# Patient Record
Sex: MALE | Race: WHITE | NOT HISPANIC OR LATINO | URBAN - METROPOLITAN AREA
[De-identification: names, ages, dates, MRNs, and addresses within clinical notes are randomized per-mention and may not be internally consistent; named-entity substitution may affect disease eponyms.]

---

## 2021-07-19 ENCOUNTER — OUTPATIENT (OUTPATIENT)
Dept: OUTPATIENT SERVICES | Facility: HOSPITAL | Age: 76
LOS: 1 days | End: 2021-07-19
Payer: MEDICARE

## 2021-07-19 DIAGNOSIS — M50.20 OTHER CERVICAL DISC DISPLACEMENT, UNSPECIFIED CERVICAL REGION: Chronic | ICD-10-CM

## 2021-07-19 DIAGNOSIS — M54.16 RADICULOPATHY, LUMBAR REGION: ICD-10-CM

## 2021-07-19 LAB
INR BLD: 1.07 RATIO — SIGNIFICANT CHANGE UP (ref 0.88–1.16)
PROTHROM AB SERPL-ACNC: 12.9 SEC — SIGNIFICANT CHANGE UP (ref 10.6–13.6)

## 2021-07-19 PROCEDURE — 62323 NJX INTERLAMINAR LMBR/SAC: CPT

## 2021-07-19 PROCEDURE — 85610 PROTHROMBIN TIME: CPT

## 2021-07-19 PROCEDURE — 36415 COLL VENOUS BLD VENIPUNCTURE: CPT

## 2022-03-03 ENCOUNTER — APPOINTMENT (OUTPATIENT)
Dept: SURGERY | Facility: CLINIC | Age: 77
End: 2022-03-03
Payer: MEDICARE

## 2022-03-03 VITALS
SYSTOLIC BLOOD PRESSURE: 159 MMHG | BODY MASS INDEX: 32.58 KG/M2 | DIASTOLIC BLOOD PRESSURE: 85 MMHG | WEIGHT: 215 LBS | TEMPERATURE: 98.3 F | HEART RATE: 102 BPM | OXYGEN SATURATION: 92 % | HEIGHT: 68 IN

## 2022-03-03 PROCEDURE — 99204 OFFICE O/P NEW MOD 45 MIN: CPT

## 2022-03-03 NOTE — PLAN
[FreeTextEntry1] : Open Left Scrotal Inguinal Hernia repair. Due to potential for abdominal compartment syndrome, the hernia will be repaired in stages. The large symptomatic left inguinal hernia will be approached and right side will be left alone in case on increase in intraabdominal pressure. Pt has gained medical clearance from PCP and pulmonologist for surgical intervention. Potential for drain in the hernia sac was also discussed in detail. The patient and his wife is agreeable and would like to proceed with the operation.  All risk, benefit, alternatives were explained to the patient and they are agreeable.\par

## 2022-03-03 NOTE — ASSESSMENT
[FreeTextEntry1] : 77 yo M with PMH of COPD, Factor V Leiden (on warfarin), Morgagni hernia, HTN and Parkinson's presenting for surgical evaluation of b/l inguinal hernia.

## 2022-03-03 NOTE — HISTORY OF PRESENT ILLNESS
[de-identified] : 77 yo M with PMH of COPD, Factor V Leiden (on warfarin), Morgagni hernia, HTN and Parkinson's presenting for evaluation of inguinal hernia. Pt first noticed the hernia 6 years ago, is not able to differentiate whether it is L, R or both. Has progressively increased in size. Denies any pain or discomfort. States he is not able to reduce it anymore. Denies fever, chills, bloody stools, nausea or vomiting.

## 2022-03-03 NOTE — PHYSICAL EXAM
[Normal Breath Sounds] : Normal breath sounds [Normal Heart Sounds] : normal heart sounds [Alert] : alert [Oriented to Person] : oriented to person [Oriented to Place] : oriented to place [Oriented to Time] : oriented to time [Calm] : calm [de-identified] : comfortable [de-identified] : tachycardic [de-identified] : Non-tender, non-distended abdomen. Large incarcerated inguinal hernia, L>R with no pain on palpation. NBS [de-identified] : resting tremor in b/l hands, mild

## 2022-06-01 ENCOUNTER — APPOINTMENT (OUTPATIENT)
Dept: SURGERY | Facility: CLINIC | Age: 77
End: 2022-06-01
Payer: MEDICARE

## 2022-06-08 ENCOUNTER — APPOINTMENT (OUTPATIENT)
Dept: SURGERY | Facility: CLINIC | Age: 77
End: 2022-06-08
Payer: MEDICARE

## 2022-06-08 VITALS
OXYGEN SATURATION: 95 % | RESPIRATION RATE: 17 BRPM | SYSTOLIC BLOOD PRESSURE: 180 MMHG | BODY MASS INDEX: 32.74 KG/M2 | HEIGHT: 68 IN | DIASTOLIC BLOOD PRESSURE: 84 MMHG | HEART RATE: 95 BPM | TEMPERATURE: 96.7 F | WEIGHT: 216 LBS

## 2022-06-08 VITALS — SYSTOLIC BLOOD PRESSURE: 167 MMHG | DIASTOLIC BLOOD PRESSURE: 81 MMHG

## 2022-06-08 DIAGNOSIS — B37.2 CANDIDIASIS OF SKIN AND NAIL: ICD-10-CM

## 2022-06-08 DIAGNOSIS — J44.9 CHRONIC OBSTRUCTIVE PULMONARY DISEASE, UNSPECIFIED: ICD-10-CM

## 2022-06-08 DIAGNOSIS — Z82.5 FAMILY HISTORY OF ASTHMA AND OTHER CHRONIC LOWER RESPIRATORY DISEASES: ICD-10-CM

## 2022-06-08 DIAGNOSIS — Q79.0 CONGENITAL DIAPHRAGMATIC HERNIA: ICD-10-CM

## 2022-06-08 DIAGNOSIS — Z80.1 FAMILY HISTORY OF MALIGNANT NEOPLASM OF TRACHEA, BRONCHUS AND LUNG: ICD-10-CM

## 2022-06-08 DIAGNOSIS — Z86.711 PERSONAL HISTORY OF PULMONARY EMBOLISM: ICD-10-CM

## 2022-06-08 DIAGNOSIS — G20 PARKINSON'S DISEASE: ICD-10-CM

## 2022-06-08 DIAGNOSIS — I87.2 VENOUS INSUFFICIENCY (CHRONIC) (PERIPHERAL): ICD-10-CM

## 2022-06-08 DIAGNOSIS — Z87.891 PERSONAL HISTORY OF NICOTINE DEPENDENCE: ICD-10-CM

## 2022-06-08 DIAGNOSIS — Z82.49 FAMILY HISTORY OF ISCHEMIC HEART DISEASE AND OTHER DISEASES OF THE CIRCULATORY SYSTEM: ICD-10-CM

## 2022-06-08 DIAGNOSIS — R73.03 PREDIABETES.: ICD-10-CM

## 2022-06-08 DIAGNOSIS — R41.3 OTHER AMNESIA: ICD-10-CM

## 2022-06-08 DIAGNOSIS — Z78.9 OTHER SPECIFIED HEALTH STATUS: ICD-10-CM

## 2022-06-08 DIAGNOSIS — M48.00 SPINAL STENOSIS, SITE UNSPECIFIED: ICD-10-CM

## 2022-06-08 DIAGNOSIS — K46.9 UNSPECIFIED ABDOMINAL HERNIA W/OUT OBSTRUCTION OR GANGRENE: ICD-10-CM

## 2022-06-08 PROCEDURE — 99205 OFFICE O/P NEW HI 60 MIN: CPT

## 2022-06-08 PROCEDURE — 99215 OFFICE O/P EST HI 40 MIN: CPT

## 2022-06-08 RX ORDER — CHROMIUM 200 MCG
TABLET ORAL
Refills: 0 | Status: ACTIVE | COMMUNITY

## 2022-06-08 RX ORDER — CARBIDOPA AND LEVODOPA 25; 100 MG/1; MG/1
25-100 TABLET ORAL
Refills: 0 | Status: ACTIVE | COMMUNITY

## 2022-06-08 RX ORDER — WARFARIN 5 MG/1
5 TABLET ORAL
Refills: 0 | Status: ACTIVE | COMMUNITY

## 2022-06-08 RX ORDER — NYSTATIN 100000 [USP'U]/G
100000 POWDER TOPICAL TWICE DAILY
Qty: 1 | Refills: 0 | Status: ACTIVE | COMMUNITY
Start: 2022-06-08 | End: 1900-01-01

## 2022-06-08 RX ORDER — AMLODIPINE BESYLATE 2.5 MG/1
2.5 TABLET ORAL
Refills: 0 | Status: ACTIVE | COMMUNITY

## 2022-06-08 RX ORDER — ROPINIROLE 2 MG/1
2 TABLET, FILM COATED ORAL
Refills: 0 | Status: ACTIVE | COMMUNITY

## 2022-06-08 RX ORDER — ALBUTEROL 90 MCG
AEROSOL (GRAM) INHALATION
Refills: 0 | Status: ACTIVE | COMMUNITY

## 2022-06-08 RX ORDER — WARFARIN SODIUM 2.5 MG/1
2.5 TABLET ORAL
Refills: 0 | Status: ACTIVE | COMMUNITY

## 2022-06-08 RX ORDER — IRBESARTAN AND HYDROCHLOROTHIAZIDE 300; 12.5 MG/1; MG/1
300-12.5 TABLET ORAL
Refills: 0 | Status: ACTIVE | COMMUNITY

## 2022-06-08 RX ORDER — TRIHEXYPHENIDYL HYDROCHLORIDE 5 MG/1
TABLET ORAL
Refills: 0 | Status: ACTIVE | COMMUNITY

## 2022-06-08 RX ORDER — BUDESONIDE AND FORMOTEROL FUMARATE DIHYDRATE 160; 4.5 UG/1; UG/1
160-4.5 AEROSOL RESPIRATORY (INHALATION)
Refills: 0 | Status: ACTIVE | COMMUNITY

## 2022-06-08 RX ORDER — FEXOFENADINE HYDROCHLORIDE 180 MG/1
180 TABLET ORAL
Refills: 0 | Status: ACTIVE | COMMUNITY

## 2022-06-08 NOTE — ASSESSMENT
[FreeTextEntry1] : 76-year-old male with multiple medical problems and bilateral, chronically incarcerated scrotal hernias, left larger than right.

## 2022-06-08 NOTE — PHYSICAL EXAM
[de-identified] : Obese, soft, nondistended, nontender. Moderate degree of upper midline diastasis. Left groin-  large, nontender, irreducible scrotal hernia. Right groin-  somewhat smaller, nontender, irreducible scrotal hernia.

## 2022-06-08 NOTE — HISTORY OF PRESENT ILLNESS
[de-identified] : HEATH  is a 76 year  male  here for a consultation for possible bilateral inguinal hernias. [de-identified] : 76-year-old male with severe COPD, Parkinson's disease, on Coumadin for factor V Leiden dictation with history of prior bilateral PEs now with bilateral scrotal hernias of many years duration. No pain or tenderness and no generalized abdominal symptoms. Patient does suffer from chronic constipation likely related to Parkinson's disease and medications.

## 2022-06-08 NOTE — PLAN
[FreeTextEntry1] : Advised staged repairs with a left-sided repair first. Discussed with patient and wife nature of, indications for and risks/benefits of surgery. Procedure to be scheduled on an inpatient basis and Coumadin to be held 4-5 days preoperatively

## 2022-07-12 ENCOUNTER — APPOINTMENT (OUTPATIENT)
Dept: SURGERY | Facility: HOSPITAL | Age: 77
End: 2022-07-12

## 2022-11-14 ENCOUNTER — OFFICE (OUTPATIENT)
Dept: URBAN - METROPOLITAN AREA CLINIC 70 | Facility: CLINIC | Age: 77
Setting detail: OPHTHALMOLOGY
End: 2022-11-14
Payer: MEDICARE

## 2022-11-14 DIAGNOSIS — Z96.1: ICD-10-CM

## 2022-11-14 DIAGNOSIS — D31.32: ICD-10-CM

## 2022-11-14 DIAGNOSIS — H35.40: ICD-10-CM

## 2022-11-14 DIAGNOSIS — H35.54: ICD-10-CM

## 2022-11-14 DIAGNOSIS — E11.9: ICD-10-CM

## 2022-11-14 DIAGNOSIS — H16.223: ICD-10-CM

## 2022-11-14 PROCEDURE — 99213 OFFICE O/P EST LOW 20 MIN: CPT | Performed by: OPHTHALMOLOGY

## 2022-11-14 PROCEDURE — 76512 OPH US DX B-SCAN: CPT | Performed by: OPHTHALMOLOGY

## 2022-11-14 ASSESSMENT — REFRACTION_CURRENTRX
OD_CYLINDER: -1.00
OS_VPRISM_DIRECTION: PROGS
OD_SPHERE: -0.50
OS_SPHERE: +0.75
OS_OVR_VA: 20/
OS_CYLINDER: -0.25
OS_SPHERE: +0.50
OS_CYLINDER: -0.25
OD_OVR_VA: 20/
OD_ADD: +2.50
OS_AXIS: 081
OS_ADD: +2.00
OD_AXIS: 085
OD_ADD: +2.50
OD_VPRISM_DIRECTION: PROGS
OS_OVR_VA: 20/
OD_AXIS: 088
OD_SPHERE: -0.50
OD_CYLINDER: -1.25
OS_CYLINDER: -0.50
OD_VPRISM_DIRECTION: PROGS
OD_CYLINDER: -1.50
OD_SPHERE: +0.50
OD_OVR_VA: 20/
OS_AXIS: 066
OS_OVR_VA: 20/
OD_AXIS: 090
OD_OVR_VA: 20/
OS_SPHERE: +0.50
OD_ADD: +2.25
OS_VPRISM_DIRECTION: PROGS
OS_ADD: +2.00
OS_ADD: +2.50
OS_AXIS: 072

## 2022-11-14 ASSESSMENT — KERATOMETRY
OD_K1POWER_DIOPTERS: 44.50
OS_K1POWER_DIOPTERS: 45.25
OS_K2POWER_DIOPTERS: 46.00
OS_AXISANGLE_DEGREES: 159
OD_K2POWER_DIOPTERS: 45.75
OD_AXISANGLE_DEGREES: 174

## 2022-11-14 ASSESSMENT — REFRACTION_MANIFEST
OD_SPHERE: -0.75
OD_VA2: 20/20
OS_AXIS: 75
OS_ADD: +2.50
OU_VA: 20/20
OS_SPHERE: +0.50
OD_AXIS: 90
OD_VA2: 20/20
OS_CYLINDER: -0.50
OS_AXIS: 065
OD_AXIS: 090
OS_VA2: 20/20
OS_ADD: +2.75
OS_VA1: 20/25
OD_CYLINDER: -1.00
OS_VA2: 20/20
OD_ADD: +2.75
OD_VA1: 20/25
OD_SPHERE: +0.25
OD_CYLINDER: -1.25
OS_CYLINDER: -0.25
OS_SPHERE: +0.25
OD_VA1: 20/20
OD_ADD: +2.50
OS_VA1: 20/20

## 2022-11-14 ASSESSMENT — TONOMETRY
OS_IOP_MMHG: 16
OD_IOP_MMHG: 15

## 2022-11-14 ASSESSMENT — REFRACTION_AUTOREFRACTION
OD_SPHERE: 0.00
OD_CYLINDER: -2.00
OS_CYLINDER: -1.25
OS_SPHERE: +1.00
OS_AXIS: 074
OD_AXIS: 084

## 2022-11-14 ASSESSMENT — SPHEQUIV_DERIVED
OS_SPHEQUIV: 0.25
OD_SPHEQUIV: -0.375
OD_SPHEQUIV: -1
OS_SPHEQUIV: 0.375
OD_SPHEQUIV: -1.25
OS_SPHEQUIV: 0.125

## 2022-11-14 ASSESSMENT — SUPERFICIAL PUNCTATE KERATITIS (SPK)
OD_SPK: 1+
OS_SPK: 1+

## 2022-11-14 ASSESSMENT — PACHYMETRY
OS_CT_UM: 560
OD_CT_CORRECTION: -1
OD_CT_UM: 562
OS_CT_CORRECTION: -1

## 2022-11-14 ASSESSMENT — AXIALLENGTH_DERIVED
OS_AL: 22.7003
OS_AL: 22.7455
OD_AL: 23.3864
OD_AL: 23.1496
OD_AL: 23.4825
OS_AL: 22.7908

## 2022-11-14 ASSESSMENT — VISUAL ACUITY
OD_BCVA: 20/25-2
OS_BCVA: 20/30

## 2022-11-14 ASSESSMENT — CONFRONTATIONAL VISUAL FIELD TEST (CVF)
OS_FINDINGS: FULL
OD_FINDINGS: FULL

## 2022-12-23 ENCOUNTER — INPATIENT (INPATIENT)
Facility: HOSPITAL | Age: 77
LOS: 14 days | Discharge: HOME HEALTH | DRG: 862 | End: 2023-01-07
Attending: STUDENT IN AN ORGANIZED HEALTH CARE EDUCATION/TRAINING PROGRAM | Admitting: STUDENT IN AN ORGANIZED HEALTH CARE EDUCATION/TRAINING PROGRAM
Payer: MEDICARE

## 2022-12-23 VITALS
RESPIRATION RATE: 20 BRPM | HEIGHT: 67 IN | OXYGEN SATURATION: 99 % | HEART RATE: 122 BPM | TEMPERATURE: 100 F | DIASTOLIC BLOOD PRESSURE: 78 MMHG | SYSTOLIC BLOOD PRESSURE: 144 MMHG | WEIGHT: 210.1 LBS

## 2022-12-23 DIAGNOSIS — N49.2 INFLAMMATORY DISORDERS OF SCROTUM: ICD-10-CM

## 2022-12-23 DIAGNOSIS — M50.20 OTHER CERVICAL DISC DISPLACEMENT, UNSPECIFIED CERVICAL REGION: Chronic | ICD-10-CM

## 2022-12-23 DIAGNOSIS — Z01.818 ENCOUNTER FOR OTHER PREPROCEDURAL EXAMINATION: ICD-10-CM

## 2022-12-23 DIAGNOSIS — Z86.2 PERSONAL HISTORY OF DISEASES OF THE BLOOD AND BLOOD-FORMING ORGANS AND CERTAIN DISORDERS INVOLVING THE IMMUNE MECHANISM: ICD-10-CM

## 2022-12-23 DIAGNOSIS — I48.91 UNSPECIFIED ATRIAL FIBRILLATION: ICD-10-CM

## 2022-12-23 DIAGNOSIS — I10 ESSENTIAL (PRIMARY) HYPERTENSION: ICD-10-CM

## 2022-12-23 DIAGNOSIS — G20 PARKINSON'S DISEASE: ICD-10-CM

## 2022-12-23 DIAGNOSIS — Z98.890 OTHER SPECIFIED POSTPROCEDURAL STATES: Chronic | ICD-10-CM

## 2022-12-23 DIAGNOSIS — J44.9 CHRONIC OBSTRUCTIVE PULMONARY DISEASE, UNSPECIFIED: ICD-10-CM

## 2022-12-23 DIAGNOSIS — Z91.89 OTHER SPECIFIED PERSONAL RISK FACTORS, NOT ELSEWHERE CLASSIFIED: ICD-10-CM

## 2022-12-23 DIAGNOSIS — A41.9 SEPSIS, UNSPECIFIED ORGANISM: ICD-10-CM

## 2022-12-23 LAB
ALBUMIN SERPL ELPH-MCNC: 3.2 G/DL — LOW (ref 3.3–5)
ALP SERPL-CCNC: 132 U/L — HIGH (ref 40–120)
ALT FLD-CCNC: 8 U/L — LOW (ref 10–45)
ANION GAP SERPL CALC-SCNC: 14 MMOL/L — SIGNIFICANT CHANGE UP (ref 5–17)
APTT BLD: 44.7 SEC — HIGH (ref 27.5–35.5)
AST SERPL-CCNC: 25 U/L — SIGNIFICANT CHANGE UP (ref 10–40)
BASE EXCESS BLDV CALC-SCNC: 8.2 MMOL/L — HIGH (ref -2–3)
BASOPHILS # BLD AUTO: 0.03 K/UL — SIGNIFICANT CHANGE UP (ref 0–0.2)
BASOPHILS NFR BLD AUTO: 0.2 % — SIGNIFICANT CHANGE UP (ref 0–2)
BILIRUB SERPL-MCNC: 0.6 MG/DL — SIGNIFICANT CHANGE UP (ref 0.2–1.2)
BUN SERPL-MCNC: 23 MG/DL — SIGNIFICANT CHANGE UP (ref 7–23)
CA-I SERPL-SCNC: 1.13 MMOL/L — LOW (ref 1.15–1.33)
CALCIUM SERPL-MCNC: 9.1 MG/DL — SIGNIFICANT CHANGE UP (ref 8.4–10.5)
CHLORIDE BLDV-SCNC: 98 MMOL/L — SIGNIFICANT CHANGE UP (ref 96–108)
CHLORIDE SERPL-SCNC: 96 MMOL/L — SIGNIFICANT CHANGE UP (ref 96–108)
CO2 BLDV-SCNC: 34 MMOL/L — HIGH (ref 22–26)
CO2 SERPL-SCNC: 28 MMOL/L — SIGNIFICANT CHANGE UP (ref 22–31)
CREAT SERPL-MCNC: 1.16 MG/DL — SIGNIFICANT CHANGE UP (ref 0.5–1.3)
EGFR: 64 ML/MIN/1.73M2 — SIGNIFICANT CHANGE UP
EOSINOPHIL # BLD AUTO: 0.02 K/UL — SIGNIFICANT CHANGE UP (ref 0–0.5)
EOSINOPHIL NFR BLD AUTO: 0.1 % — SIGNIFICANT CHANGE UP (ref 0–6)
GAS PNL BLDV: 133 MMOL/L — LOW (ref 136–145)
GAS PNL BLDV: SIGNIFICANT CHANGE UP
GAS PNL BLDV: SIGNIFICANT CHANGE UP
GLUCOSE BLDV-MCNC: 187 MG/DL — HIGH (ref 70–99)
GLUCOSE SERPL-MCNC: 179 MG/DL — HIGH (ref 70–99)
HCO3 BLDV-SCNC: 33 MMOL/L — HIGH (ref 22–29)
HCT VFR BLD CALC: 41.3 % — SIGNIFICANT CHANGE UP (ref 39–50)
HCT VFR BLDA CALC: 46 % — SIGNIFICANT CHANGE UP (ref 39–51)
HGB BLD CALC-MCNC: 15.2 G/DL — SIGNIFICANT CHANGE UP (ref 12.6–17.4)
HGB BLD-MCNC: 13.1 G/DL — SIGNIFICANT CHANGE UP (ref 13–17)
IMM GRANULOCYTES NFR BLD AUTO: 0.4 % — SIGNIFICANT CHANGE UP (ref 0–0.9)
INR BLD: 4.06 RATIO — HIGH (ref 0.88–1.16)
LACTATE BLDV-MCNC: 1.9 MMOL/L — SIGNIFICANT CHANGE UP (ref 0.5–2)
LYMPHOCYTES # BLD AUTO: 0.71 K/UL — LOW (ref 1–3.3)
LYMPHOCYTES # BLD AUTO: 4.9 % — LOW (ref 13–44)
MCHC RBC-ENTMCNC: 29.2 PG — SIGNIFICANT CHANGE UP (ref 27–34)
MCHC RBC-ENTMCNC: 31.7 GM/DL — LOW (ref 32–36)
MCV RBC AUTO: 92 FL — SIGNIFICANT CHANGE UP (ref 80–100)
MONOCYTES # BLD AUTO: 1.36 K/UL — HIGH (ref 0–0.9)
MONOCYTES NFR BLD AUTO: 9.3 % — SIGNIFICANT CHANGE UP (ref 2–14)
NEUTROPHILS # BLD AUTO: 12.42 K/UL — HIGH (ref 1.8–7.4)
NEUTROPHILS NFR BLD AUTO: 85.1 % — HIGH (ref 43–77)
NRBC # BLD: 0 /100 WBCS — SIGNIFICANT CHANGE UP (ref 0–0)
PCO2 BLDV: 44 MMHG — SIGNIFICANT CHANGE UP (ref 42–55)
PH BLDV: 7.48 — HIGH (ref 7.32–7.43)
PLATELET # BLD AUTO: 271 K/UL — SIGNIFICANT CHANGE UP (ref 150–400)
PO2 BLDV: 34 MMHG — SIGNIFICANT CHANGE UP (ref 25–45)
POTASSIUM BLDV-SCNC: 3.8 MMOL/L — SIGNIFICANT CHANGE UP (ref 3.5–5.1)
POTASSIUM SERPL-MCNC: 3.5 MMOL/L — SIGNIFICANT CHANGE UP (ref 3.5–5.3)
POTASSIUM SERPL-SCNC: 3.5 MMOL/L — SIGNIFICANT CHANGE UP (ref 3.5–5.3)
PROT SERPL-MCNC: 7.2 G/DL — SIGNIFICANT CHANGE UP (ref 6–8.3)
PROTHROM AB SERPL-ACNC: 47.8 SEC — HIGH (ref 10.5–13.4)
RAPID RVP RESULT: SIGNIFICANT CHANGE UP
RBC # BLD: 4.49 M/UL — SIGNIFICANT CHANGE UP (ref 4.2–5.8)
RBC # FLD: 16 % — HIGH (ref 10.3–14.5)
SAO2 % BLDV: 57.2 % — LOW (ref 67–88)
SARS-COV-2 RNA SPEC QL NAA+PROBE: SIGNIFICANT CHANGE UP
SODIUM SERPL-SCNC: 138 MMOL/L — SIGNIFICANT CHANGE UP (ref 135–145)
WBC # BLD: 14.6 K/UL — HIGH (ref 3.8–10.5)
WBC # FLD AUTO: 14.6 K/UL — HIGH (ref 3.8–10.5)

## 2022-12-23 PROCEDURE — 71250 CT THORAX DX C-: CPT | Mod: 26,MA

## 2022-12-23 PROCEDURE — 71045 X-RAY EXAM CHEST 1 VIEW: CPT | Mod: 26

## 2022-12-23 PROCEDURE — 99223 1ST HOSP IP/OBS HIGH 75: CPT

## 2022-12-23 PROCEDURE — 74177 CT ABD & PELVIS W/CONTRAST: CPT | Mod: 26,MA

## 2022-12-23 PROCEDURE — 99232 SBSQ HOSP IP/OBS MODERATE 35: CPT

## 2022-12-23 PROCEDURE — 99285 EMERGENCY DEPT VISIT HI MDM: CPT | Mod: GC

## 2022-12-23 PROCEDURE — 51702 INSERT TEMP BLADDER CATH: CPT

## 2022-12-23 RX ORDER — ACETAMINOPHEN 500 MG
650 TABLET ORAL EVERY 6 HOURS
Refills: 0 | Status: DISCONTINUED | OUTPATIENT
Start: 2022-12-23 | End: 2023-01-07

## 2022-12-23 RX ORDER — PIPERACILLIN AND TAZOBACTAM 4; .5 G/20ML; G/20ML
3.38 INJECTION, POWDER, LYOPHILIZED, FOR SOLUTION INTRAVENOUS ONCE
Refills: 0 | Status: COMPLETED | OUTPATIENT
Start: 2022-12-23 | End: 2022-12-23

## 2022-12-23 RX ORDER — TRIHEXYPHENIDYL HCL 2 MG
1 TABLET ORAL
Refills: 0 | Status: DISCONTINUED | OUTPATIENT
Start: 2022-12-23 | End: 2023-01-07

## 2022-12-23 RX ORDER — CARBIDOPA AND LEVODOPA 25; 100 MG/1; MG/1
1 TABLET ORAL
Refills: 0 | Status: DISCONTINUED | OUTPATIENT
Start: 2022-12-23 | End: 2023-01-07

## 2022-12-23 RX ORDER — BUDESONIDE AND FORMOTEROL FUMARATE DIHYDRATE 160; 4.5 UG/1; UG/1
2 AEROSOL RESPIRATORY (INHALATION)
Refills: 0 | Status: DISCONTINUED | OUTPATIENT
Start: 2022-12-23 | End: 2023-01-07

## 2022-12-23 RX ORDER — PIPERACILLIN AND TAZOBACTAM 4; .5 G/20ML; G/20ML
3.38 INJECTION, POWDER, LYOPHILIZED, FOR SOLUTION INTRAVENOUS EVERY 8 HOURS
Refills: 0 | Status: DISCONTINUED | OUTPATIENT
Start: 2022-12-24 | End: 2022-12-28

## 2022-12-23 RX ORDER — VANCOMYCIN HCL 1 G
1000 VIAL (EA) INTRAVENOUS ONCE
Refills: 0 | Status: COMPLETED | OUTPATIENT
Start: 2022-12-23 | End: 2022-12-23

## 2022-12-23 RX ORDER — LACTOBACILLUS ACIDOPHILUS 100MM CELL
1 CAPSULE ORAL DAILY
Refills: 0 | Status: DISCONTINUED | OUTPATIENT
Start: 2022-12-23 | End: 2023-01-07

## 2022-12-23 RX ORDER — ROPINIROLE 8 MG/1
2 TABLET, FILM COATED, EXTENDED RELEASE ORAL
Refills: 0 | Status: DISCONTINUED | OUTPATIENT
Start: 2022-12-23 | End: 2023-01-07

## 2022-12-23 RX ORDER — VANCOMYCIN HCL 1 G
750 VIAL (EA) INTRAVENOUS EVERY 12 HOURS
Refills: 0 | Status: DISCONTINUED | OUTPATIENT
Start: 2022-12-24 | End: 2022-12-25

## 2022-12-23 RX ORDER — FOLIC ACID 0.8 MG
1000 TABLET ORAL
Qty: 0 | Refills: 0 | DISCHARGE

## 2022-12-23 RX ORDER — FOLIC ACID 0.8 MG
1 TABLET ORAL DAILY
Refills: 0 | Status: DISCONTINUED | OUTPATIENT
Start: 2022-12-23 | End: 2023-01-07

## 2022-12-23 RX ORDER — SODIUM CHLORIDE 9 MG/ML
1000 INJECTION, SOLUTION INTRAVENOUS ONCE
Refills: 0 | Status: COMPLETED | OUTPATIENT
Start: 2022-12-23 | End: 2022-12-23

## 2022-12-23 RX ORDER — PREGABALIN 225 MG/1
1000 CAPSULE ORAL DAILY
Refills: 0 | Status: DISCONTINUED | OUTPATIENT
Start: 2022-12-23 | End: 2023-01-07

## 2022-12-23 RX ORDER — LANOLIN ALCOHOL/MO/W.PET/CERES
3 CREAM (GRAM) TOPICAL AT BEDTIME
Refills: 0 | Status: DISCONTINUED | OUTPATIENT
Start: 2022-12-23 | End: 2023-01-07

## 2022-12-23 RX ORDER — ACETAMINOPHEN 500 MG
975 TABLET ORAL ONCE
Refills: 0 | Status: COMPLETED | OUTPATIENT
Start: 2022-12-23 | End: 2022-12-23

## 2022-12-23 RX ORDER — LOSARTAN POTASSIUM 100 MG/1
100 TABLET, FILM COATED ORAL DAILY
Refills: 0 | Status: DISCONTINUED | OUTPATIENT
Start: 2022-12-23 | End: 2023-01-07

## 2022-12-23 RX ORDER — IPRATROPIUM/ALBUTEROL SULFATE 18-103MCG
3 AEROSOL WITH ADAPTER (GRAM) INHALATION EVERY 6 HOURS
Refills: 0 | Status: DISCONTINUED | OUTPATIENT
Start: 2022-12-23 | End: 2023-01-07

## 2022-12-23 RX ORDER — ONDANSETRON 8 MG/1
4 TABLET, FILM COATED ORAL EVERY 8 HOURS
Refills: 0 | Status: DISCONTINUED | OUTPATIENT
Start: 2022-12-23 | End: 2023-01-07

## 2022-12-23 RX ORDER — CEFTRIAXONE 500 MG/1
1000 INJECTION, POWDER, FOR SOLUTION INTRAMUSCULAR; INTRAVENOUS ONCE
Refills: 0 | Status: COMPLETED | OUTPATIENT
Start: 2022-12-23 | End: 2022-12-23

## 2022-12-23 RX ADMIN — SODIUM CHLORIDE 1000 MILLILITER(S): 9 INJECTION, SOLUTION INTRAVENOUS at 13:37

## 2022-12-23 RX ADMIN — Medication 975 MILLIGRAM(S): at 13:54

## 2022-12-23 RX ADMIN — PIPERACILLIN AND TAZOBACTAM 200 GRAM(S): 4; .5 INJECTION, POWDER, LYOPHILIZED, FOR SOLUTION INTRAVENOUS at 19:44

## 2022-12-23 RX ADMIN — Medication 250 MILLIGRAM(S): at 20:45

## 2022-12-23 NOTE — ED PROVIDER NOTE - ATTENDING CONTRIBUTION TO CARE
attending Ger: 77yM h/o Parkinson disease, COPD, DVT/PE factor V Leiden on Coumadin BIBEMS for generalized weakness and intermittent confusion, frequent falls, SOB. Has had multiple falls in the last week, seen at OSH with reportedly negative imaging. Exam as above. Sepsis. Will obtain labs including vbg with lactate and blood cultures, IVF, empiric abx, cxr, UA/Ucx, RVP, admit.

## 2022-12-23 NOTE — ED PROVIDER NOTE - NSICDXPASTMEDICALHX_GEN_ALL_CORE_FT
PAST MEDICAL HISTORY:  COPD (chronic obstructive pulmonary disease)     Hypertension     Personal history of PE (pulmonary embolism)     Restless leg syndrome

## 2022-12-23 NOTE — ED ADULT TRIAGE NOTE - WAS YOUR LAST COVID-19 VACCINE GREATER THAN OR EQUAL TO TWO MONTHS AGO?
At [facility], one important tool we use to improve our patient services is our Patient Survey.  Following your visit you may receive our survey in the mail.    Please take the time to complete the survey.    If your visit with us was great, we want to hear about it.    If we can improve, please let us know how.       [facility] Central Scheduling - [phone] (counselor)     Yes

## 2022-12-23 NOTE — H&P ADULT - NSHPPHYSICALEXAM_GEN_ALL_CORE
Vital Signs Last 24 Hrs  T(C): 36.7 (23 Dec 2022 19:24), Max: 39.2 (23 Dec 2022 13:42)  T(F): 98 (23 Dec 2022 19:24), Max: 102.5 (23 Dec 2022 13:42)  HR: 93 (23 Dec 2022 19:24) (83 - 143)  BP: 148/72 (23 Dec 2022 19:24) (128/79 - 148/72)  BP(mean): --  RR: 19 (23 Dec 2022 19:24) (19 - 28)  SpO2: 97% (23 Dec 2022 19:24) (92% - 99%)    Parameters below as of 23 Dec 2022 19:24  Patient On (Oxygen Delivery Method): nasal cannula  O2 Flow (L/min): 2 Vital Signs Last 24 Hrs  T(C): 36.7 (23 Dec 2022 19:24), Max: 39.2 (23 Dec 2022 13:42)  T(F): 98 (23 Dec 2022 19:24), Max: 102.5 (23 Dec 2022 13:42)  HR: 93 (23 Dec 2022 19:24) (83 - 143)  BP: 148/72 (23 Dec 2022 19:24) (128/79 - 148/72)  BP(mean): --  RR: 19 (23 Dec 2022 19:24) (19 - 28)  SpO2: 97% (23 Dec 2022 19:24) (92% - 99%)    Parameters below as of 23 Dec 2022 19:24  Patient On (Oxygen Delivery Method): nasal cannula  O2 Flow (L/min): 2    GENERAL: mild respiratory distress on NC , well-developed  HEAD:  Atraumatic, Normocephalic  ENT: EOMI, PERRLA, conjunctiva and sclera clear,  moist mucosa no pharyngeal erythema or exudates   NECK: supple , no JVD   CHEST/LUNG: Clear to auscultation bilaterally; diffuse  wheezes ,  equal breath sounds bilaterally   BACK: No spinal tenderness,  No CVA tenderness   HEART: Regular rate and rhythm; No murmurs, rubs, or gallops  ABDOMEN: Soft, Nontender, Nondistended; Bowel sounds present  EXTREMITIES:  No clubbing, cyanosis, or edema  : Uncircumcised, no lesions.  markedly edematous  R scrotum firm, indurated, mild TTP, no warmth  erythematous , gould intact with dark urine   MSK: No joint swelling or effusions, ROM intact   PSYCH: Normal behavior/affect  NEUROLOGY: b/l resting tremor , AAOx3, non-focal, cranial nerves intact  SKIN: Normal color, No rashes or lesions

## 2022-12-23 NOTE — H&P ADULT - NSHPLABSRESULTS_GEN_ALL_CORE
Labs personally reviewed:                          13.1   14.60 )-----------( 271      ( 23 Dec 2022 13:43 )             41.3     12-23    138  |  96  |  23  ----------------------------<  179<H>  3.5   |  28  |  1.16    Ca    9.1      23 Dec 2022 13:43    TPro  7.2  /  Alb  3.2<L>  /  TBili  0.6  /  DBili  x   /  AST  25  /  ALT  8<L>  /  AlkPhos  132<H>  12-23        LIVER FUNCTIONS - ( 23 Dec 2022 13:43 )  Alb: 3.2 g/dL / Pro: 7.2 g/dL / ALK PHOS: 132 U/L / ALT: 8 U/L / AST: 25 U/L / GGT: x           PT/INR - ( 23 Dec 2022 13:43 )   PT: 47.8 sec;   INR: 4.06 ratio         PTT - ( 23 Dec 2022 13:43 )  PTT:44.7 sec    CAPILLARY BLOOD GLUCOSE          Imaging:  CXR personally reviewed: Similar-appearing volume loss and atelectasis of the left lung as noted   back in 2013. This could be secondary to foramen of Morgagni hernia   compressing the lungs seen in the same day CT.    CT chest /AP: Large right scrotal abscess measuring up to 15.0 cm.    Large left inguinal hernia containing sigmoid colon.    Large left-sided Morgagni hernia.    2.3 cm pancreatic head cystic lesion.      EKG personally reviewed: nsr, no acute st changes Labs personally reviewed:                          13.1   14.60 )-----------( 271      ( 23 Dec 2022 13:43 )             41.3     12-23    138  |  96  |  23  ----------------------------<  179<H>  3.5   |  28  |  1.16    Ca    9.1      23 Dec 2022 13:43    TPro  7.2  /  Alb  3.2<L>  /  TBili  0.6  /  DBili  x   /  AST  25  /  ALT  8<L>  /  AlkPhos  132<H>  12-23        LIVER FUNCTIONS - ( 23 Dec 2022 13:43 )  Alb: 3.2 g/dL / Pro: 7.2 g/dL / ALK PHOS: 132 U/L / ALT: 8 U/L / AST: 25 U/L / GGT: x           PT/INR - ( 23 Dec 2022 13:43 )   PT: 47.8 sec;   INR: 4.06 ratio         PTT - ( 23 Dec 2022 13:43 )  PTT:44.7 sec    CAPILLARY BLOOD GLUCOSE          Imaging:  CXR personally reviewed: Similar-appearing volume loss and atelectasis of the left lung as noted   back in 2013. This could be secondary to foramen of Morgagni hernia   compressing the lungs seen in the same day CT.    CT chest /AP: Large right scrotal abscess measuring up to 15.0 cm.    Large left inguinal hernia containing sigmoid colon.    Large left-sided Morgagni hernia.    2.3 cm pancreatic head cystic lesion.      EKG personally reviewed: afib at 114 bpm

## 2022-12-23 NOTE — ED PROVIDER NOTE - NS ED ROS FT
GENERAL: No fever, chills  EYES: no vision changes, no discharge.   ENT: no difficulty swallowing or speaking   CARDIAC: no chest pain/pressure, SOB, lower extremity swelling  PULMONARY: no cough, SOB  GI: no abdominal pain, n/v/d  : no dysuria, no hematuria  SKIN: no rashes, no ecchymosis  NEURO: no headache, lightheadedness, +confusion  MSK: No joint pain, myalgia, +weakness.

## 2022-12-23 NOTE — ED PROVIDER NOTE - NS ED MD DISPO DIVISION
Pt awake, alert. Ambulatory to room, gait steady. Speech clear, appropriate. Skin warm, dry with good turgor. Pink, moist oral mucosa. Color natural for ethnicity. Pt denies visual disturbances, difficulty with arms, legs.       Edel Corbett RN  03/13/22 [id number] [facility]

## 2022-12-23 NOTE — H&P ADULT - PROBLEM SELECTOR PLAN 2
febrile , leukocytosis , tachycardic : presumed scrotal abscess as source , no other infection identified on imaging   - s/p 1L IV fluid bolus , additional IV fluids as needed   - continue broad spectrum abx   - urinalysis / urine cultures   - f/u blood cultures   - probiotics

## 2022-12-23 NOTE — H&P ADULT - NSICDXPASTSURGICALHX_GEN_ALL_CORE_FT
PAST SURGICAL HISTORY:  Herniated cervical disc      PAST SURGICAL HISTORY:  H/O hernia repair     Herniated cervical disc

## 2022-12-23 NOTE — H&P ADULT - PROBLEM SELECTOR PLAN 6
no prior history of afib   - currently controlled   -  rapid rate likely from sepsis   - already on AC

## 2022-12-23 NOTE — ED ADULT NURSE REASSESSMENT NOTE - NS ED NURSE REASSESS COMMENT FT1
Attempted in/out cath without success. Pt has severely edematous scrotum with redness and tenderness. Unable to locate penis. MD Becerril aware. Awaiting CT.

## 2022-12-23 NOTE — ED PROVIDER NOTE - CLINICAL SUMMARY MEDICAL DECISION MAKING FREE TEXT BOX
Kings Kinsey,  PGY-2: 77-year-old male past medical history of Parkinson's, COPD, DVT/PE factor V Leiden on Coumadin presents to ED complaining of weakness and intermittent confusion, frequent falls, shortness of breath.  Patient's had multiple falls in the last week and a half, patient went to [facility] 4 days ago had negative CTs and x-rays at that time.  Denies cough, fever, abdominal pain, nausea, vomiting, dysuria, diarrhea. Patient febrile and tachycardic, short of breath with oxygen saturation  high 80s low 90s.  Concern for sepsis secondary to pneumonia, UTI.  Plan for sepsis work-up.  Likely admission.

## 2022-12-23 NOTE — H&P ADULT - PROBLEM SELECTOR PLAN 5
INR supra-therapeutic   - hold coumadin   - Monitor INR , Once < 2 , can start heparin infusion  in anticipation for surgery

## 2022-12-23 NOTE — H&P ADULT - PROBLEM SELECTOR PLAN 3
exercise capacity limited by COPD and parkinsons , no chest pain or cardiac symptoms w/ moderate level of activity , IF urgent  scrotal drainage indicated in setting of sepsis ,  would not delay surgery for further testing   - Risk for Mortality per NSqip is above average at 2.9%  - Risk for Adverse cardiac event is above Average at 2.0 %   -  Pulmonary consult should be obtained for respiratory optimization prior to surgery , can be arranged by day team if required   - HR currently controlled , however , if reverts back to rapid afib ,  Cardiology consult can be obtained for optimization prior to surgery

## 2022-12-23 NOTE — ED ADULT NURSE REASSESSMENT NOTE - NS ED NURSE REASSESS COMMENT FT1
Pt found in position of comfort in bed. AOx3, MAEx4, respirations even and unlabored, skin warm dry and normal for race. Vital signs stable at this time. Pt pending UA/UC. Previous shift RN informed me of inability to obtain urine via straight cath due to enlarged scrotum affecting pt penile anatomy. MD made aware of inability to obtain urine sample at this time. RN made aware that pt is pending urology consult. Report received from ELIDA Guerrero. Pt found in position of comfort in bed. AOx3, MAEx4, respirations even and unlabored, skin warm dry and normal for race. Vital signs stable at this time. Pt pending UA/UC. Previous shift RN informed me of inability to obtain urine via straight cath due to enlarged scrotum affecting pt penile anatomy. MD made aware of inability to obtain urine sample at this time. RN made aware that pt is pending urology consult.

## 2022-12-23 NOTE — ED ADULT NURSE NOTE - CHEST MOVEMENT
Spiritual Care Partner Volunteer visited patient in [address] [employer] on 9/29/19. Documented by:   Chaplain Jackson MDiv, MACE  [phone] PRAY [phone] symmetric

## 2022-12-23 NOTE — ED PROVIDER NOTE - PHYSICAL EXAMINATION
GEN: Patient awake alert NAD.   HEENT: normocephalic, atraumatic, EOMI, no scleral icterus, moist MM  CARDIAC: tachycardic, S1, S2, no murmur.   PULM: CTA B/L no wheeze, rhonchi, rales.   ABD: soft NT, ND, no rebound no guarding,   MSK: Moving all extremities, no edema.   NEURO: A&Ox3, no focal neurological deficits,   SKIN: warm, dry, no rash.

## 2022-12-23 NOTE — ED PROVIDER NOTE - OBJECTIVE STATEMENT
77-year-old male past medical history of Parkinson's, COPD, DVT/PE factor V Leiden on Coumadin presents to ED complaining of weakness and intermittent confusion, frequent falls, shortness of breath.  Patient's had multiple falls in the last week and a half, patient went to [facility] 4 days ago had negative CTs and x-rays at that time.  Denies cough, fever, abdominal pain, nausea, vomiting, dysuria, diarrhea.

## 2022-12-23 NOTE — H&P ADULT - NSHPREVIEWOFSYSTEMS_GEN_ALL_CORE
CONSTITUTIONAL: +  weakness,no  fevers or chills  EYES/ENT: No visual changes;  No dysphagia  NECK: No pain or stiffness  RESPIRATORY: + cough,  + wheezing, no hemoptysis; + shortness of breath  CARDIOVASCULAR: No chest pain or palpitations; No lower extremity edema  EXTREMITIES: no le edema, cyanosis, clubbing  GASTROINTESTINAL: No abdominal or epigastric pain. No nausea, vomiting, or hematemesis; No diarrhea or constipation. No melena or hematochezia.  BACK: No back pain  GENITOURINARY: + dysuria, no frequency + hematuria  NEUROLOGICAL: No numbness or weakness  MSK: no joint swelling or pain  SKIN:+ scrotal swelling on R ,  No itching, burning, rashes, or lesions   PSYCH: no agitation  All other review of systems is negative unless indicated above.

## 2022-12-23 NOTE — PROCEDURE NOTE - NSSIZEINFR_GEN_A_CORE
Care Management Follow Up    Length of Stay (days): 5    Expected Discharge Date: TBD  Expected Time of Departure: TBD  Concerns to be Addressed:[facility]     Patient plan of care discussed at interdisciplinary rounds: Yes  Anticipated Discharge Disposition: Transitional Care   Anticipated Discharge Services:Home Infusion  Anticipated Discharge DME: None  Patient/Family in Agreement with the Plan: yes    Additional Information:  RNCC was contacted by beverly Palm at [facility]. This patient is open for services with [facility]. [facility] will follow patient to TCU and provide service there. Please contact [facility] with discharge.     [facility]  [phone] direct number    GIANCARLO Mack RN, BSN  5B RN Care Coordinator  [phone] phone  [phone] pager    Weekend or Holiday Care Coordinator [phone] pager  Job Code [id number]   18F coude/18

## 2022-12-23 NOTE — ED ADULT NURSE NOTE - CAS TRG GENERAL NORM CIRC DET
Capillary refill less/equal to 2 seconds/Bounding peripheral pulses/No visible significant external bleeding

## 2022-12-23 NOTE — PROCEDURE NOTE - NSURITECHNIQUE_GU_A_CORE
Proper hand hygiene was performed/Sterile gloves were worn for the duration of the procedure/A sterile drape was used to cover all adjacent areas/The site was cleaned with soap/water and sterile solution (betadine)/The catheter was appropriately lubricated/The catheter was secured with a securement device (e.g. StatLock)/The collection bag is below the level of the patient and urinary bladder/All applicable medical record documentation is completed
No

## 2022-12-23 NOTE — CONSULT NOTE ADULT - SUBJECTIVE AND OBJECTIVE BOX
HPI:  77y Male with PMHx of Parkinson's, COPD, DVT/PE factor V Leiden on Coumadin, BL chronic incarcerated hernias s/p R hernia repair 10/29/22 at [facility] with Dr. Dooley c/b large right hematoma s/p 2 needle aspirations, presents to ED complaining of weakness and  shortness of breath since last night.  Pt states since his hernia surgery, hes been seeing the general surgeon every several weeks for needle aspiration from scrotum, last aspiration was 1.5 weeks ago where 360cc of fluid was aspiration. Since then, patient's had multiple falls in the last week and a half (3 in total), patient went to [facility] 4 days ago had negative CTs and x-rays at that time, spoke with PCP who advised patient to go to ED due to another more recent fall.  Pt has never seen a urologist, denies hx of BPH. Endorsing intermittent cough. Denies fever, abdominal pain, nausea, vomiting, dysuria, hematuria, difficulty voiding. Spoke with wife to obtain more collateral.     In ED: patient septic, febrile to 102, tachy to 122, white count 14.6, Crit 41.3, Cr 1.16. CT scan shows large R scrotal hematoma.     PAST MEDICAL & SURGICAL HISTORY:  Personal history of PE (pulmonary embolism)      COPD (chronic obstructive pulmonary disease)      Hypertension      Restless leg syndrome      Herniated cervical disc          FAMILY HISTORY:  No known  malignancy     Social History:  Denies alcohol and drug abuse, nonsmoker     MEDICATIONS  (STANDING):  vancomycin  IVPB. 1000 milliGRAM(s) IV Intermittent once    MEDICATIONS  (PRN):    Allergies    Levaquin (Anaphylaxis)    Intolerances      REVIEW OF SYSTEMS: Pertinent positives and negatives as stated in HPI, otherwise negative    Vital signs  T(C): 36.7 (12-23-22 @ 19:24), Max: 39.2 (12-23-22 @ 13:42)  HR: 93 (12-23-22 @ 19:24)  BP: 148/72 (12-23-22 @ 19:24)  SpO2: 97% (12-23-22 @ 19:24)  Wt(kg): --    Physical Exam  Gen: NAD  HEENT: normocephalic, atraumatic, no scleral icterus  Pulm: CTA b/l, No respiratory distress, no subcostal retractions,   CV: S1 S2, RRR,    Abd: Soft, NT, ND, no rebound tenderness or guarding  : Uncircumcised, no lesions. Testes descended bilaterally. +edematous BL scrotum R>L, R scrotum firm, indurated, mild TTP, no warmth or marked erythema. No crepitus appreciated.   MSK:  No CVAT, Moving all extremities, full ROM in all extremities, No edema present  NEURO: A&Ox3, no focal neurological deficits,   SKIN: warm, dry,     LABS:        12-23 @ 13:43    WBC 14.60 / Hct 41.3  / SCr 1.16     12-23    138  |  96  |  23  ----------------------------<  179<H>  3.5   |  28  |  1.16    Ca    9.1      23 Dec 2022 13:43    TPro  7.2  /  Alb  3.2<L>  /  TBili  0.6  /  DBili  x   /  AST  25  /  ALT  8<L>  /  AlkPhos  132<H>  12-23    PT/INR - ( 23 Dec 2022 13:43 )   PT: 47.8 sec;   INR: 4.06 ratio         PTT - ( 23 Dec 2022 13:43 )  PTT:44.7 sec      Urine Cx: pending   Blood Cx: pending     Radiology:  < from: CT Abdomen and Pelvis w/ IV Cont (12.23.22 @ 18:58) >    ACC: [id number] EXAM:  CT ABDOMEN AND PELVIS IC                          PROCEDURE DATE:  12/23/2022          INTERPRETATION:  CLINICAL INFORMATION: Edematous right-sided scrotum with   cellulitis. Evaluate for abscess or necrotizing fasciitis.    COMPARISON: Same-day CT chest and CT chest 11/5/2013    CONTRAST/COMPLICATIONS:  IV Contrast: Omnipaque 350  90 cc administered   10 cc discarded  Oral Contrast: NONE  Complications: None reported at time of study completion    PROCEDURE:  CT of the Abdomen and Pelvis was performed.  Sagittal and coronal reformats were performed.    FINDINGS:  LOWER CHEST: Large left-sided Morgagni hernia containing colon. Right   lower lung atelectasis. Coronary artery calcifications.    LIVER: Steatosis.  BILE DUCTS: Normal caliber.  GALLBLADDER: Partial calcification of the gallbladder wall.  SPLEEN: Within normal limits.  PANCREAS: Markedly atrophic. 2.3 cm simple appearing cystic lesion in the   pancreatic head. No pancreatic ductal dilatation.  ADRENALS: Within normal limits.  KIDNEYS/URETERS: 4.2 cm right renal cyst. Additional subcentimeter right   renal hypodense focus, too small to characterize. 3 mm nonobstructing   stone in the lower pole of the right kidney.    BLADDER: Within normal limits.  REPRODUCTIVE ORGANS: Prostate is enlarged. Within the right scrotum there   is a large rim-enhancing fluid and gas containing collection measuring   15.0 x 12.8 x 10.2 cm (series 602 image 87 and series 2 image 158, AP x   CC x TR).    BOWEL: No bowel obstruction. Appendix is normal.  PERITONEUM: No ascites.  VESSELS: Atherosclerotic changes.  RETROPERITONEUM/LYMPH NODES: No lymphadenopathy.  ABDOMINAL WALL: Inflammatory changes/fat stranding and thickening in the   right groin region likely secondary to the patient's scrotal abscess.   Small fat-containing umbilical hernia. Large left inguinal hernia   containing nonobstructed sigmoid colon.  BONES: Degenerative changes.    IMPRESSION:  Large right scrotal abscess measuring up to 15.0 cm.    Large left inguinal hernia containing sigmoid colon.    Large left-sided Morgagni hernia.    2.3 cm pancreatic head cystic lesion.    --- End of Report ---           LOR CRUZ MD; Resident Radiologist  This document has been electronically signed.  NEFTALY LOGAN MD; Attending Radiologist  This document has been electronically signed. Dec 23 2022  7:43PM    < end of copied text >

## 2022-12-23 NOTE — PROCEDURE NOTE - ADDITIONAL PROCEDURE DETAILS
RN unable to place gould, attempted 16F and 18F coude without success. Pt with buried penis, able to visualize meatus and using aseptic technique, area prepped in traditional sterile fashion, lidocaine urojet instilled into urethra, and 18F coude catheter placed without resistance. >400cc yellow color urine drained. 10cc sterile water placed into balloon and gould catheter secured with stat lock. pt tolerated procedure well. plan for gould per primary team. please page with any acute  concerns or questions.  p: [phone]

## 2022-12-23 NOTE — H&P ADULT - HISTORY OF PRESENT ILLNESS
Patient is a 77-year-old male with a past medical history of Parkinson's, COPD, DVT/PE factor V Leidenon Coumadin, BL chronic incarcerated hernias s/p R hernia repair 10/29/22 at [facility] ,  presents  for generalized weakness and  shortness of breath for the past two days.    Patient is a 77-year-old male with a past medical history of Parkinson's, COPD, DVT/PE factor V Leiden on Coumadin, BL chronic incarcerated hernias s/p R hernia repair August ’22  at [facility] c/b scrotal hematoma for which he has been undergoing intermittent needle aspirations, he now  presents  for generalized weakness and  altered mental state for the past two days.   Per wife , patient has been having generalized  weakness and recurrent falls over the past several days. He was evaluated at [facility] ED four days prior to admission and imaging at the time was without acute fractures. However , since then patient in more lethargic , intermittently confused. Patient reports dysuria , pain in the scrotum , increased redness on right side . He has and intermittent non productive cough , but no fever or chills.

## 2022-12-23 NOTE — H&P ADULT - ASSESSMENT
77M w/pmh  Parkinson's, COPD, DVT/PE factor V Leiden on Coumadin, s/p R hernia repair  c/b scrotal hematoma p/w generalized weakness and  altered mental state for the past two days. febrile , tachycardic , labs with leukocytosis

## 2022-12-23 NOTE — CONSULT NOTE ADULT - ASSESSMENT
77y Male with PMHx of Parkinson's, COPD, DVT/PE factor V Leiden on Coumadin, BL chronic incarcerated hernias s/p R hernia repair 10/29/22 at [facility] with Dr. Dooley c/b large right hematoma s/p 2 needle aspirations, presents to ED complaining of weakness and  shortness of breath since last night. In ED, patient is septic, febrile to 102, tachy to 122, white count 14.6, Crit 41.3, Cr 1.16. CT scan shows large R scrotal hematoma.   77y Male with PMHx of Parkinson's, COPD, DVT/PE factor V Leiden on Coumadin, BL chronic incarcerated hernias s/p R hernia repair 10/29/22 at [facility] with Dr. Dooley c/b large right hematoma s/p 2 needle aspirations, presents to ED complaining of weakness and  shortness of breath since last night. In ED, patient is septic, febrile to 102 rectally, tachy to 122, white count 14.6, Crit 41.3, Cr 1.16. CT scan shows large R scrotal hematoma, gas confined to right scrotum where aspiration was done 1.5 weeks ago.      -given white count 14 and recent instrumentation in right scrotum, no need for acute urological intervention at this time, likely infected hematoma   -continue IV abx  -med consult/admission to medically optimize patient for potential surgery, consider switching from coumadin to heparin to prepare for this   -Neuro consult for frequent falls   -If pt decompensates, please reach out to urology immediately at [phone]    case discussed with Dr. Loya

## 2022-12-23 NOTE — ED PROVIDER NOTE - PROGRESS NOTE DETAILS
Kings Kinsey, DO PGY-2: Evaluated perineum with ELIDA Guerrero, patient with extremely edematous scrotum with right-sided erythema and firmness, no crepitus concern for possible cellulitis versus foreign years.  CT abdomen pelvis added and patient expedited to get CT done.    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 Kings Kinsey, DO PGY-2: Evaluated perineum with ELIDA Guerrero, patient with extremely edematous scrotum with right-sided erythema and firmness, no crepitus concern for possible cellulitis versus nec fasc.  CT abdomen/pelvis added and patient expedited to get CT done.    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 Kings Kinsey, DO PGY-2: Spoke with Rads, swelling in scrotum likely 2/2 to an abscess, urology paged awaiting a call back. Andrew SHARPE: Spoke w/ urology. Recommended medical optimization and IV abxs (switch from coumadin to heparin). No acute surgical interventions needed.

## 2022-12-23 NOTE — ED ADULT NURSE REASSESSMENT NOTE - NS ED NURSE REASSESS COMMENT FT1
pt attempted to void on his own, unable to urinate, urology paged at [phone], pending page back

## 2022-12-23 NOTE — H&P ADULT - PROBLEM SELECTOR PLAN 1
evaluated by  , suspect infected hematoma , given recent instrumentation will continue broad spectrum   - Urology consult appreciated, day team to follow up further recommendations   - Surgical intervention TBD by urology   - Vanc and Zosyn

## 2022-12-24 LAB
ALBUMIN SERPL ELPH-MCNC: 3 G/DL — LOW (ref 3.3–5)
ALP SERPL-CCNC: 121 U/L — HIGH (ref 40–120)
ALT FLD-CCNC: 6 U/L — LOW (ref 10–45)
ANION GAP SERPL CALC-SCNC: 15 MMOL/L — SIGNIFICANT CHANGE UP (ref 5–17)
APPEARANCE UR: CLEAR — SIGNIFICANT CHANGE UP
APTT BLD: 45.3 SEC — HIGH (ref 27.5–35.5)
AST SERPL-CCNC: 27 U/L — SIGNIFICANT CHANGE UP (ref 10–40)
BACTERIA # UR AUTO: NEGATIVE — SIGNIFICANT CHANGE UP
BILIRUB SERPL-MCNC: 0.4 MG/DL — SIGNIFICANT CHANGE UP (ref 0.2–1.2)
BILIRUB UR-MCNC: NEGATIVE — SIGNIFICANT CHANGE UP
BUN SERPL-MCNC: 24 MG/DL — HIGH (ref 7–23)
CALCIUM SERPL-MCNC: 9.2 MG/DL — SIGNIFICANT CHANGE UP (ref 8.4–10.5)
CHLORIDE SERPL-SCNC: 97 MMOL/L — SIGNIFICANT CHANGE UP (ref 96–108)
CO2 SERPL-SCNC: 27 MMOL/L — SIGNIFICANT CHANGE UP (ref 22–31)
COLOR SPEC: YELLOW — SIGNIFICANT CHANGE UP
CREAT SERPL-MCNC: 1.03 MG/DL — SIGNIFICANT CHANGE UP (ref 0.5–1.3)
DIFF PNL FLD: ABNORMAL
EGFR: 74 ML/MIN/1.73M2 — SIGNIFICANT CHANGE UP
EPI CELLS # UR: 3 /HPF — SIGNIFICANT CHANGE UP
GLUCOSE SERPL-MCNC: 161 MG/DL — HIGH (ref 70–99)
GLUCOSE UR QL: NEGATIVE — SIGNIFICANT CHANGE UP
HCT VFR BLD CALC: 37.7 % — LOW (ref 39–50)
HGB BLD-MCNC: 11.9 G/DL — LOW (ref 13–17)
HYALINE CASTS # UR AUTO: 1 /LPF — SIGNIFICANT CHANGE UP (ref 0–2)
INR BLD: 4.88 RATIO — HIGH (ref 0.88–1.16)
INR BLD: 6.75 RATIO — CRITICAL HIGH (ref 0.88–1.16)
KETONES UR-MCNC: ABNORMAL
LEUKOCYTE ESTERASE UR-ACNC: NEGATIVE — SIGNIFICANT CHANGE UP
MCHC RBC-ENTMCNC: 28.7 PG — SIGNIFICANT CHANGE UP (ref 27–34)
MCHC RBC-ENTMCNC: 31.6 GM/DL — LOW (ref 32–36)
MCV RBC AUTO: 91.1 FL — SIGNIFICANT CHANGE UP (ref 80–100)
NITRITE UR-MCNC: NEGATIVE — SIGNIFICANT CHANGE UP
NRBC # BLD: 0 /100 WBCS — SIGNIFICANT CHANGE UP (ref 0–0)
PH UR: 6 — SIGNIFICANT CHANGE UP (ref 5–8)
PLATELET # BLD AUTO: 253 K/UL — SIGNIFICANT CHANGE UP (ref 150–400)
POTASSIUM SERPL-MCNC: 3.6 MMOL/L — SIGNIFICANT CHANGE UP (ref 3.5–5.3)
POTASSIUM SERPL-SCNC: 3.6 MMOL/L — SIGNIFICANT CHANGE UP (ref 3.5–5.3)
PROT SERPL-MCNC: 6.9 G/DL — SIGNIFICANT CHANGE UP (ref 6–8.3)
PROT UR-MCNC: ABNORMAL
PROTHROM AB SERPL-ACNC: 57 SEC — HIGH (ref 10.5–13.4)
PROTHROM AB SERPL-ACNC: 79.1 SEC — HIGH (ref 10.5–13.4)
RBC # BLD: 4.14 M/UL — LOW (ref 4.2–5.8)
RBC # FLD: 15.9 % — HIGH (ref 10.3–14.5)
RBC CASTS # UR COMP ASSIST: 96 /HPF — HIGH (ref 0–4)
SODIUM SERPL-SCNC: 139 MMOL/L — SIGNIFICANT CHANGE UP (ref 135–145)
SP GR SPEC: >1.05 (ref 1.01–1.02)
UROBILINOGEN FLD QL: ABNORMAL
WBC # BLD: 13.71 K/UL — HIGH (ref 3.8–10.5)
WBC # FLD AUTO: 13.71 K/UL — HIGH (ref 3.8–10.5)
WBC UR QL: 8 /HPF — HIGH (ref 0–5)

## 2022-12-24 PROCEDURE — 99232 SBSQ HOSP IP/OBS MODERATE 35: CPT

## 2022-12-24 PROCEDURE — 93010 ELECTROCARDIOGRAM REPORT: CPT

## 2022-12-24 RX ORDER — SODIUM CHLORIDE 9 MG/ML
1000 INJECTION INTRAMUSCULAR; INTRAVENOUS; SUBCUTANEOUS
Refills: 0 | Status: DISCONTINUED | OUTPATIENT
Start: 2022-12-24 | End: 2022-12-25

## 2022-12-24 RX ORDER — METOPROLOL TARTRATE 50 MG
2.5 TABLET ORAL ONCE
Refills: 0 | Status: COMPLETED | OUTPATIENT
Start: 2022-12-24 | End: 2022-12-24

## 2022-12-24 RX ORDER — PHYTONADIONE (VIT K1) 5 MG
2.5 TABLET ORAL ONCE
Refills: 0 | Status: COMPLETED | OUTPATIENT
Start: 2022-12-24 | End: 2022-12-24

## 2022-12-24 RX ORDER — PHENAZOPYRIDINE HCL 100 MG
100 TABLET ORAL THREE TIMES A DAY
Refills: 0 | Status: COMPLETED | OUTPATIENT
Start: 2022-12-24 | End: 2022-12-26

## 2022-12-24 RX ORDER — METOPROLOL TARTRATE 50 MG
5 TABLET ORAL ONCE
Refills: 0 | Status: COMPLETED | OUTPATIENT
Start: 2022-12-24 | End: 2022-12-24

## 2022-12-24 RX ORDER — METOPROLOL TARTRATE 50 MG
25 TABLET ORAL
Refills: 0 | Status: DISCONTINUED | OUTPATIENT
Start: 2022-12-24 | End: 2023-01-07

## 2022-12-24 RX ADMIN — Medication 1 MILLIGRAM(S): at 23:26

## 2022-12-24 RX ADMIN — Medication 250 MILLIGRAM(S): at 08:13

## 2022-12-24 RX ADMIN — BUDESONIDE AND FORMOTEROL FUMARATE DIHYDRATE 2 PUFF(S): 160; 4.5 AEROSOL RESPIRATORY (INHALATION) at 06:58

## 2022-12-24 RX ADMIN — Medication 2.5 MILLIGRAM(S): at 11:03

## 2022-12-24 RX ADMIN — BUDESONIDE AND FORMOTEROL FUMARATE DIHYDRATE 2 PUFF(S): 160; 4.5 AEROSOL RESPIRATORY (INHALATION) at 00:51

## 2022-12-24 RX ADMIN — Medication 100 MILLIGRAM(S): at 21:46

## 2022-12-24 RX ADMIN — CARBIDOPA AND LEVODOPA 1 TABLET(S): 25; 100 TABLET ORAL at 00:50

## 2022-12-24 RX ADMIN — Medication 3 MILLILITER(S): at 23:27

## 2022-12-24 RX ADMIN — Medication 3 MILLILITER(S): at 06:58

## 2022-12-24 RX ADMIN — Medication 3 MILLILITER(S): at 18:33

## 2022-12-24 RX ADMIN — Medication 1 MILLIGRAM(S): at 08:16

## 2022-12-24 RX ADMIN — Medication 1 MILLIGRAM(S): at 12:27

## 2022-12-24 RX ADMIN — ROPINIROLE 2 MILLIGRAM(S): 8 TABLET, FILM COATED, EXTENDED RELEASE ORAL at 23:26

## 2022-12-24 RX ADMIN — Medication 1 TABLET(S): at 11:12

## 2022-12-24 RX ADMIN — PREGABALIN 1000 MICROGRAM(S): 225 CAPSULE ORAL at 11:13

## 2022-12-24 RX ADMIN — Medication 5 MILLIGRAM(S): at 16:03

## 2022-12-24 RX ADMIN — Medication 25 MILLIGRAM(S): at 18:34

## 2022-12-24 RX ADMIN — PIPERACILLIN AND TAZOBACTAM 25 GRAM(S): 4; .5 INJECTION, POWDER, LYOPHILIZED, FOR SOLUTION INTRAVENOUS at 03:07

## 2022-12-24 RX ADMIN — Medication 1 MILLIGRAM(S): at 01:41

## 2022-12-24 RX ADMIN — ROPINIROLE 2 MILLIGRAM(S): 8 TABLET, FILM COATED, EXTENDED RELEASE ORAL at 19:45

## 2022-12-24 RX ADMIN — PIPERACILLIN AND TAZOBACTAM 25 GRAM(S): 4; .5 INJECTION, POWDER, LYOPHILIZED, FOR SOLUTION INTRAVENOUS at 20:45

## 2022-12-24 RX ADMIN — ROPINIROLE 2 MILLIGRAM(S): 8 TABLET, FILM COATED, EXTENDED RELEASE ORAL at 01:41

## 2022-12-24 RX ADMIN — Medication 3 MILLILITER(S): at 00:51

## 2022-12-24 RX ADMIN — ROPINIROLE 2 MILLIGRAM(S): 8 TABLET, FILM COATED, EXTENDED RELEASE ORAL at 12:28

## 2022-12-24 RX ADMIN — Medication 2.5 MILLIGRAM(S): at 12:27

## 2022-12-24 RX ADMIN — Medication 3 MILLILITER(S): at 11:12

## 2022-12-24 RX ADMIN — SODIUM CHLORIDE 75 MILLILITER(S): 9 INJECTION INTRAMUSCULAR; INTRAVENOUS; SUBCUTANEOUS at 14:17

## 2022-12-24 RX ADMIN — ROPINIROLE 2 MILLIGRAM(S): 8 TABLET, FILM COATED, EXTENDED RELEASE ORAL at 08:16

## 2022-12-24 RX ADMIN — CARBIDOPA AND LEVODOPA 1 TABLET(S): 25; 100 TABLET ORAL at 12:27

## 2022-12-24 RX ADMIN — CARBIDOPA AND LEVODOPA 1 TABLET(S): 25; 100 TABLET ORAL at 08:19

## 2022-12-24 RX ADMIN — Medication 1 MILLIGRAM(S): at 11:13

## 2022-12-24 RX ADMIN — Medication 250 MILLIGRAM(S): at 21:46

## 2022-12-24 RX ADMIN — BUDESONIDE AND FORMOTEROL FUMARATE DIHYDRATE 2 PUFF(S): 160; 4.5 AEROSOL RESPIRATORY (INHALATION) at 18:34

## 2022-12-24 RX ADMIN — SODIUM CHLORIDE 75 MILLILITER(S): 9 INJECTION INTRAMUSCULAR; INTRAVENOUS; SUBCUTANEOUS at 21:46

## 2022-12-24 RX ADMIN — CARBIDOPA AND LEVODOPA 1 TABLET(S): 25; 100 TABLET ORAL at 18:35

## 2022-12-24 RX ADMIN — LOSARTAN POTASSIUM 100 MILLIGRAM(S): 100 TABLET, FILM COATED ORAL at 06:58

## 2022-12-24 RX ADMIN — Medication 1 MILLIGRAM(S): at 18:34

## 2022-12-24 RX ADMIN — CARBIDOPA AND LEVODOPA 1 TABLET(S): 25; 100 TABLET ORAL at 23:27

## 2022-12-24 RX ADMIN — PIPERACILLIN AND TAZOBACTAM 25 GRAM(S): 4; .5 INJECTION, POWDER, LYOPHILIZED, FOR SOLUTION INTRAVENOUS at 11:12

## 2022-12-24 NOTE — CONSULT NOTE ADULT - ASSESSMENT
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 admitted for sepsis 2/2 scrotal abscess.    Sepsis 2/2 Scrotal abscess  - imaging reviewed  - appreciate  recs  --suspect superinfected hematoma  --appreciate recs re: source control  - c/w vancomycin, goal trough 15-20  - c/w zosyn  - f/u pending cx  - supportive care and pain management per primary team    D/w Dr. Cardona    Covering weekend/holiday service through 12/26  Dr. Connolly to resume care on 12/27  Infectious Diseases will continue to follow. Please call with any questions.   Shayna Larse M.D.  [facility] Division of Infectious Diseases [phone]

## 2022-12-24 NOTE — PATIENT PROFILE ADULT - FALL HARM RISK - HARM RISK INTERVENTIONS

## 2022-12-24 NOTE — PROGRESS NOTE ADULT - SUBJECTIVE AND OBJECTIVE BOX
Surgery Progress Note     Subjective/24hour Events:   Patient seen and examined.   Admitted overnight for antibiotics and monitoring. AMS improved this AM, patient comfortable and pain controlled.  Pain controlled.     Vital Signs:  Vital Signs Last 24 Hrs  T(C): 37 (24 Dec 2022 14:22), Max: 37.1 (23 Dec 2022 17:51)  T(F): 98.6 (24 Dec 2022 14:22), Max: 98.7 (23 Dec 2022 17:51)  HR: 110 (24 Dec 2022 14:22) (83 - 115)  BP: 123/79 (24 Dec 2022 14:22) (117/71 - 148/72)  BP(mean): --  RR: 18 (24 Dec 2022 14:22) (18 - 23)  SpO2: 94% (24 Dec 2022 14:22) (93% - 97%)    Parameters below as of 24 Dec 2022 14:22  Patient On (Oxygen Delivery Method): nasal cannula  O2 Flow (L/min): 2      CAPILLARY BLOOD GLUCOSE          I&O's Detail    23 Dec 2022 07:01  -  24 Dec 2022 07:00  --------------------------------------------------------  IN:    IV PiggyBack: 100 mL    Oral Fluid: 420 mL  Total IN: 520 mL    OUT:    Indwelling Catheter - Urethral (mL): 600 mL  Total OUT: 600 mL    Total NET: -80 mL      24 Dec 2022 07:01  -  24 Dec 2022 15:29  --------------------------------------------------------  IN:    Oral Fluid: 300 mL  Total IN: 300 mL    OUT:    Indwelling Catheter - Urethral (mL): 500 mL  Total OUT: 500 mL    Total NET: -200 mL          MEDICATIONS  (STANDING):  albuterol/ipratropium for Nebulization 3 milliLiter(s) Nebulizer every 6 hours  budesonide 160 MICROgram(s)/formoterol 4.5 MICROgram(s) Inhaler 2 Puff(s) Inhalation two times a day  carbidopa/levodopa  25/100 1 Tablet(s) Oral <User Schedule>  cyanocobalamin 1000 MICROGram(s) Oral daily  folic acid 1 milliGRAM(s) Oral daily  hydrochlorothiazide 25 milliGRAM(s) Oral daily  lactobacillus acidophilus 1 Tablet(s) Oral daily  losartan 100 milliGRAM(s) Oral daily  metoprolol tartrate 25 milliGRAM(s) Oral two times a day  metoprolol tartrate Injectable 5 milliGRAM(s) IV Push once  piperacillin/tazobactam IVPB.. 3.375 Gram(s) IV Intermittent every 8 hours  rOPINIRole 2 milliGRAM(s) Oral <User Schedule>  sodium chloride 0.9%. 1000 milliLiter(s) (75 mL/Hr) IV Continuous <Continuous>  trihexyphenidyl 1 milliGRAM(s) Oral <User Schedule>  vancomycin  IVPB 750 milliGRAM(s) IV Intermittent every 12 hours    MEDICATIONS  (PRN):  acetaminophen     Tablet .. 650 milliGRAM(s) Oral every 6 hours PRN Temp greater or equal to 38C (100.4F), Mild Pain (1 - 3)  melatonin 3 milliGRAM(s) Oral at bedtime PRN Insomnia  ondansetron Injectable 4 milliGRAM(s) IV Push every 8 hours PRN Nausea and/or Vomiting      Physical Exam:  Gen: NAD.  Lungs: Non labored breathing.   Ab: Soft, nontender, nondistended.   :  Uncircumcised, no lesions. Testes descended bilaterally. +edematous BL scrotum R>L, R scrotum firm, indurated, mild TTP, no warmth or marked erythema. No crepitus appreciated. Morejon in place, clear yellow urine      Labs:    12-24    139  |  97  |  24<H>  ----------------------------<  161<H>  3.6   |  27  |  1.03    Ca    9.2      24 Dec 2022 06:59    TPro  6.9  /  Alb  3.0<L>  /  TBili  0.4  /  DBili  x   /  AST  27  /  ALT  6<L>  /  AlkPhos  121<H>  12-24    LIVER FUNCTIONS - ( 24 Dec 2022 06:59 )  Alb: 3.0 g/dL / Pro: 6.9 g/dL / ALK PHOS: 121 U/L / ALT: 6 U/L / AST: 27 U/L / GGT: x                                 11.9   13.71 )-----------( 253      ( 24 Dec 2022 07:00 )             37.7     PT/INR - ( 24 Dec 2022 06:59 )   PT: 79.1 sec;   INR: 6.75 ratio         PTT - ( 24 Dec 2022 06:59 )  PTT:45.3 sec

## 2022-12-24 NOTE — PHYSICAL THERAPY INITIAL EVALUATION ADULT - NSPTDISCHREC_GEN_A_CORE
Subacute Rehab; Pt with limitations in self-care & home management, will benefit from Sub acute rehab prior to D/C home to increase strength, balance and endurance to improve functional mobility to level necessary for safe return home/Sub-acute Rehab

## 2022-12-24 NOTE — PROGRESS NOTE ADULT - SUBJECTIVE AND OBJECTIVE BOX
Patient is a 77y old  Male who presents with a chief complaint of generalized weakness and AMS x 2 days (24 Dec 2022 10:32)      SUBJECTIVE / OVERNIGHT EVENTS:    Patient seen and examined. denies pain but painful on exam. no cp sob.      Vital Signs Last 24 Hrs  T(C): 36.8 (24 Dec 2022 10:30), Max: 39.2 (23 Dec 2022 13:42)  T(F): 98.2 (24 Dec 2022 10:30), Max: 102.5 (23 Dec 2022 13:42)  HR: 100 (24 Dec 2022 10:30) (83 - 143)  BP: 117/71 (24 Dec 2022 10:30) (117/71 - 148/72)  BP(mean): --  RR: 18 (24 Dec 2022 10:30) (18 - 28)  SpO2: 93% (24 Dec 2022 10:30) (92% - 99%)    Parameters below as of 24 Dec 2022 10:30  Patient On (Oxygen Delivery Method): nasal cannula  O2 Flow (L/min): 2    I&O's Summary    23 Dec 2022 07:01  -  24 Dec 2022 07:00  --------------------------------------------------------  IN: 520 mL / OUT: 600 mL / NET: -80 mL        PE:  GENERAL: NAD, AAOx3  CHEST/LUNG: CTABL, No wheeze  HEART: Regular rate and rhythm; no murmur  ABDOMEN: Soft, Nontender, Nondistended; Bowel sounds present  : enlarged R scrotum, erythematous, tender, gould  EXTREMITIES:  2+ Peripheral Pulses, No edema  NEURO: No focal deficits    LABS:                        11.9   13.71 )-----------( 253      ( 24 Dec 2022 07:00 )             37.7     12-24    139  |  97  |  24<H>  ----------------------------<  161<H>  3.6   |  27  |  1.03    Ca    9.2      24 Dec 2022 06:59    TPro  6.9  /  Alb  3.0<L>  /  TBili  0.4  /  DBili  x   /  AST  27  /  ALT  6<L>  /  AlkPhos  121<H>  12-24    PT/INR - ( 24 Dec 2022 06:59 )   PT: 79.1 sec;   INR: 6.75 ratio         PTT - ( 24 Dec 2022 06:59 )  PTT:45.3 sec  CAPILLARY BLOOD GLUCOSE            Urinalysis Basic - ( 24 Dec 2022 04:57 )    Color: Yellow / Appearance: Clear / SG: >1.050 / pH: x  Gluc: x / Ketone: Small  / Bili: Negative / Urobili: 3 mg/dL   Blood: x / Protein: 100 mg/dl / Nitrite: Negative   Leuk Esterase: Negative / RBC: 96 /hpf / WBC 8 /HPF   Sq Epi: x / Non Sq Epi: 3 /hpf / Bacteria: Negative        RADIOLOGY & ADDITIONAL TESTS:    Imaging Personally Reviewed:  [x] YES  [ ] NO    Consultant(s) Notes Reviewed:  [x] YES  [ ] NO    MEDICATIONS  (STANDING):  albuterol/ipratropium for Nebulization 3 milliLiter(s) Nebulizer every 6 hours  budesonide 160 MICROgram(s)/formoterol 4.5 MICROgram(s) Inhaler 2 Puff(s) Inhalation two times a day  carbidopa/levodopa  25/100 1 Tablet(s) Oral <User Schedule>  cyanocobalamin 1000 MICROGram(s) Oral daily  folic acid 1 milliGRAM(s) Oral daily  hydrochlorothiazide 25 milliGRAM(s) Oral daily  lactobacillus acidophilus 1 Tablet(s) Oral daily  losartan 100 milliGRAM(s) Oral daily  piperacillin/tazobactam IVPB.. 3.375 Gram(s) IV Intermittent every 8 hours  rOPINIRole 2 milliGRAM(s) Oral <User Schedule>  trihexyphenidyl 1 milliGRAM(s) Oral <User Schedule>  vancomycin  IVPB 750 milliGRAM(s) IV Intermittent every 12 hours    MEDICATIONS  (PRN):  acetaminophen     Tablet .. 650 milliGRAM(s) Oral every 6 hours PRN Temp greater or equal to 38C (100.4F), Mild Pain (1 - 3)  melatonin 3 milliGRAM(s) Oral at bedtime PRN Insomnia  ondansetron Injectable 4 milliGRAM(s) IV Push every 8 hours PRN Nausea and/or Vomiting      Care Discussed with Consultants/Other Providers [x] YES  [ ] NO    HEALTH ISSUES - PROBLEM Dx:  Scrotal abscess    Sepsis    Chronic obstructive pulmonary disease (COPD)    H/O factor V Leiden mutation    Unspecified atrial fibrillation    Parkinsons    HTN (hypertension)    Preop exam for internal medicine

## 2022-12-24 NOTE — PATIENT PROFILE ADULT - DO YOU FEEL THREATENED BY OTHERS?
SUBJECTIVE:   Winnie Foster is a 4 month old female presenting with a chief complaint of   Chief Complaint   Patient presents with     Urgent Care     Physical-Other     Vaccine reaction mother states patient had her 4 [facility] vaccines yesterday and right away had reaction lethargic and fever spoke to triage and was advice to provide tylenol which last dose was 430 PM      Patient is brought in by her parents, as she has had frequent crying, intermittent lethargy (earlier today, resolved), and fever (100.0 degrees Fahrenheit axillary at 06:00 this morning) since receiving her 4 month vaccines yesterday.  Patient received the standard 4 month vaccines, as reviewed on the mother's cell phone today.  Mother has found it difficult to give Tylenol, as patient doesn't like the taste of it.  Last dose of Tylenol was at 16:30 this afternoon, ~3 hours prior to evaluation.  Mother asks if there is a rectal Tylenol option available.  No vomiting, cough, other URI symptoms, breathing concerns, wheezing, or diarrhea.  Patient is now happy and back to normal, but mother is concerned that she was inconsolable earlier today.  Patient is breast fed, feeding only slightly less than usual today.  Good wet diapers noted.      Family is from [place].  They traveled to [place] to take [employer] pictures with their family this evening.    Review of Systems   Gastrointestinal: Negative for abdominal pain.   Skin: Negative for rash.       History reviewed. No pertinent past medical history.  Allergies, treated with as needed Benadryl.    Medications:  Benadryl and Tylenol as needed.    Social History   Substance Use Topics     Smoking status: Never Smoker     Smokeless tobacco: Never Used     Alcohol use Not on file     Family History:  Mother with allergies.      OBJECTIVE  Pulse 129  Temp 97.7  F (36.5  C) (Axillary)  Resp 20  Wt 17 lb 4.5 oz (7.839 kg)  SpO2 100%    Physical Exam   Constitutional: She is well-developed,  well-nourished, and in no distress.  Non-toxic appearance. She does not have a sickly appearance.   Smiling and cooing on the exam table.  Reactive and smiling with exam, playing with her toy zebra.   HENT:   Right Ear: Tympanic membrane, external ear and ear canal normal.   Left Ear: Tympanic membrane, external ear and ear canal normal.   Nose: Nose normal.   Mouth/Throat: Oropharynx is clear and moist and mucous membranes are normal. No oral lesions. No oropharyngeal exudate, posterior oropharyngeal edema or posterior oropharyngeal erythema.   Eyes: Conjunctivae, EOM and lids are normal. Pupils are equal, round, and reactive to light.   Neck: Normal range of motion. Neck supple. No thyromegaly present.   Cardiovascular: Normal rate, regular rhythm and normal heart sounds.    No murmur heard.  Femoral pulses 2+.     Pulmonary/Chest: Effort normal and breath sounds normal. No stridor. No respiratory distress. She has no wheezes. She has no rales.   No retractions.   Abdominal: Soft. She exhibits no distension and no mass. There is no tenderness. There is no rebound and no guarding.   Lymphadenopathy:     She has no cervical adenopathy.   Neurological: She is alert. She has normal motor skills.   Good tone.   Skin: Skin is warm and dry. No rash noted.   Nursing note and vitals reviewed.      Labs:  No results found for this or any previous visit (from the past 24 hour(s)).    X-Ray was not done.    ASSESSMENT:      ICD-10-CM    1. Fever, unspecified fever cause, resolved.  Likely transient fever due to 4 month vaccines yesterday. R50.9 acetaminophen (TYLENOL) 80 MG Suppository - Note:  Requested [facility] pharmacy verified that they do carry this dose of rectal Tylenol.  Parents may obtain rectal Tylenol over-the-counter or at another [facility], as recommended by [facility]'s pharmacist this evening.      PLAN:    -Over-the-counter medications, only as discussed.  -Nursing staff contacted the patient's mother, as the  prescribed Tylenol was not able to be filled at the requested [facility] pharmacy.  -Mother agrees to pick this up over-the-counter (available at multiple other [facility], per pharmacist), as discussed with nursing staff this evening.    -Continue to monitor the patient, as discussed.  -Follow up if further concerns, as discussed.  -Follow up immediately if breathing concerns, lethargy, or other emergent symptoms, as discussed.    The patient was discharged in stable condition to the care of her parents post discussion of follow up.    There are no Patient Instructions on file for this visit.     no

## 2022-12-24 NOTE — PHYSICAL THERAPY INITIAL EVALUATION ADULT - ADDITIONAL COMMENTS
Patient lives in pvt house with spouse,  3 steps to enter.   Patient ambulated with rolling walker independent.  pt owns rw, w/c, shower chair.

## 2022-12-24 NOTE — PHYSICAL THERAPY INITIAL EVALUATION ADULT - IMPAIRMENTS FOUND, PT EVAL
aerobic capacity/endurance/muscle strength aerobic capacity/endurance/gait, locomotion, and balance/muscle strength

## 2022-12-24 NOTE — PHYSICAL THERAPY INITIAL EVALUATION ADULT - LEVEL OF INDEPENDENCE: GAIT, REHAB EVAL
HR increased to 140's. Josette MARRERO made aware./unable to perform HR increased to 140's. Josette MARRERO made aware./minimum assist (75% patients effort)

## 2022-12-24 NOTE — PHYSICAL THERAPY INITIAL EVALUATION ADULT - CRITERIA FOR SKILLED THERAPEUTIC INTERVENTIONS
impairments found impairments found/functional limitations in following categories/risk reduction/prevention/rehab potential/therapy frequency/predicted duration of therapy intervention/anticipated equipment needs at discharge/anticipated discharge recommendation

## 2022-12-24 NOTE — PHYSICAL THERAPY INITIAL EVALUATION ADULT - PERTINENT HX OF CURRENT PROBLEM, REHAB EVAL
77-year-old male past medical history of Parkinson's, COPD, DVT/PE factor V Leiden on Coumadin presents to ED complaining of weakness and intermittent confusion, frequent falls, shortness of breath.  Patient's had multiple falls in the last week and a half, patient went to [facility] 4 days ago had negative CTs and x-rays at that time. Patient febrile and tachycardic, short of breath with oxygen saturation  high 80s low 90s.  Concern for sepsis secondary to pneumonia, UTI. sepsis 2/2 Scrotal abscess. CT a/p- Inflammatory changes/fat stranding and thickening in the   right groin region likely secondary to the patient's scrotal abscess. 77-year-old male past medical history of Parkinson's, COPD, DVT/PE factor V Leiden on Coumadin presents to ED complaining of weakness and intermittent confusion, frequent falls, shortness of breath.  Patient's had multiple falls in the last week and a half, patient went to [facility] 4 days ago had negative CTs and x-rays at that time. Patient febrile and tachycardic, short of breath with oxygen saturation  high 80s low 90s.  Concern for sepsis secondary to pneumonia, UTI. sepsis 2/2 Scrotal abscess. CT a/p- Inflammatory changes/fat stranding and thickening in the   right groin region likely secondary to the patient's scrotal abscess. Surgical intervention TBD by urology 77-year-old male past medical history of Parkinson's, COPD, DVT/PE factor V Leiden on Coumadin presents to ED complaining of weakness and intermittent confusion, frequent falls, shortness of breath.  Patient's had multiple falls in the last week and a half, patient went to [facility] 4 days ago had negative CTs and x-rays at that time. No h/o further falls post dc from [facility]. Patient febrile and tachycardic, short of breath with oxygen saturation  high 80s low 90s.  Concern for sepsis secondary to pneumonia, UTI. sepsis 2/2 Scrotal abscess. CT a/p- Inflammatory changes/fat stranding and thickening in the   right groin region likely secondary to the patient's scrotal abscess. Surgical intervention TBD by urology. 77-year-old male past medical history of Parkinson's, COPD, DVT/PE factor V Leiden on Coumadin presents to ED complaining of weakness and intermittent confusion, frequent falls, shortness of breath.  Patient's had multiple falls in the last week and a half, patient went to [facility] 4 days ago had negative CTs and x-rays at that time. No h/o further falls post dc from [facility]. Patient febrile and tachycardic, short of breath with oxygen saturation  high 80s low 90s.  Concern for sepsis secondary to pneumonia, UTI. sepsis 2/2 Scrotal abscess. CT a/p- Inflammatory changes/fat stranding and thickening in the   right groin region likely secondary to the patient's scrotal abscess. Surgical intervention TBD by urology. pt with elevated INR consulted [facility] ACP- cleared for PT.

## 2022-12-24 NOTE — PROGRESS NOTE ADULT - ASSESSMENT
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 admitted for sepsis 2/2 scrotal abscess.    # sepsis 2/2 Scrotal abscess  evaluated by  , suspect infected hematoma , given recent instrumentation will continue broad spectrum abx  Surgical intervention TBD by urology   Vanc and Luis En  ID consulted  fu urine culture  f/u blood cultures     # Chronic obstructive pulmonary disease  - Symbicort   - duonebs q6h    # H/O factor V Leiden mutation  hold coumadin   Monitor INR , Once < 2 , can start heparin infusion in anticipation for surgery    # Parkinsons  continue carbidopa-levo-dopa   continue Ropinirole   continue Trihexyphenidyl    # HTN (hypertension)  continue Irbesartan (therapeutic equivalent)    dvt ppx Once INR < 2 , can start heparin infusion in anticipation for surgery    Dr. Vaibhav Keller will be covering me starting tomorrow.  Please contact with any questions or concerns [phone]. 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 admitted for sepsis 2/2 scrotal abscess.    # sepsis 2/2 Scrotal abscess  evaluated by  , suspect infected hematoma , given recent instrumentation will continue broad spectrum abx  Surgical intervention TBD by urology   Vanc and Luis En  ID consulted  fu urine culture  f/u blood cultures     # Chronic obstructive pulmonary disease  - Symbicort   - duonebs q6h    # H/O factor V Leiden mutation  hold coumadin   INR 6 as concern for scrotal hematoma, will give vit k 2.5mg PO x 1  Monitor INR , Once < 2 , can start heparin infusion in anticipation for surgery    # Parkinsons  continue carbidopa-levo-dopa   continue Ropinirole   continue Trihexyphenidyl    # HTN (hypertension)  continue Irbesartan (therapeutic equivalent)    dvt ppx Once INR < 2 , can start heparin infusion in anticipation for surgery    Dr. Vaibhav Keller will be covering me starting tomorrow.  Please contact with any questions or concerns [phone]. 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 admitted for sepsis 2/2 scrotal abscess.    # sepsis 2/2 scrotal abscess  evaluated by , suspect infected hematoma, given recent instrumentation will continue broad spectrum abx  Surgical intervention TBD by urology, fu urology  Vanc and Zosyn  ID consulted  fu urine culture  f/u blood cultures     # Chronic obstructive pulmonary disease  Symbicort   duonebs q6h    # H/O factor V Leiden mutation  hold coumadin   INR 6 as concern for scrotal hematoma, will give vit k 2.5mg PO x 1, repeat INR  Monitor INR , Once < 2 , can start heparin infusion in anticipation for surgery    # Parkinsons  continue carbidopa-levo-dopa   continue Ropinirole   continue Trihexyphenidyl    # HTN (hypertension)  continue Irbesartan (therapeutic equivalent)    dvt ppx Once INR < 2 , can start heparin infusion in anticipation for surgery    Dr. Vaibhav Keller will be covering me starting tomorrow.  Please contact with any questions or concerns [phone].

## 2022-12-24 NOTE — PROGRESS NOTE ADULT - ASSESSMENT
77y Male with PMHx of Parkinson's, COPD, DVT/PE factor V Leiden on Coumadin, BL chronic incarcerated hernias s/p R hernia repair 10/29/22 at [facility] with Dr. Dooley c/b large right hematoma s/p 2 needle aspirations, presents to ED complaining of weakness and  shortness of breath since last night. In ED, patient is septic, febrile to 102 rectally, tachy to 122, white count 14.6, Crit 41.3, Cr 1.16. CT scan shows large R scrotal hematoma, gas confined to right scrotum where aspiration was done 1.5 weeks ago.      -WBC 13 from 14,  no need for acute urological intervention at this time.  -Discussed indication for conservative management at this time. Patient planned to have outpatient procedure for hernia repair, given presence of significant hernia on exam and on imaging, would like to avoid any urological intervention without simultaneous involvement for hernia repair by general surgery. In the acute setting, will need monitoring on antibiotics with eventual definitive management of hernia, at which time hematoma may be addressed either by drainage or monitoring.  -continue IV abx  -Continue medical optimize patient for potential surgery  -f/u Neuro consult for frequent falls   -If pt decompensates, please reach out to urology immediately at [phone]    case discussed with Dr. Loya

## 2022-12-24 NOTE — CONSULT NOTE ADULT - SUBJECTIVE AND OBJECTIVE BOX
[facility], Division of Infectious Diseases  CHRISTI Putnam S. 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   Per wife , patient has been having generalized  weakness and recurrent falls over the past several days. He was evaluated at [facility] ED four days prior to admission and imaging at the time was without acute fractures. However , since then patient in more lethargic , intermittently confused. Patient reports dysuria , pain in the scrotum , increased redness on right side . He has and intermittent non productive cough , but no fever or chills.      (23 Dec 2022 21:25)    Pt seen at bedside  Hx as above        Active Medications--  acetaminophen     Tablet .. 650 milliGRAM(s) Oral every 6 hours PRN  albuterol/ipratropium for Nebulization 3 milliLiter(s) Nebulizer every 6 hours  budesonide 160 MICROgram(s)/formoterol 4.5 MICROgram(s) Inhaler 2 Puff(s) Inhalation two times a day  carbidopa/levodopa  25/100 1 Tablet(s) Oral <User Schedule>  cyanocobalamin 1000 MICROGram(s) Oral daily  folic acid 1 milliGRAM(s) Oral daily  hydrochlorothiazide 25 milliGRAM(s) Oral daily  lactobacillus acidophilus 1 Tablet(s) Oral daily  losartan 100 milliGRAM(s) Oral daily  melatonin 3 milliGRAM(s) Oral at bedtime PRN  ondansetron Injectable 4 milliGRAM(s) IV Push every 8 hours PRN  phytonadione   Solution 2.5 milliGRAM(s) Oral once  piperacillin/tazobactam IVPB.. 3.375 Gram(s) IV Intermittent every 8 hours  rOPINIRole 2 milliGRAM(s) Oral <User Schedule>  trihexyphenidyl 1 milliGRAM(s) Oral <User Schedule>  vancomycin  IVPB 750 milliGRAM(s) IV Intermittent every 12 hours    Antimicrobials:   piperacillin/tazobactam IVPB.. 3.375 Gram(s) IV Intermittent every 8 hours  vancomycin  IVPB 750 milliGRAM(s) IV Intermittent every 12 hours    Immunologic:     ROS:  CONSTITUTIONAL: No fevers or chills. No weakness or headache. No weight changes.  EYES/ENT: No visual or hearing changes. No sore throat or throat pain .  NECK: No pain or stiffness  RESPIRATORY: No cough, wheezing, or hemoptysis. No shortness of breath  CARDIOVASCULAR: No chest pain or palpitations  GASTROINTESTINAL: No abdominal pain. No nausea or vomiting. No diarrhea or constipation.  GENITOURINARY: No dysuria, frequency or hematuria  NEUROLOGICAL: No numbness or weakness  SKIN: No itching or rashes  PSYCHIATRIC: Pleasant. Appropriate affect    Allergies: Levaquin (Anaphylaxis)    PMH -- Personal history of PE (pulmonary embolism)    COPD (chronic obstructive pulmonary disease)    Hypertension    Restless leg syndrome      PSH -- Herniated cervical disc    H/O hernia repair      FH -- FH: lung cancer (Father)      Social History --  EtOH: denies   Tobacco: denies   Drug Use: denies     Travel/Environmental/Occupational History:    Physical Exam--  Vital Signs Last 24 Hrs  T(F): 98.2 (24 Dec 2022 10:30), Max: 102.5 (23 Dec 2022 13:42)  HR: 100 (24 Dec 2022 10:30) (83 - 143)  BP: 117/71 (24 Dec 2022 10:30) (117/71 - 148/72)  RR: 18 (24 Dec 2022 10:30) (18 - 28)  SpO2: 93% (24 Dec 2022 10:30) (92% - 99%)  General: elderly M, NAD  HEENT: NC/AT, EOMI, anicteric  Lungs: Clear bilaterally without rales, wheezing or rhonchi  Heart: Regular rate and rhythm. No murmur, rub or gallop.  Abdomen: Soft. Nondistended. Nontender.   : enlarged scrotal, red, TTP  Extremities: No cyanosis or clubbing. No edema.   Skin: Warm. Dry.   Laboratory & Imaging Data:  CBC:                       11.9   13.71 )-----------( 253      ( 24 Dec 2022 07:00 )             37.7     CMP: 12-24    139  |  97  |  24<H>  ----------------------------<  161<H>  3.6   |  27  |  1.03    Ca    9.2      24 Dec 2022 06:59    TPro  6.9  /  Alb  3.0<L>  /  TBili  0.4  /  DBili  x   /  AST  27  /  ALT  6<L>  /  AlkPhos  121<H>  12-24    LIVER FUNCTIONS - ( 24 Dec 2022 06:59 )  Alb: 3.0 g/dL / Pro: 6.9 g/dL / ALK PHOS: 121 U/L / ALT: 6 U/L / AST: 27 U/L / GGT: x           Urinalysis Basic - ( 24 Dec 2022 04:57 )    Color: Yellow / Appearance: Clear / SG: >1.050 / pH: x  Gluc: x / Ketone: Small  / Bili: Negative / Urobili: 3 mg/dL   Blood: x / Protein: 100 mg/dl / Nitrite: Negative   Leuk Esterase: Negative / RBC: 96 /hpf / WBC 8 /HPF   Sq Epi: x / Non Sq Epi: 3 /hpf / Bacteria: Negative        Microbiology: reviewed        Radiology: reviewed    < from: CT Abdomen and Pelvis w/ IV Cont (12.23.22 @ 18:58) >    ACC: [id number] EXAM:  CT ABDOMEN AND PELVIS IC                          PROCEDURE DATE:  12/23/2022          INTERPRETATION:  CLINICAL INFORMATION: Edematous right-sided scrotum with   cellulitis. Evaluate for abscess or necrotizing fasciitis.    COMPARISON: Same-day CT chest and CT chest 11/5/2013    CONTRAST/COMPLICATIONS:  IV Contrast: Omnipaque 350  90 cc administered   10 cc discarded  Oral Contrast: NONE  Complications: None reported at time of study completion    PROCEDURE:  CT of the Abdomen and Pelvis was performed.  Sagittal and coronal reformats were performed.    FINDINGS:  LOWER CHEST: Large left-sided Morgagni hernia containing colon. Right   lower lung atelectasis. Coronary artery calcifications.    LIVER: Steatosis.  BILE DUCTS: Normal caliber.  GALLBLADDER: Partial calcification of the gallbladder wall.  SPLEEN: Within normal limits.  PANCREAS: Markedly atrophic. 2.3 cm simple appearing cystic lesion in the   pancreatic head. No pancreatic ductal dilatation.  ADRENALS: Within normal limits.  KIDNEYS/URETERS: 4.2 cm right renal cyst. Additional subcentimeter right   renal hypodense focus, too small to characterize. 3 mm nonobstructing   stone in the lower pole of the right kidney.    BLADDER: Within normal limits.  REPRODUCTIVE ORGANS: Prostate is enlarged. Within the right scrotum there   is a large rim-enhancing fluid and gas containing collection measuring   15.0 x 12.8 x 10.2 cm (series 602 image 87 and series 2 image 158, AP x   CC x TR).    BOWEL: No bowel obstruction. Appendix is normal.  PERITONEUM: No ascites.  VESSELS: Atherosclerotic changes.  RETROPERITONEUM/LYMPH NODES: No lymphadenopathy.  ABDOMINAL WALL: Inflammatory changes/fat stranding and thickening in the   right groin region likely secondary to the patient's scrotal abscess.   Small fat-containing umbilical hernia. Large left inguinal hernia   containing nonobstructed sigmoid colon.  BONES: Degenerative changes.    IMPRESSION:  Large right scrotal abscess measuring up to 15.0 cm.    Large left inguinal hernia containing sigmoid colon.    Large left-sided Morgagni hernia.    2.3 cm pancreatic head cystic lesion.    --- End of Report ---           LOR CRUZ MD; Resident Radiologist  This document has been electronically signed.  NEFTALY LOGAN MD; Attending Radiologist  This document has been electronically signed. Dec 23 2022  7:43PM    < end of copied text >  < from: CT Chest No Cont (12.23.22 @ 14:56) >    ACC: [id number] EXAM:  CT CHEST                          PROCEDURE DATE:  12/23/2022          INTERPRETATION:  CLINICAL INFORMATION: Evaluate for pneumonia. History of   hernia.    COMPARISON: Chest x-ray from 12/23/2022. CTA chest dated 11/5/2013.    CONTRAST/COMPLICATIONS:  IV Contrast: NONE  Oral Contrast: NONE  Complications: None reported at time of study completion    PROCEDURE:  CT of the Chest was performed.  Sagittal and coronal reformats were performed.    FINDINGS:    LUNGS AND AIRWAYS: Minimal tracheobronchial secretions.  There is volume   loss and compressive atelectasis in the left lung resulting from large   left-sided Morgagni hernia. There is additional bibasilar dependent   subsegmental atelectasis. Right upper lobe mucoid impaction. No   parenchymal consolidation.  PLEURA: No pleural effusion.  MEDIASTINUM AND HIRA: No lymphadenopathy. There is a large left-sided   Morgagni hernia containing omental fat and loops of transverse colon  VESSELS: Atherosclerotic calcifications of the aorta and coronary   arteries.  HEART: Heart size is normal. No pericardial effusion. Mitral annular   calcifications.  CHEST WALL AND LOWER NECK: Within normal limits.  VISUALIZED UPPER ABDOMEN: Large left-sided Morgagni hernia, containing   omental fat and loops of transverse colon. Otherwise, unremarkable   visualized upper abdomen.  BONES: Degenerative changes of the thoracic spine.    IMPRESSION:  No evidence of pneumonia.    Large left-sided Morgagni hernia is unchanged from the prior study with   resulting compressive atelectasis and volume loss in the left lung.        --- End of Report ---          GABBY HAYES DO; Resident Radiologist  This document has been electronically signed.  JOYCELYN LAMAS MD; Attending Radiologist  This document has been electronically signed. Dec 23 2022  3:31PM    < end of copied text >  < from: Xray Chest 1 View- PORTABLE-Urgent (12.23.22 @ 14:23) >    ACC: [id number] EXAM:  XR CHEST PORTABLE URGENT 1V                          PROCEDURE DATE:  12/23/2022          INTERPRETATION:  EXAMINATION: XR CHEST URGENT    CLINICAL INDICATION: Sepsis    TECHNIQUE: Single frontal view of the chest was obtained.    COMPARISON: Chest x-ray 11/5/2013.    FINDINGS:    The heart size cannot be assessed due to overlying left lung volume loss.    The lungs are clear. No pneumothorax. Similar-appearing volume loss and   atelectasis of the left lung as noted back [id number].  No acute osseous abnormalities.    IMPRESSION:  Similar-appearing volume loss and atelectasis of the left lung as noted   back in 2013. This could be secondary to foramen of Morgagni hernia   compressing the lungs seen in the same day CT.    --- End of Report ---           DAVID COOLEY MD; Resident Radiologist  This document has been electronically signed.  ATILIO POLK MD; Attending Radiologist  This document has been electronically signed. Dec 23 2022  5:24PM    < end of copied text >

## 2022-12-25 LAB
ANION GAP SERPL CALC-SCNC: 10 MMOL/L — SIGNIFICANT CHANGE UP (ref 5–17)
APTT BLD: 32.6 SEC — SIGNIFICANT CHANGE UP (ref 27.5–35.5)
BUN SERPL-MCNC: 21 MG/DL — SIGNIFICANT CHANGE UP (ref 7–23)
CALCIUM SERPL-MCNC: 8.5 MG/DL — SIGNIFICANT CHANGE UP (ref 8.4–10.5)
CHLORIDE SERPL-SCNC: 96 MMOL/L — SIGNIFICANT CHANGE UP (ref 96–108)
CO2 SERPL-SCNC: 29 MMOL/L — SIGNIFICANT CHANGE UP (ref 22–31)
CREAT SERPL-MCNC: 0.96 MG/DL — SIGNIFICANT CHANGE UP (ref 0.5–1.3)
EGFR: 81 ML/MIN/1.73M2 — SIGNIFICANT CHANGE UP
GLUCOSE SERPL-MCNC: 201 MG/DL — HIGH (ref 70–99)
HCT VFR BLD CALC: 34.5 % — LOW (ref 39–50)
HCT VFR BLD CALC: 36.9 % — LOW (ref 39–50)
HGB BLD-MCNC: 11 G/DL — LOW (ref 13–17)
HGB BLD-MCNC: 11.8 G/DL — LOW (ref 13–17)
INR BLD: 1.73 RATIO — HIGH (ref 0.88–1.16)
MAGNESIUM SERPL-MCNC: 2.2 MG/DL — SIGNIFICANT CHANGE UP (ref 1.6–2.6)
MCHC RBC-ENTMCNC: 28.8 PG — SIGNIFICANT CHANGE UP (ref 27–34)
MCHC RBC-ENTMCNC: 28.9 PG — SIGNIFICANT CHANGE UP (ref 27–34)
MCHC RBC-ENTMCNC: 31.9 GM/DL — LOW (ref 32–36)
MCHC RBC-ENTMCNC: 32 GM/DL — SIGNIFICANT CHANGE UP (ref 32–36)
MCV RBC AUTO: 90.3 FL — SIGNIFICANT CHANGE UP (ref 80–100)
MCV RBC AUTO: 90.4 FL — SIGNIFICANT CHANGE UP (ref 80–100)
NRBC # BLD: 0 /100 WBCS — SIGNIFICANT CHANGE UP (ref 0–0)
NRBC # BLD: 0 /100 WBCS — SIGNIFICANT CHANGE UP (ref 0–0)
PLATELET # BLD AUTO: 269 K/UL — SIGNIFICANT CHANGE UP (ref 150–400)
PLATELET # BLD AUTO: 274 K/UL — SIGNIFICANT CHANGE UP (ref 150–400)
POTASSIUM SERPL-MCNC: 3.2 MMOL/L — LOW (ref 3.5–5.3)
POTASSIUM SERPL-SCNC: 3.2 MMOL/L — LOW (ref 3.5–5.3)
PROTHROM AB SERPL-ACNC: 20.2 SEC — HIGH (ref 10.5–13.4)
RBC # BLD: 3.82 M/UL — LOW (ref 4.2–5.8)
RBC # BLD: 4.08 M/UL — LOW (ref 4.2–5.8)
RBC # FLD: 16 % — HIGH (ref 10.3–14.5)
RBC # FLD: 16.2 % — HIGH (ref 10.3–14.5)
SODIUM SERPL-SCNC: 135 MMOL/L — SIGNIFICANT CHANGE UP (ref 135–145)
VANCOMYCIN TROUGH SERPL-MCNC: 7.1 UG/ML — LOW (ref 10–20)
WBC # BLD: 13.06 K/UL — HIGH (ref 3.8–10.5)
WBC # BLD: 14.54 K/UL — HIGH (ref 3.8–10.5)
WBC # FLD AUTO: 13.06 K/UL — HIGH (ref 3.8–10.5)
WBC # FLD AUTO: 14.54 K/UL — HIGH (ref 3.8–10.5)

## 2022-12-25 PROCEDURE — 99232 SBSQ HOSP IP/OBS MODERATE 35: CPT

## 2022-12-25 PROCEDURE — 71045 X-RAY EXAM CHEST 1 VIEW: CPT | Mod: 26

## 2022-12-25 RX ORDER — POTASSIUM CHLORIDE 20 MEQ
40 PACKET (EA) ORAL ONCE
Refills: 0 | Status: COMPLETED | OUTPATIENT
Start: 2022-12-25 | End: 2022-12-25

## 2022-12-25 RX ORDER — HEPARIN SODIUM 5000 [USP'U]/ML
INJECTION INTRAVENOUS; SUBCUTANEOUS
Qty: 25000 | Refills: 0 | Status: DISCONTINUED | OUTPATIENT
Start: 2022-12-25 | End: 2022-12-31

## 2022-12-25 RX ORDER — VANCOMYCIN HCL 1 G
1000 VIAL (EA) INTRAVENOUS EVERY 12 HOURS
Refills: 0 | Status: DISCONTINUED | OUTPATIENT
Start: 2022-12-25 | End: 2022-12-27

## 2022-12-25 RX ORDER — HEPARIN SODIUM 5000 [USP'U]/ML
3500 INJECTION INTRAVENOUS; SUBCUTANEOUS EVERY 6 HOURS
Refills: 0 | Status: DISCONTINUED | OUTPATIENT
Start: 2022-12-25 | End: 2022-12-31

## 2022-12-25 RX ORDER — HEPARIN SODIUM 5000 [USP'U]/ML
7500 INJECTION INTRAVENOUS; SUBCUTANEOUS EVERY 6 HOURS
Refills: 0 | Status: DISCONTINUED | OUTPATIENT
Start: 2022-12-25 | End: 2022-12-31

## 2022-12-25 RX ADMIN — Medication 1 MILLIGRAM(S): at 17:47

## 2022-12-25 RX ADMIN — Medication 100 MILLIGRAM(S): at 13:17

## 2022-12-25 RX ADMIN — CARBIDOPA AND LEVODOPA 1 TABLET(S): 25; 100 TABLET ORAL at 08:39

## 2022-12-25 RX ADMIN — HEPARIN SODIUM 2100 UNIT(S)/HR: 5000 INJECTION INTRAVENOUS; SUBCUTANEOUS at 23:57

## 2022-12-25 RX ADMIN — Medication 3 MILLILITER(S): at 05:40

## 2022-12-25 RX ADMIN — Medication 100 MILLIGRAM(S): at 23:52

## 2022-12-25 RX ADMIN — HEPARIN SODIUM 1700 UNIT(S)/HR: 5000 INJECTION INTRAVENOUS; SUBCUTANEOUS at 16:28

## 2022-12-25 RX ADMIN — BUDESONIDE AND FORMOTEROL FUMARATE DIHYDRATE 2 PUFF(S): 160; 4.5 AEROSOL RESPIRATORY (INHALATION) at 05:40

## 2022-12-25 RX ADMIN — BUDESONIDE AND FORMOTEROL FUMARATE DIHYDRATE 2 PUFF(S): 160; 4.5 AEROSOL RESPIRATORY (INHALATION) at 17:47

## 2022-12-25 RX ADMIN — Medication 3 MILLILITER(S): at 17:47

## 2022-12-25 RX ADMIN — Medication 40 MILLIEQUIVALENT(S): at 13:11

## 2022-12-25 RX ADMIN — Medication 1 MILLIGRAM(S): at 23:52

## 2022-12-25 RX ADMIN — LOSARTAN POTASSIUM 100 MILLIGRAM(S): 100 TABLET, FILM COATED ORAL at 05:40

## 2022-12-25 RX ADMIN — Medication 1 TABLET(S): at 12:39

## 2022-12-25 RX ADMIN — HEPARIN SODIUM 7500 UNIT(S): 5000 INJECTION INTRAVENOUS; SUBCUTANEOUS at 23:58

## 2022-12-25 RX ADMIN — ROPINIROLE 2 MILLIGRAM(S): 8 TABLET, FILM COATED, EXTENDED RELEASE ORAL at 12:38

## 2022-12-25 RX ADMIN — Medication 100 MILLIGRAM(S): at 05:39

## 2022-12-25 RX ADMIN — Medication 3 MILLILITER(S): at 23:54

## 2022-12-25 RX ADMIN — Medication 25 MILLIGRAM(S): at 17:47

## 2022-12-25 RX ADMIN — Medication 1 MILLIGRAM(S): at 08:36

## 2022-12-25 RX ADMIN — Medication 250 MILLIGRAM(S): at 11:09

## 2022-12-25 RX ADMIN — ROPINIROLE 2 MILLIGRAM(S): 8 TABLET, FILM COATED, EXTENDED RELEASE ORAL at 17:47

## 2022-12-25 RX ADMIN — PIPERACILLIN AND TAZOBACTAM 25 GRAM(S): 4; .5 INJECTION, POWDER, LYOPHILIZED, FOR SOLUTION INTRAVENOUS at 13:05

## 2022-12-25 RX ADMIN — PIPERACILLIN AND TAZOBACTAM 25 GRAM(S): 4; .5 INJECTION, POWDER, LYOPHILIZED, FOR SOLUTION INTRAVENOUS at 05:40

## 2022-12-25 RX ADMIN — HEPARIN SODIUM 1700 UNIT(S)/HR: 5000 INJECTION INTRAVENOUS; SUBCUTANEOUS at 19:24

## 2022-12-25 RX ADMIN — Medication 1 MILLIGRAM(S): at 12:39

## 2022-12-25 RX ADMIN — Medication 250 MILLIGRAM(S): at 23:54

## 2022-12-25 RX ADMIN — CARBIDOPA AND LEVODOPA 1 TABLET(S): 25; 100 TABLET ORAL at 23:54

## 2022-12-25 RX ADMIN — Medication 3 MILLILITER(S): at 12:39

## 2022-12-25 RX ADMIN — ROPINIROLE 2 MILLIGRAM(S): 8 TABLET, FILM COATED, EXTENDED RELEASE ORAL at 10:26

## 2022-12-25 RX ADMIN — PREGABALIN 1000 MICROGRAM(S): 225 CAPSULE ORAL at 12:40

## 2022-12-25 RX ADMIN — PIPERACILLIN AND TAZOBACTAM 25 GRAM(S): 4; .5 INJECTION, POWDER, LYOPHILIZED, FOR SOLUTION INTRAVENOUS at 22:31

## 2022-12-25 RX ADMIN — Medication 1 MILLIGRAM(S): at 12:38

## 2022-12-25 RX ADMIN — Medication 25 MILLIGRAM(S): at 05:40

## 2022-12-25 RX ADMIN — ROPINIROLE 2 MILLIGRAM(S): 8 TABLET, FILM COATED, EXTENDED RELEASE ORAL at 23:54

## 2022-12-25 RX ADMIN — CARBIDOPA AND LEVODOPA 1 TABLET(S): 25; 100 TABLET ORAL at 17:54

## 2022-12-25 RX ADMIN — CARBIDOPA AND LEVODOPA 1 TABLET(S): 25; 100 TABLET ORAL at 12:39

## 2022-12-25 NOTE — PROGRESS NOTE ADULT - SUBJECTIVE AND OBJECTIVE BOX
[facility], Division of Infectious Diseases  CHRISTI Putnam Y. Patel, S. Shah, G. [facility]  [phone]    Name: BOECKLE, ROBERT  Age: 77y  Gender: Male  MRN: [id number]    Interval History:  Patient seen and examined at bedside this morning  No acute overnight events. Afebrile  Feeling lousy  Notes reviewed    Antibiotics:  piperacillin/tazobactam IVPB.. 3.375 Gram(s) IV Intermittent every 8 hours  vancomycin  IVPB 1000 milliGRAM(s) IV Intermittent every 12 hours      Medications:  acetaminophen     Tablet .. 650 milliGRAM(s) Oral every 6 hours PRN  albuterol/ipratropium for Nebulization 3 milliLiter(s) Nebulizer every 6 hours  budesonide 160 MICROgram(s)/formoterol 4.5 MICROgram(s) Inhaler 2 Puff(s) Inhalation two times a day  carbidopa/levodopa  25/100 1 Tablet(s) Oral <User Schedule>  cyanocobalamin 1000 MICROGram(s) Oral daily  folic acid 1 milliGRAM(s) Oral daily  hydrochlorothiazide 25 milliGRAM(s) Oral daily  lactobacillus acidophilus 1 Tablet(s) Oral daily  losartan 100 milliGRAM(s) Oral daily  melatonin 3 milliGRAM(s) Oral at bedtime PRN  metoprolol tartrate 25 milliGRAM(s) Oral two times a day  ondansetron Injectable 4 milliGRAM(s) IV Push every 8 hours PRN  phenazopyridine 100 milliGRAM(s) Oral three times a day  piperacillin/tazobactam IVPB.. 3.375 Gram(s) IV Intermittent every 8 hours  rOPINIRole 2 milliGRAM(s) Oral <User Schedule>  sodium chloride 0.9%. 1000 milliLiter(s) IV Continuous <Continuous>  trihexyphenidyl 1 milliGRAM(s) Oral <User Schedule>  vancomycin  IVPB 1000 milliGRAM(s) IV Intermittent every 12 hours      Review of Systems:  A 10-point review of systems was obtained.   Review of systems otherwise negative except as previously noted.    Allergies: Levaquin (Anaphylaxis)    For details regarding the patient's past medical history, social history, family history, and other miscellaneous elements, please refer the initial infectious diseases consultation and/or the admitting history and physical examination for this admission.    Objective:  Vitals:   T(C): 36.7 (12-25-22 @ 05:18), Max: 37.2 (12-24-22 @ 16:12)  HR: 81 (12-25-22 @ 05:18) (74 - 115)  BP: 128/70 (12-25-22 @ 05:18) (117/71 - 138/78)  RR: 18 (12-25-22 @ 05:18) (16 - 18)  SpO2: 95% (12-25-22 @ 05:18) (93% - 95%)    Physical Examination:  General: no acute distress  HEENT: NC/AT, EOMI  Cardio: S1, S2 heard, RRR, no murmurs  Resp: breath sounds heard bilaterally, no rales, wheezes or rhonchi  Abd: soft, NT, ND  : scrotal swelling and erythema, TTP  Ext: no edema or cyanosis  Skin: warm, dry, no visible rash      Laboratory Studies:  CBC:                       11.8   13.06 )-----------( 274      ( 25 Dec 2022 09:24 )             36.9     CMP: 12-25    135  |  96  |  21  ----------------------------<  201<H>  3.2<L>   |  29  |  0.96    Ca    8.5      25 Dec 2022 08:36  Mg     2.2     12-25    TPro  6.9  /  Alb  3.0<L>  /  TBili  0.4  /  DBili  x   /  AST  27  /  ALT  6<L>  /  AlkPhos  121<H>  12-24    LIVER FUNCTIONS - ( 24 Dec 2022 06:59 )  Alb: 3.0 g/dL / Pro: 6.9 g/dL / ALK PHOS: 121 U/L / ALT: 6 U/L / AST: 27 U/L / GGT: x           Urinalysis Basic - ( 24 Dec 2022 04:57 )    Color: Yellow / Appearance: Clear / SG: >1.050 / pH: x  Gluc: x / Ketone: Small  / Bili: Negative / Urobili: 3 mg/dL   Blood: x / Protein: 100 mg/dl / Nitrite: Negative   Leuk Esterase: Negative / RBC: 96 /hpf / WBC 8 /HPF   Sq Epi: x / Non Sq Epi: 3 /hpf / Bacteria: Negative        Microbiology: reviewed    Culture - Blood (collected 12-23-22 @ 13:35)  Source: .Blood Blood-Peripheral  Preliminary Report (12-24-22 @ 18:01):    No growth to date.    Culture - Blood (collected 12-23-22 @ 13:15)  Source: .Blood Blood-Peripheral  Preliminary Report (12-24-22 @ 18:01):    No growth to date.          Radiology: reviewed    < from: CT Abdomen and Pelvis w/ IV Cont (12.23.22 @ 18:58) >    ACC: [id number] EXAM:  CT ABDOMEN AND PELVIS IC                          PROCEDURE DATE:  12/23/2022          INTERPRETATION:  CLINICAL INFORMATION: Edematous right-sided scrotum with   cellulitis. Evaluate for abscess or necrotizing fasciitis.    COMPARISON: Same-day CT chest and CT chest 11/5/2013    CONTRAST/COMPLICATIONS:  IV Contrast: Omnipaque 350  90 cc administered   10 cc discarded  Oral Contrast: NONE  Complications: None reported at time of study completion    PROCEDURE:  CT of the Abdomen and Pelvis was performed.  Sagittal and coronal reformats were performed.    FINDINGS:  LOWER CHEST: Large left-sided Morgagni hernia containing colon. Right   lower lung atelectasis. Coronary artery calcifications.    LIVER: Steatosis.  BILE DUCTS: Normal caliber.  GALLBLADDER: Partial calcification of the gallbladder wall.  SPLEEN: Within normal limits.  PANCREAS: Markedly atrophic. 2.3 cm simple appearing cystic lesion in the   pancreatic head. No pancreatic ductal dilatation.  ADRENALS: Within normal limits.  KIDNEYS/URETERS: 4.2 cm right renal cyst. Additional subcentimeter right   renal hypodense focus, too small to characterize. 3 mm nonobstructing   stone in the lower pole of the right kidney.    BLADDER: Within normal limits.  REPRODUCTIVE ORGANS: Prostate is enlarged. Within the right scrotum there   is a large rim-enhancing fluid and gas containing collection measuring   15.0 x 12.8 x 10.2 cm (series 602 image 87 and series 2 image 158, AP x   CC x TR).    BOWEL: No bowel obstruction. Appendix is normal.  PERITONEUM: No ascites.  VESSELS: Atherosclerotic changes.  RETROPERITONEUM/LYMPH NODES: No lymphadenopathy.  ABDOMINAL WALL: Inflammatory changes/fat stranding and thickening in the   right groin region likely secondary to the patient's scrotal abscess.   Small fat-containing umbilical hernia. Large left inguinal hernia   containing nonobstructed sigmoid colon.  BONES: Degenerative changes.    IMPRESSION:  Large right scrotal abscess measuring up to 15.0 cm.    Large left inguinal hernia containing sigmoid colon.    Large left-sided Morgagni hernia.    2.3 cm pancreatic head cystic lesion.    --- End of Report ---           LOR CRUZ MD; Resident Radiologist  This document has been electronically signed.  NEFTALY LOGAN MD; Attending Radiologist  This document has been electronically signed. Dec 23 2022  7:43PM    < end of copied text >

## 2022-12-25 NOTE — PROCEDURE NOTE - PROCEDURE FINDINGS AND DETAILS
technically successful US guided R scrotal abscess drainage yielding purulent fluid which was sent for culture. approximately 20cc were manually aspirated with the remaining collection allowed to drain via ALIZA bulb. catheter secured to the skin with a non absorbable suture.

## 2022-12-25 NOTE — PROGRESS NOTE ADULT - SUBJECTIVE AND OBJECTIVE BOX
Patient is a 77y old  Male who presents with a chief complaint of generalized weakness and AMS x 2 days (25 Dec 2022 10:07)      SUBJECTIVE / OVERNIGHT EVENTS:  Patient seen and examined.   Still with testicular pain.      Vital Signs Last 24 Hrs  T(C): 37.1 (25 Dec 2022 10:36), Max: 37.2 (24 Dec 2022 16:12)  T(F): 98.7 (25 Dec 2022 10:36), Max: 99 (24 Dec 2022 16:12)  HR: 87 (25 Dec 2022 10:36) (74 - 110)  BP: 149/72 (25 Dec 2022 10:36) (123/79 - 149/72)  BP(mean): --  RR: 18 (25 Dec 2022 10:36) (16 - 18)  SpO2: 90% (25 Dec 2022 10:36) (90% - 95%)    Parameters below as of 25 Dec 2022 10:36  Patient On (Oxygen Delivery Method): nasal cannula  O2 Flow (L/min): 2    I&O's Summary    24 Dec 2022 07:01  -  25 Dec 2022 07:00  --------------------------------------------------------  IN: 2295 mL / OUT: 1100 mL / NET: 1195 mL        PE:  GENERAL: NAD, AAOx3  CHEST/LUNG: CTABL, No wheeze  HEART: Regular rate and rhythm; no murmur  ABDOMEN: Soft, Nontender, Nondistended; Bowel sounds present  : enlarged R scrotum, erythematous, tender, gould  EXTREMITIES:  2+ Peripheral Pulses, No edema  NEURO: No focal deficits  LABS:                        11.8   13.06 )-----------( 274      ( 25 Dec 2022 09:24 )             36.9     12-25    135  |  96  |  21  ----------------------------<  201<H>  3.2<L>   |  29  |  0.96    Ca    8.5      25 Dec 2022 08:36  Mg     2.2     12-25    TPro  6.9  /  Alb  3.0<L>  /  TBili  0.4  /  DBili  x   /  AST  27  /  ALT  6<L>  /  AlkPhos  121<H>  12-24    PT/INR - ( 25 Dec 2022 09:24 )   PT: 20.2 sec;   INR: 1.73 ratio         PTT - ( 24 Dec 2022 06:59 )  PTT:45.3 sec  CAPILLARY BLOOD GLUCOSE            Urinalysis Basic - ( 24 Dec 2022 04:57 )    Color: Yellow / Appearance: Clear / SG: >1.050 / pH: x  Gluc: x / Ketone: Small  / Bili: Negative / Urobili: 3 mg/dL   Blood: x / Protein: 100 mg/dl / Nitrite: Negative   Leuk Esterase: Negative / RBC: 96 /hpf / WBC 8 /HPF   Sq Epi: x / Non Sq Epi: 3 /hpf / Bacteria: Negative        RADIOLOGY & ADDITIONAL TESTS:    Imaging Personally Reviewed:  [x] YES  [ ] NO    Consultant(s) Notes Reviewed:  [x] YES  [ ] NO      MEDICATIONS  (STANDING):  albuterol/ipratropium for Nebulization 3 milliLiter(s) Nebulizer every 6 hours  budesonide 160 MICROgram(s)/formoterol 4.5 MICROgram(s) Inhaler 2 Puff(s) Inhalation two times a day  carbidopa/levodopa  25/100 1 Tablet(s) Oral <User Schedule>  cyanocobalamin 1000 MICROGram(s) Oral daily  folic acid 1 milliGRAM(s) Oral daily  hydrochlorothiazide 25 milliGRAM(s) Oral daily  lactobacillus acidophilus 1 Tablet(s) Oral daily  losartan 100 milliGRAM(s) Oral daily  metoprolol tartrate 25 milliGRAM(s) Oral two times a day  phenazopyridine 100 milliGRAM(s) Oral three times a day  piperacillin/tazobactam IVPB.. 3.375 Gram(s) IV Intermittent every 8 hours  potassium chloride    Tablet ER 40 milliEquivalent(s) Oral once  rOPINIRole 2 milliGRAM(s) Oral <User Schedule>  trihexyphenidyl 1 milliGRAM(s) Oral <User Schedule>  vancomycin  IVPB 1000 milliGRAM(s) IV Intermittent every 12 hours    MEDICATIONS  (PRN):  acetaminophen     Tablet .. 650 milliGRAM(s) Oral every 6 hours PRN Temp greater or equal to 38C (100.4F), Mild Pain (1 - 3)  melatonin 3 milliGRAM(s) Oral at bedtime PRN Insomnia  ondansetron Injectable 4 milliGRAM(s) IV Push every 8 hours PRN Nausea and/or Vomiting      Care Discussed with Consultants/Other Providers [x] YES  [ ] NO    HEALTH ISSUES - PROBLEM Dx:  Scrotal abscess    Sepsis    Chronic obstructive pulmonary disease (COPD)    H/O factor V Leiden mutation    Unspecified atrial fibrillation    Parkinsons    HTN (hypertension)    Preop exam for internal medicine

## 2022-12-25 NOTE — PROGRESS NOTE ADULT - SUBJECTIVE AND OBJECTIVE BOX
Subjective  Patient seen and examined.   AMS improved, patient comfortable and pain controlled.  Had some dysuria last night with gould catheter in place and started on pyridium    Objective    Vital signs  T(F): , Max: 99 (12-24-22 @ 16:12)  HR: 81 (12-25-22 @ 05:18)  BP: 128/70 (12-25-22 @ 05:18)  SpO2: 95% (12-25-22 @ 05:18)  Wt(kg): --    Output     OUT:    Indwelling Catheter - Urethral (mL): 1100 mL  Total OUT: 1100 mL    Total NET: -1100 mL      Physical Exam:  Gen: NAD.  Lungs: Non labored breathing.   Ab: Soft, nontender, nondistended.   :  Uncircumcised, no lesions. Testes descended bilaterally. +edematous BL scrotum R>L, R scrotum firm, indurated, mild TTP, no warmth or marked erythema. No crepitus appreciated. Gould in place, clear yellow urine    Labs      12-24 @ 07:00    WBC 13.71 / Hct 37.7  / SCr --       12-24 @ 06:59    WBC --    / Hct --    / SCr 1.03         Culture - Blood (collected 12-23-22 @ 13:35)  Source: .Blood Blood-Peripheral  Preliminary Report (12-24-22 @ 18:01):    No growth to date.    Culture - Blood (collected 12-23-22 @ 13:15)  Source: .Blood Blood-Peripheral  Preliminary Report (12-24-22 @ 18:01):    No growth to date.

## 2022-12-25 NOTE — CONSULT NOTE ADULT - SUBJECTIVE AND OBJECTIVE BOX
GENERAL SURGERY CONSULT  ========================    HPI:  Patient is a 77-year-old male with a past medical history of Parkinson's, COPD, DVT/PE factor V Leiden on Coumadin, BL inguinal hernias s/p R inguinal hernia repair (Aug 2022) at [facility] c/b scrotal hematoma for which he has been undergoing intermittent needle aspirations presenting with generalized weakness and altered mental state for the past two days. Per wife, patient has been having generalized weakness and recurrent falls over the past several days. He was evaluated at [facility] ED four days prior to admission and imaging at the time was without acute fractures. However, since then patient in more lethargic, intermittently confused. Patient reports dysuria, pain in the scrotum , increased redness on right side . He has and intermittent non productive cough , but no fever or chills. On admission, patient found to be septic with a large 15cm right scrotal hematoma/abscess. Urology consulted with no plans for acute intervention.    Surgery consulted for       PAST MEDICAL & SURGICAL HISTORY:  Personal history of PE (pulmonary embolism)      COPD (chronic obstructive pulmonary disease)      Hypertension      Restless leg syndrome      Herniated cervical disc      H/O hernia repair          MEDICATIONS  (STANDING):  albuterol/ipratropium for Nebulization 3 milliLiter(s) Nebulizer every 6 hours  budesonide 160 MICROgram(s)/formoterol 4.5 MICROgram(s) Inhaler 2 Puff(s) Inhalation two times a day  carbidopa/levodopa  25/100 1 Tablet(s) Oral <User Schedule>  cyanocobalamin 1000 MICROGram(s) Oral daily  folic acid 1 milliGRAM(s) Oral daily  hydrochlorothiazide 25 milliGRAM(s) Oral daily  lactobacillus acidophilus 1 Tablet(s) Oral daily  losartan 100 milliGRAM(s) Oral daily  metoprolol tartrate 25 milliGRAM(s) Oral two times a day  phenazopyridine 100 milliGRAM(s) Oral three times a day  piperacillin/tazobactam IVPB.. 3.375 Gram(s) IV Intermittent every 8 hours  rOPINIRole 2 milliGRAM(s) Oral <User Schedule>  trihexyphenidyl 1 milliGRAM(s) Oral <User Schedule>  vancomycin  IVPB 1000 milliGRAM(s) IV Intermittent every 12 hours      ALLERGIES:    ___________________________________________  REVIEW OF SYSTEMS:  All ROS negative except as per HPI.    ___________________________________________  VITALS:  Vital Signs Last 24 Hrs  T(C): 36.7 (25 Dec 2022 14:10), Max: 37.2 (24 Dec 2022 16:12)  T(F): 98 (25 Dec 2022 14:10), Max: 99 (24 Dec 2022 16:12)  HR: 88 (25 Dec 2022 14:10) (74 - 109)  BP: 122/70 (25 Dec 2022 14:10) (122/70 - 149/72)  BP(mean): --  RR: 18 (25 Dec 2022 14:24) (16 - 18)  SpO2: 91% (25 Dec 2022 14:24) (90% - 95%)    Parameters below as of 25 Dec 2022 14:24  Patient On (Oxygen Delivery Method): nasal cannula  O2 Flow (L/min): 2      CAPILLARY BLOOD GLUCOSE          I&O's Detail    24 Dec 2022 07:01  -  25 Dec 2022 07:00  --------------------------------------------------------  IN:    IV PiggyBack: 450 mL    Oral Fluid: 1020 mL    sodium chloride 0.9%: 825 mL  Total IN: 2295 mL    OUT:    Indwelling Catheter - Urethral (mL): 1100 mL  Total OUT: 1100 mL    Total NET: 1195 mL      25 Dec 2022 07:01  -  25 Dec 2022 15:12  --------------------------------------------------------  IN:    Oral Fluid: 450 mL  Total IN: 450 mL    OUT:    Indwelling Catheter - Urethral (mL): 450 mL  Total OUT: 450 mL    Total NET: 0 mL          PHYSICAL EXAM:  General: AAOx3, no acute distress.  Respiratory: breathing comfortably, no increased WOB   Abdomen: soft, nontender, nondistended, no rebound, no guarding  Extremities: Moves all four.     ____________________________________________  LABS:  CBC Full  -  ( 25 Dec 2022 09:24 )  WBC Count : 13.06 K/uL  RBC Count : 4.08 M/uL  Hemoglobin : 11.8 g/dL  Hematocrit : 36.9 %  Platelet Count - Automated : 274 K/uL  Mean Cell Volume : 90.4 fl  Mean Cell Hemoglobin : 28.9 pg  Mean Cell Hemoglobin Concentration : 32.0 gm/dL  Auto Neutrophil # : x  Auto Lymphocyte # : x  Auto Monocyte # : x  Auto Eosinophil # : x  Auto Basophil # : x  Auto Neutrophil % : x  Auto Lymphocyte % : x  Auto Monocyte % : x  Auto Eosinophil % : x  Auto Basophil % : x    12-25    135  |  96  |  21  ----------------------------<  201<H>  3.2<L>   |  29  |  0.96    Ca    8.5      25 Dec 2022 08:36  Mg     2.2     12-25    TPro  6.9  /  Alb  3.0<L>  /  TBili  0.4  /  DBili  x   /  AST  27  /  ALT  6<L>  /  AlkPhos  121<H>  12-24    LIVER FUNCTIONS - ( 24 Dec 2022 06:59 )  Alb: 3.0 g/dL / Pro: 6.9 g/dL / ALK PHOS: 121 U/L / ALT: 6 U/L / AST: 27 U/L / GGT: x           PT/INR - ( 25 Dec 2022 09:24 )   PT: 20.2 sec;   INR: 1.73 ratio         PTT - ( 24 Dec 2022 06:59 )  PTT:45.3 sec  Urinalysis Basic - ( 24 Dec 2022 04:57 )    Color: Yellow / Appearance: Clear / SG: >1.050 / pH: x  Gluc: x / Ketone: Small  / Bili: Negative / Urobili: 3 mg/dL   Blood: x / Protein: 100 mg/dl / Nitrite: Negative   Leuk Esterase: Negative / RBC: 96 /hpf / WBC 8 /HPF   Sq Epi: x / Non Sq Epi: 3 /hpf / Bacteria: Negative            ____________________________________________  RADIOLOGY & ADDITIONAL STUDIES:   GENERAL SURGERY CONSULT  ========================    HPI:  Patient is a 77-year-old male with a past medical history of Parkinson's, COPD, L Morgagni hernia, 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 Per wife, patient has been having generalized weakness and recurrent falls over the past several days. He was evaluated at [facility] ED four days prior to admission and imaging at the time was without acute fractures. However, since then patient in more lethargic, intermittently confused. Patient reports dysuria, pain in the scrotum , increased redness on right side . He has and intermittent non productive cough , but no fever or chills. On admission, patient found to be septic with a large 15cm right scrotal hematoma/abscess. Urology consulted with no plans for acute operative intervention.    Surgery consulted for evaluation of large left inguinal hernia. Per family, hernia has been present for 10 years. He had the right side repaired at [place] and had plans for repair of the left side in March 2023. Denies nausea, vomiting or abd pain. Endorses flatus. Lives with wife and is functional at baseline. Occasionally uses a walker.       PAST MEDICAL & SURGICAL HISTORY:  Personal history of PE (pulmonary embolism)      COPD (chronic obstructive pulmonary disease)      Hypertension      Restless leg syndrome      Herniated cervical disc      H/O hernia repair          MEDICATIONS  (STANDING):  albuterol/ipratropium for Nebulization 3 milliLiter(s) Nebulizer every 6 hours  budesonide 160 MICROgram(s)/formoterol 4.5 MICROgram(s) Inhaler 2 Puff(s) Inhalation two times a day  carbidopa/levodopa  25/100 1 Tablet(s) Oral <User Schedule>  cyanocobalamin 1000 MICROGram(s) Oral daily  folic acid 1 milliGRAM(s) Oral daily  hydrochlorothiazide 25 milliGRAM(s) Oral daily  lactobacillus acidophilus 1 Tablet(s) Oral daily  losartan 100 milliGRAM(s) Oral daily  metoprolol tartrate 25 milliGRAM(s) Oral two times a day  phenazopyridine 100 milliGRAM(s) Oral three times a day  piperacillin/tazobactam IVPB.. 3.375 Gram(s) IV Intermittent every 8 hours  rOPINIRole 2 milliGRAM(s) Oral <User Schedule>  trihexyphenidyl 1 milliGRAM(s) Oral <User Schedule>  vancomycin  IVPB 1000 milliGRAM(s) IV Intermittent every 12 hours      ALLERGIES:  NKDA  ___________________________________________  REVIEW OF SYSTEMS:  All ROS negative except as per HPI.    ___________________________________________  VITALS:  Vital Signs Last 24 Hrs  T(C): 36.7 (25 Dec 2022 14:10), Max: 37.2 (24 Dec 2022 16:12)  T(F): 98 (25 Dec 2022 14:10), Max: 99 (24 Dec 2022 16:12)  HR: 88 (25 Dec 2022 14:10) (74 - 109)  BP: 122/70 (25 Dec 2022 14:10) (122/70 - 149/72)  BP(mean): --  RR: 18 (25 Dec 2022 14:24) (16 - 18)  SpO2: 91% (25 Dec 2022 14:24) (90% - 95%)    Parameters below as of 25 Dec 2022 14:24  Patient On (Oxygen Delivery Method): nasal cannula  O2 Flow (L/min): 2      I&O's Detail    24 Dec 2022 07:01  -  25 Dec 2022 07:00  --------------------------------------------------------  IN:    IV PiggyBack: 450 mL    Oral Fluid: 1020 mL    sodium chloride 0.9%: 825 mL  Total IN: 2295 mL    OUT:    Indwelling Catheter - Urethral (mL): 1100 mL  Total OUT: 1100 mL    Total NET: 1195 mL      25 Dec 2022 07:01  -  25 Dec 2022 15:12  --------------------------------------------------------  IN:    Oral Fluid: 450 mL  Total IN: 450 mL    OUT:    Indwelling Catheter - Urethral (mL): 450 mL  Total OUT: 450 mL    Total NET: 0 mL      PHYSICAL EXAM:  General: AAOx1-2, no acute distress.  Respiratory: breathing comfortably, no increased WOB   Abdomen: soft, nontender, nondistended, no rebound, no guarding  : very large left inguinal hernia down into scrotum with no overlying skin changes or tenderness, right scrotum mildly erythematous but nontender, no drainage, gould catheter in place  Extremities: Moves all four.     ____________________________________________  LABS:  CBC Full  -  ( 25 Dec 2022 09:24 )  WBC Count : 13.06 K/uL  RBC Count : 4.08 M/uL  Hemoglobin : 11.8 g/dL  Hematocrit : 36.9 %  Platelet Count - Automated : 274 K/uL  Mean Cell Volume : 90.4 fl  Mean Cell Hemoglobin : 28.9 pg  Mean Cell Hemoglobin Concentration : 32.0 gm/dL  Auto Neutrophil # : x  Auto Lymphocyte # : x  Auto Monocyte # : x  Auto Eosinophil # : x  Auto Basophil # : x  Auto Neutrophil % : x  Auto Lymphocyte % : x  Auto Monocyte % : x  Auto Eosinophil % : x  Auto Basophil % : x    12-25    135  |  96  |  21  ----------------------------<  201<H>  3.2<L>   |  29  |  0.96    Ca    8.5      25 Dec 2022 08:36  Mg     2.2     12-25    TPro  6.9  /  Alb  3.0<L>  /  TBili  0.4  /  DBili  x   /  AST  27  /  ALT  6<L>  /  AlkPhos  121<H>  12-24    LIVER FUNCTIONS - ( 24 Dec 2022 06:59 )  Alb: 3.0 g/dL / Pro: 6.9 g/dL / ALK PHOS: 121 U/L / ALT: 6 U/L / AST: 27 U/L / GGT: x           PT/INR - ( 25 Dec 2022 09:24 )   PT: 20.2 sec;   INR: 1.73 ratio         PTT - ( 24 Dec 2022 06:59 )  PTT:45.3 sec  Urinalysis Basic - ( 24 Dec 2022 04:57 )    Color: Yellow / Appearance: Clear / SG: >1.050 / pH: x  Gluc: x / Ketone: Small  / Bili: Negative / Urobili: 3 mg/dL   Blood: x / Protein: 100 mg/dl / Nitrite: Negative   Leuk Esterase: Negative / RBC: 96 /hpf / WBC 8 /HPF   Sq Epi: x / Non Sq Epi: 3 /hpf / Bacteria: Negative      ____________________________________________  RADIOLOGY & ADDITIONAL STUDIES:  < from: CT Abdomen and Pelvis w/ IV Cont (12.23.22 @ 18:58) >  ACC: [id number] EXAM:  CT ABDOMEN AND PELVIS IC                          PROCEDURE DATE:  12/23/2022          INTERPRETATION:  CLINICAL INFORMATION: Edematous right-sided scrotum with   cellulitis. Evaluate for abscess or necrotizing fasciitis.    COMPARISON: Same-day CT chest and CT chest 11/5/2013    CONTRAST/COMPLICATIONS:  IV Contrast: Omnipaque 350  90 cc administered   10 cc discarded  Oral Contrast: NONE  Complications: None reported at time of study completion    PROCEDURE:  CT of the Abdomen and Pelvis was performed.  Sagittal and coronal reformats were performed.    FINDINGS:  LOWER CHEST: Large left-sided Morgagni hernia containing colon. Right   lower lung atelectasis. Coronary artery calcifications.    LIVER: Steatosis.  BILE DUCTS: Normal caliber.  GALLBLADDER: Partial calcification of the gallbladder wall.  SPLEEN: Within normal limits.  PANCREAS: Markedly atrophic. 2.3 cm simple appearing cystic lesion in the   pancreatic head. No pancreatic ductal dilatation.  ADRENALS: Within normal limits.  KIDNEYS/URETERS: 4.2 cm right renal cyst. Additional subcentimeter right   renal hypodense focus, too small to characterize. 3 mm nonobstructing   stone in the lower pole of the right kidney.    BLADDER: Within normal limits.  REPRODUCTIVE ORGANS: Prostate is enlarged. Within the right scrotum there   is a large rim-enhancing fluid and gas containing collection measuring   15.0 x 12.8 x 10.2 cm (series 602 image 87 and series 2 image 158, AP x   CC x TR).    BOWEL: No bowel obstruction. Appendix is normal.  PERITONEUM: No ascites.  VESSELS: Atherosclerotic changes.  RETROPERITONEUM/LYMPH NODES: No lymphadenopathy.  ABDOMINAL WALL: Inflammatory changes/fat stranding and thickening in the   right groin region likely secondary to the patient's scrotal abscess.   Small fat-containing umbilical hernia. Large left inguinal hernia   containing nonobstructed sigmoid colon.  BONES: Degenerative changes.    IMPRESSION:  Large right scrotal abscess measuring up to 15.0 cm.    Large left inguinal hernia containing sigmoid colon.    Large left-sided Morgagni hernia.    2.3 cm pancreatic head cystic lesion.    --- End of Report ---    < end of copied text >   GENERAL SURGERY CONSULT  ========================    HPI:  Patient is a 77-year-old male with a past medical history of Parkinson's, COPD, L Morgagni hernia, DVT/PE factor V Leiden on Coumadin, BL inguinal hernias s/p R inguinal hernia repair (Aug 2022) at [facility] c/b scrotal hematoma for which he underwent an office needle aspirations (2 weeks ago) presenting with generalized weakness and altered mental state for 4 days. Per wife, patient has been having generalized weakness and recurrent falls over the past several days. He was evaluated at [facility] ED four days prior to admission and imaging at the time was without acute fractures. However, since then patient in more lethargic, intermittently confused. Patient reports dysuria, pain in the scrotum , increased redness on right side . He has and intermittent non productive cough , but no fever or chills. On admission, patient found to be septic with a large 15cm right scrotal hematoma/abscess. Urology consulted with no plans for acute operative intervention.    Surgery consulted for evaluation of large left inguinal hernia. Per family, hernia has been present for 10 years. He had the right side repaired at [place] by Dr. Hollingsworth and had plans for repair of the left side in March 2023. Denies nausea, vomiting or abd pain. Endorses flatus. Lives with wife and is functional at baseline. Occasionally uses a walker.       PAST MEDICAL & SURGICAL HISTORY:  Personal history of PE (pulmonary embolism)      COPD (chronic obstructive pulmonary disease)      Hypertension      Restless leg syndrome      Herniated cervical disc      H/O hernia repair          MEDICATIONS  (STANDING):  albuterol/ipratropium for Nebulization 3 milliLiter(s) Nebulizer every 6 hours  budesonide 160 MICROgram(s)/formoterol 4.5 MICROgram(s) Inhaler 2 Puff(s) Inhalation two times a day  carbidopa/levodopa  25/100 1 Tablet(s) Oral <User Schedule>  cyanocobalamin 1000 MICROGram(s) Oral daily  folic acid 1 milliGRAM(s) Oral daily  hydrochlorothiazide 25 milliGRAM(s) Oral daily  lactobacillus acidophilus 1 Tablet(s) Oral daily  losartan 100 milliGRAM(s) Oral daily  metoprolol tartrate 25 milliGRAM(s) Oral two times a day  phenazopyridine 100 milliGRAM(s) Oral three times a day  piperacillin/tazobactam IVPB.. 3.375 Gram(s) IV Intermittent every 8 hours  rOPINIRole 2 milliGRAM(s) Oral <User Schedule>  trihexyphenidyl 1 milliGRAM(s) Oral <User Schedule>  vancomycin  IVPB 1000 milliGRAM(s) IV Intermittent every 12 hours      ALLERGIES:  NKDA  ___________________________________________  REVIEW OF SYSTEMS:  All ROS negative except as per HPI.    ___________________________________________  VITALS:  Vital Signs Last 24 Hrs  T(C): 36.7 (25 Dec 2022 14:10), Max: 37.2 (24 Dec 2022 16:12)  T(F): 98 (25 Dec 2022 14:10), Max: 99 (24 Dec 2022 16:12)  HR: 88 (25 Dec 2022 14:10) (74 - 109)  BP: 122/70 (25 Dec 2022 14:10) (122/70 - 149/72)  BP(mean): --  RR: 18 (25 Dec 2022 14:24) (16 - 18)  SpO2: 91% (25 Dec 2022 14:24) (90% - 95%)    Parameters below as of 25 Dec 2022 14:24  Patient On (Oxygen Delivery Method): nasal cannula  O2 Flow (L/min): 2      I&O's Detail    24 Dec 2022 07:01  -  25 Dec 2022 07:00  --------------------------------------------------------  IN:    IV PiggyBack: 450 mL    Oral Fluid: 1020 mL    sodium chloride 0.9%: 825 mL  Total IN: 2295 mL    OUT:    Indwelling Catheter - Urethral (mL): 1100 mL  Total OUT: 1100 mL    Total NET: 1195 mL      25 Dec 2022 07:01  -  25 Dec 2022 15:12  --------------------------------------------------------  IN:    Oral Fluid: 450 mL  Total IN: 450 mL    OUT:    Indwelling Catheter - Urethral (mL): 450 mL  Total OUT: 450 mL    Total NET: 0 mL      PHYSICAL EXAM:  General: AAOx1-2, no acute distress.  Respiratory: breathing comfortably, no increased WOB   Abdomen: soft, nontender, nondistended, no rebound, no guarding  : very large left inguinal hernia down into scrotum with no overlying skin changes or tenderness, right scrotum mildly erythematous but nontender, no drainage, gould catheter in place  Extremities: Moves all four.     ____________________________________________  LABS:  CBC Full  -  ( 25 Dec 2022 09:24 )  WBC Count : 13.06 K/uL  RBC Count : 4.08 M/uL  Hemoglobin : 11.8 g/dL  Hematocrit : 36.9 %  Platelet Count - Automated : 274 K/uL  Mean Cell Volume : 90.4 fl  Mean Cell Hemoglobin : 28.9 pg  Mean Cell Hemoglobin Concentration : 32.0 gm/dL  Auto Neutrophil # : x  Auto Lymphocyte # : x  Auto Monocyte # : x  Auto Eosinophil # : x  Auto Basophil # : x  Auto Neutrophil % : x  Auto Lymphocyte % : x  Auto Monocyte % : x  Auto Eosinophil % : x  Auto Basophil % : x    12-25    135  |  96  |  21  ----------------------------<  201<H>  3.2<L>   |  29  |  0.96    Ca    8.5      25 Dec 2022 08:36  Mg     2.2     12-25    TPro  6.9  /  Alb  3.0<L>  /  TBili  0.4  /  DBili  x   /  AST  27  /  ALT  6<L>  /  AlkPhos  121<H>  12-24    LIVER FUNCTIONS - ( 24 Dec 2022 06:59 )  Alb: 3.0 g/dL / Pro: 6.9 g/dL / ALK PHOS: 121 U/L / ALT: 6 U/L / AST: 27 U/L / GGT: x           PT/INR - ( 25 Dec 2022 09:24 )   PT: 20.2 sec;   INR: 1.73 ratio         PTT - ( 24 Dec 2022 06:59 )  PTT:45.3 sec  Urinalysis Basic - ( 24 Dec 2022 04:57 )    Color: Yellow / Appearance: Clear / SG: >1.050 / pH: x  Gluc: x / Ketone: Small  / Bili: Negative / Urobili: 3 mg/dL   Blood: x / Protein: 100 mg/dl / Nitrite: Negative   Leuk Esterase: Negative / RBC: 96 /hpf / WBC 8 /HPF   Sq Epi: x / Non Sq Epi: 3 /hpf / Bacteria: Negative      ____________________________________________  RADIOLOGY & ADDITIONAL STUDIES:  < from: CT Abdomen and Pelvis w/ IV Cont (12.23.22 @ 18:58) >  ACC: [id number] EXAM:  CT ABDOMEN AND PELVIS IC                          PROCEDURE DATE:  12/23/2022          INTERPRETATION:  CLINICAL INFORMATION: Edematous right-sided scrotum with   cellulitis. Evaluate for abscess or necrotizing fasciitis.    COMPARISON: Same-day CT chest and CT chest 11/5/2013    CONTRAST/COMPLICATIONS:  IV Contrast: Omnipaque 350  90 cc administered   10 cc discarded  Oral Contrast: NONE  Complications: None reported at time of study completion    PROCEDURE:  CT of the Abdomen and Pelvis was performed.  Sagittal and coronal reformats were performed.    FINDINGS:  LOWER CHEST: Large left-sided Morgagni hernia containing colon. Right   lower lung atelectasis. Coronary artery calcifications.    LIVER: Steatosis.  BILE DUCTS: Normal caliber.  GALLBLADDER: Partial calcification of the gallbladder wall.  SPLEEN: Within normal limits.  PANCREAS: Markedly atrophic. 2.3 cm simple appearing cystic lesion in the   pancreatic head. No pancreatic ductal dilatation.  ADRENALS: Within normal limits.  KIDNEYS/URETERS: 4.2 cm right renal cyst. Additional subcentimeter right   renal hypodense focus, too small to characterize. 3 mm nonobstructing   stone in the lower pole of the right kidney.    BLADDER: Within normal limits.  REPRODUCTIVE ORGANS: Prostate is enlarged. Within the right scrotum there   is a large rim-enhancing fluid and gas containing collection measuring   15.0 x 12.8 x 10.2 cm (series 602 image 87 and series 2 image 158, AP x   CC x TR).    BOWEL: No bowel obstruction. Appendix is normal.  PERITONEUM: No ascites.  VESSELS: Atherosclerotic changes.  RETROPERITONEUM/LYMPH NODES: No lymphadenopathy.  ABDOMINAL WALL: Inflammatory changes/fat stranding and thickening in the   right groin region likely secondary to the patient's scrotal abscess.   Small fat-containing umbilical hernia. Large left inguinal hernia   containing nonobstructed sigmoid colon.  BONES: Degenerative changes.    IMPRESSION:  Large right scrotal abscess measuring up to 15.0 cm.    Large left inguinal hernia containing sigmoid colon.    Large left-sided Morgagni hernia.    2.3 cm pancreatic head cystic lesion.    --- End of Report ---    < end of copied text >

## 2022-12-25 NOTE — PROGRESS NOTE ADULT - ASSESSMENT
77y Male with PMHx of Parkinson's, COPD, DVT/PE factor V Leiden on Coumadin, BL chronic incarcerated hernias s/p R hernia repair 10/29/22 at [facility] with Dr. Dooley c/b large right hematoma s/p 2 needle aspirations, presents to ED complaining of weakness and  shortness of breath since last night. In ED, patient is septic, febrile to 102 rectally, tachy to 122, white count 14.6, Crit 41.3, Cr 1.16. CT scan shows large R scrotal hematoma, gas confined to right scrotum where aspiration was done 1.5 weeks ago.      -No need for acute urological intervention at this time.  -Discussed indication for conservative management at this time. Patient planned to have outpatient procedure for hernia repair, given presence of significant hernia on exam and on imaging, would like to avoid any urological intervention without simultaneous involvement for hernia repair by general surgery. In the acute setting, will need monitoring on antibiotics with eventual definitive management of hernia, at which time hematoma may be addressed either by drainage or monitoring.  -continue IV abx  -Continue medical optimize patient for potential surgery  -f/u Neuro consult for frequent falls   -If pt decompensates, please reach out to urology immediately at [phone]

## 2022-12-25 NOTE — CONSULT NOTE ADULT - ASSESSMENT
Patient is a 77-year-old male with a past medical history of Parkinson's, COPD, L Morgagni hernia, DVT/PE factor V Leiden on Coumadin, BL inguinal hernias s/p R inguinal hernia repair (Aug 2022) at [facility] c/b scrotal hematoma for which he underwent an office needle aspirations (2 weeks ago) presenting with generalized weakness and altered mental state for 4 days found to be septic from a right 15cm likely infected hematoma.    Plan/Recommendations:  - To be discussed with attending Patient is a 77-year-old male with a past medical history of Parkinson's, COPD, L Morgagni hernia, DVT/PE factor V Leiden on Coumadin, BL inguinal hernias s/p R inguinal hernia repair (Aug 2022) at [facility] c/b scrotal hematoma for which he underwent an office needle aspirations (2 weeks ago) presenting with generalized weakness and altered mental state for 4 days found to be septic from a right 15cm likely infected hematoma.    Plan/Recommendations:  - No acute surgical intervention for LEFT inguinal hernia  - Strongly recommend IR consult for drainage of right scrotal hematoma in order to achieve source control of infection  - Continue antibiotics  - Will follow    D/w Dr. Angelica Bell, PGY3  Acute Care Surgery [id number]

## 2022-12-25 NOTE — PROGRESS NOTE ADULT - ASSESSMENT
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 admitted for sepsis 2/2 scrotal abscess.    Sepsis 2/2 Scrotal abscess  - imaging reviewed  - appreciate  recs  --suspect superinfected hematoma  --appreciate recs re: source control  - c/w vancomycin, goal trough 15-20  - c/w zosyn  - f/u pending cx  - supportive care and pain management per primary team    Covering weekend/holiday service through 12/26  Dr. Connolly to resume care on 12/27  Infectious Diseases will continue to follow. Please call with any questions.   Shayna Lares M.D.  [facility] Division of Infectious Diseases [phone]

## 2022-12-25 NOTE — PRE PROCEDURE NOTE - PRE PROCEDURE EVALUATION
Vascular & Interventional Radiology Pre-Procedure Note    Procedure Name: R scrotal fluid collection drainage    HPI: 77y Male with R scrotal fluid collection after hernia repair with concern for infection.    Allergies: Levaquin (Anaphylaxis)      Medications:     heparin  Infusion.: 1700 Unit(s)/Hr IV Continuous (12-25 @ 16:29)  hydrochlorothiazide: 25 milliGRAM(s) Oral (12-25 @ 05:40)  losartan: 100 milliGRAM(s) Oral (12-25 @ 05:40)  metoprolol tartrate: 25 milliGRAM(s) Oral (12-25 @ 17:47)  metoprolol tartrate Injectable: 5 milliGRAM(s) IV Push (12-24 @ 16:03)  metoprolol tartrate Injectable: 2.5 milliGRAM(s) IV Push (12-24 @ 12:27)  piperacillin/tazobactam IVPB..: 25 mL/Hr IV Intermittent (12-25 @ 13:05)  vancomycin  IVPB: 250 mL/Hr IV Intermittent (12-24 @ 21:46)  vancomycin  IVPB: 250 mL/Hr IV Intermittent (12-25 @ 11:09)      Data:  Vital Signs Last 24 Hrs  T(C): 37.3 (25 Dec 2022 20:54), Max: 37.4 (25 Dec 2022 16:59)  T(F): 99.2 (25 Dec 2022 20:54), Max: 99.3 (25 Dec 2022 16:59)  HR: 86 (25 Dec 2022 20:54) (74 - 88)  BP: 141/71 (25 Dec 2022 20:54) (122/70 - 149/72)  BP(mean): --  RR: 18 (25 Dec 2022 20:54) (17 - 18)  SpO2: 92% (25 Dec 2022 20:54) (90% - 100%)    Parameters below as of 25 Dec 2022 20:54  Patient On (Oxygen Delivery Method): nasal cannula  O2 Flow (L/min): 2      LABS:                        11.8   13.06 )-----------( 274      ( 25 Dec 2022 09:24 )             36.9     12-25    135  |  96  |  21  ----------------------------<  201<H>  3.2<L>   |  29  |  0.96    Ca    8.5      25 Dec 2022 08:36  Mg     2.2     12-25    TPro  6.9  /  Alb  3.0<L>  /  TBili  0.4  /  DBili  x   /  AST  27  /  ALT  6<L>  /  AlkPhos  121<H>  12-24    PT/INR - ( 25 Dec 2022 09:24 )   PT: 20.2 sec;   INR: 1.73 ratio         PTT - ( 24 Dec 2022 06:59 )  PTT:45.3 sec    Plan:   -77y Male presents for R scrotal fluid collection drainage  -Risks/Benefits/alternatives explained with the patient's HCP and witnessed informed consent obtained.

## 2022-12-25 NOTE — CONSULT NOTE ADULT - ASSESSMENT
Assessment: 77y Male with recent R hernia repair c/b R scrotal hematoma/abscess.    Plan: IR to perform bedside R scrotal abscess drainage today, 12/25  -Please place order for IR Procedure, approving attending Dr. Halaibeh -COVID PCR within 5 days of procedure  -discussed with primary team    Maxim Luna MD  PGY-V, Interventional Radiology    -Available on [employer] TEAMS for all non-urgent questions  -Emergent issues: [facility]-p.[phone]; [facility]-p.[phone] ([phone])  -Non-emergent consults: Please place a [place] order "Consult-Interventional Radiology" with an appropriate callback number  -Scheduling questions: [facility]: [phone]; [facility]: [phone]  -Clinic/Outpatient booking: [facility]: [phone]; [facility]: [phone]

## 2022-12-25 NOTE — PROGRESS NOTE ADULT - ASSESSMENT
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  admitted for sepsis 2/2 scrotal abscess.    # sepsis 2/2 right scrotal abscess  evaluated by , suspect infected hematoma, given recent instrumentation will continue broad spectrum abx  Surgical intervention TBD by urology, fu urology  Vanc and Zosyn  ID consulted  fu urine culture  f/u blood cultures     # Large left inguinal hernia  -tender, may need surgical correction.   -consult gen surgery    # Chronic obstructive pulmonary disease  Symbicort   duonebs q6h    # H/O factor V Leiden mutation  hold coumadin   Repeat INR is 1.73; F/u Urology if ok to start full dose AC; start if cleared by urology lovenox 1mg/kg bid     # Parkinsons  continue carbidopa-levo-dopa   continue Ropinirole   continue Trihexyphenidyl    # HTN (hypertension)  continue Irbesartan (therapeutic equivalent)    dvt ppx lovenox if ok by urology   Please contact with any questions or concerns [phone].

## 2022-12-25 NOTE — CONSULT NOTE ADULT - SUBJECTIVE AND OBJECTIVE BOX
Vascular & Interventional Radiology Consult Note    Evaluate for Procedure: R scrotal abscess drainage    HPI: 77y Male with recent R hernia repair c/b R scrotal abscess s/p multiple needle drainages with persistent scrotal collection.    Allergies: Levaquin (Anaphylaxis)    Medications (Abx/Cardiac/Anticoagulation/Blood Products)    heparin  Infusion.: 1700 Unit(s)/Hr IV Continuous (12-25 @ 16:29)  hydrochlorothiazide: 25 milliGRAM(s) Oral (12-25 @ 05:40)  losartan: 100 milliGRAM(s) Oral (12-25 @ 05:40)  metoprolol tartrate: 25 milliGRAM(s) Oral (12-25 @ 17:47)  metoprolol tartrate Injectable: 5 milliGRAM(s) IV Push (12-24 @ 16:03)  metoprolol tartrate Injectable: 2.5 milliGRAM(s) IV Push (12-24 @ 12:27)  piperacillin/tazobactam IVPB..: 25 mL/Hr IV Intermittent (12-25 @ 13:05)  vancomycin  IVPB: 250 mL/Hr IV Intermittent (12-24 @ 21:46)  vancomycin  IVPB: 250 mL/Hr IV Intermittent (12-25 @ 11:09)  vancomycin  IVPB.: 250 mL/Hr IV Intermittent (12-23 @ 20:45)    Data:    T(C): 37.4  HR: 79  BP: 148/74  RR: 18  SpO2: 100%    -WBC 13.06 / HgB 11.8 / Hct 36.9 / Plt 274  -Na 135 / Cl 96 / BUN 21 / Glucose 201  -K 3.2 / CO2 29 / Cr 0.96  -ALT -- / Alk Phos -- / T.Bili --  -INR 1.73 / PTT --      Imaging: CT with large R scrotal hematoma/absess

## 2022-12-26 LAB
ALBUMIN SERPL ELPH-MCNC: 2.9 G/DL — LOW (ref 3.3–5)
ALP SERPL-CCNC: 137 U/L — HIGH (ref 40–120)
ALT FLD-CCNC: 8 U/L — LOW (ref 10–45)
ANION GAP SERPL CALC-SCNC: 13 MMOL/L — SIGNIFICANT CHANGE UP (ref 5–17)
APTT BLD: 69.8 SEC — HIGH (ref 27.5–35.5)
APTT BLD: 72.2 SEC — HIGH (ref 27.5–35.5)
AST SERPL-CCNC: 32 U/L — SIGNIFICANT CHANGE UP (ref 10–40)
BASE EXCESS BLDA CALC-SCNC: 4.8 MMOL/L — HIGH (ref -2–3)
BASOPHILS # BLD AUTO: 0.04 K/UL — SIGNIFICANT CHANGE UP (ref 0–0.2)
BASOPHILS NFR BLD AUTO: 0.3 % — SIGNIFICANT CHANGE UP (ref 0–2)
BILIRUB SERPL-MCNC: 0.6 MG/DL — SIGNIFICANT CHANGE UP (ref 0.2–1.2)
BUN SERPL-MCNC: 20 MG/DL — SIGNIFICANT CHANGE UP (ref 7–23)
CALCIUM SERPL-MCNC: 8.7 MG/DL — SIGNIFICANT CHANGE UP (ref 8.4–10.5)
CHLORIDE SERPL-SCNC: 95 MMOL/L — LOW (ref 96–108)
CO2 BLDA-SCNC: 31 MMOL/L — HIGH (ref 19–24)
CO2 SERPL-SCNC: 27 MMOL/L — SIGNIFICANT CHANGE UP (ref 22–31)
CREAT SERPL-MCNC: 0.99 MG/DL — SIGNIFICANT CHANGE UP (ref 0.5–1.3)
EGFR: 78 ML/MIN/1.73M2 — SIGNIFICANT CHANGE UP
EOSINOPHIL # BLD AUTO: 0 K/UL — SIGNIFICANT CHANGE UP (ref 0–0.5)
EOSINOPHIL NFR BLD AUTO: 0 % — SIGNIFICANT CHANGE UP (ref 0–6)
GAS PNL BLDA: SIGNIFICANT CHANGE UP
GLUCOSE SERPL-MCNC: 292 MG/DL — HIGH (ref 70–99)
GRAM STN FLD: SIGNIFICANT CHANGE UP
HCO3 BLDA-SCNC: 30 MMOL/L — HIGH (ref 21–28)
HCT VFR BLD CALC: 36.9 % — LOW (ref 39–50)
HGB BLD-MCNC: 12 G/DL — LOW (ref 13–17)
IMM GRANULOCYTES NFR BLD AUTO: 1.2 % — HIGH (ref 0–0.9)
LYMPHOCYTES # BLD AUTO: 0.5 K/UL — LOW (ref 1–3.3)
LYMPHOCYTES # BLD AUTO: 3.3 % — LOW (ref 13–44)
MAGNESIUM SERPL-MCNC: 2.2 MG/DL — SIGNIFICANT CHANGE UP (ref 1.6–2.6)
MCHC RBC-ENTMCNC: 29.3 PG — SIGNIFICANT CHANGE UP (ref 27–34)
MCHC RBC-ENTMCNC: 32.5 GM/DL — SIGNIFICANT CHANGE UP (ref 32–36)
MCV RBC AUTO: 90.2 FL — SIGNIFICANT CHANGE UP (ref 80–100)
MONOCYTES # BLD AUTO: 0.43 K/UL — SIGNIFICANT CHANGE UP (ref 0–0.9)
MONOCYTES NFR BLD AUTO: 2.9 % — SIGNIFICANT CHANGE UP (ref 2–14)
NEUTROPHILS # BLD AUTO: 13.9 K/UL — HIGH (ref 1.8–7.4)
NEUTROPHILS NFR BLD AUTO: 92.3 % — HIGH (ref 43–77)
NRBC # BLD: 0 /100 WBCS — SIGNIFICANT CHANGE UP (ref 0–0)
PCO2 BLDA: 45 MMHG — SIGNIFICANT CHANGE UP (ref 35–48)
PH BLDA: 7.43 — SIGNIFICANT CHANGE UP (ref 7.35–7.45)
PHOSPHATE SERPL-MCNC: 2.7 MG/DL — SIGNIFICANT CHANGE UP (ref 2.5–4.5)
PLATELET # BLD AUTO: 269 K/UL — SIGNIFICANT CHANGE UP (ref 150–400)
PO2 BLDA: 61 MMHG — LOW (ref 83–108)
POTASSIUM SERPL-MCNC: 3.8 MMOL/L — SIGNIFICANT CHANGE UP (ref 3.5–5.3)
POTASSIUM SERPL-SCNC: 3.8 MMOL/L — SIGNIFICANT CHANGE UP (ref 3.5–5.3)
PROT SERPL-MCNC: 7 G/DL — SIGNIFICANT CHANGE UP (ref 6–8.3)
RBC # BLD: 4.09 M/UL — LOW (ref 4.2–5.8)
RBC # FLD: 16.2 % — HIGH (ref 10.3–14.5)
SAO2 % BLDA: 91.7 % — LOW (ref 94–98)
SODIUM SERPL-SCNC: 135 MMOL/L — SIGNIFICANT CHANGE UP (ref 135–145)
SPECIMEN SOURCE: SIGNIFICANT CHANGE UP
VANCOMYCIN TROUGH SERPL-MCNC: 10 UG/ML — SIGNIFICANT CHANGE UP (ref 10–20)
WBC # BLD: 15.05 K/UL — HIGH (ref 3.8–10.5)
WBC # FLD AUTO: 15.05 K/UL — HIGH (ref 3.8–10.5)

## 2022-12-26 PROCEDURE — 71045 X-RAY EXAM CHEST 1 VIEW: CPT | Mod: 26

## 2022-12-26 PROCEDURE — 10030 IMG GID FLU COLL DRG SFT TIS: CPT

## 2022-12-26 RX ORDER — FUROSEMIDE 40 MG
40 TABLET ORAL ONCE
Refills: 0 | Status: COMPLETED | OUTPATIENT
Start: 2022-12-26 | End: 2022-12-26

## 2022-12-26 RX ORDER — SODIUM CHLORIDE 9 MG/ML
1000 INJECTION, SOLUTION INTRAVENOUS
Refills: 0 | Status: DISCONTINUED | OUTPATIENT
Start: 2022-12-26 | End: 2023-01-07

## 2022-12-26 RX ORDER — INSULIN LISPRO 100/ML
1 VIAL (ML) SUBCUTANEOUS
Refills: 0 | Status: DISCONTINUED | OUTPATIENT
Start: 2022-12-26 | End: 2022-12-26

## 2022-12-26 RX ORDER — DEXTROSE 50 % IN WATER 50 %
25 SYRINGE (ML) INTRAVENOUS ONCE
Refills: 0 | Status: DISCONTINUED | OUTPATIENT
Start: 2022-12-26 | End: 2023-01-07

## 2022-12-26 RX ORDER — INSULIN GLARGINE 100 [IU]/ML
6 INJECTION, SOLUTION SUBCUTANEOUS AT BEDTIME
Refills: 0 | Status: DISCONTINUED | OUTPATIENT
Start: 2022-12-26 | End: 2022-12-27

## 2022-12-26 RX ORDER — INSULIN LISPRO 100/ML
4 VIAL (ML) SUBCUTANEOUS
Refills: 0 | Status: DISCONTINUED | OUTPATIENT
Start: 2022-12-26 | End: 2022-12-27

## 2022-12-26 RX ORDER — IPRATROPIUM/ALBUTEROL SULFATE 18-103MCG
3 AEROSOL WITH ADAPTER (GRAM) INHALATION ONCE
Refills: 0 | Status: COMPLETED | OUTPATIENT
Start: 2022-12-26 | End: 2022-12-26

## 2022-12-26 RX ORDER — DEXTROSE 50 % IN WATER 50 %
12.5 SYRINGE (ML) INTRAVENOUS ONCE
Refills: 0 | Status: DISCONTINUED | OUTPATIENT
Start: 2022-12-26 | End: 2023-01-07

## 2022-12-26 RX ORDER — INSULIN LISPRO 100/ML
VIAL (ML) SUBCUTANEOUS
Refills: 0 | Status: DISCONTINUED | OUTPATIENT
Start: 2022-12-26 | End: 2023-01-07

## 2022-12-26 RX ORDER — GLUCAGON INJECTION, SOLUTION 0.5 MG/.1ML
1 INJECTION, SOLUTION SUBCUTANEOUS ONCE
Refills: 0 | Status: DISCONTINUED | OUTPATIENT
Start: 2022-12-26 | End: 2023-01-07

## 2022-12-26 RX ORDER — INSULIN LISPRO 100/ML
VIAL (ML) SUBCUTANEOUS AT BEDTIME
Refills: 0 | Status: DISCONTINUED | OUTPATIENT
Start: 2022-12-26 | End: 2023-01-07

## 2022-12-26 RX ORDER — DEXTROSE 50 % IN WATER 50 %
15 SYRINGE (ML) INTRAVENOUS ONCE
Refills: 0 | Status: DISCONTINUED | OUTPATIENT
Start: 2022-12-26 | End: 2023-01-07

## 2022-12-26 RX ADMIN — CARBIDOPA AND LEVODOPA 1 TABLET(S): 25; 100 TABLET ORAL at 08:26

## 2022-12-26 RX ADMIN — Medication 40 MILLIGRAM(S): at 09:50

## 2022-12-26 RX ADMIN — Medication 1 MILLIGRAM(S): at 12:08

## 2022-12-26 RX ADMIN — Medication 3 MILLILITER(S): at 23:18

## 2022-12-26 RX ADMIN — INSULIN GLARGINE 6 UNIT(S): 100 INJECTION, SOLUTION SUBCUTANEOUS at 22:38

## 2022-12-26 RX ADMIN — LOSARTAN POTASSIUM 100 MILLIGRAM(S): 100 TABLET, FILM COATED ORAL at 05:51

## 2022-12-26 RX ADMIN — Medication 1 MILLIGRAM(S): at 17:18

## 2022-12-26 RX ADMIN — Medication 4 UNIT(S): at 18:16

## 2022-12-26 RX ADMIN — Medication 100 MILLIGRAM(S): at 05:50

## 2022-12-26 RX ADMIN — Medication 25 MILLIGRAM(S): at 17:19

## 2022-12-26 RX ADMIN — Medication 1 MILLIGRAM(S): at 08:25

## 2022-12-26 RX ADMIN — Medication 1 UNIT(S): at 12:23

## 2022-12-26 RX ADMIN — Medication 1 MILLIGRAM(S): at 12:07

## 2022-12-26 RX ADMIN — CARBIDOPA AND LEVODOPA 1 TABLET(S): 25; 100 TABLET ORAL at 22:38

## 2022-12-26 RX ADMIN — Medication 40 MILLIGRAM(S): at 02:17

## 2022-12-26 RX ADMIN — Medication 25 MILLIGRAM(S): at 05:50

## 2022-12-26 RX ADMIN — PIPERACILLIN AND TAZOBACTAM 25 GRAM(S): 4; .5 INJECTION, POWDER, LYOPHILIZED, FOR SOLUTION INTRAVENOUS at 05:51

## 2022-12-26 RX ADMIN — ROPINIROLE 2 MILLIGRAM(S): 8 TABLET, FILM COATED, EXTENDED RELEASE ORAL at 08:26

## 2022-12-26 RX ADMIN — Medication 5: at 12:23

## 2022-12-26 RX ADMIN — PIPERACILLIN AND TAZOBACTAM 25 GRAM(S): 4; .5 INJECTION, POWDER, LYOPHILIZED, FOR SOLUTION INTRAVENOUS at 14:24

## 2022-12-26 RX ADMIN — Medication 3 MILLILITER(S): at 17:17

## 2022-12-26 RX ADMIN — ROPINIROLE 2 MILLIGRAM(S): 8 TABLET, FILM COATED, EXTENDED RELEASE ORAL at 22:38

## 2022-12-26 RX ADMIN — HEPARIN SODIUM 2100 UNIT(S)/HR: 5000 INJECTION INTRAVENOUS; SUBCUTANEOUS at 07:40

## 2022-12-26 RX ADMIN — ROPINIROLE 2 MILLIGRAM(S): 8 TABLET, FILM COATED, EXTENDED RELEASE ORAL at 17:18

## 2022-12-26 RX ADMIN — Medication 3 MILLILITER(S): at 12:07

## 2022-12-26 RX ADMIN — BUDESONIDE AND FORMOTEROL FUMARATE DIHYDRATE 2 PUFF(S): 160; 4.5 AEROSOL RESPIRATORY (INHALATION) at 17:17

## 2022-12-26 RX ADMIN — BUDESONIDE AND FORMOTEROL FUMARATE DIHYDRATE 2 PUFF(S): 160; 4.5 AEROSOL RESPIRATORY (INHALATION) at 05:52

## 2022-12-26 RX ADMIN — Medication 2: at 23:19

## 2022-12-26 RX ADMIN — Medication 250 MILLIGRAM(S): at 23:19

## 2022-12-26 RX ADMIN — CARBIDOPA AND LEVODOPA 1 TABLET(S): 25; 100 TABLET ORAL at 17:19

## 2022-12-26 RX ADMIN — PREGABALIN 1000 MICROGRAM(S): 225 CAPSULE ORAL at 12:09

## 2022-12-26 RX ADMIN — HEPARIN SODIUM 2100 UNIT(S)/HR: 5000 INJECTION INTRAVENOUS; SUBCUTANEOUS at 15:08

## 2022-12-26 RX ADMIN — Medication 1 TABLET(S): at 12:08

## 2022-12-26 RX ADMIN — HEPARIN SODIUM 2100 UNIT(S)/HR: 5000 INJECTION INTRAVENOUS; SUBCUTANEOUS at 07:19

## 2022-12-26 RX ADMIN — PIPERACILLIN AND TAZOBACTAM 25 GRAM(S): 4; .5 INJECTION, POWDER, LYOPHILIZED, FOR SOLUTION INTRAVENOUS at 22:37

## 2022-12-26 RX ADMIN — Medication 1 MILLIGRAM(S): at 22:38

## 2022-12-26 RX ADMIN — Medication 3 MILLILITER(S): at 05:51

## 2022-12-26 RX ADMIN — HEPARIN SODIUM 2100 UNIT(S)/HR: 5000 INJECTION INTRAVENOUS; SUBCUTANEOUS at 20:12

## 2022-12-26 RX ADMIN — Medication 250 MILLIGRAM(S): at 12:05

## 2022-12-26 RX ADMIN — Medication 3 MILLILITER(S): at 02:07

## 2022-12-26 RX ADMIN — ROPINIROLE 2 MILLIGRAM(S): 8 TABLET, FILM COATED, EXTENDED RELEASE ORAL at 12:07

## 2022-12-26 RX ADMIN — CARBIDOPA AND LEVODOPA 1 TABLET(S): 25; 100 TABLET ORAL at 12:09

## 2022-12-26 RX ADMIN — Medication 6: at 18:16

## 2022-12-26 NOTE — PROGRESS NOTE ADULT - ASSESSMENT
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 admitted for sepsis 2/2 scrotal abscess.    Sepsis 2/2 Scrotal abscess  - imaging reviewed  - BCx NGTD  - appreciate  recs--suspect superinfected hematoma  - s/p IR drainage on 12/25  - Abscess cx + GPC  - c/w vancomycin, goal trough 15-20   --trough 12/25 7.1, dose increased  - c/w zosyn  - supportive care and pain management per primary team    Covering weekend/holiday service through 12/26  Dr. Connolly to resume care on 12/27  Infectious Diseases will continue to follow. Please call with any questions.   Shayna Lares M.D.  [facility] Division of Infectious Diseases [phone]

## 2022-12-26 NOTE — PROGRESS NOTE ADULT - SUBJECTIVE AND OBJECTIVE BOX
Surgery Progress Note     Subjective/24hour Events:   Patient seen and examined.   S/p drainage with IR of scrotal hematoma.      Vital Signs:  Vital Signs Last 24 Hrs  T(C): 36.6 (26 Dec 2022 09:41), Max: 37.4 (25 Dec 2022 16:59)  T(F): 97.8 (26 Dec 2022 09:41), Max: 99.3 (25 Dec 2022 16:59)  HR: 79 (26 Dec 2022 09:41) (79 - 88)  BP: 149/77 (26 Dec 2022 09:41) (122/70 - 163/85)  BP(mean): --  RR: 20 (26 Dec 2022 09:41) (18 - 20)  SpO2: 93% (26 Dec 2022 09:41) (90% - 100%)    Parameters below as of 26 Dec 2022 09:41  Patient On (Oxygen Delivery Method): nasal cannula w/ humidification  O2 Flow (L/min): 5      CAPILLARY BLOOD GLUCOSE          I&O's Detail    25 Dec 2022 07:01  -  26 Dec 2022 07:00  --------------------------------------------------------  IN:    Heparin Infusion: 304 mL    IV PiggyBack: 350 mL    Oral Fluid: 1030 mL  Total IN: 1684 mL    OUT:    Bulb (mL): 535 mL    Indwelling Catheter - Urethral (mL): 1200 mL  Total OUT: 1735 mL    Total NET: -51 mL      26 Dec 2022 07:01  -  26 Dec 2022 10:24  --------------------------------------------------------  IN:    Oral Fluid: 100 mL  Total IN: 100 mL    OUT:    Bulb (mL): 55 mL    Indwelling Catheter - Urethral (mL): 250 mL  Total OUT: 305 mL    Total NET: -205 mL          Physical Exam:  General: AAOx1-2, no acute distress.  Respiratory: breathing comfortably, no increased WOB   Abdomen: soft, nontender, nondistended, no rebound, no guarding  : very large left inguinal hernia down into scrotum with no overlying skin changes or tenderness, right scrotum mildly erythematous but nontender, no drainage, gould catheter in place, scrotal drain in place  Extremities: Moves all four.       Labs:    12-26    135  |  95<L>  |  20  ----------------------------<  292<H>  3.8   |  27  |  0.99    Ca    8.7      26 Dec 2022 06:17  Phos  2.7     12-26  Mg     2.2     12-26    TPro  7.0  /  Alb  2.9<L>  /  TBili  0.6  /  DBili  x   /  AST  32  /  ALT  8<L>  /  AlkPhos  137<H>  12-26    LIVER FUNCTIONS - ( 26 Dec 2022 06:17 )  Alb: 2.9 g/dL / Pro: 7.0 g/dL / ALK PHOS: 137 U/L / ALT: 8 U/L / AST: 32 U/L / GGT: x                                 12.0   15.05 )-----------( 269      ( 26 Dec 2022 06:17 )             36.9     PT/INR - ( 26 Dec 2022 06:17 )   PT: 18.5 sec;   INR: 1.60 ratio         PTT - ( 26 Dec 2022 06:17 )  PTT:69.8 sec

## 2022-12-26 NOTE — PROGRESS NOTE ADULT - SUBJECTIVE AND OBJECTIVE BOX
[facility], Division of Infectious Diseases  CHRISTI Putnam Y. Patel, S. Shah, G. [facility]  [phone]    Name: BOECKLE, ROBERT  Age: 77y  Gender: Male  MRN: [id number]    Interval History:  Pt s/p scrotal abscess drainage yesterday PM  Afebrile overnight  Notes reviewed    Antibiotics:  piperacillin/tazobactam IVPB.. 3.375 Gram(s) IV Intermittent every 8 hours  vancomycin  IVPB 1000 milliGRAM(s) IV Intermittent every 12 hours      Medications:  acetaminophen     Tablet .. 650 milliGRAM(s) Oral every 6 hours PRN  albuterol/ipratropium for Nebulization 3 milliLiter(s) Nebulizer every 6 hours  budesonide 160 MICROgram(s)/formoterol 4.5 MICROgram(s) Inhaler 2 Puff(s) Inhalation two times a day  carbidopa/levodopa  25/100 1 Tablet(s) Oral <User Schedule>  cyanocobalamin 1000 MICROGram(s) Oral daily  dextrose 5%. 1000 milliLiter(s) IV Continuous <Continuous>  dextrose 5%. 1000 milliLiter(s) IV Continuous <Continuous>  dextrose 50% Injectable 25 Gram(s) IV Push once  dextrose 50% Injectable 12.5 Gram(s) IV Push once  dextrose 50% Injectable 25 Gram(s) IV Push once  dextrose Oral Gel 15 Gram(s) Oral once PRN  folic acid 1 milliGRAM(s) Oral daily  furosemide   Injectable 40 milliGRAM(s) IV Push once  glucagon  Injectable 1 milliGRAM(s) IntraMuscular once  heparin   Injectable 7500 Unit(s) IV Push every 6 hours PRN  heparin   Injectable 3500 Unit(s) IV Push every 6 hours PRN  heparin  Infusion.  Unit(s)/Hr IV Continuous <Continuous>  hydrochlorothiazide 25 milliGRAM(s) Oral daily  insulin glargine Injectable (LANTUS) 6 Unit(s) SubCutaneous at bedtime  insulin lispro (ADMELOG) corrective regimen sliding scale   SubCutaneous three times a day before meals  insulin lispro Injectable (ADMELOG) 1 Unit(s) SubCutaneous three times a day before meals  lactobacillus acidophilus 1 Tablet(s) Oral daily  losartan 100 milliGRAM(s) Oral daily  melatonin 3 milliGRAM(s) Oral at bedtime PRN  metoprolol tartrate 25 milliGRAM(s) Oral two times a day  ondansetron Injectable 4 milliGRAM(s) IV Push every 8 hours PRN  phenazopyridine 100 milliGRAM(s) Oral three times a day  piperacillin/tazobactam IVPB.. 3.375 Gram(s) IV Intermittent every 8 hours  rOPINIRole 2 milliGRAM(s) Oral <User Schedule>  trihexyphenidyl 1 milliGRAM(s) Oral <User Schedule>  vancomycin  IVPB 1000 milliGRAM(s) IV Intermittent every 12 hours      Review of Systems:  Review of systems otherwise negative except as previously noted.    Allergies: Levaquin (Anaphylaxis)    For details regarding the patient's past medical history, social history, family history, and other miscellaneous elements, please refer the initial infectious diseases consultation and/or the admitting history and physical examination for this admission.    Objective:  Vitals:   T(C): 36.7 (12-26-22 @ 05:25), Max: 37.4 (12-25-22 @ 16:59)  HR: 83 (12-26-22 @ 05:25) (79 - 88)  BP: 163/85 (12-26-22 @ 05:25) (122/70 - 163/85)  RR: 20 (12-26-22 @ 05:25) (18 - 20)  SpO2: 95% (12-26-22 @ 05:25) (90% - 100%)    Physical Examination:  General: no acute distress  HEENT: NC/AT, EOMI  Cardio: S1, S2 heard, RRR, no murmurs  Resp: breath sounds heard bilaterally, no rales, wheezes or rhonchi  Abd: soft, NT, ND  : scrotal swelling and erythema, TTP, s/p drainage  Ext: no edema or cyanosis  Skin: warm, dry, no visible rash        Laboratory Studies:  CBC:                       12.0   15.05 )-----------( 269      ( 26 Dec 2022 06:17 )             36.9     CMP: 12-26    135  |  95<L>  |  20  ----------------------------<  292<H>  3.8   |  27  |  0.99    Ca    8.7      26 Dec 2022 06:17  Phos  2.7     12-26  Mg     2.2     12-26    TPro  7.0  /  Alb  2.9<L>  /  TBili  0.6  /  DBili  x   /  AST  32  /  ALT  8<L>  /  AlkPhos  137<H>  12-26    LIVER FUNCTIONS - ( 26 Dec 2022 06:17 )  Alb: 2.9 g/dL / Pro: 7.0 g/dL / ALK PHOS: 137 U/L / ALT: 8 U/L / AST: 32 U/L / GGT: x               Microbiology: reviewed    Culture - Body Fluid with Gram Stain (collected 12-25-22 @ 23:09)  Source: Abdominal Fl Abdominal Fluid  Gram Stain (12-26-22 @ 08:02):    Numerous polymorphonuclear leukocytes seen per low power field    Moderate Gram positive cocci in pairs seen per oil power field    Culture - Blood (collected 12-23-22 @ 13:35)  Source: .Blood Blood-Peripheral  Preliminary Report (12-24-22 @ 18:01):    No growth to date.    Culture - Blood (collected 12-23-22 @ 13:15)  Source: .Blood Blood-Peripheral  Preliminary Report (12-24-22 @ 18:01):    No growth to date.          Radiology: reviewed       Pt is a 53yo M with a h/o AA, liver cirrhosis, DM, TENZIN, asthma, PUD, gastric bypass, and pancreatitis.  Pt p/w fever, chills, and general malaise.  Recent admission for similar complaints and a U Cx that showed ESBL.  ROS - as above  PE - agree with PA exam as above.   A/P - Pt triggered sepsis upon arrival.  Sepsis work up performed.  Started broad spectrum abx after blood cultures obtained.  Signed out to overnight team at 8pm to be admitted. Pt is a 51yo M with a h/o AA, liver cirrhosis, DM, TENZIN, asthma, PUD, gastric bypass, and pancreatitis.  Pt p/w fever, chills, and general malaise.  Recent admission for similar complaints and a U Cx that showed ESBL.  ROS - as above  PE - agree with PA exam as above.   A/P - Pt triggered sepsis upon arrival.  Sepsis work up performed.  Started broad spectrum abx after blood cultures obtained.  Signed out to overnight team at 8pm to be admitted.

## 2022-12-26 NOTE — PROGRESS NOTE ADULT - SUBJECTIVE AND OBJECTIVE BOX
Patient is a 77y old  Male who presents with a chief complaint of generalized weakness and AMS x 2 days (26 Dec 2022 10:24)      SUBJECTIVE / OVERNIGHT EVENTS:  Patient seen and examined.   Overnight scrotal abscess drained.      Vital Signs Last 24 Hrs  T(C): 36.6 (26 Dec 2022 09:41), Max: 37.4 (25 Dec 2022 16:59)  T(F): 97.8 (26 Dec 2022 09:41), Max: 99.3 (25 Dec 2022 16:59)  HR: 79 (26 Dec 2022 09:41) (79 - 88)  BP: 149/77 (26 Dec 2022 09:41) (122/70 - 163/85)  BP(mean): --  RR: 20 (26 Dec 2022 09:41) (18 - 20)  SpO2: 93% (26 Dec 2022 09:41) (90% - 100%)    Parameters below as of 26 Dec 2022 09:41  Patient On (Oxygen Delivery Method): nasal cannula w/ humidification  O2 Flow (L/min): 5    I&O's Summary    25 Dec 2022 07:01  -  26 Dec 2022 07:00  --------------------------------------------------------  IN: 1684 mL / OUT: 1735 mL / NET: -51 mL    26 Dec 2022 07:01  -  26 Dec 2022 11:45  --------------------------------------------------------  IN: 100 mL / OUT: 305 mL / NET: -205 mL        PE:  GENERAL: NAD, AAOx3  CHEST/LUNG: wheezing  HEART: Regular rate and rhythm; no murmur  ABDOMEN: Soft, Nontender, Nondistended; Bowel sounds present  : enlarged R scrotum, erythematous, tender, gould  EXTREMITIES:  2+ Peripheral Pulses, No edema  NEURO: No focal deficit    LABS:                        12.0   15.05 )-----------( 269      ( 26 Dec 2022 06:17 )             36.9     12-26    135  |  95<L>  |  20  ----------------------------<  292<H>  3.8   |  27  |  0.99    Ca    8.7      26 Dec 2022 06:17  Phos  2.7     12-26  Mg     2.2     12-26    TPro  7.0  /  Alb  2.9<L>  /  TBili  0.6  /  DBili  x   /  AST  32  /  ALT  8<L>  /  AlkPhos  137<H>  12-26    PT/INR - ( 26 Dec 2022 06:17 )   PT: 18.5 sec;   INR: 1.60 ratio         PTT - ( 26 Dec 2022 06:17 )  PTT:69.8 sec  CAPILLARY BLOOD GLUCOSE                RADIOLOGY & ADDITIONAL TESTS:    Imaging Personally Reviewed:  [x] YES  [ ] NO    Consultant(s) Notes Reviewed:  [x] YES  [ ] NO      MEDICATIONS  (STANDING):  albuterol/ipratropium for Nebulization 3 milliLiter(s) Nebulizer every 6 hours  budesonide 160 MICROgram(s)/formoterol 4.5 MICROgram(s) Inhaler 2 Puff(s) Inhalation two times a day  carbidopa/levodopa  25/100 1 Tablet(s) Oral <User Schedule>  cyanocobalamin 1000 MICROGram(s) Oral daily  dextrose 5%. 1000 milliLiter(s) (100 mL/Hr) IV Continuous <Continuous>  dextrose 5%. 1000 milliLiter(s) (50 mL/Hr) IV Continuous <Continuous>  dextrose 50% Injectable 25 Gram(s) IV Push once  dextrose 50% Injectable 12.5 Gram(s) IV Push once  dextrose 50% Injectable 25 Gram(s) IV Push once  folic acid 1 milliGRAM(s) Oral daily  glucagon  Injectable 1 milliGRAM(s) IntraMuscular once  heparin  Infusion.  Unit(s)/Hr (17 mL/Hr) IV Continuous <Continuous>  hydrochlorothiazide 25 milliGRAM(s) Oral daily  insulin glargine Injectable (LANTUS) 6 Unit(s) SubCutaneous at bedtime  insulin lispro (ADMELOG) corrective regimen sliding scale   SubCutaneous three times a day before meals  insulin lispro Injectable (ADMELOG) 1 Unit(s) SubCutaneous three times a day before meals  lactobacillus acidophilus 1 Tablet(s) Oral daily  losartan 100 milliGRAM(s) Oral daily  metoprolol tartrate 25 milliGRAM(s) Oral two times a day  phenazopyridine 100 milliGRAM(s) Oral three times a day  piperacillin/tazobactam IVPB.. 3.375 Gram(s) IV Intermittent every 8 hours  rOPINIRole 2 milliGRAM(s) Oral <User Schedule>  trihexyphenidyl 1 milliGRAM(s) Oral <User Schedule>  vancomycin  IVPB 1000 milliGRAM(s) IV Intermittent every 12 hours    MEDICATIONS  (PRN):  acetaminophen     Tablet .. 650 milliGRAM(s) Oral every 6 hours PRN Temp greater or equal to 38C (100.4F), Mild Pain (1 - 3)  dextrose Oral Gel 15 Gram(s) Oral once PRN Blood Glucose LESS THAN 70 milliGRAM(s)/deciliter  heparin   Injectable 7500 Unit(s) IV Push every 6 hours PRN For aPTT less than 40  heparin   Injectable 3500 Unit(s) IV Push every 6 hours PRN For aPTT between 40 - 57  melatonin 3 milliGRAM(s) Oral at bedtime PRN Insomnia  ondansetron Injectable 4 milliGRAM(s) IV Push every 8 hours PRN Nausea and/or Vomiting      Care Discussed with Consultants/Other Providers [x] YES  [ ] NO    HEALTH ISSUES - PROBLEM Dx:  Scrotal abscess    Sepsis    Chronic obstructive pulmonary disease (COPD)    H/O factor V Leiden mutation    Unspecified atrial fibrillation    Parkinsons    HTN (hypertension)    Preop exam for internal medicine

## 2022-12-26 NOTE — PROGRESS NOTE ADULT - ASSESSMENT
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    Plan:  - s/p drainage of scrotal hematoma with IR  - Recommend follow up with outside surgeon for hernias  - Please recall with any questions or concerns      ACS/Trauma Surgery  [id number]

## 2022-12-26 NOTE — PROGRESS NOTE ADULT - ASSESSMENT
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  admitted for sepsis 2/2 scrotal abscess.    # sepsis 2/2 right scrotal abscess  s/p IR drainage 12/25-- growing gram + cocci   evaluated by , suspect infected hematoma, given recent instrumentation will continue broad spectrum abx  Surgical intervention TBD by urology, fu urology  Vanc and Zosyn  ID consult appreciated   fu urine culture  f/u blood cultures     # Large left inguinal hernia  -tender, may need surgical correction.   -gen surgery recs appreciated     # Chronic obstructive pulmonary disease  # Pleural effusion   -Strict I & O, Daily weights ; fluids on hold since 12/25.   -given lasix 40 Iv once   -f/u echo   -CXR noted, chronic chronic changes + effusion   Symbicort   duonebs q6h    # H/O factor V Leiden mutation  hold coumadin   Repeat INR is 1.73  heparin gtt while inpatient     # Parkinsons  continue carbidopa-levo-dopa   continue Ropinirole   continue Trihexyphenidyl    # HTN (hypertension)  continue Irbesartan (therapeutic equivalent)    dvt ppx lovenox if ok by urology   Please contact with any questions or concerns [phone]. 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  admitted for sepsis 2/2 scrotal abscess.    # sepsis 2/2 right scrotal abscess  s/p IR drainage 12/25-- growing gram + cocci   evaluated by , suspect infected hematoma, given recent instrumentation will continue broad spectrum abx  Surgical intervention TBD by urology, fu urology  Vanc and Zosyn  ID consult appreciated   fu urine culture  f/u blood cultures     # Large left inguinal hernia  -tender, may need surgical correction.   -gen surgery recs appreciated     # Chronic obstructive pulmonary disease  # Pleural effusion   -Strict I & O, Daily weights ; fluids on hold since 12/25.   -given lasix 40 Iv once   -f/u echo   -CXR noted, chronic chronic changes + effusion   Symbicort   duonebs q6h    # H/O factor V Leiden mutation  hold coumadin   Repeat INR is 1.73  heparin gtt while inpatient     # Parkinsons  continue carbidopa-levo-dopa   continue Ropinirole   continue Trihexyphenidyl    # HTN (hypertension)  continue Irbesartan (therapeutic equivalent)    dvt ppx heparin gtt   Please contact with any questions or concerns [phone].

## 2022-12-27 DIAGNOSIS — E11.9 TYPE 2 DIABETES MELLITUS WITHOUT COMPLICATIONS: ICD-10-CM

## 2022-12-27 LAB
A1C WITH ESTIMATED AVERAGE GLUCOSE RESULT: 8 % — HIGH (ref 4–5.6)
ALBUMIN SERPL ELPH-MCNC: 2.5 G/DL — LOW (ref 3.3–5)
ALP SERPL-CCNC: 115 U/L — SIGNIFICANT CHANGE UP (ref 40–120)
ALT FLD-CCNC: 8 U/L — LOW (ref 10–45)
ANION GAP SERPL CALC-SCNC: 11 MMOL/L — SIGNIFICANT CHANGE UP (ref 5–17)
APTT BLD: 64.6 SEC — HIGH (ref 27.5–35.5)
AST SERPL-CCNC: 33 U/L — SIGNIFICANT CHANGE UP (ref 10–40)
BASOPHILS # BLD AUTO: 0.03 K/UL — SIGNIFICANT CHANGE UP (ref 0–0.2)
BASOPHILS NFR BLD AUTO: 0.2 % — SIGNIFICANT CHANGE UP (ref 0–2)
BILIRUB SERPL-MCNC: 0.4 MG/DL — SIGNIFICANT CHANGE UP (ref 0.2–1.2)
BUN SERPL-MCNC: 29 MG/DL — HIGH (ref 7–23)
CALCIUM SERPL-MCNC: 8.8 MG/DL — SIGNIFICANT CHANGE UP (ref 8.4–10.5)
CHLORIDE SERPL-SCNC: 93 MMOL/L — LOW (ref 96–108)
CO2 SERPL-SCNC: 32 MMOL/L — HIGH (ref 22–31)
CREAT SERPL-MCNC: 1.01 MG/DL — SIGNIFICANT CHANGE UP (ref 0.5–1.3)
EGFR: 76 ML/MIN/1.73M2 — SIGNIFICANT CHANGE UP
EOSINOPHIL # BLD AUTO: 0 K/UL — SIGNIFICANT CHANGE UP (ref 0–0.5)
EOSINOPHIL NFR BLD AUTO: 0 % — SIGNIFICANT CHANGE UP (ref 0–6)
ESTIMATED AVERAGE GLUCOSE: 183 MG/DL — HIGH (ref 68–114)
GLUCOSE SERPL-MCNC: 332 MG/DL — HIGH (ref 70–99)
HCT VFR BLD CALC: 35.6 % — LOW (ref 39–50)
HGB BLD-MCNC: 11.5 G/DL — LOW (ref 13–17)
IMM GRANULOCYTES NFR BLD AUTO: 1 % — HIGH (ref 0–0.9)
LYMPHOCYTES # BLD AUTO: 1.29 K/UL — SIGNIFICANT CHANGE UP (ref 1–3.3)
LYMPHOCYTES # BLD AUTO: 7.4 % — LOW (ref 13–44)
MCHC RBC-ENTMCNC: 29.3 PG — SIGNIFICANT CHANGE UP (ref 27–34)
MCHC RBC-ENTMCNC: 32.3 GM/DL — SIGNIFICANT CHANGE UP (ref 32–36)
MCV RBC AUTO: 90.8 FL — SIGNIFICANT CHANGE UP (ref 80–100)
MONOCYTES # BLD AUTO: 1.16 K/UL — HIGH (ref 0–0.9)
MONOCYTES NFR BLD AUTO: 6.7 % — SIGNIFICANT CHANGE UP (ref 2–14)
NEUTROPHILS # BLD AUTO: 14.75 K/UL — HIGH (ref 1.8–7.4)
NEUTROPHILS NFR BLD AUTO: 84.7 % — HIGH (ref 43–77)
NRBC # BLD: 0 /100 WBCS — SIGNIFICANT CHANGE UP (ref 0–0)
PLATELET # BLD AUTO: 317 K/UL — SIGNIFICANT CHANGE UP (ref 150–400)
POTASSIUM SERPL-MCNC: 3.4 MMOL/L — LOW (ref 3.5–5.3)
POTASSIUM SERPL-SCNC: 3.4 MMOL/L — LOW (ref 3.5–5.3)
PROT SERPL-MCNC: 6.4 G/DL — SIGNIFICANT CHANGE UP (ref 6–8.3)
RBC # BLD: 3.92 M/UL — LOW (ref 4.2–5.8)
RBC # FLD: 16.1 % — HIGH (ref 10.3–14.5)
SODIUM SERPL-SCNC: 136 MMOL/L — SIGNIFICANT CHANGE UP (ref 135–145)
WBC # BLD: 17.41 K/UL — HIGH (ref 3.8–10.5)
WBC # FLD AUTO: 17.41 K/UL — HIGH (ref 3.8–10.5)

## 2022-12-27 PROCEDURE — 93306 TTE W/DOPPLER COMPLETE: CPT | Mod: 26

## 2022-12-27 PROCEDURE — 99231 SBSQ HOSP IP/OBS SF/LOW 25: CPT

## 2022-12-27 RX ORDER — INSULIN GLARGINE 100 [IU]/ML
18 INJECTION, SOLUTION SUBCUTANEOUS AT BEDTIME
Refills: 0 | Status: DISCONTINUED | OUTPATIENT
Start: 2022-12-27 | End: 2023-01-02

## 2022-12-27 RX ORDER — POTASSIUM CHLORIDE 20 MEQ
40 PACKET (EA) ORAL ONCE
Refills: 0 | Status: COMPLETED | OUTPATIENT
Start: 2022-12-27 | End: 2022-12-27

## 2022-12-27 RX ORDER — INSULIN LISPRO 100/ML
7 VIAL (ML) SUBCUTANEOUS
Refills: 0 | Status: DISCONTINUED | OUTPATIENT
Start: 2022-12-27 | End: 2022-12-28

## 2022-12-27 RX ORDER — VANCOMYCIN HCL 1 G
1250 VIAL (EA) INTRAVENOUS EVERY 12 HOURS
Refills: 0 | Status: DISCONTINUED | OUTPATIENT
Start: 2022-12-27 | End: 2022-12-28

## 2022-12-27 RX ADMIN — ROPINIROLE 2 MILLIGRAM(S): 8 TABLET, FILM COATED, EXTENDED RELEASE ORAL at 22:35

## 2022-12-27 RX ADMIN — Medication 3 MILLILITER(S): at 23:23

## 2022-12-27 RX ADMIN — Medication 3 MILLILITER(S): at 17:10

## 2022-12-27 RX ADMIN — ROPINIROLE 2 MILLIGRAM(S): 8 TABLET, FILM COATED, EXTENDED RELEASE ORAL at 12:32

## 2022-12-27 RX ADMIN — Medication 1 MILLIGRAM(S): at 07:57

## 2022-12-27 RX ADMIN — CARBIDOPA AND LEVODOPA 1 TABLET(S): 25; 100 TABLET ORAL at 17:10

## 2022-12-27 RX ADMIN — Medication 1 TABLET(S): at 11:47

## 2022-12-27 RX ADMIN — Medication 7 UNIT(S): at 18:12

## 2022-12-27 RX ADMIN — Medication 1: at 18:12

## 2022-12-27 RX ADMIN — Medication 25 MILLIGRAM(S): at 05:47

## 2022-12-27 RX ADMIN — Medication 3: at 08:29

## 2022-12-27 RX ADMIN — CARBIDOPA AND LEVODOPA 1 TABLET(S): 25; 100 TABLET ORAL at 12:32

## 2022-12-27 RX ADMIN — Medication 1 MILLIGRAM(S): at 22:34

## 2022-12-27 RX ADMIN — BUDESONIDE AND FORMOTEROL FUMARATE DIHYDRATE 2 PUFF(S): 160; 4.5 AEROSOL RESPIRATORY (INHALATION) at 17:10

## 2022-12-27 RX ADMIN — Medication 4 UNIT(S): at 08:29

## 2022-12-27 RX ADMIN — Medication 1 MILLIGRAM(S): at 11:47

## 2022-12-27 RX ADMIN — CARBIDOPA AND LEVODOPA 1 TABLET(S): 25; 100 TABLET ORAL at 22:36

## 2022-12-27 RX ADMIN — HEPARIN SODIUM 2100 UNIT(S)/HR: 5000 INJECTION INTRAVENOUS; SUBCUTANEOUS at 19:21

## 2022-12-27 RX ADMIN — Medication 3: at 12:31

## 2022-12-27 RX ADMIN — Medication 166.67 MILLIGRAM(S): at 21:05

## 2022-12-27 RX ADMIN — Medication 1 MILLIGRAM(S): at 17:10

## 2022-12-27 RX ADMIN — Medication 7 UNIT(S): at 12:31

## 2022-12-27 RX ADMIN — Medication 3 MILLILITER(S): at 05:46

## 2022-12-27 RX ADMIN — HEPARIN SODIUM 2100 UNIT(S)/HR: 5000 INJECTION INTRAVENOUS; SUBCUTANEOUS at 07:29

## 2022-12-27 RX ADMIN — ROPINIROLE 2 MILLIGRAM(S): 8 TABLET, FILM COATED, EXTENDED RELEASE ORAL at 17:10

## 2022-12-27 RX ADMIN — HEPARIN SODIUM 2100 UNIT(S)/HR: 5000 INJECTION INTRAVENOUS; SUBCUTANEOUS at 04:32

## 2022-12-27 RX ADMIN — ROPINIROLE 2 MILLIGRAM(S): 8 TABLET, FILM COATED, EXTENDED RELEASE ORAL at 07:56

## 2022-12-27 RX ADMIN — Medication 25 MILLIGRAM(S): at 17:10

## 2022-12-27 RX ADMIN — Medication 1 MILLIGRAM(S): at 12:32

## 2022-12-27 RX ADMIN — INSULIN GLARGINE 18 UNIT(S): 100 INJECTION, SOLUTION SUBCUTANEOUS at 22:33

## 2022-12-27 RX ADMIN — CARBIDOPA AND LEVODOPA 1 TABLET(S): 25; 100 TABLET ORAL at 07:57

## 2022-12-27 RX ADMIN — PIPERACILLIN AND TAZOBACTAM 25 GRAM(S): 4; .5 INJECTION, POWDER, LYOPHILIZED, FOR SOLUTION INTRAVENOUS at 05:47

## 2022-12-27 RX ADMIN — Medication 40 MILLIEQUIVALENT(S): at 10:08

## 2022-12-27 RX ADMIN — PREGABALIN 1000 MICROGRAM(S): 225 CAPSULE ORAL at 11:48

## 2022-12-27 RX ADMIN — BUDESONIDE AND FORMOTEROL FUMARATE DIHYDRATE 2 PUFF(S): 160; 4.5 AEROSOL RESPIRATORY (INHALATION) at 05:47

## 2022-12-27 RX ADMIN — Medication 3 MILLILITER(S): at 11:47

## 2022-12-27 RX ADMIN — PIPERACILLIN AND TAZOBACTAM 25 GRAM(S): 4; .5 INJECTION, POWDER, LYOPHILIZED, FOR SOLUTION INTRAVENOUS at 13:32

## 2022-12-27 RX ADMIN — LOSARTAN POTASSIUM 100 MILLIGRAM(S): 100 TABLET, FILM COATED ORAL at 05:47

## 2022-12-27 RX ADMIN — Medication 166.67 MILLIGRAM(S): at 11:48

## 2022-12-27 RX ADMIN — HEPARIN SODIUM 2100 UNIT(S)/HR: 5000 INJECTION INTRAVENOUS; SUBCUTANEOUS at 07:56

## 2022-12-27 NOTE — CONSULT NOTE ADULT - ASSESSMENT
Assessment  DMT2: 77y Male with DM T2 with hyperglycemia, A1C 8%, now on low-dose basal bolus insulin, blood sugars running high and not at target, no hypoglycemic episodes, eating meals, on inhaled steroids and receiving IV ABx in dextrose.  Scrotal Abscess: on medications, stable, monitored.  COPD: On meds, breathing tx, stable.  HTN: Controlled,  on antihypertensive medications.  Parkinsons: On meds, stable, monitored.        Dima Keller MD  Cell: [phone]  Office: [phone]               Assessment  DMT2: 77y Male with DM T2 with hyperglycemia, A1C 8%, now on low-dose basal bolus insulin, blood sugars running high and not at target, no hypoglycemic episodes, eating meals, on inhaled  steroids and receiving IV ABx in dextrose.  Scrotal Abscess: on medications, stable, monitored.  COPD: On meds, breathing tx, stable.  HTN: Controlled,  on antihypertensive medications.  Parkinsons: On meds, stable, monitored.        Dima Keller MD  Cell: [phone]  Office: [phone]

## 2022-12-27 NOTE — PROGRESS NOTE ADULT - SUBJECTIVE AND OBJECTIVE BOX
Patient is a 77y old  Male who presents with a chief complaint of generalized weakness and AMS x 2 days (27 Dec 2022 12:45)      SUBJECTIVE / OVERNIGHT EVENTS:  Patient seen and examined.   Doing better.   Did not know he has DM.  Has chronic wheezing.      Vital Signs Last 24 Hrs  T(C): 36.6 (27 Dec 2022 10:02), Max: 36.6 (27 Dec 2022 10:02)  T(F): 97.8 (27 Dec 2022 10:02), Max: 97.8 (27 Dec 2022 10:02)  HR: 70 (27 Dec 2022 10:02) (70 - 78)  BP: 129/67 (27 Dec 2022 10:02) (119/68 - 149/65)  BP(mean): --  RR: 20 (27 Dec 2022 10:02) (20 - 20)  SpO2: 95% (27 Dec 2022 10:02) (94% - 98%)    Parameters below as of 27 Dec 2022 10:02  Patient On (Oxygen Delivery Method): nasal cannula w/ humidification  O2 Flow (L/min): 3    I&O's Summary    26 Dec 2022 07:01  -  27 Dec 2022 07:00  --------------------------------------------------------  IN: 1431 mL / OUT: 2475 mL / NET: -1044 mL    27 Dec 2022 07:01  -  27 Dec 2022 13:33  --------------------------------------------------------  IN: 856 mL / OUT: 270 mL / NET: 586 mL      PE:  GENERAL: NAD, AAOx3  CHEST/LUNG: wheezing  HEART: Regular rate and rhythm; no murmur  ABDOMEN: Soft, Nontender, Nondistended; Bowel sounds present  : enlarged R scrotum, erythematous, tender, gould  EXTREMITIES:  2+ Peripheral Pulses, No edema  NEURO: No focal deficit    LABS:                        11.5   17.41 )-----------( 317      ( 27 Dec 2022 07:05 )             35.6     12-27    136  |  93<L>  |  29<H>  ----------------------------<  332<H>  3.4<L>   |  32<H>  |  1.01    Ca    8.8      27 Dec 2022 07:05  Phos  2.7     12-26  Mg     2.2     12-26    TPro  6.4  /  Alb  2.5<L>  /  TBili  0.4  /  DBili  x   /  AST  33  /  ALT  8<L>  /  AlkPhos  115  12-27    PT/INR - ( 26 Dec 2022 06:17 )   PT: 18.5 sec;   INR: 1.60 ratio         PTT - ( 27 Dec 2022 07:05 )  PTT:64.6 sec  CAPILLARY BLOOD GLUCOSE      POCT Blood Glucose.: 251 mg/dL (27 Dec 2022 11:54)  POCT Blood Glucose.: 295 mg/dL (27 Dec 2022 08:19)  POCT Blood Glucose.: 346 mg/dL (26 Dec 2022 23:15)  POCT Blood Glucose.: 377 mg/dL (26 Dec 2022 21:54)  POCT Blood Glucose.: 402 mg/dL (26 Dec 2022 21:51)  POCT Blood Glucose.: 434 mg/dL (26 Dec 2022 18:09)  POCT Blood Glucose.: 444 mg/dL (26 Dec 2022 18:08)            RADIOLOGY & ADDITIONAL TESTS:    Imaging Personally Reviewed:  [x] YES  [ ] NO    Consultant(s) Notes Reviewed:  [x] YES  [ ] NO      MEDICATIONS  (STANDING):  albuterol/ipratropium for Nebulization 3 milliLiter(s) Nebulizer every 6 hours  budesonide 160 MICROgram(s)/formoterol 4.5 MICROgram(s) Inhaler 2 Puff(s) Inhalation two times a day  carbidopa/levodopa  25/100 1 Tablet(s) Oral <User Schedule>  cyanocobalamin 1000 MICROGram(s) Oral daily  dextrose 5%. 1000 milliLiter(s) (50 mL/Hr) IV Continuous <Continuous>  dextrose 5%. 1000 milliLiter(s) (100 mL/Hr) IV Continuous <Continuous>  dextrose 50% Injectable 25 Gram(s) IV Push once  dextrose 50% Injectable 12.5 Gram(s) IV Push once  dextrose 50% Injectable 25 Gram(s) IV Push once  folic acid 1 milliGRAM(s) Oral daily  glucagon  Injectable 1 milliGRAM(s) IntraMuscular once  heparin  Infusion.  Unit(s)/Hr (17 mL/Hr) IV Continuous <Continuous>  hydrochlorothiazide 25 milliGRAM(s) Oral daily  insulin glargine Injectable (LANTUS) 18 Unit(s) SubCutaneous at bedtime  insulin lispro (ADMELOG) corrective regimen sliding scale   SubCutaneous three times a day before meals  insulin lispro (ADMELOG) corrective regimen sliding scale   SubCutaneous at bedtime  insulin lispro Injectable (ADMELOG) 7 Unit(s) SubCutaneous three times a day before meals  lactobacillus acidophilus 1 Tablet(s) Oral daily  losartan 100 milliGRAM(s) Oral daily  metoprolol tartrate 25 milliGRAM(s) Oral two times a day  piperacillin/tazobactam IVPB.. 3.375 Gram(s) IV Intermittent every 8 hours  rOPINIRole 2 milliGRAM(s) Oral <User Schedule>  trihexyphenidyl 1 milliGRAM(s) Oral <User Schedule>  vancomycin  IVPB 1250 milliGRAM(s) IV Intermittent every 12 hours    MEDICATIONS  (PRN):  acetaminophen     Tablet .. 650 milliGRAM(s) Oral every 6 hours PRN Temp greater or equal to 38C (100.4F), Mild Pain (1 - 3)  dextrose Oral Gel 15 Gram(s) Oral once PRN Blood Glucose LESS THAN 70 milliGRAM(s)/deciliter  heparin   Injectable 7500 Unit(s) IV Push every 6 hours PRN For aPTT less than 40  heparin   Injectable 3500 Unit(s) IV Push every 6 hours PRN For aPTT between 40 - 57  melatonin 3 milliGRAM(s) Oral at bedtime PRN Insomnia  ondansetron Injectable 4 milliGRAM(s) IV Push every 8 hours PRN Nausea and/or Vomiting      Care Discussed with Consultants/Other Providers [x] YES  [ ] NO    HEALTH ISSUES - PROBLEM Dx:  Scrotal abscess    Sepsis    Chronic obstructive pulmonary disease (COPD)    H/O factor V Leiden mutation    Unspecified atrial fibrillation    Parkinsons    HTN (hypertension)    Preop exam for internal medicine    DM (diabetes mellitus)

## 2022-12-27 NOTE — CONSULT NOTE ADULT - CONSULT REASON
Left inguinal hernia
Scrotal Abscess
R scrotal abscess
R scrotal hematoma drainage
High Blood Sugars/DMT2

## 2022-12-27 NOTE — CONSULT NOTE ADULT - SUBJECTIVE AND OBJECTIVE BOX
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   Per wife , patient has been having generalized  weakness and recurrent falls over the past several days. He was evaluated at [facility] ED four days prior to admission and imaging at the time was without acute fractures. However , since then patient in more lethargic , intermittently confused. Patient reports dysuria , pain in the scrotum , increased redness on right side . He has and intermittent non productive cough , but no fever or chills.      (23 Dec 2022 21:25)  Patient has diabetes, A1C 8%, Endo was consulted for glycemic control.    PAST MEDICAL & SURGICAL HISTORY:  Personal history of PE (pulmonary embolism)      COPD (chronic obstructive pulmonary disease)      Hypertension      Restless leg syndrome      Herniated cervical disc      H/O hernia repair          FAMILY HISTORY:  FH: lung cancer (Father)        Social History:    Outpatient Medications:    MEDICATIONS  (STANDING):  albuterol/ipratropium for Nebulization 3 milliLiter(s) Nebulizer every 6 hours  budesonide 160 MICROgram(s)/formoterol 4.5 MICROgram(s) Inhaler 2 Puff(s) Inhalation two times a day  carbidopa/levodopa  25/100 1 Tablet(s) Oral <User Schedule>  cyanocobalamin 1000 MICROGram(s) Oral daily  dextrose 5%. 1000 milliLiter(s) (50 mL/Hr) IV Continuous <Continuous>  dextrose 5%. 1000 milliLiter(s) (100 mL/Hr) IV Continuous <Continuous>  dextrose 50% Injectable 25 Gram(s) IV Push once  dextrose 50% Injectable 12.5 Gram(s) IV Push once  dextrose 50% Injectable 25 Gram(s) IV Push once  folic acid 1 milliGRAM(s) Oral daily  glucagon  Injectable 1 milliGRAM(s) IntraMuscular once  heparin  Infusion.  Unit(s)/Hr (17 mL/Hr) IV Continuous <Continuous>  hydrochlorothiazide 25 milliGRAM(s) Oral daily  insulin glargine Injectable (LANTUS) 18 Unit(s) SubCutaneous at bedtime  insulin lispro (ADMELOG) corrective regimen sliding scale   SubCutaneous at bedtime  insulin lispro (ADMELOG) corrective regimen sliding scale   SubCutaneous three times a day before meals  insulin lispro Injectable (ADMELOG) 7 Unit(s) SubCutaneous three times a day before meals  lactobacillus acidophilus 1 Tablet(s) Oral daily  losartan 100 milliGRAM(s) Oral daily  metoprolol tartrate 25 milliGRAM(s) Oral two times a day  piperacillin/tazobactam IVPB.. 3.375 Gram(s) IV Intermittent every 8 hours  rOPINIRole 2 milliGRAM(s) Oral <User Schedule>  trihexyphenidyl 1 milliGRAM(s) Oral <User Schedule>  vancomycin  IVPB 1250 milliGRAM(s) IV Intermittent every 12 hours    MEDICATIONS  (PRN):  acetaminophen     Tablet .. 650 milliGRAM(s) Oral every 6 hours PRN Temp greater or equal to 38C (100.4F), Mild Pain (1 - 3)  dextrose Oral Gel 15 Gram(s) Oral once PRN Blood Glucose LESS THAN 70 milliGRAM(s)/deciliter  heparin   Injectable 7500 Unit(s) IV Push every 6 hours PRN For aPTT less than 40  heparin   Injectable 3500 Unit(s) IV Push every 6 hours PRN For aPTT between 40 - 57  melatonin 3 milliGRAM(s) Oral at bedtime PRN Insomnia  ondansetron Injectable 4 milliGRAM(s) IV Push every 8 hours PRN Nausea and/or Vomiting      Allergies    Levaquin (Anaphylaxis)    Intolerances      Review of Systems:  Constitutional: No fever, no chills  Eyes: No blurry vision  Neuro: No tremors  HEENT: No pain, no neck swelling  Cardiovascular: No chest pain, no palpitations  Respiratory: Has SOB, no cough  GI: No nausea, vomiting, abdominal pain  : No dysuria  Skin: no rash  MSK: Has leg swelling.  Psych: no depression  Endocrine: no polyuria, polydipsia    ALL OTHER SYSTEMS REVIEWED AND NEGATIVE    UNABLE TO OBTAIN    PHYSICAL EXAM:  VITALS: T(C): 36.6 (12-27-22 @ 10:02)  T(F): 97.8 (12-27-22 @ 10:02), Max: 97.8 (12-27-22 @ 10:02)  HR: 70 (12-27-22 @ 10:02) (70 - 78)  BP: 129/67 (12-27-22 @ 10:02) (119/68 - 149/65)  RR:  (20 - 20)  SpO2:  (94% - 98%)  Wt(kg): --  GENERAL: NAD, well-groomed, well-developed  EYES: No proptosis, no lid lag  HEENT:  Atraumatic, Normocephalic  THYROID: Normal size, no palpable nodules  RESPIRATORY: Clear to auscultation bilaterally; No rales, rhonchi, wheezing  CARDIOVASCULAR: Si S2, No murmurs;  GI: Soft, non distended, normal bowel sounds  SKIN: Dry, intact, No rashes or lesions  MUSCULOSKELETAL: Has BL lower extremity edema.  NEURO:  no tremor, sensation decreased in feet BL,    POCT Blood Glucose.: 295 mg/dL (12-27-22 @ 08:19)  POCT Blood Glucose.: 346 mg/dL (12-26-22 @ 23:15)  POCT Blood Glucose.: 377 mg/dL (12-26-22 @ 21:54)  POCT Blood Glucose.: 402 mg/dL (12-26-22 @ 21:51)  POCT Blood Glucose.: 434 mg/dL (12-26-22 @ 18:09)  POCT Blood Glucose.: 444 mg/dL (12-26-22 @ 18:08)  POCT Blood Glucose.: 352 mg/dL (12-26-22 @ 12:13)                            11.5   17.41 )-----------( 317      ( 27 Dec 2022 07:05 )             35.6       12-27    136  |  93<L>  |  29<H>  ----------------------------<  332<H>  3.4<L>   |  32<H>  |  1.01    eGFR: 76    Ca    8.8      12-27  Mg     2.2     12-26  Phos  2.7     12-26    TPro  6.4  /  Alb  2.5<L>  /  TBili  0.4  /  DBili  x   /  AST  33  /  ALT  8<L>  /  AlkPhos  115  12-27      Thyroid Function Tests:              Radiology:                HPI:  Patient is a 77-year-old male with a past medical history of Parkinson's, COPD, DVT/PE factor V Leiden on Coumadin, BL chronic incarcerated hernias s/p R hernia repair August ’22  at [facility] c/b scrotal hematoma for which he has been undergoing intermittent needle aspirations, he now  presents  for generalized weakness and  altered mental state for the past two days.   Per wife , patient has been having generalized  weakness and recurrent falls over the past several days. He was evaluated at [facility] ED four days prior to admission and imaging at the time was without acute fractures. However , since then patient in more lethargic , intermittently confused. Patient reports dysuria , pain in the scrotum , increased redness on right side . He has and intermittent non productive cough , but no fever or chills.      (23 Dec 2022 21:25)  Patient has diabetes, A1C 8%, Endo was consulted for glycemic control.    PAST MEDICAL & SURGICAL HISTORY:  Personal history of PE (pulmonary embolism)      COPD (chronic obstructive pulmonary disease)      Hypertension      Restless leg syndrome      Herniated cervical disc      H/O hernia repair          FAMILY HISTORY:  FH: lung cancer (Father)        Social History:    Outpatient Medications:    MEDICATIONS  (STANDING):  albuterol/ipratropium for Nebulization 3 milliLiter(s) Nebulizer every 6 hours  budesonide 160 MICROgram(s)/formoterol 4.5 MICROgram(s) Inhaler 2 Puff(s) Inhalation two times a day  carbidopa/levodopa  25/100 1 Tablet(s) Oral <User Schedule>  cyanocobalamin 1000 MICROGram(s) Oral daily  dextrose 5%. 1000 milliLiter(s) (50 mL/Hr) IV Continuous <Continuous>  dextrose 5%. 1000 milliLiter(s) (100 mL/Hr) IV Continuous <Continuous>  dextrose 50% Injectable 25 Gram(s) IV Push once  dextrose 50% Injectable 12.5 Gram(s) IV Push once  dextrose 50% Injectable 25 Gram(s) IV Push once  folic acid 1 milliGRAM(s) Oral daily  glucagon  Injectable 1 milliGRAM(s) IntraMuscular once  heparin  Infusion.  Unit(s)/Hr (17 mL/Hr) IV Continuous <Continuous>  hydrochlorothiazide 25 milliGRAM(s) Oral daily  insulin glargine Injectable (LANTUS) 18 Unit(s) SubCutaneous at bedtime  insulin lispro (ADMELOG) corrective regimen sliding scale   SubCutaneous at bedtime  insulin lispro (ADMELOG) corrective regimen sliding scale   SubCutaneous three times a day before meals  insulin lispro Injectable (ADMELOG) 7 Unit(s) SubCutaneous three times a day before meals  lactobacillus acidophilus 1 Tablet(s) Oral daily  losartan 100 milliGRAM(s) Oral daily  metoprolol tartrate 25 milliGRAM(s) Oral two times a day  piperacillin/tazobactam IVPB.. 3.375 Gram(s) IV Intermittent every 8 hours  rOPINIRole 2 milliGRAM(s) Oral <User Schedule>  trihexyphenidyl 1 milliGRAM(s) Oral <User Schedule>  vancomycin  IVPB 1250 milliGRAM(s) IV Intermittent every 12 hours    MEDICATIONS  (PRN):  acetaminophen     Tablet .. 650 milliGRAM(s) Oral every 6 hours PRN Temp greater or equal to 38C (100.4F), Mild Pain (1 - 3)  dextrose Oral Gel 15 Gram(s) Oral once PRN Blood Glucose LESS THAN 70 milliGRAM(s)/deciliter  heparin   Injectable 7500 Unit(s) IV Push every 6 hours PRN For aPTT less than 40  heparin   Injectable 3500 Unit(s) IV Push every 6 hours PRN For aPTT between 40 - 57  melatonin 3 milliGRAM(s) Oral at bedtime PRN Insomnia  ondansetron Injectable 4 milliGRAM(s) IV Push every 8 hours PRN Nausea and/or Vomiting      Allergies    Levaquin (Anaphylaxis)    Intolerances      Review of Systems:  Constitutional: No fever, no chills  Eyes: No blurry vision  Neuro: No tremors  HEENT: No pain, no neck swelling  Cardiovascular: No chest pain, no palpitations  Respiratory: Has SOB, no cough  GI: No nausea, vomiting, abdominal pain  : No dysuria  Skin: no rash  MSK: Has leg swelling.  Psych: no depression  Endocrine: no polyuria, polydipsia    ALL OTHER SYSTEMS REVIEWED AND NEGATIVE    UNABLE TO OBTAIN    PHYSICAL EXAM:  VITALS: T(C): 36.6 (12-27-22 @ 10:02)  T(F): 97.8 (12-27-22 @ 10:02), Max: 97.8 (12-27-22 @ 10:02)  HR: 70 (12-27-22 @ 10:02) (70 - 78)  BP: 129/67 (12-27-22 @ 10:02) (119/68 - 149/65)  RR:  (20 - 20)  SpO2:  (94% - 98%)  Wt(kg): --  GENERAL: NAD, well-groomed, well-developed  EYES: No proptosis, no lid lag  HEENT:  Atraumatic, Normocephalic  THYROID: Normal size, no palpable nodules  RESPIRATORY: Clear to auscultation bilaterally; No rales, rhonchi, wheezing  CARDIOVASCULAR: Si S2, No murmurs;  GI: Soft, non distended, normal bowel sounds  SKIN: Dry, intact, No rashes or lesions  MUSCULOSKELETAL: Has BL lower extremity edema.  NEURO:  no tremor, sensation decreased in feet BL,    POCT Blood Glucose.: 295 mg/dL (12-27-22 @ 08:19)  POCT Blood Glucose.: 346 mg/dL (12-26-22 @ 23:15)  POCT Blood Glucose.: 377 mg/dL (12-26-22 @ 21:54)  POCT Blood Glucose.: 402 mg/dL (12-26-22 @ 21:51)  POCT Blood Glucose.: 434 mg/dL (12-26-22 @ 18:09)  POCT Blood Glucose.: 444 mg/dL (12-26-22 @ 18:08)  POCT Blood Glucose.: 352 mg/dL (12-26-22 @ 12:13)                            11.5   17.41 )-----------( 317      ( 27 Dec 2022 07:05 )             35.6       12-27    136  |  93<L>  |  29<H>  ----------------------------<  332<H>  3.4<L>   |  32<H>  |  1.01    eGFR: 76    Ca    8.8      12-27  Mg     2.2     12-26  Phos  2.7     12-26    TPro  6.4  /  Alb  2.5<L>  /  TBili  0.4  /  DBili  x   /  AST  33  /  ALT  8<L>  /  AlkPhos  115  12-27      Thyroid Function Tests:              Radiology:

## 2022-12-27 NOTE — CONSULT NOTE ADULT - PROBLEM SELECTOR RECOMMENDATION 9
Suggest to switch antibiotic solution if possible.  Will increase Lantus to 18u at bedtime.  Will increase Admelog to 7u before each meal and continue Admelog correction scale coverage. Will continue monitoring FS and FU.  Patient counseled for compliance with consistent low carb diet.

## 2022-12-27 NOTE — PROGRESS NOTE ADULT - ASSESSMENT
Interventional Radiology Follow-Up Note  This is a 77y Male s/p scrotal drain placement on 12/25 in Interventional Radiology.     S: Patient seen and examined @ bedside. No complaints offered.     Medication:  furosemide   Injectable: (12-26)  heparin  Infusion.: (12-27)  hydrochlorothiazide: (12-27)  losartan: (12-27)  metoprolol tartrate: (12-27)  piperacillin/tazobactam IVPB..: (12-27)  vancomycin  IVPB: (12-27)  vancomycin  IVPB: (12-26)    Vitals:   T(F): 97.8, Max: 97.8 (10:02)  HR: 70  BP: 129/67  RR: 20  SpO2: 95%    Physical Exam:  General: Nontoxic, in NAD, A&O x3.  Abdomen: soft, NTND, no peritoneal signs.  Drain Device: Drain intact attached to kim. Dressing clean, dry, intact.    24hr Drain output: 125cc    LABS:  WBC 17.41 / Hgb 11.5 / Hct 35.6 / Plt 317  Na 136 / K 3.4 / CO2 32 / Cl 93 / BUN 29 / Cr 1.01 / Glucose 332  ALT 8 / AST 33 / Alk Phos 115 / Tbili 0.4  Ptt 64.6 / Pt -- / INR --    Preliminary Report:    Few Staphylococcus caprae    Susceptibility to follow.    Assessment/Plan:  77 year old male with PMH of Parkinson's, COPD, 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    -continue global management per primary team  -monitor h/h; transfuse as needed  -trend vs/labs  -flush drain with 5cc NS daily forward only; DO NOT aspirate  -change dressing q3 days or when dressing is saturated  -regarding outpatient follow up with IR, if the patient is d/c home with drainage catheter they can make an appointment with IR by calling the IR booking office at [phone]; recommend IR follow in 2weeks for tube evaluation.  - Greater than 50% of the encounter was spent counseling and/or coordination of care on drain management and follow up.   -they will benefit from VNS service to help with drainage catheter care; they should continue same drainage catheter care as an outpatient.     Please call IR at extension [phone] with any questions, concerns, or issues regarding above.      Rosemarie Moore NP  Available on Teams

## 2022-12-27 NOTE — PROGRESS NOTE ADULT - SUBJECTIVE AND OBJECTIVE BOX
[facility] DIV[facility] OF INFECTIOUS DISEASES  CHRISTI Putnam Y. Patel, S. Shah, G. Aureliano  [phone]  ([phone] - weekdays after 5pm and weekends)    Name: BOECKLE, ROBERT  Age/Gender: 77y Male  MRN: [id number]    Interval History:  Patient seen and examined this morning.   States he feels better today, no new complaints.   Notes reviewed. No concerning overnight events  Afebrile     Allergies: Levaquin (Anaphylaxis)    Objective:  Vitals:   T(F): 97.7 (12-27-22 @ 05:15), Max: 97.8 (12-26-22 @ 09:41)  HR: 75 (12-27-22 @ 05:15) (70 - 79)  BP: 149/65 (12-27-22 @ 05:15) (119/68 - 149/77)  RR: 20 (12-27-22 @ 05:15) (20 - 20)  SpO2: 95% (12-27-22 @ 05:15) (93% - 98%)  Physical Examination:  General: no acute distress  HEENT: NC/AT, anicteric, neck supple  Respiratory: no acc muscle use, breathing comfortably  Cardiovascular: S1 and S2 present  Gastrointestinal: soft, nontender, nondistended  : scrotal swelling, drain with little brown nonpurulent fluid  Extremities: no edema, no cyanosis  Skin: no visible rash  Mishra: present draining yellow urine     Laboratory Studies:  CBC:                       11.5   17.41 )-----------( 317      ( 27 Dec 2022 07:05 )             35.6     WBC Trend:  17.41 12-27-22 @ 07:05  15.05 12-26-22 @ 06:17  14.54 12-25-22 @ 22:59  13.06 12-25-22 @ 09:24  13.71 12-24-22 @ 07:00  14.60 12-23-22 @ 13:43    CMP: 12-27    136  |  93<L>  |  29<H>  ----------------------------<  332<H>  3.4<L>   |  32<H>  |  1.01    Ca    8.8      27 Dec 2022 07:05  Phos  2.7     12-26  Mg     2.2     12-26    TPro  6.4  /  Alb  2.5<L>  /  TBili  0.4  /  DBili  x   /  AST  33  /  ALT  8<L>  /  AlkPhos  115  12-27    Creatinine, Serum: 1.01 mg/dL (12-27-22 @ 07:05)  Creatinine, Serum: 0.99 mg/dL (12-26-22 @ 06:17)  Creatinine, Serum: 0.96 mg/dL (12-25-22 @ 08:36)  Creatinine, Serum: 1.03 mg/dL (12-24-22 @ 06:59)  Creatinine, Serum: 1.16 mg/dL (12-23-22 @ 13:43)    LIVER FUNCTIONS - ( 27 Dec 2022 07:05 )  Alb: 2.5 g/dL / Pro: 6.4 g/dL / ALK PHOS: 115 U/L / ALT: 8 U/L / AST: 33 U/L / GGT: x           Vancomycin Level, Trough: 10.0 ug/mL (12-26-22 @ 22:00)  Vancomycin Level, Trough: 7.1 ug/mL (12-25-22 @ 08:36)    Microbiology: reviewed   Culture - Body Fluid with Gram Stain (collected 12-25-22 @ 23:09)  Source: Abdominal Fl Abdominal Fluid  Gram Stain (12-26-22 @ 08:02):    Numerous polymorphonuclear leukocytes seen per low power field    Moderate Gram positive cocci in pairs seen per oil power field    Culture - Blood (collected 12-23-22 @ 13:35)  Source: .Blood Blood-Peripheral  Preliminary Report (12-24-22 @ 18:01):    No growth to date.    Culture - Blood (collected 12-23-22 @ 13:15)  Source: .Blood Blood-Peripheral  Preliminary Report (12-24-22 @ 18:01):    No growth to date.    Radiology: reviewed   Xray Chest 1 View- PORTABLE-Urgent (Xray Chest 1 View- PORTABLE-Urgent .) (12.26.22 @ 03:02) >Impression: Multifocal right lung patchy opacities with small right pleural effusion.  Increase in size of moderate left pleural effusion with associated consolidative atelectasis. Cardiac silhouette is obscured by adjacent consolidation. Osseous structures are unremarkable for age.    Medications:  acetaminophen     Tablet .. 650 milliGRAM(s) Oral every 6 hours PRN  albuterol/ipratropium for Nebulization 3 milliLiter(s) Nebulizer every 6 hours  budesonide 160 MICROgram(s)/formoterol 4.5 MICROgram(s) Inhaler 2 Puff(s) Inhalation two times a day  carbidopa/levodopa  25/100 1 Tablet(s) Oral <User Schedule>  cyanocobalamin 1000 MICROGram(s) Oral daily  dextrose 5%. 1000 milliLiter(s) IV Continuous <Continuous>  dextrose 5%. 1000 milliLiter(s) IV Continuous <Continuous>  dextrose 50% Injectable 25 Gram(s) IV Push once  dextrose 50% Injectable 12.5 Gram(s) IV Push once  dextrose 50% Injectable 25 Gram(s) IV Push once  dextrose Oral Gel 15 Gram(s) Oral once PRN  folic acid 1 milliGRAM(s) Oral daily  glucagon  Injectable 1 milliGRAM(s) IntraMuscular once  heparin   Injectable 7500 Unit(s) IV Push every 6 hours PRN  heparin   Injectable 3500 Unit(s) IV Push every 6 hours PRN  heparin  Infusion.  Unit(s)/Hr IV Continuous <Continuous>  hydrochlorothiazide 25 milliGRAM(s) Oral daily  insulin glargine Injectable (LANTUS) 6 Unit(s) SubCutaneous at bedtime  insulin lispro (ADMELOG) corrective regimen sliding scale   SubCutaneous three times a day before meals  insulin lispro (ADMELOG) corrective regimen sliding scale   SubCutaneous at bedtime  insulin lispro Injectable (ADMELOG) 4 Unit(s) SubCutaneous three times a day before meals  lactobacillus acidophilus 1 Tablet(s) Oral daily  losartan 100 milliGRAM(s) Oral daily  melatonin 3 milliGRAM(s) Oral at bedtime PRN  metoprolol tartrate 25 milliGRAM(s) Oral two times a day  ondansetron Injectable 4 milliGRAM(s) IV Push every 8 hours PRN  piperacillin/tazobactam IVPB.. 3.375 Gram(s) IV Intermittent every 8 hours  rOPINIRole 2 milliGRAM(s) Oral <User Schedule>  trihexyphenidyl 1 milliGRAM(s) Oral <User Schedule>  vancomycin  IVPB 1000 milliGRAM(s) IV Intermittent every 12 hours    Current Antimicrobials:  piperacillin/tazobactam IVPB.. 3.375 Gram(s) IV Intermittent every 8 hours  vancomycin  IVPB 1000 milliGRAM(s) IV Intermittent every 12 hours    Prior/Completed Antimicrobials:  cefTRIAXone   IVPB  piperacillin/tazobactam IVPB...  vancomycin  IVPB.

## 2022-12-27 NOTE — PROGRESS NOTE ADULT - ASSESSMENT
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  admitted for sepsis 2/2 scrotal abscess.    # sepsis 2/2 right scrotal abscess  s/p IR drainage 12/25-- growing gram + cocci   evaluated by , suspect infected hematoma, given recent instrumentation will continue broad spectrum abx  Surgical intervention TBD by urology, fu urology  Vanc and Zosyn  ID consult appreciated   fu urine culture  f/u blood cultures     # Large left inguinal hernia  -tender, may need surgical correction.   -gen surgery recs appreciated     # Chronic obstructive pulmonary disease  # Pleural effusion   -Strict I & O, Daily weights ; fluids on hold since 12/25.   -given lasix 40 Iv once   -f/u echo -- pending   -CXR noted, chronic chronic changes + effusion   -per pt has chronic wheezing  Symbicort   duonebs q6h    #T2DM   - newly diagnosed but probably long standing   - insulin basal bolus   - a1c 8s   - endo consult appreciated     # H/O factor V Leiden mutation  hold coumadin   Repeat INR is 1.73  heparin gtt while inpatient     # Parkinsons  continue carbidopa-levo-dopa   continue Ropinirole   continue Trihexyphenidyl    # HTN (hypertension)  continue Irbesartan (therapeutic equivalent)    dvt ppx heparin gtt   Please contact with any questions or concerns [phone].

## 2022-12-27 NOTE — PROGRESS NOTE ADULT - ASSESSMENT
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    Sepsis 2/2 Scrotal abscess  Large L inguinal hernia  - imaging reviewed  - BCx NGTD  -  and surgery following--suspect superinfected hematoma  - s/p IR drainage on 12/25 - drained 20cc purulent fluid, drain in place  - Abscess cx gram stain with GPC in pairs   Recs:  - follow abscess culture   - c/w vancomycin, goal trough 15-20   -- trough last night low on 1g IV Q12h -- will inc to 1.25g Q12h for now pending culture and monitor level  - c/w zosyn  - supportive care and pain management per primary team  - drain care per surgery       Surendra Connolly M.D.  [facility], Division of Infectious Diseases  [phone]  After 5pm on weekdays and all day on weekends - please call [phone] Patient is a 77 year old male with PMH of Parkinson's, COPD, 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    Sepsis 2/2 Scrotal abscess  Large L inguinal hernia  - imaging reviewed  - BCx NGTD  -  and surgery following--suspect superinfected hematoma  - s/p IR drainage on 12/25 - drained 20cc purulent fluid, drain in place  - Abscess cx gram stain with GPC in pairs   - afebrile, WBC trend noted, pt states feels better  Recs:  - follow abscess culture   - c/w vancomycin, goal trough 15-20   -- trough last night low on 1g IV Q12h -- will inc to 1.25g Q12h for now pending culture and monitor level  - c/w zosyn  - supportive care and pain management per primary team  - drain care per surgery  - monitor temps, CBC, Cr      Surendra Connolly M.D.  [facility], Division of Infectious Diseases  [phone]  After 5pm on weekdays and all day on weekends - please call [phone]

## 2022-12-27 NOTE — CONSULT NOTE ADULT - REASON FOR ADMISSION
generalized weakness and AMS x 2 days

## 2022-12-28 LAB
-  AMPICILLIN/SULBACTAM: SIGNIFICANT CHANGE UP
-  CEFAZOLIN: SIGNIFICANT CHANGE UP
-  CLINDAMYCIN: SIGNIFICANT CHANGE UP
-  ERYTHROMYCIN: SIGNIFICANT CHANGE UP
-  GENTAMICIN: SIGNIFICANT CHANGE UP
-  OXACILLIN: SIGNIFICANT CHANGE UP
-  RIFAMPIN: SIGNIFICANT CHANGE UP
-  TETRACYCLINE: SIGNIFICANT CHANGE UP
-  TRIMETHOPRIM/SULFAMETHOXAZOLE: SIGNIFICANT CHANGE UP
-  VANCOMYCIN: SIGNIFICANT CHANGE UP
ALBUMIN SERPL ELPH-MCNC: 2.5 G/DL — LOW (ref 3.3–5)
ALP SERPL-CCNC: 111 U/L — SIGNIFICANT CHANGE UP (ref 40–120)
ALT FLD-CCNC: 9 U/L — LOW (ref 10–45)
ANION GAP SERPL CALC-SCNC: 9 MMOL/L — SIGNIFICANT CHANGE UP (ref 5–17)
APTT BLD: 85.8 SEC — HIGH (ref 27.5–35.5)
AST SERPL-CCNC: 46 U/L — HIGH (ref 10–40)
BASOPHILS # BLD AUTO: 0.05 K/UL — SIGNIFICANT CHANGE UP (ref 0–0.2)
BASOPHILS NFR BLD AUTO: 0.4 % — SIGNIFICANT CHANGE UP (ref 0–2)
BILIRUB SERPL-MCNC: 0.4 MG/DL — SIGNIFICANT CHANGE UP (ref 0.2–1.2)
BUN SERPL-MCNC: 29 MG/DL — HIGH (ref 7–23)
CALCIUM SERPL-MCNC: 8.8 MG/DL — SIGNIFICANT CHANGE UP (ref 8.4–10.5)
CHLORIDE SERPL-SCNC: 96 MMOL/L — SIGNIFICANT CHANGE UP (ref 96–108)
CO2 SERPL-SCNC: 33 MMOL/L — HIGH (ref 22–31)
CREAT SERPL-MCNC: 0.96 MG/DL — SIGNIFICANT CHANGE UP (ref 0.5–1.3)
CULTURE RESULTS: SIGNIFICANT CHANGE UP
CULTURE RESULTS: SIGNIFICANT CHANGE UP
EGFR: 81 ML/MIN/1.73M2 — SIGNIFICANT CHANGE UP
EOSINOPHIL # BLD AUTO: 0.13 K/UL — SIGNIFICANT CHANGE UP (ref 0–0.5)
EOSINOPHIL NFR BLD AUTO: 0.9 % — SIGNIFICANT CHANGE UP (ref 0–6)
GLUCOSE SERPL-MCNC: 183 MG/DL — HIGH (ref 70–99)
HCT VFR BLD CALC: 36.9 % — LOW (ref 39–50)
HGB BLD-MCNC: 11.8 G/DL — LOW (ref 13–17)
IMM GRANULOCYTES NFR BLD AUTO: 1.1 % — HIGH (ref 0–0.9)
LYMPHOCYTES # BLD AUTO: 1.71 K/UL — SIGNIFICANT CHANGE UP (ref 1–3.3)
LYMPHOCYTES # BLD AUTO: 12.3 % — LOW (ref 13–44)
MAGNESIUM SERPL-MCNC: 2.1 MG/DL — SIGNIFICANT CHANGE UP (ref 1.6–2.6)
MCHC RBC-ENTMCNC: 29.4 PG — SIGNIFICANT CHANGE UP (ref 27–34)
MCHC RBC-ENTMCNC: 32 GM/DL — SIGNIFICANT CHANGE UP (ref 32–36)
MCV RBC AUTO: 91.8 FL — SIGNIFICANT CHANGE UP (ref 80–100)
METHOD TYPE: SIGNIFICANT CHANGE UP
MONOCYTES # BLD AUTO: 1.17 K/UL — HIGH (ref 0–0.9)
MONOCYTES NFR BLD AUTO: 8.4 % — SIGNIFICANT CHANGE UP (ref 2–14)
NEUTROPHILS # BLD AUTO: 10.68 K/UL — HIGH (ref 1.8–7.4)
NEUTROPHILS NFR BLD AUTO: 76.9 % — SIGNIFICANT CHANGE UP (ref 43–77)
NRBC # BLD: 0 /100 WBCS — SIGNIFICANT CHANGE UP (ref 0–0)
PHOSPHATE SERPL-MCNC: 2.8 MG/DL — SIGNIFICANT CHANGE UP (ref 2.5–4.5)
PLATELET # BLD AUTO: 344 K/UL — SIGNIFICANT CHANGE UP (ref 150–400)
POTASSIUM SERPL-MCNC: 3.5 MMOL/L — SIGNIFICANT CHANGE UP (ref 3.5–5.3)
POTASSIUM SERPL-SCNC: 3.5 MMOL/L — SIGNIFICANT CHANGE UP (ref 3.5–5.3)
PROT SERPL-MCNC: 6.5 G/DL — SIGNIFICANT CHANGE UP (ref 6–8.3)
RBC # BLD: 4.02 M/UL — LOW (ref 4.2–5.8)
RBC # FLD: 16.2 % — HIGH (ref 10.3–14.5)
SODIUM SERPL-SCNC: 138 MMOL/L — SIGNIFICANT CHANGE UP (ref 135–145)
SPECIMEN SOURCE: SIGNIFICANT CHANGE UP
SPECIMEN SOURCE: SIGNIFICANT CHANGE UP
T4 FREE SERPL-MCNC: 1 NG/DL — SIGNIFICANT CHANGE UP (ref 0.9–1.8)
TSH SERPL-MCNC: 2.53 UIU/ML — SIGNIFICANT CHANGE UP (ref 0.27–4.2)
VANCOMYCIN TROUGH SERPL-MCNC: 19.4 UG/ML — SIGNIFICANT CHANGE UP (ref 10–20)
WBC # BLD: 13.89 K/UL — HIGH (ref 3.8–10.5)
WBC # FLD AUTO: 13.89 K/UL — HIGH (ref 3.8–10.5)

## 2022-12-28 PROCEDURE — 99231 SBSQ HOSP IP/OBS SF/LOW 25: CPT

## 2022-12-28 RX ORDER — CEFAZOLIN SODIUM 1 G
2000 VIAL (EA) INJECTION EVERY 8 HOURS
Refills: 0 | Status: DISCONTINUED | OUTPATIENT
Start: 2022-12-28 | End: 2022-12-31

## 2022-12-28 RX ORDER — INSULIN LISPRO 100/ML
10 VIAL (ML) SUBCUTANEOUS
Refills: 0 | Status: DISCONTINUED | OUTPATIENT
Start: 2022-12-28 | End: 2023-01-02

## 2022-12-28 RX ADMIN — BUDESONIDE AND FORMOTEROL FUMARATE DIHYDRATE 2 PUFF(S): 160; 4.5 AEROSOL RESPIRATORY (INHALATION) at 17:39

## 2022-12-28 RX ADMIN — Medication 1 MILLIGRAM(S): at 12:12

## 2022-12-28 RX ADMIN — Medication 1 MILLIGRAM(S): at 17:39

## 2022-12-28 RX ADMIN — ROPINIROLE 2 MILLIGRAM(S): 8 TABLET, FILM COATED, EXTENDED RELEASE ORAL at 23:57

## 2022-12-28 RX ADMIN — HEPARIN SODIUM 2100 UNIT(S)/HR: 5000 INJECTION INTRAVENOUS; SUBCUTANEOUS at 07:35

## 2022-12-28 RX ADMIN — HEPARIN SODIUM 2100 UNIT(S)/HR: 5000 INJECTION INTRAVENOUS; SUBCUTANEOUS at 02:46

## 2022-12-28 RX ADMIN — Medication 40 MILLIGRAM(S): at 13:14

## 2022-12-28 RX ADMIN — Medication 25 MILLIGRAM(S): at 05:38

## 2022-12-28 RX ADMIN — LOSARTAN POTASSIUM 100 MILLIGRAM(S): 100 TABLET, FILM COATED ORAL at 05:39

## 2022-12-28 RX ADMIN — Medication 7 UNIT(S): at 17:40

## 2022-12-28 RX ADMIN — ROPINIROLE 2 MILLIGRAM(S): 8 TABLET, FILM COATED, EXTENDED RELEASE ORAL at 12:12

## 2022-12-28 RX ADMIN — PIPERACILLIN AND TAZOBACTAM 25 GRAM(S): 4; .5 INJECTION, POWDER, LYOPHILIZED, FOR SOLUTION INTRAVENOUS at 08:32

## 2022-12-28 RX ADMIN — CARBIDOPA AND LEVODOPA 1 TABLET(S): 25; 100 TABLET ORAL at 08:31

## 2022-12-28 RX ADMIN — ROPINIROLE 2 MILLIGRAM(S): 8 TABLET, FILM COATED, EXTENDED RELEASE ORAL at 08:31

## 2022-12-28 RX ADMIN — CARBIDOPA AND LEVODOPA 1 TABLET(S): 25; 100 TABLET ORAL at 23:57

## 2022-12-28 RX ADMIN — Medication 2: at 17:40

## 2022-12-28 RX ADMIN — BUDESONIDE AND FORMOTEROL FUMARATE DIHYDRATE 2 PUFF(S): 160; 4.5 AEROSOL RESPIRATORY (INHALATION) at 05:39

## 2022-12-28 RX ADMIN — HEPARIN SODIUM 2100 UNIT(S)/HR: 5000 INJECTION INTRAVENOUS; SUBCUTANEOUS at 20:36

## 2022-12-28 RX ADMIN — Medication 3 MILLILITER(S): at 17:39

## 2022-12-28 RX ADMIN — Medication 3 MILLILITER(S): at 12:11

## 2022-12-28 RX ADMIN — Medication 100 MILLIGRAM(S): at 21:51

## 2022-12-28 RX ADMIN — CARBIDOPA AND LEVODOPA 1 TABLET(S): 25; 100 TABLET ORAL at 17:41

## 2022-12-28 RX ADMIN — Medication 2: at 22:04

## 2022-12-28 RX ADMIN — Medication 7 UNIT(S): at 12:12

## 2022-12-28 RX ADMIN — Medication 100 MILLIGRAM(S): at 13:14

## 2022-12-28 RX ADMIN — Medication 3 MILLILITER(S): at 05:38

## 2022-12-28 RX ADMIN — Medication 25 MILLIGRAM(S): at 17:39

## 2022-12-28 RX ADMIN — Medication 1 MILLIGRAM(S): at 08:31

## 2022-12-28 RX ADMIN — PIPERACILLIN AND TAZOBACTAM 25 GRAM(S): 4; .5 INJECTION, POWDER, LYOPHILIZED, FOR SOLUTION INTRAVENOUS at 01:34

## 2022-12-28 RX ADMIN — Medication 1 TABLET(S): at 12:15

## 2022-12-28 RX ADMIN — Medication 3 MILLILITER(S): at 23:57

## 2022-12-28 RX ADMIN — Medication 1 MILLIGRAM(S): at 23:56

## 2022-12-28 RX ADMIN — Medication 1: at 08:31

## 2022-12-28 RX ADMIN — PREGABALIN 1000 MICROGRAM(S): 225 CAPSULE ORAL at 12:15

## 2022-12-28 RX ADMIN — CARBIDOPA AND LEVODOPA 1 TABLET(S): 25; 100 TABLET ORAL at 12:12

## 2022-12-28 RX ADMIN — ROPINIROLE 2 MILLIGRAM(S): 8 TABLET, FILM COATED, EXTENDED RELEASE ORAL at 17:39

## 2022-12-28 RX ADMIN — Medication 1 MILLIGRAM(S): at 12:15

## 2022-12-28 RX ADMIN — INSULIN GLARGINE 18 UNIT(S): 100 INJECTION, SOLUTION SUBCUTANEOUS at 22:04

## 2022-12-28 RX ADMIN — Medication 7 UNIT(S): at 08:31

## 2022-12-28 NOTE — PROGRESS NOTE ADULT - ASSESSMENT
Assessment  DMT2: 77y Male with DM T2 with hyperglycemia, A1C 8%, now on low-dose basal bolus insulin, blood sugars improving, no hypoglycemic episodes, eating meals, on inhaled  steroids and receiving IV ABx in dextrose.  Scrotal Abscess: on medications, stable, monitored.  COPD: On meds, breathing tx, stable.  HTN: Controlled,  on antihypertensive medications.  Parkinsons: On meds, stable, monitored.        Dima Keller MD  Cell: [phone]  Office: [phone]

## 2022-12-28 NOTE — DIETITIAN INITIAL EVALUATION ADULT - NS FNS DIET ORDER
Diet, Regular:   Consistent Carbohydrate {No Snacks} (CSTCHO)  DASH/TLC {Sodium & Cholesterol Restricted} (DASH) (12-26-22 @ 12:54) [Active]

## 2022-12-28 NOTE — DIETITIAN INITIAL EVALUATION ADULT - ORAL INTAKE PTA/DIET HISTORY
Pt reports no known food allergies/intolerances. Pt reports not following any specific  dietary restrictions at home PTA. Pt denies nausea, vomiting, diarrhea, or constipation. Denies difficulty chewing/swallowing. Pt did not take any oral nutritional supplement at home. As per spouse, pt took vitamin D and folic acid at home.

## 2022-12-28 NOTE — DIETITIAN INITIAL EVALUATION ADULT - REASON FOR ADMISSION
Chart Reviewed, Events Noted  "77M w/pmh Parkinson's, COPD, DVT/PE factor V Leiden on Coumadin, s/p R hernia repair c/b scrotal hematoma p/w generalized weakness and  altered mental state for the past two days.  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 admitted for sepsis 2/2 scrotal abscess."

## 2022-12-28 NOTE — DIETITIAN INITIAL EVALUATION ADULT - ENERGY INTAKE
Fair (50-75%) Pt reports dislikes of current therapeutic diet/institutional foods. Pt's spouse and daughter bringing in fast food ([employer]) for patient multiple times during present admission.

## 2022-12-28 NOTE — DIETITIAN INITIAL EVALUATION ADULT - NSFNSPHYEXAMSKINFT_GEN_A_CORE
Patient picked up from [place] [id number] and transported to [facility] via gurney with 2 RN, attached to ICU monitoring equipment, on 5L O2 NC, with all belongings, medication, and chart.   No Pressure Injuries noted per flow sheets

## 2022-12-28 NOTE — DIETITIAN INITIAL EVALUATION ADULT - OTHER CALCULATIONS
Defer fluid needs to medical team  Estimated calorie & protein needs are based on upper IBW range of 162.8 pounds

## 2022-12-28 NOTE — PROGRESS NOTE ADULT - SUBJECTIVE AND OBJECTIVE BOX
[facility]IOUS DISEASES  CHRISTI Putnam Y. Patel, S. Shah, G. [facility]  [phone]  ([phone] - weekdays after 5pm and weekends)    Name: BOECKLE, ROBERT  Age/Gender: 77y Male  MRN: [id number]    Interval History:  Patient seen and examined this morning.   Denies pain, wants to have his breakfast.  No new complaints noted.   Notes reviewed. Afebrile   Allergies: Levaquin (Anaphylaxis)      Objective:  Vitals:   T(F): 98.1 (12-28-22 @ 05:32), Max: 98.5 (12-28-22 @ 01:10)  HR: 79 (12-28-22 @ 05:32) (68 - 79)  BP: 146/74 (12-28-22 @ 05:32) (110/60 - 146/74)  RR: 20 (12-28-22 @ 05:32) (20 - 20)  SpO2: 92% (12-28-22 @ 05:32) (92% - 95%)  Physical Examination:  General: no acute distress  HEENT: NC/AT, anicteric, neck supple  Respiratory: no acc muscle use, breathing comfortably  Cardiovascular: S1 and S2 present  Gastrointestinal: normal appearing, nondistended  : +scrotal swelling, +drain with brown fluid   Extremities: no edema, no cyanosis  Skin: no visible rash    Laboratory Studies:  CBC:                       11.8   13.89 )-----------( 344      ( 28 Dec 2022 06:37 )             36.9     WBC Trend:  13.89 12-28-22 @ 06:37  17.41 12-27-22 @ 07:05  15.05 12-26-22 @ 06:17  14.54 12-25-22 @ 22:59  13.06 12-25-22 @ 09:24  13.71 12-24-22 @ 07:00  14.60 12-23-22 @ 13:43    CMP: 12-28    138  |  96  |  29<H>  ----------------------------<  183<H>  3.5   |  33<H>  |  0.96    Ca    8.8      28 Dec 2022 06:37  Phos  2.8     12-28  Mg     2.1     12-28    TPro  6.5  /  Alb  2.5<L>  /  TBili  0.4  /  DBili  x   /  AST  46<H>  /  ALT  9<L>  /  AlkPhos  111  12-28    Creatinine, Serum: 0.96 mg/dL (12-28-22 @ 06:37)  Creatinine, Serum: 1.01 mg/dL (12-27-22 @ 07:05)  Creatinine, Serum: 0.99 mg/dL (12-26-22 @ 06:17)  Creatinine, Serum: 0.96 mg/dL (12-25-22 @ 08:36)  Creatinine, Serum: 1.03 mg/dL (12-24-22 @ 06:59)  Creatinine, Serum: 1.16 mg/dL (12-23-22 @ 13:43)    LIVER FUNCTIONS - ( 28 Dec 2022 06:37 )  Alb: 2.5 g/dL / Pro: 6.5 g/dL / ALK PHOS: 111 U/L / ALT: 9 U/L / AST: 46 U/L / GGT: x           Vancomycin Level, Trough: 10.0 ug/mL (12-26-22 @ 22:00)  Vancomycin Level, Trough: 7.1 ug/mL (12-25-22 @ 08:36)    Microbiology: reviewed   Culture - Body Fluid with Gram Stain (collected 12-25-22 @ 23:09)  Source: Abdominal Fl Abdominal Fluid  Gram Stain (12-26-22 @ 08:02):    Numerous polymorphonuclear leukocytes seen per low power field    Moderate Gram positive cocci in pairs seen per oil power field  Preliminary Report (12-27-22 @ 11:33):    Few Staphylococcus caprae    Susceptibility to follow.    Culture - Blood (collected 12-23-22 @ 13:35)  Source: .Blood Blood-Peripheral  Preliminary Report (12-24-22 @ 18:01):    No growth to date.    Culture - Blood (collected 12-23-22 @ 13:15)  Source: .Blood Blood-Peripheral  Preliminary Report (12-24-22 @ 18:01):    No growth to date.    Radiology: reviewed     Medications:  acetaminophen     Tablet .. 650 milliGRAM(s) Oral every 6 hours PRN  albuterol/ipratropium for Nebulization 3 milliLiter(s) Nebulizer every 6 hours  budesonide 160 MICROgram(s)/formoterol 4.5 MICROgram(s) Inhaler 2 Puff(s) Inhalation two times a day  carbidopa/levodopa  25/100 1 Tablet(s) Oral <User Schedule>  cyanocobalamin 1000 MICROGram(s) Oral daily  dextrose 5%. 1000 milliLiter(s) IV Continuous <Continuous>  dextrose 5%. 1000 milliLiter(s) IV Continuous <Continuous>  dextrose 50% Injectable 25 Gram(s) IV Push once  dextrose 50% Injectable 12.5 Gram(s) IV Push once  dextrose 50% Injectable 25 Gram(s) IV Push once  dextrose Oral Gel 15 Gram(s) Oral once PRN  folic acid 1 milliGRAM(s) Oral daily  glucagon  Injectable 1 milliGRAM(s) IntraMuscular once  heparin   Injectable 7500 Unit(s) IV Push every 6 hours PRN  heparin   Injectable 3500 Unit(s) IV Push every 6 hours PRN  heparin  Infusion.  Unit(s)/Hr IV Continuous <Continuous>  hydrochlorothiazide 25 milliGRAM(s) Oral daily  insulin glargine Injectable (LANTUS) 18 Unit(s) SubCutaneous at bedtime  insulin lispro (ADMELOG) corrective regimen sliding scale   SubCutaneous at bedtime  insulin lispro (ADMELOG) corrective regimen sliding scale   SubCutaneous three times a day before meals  insulin lispro Injectable (ADMELOG) 7 Unit(s) SubCutaneous three times a day before meals  lactobacillus acidophilus 1 Tablet(s) Oral daily  losartan 100 milliGRAM(s) Oral daily  melatonin 3 milliGRAM(s) Oral at bedtime PRN  metoprolol tartrate 25 milliGRAM(s) Oral two times a day  ondansetron Injectable 4 milliGRAM(s) IV Push every 8 hours PRN  piperacillin/tazobactam IVPB.. 3.375 Gram(s) IV Intermittent every 8 hours  rOPINIRole 2 milliGRAM(s) Oral <User Schedule>  trihexyphenidyl 1 milliGRAM(s) Oral <User Schedule>  vancomycin  IVPB 1250 milliGRAM(s) IV Intermittent every 12 hours    Current Antimicrobials:  piperacillin/tazobactam IVPB.. 3.375 Gram(s) IV Intermittent every 8 hours  vancomycin  IVPB 1250 milliGRAM(s) IV Intermittent every 12 hours    Prior/Completed Antimicrobials:  cefTRIAXone   IVPB  piperacillin/tazobactam IVPB...  vancomycin  IVPB. [facility] DIVISION OF INFECTIOUS DISEASES  CHRISTI Putnam Y. Patel, S. Shah, G. [facility]  [phone]  ([phone] - weekdays after 5pm and weekends)    Name: BOECKLE, ROBERT  Age/Gender: 77y Male  MRN: [id number]    Interval History:  Patient seen and examined this morning.   Denies pain, wants to have his breakfast.  No new complaints noted.   Notes reviewed. Afebrile   Allergies: Levaquin (Anaphylaxis)      Objective:  Vitals:   T(F): 98.1 (12-28-22 @ 05:32), Max: 98.5 (12-28-22 @ 01:10)  HR: 79 (12-28-22 @ 05:32) (68 - 79)  BP: 146/74 (12-28-22 @ 05:32) (110/60 - 146/74)  RR: 20 (12-28-22 @ 05:32) (20 - 20)  SpO2: 92% (12-28-22 @ 05:32) (92% - 95%)  Physical Examination:  General: no acute distress  HEENT: NC/AT, anicteric, neck supple  Respiratory: no acc muscle use, breathing comfortably  Cardiovascular: S1 and S2 present  Gastrointestinal: normal appearing, nondistended  : +scrotal swelling, +drain with brown fluid   Extremities: no edema, no cyanosis  Skin: no visible rash    Laboratory Studies:  CBC:                       11.8   13.89 )-----------( 344      ( 28 Dec 2022 06:37 )             36.9     WBC Trend:  13.89 12-28-22 @ 06:37  17.41 12-27-22 @ 07:05  15.05 12-26-22 @ 06:17  14.54 12-25-22 @ 22:59  13.06 12-25-22 @ 09:24  13.71 12-24-22 @ 07:00  14.60 12-23-22 @ 13:43    CMP: 12-28    138  |  96  |  29<H>  ----------------------------<  183<H>  3.5   |  33<H>  |  0.96    Ca    8.8      28 Dec 2022 06:37  Phos  2.8     12-28  Mg     2.1     12-28    TPro  6.5  /  Alb  2.5<L>  /  TBili  0.4  /  DBili  x   /  AST  46<H>  /  ALT  9<L>  /  AlkPhos  111  12-28    Creatinine, Serum: 0.96 mg/dL (12-28-22 @ 06:37)  Creatinine, Serum: 1.01 mg/dL (12-27-22 @ 07:05)  Creatinine, Serum: 0.99 mg/dL (12-26-22 @ 06:17)  Creatinine, Serum: 0.96 mg/dL (12-25-22 @ 08:36)  Creatinine, Serum: 1.03 mg/dL (12-24-22 @ 06:59)  Creatinine, Serum: 1.16 mg/dL (12-23-22 @ 13:43)    LIVER FUNCTIONS - ( 28 Dec 2022 06:37 )  Alb: 2.5 g/dL / Pro: 6.5 g/dL / ALK PHOS: 111 U/L / ALT: 9 U/L / AST: 46 U/L / GGT: x           Vancomycin Level, Trough: 10.0 ug/mL (12-26-22 @ 22:00)  Vancomycin Level, Trough: 7.1 ug/mL (12-25-22 @ 08:36)    Microbiology: reviewed     Culture - Body Fluid with Gram Stain (collected 12-25-22 @ 23:09)  Source: Abdominal Fl Abdominal Fluid  Gram Stain (12-26-22 @ 08:02):    Numerous polymorphonuclear leukocytes seen per low power field    Moderate Gram positive cocci in pairs seen per oil power field  Preliminary Report (12-28-22 @ 10:10):    Few Staphylococcus caprae  Organism: Staphylococcus caprae (12-28-22 @ 10:10)  Organism: Staphylococcus caprae (12-28-22 @ 10:10)      -  Ampicillin/Sulbactam: S <=8/4      -  Cefazolin: S <=4      -  Clindamycin: S <=0.25      -  Erythromycin: S <=0.25      -  Gentamicin: S <=1 Should not be used as monotherapy      -  Oxacillin: S <=0.25      -  Rifampin: S <=1 Should not be used as monotherapy      -  Tetracycline: S <=1      -  Trimethoprim/Sulfamethoxazole: S <=0.5/9.5      -  Vancomycin: S 1      Method Type: MARIA DEL CARMEN    Culture - Blood (collected 12-23-22 @ 13:35)  Source: .Blood Blood-Peripheral  Preliminary Report (12-24-22 @ 18:01):    No growth to date.    Culture - Blood (collected 12-23-22 @ 13:15)  Source: .Blood Blood-Peripheral  Preliminary Report (12-24-22 @ 18:01):    No growth to date.    Radiology: reviewed     Medications:  acetaminophen     Tablet .. 650 milliGRAM(s) Oral every 6 hours PRN  albuterol/ipratropium for Nebulization 3 milliLiter(s) Nebulizer every 6 hours  budesonide 160 MICROgram(s)/formoterol 4.5 MICROgram(s) Inhaler 2 Puff(s) Inhalation two times a day  carbidopa/levodopa  25/100 1 Tablet(s) Oral <User Schedule>  cyanocobalamin 1000 MICROGram(s) Oral daily  dextrose 5%. 1000 milliLiter(s) IV Continuous <Continuous>  dextrose 5%. 1000 milliLiter(s) IV Continuous <Continuous>  dextrose 50% Injectable 25 Gram(s) IV Push once  dextrose 50% Injectable 12.5 Gram(s) IV Push once  dextrose 50% Injectable 25 Gram(s) IV Push once  dextrose Oral Gel 15 Gram(s) Oral once PRN  folic acid 1 milliGRAM(s) Oral daily  glucagon  Injectable 1 milliGRAM(s) IntraMuscular once  heparin   Injectable 7500 Unit(s) IV Push every 6 hours PRN  heparin   Injectable 3500 Unit(s) IV Push every 6 hours PRN  heparin  Infusion.  Unit(s)/Hr IV Continuous <Continuous>  hydrochlorothiazide 25 milliGRAM(s) Oral daily  insulin glargine Injectable (LANTUS) 18 Unit(s) SubCutaneous at bedtime  insulin lispro (ADMELOG) corrective regimen sliding scale   SubCutaneous at bedtime  insulin lispro (ADMELOG) corrective regimen sliding scale   SubCutaneous three times a day before meals  insulin lispro Injectable (ADMELOG) 7 Unit(s) SubCutaneous three times a day before meals  lactobacillus acidophilus 1 Tablet(s) Oral daily  losartan 100 milliGRAM(s) Oral daily  melatonin 3 milliGRAM(s) Oral at bedtime PRN  metoprolol tartrate 25 milliGRAM(s) Oral two times a day  ondansetron Injectable 4 milliGRAM(s) IV Push every 8 hours PRN  piperacillin/tazobactam IVPB.. 3.375 Gram(s) IV Intermittent every 8 hours  rOPINIRole 2 milliGRAM(s) Oral <User Schedule>  trihexyphenidyl 1 milliGRAM(s) Oral <User Schedule>  vancomycin  IVPB 1250 milliGRAM(s) IV Intermittent every 12 hours    Current Antimicrobials:  piperacillin/tazobactam IVPB.. 3.375 Gram(s) IV Intermittent every 8 hours  vancomycin  IVPB 1250 milliGRAM(s) IV Intermittent every 12 hours    Prior/Completed Antimicrobials:  cefTRIAXone   IVPB  piperacillin/tazobactam IVPB...  vancomycin  IVPB.

## 2022-12-28 NOTE — DIETITIAN INITIAL EVALUATION ADULT - OTHER INFO
Current Weight 96.2kg/ 212.1 pounds (12-28-22)  Drug Dosing Weight: 95.3 kg/ 210.1 pounds  (12-23-22)  IBW: 148 pounds  %IBW:144%  UBW: pt reports his UBW to be 210 pounds    Weight History: Per [facility] HIE: 97.5 kg/ 214.98 pounds (3/6/22)   - Wt status appears stable based on previous weight history. Monitor weights as able during present admission.

## 2022-12-28 NOTE — DIETITIAN INITIAL EVALUATION ADULT - NSFNSADHERENCEPTAFT_GEN_A_CORE
Pt and spouse report that pt does not have a history of diabetes and was not monitoring his blood glucose at home PTA or taking any medication for diabetes. Noted pt is currently on prednisone, which may elevated blood glucose levels. Pt also receiving lispro ISS for blood glucose control.

## 2022-12-28 NOTE — DIETITIAN INITIAL EVALUATION ADULT - PERTINENT LABORATORY DATA
12-28    138  |  96  |  29<H>  ----------------------------<  183<H>  3.5   |  33<H>  |  0.96    Ca    8.8      28 Dec 2022 06:37  Phos  2.8     12-28  Mg     2.1     12-28    TPro  6.5  /  Alb  2.5<L>  /  TBili  0.4  /  DBili  x   /  AST  46<H>  /  ALT  9<L>  /  AlkPhos  111  12-28  POCT Blood Glucose.: 106 mg/dL (12-28-22 @ 12:00)  POCT Blood Glucose.: 180 mg/dL (12-28-22 @ 08:19)  POCT Blood Glucose.: 181 mg/dL (12-27-22 @ 21:46)  POCT Blood Glucose.: 193 mg/dL (12-27-22 @ 18:10)    A1C with Estimated Average Glucose Result: 8.0 % (12-27-22 @ 07:05)

## 2022-12-28 NOTE — PROGRESS NOTE ADULT - SUBJECTIVE AND OBJECTIVE BOX
Patient is a 77y old  Male who presents with a chief complaint of generalized weakness and AMS x 2 days (28 Dec 2022 09:49)      SUBJECTIVE / OVERNIGHT EVENTS:  Patient seen and examined.   Doing better.   But noticed sob, which per him is chronic.      Vital Signs Last 24 Hrs  T(C): 36.6 (28 Dec 2022 09:51), Max: 36.9 (28 Dec 2022 01:10)  T(F): 97.8 (28 Dec 2022 09:51), Max: 98.5 (28 Dec 2022 01:10)  HR: 70 (28 Dec 2022 09:51) (68 - 79)  BP: 123/71 (28 Dec 2022 09:51) (110/60 - 146/74)  BP(mean): --  RR: 20 (28 Dec 2022 09:51) (20 - 20)  SpO2: 94% (28 Dec 2022 09:51) (92% - 94%)    Parameters below as of 28 Dec 2022 09:51  Patient On (Oxygen Delivery Method): nasal cannula  O2 Flow (L/min): 3    I&O's Summary    27 Dec 2022 07:01  -  28 Dec 2022 07:00  --------------------------------------------------------  IN: 2152 mL / OUT: 1500 mL / NET: 652 mL    28 Dec 2022 07:01  -  28 Dec 2022 12:34  --------------------------------------------------------  IN: 0 mL / OUT: 500 mL / NET: -500 mL        PE:  GENERAL: NAD, AAOx3  CHEST/LUNG: wheezing  HEART: Regular rate and rhythm; no murmur  ABDOMEN: Soft, Nontender, Nondistended; Bowel sounds present  : enlarged R scrotum, erythematous, tender, gould  EXTREMITIES:  2+ Peripheral Pulses, No edema  NEURO: No focal deficit  LABS:                        11.8   13.89 )-----------( 344      ( 28 Dec 2022 06:37 )             36.9     12-28    138  |  96  |  29<H>  ----------------------------<  183<H>  3.5   |  33<H>  |  0.96    Ca    8.8      28 Dec 2022 06:37  Phos  2.8     12-28  Mg     2.1     12-28    TPro  6.5  /  Alb  2.5<L>  /  TBili  0.4  /  DBili  x   /  AST  46<H>  /  ALT  9<L>  /  AlkPhos  111  12-28    PTT - ( 28 Dec 2022 06:37 )  PTT:85.8 sec  CAPILLARY BLOOD GLUCOSE      POCT Blood Glucose.: 106 mg/dL (28 Dec 2022 12:00)  POCT Blood Glucose.: 180 mg/dL (28 Dec 2022 08:19)  POCT Blood Glucose.: 181 mg/dL (27 Dec 2022 21:46)  POCT Blood Glucose.: 193 mg/dL (27 Dec 2022 18:10)            RADIOLOGY & ADDITIONAL TESTS:    Imaging Personally Reviewed:  [x] YES  [ ] NO    Consultant(s) Notes Reviewed:  [x] YES  [ ] NO      MEDICATIONS  (STANDING):  albuterol/ipratropium for Nebulization 3 milliLiter(s) Nebulizer every 6 hours  budesonide 160 MICROgram(s)/formoterol 4.5 MICROgram(s) Inhaler 2 Puff(s) Inhalation two times a day  carbidopa/levodopa  25/100 1 Tablet(s) Oral <User Schedule>  ceFAZolin   IVPB 2000 milliGRAM(s) IV Intermittent every 8 hours  cyanocobalamin 1000 MICROGram(s) Oral daily  dextrose 5%. 1000 milliLiter(s) (50 mL/Hr) IV Continuous <Continuous>  dextrose 5%. 1000 milliLiter(s) (100 mL/Hr) IV Continuous <Continuous>  dextrose 50% Injectable 25 Gram(s) IV Push once  dextrose 50% Injectable 12.5 Gram(s) IV Push once  dextrose 50% Injectable 25 Gram(s) IV Push once  folic acid 1 milliGRAM(s) Oral daily  glucagon  Injectable 1 milliGRAM(s) IntraMuscular once  heparin  Infusion.  Unit(s)/Hr (17 mL/Hr) IV Continuous <Continuous>  hydrochlorothiazide 25 milliGRAM(s) Oral daily  insulin glargine Injectable (LANTUS) 18 Unit(s) SubCutaneous at bedtime  insulin lispro (ADMELOG) corrective regimen sliding scale   SubCutaneous at bedtime  insulin lispro (ADMELOG) corrective regimen sliding scale   SubCutaneous three times a day before meals  insulin lispro Injectable (ADMELOG) 7 Unit(s) SubCutaneous three times a day before meals  lactobacillus acidophilus 1 Tablet(s) Oral daily  losartan 100 milliGRAM(s) Oral daily  metoprolol tartrate 25 milliGRAM(s) Oral two times a day  predniSONE   Tablet 40 milliGRAM(s) Oral daily  rOPINIRole 2 milliGRAM(s) Oral <User Schedule>  trihexyphenidyl 1 milliGRAM(s) Oral <User Schedule>    MEDICATIONS  (PRN):  acetaminophen     Tablet .. 650 milliGRAM(s) Oral every 6 hours PRN Temp greater or equal to 38C (100.4F), Mild Pain (1 - 3)  dextrose Oral Gel 15 Gram(s) Oral once PRN Blood Glucose LESS THAN 70 milliGRAM(s)/deciliter  heparin   Injectable 7500 Unit(s) IV Push every 6 hours PRN For aPTT less than 40  heparin   Injectable 3500 Unit(s) IV Push every 6 hours PRN For aPTT between 40 - 57  melatonin 3 milliGRAM(s) Oral at bedtime PRN Insomnia  ondansetron Injectable 4 milliGRAM(s) IV Push every 8 hours PRN Nausea and/or Vomiting      Care Discussed with Consultants/Other Providers [x] YES  [ ] NO    HEALTH ISSUES - PROBLEM Dx:  Scrotal abscess    Sepsis    Chronic obstructive pulmonary disease (COPD)    H/O factor V Leiden mutation    Unspecified atrial fibrillation    Parkinsons    HTN (hypertension)    Preop exam for internal medicine    DM (diabetes mellitus)

## 2022-12-28 NOTE — DIETITIAN INITIAL EVALUATION ADULT - REASON
no overt signs of muscle/fat depletion upon visual observation; nutrition focus physical examination not indicated at this time.

## 2022-12-28 NOTE — PROGRESS NOTE ADULT - ASSESSMENT
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    Sepsis 2/2 Scrotal abscess  Large L inguinal hernia  - imaging reviewed  - BCx NGTD  -  and surgery following--suspect superinfected hematoma  - s/p IR drainage on 12/25 - drained 20cc purulent fluid, drain in place  - Abscess cx with Staph caprae  - afebrile, WBC trending down, pt states feels better  Recs:  - follow abscess culture for sensitivities   - c/w vancomycin 1.25g IV Q12h, goal trough 15-20   --check level prior to 4th dose  - discontinue pip-tazo   - supportive care and pain management per primary team  - drain care per surgery/IR  - monitor temps, CBC, Cr      Surendra Connolly M.D.  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    Sepsis 2/2 Scrotal abscess  Large L inguinal hernia  - imaging reviewed  - BCx NGTD  -  and surgery following--suspect superinfected hematoma  - s/p IR drainage on 12/25 - drained 20cc purulent fluid, drain in place  - Abscess cx with Staph caprae-- sensitivities noted   - afebrile, WBC trending down, pt states feels better  Recs:  - discontinue vancomycin and pip-tazo  - start on cefazolin 2g IV Q8h  - supportive care and pain management per primary team  - drain care per surgery/IR  - monitor temps, CBC, Cr      Surendra Connolly M.D.  [facility], Division of Infectious Diseases  [phone]  After 5pm on weekdays and all day on weekends - please call [phone]

## 2022-12-28 NOTE — PROGRESS NOTE ADULT - SUBJECTIVE AND OBJECTIVE BOX
Interventional Radiology Follow-Up Note.     Patient seen and examined @ bedside around 7am.     This is a 77y Male s/p scrotal drain placement on 12/25 in Interventional Radiology.   Reports ttp.       Medication:     furosemide   Injectable: (12-26)  heparin  Infusion.: (12-28)  hydrochlorothiazide: (12-28)  losartan: (12-28)  metoprolol tartrate: (12-28)  piperacillin/tazobactam IVPB..: (12-28)  vancomycin  IVPB: (12-27)  vancomycin  IVPB: (12-26)    Vitals:   T(F): 98.1, Max: 98.5 (01:10)  HR: 79  BP: 146/74  RR: 20  SpO2: 92%    Physical Exam:  General: Nontoxic, in NAD.  : scrotal swelling, erythema. Brown colored fluid noted. 150cc/24hrs.     LABS:  WBC 13.89 / Hgb 11.8 / Hct 36.9 / Plt 344  Na 138 / K 3.5 / CO2 33 / Cl 96 / BUN 29 / Cr 0.96 / Glucose 183  ALT 9 / AST 46 / Alk Phos 111 / Tbili 0.4  Ptt 85.8 / Pt -- / INR --    Culture - Body Fluid with Gram Stain (12.25.22 @ 23:09)    Gram Stain:   Numerous polymorphonuclear leukocytes seen per low power field  Moderate Gram positive cocci in pairs seen per oil power field    Specimen Source: Abdominal Fl Abdominal Fluid    Culture Results:   Few Staphylococcus caprae  Susceptibility to follow.          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    - CX: staphylococus caprae on Vanc and zosyn.   - Change dressing q3 days or when dressing is saturated.  -  Monitor h/h; transfuse as needed  - Trend vs/labs  - Continue global management per primary team  - If the patient is d/c home with drainage catheter, pt can make an appointment with IR by calling the IR booking office at [phone]; recommend IR follow in  1-2 weeks for drain evaluation.   - Pt will benefit from VNS service to help with drainage catheter care; Pt should continue same drainage catheter care as an outpatient.  - Greater than 50% of the encounter was spent counseling and/ or coordination of care on drain care and follow up       Please call IR  [phone] with any questions, concerns, or issues regarding above.    Also available on Teams.

## 2022-12-28 NOTE — PROGRESS NOTE ADULT - SUBJECTIVE AND OBJECTIVE BOX
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 admitted for sepsis 2/2 scrotal abscess."    (28 Dec 2022 16:20)   Review of systems  Patient in bed, appears comfortable.    Labs and Fingersticks  CAPILLARY BLOOD GLUCOSE      POCT Blood Glucose.: 232 mg/dL (28 Dec 2022 17:19)  POCT Blood Glucose.: 106 mg/dL (28 Dec 2022 12:00)  POCT Blood Glucose.: 180 mg/dL (28 Dec 2022 08:19)  POCT Blood Glucose.: 181 mg/dL (27 Dec 2022 21:46)      Anion Gap, Serum: 9 (12-28 @ 06:37)  Anion Gap, Serum: 11 (12-27 @ 07:05)      Calcium, Total Serum: 8.8 (12-28 @ 06:37)  Calcium, Total Serum: 8.8 (12-27 @ 07:05)  Albumin, Serum: 2.5 *L* (12-28 @ 06:37)  Albumin, Serum: 2.5 *L* (12-27 @ 07:05)    Alanine Aminotransferase (ALT/SGPT): 9 *L* (12-28 @ 06:37)  Alanine Aminotransferase (ALT/SGPT): 8 *L* (12-27 @ 07:05)  Alkaline Phosphatase, Serum: 111 (12-28 @ 06:37)  Alkaline Phosphatase, Serum: 115 (12-27 @ 07:05)  Aspartate Aminotransferase (AST/SGOT): 46 *H* (12-28 @ 06:37)  Aspartate Aminotransferase (AST/SGOT): 33 (12-27 @ 07:05)        12-28    138  |  96  |  29<H>  ----------------------------<  183<H>  3.5   |  33<H>  |  0.96    Ca    8.8      28 Dec 2022 06:37  Phos  2.8     12-28  Mg     2.1     12-28    TPro  6.5  /  Alb  2.5<L>  /  TBili  0.4  /  DBili  x   /  AST  46<H>  /  ALT  9<L>  /  AlkPhos  111  12-28                        11.8   13.89 )-----------( 344      ( 28 Dec 2022 06:37 )             36.9     Medications  MEDICATIONS  (STANDING):  albuterol/ipratropium for Nebulization 3 milliLiter(s) Nebulizer every 6 hours  budesonide 160 MICROgram(s)/formoterol 4.5 MICROgram(s) Inhaler 2 Puff(s) Inhalation two times a day  carbidopa/levodopa  25/100 1 Tablet(s) Oral <User Schedule>  ceFAZolin   IVPB 2000 milliGRAM(s) IV Intermittent every 8 hours  cyanocobalamin 1000 MICROGram(s) Oral daily  dextrose 5%. 1000 milliLiter(s) (50 mL/Hr) IV Continuous <Continuous>  dextrose 5%. 1000 milliLiter(s) (100 mL/Hr) IV Continuous <Continuous>  dextrose 50% Injectable 25 Gram(s) IV Push once  dextrose 50% Injectable 12.5 Gram(s) IV Push once  dextrose 50% Injectable 25 Gram(s) IV Push once  folic acid 1 milliGRAM(s) Oral daily  glucagon  Injectable 1 milliGRAM(s) IntraMuscular once  heparin  Infusion.  Unit(s)/Hr (17 mL/Hr) IV Continuous <Continuous>  hydrochlorothiazide 25 milliGRAM(s) Oral daily  insulin glargine Injectable (LANTUS) 18 Unit(s) SubCutaneous at bedtime  insulin lispro (ADMELOG) corrective regimen sliding scale   SubCutaneous at bedtime  insulin lispro (ADMELOG) corrective regimen sliding scale   SubCutaneous three times a day before meals  insulin lispro Injectable (ADMELOG) 10 Unit(s) SubCutaneous three times a day before meals  lactobacillus acidophilus 1 Tablet(s) Oral daily  losartan 100 milliGRAM(s) Oral daily  metoprolol tartrate 25 milliGRAM(s) Oral two times a day  predniSONE   Tablet 40 milliGRAM(s) Oral daily  rOPINIRole 2 milliGRAM(s) Oral <User Schedule>  trihexyphenidyl 1 milliGRAM(s) Oral <User Schedule>      Physical Exam  General: Patient appears comfortable.  Vital Signs Last 12 Hrs  T(F): 97.8 (12-28-22 @ 18:03), Max: 98 (12-28-22 @ 13:16)  HR: 78 (12-28-22 @ 18:03) (70 - 78)  BP: 142/74 (12-28-22 @ 18:03) (123/71 - 142/74)  BP(mean): --  RR: 20 (12-28-22 @ 18:03) (20 - 20)  SpO2: 92% (12-28-22 @ 18:03) (92% - 94%)  Neck: No palpable thyroid nodules.  CVS: S1S2, No murmurs  Respiratory: No wheezing, no crepitations  GI: Abdomen soft, non tender.  Musculoskeletal:  edema lower extremities.     Diagnostics

## 2022-12-28 NOTE — PROGRESS NOTE ADULT - ASSESSMENT
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  admitted for sepsis 2/2 scrotal abscess.    # sepsis 2/2 right scrotal abscess  s/p IR drainage 12/25-- growing gram + cocci   evaluated by , suspect infected hematoma, given recent instrumentation will continue broad spectrum abx  Surgical intervention TBD by urology, fu urology  cefazolin per ID  ID consult appreciated     # Chronic obstructive pulmonary disease with exacerbation.  # Pleural effusion   -start prednisone 40 mg x 5 days   -echo noted normal lvef   -CXR noted, chronic chronic changes + effusion; hold fluids   -per pt has chronic wheezing  Symbicort   duonebs q6h    # Large left inguinal hernia  -tender, may need surgical correction.   -gen surgery recs appreciated     #T2DM  -watch closely in setting on prednisone initiation today.   - newly diagnosed but probably long standing   - insulin basal bolus   - a1c 8s   - endo consult appreciated     # H/O factor V Leiden mutation  heparin to coumadin transition; f/u INR today  heparin gtt while inpatient     # Parkinsons  continue carbidopa-levo-dopa   continue Ropinirole   continue Trihexyphenidyl    # HTN (hypertension)  continue Irbesartan (therapeutic equivalent)    dvt ppx heparin gtt   Please contact with any questions or concerns [phone].

## 2022-12-29 LAB
ALBUMIN SERPL ELPH-MCNC: 2.5 G/DL — LOW (ref 3.3–5)
ALP SERPL-CCNC: 104 U/L — SIGNIFICANT CHANGE UP (ref 40–120)
ALT FLD-CCNC: 8 U/L — LOW (ref 10–45)
ANION GAP SERPL CALC-SCNC: 10 MMOL/L — SIGNIFICANT CHANGE UP (ref 5–17)
APTT BLD: 51.1 SEC — HIGH (ref 27.5–35.5)
APTT BLD: >200 SEC — CRITICAL HIGH (ref 27.5–35.5)
AST SERPL-CCNC: 27 U/L — SIGNIFICANT CHANGE UP (ref 10–40)
BASOPHILS # BLD AUTO: 0.03 K/UL — SIGNIFICANT CHANGE UP (ref 0–0.2)
BASOPHILS NFR BLD AUTO: 0.2 % — SIGNIFICANT CHANGE UP (ref 0–2)
BILIRUB SERPL-MCNC: 0.3 MG/DL — SIGNIFICANT CHANGE UP (ref 0.2–1.2)
BUN SERPL-MCNC: 25 MG/DL — HIGH (ref 7–23)
CALCIUM SERPL-MCNC: 8.9 MG/DL — SIGNIFICANT CHANGE UP (ref 8.4–10.5)
CHLORIDE SERPL-SCNC: 96 MMOL/L — SIGNIFICANT CHANGE UP (ref 96–108)
CO2 SERPL-SCNC: 32 MMOL/L — HIGH (ref 22–31)
CREAT SERPL-MCNC: 0.9 MG/DL — SIGNIFICANT CHANGE UP (ref 0.5–1.3)
EGFR: 87 ML/MIN/1.73M2 — SIGNIFICANT CHANGE UP
EOSINOPHIL # BLD AUTO: 0.01 K/UL — SIGNIFICANT CHANGE UP (ref 0–0.5)
EOSINOPHIL NFR BLD AUTO: 0.1 % — SIGNIFICANT CHANGE UP (ref 0–6)
GLUCOSE SERPL-MCNC: 187 MG/DL — HIGH (ref 70–99)
HCT VFR BLD CALC: 35.9 % — LOW (ref 39–50)
HGB BLD-MCNC: 11.4 G/DL — LOW (ref 13–17)
IMM GRANULOCYTES NFR BLD AUTO: 1.8 % — HIGH (ref 0–0.9)
INR BLD: 1.58 RATIO — HIGH (ref 0.88–1.16)
LYMPHOCYTES # BLD AUTO: 1.46 K/UL — SIGNIFICANT CHANGE UP (ref 1–3.3)
LYMPHOCYTES # BLD AUTO: 11.4 % — LOW (ref 13–44)
MCHC RBC-ENTMCNC: 28.7 PG — SIGNIFICANT CHANGE UP (ref 27–34)
MCHC RBC-ENTMCNC: 31.8 GM/DL — LOW (ref 32–36)
MCV RBC AUTO: 90.4 FL — SIGNIFICANT CHANGE UP (ref 80–100)
MONOCYTES # BLD AUTO: 1.15 K/UL — HIGH (ref 0–0.9)
MONOCYTES NFR BLD AUTO: 9 % — SIGNIFICANT CHANGE UP (ref 2–14)
NEUTROPHILS # BLD AUTO: 9.9 K/UL — HIGH (ref 1.8–7.4)
NEUTROPHILS NFR BLD AUTO: 77.5 % — HIGH (ref 43–77)
NRBC # BLD: 0 /100 WBCS — SIGNIFICANT CHANGE UP (ref 0–0)
PLATELET # BLD AUTO: 382 K/UL — SIGNIFICANT CHANGE UP (ref 150–400)
POTASSIUM SERPL-MCNC: 3.7 MMOL/L — SIGNIFICANT CHANGE UP (ref 3.5–5.3)
POTASSIUM SERPL-SCNC: 3.7 MMOL/L — SIGNIFICANT CHANGE UP (ref 3.5–5.3)
PROT SERPL-MCNC: 6.6 G/DL — SIGNIFICANT CHANGE UP (ref 6–8.3)
PROTHROM AB SERPL-ACNC: 18.4 SEC — HIGH (ref 10.5–13.4)
RAPID RVP RESULT: SIGNIFICANT CHANGE UP
RBC # BLD: 3.97 M/UL — LOW (ref 4.2–5.8)
RBC # FLD: 16.2 % — HIGH (ref 10.3–14.5)
SARS-COV-2 RNA SPEC QL NAA+PROBE: SIGNIFICANT CHANGE UP
SODIUM SERPL-SCNC: 138 MMOL/L — SIGNIFICANT CHANGE UP (ref 135–145)
WBC # BLD: 12.78 K/UL — HIGH (ref 3.8–10.5)
WBC # FLD AUTO: 12.78 K/UL — HIGH (ref 3.8–10.5)

## 2022-12-29 PROCEDURE — 99232 SBSQ HOSP IP/OBS MODERATE 35: CPT

## 2022-12-29 PROCEDURE — 71045 X-RAY EXAM CHEST 1 VIEW: CPT | Mod: 26

## 2022-12-29 RX ORDER — WARFARIN SODIUM 2.5 MG/1
7.5 TABLET ORAL ONCE
Refills: 0 | Status: COMPLETED | OUTPATIENT
Start: 2022-12-29 | End: 2022-12-29

## 2022-12-29 RX ADMIN — BUDESONIDE AND FORMOTEROL FUMARATE DIHYDRATE 2 PUFF(S): 160; 4.5 AEROSOL RESPIRATORY (INHALATION) at 05:26

## 2022-12-29 RX ADMIN — ROPINIROLE 2 MILLIGRAM(S): 8 TABLET, FILM COATED, EXTENDED RELEASE ORAL at 23:34

## 2022-12-29 RX ADMIN — HEPARIN SODIUM 2300 UNIT(S)/HR: 5000 INJECTION INTRAVENOUS; SUBCUTANEOUS at 08:11

## 2022-12-29 RX ADMIN — Medication 10 UNIT(S): at 13:11

## 2022-12-29 RX ADMIN — HEPARIN SODIUM 2100 UNIT(S)/HR: 5000 INJECTION INTRAVENOUS; SUBCUTANEOUS at 01:37

## 2022-12-29 RX ADMIN — HEPARIN SODIUM 3500 UNIT(S): 5000 INJECTION INTRAVENOUS; SUBCUTANEOUS at 08:13

## 2022-12-29 RX ADMIN — Medication 1 MILLIGRAM(S): at 23:34

## 2022-12-29 RX ADMIN — Medication 100 MILLIGRAM(S): at 05:24

## 2022-12-29 RX ADMIN — CARBIDOPA AND LEVODOPA 1 TABLET(S): 25; 100 TABLET ORAL at 13:10

## 2022-12-29 RX ADMIN — CARBIDOPA AND LEVODOPA 1 TABLET(S): 25; 100 TABLET ORAL at 08:48

## 2022-12-29 RX ADMIN — Medication 3 MILLILITER(S): at 17:33

## 2022-12-29 RX ADMIN — Medication 25 MILLIGRAM(S): at 05:25

## 2022-12-29 RX ADMIN — Medication 1 MILLIGRAM(S): at 17:33

## 2022-12-29 RX ADMIN — Medication 3 MILLILITER(S): at 05:24

## 2022-12-29 RX ADMIN — ROPINIROLE 2 MILLIGRAM(S): 8 TABLET, FILM COATED, EXTENDED RELEASE ORAL at 13:10

## 2022-12-29 RX ADMIN — HEPARIN SODIUM 0 UNIT(S)/HR: 5000 INJECTION INTRAVENOUS; SUBCUTANEOUS at 16:56

## 2022-12-29 RX ADMIN — ROPINIROLE 2 MILLIGRAM(S): 8 TABLET, FILM COATED, EXTENDED RELEASE ORAL at 08:48

## 2022-12-29 RX ADMIN — LOSARTAN POTASSIUM 100 MILLIGRAM(S): 100 TABLET, FILM COATED ORAL at 05:25

## 2022-12-29 RX ADMIN — Medication 10 UNIT(S): at 17:32

## 2022-12-29 RX ADMIN — Medication 1 MILLIGRAM(S): at 08:48

## 2022-12-29 RX ADMIN — PREGABALIN 1000 MICROGRAM(S): 225 CAPSULE ORAL at 11:20

## 2022-12-29 RX ADMIN — Medication 1 MILLIGRAM(S): at 13:10

## 2022-12-29 RX ADMIN — BUDESONIDE AND FORMOTEROL FUMARATE DIHYDRATE 2 PUFF(S): 160; 4.5 AEROSOL RESPIRATORY (INHALATION) at 17:33

## 2022-12-29 RX ADMIN — Medication 10 UNIT(S): at 08:48

## 2022-12-29 RX ADMIN — Medication 3 MILLILITER(S): at 23:33

## 2022-12-29 RX ADMIN — Medication 3: at 17:32

## 2022-12-29 RX ADMIN — Medication 1: at 08:49

## 2022-12-29 RX ADMIN — HEPARIN SODIUM 2000 UNIT(S)/HR: 5000 INJECTION INTRAVENOUS; SUBCUTANEOUS at 18:06

## 2022-12-29 RX ADMIN — Medication 25 MILLIGRAM(S): at 17:34

## 2022-12-29 RX ADMIN — WARFARIN SODIUM 7.5 MILLIGRAM(S): 2.5 TABLET ORAL at 21:05

## 2022-12-29 RX ADMIN — Medication 40 MILLIGRAM(S): at 05:26

## 2022-12-29 RX ADMIN — Medication 100 MILLIGRAM(S): at 13:10

## 2022-12-29 RX ADMIN — CARBIDOPA AND LEVODOPA 1 TABLET(S): 25; 100 TABLET ORAL at 17:35

## 2022-12-29 RX ADMIN — Medication 1 TABLET(S): at 11:20

## 2022-12-29 RX ADMIN — Medication 100 MILLIGRAM(S): at 21:05

## 2022-12-29 RX ADMIN — Medication 1 MILLIGRAM(S): at 11:20

## 2022-12-29 RX ADMIN — Medication 3 MILLILITER(S): at 11:21

## 2022-12-29 RX ADMIN — CARBIDOPA AND LEVODOPA 1 TABLET(S): 25; 100 TABLET ORAL at 23:34

## 2022-12-29 RX ADMIN — INSULIN GLARGINE 18 UNIT(S): 100 INJECTION, SOLUTION SUBCUTANEOUS at 21:05

## 2022-12-29 RX ADMIN — ROPINIROLE 2 MILLIGRAM(S): 8 TABLET, FILM COATED, EXTENDED RELEASE ORAL at 17:33

## 2022-12-29 RX ADMIN — HEPARIN SODIUM 2000 UNIT(S)/HR: 5000 INJECTION INTRAVENOUS; SUBCUTANEOUS at 20:15

## 2022-12-29 NOTE — PROGRESS NOTE ADULT - ASSESSMENT
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  admitted for sepsis 2/2 scrotal abscess.    # sepsis 2/2 right scrotal abscess  s/p IR drainage 12/25-- growing gram + cocci   evaluated by , suspect infected hematoma, given recent instrumentation will continue broad spectrum abx  Surgical intervention TBD by urology, fu urology  cefazolin per ID  ID consult appreciated     # Chronic obstructive pulmonary disease with exacerbation.  # Pleural effusion   - prednisone 40 mg x 5 days   -echo noted normal lvef   -CXR noted, chronic chronic changes + effusion; hold fluids   -per pt has chronic wheezing  Symbicort   duonebs q6h    # Large left inguinal hernia  -tender, may need surgical correction.   -gen surgery recs appreciated     #T2DM  -watch closely in setting of prednisone  - newly diagnosed but probably long standing   - insulin basal bolus   - a1c 8s   - endo consult appreciated     # H/O factor V Leiden mutation  heparin to coumadin transition; DAILY INR.   heparin gtt while inpatient     # Parkinsons  continue carbidopa-levo-dopa   continue Ropinirole   continue Trihexyphenidyl    # HTN (hypertension)  continue Irbesartan (therapeutic equivalent)    dvt ppx heparin gtt --> coumadin   Please contact with any questions or concerns [phone].

## 2022-12-29 NOTE — PROGRESS NOTE ADULT - ASSESSMENT
Assessment  DMT2: 77y Male with DM T2 with hyperglycemia, A1C 8%, now on low-dose basal bolus insulin, blood sugars are within acceptable range,  no hypoglycemic episodes, eating meals, on inhaled  steroids and receiving IV ABx in dextrose.  Scrotal Abscess: on medications, stable, monitored.  COPD: On meds, breathing tx, stable.  HTN: Controlled,  on antihypertensive medications.  Parkinsons: On meds, stable, monitored.        Dima Keller MD  Cell: [phone]  Office: [phone]

## 2022-12-29 NOTE — PROGRESS NOTE ADULT - SUBJECTIVE AND OBJECTIVE BOX
Chief complaint    Patient is a 77y old  Male who presents with a chief complaint of generalized weakness and AMS x 2 days (29 Dec 2022 12:18)   Review of systems  Patient in bed, appears comfortable.    Labs and Fingersticks  CAPILLARY BLOOD GLUCOSE      POCT Blood Glucose.: 149 mg/dL (29 Dec 2022 12:49)  POCT Blood Glucose.: 163 mg/dL (29 Dec 2022 08:41)  POCT Blood Glucose.: 306 mg/dL (28 Dec 2022 21:55)  POCT Blood Glucose.: 232 mg/dL (28 Dec 2022 17:19)      Anion Gap, Serum: 10 (12-29 @ 07:15)  Anion Gap, Serum: 9 (12-28 @ 06:37)      Calcium, Total Serum: 8.9 (12-29 @ 07:15)  Calcium, Total Serum: 8.8 (12-28 @ 06:37)  Albumin, Serum: 2.5 *L* (12-29 @ 07:15)  Albumin, Serum: 2.5 *L* (12-28 @ 06:37)    Alanine Aminotransferase (ALT/SGPT): 8 *L* (12-29 @ 07:15)  Alanine Aminotransferase (ALT/SGPT): 9 *L* (12-28 @ 06:37)  Alkaline Phosphatase, Serum: 104 (12-29 @ 07:15)  Alkaline Phosphatase, Serum: 111 (12-28 @ 06:37)  Aspartate Aminotransferase (AST/SGOT): 27 (12-29 @ 07:15)  Aspartate Aminotransferase (AST/SGOT): 46 *H* (12-28 @ 06:37)        12-29    138  |  96  |  25<H>  ----------------------------<  187<H>  3.7   |  32<H>  |  0.90    Ca    8.9      29 Dec 2022 07:15  Phos  2.8     12-28  Mg     2.1     12-28    TPro  6.6  /  Alb  2.5<L>  /  TBili  0.3  /  DBili  x   /  AST  27  /  ALT  8<L>  /  AlkPhos  104  12-29                        11.4   12.78 )-----------( 382      ( 29 Dec 2022 07:18 )             35.9     Medications  MEDICATIONS  (STANDING):  albuterol/ipratropium for Nebulization 3 milliLiter(s) Nebulizer every 6 hours  budesonide 160 MICROgram(s)/formoterol 4.5 MICROgram(s) Inhaler 2 Puff(s) Inhalation two times a day  carbidopa/levodopa  25/100 1 Tablet(s) Oral <User Schedule>  ceFAZolin   IVPB 2000 milliGRAM(s) IV Intermittent every 8 hours  cyanocobalamin 1000 MICROGram(s) Oral daily  dextrose 5%. 1000 milliLiter(s) (50 mL/Hr) IV Continuous <Continuous>  dextrose 5%. 1000 milliLiter(s) (100 mL/Hr) IV Continuous <Continuous>  dextrose 50% Injectable 25 Gram(s) IV Push once  dextrose 50% Injectable 12.5 Gram(s) IV Push once  dextrose 50% Injectable 25 Gram(s) IV Push once  folic acid 1 milliGRAM(s) Oral daily  glucagon  Injectable 1 milliGRAM(s) IntraMuscular once  heparin  Infusion.  Unit(s)/Hr (17 mL/Hr) IV Continuous <Continuous>  hydrochlorothiazide 25 milliGRAM(s) Oral daily  insulin glargine Injectable (LANTUS) 18 Unit(s) SubCutaneous at bedtime  insulin lispro (ADMELOG) corrective regimen sliding scale   SubCutaneous at bedtime  insulin lispro (ADMELOG) corrective regimen sliding scale   SubCutaneous three times a day before meals  insulin lispro Injectable (ADMELOG) 10 Unit(s) SubCutaneous three times a day before meals  lactobacillus acidophilus 1 Tablet(s) Oral daily  losartan 100 milliGRAM(s) Oral daily  metoprolol tartrate 25 milliGRAM(s) Oral two times a day  predniSONE   Tablet 40 milliGRAM(s) Oral daily  rOPINIRole 2 milliGRAM(s) Oral <User Schedule>  trihexyphenidyl 1 milliGRAM(s) Oral <User Schedule>  warfarin 7.5 milliGRAM(s) Oral once      Physical Exam  General: Patient appears comfortable.  Vital Signs Last 12 Hrs  T(F): 98.1 (12-29-22 @ 08:57), Max: 98.1 (12-29-22 @ 08:57)  HR: 76 (12-29-22 @ 08:57) (72 - 76)  BP: 145/80 (12-29-22 @ 08:57) (137/67 - 148/81)  BP(mean): --  RR: 18 (12-29-22 @ 08:57) (18 - 20)  SpO2: 94% (12-29-22 @ 08:57) (93% - 95%)  Neck: No palpable thyroid nodules.  CVS: S1S2, No murmurs  Respiratory: No wheezing, no crepitations  GI: Abdomen soft, non tender.  Musculoskeletal:  edema lower extremities.     Diagnostics

## 2022-12-29 NOTE — PROGRESS NOTE ADULT - SUBJECTIVE AND OBJECTIVE BOX
Interventional Radiology Follow-Up Note.    Patient seen and examined @ bedside.    This is a 77y Male s/p scrotal drain placement on 12/25 in Interventional Radiology.     No complaint offered.      Medication:  ceFAZolin   IVPB: (12-29)  heparin  Infusion.: (12-29)  hydrochlorothiazide: (12-29)  losartan: (12-29)  metoprolol tartrate: (12-29)  piperacillin/tazobactam IVPB..: (12-28)  vancomycin  IVPB: (12-27)    Vitals:  T(F): 98.1, Max: 98.1 (08:57)  HR: 76  BP: 145/80  RR: 18  SpO2: 94%    Physical Exam:  General: Nontoxic, in NAD.  Abdomen: soft, NTND.   Drain Device: Drain intact attached to gravity bag.  Flushed with 5cc NS w/o difficulty, no leaking. Dressing clean, dry, intact.   24hr Drain output: 75cc          LABS:  Na: 138 (12-29 @ 07:15), 138 (12-28 @ 06:37), 136 (12-27 @ 07:05)  K: 3.7 (12-29 @ 07:15), 3.5 (12-28 @ 06:37), 3.4 (12-27 @ 07:05)  Cl: 96 (12-29 @ 07:15), 96 (12-28 @ 06:37), 93 (12-27 @ 07:05)  CO2: 32 (12-29 @ 07:15), 33 (12-28 @ 06:37), 32 (12-27 @ 07:05)  BUN: 25 (12-29 @ 07:15), 29 (12-28 @ 06:37), 29 (12-27 @ 07:05)  Cr: 0.90 (12-29 @ 07:15), 0.96 (12-28 @ 06:37), 1.01 (12-27 @ 07:05)  Glu: 187(12-29 @ 07:15), 183(12-28 @ 06:37), 332(12-27 @ 07:05)    Hgb: 11.4 (12-29 @ 07:18), 11.8 (12-28 @ 06:37), 11.5 (12-27 @ 07:05)  Hct: 35.9 (12-29 @ 07:18), 36.9 (12-28 @ 06:37), 35.6 (12-27 @ 07:05)  WBC: 12.78 (12-29 @ 07:18), 13.89 (12-28 @ 06:37), 17.41 (12-27 @ 07:05)  Plt: 382 (12-29 @ 07:18), 344 (12-28 @ 06:37), 317 (12-27 @ 07:05)    INR: 1.58 12-29-22 @ 07:20, 1.68 12-28-22 @ 06:37  PTT: 51.1 12-29-22 @ 07:20, 85.8 12-28-22 @ 06:37, 64.6 12-27-22 @ 07:05, 72.2 12-26-22 @ 14:48          LIVER FUNCTIONS - ( 29 Dec 2022 07:15 )  Alb: 2.5 g/dL / Pro: 6.6 g/dL / ALK PHOS: 104 U/L / ALT: 8 U/L / AST: 27 U/L / GGT: x                 Bilirubin Total, Serum: 0.3 mg/dL (12-29-22 @ 07:15)    Aspartate Aminotransferase (AST/SGOT): 27 U/L (12-29-22 @ 07:15)  Alanine Aminotransferase (ALT/SGPT): 8 U/L (12-29-22 @ 07:15)  Aspartate Aminotransferase (AST/SGOT): 46 U/L (12-28-22 @ 06:37)  Alanine Aminotransferase (ALT/SGPT): 9 U/L (12-28-22 @ 06:37)      Bilirubin Total, Serum: 0.3 mg/dL (12-29-22 @ 07:15)  Bilirubin Total, Serum: 0.4 mg/dL (12-28-22 @ 06:37)  Bilirubin Total, Serum: 0.4 mg/dL (12-27-22 @ 07:05)      Culture - Body Fluid with Gram Stain (collected 12-25-22 @ 23:09)  Source: Abdominal Fl Abdominal Fluid  Gram Stain (12-26-22 @ 08:02):    Numerous polymorphonuclear leukocytes seen per low power field    Moderate Gram positive cocci in pairs seen per oil power field  Preliminary Report (12-28-22 @ 10:10):    Few Staphylococcus caprae  Organism: Staphylococcus caprae (12-28-22 @ 10:10)  Organism: Staphylococcus caprae (12-28-22 @ 10:10)      -  Ampicillin/Sulbactam: S <=8/4      -  Cefazolin: S <=4      -  Clindamycin: S <=0.25      -  Erythromycin: S <=0.25      -  Gentamicin: S <=1 Should not be used as monotherapy      -  Oxacillin: S <=0.25      -  Rifampin: S <=1 Should not be used as monotherapy      -  Tetracycline: S <=1      -  Trimethoprim/Sulfamethoxazole: S <=0.5/9.5      -  Vancomycin: S 1      Method Type: [facility]IA DEL CARMEN          Assessment/Plan:  77 year old male with PMH of Parkinson's, COPD, 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    Pt most recently s/p ___________.     - Flush drain with 5cc normal saline daily forward only; DO NOT aspirate  - Change dressing q3 days or when dressing is saturated.  - Trend vs/labs  - Continue global management per primary team  - If the patient is d/c home with drainage catheter, pt can make an appointment with IR by calling the IR booking office at [phone]; recommend IR follow in _____wks/months after discharge for tube evaluation.  - Pt will benefit from VNS service to help with drainage catheter care; Pt should continue same drainage catheter care as an outpatient.  - Greater than 50% of the encounter was spent counseling and/ or coordination of care on drain care and follow up.      Please call IR at [phone] with any questions, concerns, or issues regarding above.    Also available on [employer] TEAMS.   Interventional Radiology Follow-Up Note.    Patient seen and examined @ bedside.    This is a 77y Male s/p scrotal drain placement on 12/25 in Interventional Radiology.     No complaint offered.      Medication:  ceFAZolin   IVPB: (12-29)  heparin  Infusion.: (12-29)  hydrochlorothiazide: (12-29)  losartan: (12-29)  metoprolol tartrate: (12-29)  piperacillin/tazobactam IVPB..: (12-28)  vancomycin  IVPB: (12-27)    Vitals:  T(F): 98.1, Max: 98.1 (08:57)  HR: 76  BP: 145/80  RR: 18  SpO2: 94%    Physical Exam:  General: Nontoxic, in NAD.  Abdomen: soft, NTND.   Drain Device: Drain intact attached to ALIZA bulb. Dressing clean, dry, intact.   24hr Drain output: 75cc          LABS:  Na: 138 (12-29 @ 07:15), 138 (12-28 @ 06:37), 136 (12-27 @ 07:05)  K: 3.7 (12-29 @ 07:15), 3.5 (12-28 @ 06:37), 3.4 (12-27 @ 07:05)  Cl: 96 (12-29 @ 07:15), 96 (12-28 @ 06:37), 93 (12-27 @ 07:05)  CO2: 32 (12-29 @ 07:15), 33 (12-28 @ 06:37), 32 (12-27 @ 07:05)  BUN: 25 (12-29 @ 07:15), 29 (12-28 @ 06:37), 29 (12-27 @ 07:05)  Cr: 0.90 (12-29 @ 07:15), 0.96 (12-28 @ 06:37), 1.01 (12-27 @ 07:05)  Glu: 187(12-29 @ 07:15), 183(12-28 @ 06:37), 332(12-27 @ 07:05)    Hgb: 11.4 (12-29 @ 07:18), 11.8 (12-28 @ 06:37), 11.5 (12-27 @ 07:05)  Hct: 35.9 (12-29 @ 07:18), 36.9 (12-28 @ 06:37), 35.6 (12-27 @ 07:05)  WBC: 12.78 (12-29 @ 07:18), 13.89 (12-28 @ 06:37), 17.41 (12-27 @ 07:05)  Plt: 382 (12-29 @ 07:18), 344 (12-28 @ 06:37), 317 (12-27 @ 07:05)    INR: 1.58 12-29-22 @ 07:20, 1.68 12-28-22 @ 06:37  PTT: 51.1 12-29-22 @ 07:20, 85.8 12-28-22 @ 06:37, 64.6 12-27-22 @ 07:05, 72.2 12-26-22 @ 14:48          LIVER FUNCTIONS - ( 29 Dec 2022 07:15 )  Alb: 2.5 g/dL / Pro: 6.6 g/dL / ALK PHOS: 104 U/L / ALT: 8 U/L / AST: 27 U/L / GGT: x                 Bilirubin Total, Serum: 0.3 mg/dL (12-29-22 @ 07:15)    Aspartate Aminotransferase (AST/SGOT): 27 U/L (12-29-22 @ 07:15)  Alanine Aminotransferase (ALT/SGPT): 8 U/L (12-29-22 @ 07:15)  Aspartate Aminotransferase (AST/SGOT): 46 U/L (12-28-22 @ 06:37)  Alanine Aminotransferase (ALT/SGPT): 9 U/L (12-28-22 @ 06:37)      Bilirubin Total, Serum: 0.3 mg/dL (12-29-22 @ 07:15)  Bilirubin Total, Serum: 0.4 mg/dL (12-28-22 @ 06:37)  Bilirubin Total, Serum: 0.4 mg/dL (12-27-22 @ 07:05)      Culture - Body Fluid with Gram Stain (collected 12-25-22 @ 23:09)  Source: Abdominal Fl Abdominal Fluid  Gram Stain (12-26-22 @ 08:02):    Numerous polymorphonuclear leukocytes seen per low power field    Moderate Gram positive cocci in pairs seen per oil power field  Preliminary Report (12-28-22 @ 10:10):    Few Staphylococcus caprae  Organism: Staphylococcus caprae (12-28-22 @ 10:10)  Organism: Staphylococcus caprae (12-28-22 @ 10:10)      -  Ampicillin/Sulbactam: S <=8/4      -  Cefazolin: S <=4      -  Clindamycin: S <=0.25      -  Erythromycin: S <=0.25      -  Gentamicin: S <=1 Should not be used as monotherapy      -  Oxacillin: S <=0.25      -  Rifampin: S <=1 Should not be used as monotherapy      -  Tetracycline: S <=1      -  Trimethoprim/Sulfamethoxazole: S <=0.5/9.5      -  Vancomycin: S 1      Method Type: [facility] DEL CARMEN          Assessment/Plan:  77 year old male with PMH of Parkinson's, COPD, 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  Pt most recently s/p scrotal drain placement on 12/25 in Interventional Radiology.     - leukocytosis improving, continue abx as per primary team  - Flush drain with 5cc normal saline daily forward only; DO NOT aspirate  - Change dressing q3 days or when dressing is saturated.  - Trend vs/labs  - Continue global management per primary team  - If drain output <15cc/24hrs x 2 consecutive days, please obtain repeat imaging to evaluate drain and consult IR for drain check  - If the patient is d/c home with drainage catheter, pt can make an appointment with IR by calling the IR booking office at [phone]; recommend IR follow in 7-10 days after discharge for tube evaluation.  - Pt will benefit from VNS service to help with drainage catheter care; Pt should continue same drainage catheter care as an outpatient.  - Greater than 50% of the encounter was spent counseling and/ or coordination of care on drain care and follow up.      Please call IR at [phone] with any questions, concerns, or issues regarding above.    Also available on [employer] TEAMS.

## 2022-12-29 NOTE — PROGRESS NOTE ADULT - SUBJECTIVE AND OBJECTIVE BOX
Patient is a 77y old  Male who presents with a chief complaint of generalized weakness and AMS x 2 days (29 Dec 2022 13:22)      SUBJECTIVE / OVERNIGHT EVENTS:  Patient seen and examined.   Doing better.       Vital Signs Last 24 Hrs  T(C): 36.5 (29 Dec 2022 13:26), Max: 36.7 (29 Dec 2022 05:20)  T(F): 97.7 (29 Dec 2022 13:26), Max: 98.1 (29 Dec 2022 08:57)  HR: 73 (29 Dec 2022 13:26) (72 - 80)  BP: 151/77 (29 Dec 2022 13:26) (137/67 - 151/77)  BP(mean): --  RR: 18 (29 Dec 2022 13:26) (18 - 20)  SpO2: 94% (29 Dec 2022 13:26) (92% - 95%)    Parameters below as of 29 Dec 2022 13:26  Patient On (Oxygen Delivery Method): nasal cannula  O2 Flow (L/min): 2    I&O's Summary    28 Dec 2022 07:01  -  29 Dec 2022 07:00  --------------------------------------------------------  IN: 1084 mL / OUT: 2050 mL / NET: -966 mL    29 Dec 2022 07:01  -  29 Dec 2022 15:44  --------------------------------------------------------  IN: 544 mL / OUT: 660 mL / NET: -116 mL        PE:  GENERAL: NAD, AAOx3  CHEST/LUNG: wheezing  HEART: Regular rate and rhythm; no murmur  ABDOMEN: Soft, Nontender, Nondistended; Bowel sounds present  : enlarged R scrotum, erythematous, tender, gould  EXTREMITIES:  2+ Peripheral Pulses, No edema  NEURO: No focal deficit    LABS:                        11.4   12.78 )-----------( 382      ( 29 Dec 2022 07:18 )             35.9     12-29    138  |  96  |  25<H>  ----------------------------<  187<H>  3.7   |  32<H>  |  0.90    Ca    8.9      29 Dec 2022 07:15  Phos  2.8     12-28  Mg     2.1     12-28    TPro  6.6  /  Alb  2.5<L>  /  TBili  0.3  /  DBili  x   /  AST  27  /  ALT  8<L>  /  AlkPhos  104  12-29    PT/INR - ( 29 Dec 2022 07:20 )   PT: 18.4 sec;   INR: 1.58 ratio         PTT - ( 29 Dec 2022 07:20 )  PTT:51.1 sec  CAPILLARY BLOOD GLUCOSE      POCT Blood Glucose.: 149 mg/dL (29 Dec 2022 12:49)  POCT Blood Glucose.: 163 mg/dL (29 Dec 2022 08:41)  POCT Blood Glucose.: 306 mg/dL (28 Dec 2022 21:55)  POCT Blood Glucose.: 232 mg/dL (28 Dec 2022 17:19)            RADIOLOGY & ADDITIONAL TESTS:    Imaging Personally Reviewed:  [x] YES  [ ] NO    Consultant(s) Notes Reviewed:  [x] YES  [ ] NO      MEDICATIONS  (STANDING):  albuterol/ipratropium for Nebulization 3 milliLiter(s) Nebulizer every 6 hours  budesonide 160 MICROgram(s)/formoterol 4.5 MICROgram(s) Inhaler 2 Puff(s) Inhalation two times a day  carbidopa/levodopa  25/100 1 Tablet(s) Oral <User Schedule>  ceFAZolin   IVPB 2000 milliGRAM(s) IV Intermittent every 8 hours  cyanocobalamin 1000 MICROGram(s) Oral daily  dextrose 5%. 1000 milliLiter(s) (50 mL/Hr) IV Continuous <Continuous>  dextrose 5%. 1000 milliLiter(s) (100 mL/Hr) IV Continuous <Continuous>  dextrose 50% Injectable 25 Gram(s) IV Push once  dextrose 50% Injectable 12.5 Gram(s) IV Push once  dextrose 50% Injectable 25 Gram(s) IV Push once  folic acid 1 milliGRAM(s) Oral daily  glucagon  Injectable 1 milliGRAM(s) IntraMuscular once  heparin  Infusion.  Unit(s)/Hr (17 mL/Hr) IV Continuous <Continuous>  hydrochlorothiazide 25 milliGRAM(s) Oral daily  insulin glargine Injectable (LANTUS) 18 Unit(s) SubCutaneous at bedtime  insulin lispro (ADMELOG) corrective regimen sliding scale   SubCutaneous at bedtime  insulin lispro (ADMELOG) corrective regimen sliding scale   SubCutaneous three times a day before meals  insulin lispro Injectable (ADMELOG) 10 Unit(s) SubCutaneous three times a day before meals  lactobacillus acidophilus 1 Tablet(s) Oral daily  losartan 100 milliGRAM(s) Oral daily  metoprolol tartrate 25 milliGRAM(s) Oral two times a day  predniSONE   Tablet 40 milliGRAM(s) Oral daily  rOPINIRole 2 milliGRAM(s) Oral <User Schedule>  trihexyphenidyl 1 milliGRAM(s) Oral <User Schedule>  warfarin 7.5 milliGRAM(s) Oral once    MEDICATIONS  (PRN):  acetaminophen     Tablet .. 650 milliGRAM(s) Oral every 6 hours PRN Temp greater or equal to 38C (100.4F), Mild Pain (1 - 3)  dextrose Oral Gel 15 Gram(s) Oral once PRN Blood Glucose LESS THAN 70 milliGRAM(s)/deciliter  heparin   Injectable 7500 Unit(s) IV Push every 6 hours PRN For aPTT less than 40  heparin   Injectable 3500 Unit(s) IV Push every 6 hours PRN For aPTT between 40 - 57  melatonin 3 milliGRAM(s) Oral at bedtime PRN Insomnia  ondansetron Injectable 4 milliGRAM(s) IV Push every 8 hours PRN Nausea and/or Vomiting      Care Discussed with Consultants/Other Providers [x] YES  [ ] NO    HEALTH ISSUES - PROBLEM Dx:  Scrotal abscess    Sepsis    Chronic obstructive pulmonary disease (COPD)    H/O factor V Leiden mutation    Unspecified atrial fibrillation    Parkinsons    HTN (hypertension)    Preop exam for internal medicine    DM (diabetes mellitus)

## 2022-12-29 NOTE — PROGRESS NOTE ADULT - ASSESSMENT
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    Sepsis 2/2 Scrotal abscess  Large L inguinal hernia  - imaging reviewed  - BCx NGTD  -  and surgery following--suspect superinfected hematoma  - s/p IR drainage on 12/25 - drained 20cc purulent fluid, drain in place  - Abscess cx with Staph caprae-- sensitivities noted   - afebrile, WBC trending down, pt states feels better but new cough x2d  Recs:  - s/p vancomycin and pip-tazo  - continue on cefazolin 2g IV Q8h (12/28-- )   -- if discharged, will plan to switch to po cephalexin while drain in place  - new cough reported -- RVP and CXR ordered  - supportive care and pain management per primary team  - drain care per surgery/IR  - monitor temps, CBC, Cr    D/w Dr. Arianna Connolly M.D.  [facility], Division of Infectious Diseases  [phone]  After 5pm on weekdays and all day on weekends - please call [phone]

## 2022-12-29 NOTE — PROGRESS NOTE ADULT - SUBJECTIVE AND OBJECTIVE BOX
[facility]  CHRISTI Putnam Y. Patel, S. Shah, G. Aureliano  [phone]  ([phone] - weekdays after 5pm and weekends)    Name: BOECKLE, ROBERT  Age/Gender: 77y Male  MRN: [id number]    Interval History:  Patient seen and examined this morning.   Reports new cough over past 2 days  States breathing is unchanged, no fever/chills  No other new complaints  Notes reviewed. Afebrile.     Allergies: garlic (Other (Mild))  Levaquin (Anaphylaxis)    Objective:  Vitals:   T(F): 98.1 (12-29-22 @ 08:57), Max: 98.1 (12-29-22 @ 08:57)  HR: 76 (12-29-22 @ 08:57) (72 - 80)  BP: 145/80 (12-29-22 @ 08:57) (124/71 - 148/81)  RR: 18 (12-29-22 @ 08:57) (18 - 20)  SpO2: 94% (12-29-22 @ 08:57) (92% - 95%)  Physical Examination:  General: no acute distress, obese  HEENT: NC/AT, EOMI, anicteric, neck supple  Cardio: S1, S2 present, normal rate   Resp: decreased breath sounds b/l  Abd: soft, nontender, nondistended, + BS  : scrotal swelling, no TTP/erythema, +drain dark ss output  Neuro: AAOx3, no obvious focal deficits  Ext: no edema, no cyanosis, moving extremities  Skin: warm, dry, no visible rash  Lines: PIV    Laboratory Studies:  CBC:                       11.4   12.78 )-----------( 382      ( 29 Dec 2022 07:18 )             35.9     WBC Trend:  12.78 12-29-22 @ 07:18  13.89 12-28-22 @ 06:37  17.41 12-27-22 @ 07:05  15.05 12-26-22 @ 06:17  14.54 12-25-22 @ 22:59  13.06 12-25-22 @ 09:24  13.71 12-24-22 @ 07:00  14.60 12-23-22 @ 13:43    CMP: 12-29    138  |  96  |  25<H>  ----------------------------<  187<H>  3.7   |  32<H>  |  0.90    Ca    8.9      29 Dec 2022 07:15  Phos  2.8     12-28  Mg     2.1     12-28    TPro  6.6  /  Alb  2.5<L>  /  TBili  0.3  /  DBili  x   /  AST  27  /  ALT  8<L>  /  AlkPhos  104  12-29    Creatinine, Serum: 0.90 mg/dL (12-29-22 @ 07:15)  Creatinine, Serum: 0.96 mg/dL (12-28-22 @ 06:37)  Creatinine, Serum: 1.01 mg/dL (12-27-22 @ 07:05)  Creatinine, Serum: 0.99 mg/dL (12-26-22 @ 06:17)  Creatinine, Serum: 0.96 mg/dL (12-25-22 @ 08:36)  Creatinine, Serum: 1.03 mg/dL (12-24-22 @ 06:59)  Creatinine, Serum: 1.16 mg/dL (12-23-22 @ 13:43)      LIVER FUNCTIONS - ( 29 Dec 2022 07:15 )  Alb: 2.5 g/dL / Pro: 6.6 g/dL / ALK PHOS: 104 U/L / ALT: 8 U/L / AST: 27 U/L / GGT: x           Vancomycin Level, Trough: 19.4 ug/mL (12-28-22 @ 09:31)  Vancomycin Level, Trough: 10.0 ug/mL (12-26-22 @ 22:00)  Vancomycin Level, Trough: 7.1 ug/mL (12-25-22 @ 08:36)      Microbiology: reviewed     Culture - Body Fluid with Gram Stain (collected 12-25-22 @ 23:09)  Source: Abdominal Fl Abdominal Fluid  Gram Stain (12-26-22 @ 08:02):    Numerous polymorphonuclear leukocytes seen per low power field    Moderate Gram positive cocci in pairs seen per oil power field  Preliminary Report (12-28-22 @ 10:10):    Few Staphylococcus caprae  Organism: Staphylococcus caprae (12-28-22 @ 10:10)  Organism: Staphylococcus caprae (12-28-22 @ 10:10)      -  Ampicillin/Sulbactam: S <=8/4      -  Cefazolin: S <=4      -  Clindamycin: S <=0.25      -  Erythromycin: S <=0.25      -  Gentamicin: S <=1 Should not be used as monotherapy      -  Oxacillin: S <=0.25      -  Rifampin: S <=1 Should not be used as monotherapy      -  Tetracycline: S <=1      -  Trimethoprim/Sulfamethoxazole: S <=0.5/9.5      -  Vancomycin: S 1      Method Type: [facility]IA DEL CARMEN    Culture - Blood (collected 12-23-22 @ 13:35)  Source: .Blood Blood-Peripheral  Final Report (12-28-22 @ 18:01):    No Growth Final    Culture - Blood (collected 12-23-22 @ 13:15)  Source: .Blood Blood-Peripheral  Final Report (12-28-22 @ 18:01):    No Growth Final    Radiology: reviewed     Medications:  acetaminophen     Tablet .. 650 milliGRAM(s) Oral every 6 hours PRN  albuterol/ipratropium for Nebulization 3 milliLiter(s) Nebulizer every 6 hours  budesonide 160 MICROgram(s)/formoterol 4.5 MICROgram(s) Inhaler 2 Puff(s) Inhalation two times a day  carbidopa/levodopa  25/100 1 Tablet(s) Oral <User Schedule>  ceFAZolin   IVPB 2000 milliGRAM(s) IV Intermittent every 8 hours  cyanocobalamin 1000 MICROGram(s) Oral daily  dextrose 5%. 1000 milliLiter(s) IV Continuous <Continuous>  dextrose 5%. 1000 milliLiter(s) IV Continuous <Continuous>  dextrose 50% Injectable 25 Gram(s) IV Push once  dextrose 50% Injectable 12.5 Gram(s) IV Push once  dextrose 50% Injectable 25 Gram(s) IV Push once  dextrose Oral Gel 15 Gram(s) Oral once PRN  folic acid 1 milliGRAM(s) Oral daily  glucagon  Injectable 1 milliGRAM(s) IntraMuscular once  heparin   Injectable 7500 Unit(s) IV Push every 6 hours PRN  heparin   Injectable 3500 Unit(s) IV Push every 6 hours PRN  heparin  Infusion.  Unit(s)/Hr IV Continuous <Continuous>  hydrochlorothiazide 25 milliGRAM(s) Oral daily  insulin glargine Injectable (LANTUS) 18 Unit(s) SubCutaneous at bedtime  insulin lispro (ADMELOG) corrective regimen sliding scale   SubCutaneous at bedtime  insulin lispro (ADMELOG) corrective regimen sliding scale   SubCutaneous three times a day before meals  insulin lispro Injectable (ADMELOG) 10 Unit(s) SubCutaneous three times a day before meals  lactobacillus acidophilus 1 Tablet(s) Oral daily  losartan 100 milliGRAM(s) Oral daily  melatonin 3 milliGRAM(s) Oral at bedtime PRN  metoprolol tartrate 25 milliGRAM(s) Oral two times a day  ondansetron Injectable 4 milliGRAM(s) IV Push every 8 hours PRN  predniSONE   Tablet 40 milliGRAM(s) Oral daily  rOPINIRole 2 milliGRAM(s) Oral <User Schedule>  trihexyphenidyl 1 milliGRAM(s) Oral <User Schedule>  warfarin 7.5 milliGRAM(s) Oral once    Current Antimicrobials:  ceFAZolin   IVPB 2000 milliGRAM(s) IV Intermittent every 8 hours    Prior/Completed Antimicrobials:  cefTRIAXone   IVPB  piperacillin/tazobactam IVPB...  vancomycin  IVPB.

## 2022-12-30 ENCOUNTER — TRANSCRIPTION ENCOUNTER (OUTPATIENT)
Age: 77
End: 2022-12-30

## 2022-12-30 LAB
ALBUMIN SERPL ELPH-MCNC: 2.7 G/DL — LOW (ref 3.3–5)
ALP SERPL-CCNC: 93 U/L — SIGNIFICANT CHANGE UP (ref 40–120)
ALT FLD-CCNC: 5 U/L — LOW (ref 10–45)
ANION GAP SERPL CALC-SCNC: 7 MMOL/L — SIGNIFICANT CHANGE UP (ref 5–17)
APTT BLD: 68.8 SEC — HIGH (ref 27.5–35.5)
APTT BLD: 85.8 SEC — HIGH (ref 27.5–35.5)
AST SERPL-CCNC: 25 U/L — SIGNIFICANT CHANGE UP (ref 10–40)
BASOPHILS # BLD AUTO: 0.06 K/UL — SIGNIFICANT CHANGE UP (ref 0–0.2)
BASOPHILS NFR BLD AUTO: 0.4 % — SIGNIFICANT CHANGE UP (ref 0–2)
BILIRUB SERPL-MCNC: 0.3 MG/DL — SIGNIFICANT CHANGE UP (ref 0.2–1.2)
BUN SERPL-MCNC: 25 MG/DL — HIGH (ref 7–23)
CALCIUM SERPL-MCNC: 9.3 MG/DL — SIGNIFICANT CHANGE UP (ref 8.4–10.5)
CHLORIDE SERPL-SCNC: 97 MMOL/L — SIGNIFICANT CHANGE UP (ref 96–108)
CO2 SERPL-SCNC: 33 MMOL/L — HIGH (ref 22–31)
CREAT SERPL-MCNC: 0.97 MG/DL — SIGNIFICANT CHANGE UP (ref 0.5–1.3)
EGFR: 80 ML/MIN/1.73M2 — SIGNIFICANT CHANGE UP
EOSINOPHIL # BLD AUTO: 0.11 K/UL — SIGNIFICANT CHANGE UP (ref 0–0.5)
EOSINOPHIL NFR BLD AUTO: 0.7 % — SIGNIFICANT CHANGE UP (ref 0–6)
GLUCOSE SERPL-MCNC: 97 MG/DL — SIGNIFICANT CHANGE UP (ref 70–99)
HCT VFR BLD CALC: 37.2 % — LOW (ref 39–50)
HGB BLD-MCNC: 11.9 G/DL — LOW (ref 13–17)
IMM GRANULOCYTES NFR BLD AUTO: 2 % — HIGH (ref 0–0.9)
INR BLD: 1.71 RATIO — HIGH (ref 0.88–1.16)
LYMPHOCYTES # BLD AUTO: 21 % — SIGNIFICANT CHANGE UP (ref 13–44)
LYMPHOCYTES # BLD AUTO: 3.25 K/UL — SIGNIFICANT CHANGE UP (ref 1–3.3)
MAGNESIUM SERPL-MCNC: 2.2 MG/DL — SIGNIFICANT CHANGE UP (ref 1.6–2.6)
MCHC RBC-ENTMCNC: 29.1 PG — SIGNIFICANT CHANGE UP (ref 27–34)
MCHC RBC-ENTMCNC: 32 GM/DL — SIGNIFICANT CHANGE UP (ref 32–36)
MCV RBC AUTO: 91 FL — SIGNIFICANT CHANGE UP (ref 80–100)
MONOCYTES # BLD AUTO: 1.45 K/UL — HIGH (ref 0–0.9)
MONOCYTES NFR BLD AUTO: 9.4 % — SIGNIFICANT CHANGE UP (ref 2–14)
NEUTROPHILS # BLD AUTO: 10.28 K/UL — HIGH (ref 1.8–7.4)
NEUTROPHILS NFR BLD AUTO: 66.5 % — SIGNIFICANT CHANGE UP (ref 43–77)
NRBC # BLD: 0 /100 WBCS — SIGNIFICANT CHANGE UP (ref 0–0)
PHOSPHATE SERPL-MCNC: 3.1 MG/DL — SIGNIFICANT CHANGE UP (ref 2.5–4.5)
PLATELET # BLD AUTO: 407 K/UL — HIGH (ref 150–400)
POTASSIUM SERPL-MCNC: 3.6 MMOL/L — SIGNIFICANT CHANGE UP (ref 3.5–5.3)
POTASSIUM SERPL-SCNC: 3.6 MMOL/L — SIGNIFICANT CHANGE UP (ref 3.5–5.3)
PROT SERPL-MCNC: 6.3 G/DL — SIGNIFICANT CHANGE UP (ref 6–8.3)
PROTHROM AB SERPL-ACNC: 20 SEC — HIGH (ref 10.5–13.4)
RBC # BLD: 4.09 M/UL — LOW (ref 4.2–5.8)
RBC # FLD: 16.2 % — HIGH (ref 10.3–14.5)
SODIUM SERPL-SCNC: 137 MMOL/L — SIGNIFICANT CHANGE UP (ref 135–145)
WBC # BLD: 15.46 K/UL — HIGH (ref 3.8–10.5)
WBC # FLD AUTO: 15.46 K/UL — HIGH (ref 3.8–10.5)

## 2022-12-30 PROCEDURE — 99232 SBSQ HOSP IP/OBS MODERATE 35: CPT

## 2022-12-30 RX ORDER — WARFARIN SODIUM 2.5 MG/1
5 TABLET ORAL ONCE
Refills: 0 | Status: COMPLETED | OUTPATIENT
Start: 2022-12-30 | End: 2022-12-30

## 2022-12-30 RX ADMIN — Medication 1 MILLIGRAM(S): at 12:18

## 2022-12-30 RX ADMIN — CARBIDOPA AND LEVODOPA 1 TABLET(S): 25; 100 TABLET ORAL at 17:26

## 2022-12-30 RX ADMIN — Medication 25 MILLIGRAM(S): at 05:16

## 2022-12-30 RX ADMIN — Medication 1 MILLIGRAM(S): at 22:55

## 2022-12-30 RX ADMIN — Medication 3 MILLILITER(S): at 05:15

## 2022-12-30 RX ADMIN — Medication 3 MILLILITER(S): at 12:17

## 2022-12-30 RX ADMIN — Medication 10 UNIT(S): at 12:27

## 2022-12-30 RX ADMIN — INSULIN GLARGINE 18 UNIT(S): 100 INJECTION, SOLUTION SUBCUTANEOUS at 22:55

## 2022-12-30 RX ADMIN — Medication 1 MILLIGRAM(S): at 08:09

## 2022-12-30 RX ADMIN — CARBIDOPA AND LEVODOPA 1 TABLET(S): 25; 100 TABLET ORAL at 22:56

## 2022-12-30 RX ADMIN — Medication 100 MILLIGRAM(S): at 14:09

## 2022-12-30 RX ADMIN — WARFARIN SODIUM 5 MILLIGRAM(S): 2.5 TABLET ORAL at 17:27

## 2022-12-30 RX ADMIN — Medication 100 MILLIGRAM(S): at 05:16

## 2022-12-30 RX ADMIN — HEPARIN SODIUM 2000 UNIT(S)/HR: 5000 INJECTION INTRAVENOUS; SUBCUTANEOUS at 03:05

## 2022-12-30 RX ADMIN — PREGABALIN 1000 MICROGRAM(S): 225 CAPSULE ORAL at 12:17

## 2022-12-30 RX ADMIN — ROPINIROLE 2 MILLIGRAM(S): 8 TABLET, FILM COATED, EXTENDED RELEASE ORAL at 08:09

## 2022-12-30 RX ADMIN — ROPINIROLE 2 MILLIGRAM(S): 8 TABLET, FILM COATED, EXTENDED RELEASE ORAL at 22:56

## 2022-12-30 RX ADMIN — CARBIDOPA AND LEVODOPA 1 TABLET(S): 25; 100 TABLET ORAL at 12:15

## 2022-12-30 RX ADMIN — Medication 1 MILLIGRAM(S): at 17:24

## 2022-12-30 RX ADMIN — CARBIDOPA AND LEVODOPA 1 TABLET(S): 25; 100 TABLET ORAL at 08:09

## 2022-12-30 RX ADMIN — Medication 1 MILLIGRAM(S): at 12:17

## 2022-12-30 RX ADMIN — BUDESONIDE AND FORMOTEROL FUMARATE DIHYDRATE 2 PUFF(S): 160; 4.5 AEROSOL RESPIRATORY (INHALATION) at 17:25

## 2022-12-30 RX ADMIN — HEPARIN SODIUM 2000 UNIT(S)/HR: 5000 INJECTION INTRAVENOUS; SUBCUTANEOUS at 06:46

## 2022-12-30 RX ADMIN — Medication 3 MILLILITER(S): at 17:25

## 2022-12-30 RX ADMIN — Medication 1 TABLET(S): at 12:17

## 2022-12-30 RX ADMIN — Medication 40 MILLIGRAM(S): at 05:16

## 2022-12-30 RX ADMIN — BUDESONIDE AND FORMOTEROL FUMARATE DIHYDRATE 2 PUFF(S): 160; 4.5 AEROSOL RESPIRATORY (INHALATION) at 05:14

## 2022-12-30 RX ADMIN — HEPARIN SODIUM 2000 UNIT(S)/HR: 5000 INJECTION INTRAVENOUS; SUBCUTANEOUS at 19:29

## 2022-12-30 RX ADMIN — Medication 10 UNIT(S): at 17:25

## 2022-12-30 RX ADMIN — Medication 10 UNIT(S): at 09:54

## 2022-12-30 RX ADMIN — ROPINIROLE 2 MILLIGRAM(S): 8 TABLET, FILM COATED, EXTENDED RELEASE ORAL at 17:25

## 2022-12-30 RX ADMIN — Medication 2: at 12:27

## 2022-12-30 RX ADMIN — HEPARIN SODIUM 2000 UNIT(S)/HR: 5000 INJECTION INTRAVENOUS; SUBCUTANEOUS at 00:44

## 2022-12-30 RX ADMIN — Medication 25 MILLIGRAM(S): at 17:26

## 2022-12-30 RX ADMIN — LOSARTAN POTASSIUM 100 MILLIGRAM(S): 100 TABLET, FILM COATED ORAL at 05:16

## 2022-12-30 RX ADMIN — ROPINIROLE 2 MILLIGRAM(S): 8 TABLET, FILM COATED, EXTENDED RELEASE ORAL at 12:16

## 2022-12-30 RX ADMIN — Medication 100 MILLIGRAM(S): at 22:54

## 2022-12-30 NOTE — PROGRESS NOTE ADULT - SUBJECTIVE AND OBJECTIVE BOX
Interventional Radiology Follow-Up Note.    Patient seen and examined @ bedside.    This is a 77y Male s/p scrotal drain placement on 12/25 in Interventional Radiology.     No complaint offered.      Medication:  ceFAZolin   IVPB: (12-30)  heparin  Infusion.: (12-30)  hydrochlorothiazide: (12-30)  losartan: (12-30)  metoprolol tartrate: (12-30)  piperacillin/tazobactam IVPB..: (12-28)  warfarin: (12-29)    Vitals:  T(F): 97.8, Max: 98.1 (08:57)  HR: 65  BP: 135/77  RR: 18  SpO2: 93%    Physical Exam:  General: Nontoxic, in NAD.  Abdomen: soft, NTND.   Drain Device: Drain intact attached to ALIZA bulb. Dressing clean, dry, intact.   24hr Drain output: 60cc          LABS:  Na: 137 (12-30 @ 05:45), 138 (12-29 @ 07:15), 138 (12-28 @ 06:37)  K: 3.6 (12-30 @ 05:45), 3.7 (12-29 @ 07:15), 3.5 (12-28 @ 06:37)  Cl: 97 (12-30 @ 05:45), 96 (12-29 @ 07:15), 96 (12-28 @ 06:37)  CO2: 33 (12-30 @ 05:45), 32 (12-29 @ 07:15), 33 (12-28 @ 06:37)  BUN: 25 (12-30 @ 05:45), 25 (12-29 @ 07:15), 29 (12-28 @ 06:37)  Cr: 0.97 (12-30 @ 05:45), 0.90 (12-29 @ 07:15), 0.96 (12-28 @ 06:37)  Glu: 97(12-30 @ 05:45), 187(12-29 @ 07:15), 183(12-28 @ 06:37)    Hgb: 11.9 (12-30 @ 05:45), 11.4 (12-29 @ 07:18), 11.8 (12-28 @ 06:37)  Hct: 37.2 (12-30 @ 05:45), 35.9 (12-29 @ 07:18), 36.9 (12-28 @ 06:37)  WBC: 15.46 (12-30 @ 05:45), 12.78 (12-29 @ 07:18), 13.89 (12-28 @ 06:37)  Plt: 407 (12-30 @ 05:45), 382 (12-29 @ 07:18), 344 (12-28 @ 06:37)    INR: 1.71 12-30-22 @ 05:46, 1.58 12-29-22 @ 07:20, 1.68 12-28-22 @ 06:37  PTT: 85.8 12-30-22 @ 05:46, 68.8 12-29-22 @ 23:46, >200.0 12-29-22 @ 15:28, 51.1 12-29-22 @ 07:20, 85.8 12-28-22 @ 06:37          LIVER FUNCTIONS - ( 30 Dec 2022 05:45 )  Alb: 2.7 g/dL / Pro: 6.3 g/dL / ALK PHOS: 93 U/L / ALT: 5 U/L / AST: 25 U/L / GGT: x           Aspartate Aminotransferase (AST/SGOT): 25 U/L (12-30-22 @ 05:45)  Alanine Aminotransferase (ALT/SGPT): 5 U/L (12-30-22 @ 05:45)  Aspartate Aminotransferase (AST/SGOT): 27 U/L (12-29-22 @ 07:15)  Alanine Aminotransferase (ALT/SGPT): 8 U/L (12-29-22 @ 07:15)      Bilirubin Total, Serum: 0.3 mg/dL (12-30-22 @ 05:45)  Bilirubin Total, Serum: 0.3 mg/dL (12-29-22 @ 07:15)  Bilirubin Total, Serum: 0.4 mg/dL (12-28-22 @ 06:37)      Culture - Body Fluid with Gram Stain (collected 12-25-22 @ 23:09)  Source: Abdominal Fl Abdominal Fluid  Gram Stain (12-26-22 @ 08:02):    Numerous polymorphonuclear leukocytes seen per low power field    Moderate Gram positive cocci in pairs seen per oil power field  Preliminary Report (12-28-22 @ 10:10):    Few Staphylococcus caprae  Organism: Staphylococcus caprae (12-28-22 @ 10:10)  Organism: Staphylococcus caprae (12-28-22 @ 10:10)      -  Ampicillin/Sulbactam: S <=8/4      -  Cefazolin: S <=4      -  Clindamycin: S <=0.25      -  Erythromycin: S <=0.25      -  Gentamicin: S <=1 Should not be used as monotherapy      -  Oxacillin: S <=0.25      -  Rifampin: S <=1 Should not be used as monotherapy      -  Tetracycline: S <=1      -  Trimethoprim/Sulfamethoxazole: S <=0.5/9.5      -  Vancomycin: S 1      Method Type: MARIA DEL CARMEN            Assessment/Plan:  77 year old male with PMH of Parkinson's, COPD, 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  Pt most recently s/p scrotal drain placement on 12/25 in Interventional Radiology.     - persistent leukocytosis, continue abx per primary team  - Flush drain with 5cc normal saline daily forward only; DO NOT aspirate  - Change dressing q3 days or when dressing is saturated.  - Trend vs/labs  - Continue global management per primary team  - If drain output <15cc/24hrs x 2 consecutive days, please obtain repeat imaging to evaluate drain and consult IR for drain check  - If the patient is d/c home with drainage catheter, pt can make an appointment with IR by calling the IR booking office at [phone]; recommend IR follow in 7-10 days after discharge for tube evaluation.  - Pt will benefit from VNS service to help with drainage catheter care; Pt should continue same drainage catheter care as an outpatient.  - Greater than 50% of the encounter was spent counseling and/ or coordination of care on drain care and follow up.      Please call IR at [phone] with any questions, concerns, or issues regarding above.    Also available on [employer] TEAMS.   Interventional Radiology Follow-Up Note.    Patient seen and examined @ bedside.    This is a 77y Male s/p scrotal drain placement on 12/25 in Interventional Radiology.     No complaint offered.      Medication:  ceFAZolin   IVPB: (12-30)  heparin  Infusion.: (12-30)  hydrochlorothiazide: (12-30)  losartan: (12-30)  metoprolol tartrate: (12-30)  piperacillin/tazobactam IVPB..: (12-28)  warfarin: (12-29)    Vitals:  T(F): 97.8, Max: 98.1 (08:57)  HR: 65  BP: 135/77  RR: 18  SpO2: 93%    Physical Exam:  General: Nontoxic, in NAD.  Abdomen: soft, NTND.   Drain Device: Drain intact attached to ALIZA bulb. Small amount of leakage seen on rosalia under drain. Flushed with 10cc normal saline with no reistence, no leaking seen.  24hr Drain output: 60cc, purulent fluid          LABS:  Na: 137 (12-30 @ 05:45), 138 (12-29 @ 07:15), 138 (12-28 @ 06:37)  K: 3.6 (12-30 @ 05:45), 3.7 (12-29 @ 07:15), 3.5 (12-28 @ 06:37)  Cl: 97 (12-30 @ 05:45), 96 (12-29 @ 07:15), 96 (12-28 @ 06:37)  CO2: 33 (12-30 @ 05:45), 32 (12-29 @ 07:15), 33 (12-28 @ 06:37)  BUN: 25 (12-30 @ 05:45), 25 (12-29 @ 07:15), 29 (12-28 @ 06:37)  Cr: 0.97 (12-30 @ 05:45), 0.90 (12-29 @ 07:15), 0.96 (12-28 @ 06:37)  Glu: 97(12-30 @ 05:45), 187(12-29 @ 07:15), 183(12-28 @ 06:37)    Hgb: 11.9 (12-30 @ 05:45), 11.4 (12-29 @ 07:18), 11.8 (12-28 @ 06:37)  Hct: 37.2 (12-30 @ 05:45), 35.9 (12-29 @ 07:18), 36.9 (12-28 @ 06:37)  WBC: 15.46 (12-30 @ 05:45), 12.78 (12-29 @ 07:18), 13.89 (12-28 @ 06:37)  Plt: 407 (12-30 @ 05:45), 382 (12-29 @ 07:18), 344 (12-28 @ 06:37)    INR: 1.71 12-30-22 @ 05:46, 1.58 12-29-22 @ 07:20, 1.68 12-28-22 @ 06:37  PTT: 85.8 12-30-22 @ 05:46, 68.8 12-29-22 @ 23:46, >200.0 12-29-22 @ 15:28, 51.1 12-29-22 @ 07:20, 85.8 12-28-22 @ 06:37          LIVER FUNCTIONS - ( 30 Dec 2022 05:45 )  Alb: 2.7 g/dL / Pro: 6.3 g/dL / ALK PHOS: 93 U/L / ALT: 5 U/L / AST: 25 U/L / GGT: x           Aspartate Aminotransferase (AST/SGOT): 25 U/L (12-30-22 @ 05:45)  Alanine Aminotransferase (ALT/SGPT): 5 U/L (12-30-22 @ 05:45)  Aspartate Aminotransferase (AST/SGOT): 27 U/L (12-29-22 @ 07:15)  Alanine Aminotransferase (ALT/SGPT): 8 U/L (12-29-22 @ 07:15)      Bilirubin Total, Serum: 0.3 mg/dL (12-30-22 @ 05:45)  Bilirubin Total, Serum: 0.3 mg/dL (12-29-22 @ 07:15)  Bilirubin Total, Serum: 0.4 mg/dL (12-28-22 @ 06:37)      Culture - Body Fluid with Gram Stain (collected 12-25-22 @ 23:09)  Source: Abdominal Fl Abdominal Fluid  Gram Stain (12-26-22 @ 08:02):    Numerous polymorphonuclear leukocytes seen per low power field    Moderate Gram positive cocci in pairs seen per oil power field  Preliminary Report (12-28-22 @ 10:10):    Few Staphylococcus caprae  Organism: Staphylococcus caprae (12-28-22 @ 10:10)  Organism: Staphylococcus caprae (12-28-22 @ 10:10)      -  Ampicillin/Sulbactam: S <=8/4      -  Cefazolin: S <=4      -  Clindamycin: S <=0.25      -  Erythromycin: S <=0.25      -  Gentamicin: S <=1 Should not be used as monotherapy      -  Oxacillin: S <=0.25      -  Rifampin: S <=1 Should not be used as monotherapy      -  Tetracycline: S <=1      -  Trimethoprim/Sulfamethoxazole: S <=0.5/9.5      -  Vancomycin: S 1      Method Type: MARIA DEL CARMEN            Assessment/Plan:  77 year old male with PMH of Parkinson's, COPD, 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  Pt most recently s/p scrotal drain placement on 12/25 in Interventional Radiology.     - persistent leukocytosis, continue abx per primary team  - Flush drain with 5cc normal saline daily forward only; DO NOT aspirate  - Change dressing q3 days or when dressing is saturated.  - Trend vs/labs  - Continue global management per primary team  - If drain output <15cc/24hrs x 2 consecutive days, please obtain repeat imaging to evaluate drain and consult IR for drain check  - If the patient is d/c home with drainage catheter, pt can make an appointment with IR by calling the IR booking office at [phone]; recommend IR follow in 7-10 days after discharge for tube evaluation.  - Pt will benefit from VNS service to help with drainage catheter care; Pt should continue same drainage catheter care as an outpatient.  - Greater than 50% of the encounter was spent counseling and/ or coordination of care on drain care and follow up.      Please call IR at [phone] with any questions, concerns, or issues regarding above.    Also available on [employer] TEAMS.

## 2022-12-30 NOTE — PROGRESS NOTE ADULT - SUBJECTIVE AND OBJECTIVE BOX
Patient is a 77y old  Male who presents with a chief complaint of generalized weakness and AMS x 2 days (30 Dec 2022 09:15)      SUBJECTIVE / OVERNIGHT EVENTS:  Patient seen and examined.   Doing better.      Vital Signs Last 24 Hrs  T(C): 36.6 (30 Dec 2022 13:59), Max: 36.6 (29 Dec 2022 21:36)  T(F): 97.8 (30 Dec 2022 13:59), Max: 97.8 (29 Dec 2022 21:36)  HR: 77 (30 Dec 2022 13:59) (65 - 77)  BP: 126/67 (30 Dec 2022 13:59) (120/62 - 154/79)  BP(mean): --  RR: 18 (30 Dec 2022 13:59) (17 - 18)  SpO2: 91% (30 Dec 2022 13:59) (91% - 94%)    Parameters below as of 30 Dec 2022 13:59  Patient On (Oxygen Delivery Method): room air      I&O's Summary    29 Dec 2022 07:01  -  30 Dec 2022 07:00  --------------------------------------------------------  IN: 1454 mL / OUT: 1710 mL / NET: -256 mL    30 Dec 2022 07:01  -  30 Dec 2022 15:41  --------------------------------------------------------  IN: 420 mL / OUT: 640 mL / NET: -220 mL        PE:  GENERAL: NAD, AAOx3  CHEST/LUNG: wheezing  HEART: Regular rate and rhythm; no murmur  ABDOMEN: Soft, Nontender, Nondistended; Bowel sounds present  : enlarged R scrotum, erythematous, tender, gould; ir tube with drainage  EXTREMITIES:  2+ Peripheral Pulses, No edema  NEURO: No focal deficit  LABS:                        11.9   15.46 )-----------( 407      ( 30 Dec 2022 05:45 )             37.2     12-30    137  |  97  |  25<H>  ----------------------------<  97  3.6   |  33<H>  |  0.97    Ca    9.3      30 Dec 2022 05:45  Phos  3.1     12-30  Mg     2.2     12-30    TPro  6.3  /  Alb  2.7<L>  /  TBili  0.3  /  DBili  x   /  AST  25  /  ALT  5<L>  /  AlkPhos  93  12-30    PT/INR - ( 30 Dec 2022 05:46 )   PT: 20.0 sec;   INR: 1.71 ratio         PTT - ( 30 Dec 2022 05:46 )  PTT:85.8 sec  CAPILLARY BLOOD GLUCOSE      POCT Blood Glucose.: 128 mg/dL (30 Dec 2022 14:21)  POCT Blood Glucose.: 207 mg/dL (30 Dec 2022 12:26)  POCT Blood Glucose.: 139 mg/dL (30 Dec 2022 09:46)  POCT Blood Glucose.: 121 mg/dL (29 Dec 2022 21:04)  POCT Blood Glucose.: 261 mg/dL (29 Dec 2022 17:31)            RADIOLOGY & ADDITIONAL TESTS:    Imaging Personally Reviewed:  [x] YES  [ ] NO    Consultant(s) Notes Reviewed:  [x] YES  [ ] NO      MEDICATIONS  (STANDING):  albuterol/ipratropium for Nebulization 3 milliLiter(s) Nebulizer every 6 hours  budesonide 160 MICROgram(s)/formoterol 4.5 MICROgram(s) Inhaler 2 Puff(s) Inhalation two times a day  carbidopa/levodopa  25/100 1 Tablet(s) Oral <User Schedule>  ceFAZolin   IVPB 2000 milliGRAM(s) IV Intermittent every 8 hours  cyanocobalamin 1000 MICROGram(s) Oral daily  dextrose 5%. 1000 milliLiter(s) (50 mL/Hr) IV Continuous <Continuous>  dextrose 5%. 1000 milliLiter(s) (100 mL/Hr) IV Continuous <Continuous>  dextrose 50% Injectable 25 Gram(s) IV Push once  dextrose 50% Injectable 12.5 Gram(s) IV Push once  dextrose 50% Injectable 25 Gram(s) IV Push once  folic acid 1 milliGRAM(s) Oral daily  glucagon  Injectable 1 milliGRAM(s) IntraMuscular once  heparin  Infusion.  Unit(s)/Hr (17 mL/Hr) IV Continuous <Continuous>  hydrochlorothiazide 25 milliGRAM(s) Oral daily  insulin glargine Injectable (LANTUS) 18 Unit(s) SubCutaneous at bedtime  insulin lispro (ADMELOG) corrective regimen sliding scale   SubCutaneous at bedtime  insulin lispro (ADMELOG) corrective regimen sliding scale   SubCutaneous three times a day before meals  insulin lispro Injectable (ADMELOG) 10 Unit(s) SubCutaneous three times a day before meals  lactobacillus acidophilus 1 Tablet(s) Oral daily  losartan 100 milliGRAM(s) Oral daily  metoprolol tartrate 25 milliGRAM(s) Oral two times a day  predniSONE   Tablet 40 milliGRAM(s) Oral daily  rOPINIRole 2 milliGRAM(s) Oral <User Schedule>  trihexyphenidyl 1 milliGRAM(s) Oral <User Schedule>  warfarin 5 milliGRAM(s) Oral once    MEDICATIONS  (PRN):  acetaminophen     Tablet .. 650 milliGRAM(s) Oral every 6 hours PRN Temp greater or equal to 38C (100.4F), Mild Pain (1 - 3)  dextrose Oral Gel 15 Gram(s) Oral once PRN Blood Glucose LESS THAN 70 milliGRAM(s)/deciliter  heparin   Injectable 7500 Unit(s) IV Push every 6 hours PRN For aPTT less than 40  heparin   Injectable 3500 Unit(s) IV Push every 6 hours PRN For aPTT between 40 - 57  melatonin 3 milliGRAM(s) Oral at bedtime PRN Insomnia  ondansetron Injectable 4 milliGRAM(s) IV Push every 8 hours PRN Nausea and/or Vomiting      Care Discussed with Consultants/Other Providers [x] YES  [ ] NO    HEALTH ISSUES - PROBLEM Dx:  Scrotal abscess    Sepsis    Chronic obstructive pulmonary disease (COPD)    H/O factor V Leiden mutation    Unspecified atrial fibrillation    Parkinsons    HTN (hypertension)    Preop exam for internal medicine    DM (diabetes mellitus)

## 2022-12-30 NOTE — PROGRESS NOTE ADULT - SUBJECTIVE AND OBJECTIVE BOX
[facility] DIV[facility] OF INFECTIOUS DISEASES  CHRISTI Putnam Y. Patel, S. Shah, G. Aureliano  [phone]  ([phone] - weekdays after 5pm and weekends)    Name: BOECKLE, ROBERT  Age/Gender: 77y Male  MRN: [id number]    Interval History:  Patient seen and examined this morning.   Feels better, coughing less now.  Denies fever, pain or any new complaints.   Notes reviewed. Afebrile     Allergies: garlic (Other (Mild))  Levaquin (Anaphylaxis)    Objective:  Vitals:   T(F): 97.8 (12-30-22 @ 05:09), Max: 97.8 (12-29-22 @ 21:36)  HR: 65 (12-30-22 @ 05:09) (65 - 73)  BP: 135/77 (12-30-22 @ 05:09) (120/62 - 154/79)  RR: 18 (12-30-22 @ 05:09) (18 - 18)  SpO2: 93% (12-30-22 @ 05:09) (93% - 94%)  Physical Examination:  General: no acute distress, obese  HEENT: NC/AT, EOMI, anicteric, neck supple  Cardio: S1, S2 present, normal rate   Resp: decreased breath sounds b/l  Abd: soft, nontender, nondistended, + BS  : scrotal swelling, no TTP/erythema, +drain  Neuro: AAOx3, no obvious focal deficits  Ext: no edema, no cyanosis, moving extremities  Skin: warm, dry, no visible rash  Lines: PIV    Laboratory Studies:  CBC:                       11.9   15.46 )-----------( 407      ( 30 Dec 2022 05:45 )             37.2     WBC Trend:  15.46 12-30-22 @ 05:45  12.78 12-29-22 @ 07:18  13.89 12-28-22 @ 06:37  17.41 12-27-22 @ 07:05  15.05 12-26-22 @ 06:17  14.54 12-25-22 @ 22:59  13.06 12-25-22 @ 09:24  13.71 12-24-22 @ 07:00  14.60 12-23-22 @ 13:43    CMP: 12-30    137  |  97  |  25<H>  ----------------------------<  97  3.6   |  33<H>  |  0.97    Ca    9.3      30 Dec 2022 05:45  Phos  3.1     12-30  Mg     2.2     12-30    TPro  6.3  /  Alb  2.7<L>  /  TBili  0.3  /  DBili  x   /  AST  25  /  ALT  5<L>  /  AlkPhos  93  12-30    Creatinine, Serum: 0.97 mg/dL (12-30-22 @ 05:45)  Creatinine, Serum: 0.90 mg/dL (12-29-22 @ 07:15)  Creatinine, Serum: 0.96 mg/dL (12-28-22 @ 06:37)  Creatinine, Serum: 1.01 mg/dL (12-27-22 @ 07:05)  Creatinine, Serum: 0.99 mg/dL (12-26-22 @ 06:17)  Creatinine, Serum: 0.96 mg/dL (12-25-22 @ 08:36)  Creatinine, Serum: 1.03 mg/dL (12-24-22 @ 06:59)  Creatinine, Serum: 1.16 mg/dL (12-23-22 @ 13:43)      LIVER FUNCTIONS - ( 30 Dec 2022 05:45 )  Alb: 2.7 g/dL / Pro: 6.3 g/dL / ALK PHOS: 93 U/L / ALT: 5 U/L / AST: 25 U/L / GGT: x           Vancomycin Level, Trough: 19.4 ug/mL (12-28-22 @ 09:31)  Vancomycin Level, Trough: 10.0 ug/mL (12-26-22 @ 22:00)      Microbiology: reviewed     Culture - Body Fluid with Gram Stain (collected 12-25-22 @ 23:09)  Source: Abdominal Fl Abdominal Fluid  Gram Stain (12-26-22 @ 08:02):    Numerous polymorphonuclear leukocytes seen per low power field    Moderate Gram positive cocci in pairs seen per oil power field  Preliminary Report (12-28-22 @ 10:10):    Few Staphylococcus caprae  Organism: Staphylococcus caprae (12-28-22 @ 10:10)  Organism: Staphylococcus caprae (12-28-22 @ 10:10)      -  Ampicillin/Sulbactam: S <=8/4      -  Cefazolin: S <=4      -  Clindamycin: S <=0.25      -  Erythromycin: S <=0.25      -  Gentamicin: S <=1 Should not be used as monotherapy      -  Oxacillin: S <=0.25      -  Rifampin: S <=1 Should not be used as monotherapy      -  Tetracycline: S <=1      -  Trimethoprim/Sulfamethoxazole: S <=0.5/9.5      -  Vancomycin: S 1      Method Type: MARIA DEL CARMEN    Culture - Blood (collected 12-23-22 @ 13:35)  Source: .Blood Blood-Peripheral  Final Report (12-28-22 @ 18:01):    No Growth Final    Culture - Blood (collected 12-23-22 @ 13:15)  Source: .Blood Blood-Peripheral  Final Report (12-28-22 @ 18:01):    No Growth Final    Radiology: reviewed    Medications:  acetaminophen     Tablet .. 650 milliGRAM(s) Oral every 6 hours PRN  albuterol/ipratropium for Nebulization 3 milliLiter(s) Nebulizer every 6 hours  budesonide 160 MICROgram(s)/formoterol 4.5 MICROgram(s) Inhaler 2 Puff(s) Inhalation two times a day  carbidopa/levodopa  25/100 1 Tablet(s) Oral <User Schedule>  ceFAZolin   IVPB 2000 milliGRAM(s) IV Intermittent every 8 hours  cyanocobalamin 1000 MICROGram(s) Oral daily  dextrose 5%. 1000 milliLiter(s) IV Continuous <Continuous>  dextrose 5%. 1000 milliLiter(s) IV Continuous <Continuous>  dextrose 50% Injectable 25 Gram(s) IV Push once  dextrose 50% Injectable 12.5 Gram(s) IV Push once  dextrose 50% Injectable 25 Gram(s) IV Push once  dextrose Oral Gel 15 Gram(s) Oral once PRN  folic acid 1 milliGRAM(s) Oral daily  glucagon  Injectable 1 milliGRAM(s) IntraMuscular once  heparin   Injectable 7500 Unit(s) IV Push every 6 hours PRN  heparin   Injectable 3500 Unit(s) IV Push every 6 hours PRN  heparin  Infusion.  Unit(s)/Hr IV Continuous <Continuous>  hydrochlorothiazide 25 milliGRAM(s) Oral daily  insulin glargine Injectable (LANTUS) 18 Unit(s) SubCutaneous at bedtime  insulin lispro (ADMELOG) corrective regimen sliding scale   SubCutaneous at bedtime  insulin lispro (ADMELOG) corrective regimen sliding scale   SubCutaneous three times a day before meals  insulin lispro Injectable (ADMELOG) 10 Unit(s) SubCutaneous three times a day before meals  lactobacillus acidophilus 1 Tablet(s) Oral daily  losartan 100 milliGRAM(s) Oral daily  melatonin 3 milliGRAM(s) Oral at bedtime PRN  metoprolol tartrate 25 milliGRAM(s) Oral two times a day  ondansetron Injectable 4 milliGRAM(s) IV Push every 8 hours PRN  predniSONE   Tablet 40 milliGRAM(s) Oral daily  rOPINIRole 2 milliGRAM(s) Oral <User Schedule>  trihexyphenidyl 1 milliGRAM(s) Oral <User Schedule>    Current Antimicrobials:  ceFAZolin   IVPB 2000 milliGRAM(s) IV Intermittent every 8 hours    Prior/Completed Antimicrobials:  cefTRIAXone   IVPB  piperacillin/tazobactam IVPB...  vancomycin  IVPB.

## 2022-12-30 NOTE — PROGRESS NOTE ADULT - ASSESSMENT
Assessment  DMT2: 77y Male with DM T2 with hyperglycemia, A1C 8%, now on low-dose basal bolus insulin, blood sugars are within acceptable range,  no hypoglycemic episodes, eating meals, on inhaled  steroids and receiving IV ABx in dextrose.  Scrotal Abscess: on medications, stable, monitored.  COPD: On meds, breathing tx, stable.  HTN: Controlled,  on antihypertensive medications.  Parkinsons: On meds, stable, monitored.        Dima Keller MD  Cell: [phone]  Office: [phone]

## 2022-12-30 NOTE — PROGRESS NOTE ADULT - ASSESSMENT
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    Sepsis 2/2 Scrotal abscess  Large L inguinal hernia  - imaging reviewed  - BCx NGTD  -  and surgery following--suspect superinfected hematoma  - s/p IR drainage on 12/25 - drained 20cc purulent fluid, drain in place  - Abscess cx with Staph caprae-- sensitivities noted   - afebrile, WBC trended up today, pt feels better, no new sx, cough improved    - RVP negative, CXR appears unchanged on my review  Recs:  - s/p vancomycin and pip-tazo  - IR following - agree with repeat imaging once drain output decreases to assess collection  - continue on cefazolin 2g IV Q8h (12/28-- )    - duration to be determined based on above   - supportive care and pain management per primary team  - drain care per surgery/IR  - monitor temps, CBC, Cr      Surendra Connolly M.D.  [facility] Division of Infectious Diseases  [phone]  After 5pm on weekdays and all day on weekends - please call [phone]

## 2022-12-30 NOTE — PROGRESS NOTE ADULT - ASSESSMENT
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  admitted for sepsis 2/2 scrotal abscess.    # sepsis 2/2 right scrotal abscess  s/p IR drainage 12/25-- growing gram + cocci   evaluated by , suspect infected hematoma, given recent instrumentation will continue broad spectrum abx  Surgical intervention TBD by urology, fu urology  cefazolin per ID  ID consult appreciated     # Chronic obstructive pulmonary disease with exacerbation.  # Pleural effusion   - prednisone 40 mg x 5 days   -echo noted normal lvef   -CXR noted, chronic chronic changes + effusion; hold fluids   -per pt has chronic wheezing  Symbicort   duonebs q6h    # Large left inguinal hernia  -tender, may need surgical correction.   -gen surgery recs appreciated     #T2DM  -watch closely in setting of prednisone  - newly diagnosed but probably long standing   - insulin basal bolus   - a1c 8s   - endo consult appreciated     # H/O factor V Leiden mutation  heparin to coumadin transition; DAILY INR.   heparin gtt while inpatient     # Parkinsons  continue carbidopa-levo-dopa   continue Ropinirole   continue Trihexyphenidyl    # HTN (hypertension)  continue Irbesartan (therapeutic equivalent)    dvt ppx heparin gtt --> coumadin   Please contact with any questions or concerns [phone].

## 2022-12-30 NOTE — PROGRESS NOTE ADULT - SUBJECTIVE AND OBJECTIVE BOX
Chief complaint    Patient is a 77y old  Male who presents with a chief complaint of generalized weakness and AMS x 2 days (30 Dec 2022 15:38)   Review of systems  Patient in bed, appears comfortable.    Labs and Fingersticks  CAPILLARY BLOOD GLUCOSE      POCT Blood Glucose.: 136 mg/dL (30 Dec 2022 17:22)  POCT Blood Glucose.: 128 mg/dL (30 Dec 2022 14:21)  POCT Blood Glucose.: 207 mg/dL (30 Dec 2022 12:26)  POCT Blood Glucose.: 139 mg/dL (30 Dec 2022 09:46)  POCT Blood Glucose.: 121 mg/dL (29 Dec 2022 21:04)      Anion Gap, Serum: 7 (12-30 @ 05:45)  Anion Gap, Serum: 10 (12-29 @ 07:15)      Calcium, Total Serum: 9.3 (12-30 @ 05:45)  Calcium, Total Serum: 8.9 (12-29 @ 07:15)  Albumin, Serum: 2.7 *L* (12-30 @ 05:45)  Albumin, Serum: 2.5 *L* (12-29 @ 07:15)    Alanine Aminotransferase (ALT/SGPT): 5 *L* (12-30 @ 05:45)  Alanine Aminotransferase (ALT/SGPT): 8 *L* (12-29 @ 07:15)  Alkaline Phosphatase, Serum: 93 (12-30 @ 05:45)  Alkaline Phosphatase, Serum: 104 (12-29 @ 07:15)  Aspartate Aminotransferase (AST/SGOT): 25 (12-30 @ 05:45)  Aspartate Aminotransferase (AST/SGOT): 27 (12-29 @ 07:15)        12-30    137  |  97  |  25<H>  ----------------------------<  97  3.6   |  33<H>  |  0.97    Ca    9.3      30 Dec 2022 05:45  Phos  3.1     12-30  Mg     2.2     12-30    TPro  6.3  /  Alb  2.7<L>  /  TBili  0.3  /  DBili  x   /  AST  25  /  ALT  5<L>  /  AlkPhos  93  12-30                        11.9   15.46 )-----------( 407      ( 30 Dec 2022 05:45 )             37.2     Medications  MEDICATIONS  (STANDING):  albuterol/ipratropium for Nebulization 3 milliLiter(s) Nebulizer every 6 hours  budesonide 160 MICROgram(s)/formoterol 4.5 MICROgram(s) Inhaler 2 Puff(s) Inhalation two times a day  carbidopa/levodopa  25/100 1 Tablet(s) Oral <User Schedule>  ceFAZolin   IVPB 2000 milliGRAM(s) IV Intermittent every 8 hours  cyanocobalamin 1000 MICROGram(s) Oral daily  dextrose 5%. 1000 milliLiter(s) (100 mL/Hr) IV Continuous <Continuous>  dextrose 5%. 1000 milliLiter(s) (50 mL/Hr) IV Continuous <Continuous>  dextrose 50% Injectable 25 Gram(s) IV Push once  dextrose 50% Injectable 12.5 Gram(s) IV Push once  dextrose 50% Injectable 25 Gram(s) IV Push once  folic acid 1 milliGRAM(s) Oral daily  glucagon  Injectable 1 milliGRAM(s) IntraMuscular once  heparin  Infusion.  Unit(s)/Hr (17 mL/Hr) IV Continuous <Continuous>  hydrochlorothiazide 25 milliGRAM(s) Oral daily  insulin glargine Injectable (LANTUS) 18 Unit(s) SubCutaneous at bedtime  insulin lispro (ADMELOG) corrective regimen sliding scale   SubCutaneous at bedtime  insulin lispro (ADMELOG) corrective regimen sliding scale   SubCutaneous three times a day before meals  insulin lispro Injectable (ADMELOG) 10 Unit(s) SubCutaneous three times a day before meals  lactobacillus acidophilus 1 Tablet(s) Oral daily  losartan 100 milliGRAM(s) Oral daily  metoprolol tartrate 25 milliGRAM(s) Oral two times a day  predniSONE   Tablet 40 milliGRAM(s) Oral daily  rOPINIRole 2 milliGRAM(s) Oral <User Schedule>  trihexyphenidyl 1 milliGRAM(s) Oral <User Schedule>      Physical Exam  General: Patient appears comfortable.  Vital Signs Last 12 Hrs  T(F): 97.6 (12-30-22 @ 17:17), Max: 97.8 (12-30-22 @ 13:59)  HR: 74 (12-30-22 @ 17:17) (70 - 77)  BP: 116/60 (12-30-22 @ 17:17) (116/60 - 146/70)  BP(mean): --  RR: 18 (12-30-22 @ 17:17) (17 - 18)  SpO2: 92% (12-30-22 @ 17:17) (91% - 92%)  Neck: No palpable thyroid nodules.  CVS: S1S2, No murmurs  Respiratory: No wheezing, no crepitations  GI: Abdomen soft, non tender.  Musculoskeletal:  edema lower extremities.     Diagnostics

## 2022-12-31 LAB
ALBUMIN SERPL ELPH-MCNC: 2.6 G/DL — LOW (ref 3.3–5)
ALP SERPL-CCNC: 88 U/L — SIGNIFICANT CHANGE UP (ref 40–120)
ALT FLD-CCNC: 9 U/L — LOW (ref 10–45)
ANION GAP SERPL CALC-SCNC: 12 MMOL/L — SIGNIFICANT CHANGE UP (ref 5–17)
APTT BLD: 155.3 SEC — CRITICAL HIGH (ref 27.5–35.5)
AST SERPL-CCNC: 28 U/L — SIGNIFICANT CHANGE UP (ref 10–40)
BILIRUB SERPL-MCNC: 0.3 MG/DL — SIGNIFICANT CHANGE UP (ref 0.2–1.2)
BUN SERPL-MCNC: 28 MG/DL — HIGH (ref 7–23)
CALCIUM SERPL-MCNC: 8.9 MG/DL — SIGNIFICANT CHANGE UP (ref 8.4–10.5)
CHLORIDE SERPL-SCNC: 96 MMOL/L — SIGNIFICANT CHANGE UP (ref 96–108)
CO2 SERPL-SCNC: 30 MMOL/L — SIGNIFICANT CHANGE UP (ref 22–31)
CREAT SERPL-MCNC: 0.98 MG/DL — SIGNIFICANT CHANGE UP (ref 0.5–1.3)
CULTURE RESULTS: SIGNIFICANT CHANGE UP
EGFR: 79 ML/MIN/1.73M2 — SIGNIFICANT CHANGE UP
GLUCOSE SERPL-MCNC: 98 MG/DL — SIGNIFICANT CHANGE UP (ref 70–99)
HCT VFR BLD CALC: 36.6 % — LOW (ref 39–50)
HGB BLD-MCNC: 11.8 G/DL — LOW (ref 13–17)
INR BLD: 2.32 RATIO — HIGH (ref 0.88–1.16)
MAGNESIUM SERPL-MCNC: 2.2 MG/DL — SIGNIFICANT CHANGE UP (ref 1.6–2.6)
MCHC RBC-ENTMCNC: 29.4 PG — SIGNIFICANT CHANGE UP (ref 27–34)
MCHC RBC-ENTMCNC: 32.2 GM/DL — SIGNIFICANT CHANGE UP (ref 32–36)
MCV RBC AUTO: 91 FL — SIGNIFICANT CHANGE UP (ref 80–100)
NRBC # BLD: 0 /100 WBCS — SIGNIFICANT CHANGE UP (ref 0–0)
ORGANISM # SPEC MICROSCOPIC CNT: SIGNIFICANT CHANGE UP
ORGANISM # SPEC MICROSCOPIC CNT: SIGNIFICANT CHANGE UP
PHOSPHATE SERPL-MCNC: 2.8 MG/DL — SIGNIFICANT CHANGE UP (ref 2.5–4.5)
PLATELET # BLD AUTO: 409 K/UL — HIGH (ref 150–400)
POTASSIUM SERPL-MCNC: 3.4 MMOL/L — LOW (ref 3.5–5.3)
POTASSIUM SERPL-SCNC: 3.4 MMOL/L — LOW (ref 3.5–5.3)
PROT SERPL-MCNC: 6.1 G/DL — SIGNIFICANT CHANGE UP (ref 6–8.3)
PROTHROM AB SERPL-ACNC: 27.1 SEC — HIGH (ref 10.5–13.4)
RBC # BLD: 4.02 M/UL — LOW (ref 4.2–5.8)
RBC # FLD: 16.6 % — HIGH (ref 10.3–14.5)
SODIUM SERPL-SCNC: 138 MMOL/L — SIGNIFICANT CHANGE UP (ref 135–145)
SPECIMEN SOURCE: SIGNIFICANT CHANGE UP
WBC # BLD: 16.44 K/UL — HIGH (ref 3.8–10.5)
WBC # FLD AUTO: 16.44 K/UL — HIGH (ref 3.8–10.5)

## 2022-12-31 PROCEDURE — 71045 X-RAY EXAM CHEST 1 VIEW: CPT | Mod: 26

## 2022-12-31 RX ORDER — PIPERACILLIN AND TAZOBACTAM 4; .5 G/20ML; G/20ML
3.38 INJECTION, POWDER, LYOPHILIZED, FOR SOLUTION INTRAVENOUS ONCE
Refills: 0 | Status: COMPLETED | OUTPATIENT
Start: 2022-12-31 | End: 2022-12-31

## 2022-12-31 RX ORDER — PIPERACILLIN AND TAZOBACTAM 4; .5 G/20ML; G/20ML
3.38 INJECTION, POWDER, LYOPHILIZED, FOR SOLUTION INTRAVENOUS EVERY 8 HOURS
Refills: 0 | Status: DISCONTINUED | OUTPATIENT
Start: 2022-12-31 | End: 2023-01-03

## 2022-12-31 RX ORDER — WARFARIN SODIUM 2.5 MG/1
2.5 TABLET ORAL ONCE
Refills: 0 | Status: COMPLETED | OUTPATIENT
Start: 2022-12-31 | End: 2022-12-31

## 2022-12-31 RX ORDER — POTASSIUM CHLORIDE 20 MEQ
40 PACKET (EA) ORAL ONCE
Refills: 0 | Status: COMPLETED | OUTPATIENT
Start: 2022-12-31 | End: 2022-12-31

## 2022-12-31 RX ADMIN — Medication 100 MILLIGRAM(S): at 22:43

## 2022-12-31 RX ADMIN — Medication 1 MILLIGRAM(S): at 22:43

## 2022-12-31 RX ADMIN — Medication 25 MILLIGRAM(S): at 06:14

## 2022-12-31 RX ADMIN — ROPINIROLE 2 MILLIGRAM(S): 8 TABLET, FILM COATED, EXTENDED RELEASE ORAL at 08:07

## 2022-12-31 RX ADMIN — CARBIDOPA AND LEVODOPA 1 TABLET(S): 25; 100 TABLET ORAL at 17:27

## 2022-12-31 RX ADMIN — ROPINIROLE 2 MILLIGRAM(S): 8 TABLET, FILM COATED, EXTENDED RELEASE ORAL at 17:27

## 2022-12-31 RX ADMIN — HEPARIN SODIUM 2000 UNIT(S)/HR: 5000 INJECTION INTRAVENOUS; SUBCUTANEOUS at 07:59

## 2022-12-31 RX ADMIN — Medication 100 MILLIGRAM(S): at 06:15

## 2022-12-31 RX ADMIN — Medication 10 UNIT(S): at 13:38

## 2022-12-31 RX ADMIN — LOSARTAN POTASSIUM 100 MILLIGRAM(S): 100 TABLET, FILM COATED ORAL at 06:13

## 2022-12-31 RX ADMIN — PIPERACILLIN AND TAZOBACTAM 25 GRAM(S): 4; .5 INJECTION, POWDER, LYOPHILIZED, FOR SOLUTION INTRAVENOUS at 22:42

## 2022-12-31 RX ADMIN — Medication 1 MILLIGRAM(S): at 12:39

## 2022-12-31 RX ADMIN — Medication 3 MILLILITER(S): at 06:13

## 2022-12-31 RX ADMIN — Medication 3 MILLILITER(S): at 17:26

## 2022-12-31 RX ADMIN — CARBIDOPA AND LEVODOPA 1 TABLET(S): 25; 100 TABLET ORAL at 12:38

## 2022-12-31 RX ADMIN — PIPERACILLIN AND TAZOBACTAM 200 GRAM(S): 4; .5 INJECTION, POWDER, LYOPHILIZED, FOR SOLUTION INTRAVENOUS at 10:18

## 2022-12-31 RX ADMIN — Medication 3 MILLILITER(S): at 00:54

## 2022-12-31 RX ADMIN — BUDESONIDE AND FORMOTEROL FUMARATE DIHYDRATE 2 PUFF(S): 160; 4.5 AEROSOL RESPIRATORY (INHALATION) at 17:26

## 2022-12-31 RX ADMIN — Medication 3 MILLILITER(S): at 23:54

## 2022-12-31 RX ADMIN — WARFARIN SODIUM 2.5 MILLIGRAM(S): 2.5 TABLET ORAL at 22:43

## 2022-12-31 RX ADMIN — Medication 2: at 18:08

## 2022-12-31 RX ADMIN — Medication 40 MILLIGRAM(S): at 06:14

## 2022-12-31 RX ADMIN — BUDESONIDE AND FORMOTEROL FUMARATE DIHYDRATE 2 PUFF(S): 160; 4.5 AEROSOL RESPIRATORY (INHALATION) at 06:13

## 2022-12-31 RX ADMIN — CARBIDOPA AND LEVODOPA 1 TABLET(S): 25; 100 TABLET ORAL at 22:43

## 2022-12-31 RX ADMIN — PREGABALIN 1000 MICROGRAM(S): 225 CAPSULE ORAL at 12:37

## 2022-12-31 RX ADMIN — Medication 1 TABLET(S): at 12:38

## 2022-12-31 RX ADMIN — Medication 40 MILLIEQUIVALENT(S): at 18:51

## 2022-12-31 RX ADMIN — INSULIN GLARGINE 18 UNIT(S): 100 INJECTION, SOLUTION SUBCUTANEOUS at 22:42

## 2022-12-31 RX ADMIN — Medication 10 UNIT(S): at 18:08

## 2022-12-31 RX ADMIN — PIPERACILLIN AND TAZOBACTAM 25 GRAM(S): 4; .5 INJECTION, POWDER, LYOPHILIZED, FOR SOLUTION INTRAVENOUS at 14:31

## 2022-12-31 RX ADMIN — ROPINIROLE 2 MILLIGRAM(S): 8 TABLET, FILM COATED, EXTENDED RELEASE ORAL at 22:43

## 2022-12-31 RX ADMIN — Medication 10 UNIT(S): at 10:18

## 2022-12-31 RX ADMIN — ROPINIROLE 2 MILLIGRAM(S): 8 TABLET, FILM COATED, EXTENDED RELEASE ORAL at 12:37

## 2022-12-31 RX ADMIN — Medication 1 MILLIGRAM(S): at 17:27

## 2022-12-31 RX ADMIN — Medication 1: at 10:18

## 2022-12-31 RX ADMIN — Medication 25 MILLIGRAM(S): at 17:28

## 2022-12-31 RX ADMIN — CARBIDOPA AND LEVODOPA 1 TABLET(S): 25; 100 TABLET ORAL at 08:06

## 2022-12-31 RX ADMIN — Medication 1 MILLIGRAM(S): at 08:06

## 2022-12-31 RX ADMIN — Medication 3 MILLILITER(S): at 12:36

## 2022-12-31 NOTE — PROGRESS NOTE ADULT - ASSESSMENT
Assessment  DMT2: 77y Male with DM T2 with hyperglycemia, A1C 8%, now on low-dose basal bolus insulin, blood sugars are within acceptable range,  no hypoglycemic episodes, eating meals, on inhaled steroids.  Scrotal Abscess: on medications, stable, monitored.  COPD: On meds, breathing tx, stable.  HTN: Controlled,  on antihypertensive medications.  Parkinsons: On meds, stable, monitored.        Dima Keller MD  Cell: [phone]  Office: [phone]

## 2022-12-31 NOTE — PROGRESS NOTE ADULT - SUBJECTIVE AND OBJECTIVE BOX
[facility] DIVISION OF INFECTIOUS DISEASES  CHRISTI Putnam Y. Patel, S. Shah, G. Aureliano  [phone]  ([phone] - weekdays after 5pm and weekends)    Name: BOECKLE, ROBERT  Age/Gender: 77y Male  MRN: [id number]    Interval History:  Patient seen and examined this morning.   Cough is about the same, no dyspnea or fever  Denies any new complaints.   Notes reviewed. Afebrile   Allergies: garlic (Other (Mild))  Levaquin (Anaphylaxis)      Objective:  Vitals:   T(F): 98 (12-31-22 @ 05:20), Max: 98 (12-31-22 @ 05:20)  HR: 72 (12-31-22 @ 05:20) (70 - 77)  BP: 142/71 (12-31-22 @ 05:20) (116/60 - 146/70)  RR: 18 (12-31-22 @ 05:20) (17 - 18)  SpO2: 92% (12-31-22 @ 05:20) (91% - 92%)  Physical Examination:  General: no acute distress, obese, NC placed back by RN  HEENT: NC/AT, EOMI, anicteric, neck supple  Cardio: S1, S2 present, normal rate   Resp: decreased breath sounds b/l  Abd: soft, nontender, nondistended, + BS  : scrotal swelling, no TTP/erythema, +drain  Neuro: AAOx3, no obvious focal deficits  Ext: no edema, no cyanosis, moving extremities  Skin: warm, dry, no visible rash  Lines: PIV    Laboratory Studies:  CBC:                       11.8   16.44 )-----------( 409      ( 31 Dec 2022 07:24 )             36.6     WBC Trend:  16.44 12-31-22 @ 07:24  15.46 12-30-22 @ 05:45  12.78 12-29-22 @ 07:18  13.89 12-28-22 @ 06:37  17.41 12-27-22 @ 07:05  15.05 12-26-22 @ 06:17  14.54 12-25-22 @ 22:59  13.06 12-25-22 @ 09:24    CMP: 12-31    138  |  96  |  28<H>  ----------------------------<  98  3.4<L>   |  30  |  0.98    Ca    8.9      31 Dec 2022 07:22  Phos  2.8     12-31  Mg     2.2     12-31    TPro  6.1  /  Alb  2.6<L>  /  TBili  0.3  /  DBili  x   /  AST  28  /  ALT  9<L>  /  AlkPhos  88  12-31    Creatinine, Serum: 0.98 mg/dL (12-31-22 @ 07:22)  Creatinine, Serum: 0.97 mg/dL (12-30-22 @ 05:45)  Creatinine, Serum: 0.90 mg/dL (12-29-22 @ 07:15)  Creatinine, Serum: 0.96 mg/dL (12-28-22 @ 06:37)  Creatinine, Serum: 1.01 mg/dL (12-27-22 @ 07:05)  Creatinine, Serum: 0.99 mg/dL (12-26-22 @ 06:17)  Creatinine, Serum: 0.96 mg/dL (12-25-22 @ 08:36)    LIVER FUNCTIONS - ( 31 Dec 2022 07:22 )  Alb: 2.6 g/dL / Pro: 6.1 g/dL / ALK PHOS: 88 U/L / ALT: 9 U/L / AST: 28 U/L / GGT: x           Vancomycin Level, Trough: 19.4 ug/mL (12-28-22 @ 09:31)  Vancomycin Level, Trough: 10.0 ug/mL (12-26-22 @ 22:00)    Microbiology: reviewed   Culture - Body Fluid with Gram Stain (collected 12-25-22 @ 23:09)  Source: Abdominal Fl Abdominal Fluid  Gram Stain (12-26-22 @ 08:02):    Numerous polymorphonuclear leukocytes seen per low power field    Moderate Gram positive cocci in pairs seen per oil power field  Final Report (12-31-22 @ 08:35):    Few Staphylococcus caprae  Organism: Staphylococcus caprae (12-31-22 @ 08:35)  Organism: Staphylococcus caprae (12-31-22 @ 08:35)      -  Ampicillin/Sulbactam: S <=8/4      -  Cefazolin: S <=4      -  Clindamycin: S <=0.25      -  Erythromycin: S <=0.25      -  Gentamicin: S <=1 Should not be used as monotherapy      -  Oxacillin: S <=0.25      -  Rifampin: S <=1 Should not be used as monotherapy      -  Tetracycline: S <=1      -  Trimethoprim/Sulfamethoxazole: S <=0.5/9.5      -  Vancomycin: S 1      Method Type: [facility]    Culture - Blood (collected 12-23-22 @ 13:35)  Source: .Blood Blood-Peripheral  Final Report (12-28-22 @ 18:01):    No Growth Final    Culture - Blood (collected 12-23-22 @ 13:15)  Source: .Blood Blood-Peripheral  Final Report (12-28-22 @ 18:01):    No Growth Final    Radiology: reviewed     Medications:  acetaminophen     Tablet .. 650 milliGRAM(s) Oral every 6 hours PRN  albuterol/ipratropium for Nebulization 3 milliLiter(s) Nebulizer every 6 hours  budesonide 160 MICROgram(s)/formoterol 4.5 MICROgram(s) Inhaler 2 Puff(s) Inhalation two times a day  carbidopa/levodopa  25/100 1 Tablet(s) Oral <User Schedule>  ceFAZolin   IVPB 2000 milliGRAM(s) IV Intermittent every 8 hours  cyanocobalamin 1000 MICROGram(s) Oral daily  dextrose 5%. 1000 milliLiter(s) IV Continuous <Continuous>  dextrose 5%. 1000 milliLiter(s) IV Continuous <Continuous>  dextrose 50% Injectable 25 Gram(s) IV Push once  dextrose 50% Injectable 12.5 Gram(s) IV Push once  dextrose 50% Injectable 25 Gram(s) IV Push once  dextrose Oral Gel 15 Gram(s) Oral once PRN  folic acid 1 milliGRAM(s) Oral daily  glucagon  Injectable 1 milliGRAM(s) IntraMuscular once  hydrochlorothiazide 25 milliGRAM(s) Oral daily  insulin glargine Injectable (LANTUS) 18 Unit(s) SubCutaneous at bedtime  insulin lispro (ADMELOG) corrective regimen sliding scale   SubCutaneous at bedtime  insulin lispro (ADMELOG) corrective regimen sliding scale   SubCutaneous three times a day before meals  insulin lispro Injectable (ADMELOG) 10 Unit(s) SubCutaneous three times a day before meals  lactobacillus acidophilus 1 Tablet(s) Oral daily  losartan 100 milliGRAM(s) Oral daily  melatonin 3 milliGRAM(s) Oral at bedtime PRN  metoprolol tartrate 25 milliGRAM(s) Oral two times a day  ondansetron Injectable 4 milliGRAM(s) IV Push every 8 hours PRN  predniSONE   Tablet 40 milliGRAM(s) Oral daily  rOPINIRole 2 milliGRAM(s) Oral <User Schedule>  trihexyphenidyl 1 milliGRAM(s) Oral <User Schedule>  warfarin 2.5 milliGRAM(s) Oral once    Current Antimicrobials:  ceFAZolin   IVPB 2000 milliGRAM(s) IV Intermittent every 8 hours    Prior/Completed Antimicrobials:  cefTRIAXone   IVPB  piperacillin/tazobactam IVPB...  vancomycin  IVPB.

## 2022-12-31 NOTE — PROGRESS NOTE ADULT - SUBJECTIVE AND OBJECTIVE BOX
Patient is a 77y old  Male who presents with a chief complaint of generalized weakness and AMS x 2 days (31 Dec 2022 12:32)      SUBJECTIVE / OVERNIGHT EVENTS:  Patient seen and examined.   Doing better.       Vital Signs Last 24 Hrs  T(C): 36.6 (31 Dec 2022 13:18), Max: 36.7 (31 Dec 2022 05:20)  T(F): 97.8 (31 Dec 2022 13:18), Max: 98 (31 Dec 2022 05:20)  HR: 80 (31 Dec 2022 13:18) (72 - 80)  BP: 127/71 (31 Dec 2022 13:18) (116/60 - 142/71)  BP(mean): --  RR: 18 (31 Dec 2022 13:18) (18 - 18)  SpO2: 94% (31 Dec 2022 13:18) (92% - 94%)    Parameters below as of 31 Dec 2022 13:18  Patient On (Oxygen Delivery Method): room air      I&O's Summary    30 Dec 2022 07:01  -  31 Dec 2022 07:00  --------------------------------------------------------  IN: 1360 mL / OUT: 1685 mL / NET: -325 mL    31 Dec 2022 07:01  -  31 Dec 2022 14:50  --------------------------------------------------------  IN: 470 mL / OUT: 750 mL / NET: -280 mL        PHYSICAL EXAM:  GENERAL: NAD, AAOx3  HEAD:  Atraumatic, Normocephalic  EYES: EOMI; conjunctiva and sclera clear  NECK: Supple, No JVD, No LAD  CHEST/LUNG: b/l wheezing - chronic   HEART: Regular rate and rhythm; No murmurs, rubs, or gallops  ABDOMEN: Soft, Nontender, Nondistended; Bowel sounds present  EXTREMITIES:  2+ Peripheral Pulses, No clubbing, cyanosis, or edema  SKIN: No rashes or lesions    LABS:                        11.8   16.44 )-----------( 409      ( 31 Dec 2022 07:24 )             36.6     12-31    138  |  96  |  28<H>  ----------------------------<  98  3.4<L>   |  30  |  0.98    Ca    8.9      31 Dec 2022 07:22  Phos  2.8     12-31  Mg     2.2     12-31    TPro  6.1  /  Alb  2.6<L>  /  TBili  0.3  /  DBili  x   /  AST  28  /  ALT  9<L>  /  AlkPhos  88  12-31    PT/INR - ( 31 Dec 2022 07:22 )   PT: 27.1 sec;   INR: 2.32 ratio         PTT - ( 31 Dec 2022 07:22 )  PTT:155.3 sec  CAPILLARY BLOOD GLUCOSE      POCT Blood Glucose.: 129 mg/dL (31 Dec 2022 13:23)  POCT Blood Glucose.: 170 mg/dL (31 Dec 2022 10:01)  POCT Blood Glucose.: 139 mg/dL (30 Dec 2022 21:36)  POCT Blood Glucose.: 136 mg/dL (30 Dec 2022 17:22)            RADIOLOGY & ADDITIONAL TESTS:    Imaging Personally Reviewed:  [x] YES  [ ] NO    Consultant(s) Notes Reviewed:  [x] YES  [ ] NO      MEDICATIONS  (STANDING):  albuterol/ipratropium for Nebulization 3 milliLiter(s) Nebulizer every 6 hours  budesonide 160 MICROgram(s)/formoterol 4.5 MICROgram(s) Inhaler 2 Puff(s) Inhalation two times a day  carbidopa/levodopa  25/100 1 Tablet(s) Oral <User Schedule>  cyanocobalamin 1000 MICROGram(s) Oral daily  dextrose 5%. 1000 milliLiter(s) (50 mL/Hr) IV Continuous <Continuous>  dextrose 5%. 1000 milliLiter(s) (100 mL/Hr) IV Continuous <Continuous>  dextrose 50% Injectable 25 Gram(s) IV Push once  dextrose 50% Injectable 12.5 Gram(s) IV Push once  dextrose 50% Injectable 25 Gram(s) IV Push once  folic acid 1 milliGRAM(s) Oral daily  glucagon  Injectable 1 milliGRAM(s) IntraMuscular once  hydrochlorothiazide 25 milliGRAM(s) Oral daily  insulin glargine Injectable (LANTUS) 18 Unit(s) SubCutaneous at bedtime  insulin lispro (ADMELOG) corrective regimen sliding scale   SubCutaneous at bedtime  insulin lispro (ADMELOG) corrective regimen sliding scale   SubCutaneous three times a day before meals  insulin lispro Injectable (ADMELOG) 10 Unit(s) SubCutaneous three times a day before meals  lactobacillus acidophilus 1 Tablet(s) Oral daily  losartan 100 milliGRAM(s) Oral daily  metoprolol tartrate 25 milliGRAM(s) Oral two times a day  piperacillin/tazobactam IVPB.. 3.375 Gram(s) IV Intermittent every 8 hours  predniSONE   Tablet 40 milliGRAM(s) Oral daily  rOPINIRole 2 milliGRAM(s) Oral <User Schedule>  trihexyphenidyl 1 milliGRAM(s) Oral <User Schedule>  warfarin 2.5 milliGRAM(s) Oral once    MEDICATIONS  (PRN):  acetaminophen     Tablet .. 650 milliGRAM(s) Oral every 6 hours PRN Temp greater or equal to 38C (100.4F), Mild Pain (1 - 3)  dextrose Oral Gel 15 Gram(s) Oral once PRN Blood Glucose LESS THAN 70 milliGRAM(s)/deciliter  melatonin 3 milliGRAM(s) Oral at bedtime PRN Insomnia  ondansetron Injectable 4 milliGRAM(s) IV Push every 8 hours PRN Nausea and/or Vomiting      Care Discussed with Consultants/Other Providers [x] YES  [ ] NO    HEALTH ISSUES - PROBLEM Dx:  Scrotal abscess    Sepsis    Chronic obstructive pulmonary disease (COPD)    H/O factor V Leiden mutation    Unspecified atrial fibrillation    Parkinsons    HTN (hypertension)    Preop exam for internal medicine    DM (diabetes mellitus)

## 2022-12-31 NOTE — PROGRESS NOTE ADULT - ASSESSMENT
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    Sepsis 2/2 Scrotal abscess  Large L inguinal hernia  - imaging reviewed  - BCx NGTD  -  and surgery following--suspect superinfected hematoma  - s/p IR drainage on 12/25 - drained 20cc purulent fluid, drain in place  - Abscess cx with Staph caprae-- sensitivities noted   - afebrile, WBC trending up, has continued cough, no other new sx   - RVP negative, CXR reported with clearing  Recs:  - s/p vancomycin and pip-tazo  - IR following - agree with repeat imaging once drain output decreases to assess collection  - will discontinue cefazolin for now (12/28-12/31)  - broaden to pip-tazo and repeat CXR given increasing WBC and ongoing cough  - supportive care and pain management per primary team  - drain care per surgery/IR  - monitor temps, CBC, Cr      Surendra Connolly M.D.  [facility], Division of Infectious Diseases  [phone]  After 5pm on weekdays and all day on weekends - please call [phone]

## 2022-12-31 NOTE — PROGRESS NOTE ADULT - SUBJECTIVE AND OBJECTIVE BOX
Chief complaint    Patient is a 77y old  Male who presents with a chief complaint of generalized weakness and AMS x 2 days (31 Dec 2022 09:47)   Review of systems  Patient in bed, appears comfortable.    Labs and Fingersticks  CAPILLARY BLOOD GLUCOSE      POCT Blood Glucose.: 170 mg/dL (31 Dec 2022 10:01)  POCT Blood Glucose.: 139 mg/dL (30 Dec 2022 21:36)  POCT Blood Glucose.: 136 mg/dL (30 Dec 2022 17:22)  POCT Blood Glucose.: 128 mg/dL (30 Dec 2022 14:21)      Anion Gap, Serum: 12 (12-31 @ 07:22)  Anion Gap, Serum: 7 (12-30 @ 05:45)      Calcium, Total Serum: 8.9 (12-31 @ 07:22)  Calcium, Total Serum: 9.3 (12-30 @ 05:45)  Albumin, Serum: 2.6 *L* (12-31 @ 07:22)  Albumin, Serum: 2.7 *L* (12-30 @ 05:45)    Alanine Aminotransferase (ALT/SGPT): 9 *L* (12-31 @ 07:22)  Alanine Aminotransferase (ALT/SGPT): 5 *L* (12-30 @ 05:45)  Alkaline Phosphatase, Serum: 88 (12-31 @ 07:22)  Alkaline Phosphatase, Serum: 93 (12-30 @ 05:45)  Aspartate Aminotransferase (AST/SGOT): 28 (12-31 @ 07:22)  Aspartate Aminotransferase (AST/SGOT): 25 (12-30 @ 05:45)        12-31    138  |  96  |  28<H>  ----------------------------<  98  3.4<L>   |  30  |  0.98    Ca    8.9      31 Dec 2022 07:22  Phos  2.8     12-31  Mg     2.2     12-31    TPro  6.1  /  Alb  2.6<L>  /  TBili  0.3  /  DBili  x   /  AST  28  /  ALT  9<L>  /  AlkPhos  88  12-31                        11.8   16.44 )-----------( 409      ( 31 Dec 2022 07:24 )             36.6     Medications  MEDICATIONS  (STANDING):  albuterol/ipratropium for Nebulization 3 milliLiter(s) Nebulizer every 6 hours  budesonide 160 MICROgram(s)/formoterol 4.5 MICROgram(s) Inhaler 2 Puff(s) Inhalation two times a day  carbidopa/levodopa  25/100 1 Tablet(s) Oral <User Schedule>  cyanocobalamin 1000 MICROGram(s) Oral daily  dextrose 5%. 1000 milliLiter(s) (50 mL/Hr) IV Continuous <Continuous>  dextrose 5%. 1000 milliLiter(s) (100 mL/Hr) IV Continuous <Continuous>  dextrose 50% Injectable 25 Gram(s) IV Push once  dextrose 50% Injectable 12.5 Gram(s) IV Push once  dextrose 50% Injectable 25 Gram(s) IV Push once  folic acid 1 milliGRAM(s) Oral daily  glucagon  Injectable 1 milliGRAM(s) IntraMuscular once  hydrochlorothiazide 25 milliGRAM(s) Oral daily  insulin glargine Injectable (LANTUS) 18 Unit(s) SubCutaneous at bedtime  insulin lispro (ADMELOG) corrective regimen sliding scale   SubCutaneous at bedtime  insulin lispro (ADMELOG) corrective regimen sliding scale   SubCutaneous three times a day before meals  insulin lispro Injectable (ADMELOG) 10 Unit(s) SubCutaneous three times a day before meals  lactobacillus acidophilus 1 Tablet(s) Oral daily  losartan 100 milliGRAM(s) Oral daily  metoprolol tartrate 25 milliGRAM(s) Oral two times a day  piperacillin/tazobactam IVPB.- 3.375 Gram(s) IV Intermittent once  piperacillin/tazobactam IVPB.. 3.375 Gram(s) IV Intermittent every 8 hours  predniSONE   Tablet 40 milliGRAM(s) Oral daily  rOPINIRole 2 milliGRAM(s) Oral <User Schedule>  trihexyphenidyl 1 milliGRAM(s) Oral <User Schedule>  warfarin 2.5 milliGRAM(s) Oral once      Physical Exam  General: Patient appears comfortable.  Vital Signs Last 12 Hrs  T(F): 97.7 (12-31-22 @ 09:56), Max: 98 (12-31-22 @ 05:20)  HR: 75 (12-31-22 @ 09:56) (72 - 75)  BP: 130/73 (12-31-22 @ 09:56) (130/73 - 142/71)  BP(mean): --  RR: 18 (12-31-22 @ 09:56) (18 - 18)  SpO2: 94% (12-31-22 @ 09:56) (92% - 94%)  Neck: No palpable thyroid nodules.  CVS: S1S2, No murmurs  Respiratory: No wheezing, no crepitations  GI: Abdomen soft, non tender.  Musculoskeletal:  edema lower extremities.     Diagnostics

## 2022-12-31 NOTE — PROVIDER CONTACT NOTE (CRITICAL VALUE NOTIFICATION) - ACTION/TREATMENT ORDERED:
DC Heparin gtt.
NP made aware. ID on board, pt on Vanco and zosyn. No intervention at this time, will continue to monitor.
follow nonogram

## 2022-12-31 NOTE — PROGRESS NOTE ADULT - ASSESSMENT
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  admitted for sepsis 2/2 scrotal abscess.    # sepsis 2/2 right scrotal abscess  s/p IR drainage 12/25-- growing gram + cocci   evaluated by , suspect infected hematoma, given recent instrumentation will continue broad spectrum abx  Surgical intervention TBD by urology, fu urology  cefazolin per ID  ID consult appreciated     # Chronic obstructive pulmonary disease with exacerbation.  # Pleural effusion   - prednisone 40 mg x 5 days   -echo noted normal lvef   -CXR noted, chronic chronic changes + effusion; hold fluids ; cxr is better.  -per pt has chronic wheezing  Symbicort   duonebs q6h    # Large left inguinal hernia  -tender, may need surgical correction.   -gen surgery recs appreciated     #T2DM  -watch closely in setting of prednisone  - newly diagnosed but probably long standing   - insulin basal bolus   - a1c 8s   - endo consult appreciated     # H/O factor V Leiden mutation  coumadin DAILY INR.     # Parkinsons  continue carbidopa-levo-dopa   continue Ropinirole   continue Trihexyphenidyl    # HTN (hypertension)  continue Irbesartan (therapeutic equivalent)    dvt ppx  coumadin   Please contact with any questions or concerns [phone].

## 2023-01-01 LAB
ALBUMIN SERPL ELPH-MCNC: 2.6 G/DL — LOW (ref 3.3–5)
ALP SERPL-CCNC: 80 U/L — SIGNIFICANT CHANGE UP (ref 40–120)
ALT FLD-CCNC: 6 U/L — LOW (ref 10–45)
ANION GAP SERPL CALC-SCNC: 7 MMOL/L — SIGNIFICANT CHANGE UP (ref 5–17)
APTT BLD: 32.8 SEC — SIGNIFICANT CHANGE UP (ref 27.5–35.5)
AST SERPL-CCNC: 21 U/L — SIGNIFICANT CHANGE UP (ref 10–40)
BASOPHILS # BLD AUTO: 0.05 K/UL — SIGNIFICANT CHANGE UP (ref 0–0.2)
BASOPHILS NFR BLD AUTO: 0.3 % — SIGNIFICANT CHANGE UP (ref 0–2)
BILIRUB SERPL-MCNC: 0.3 MG/DL — SIGNIFICANT CHANGE UP (ref 0.2–1.2)
BUN SERPL-MCNC: 28 MG/DL — HIGH (ref 7–23)
CALCIUM SERPL-MCNC: 9 MG/DL — SIGNIFICANT CHANGE UP (ref 8.4–10.5)
CHLORIDE SERPL-SCNC: 101 MMOL/L — SIGNIFICANT CHANGE UP (ref 96–108)
CO2 SERPL-SCNC: 31 MMOL/L — SIGNIFICANT CHANGE UP (ref 22–31)
CREAT SERPL-MCNC: 1.01 MG/DL — SIGNIFICANT CHANGE UP (ref 0.5–1.3)
EGFR: 76 ML/MIN/1.73M2 — SIGNIFICANT CHANGE UP
EOSINOPHIL # BLD AUTO: 0.11 K/UL — SIGNIFICANT CHANGE UP (ref 0–0.5)
EOSINOPHIL NFR BLD AUTO: 0.7 % — SIGNIFICANT CHANGE UP (ref 0–6)
GLUCOSE SERPL-MCNC: 101 MG/DL — HIGH (ref 70–99)
HCT VFR BLD CALC: 36.6 % — LOW (ref 39–50)
HGB BLD-MCNC: 11.5 G/DL — LOW (ref 13–17)
IMM GRANULOCYTES NFR BLD AUTO: 2.6 % — HIGH (ref 0–0.9)
INR BLD: 2.71 RATIO — HIGH (ref 0.88–1.16)
LYMPHOCYTES # BLD AUTO: 16.9 % — SIGNIFICANT CHANGE UP (ref 13–44)
LYMPHOCYTES # BLD AUTO: 2.55 K/UL — SIGNIFICANT CHANGE UP (ref 1–3.3)
MAGNESIUM SERPL-MCNC: 2.2 MG/DL — SIGNIFICANT CHANGE UP (ref 1.6–2.6)
MCHC RBC-ENTMCNC: 28.8 PG — SIGNIFICANT CHANGE UP (ref 27–34)
MCHC RBC-ENTMCNC: 31.4 GM/DL — LOW (ref 32–36)
MCV RBC AUTO: 91.7 FL — SIGNIFICANT CHANGE UP (ref 80–100)
MONOCYTES # BLD AUTO: 1.33 K/UL — HIGH (ref 0–0.9)
MONOCYTES NFR BLD AUTO: 8.8 % — SIGNIFICANT CHANGE UP (ref 2–14)
NEUTROPHILS # BLD AUTO: 10.63 K/UL — HIGH (ref 1.8–7.4)
NEUTROPHILS NFR BLD AUTO: 70.7 % — SIGNIFICANT CHANGE UP (ref 43–77)
NRBC # BLD: 0 /100 WBCS — SIGNIFICANT CHANGE UP (ref 0–0)
PHOSPHATE SERPL-MCNC: 3.1 MG/DL — SIGNIFICANT CHANGE UP (ref 2.5–4.5)
PLATELET # BLD AUTO: 388 K/UL — SIGNIFICANT CHANGE UP (ref 150–400)
POTASSIUM SERPL-MCNC: 4.1 MMOL/L — SIGNIFICANT CHANGE UP (ref 3.5–5.3)
POTASSIUM SERPL-SCNC: 4.1 MMOL/L — SIGNIFICANT CHANGE UP (ref 3.5–5.3)
PROT SERPL-MCNC: 5.9 G/DL — LOW (ref 6–8.3)
PROTHROM AB SERPL-ACNC: 31.7 SEC — HIGH (ref 10.5–13.4)
RBC # BLD: 3.99 M/UL — LOW (ref 4.2–5.8)
RBC # FLD: 16.9 % — HIGH (ref 10.3–14.5)
SODIUM SERPL-SCNC: 139 MMOL/L — SIGNIFICANT CHANGE UP (ref 135–145)
WBC # BLD: 15.06 K/UL — HIGH (ref 3.8–10.5)
WBC # FLD AUTO: 15.06 K/UL — HIGH (ref 3.8–10.5)

## 2023-01-01 RX ORDER — WARFARIN SODIUM 2.5 MG/1
2.5 TABLET ORAL ONCE
Refills: 0 | Status: COMPLETED | OUTPATIENT
Start: 2023-01-01 | End: 2023-01-01

## 2023-01-01 RX ORDER — FUROSEMIDE 40 MG
40 TABLET ORAL ONCE
Refills: 0 | Status: DISCONTINUED | OUTPATIENT
Start: 2023-01-01 | End: 2023-01-01

## 2023-01-01 RX ORDER — SENNA PLUS 8.6 MG/1
2 TABLET ORAL AT BEDTIME
Refills: 0 | Status: DISCONTINUED | OUTPATIENT
Start: 2023-01-01 | End: 2023-01-07

## 2023-01-01 RX ORDER — POLYETHYLENE GLYCOL 3350 17 G/17G
17 POWDER, FOR SOLUTION ORAL DAILY
Refills: 0 | Status: DISCONTINUED | OUTPATIENT
Start: 2023-01-01 | End: 2023-01-07

## 2023-01-01 RX ADMIN — ROPINIROLE 2 MILLIGRAM(S): 8 TABLET, FILM COATED, EXTENDED RELEASE ORAL at 22:20

## 2023-01-01 RX ADMIN — LOSARTAN POTASSIUM 100 MILLIGRAM(S): 100 TABLET, FILM COATED ORAL at 05:15

## 2023-01-01 RX ADMIN — CARBIDOPA AND LEVODOPA 1 TABLET(S): 25; 100 TABLET ORAL at 22:20

## 2023-01-01 RX ADMIN — BUDESONIDE AND FORMOTEROL FUMARATE DIHYDRATE 2 PUFF(S): 160; 4.5 AEROSOL RESPIRATORY (INHALATION) at 05:14

## 2023-01-01 RX ADMIN — PIPERACILLIN AND TAZOBACTAM 25 GRAM(S): 4; .5 INJECTION, POWDER, LYOPHILIZED, FOR SOLUTION INTRAVENOUS at 14:01

## 2023-01-01 RX ADMIN — Medication 3 MILLILITER(S): at 12:24

## 2023-01-01 RX ADMIN — Medication 100 MILLIGRAM(S): at 22:21

## 2023-01-01 RX ADMIN — Medication 100 MILLIGRAM(S): at 15:03

## 2023-01-01 RX ADMIN — ROPINIROLE 2 MILLIGRAM(S): 8 TABLET, FILM COATED, EXTENDED RELEASE ORAL at 18:15

## 2023-01-01 RX ADMIN — ROPINIROLE 2 MILLIGRAM(S): 8 TABLET, FILM COATED, EXTENDED RELEASE ORAL at 09:10

## 2023-01-01 RX ADMIN — PIPERACILLIN AND TAZOBACTAM 25 GRAM(S): 4; .5 INJECTION, POWDER, LYOPHILIZED, FOR SOLUTION INTRAVENOUS at 05:14

## 2023-01-01 RX ADMIN — Medication 1: at 18:15

## 2023-01-01 RX ADMIN — BUDESONIDE AND FORMOTEROL FUMARATE DIHYDRATE 2 PUFF(S): 160; 4.5 AEROSOL RESPIRATORY (INHALATION) at 18:17

## 2023-01-01 RX ADMIN — CARBIDOPA AND LEVODOPA 1 TABLET(S): 25; 100 TABLET ORAL at 18:16

## 2023-01-01 RX ADMIN — PIPERACILLIN AND TAZOBACTAM 25 GRAM(S): 4; .5 INJECTION, POWDER, LYOPHILIZED, FOR SOLUTION INTRAVENOUS at 22:21

## 2023-01-01 RX ADMIN — Medication 1 MILLIGRAM(S): at 12:23

## 2023-01-01 RX ADMIN — Medication 3 MILLILITER(S): at 05:14

## 2023-01-01 RX ADMIN — CARBIDOPA AND LEVODOPA 1 TABLET(S): 25; 100 TABLET ORAL at 09:13

## 2023-01-01 RX ADMIN — Medication 10 UNIT(S): at 12:22

## 2023-01-01 RX ADMIN — Medication 40 MILLIGRAM(S): at 05:15

## 2023-01-01 RX ADMIN — SENNA PLUS 2 TABLET(S): 8.6 TABLET ORAL at 22:21

## 2023-01-01 RX ADMIN — Medication 25 MILLIGRAM(S): at 18:18

## 2023-01-01 RX ADMIN — Medication 1 MILLIGRAM(S): at 09:10

## 2023-01-01 RX ADMIN — ROPINIROLE 2 MILLIGRAM(S): 8 TABLET, FILM COATED, EXTENDED RELEASE ORAL at 12:23

## 2023-01-01 RX ADMIN — Medication 3 MILLILITER(S): at 18:16

## 2023-01-01 RX ADMIN — WARFARIN SODIUM 2.5 MILLIGRAM(S): 2.5 TABLET ORAL at 22:21

## 2023-01-01 RX ADMIN — Medication 3 MILLILITER(S): at 23:46

## 2023-01-01 RX ADMIN — INSULIN GLARGINE 18 UNIT(S): 100 INJECTION, SOLUTION SUBCUTANEOUS at 22:21

## 2023-01-01 RX ADMIN — PREGABALIN 1000 MICROGRAM(S): 225 CAPSULE ORAL at 12:23

## 2023-01-01 RX ADMIN — CARBIDOPA AND LEVODOPA 1 TABLET(S): 25; 100 TABLET ORAL at 12:23

## 2023-01-01 RX ADMIN — POLYETHYLENE GLYCOL 3350 17 GRAM(S): 17 POWDER, FOR SOLUTION ORAL at 18:18

## 2023-01-01 RX ADMIN — Medication 1 TABLET(S): at 12:23

## 2023-01-01 RX ADMIN — Medication 10 UNIT(S): at 09:10

## 2023-01-01 RX ADMIN — Medication 10 UNIT(S): at 18:15

## 2023-01-01 RX ADMIN — Medication 1: at 12:22

## 2023-01-01 RX ADMIN — Medication 25 MILLIGRAM(S): at 05:15

## 2023-01-01 RX ADMIN — Medication 100 MILLIGRAM(S): at 05:15

## 2023-01-01 RX ADMIN — Medication 1 MILLIGRAM(S): at 18:16

## 2023-01-01 RX ADMIN — Medication 1 MILLIGRAM(S): at 22:20

## 2023-01-01 NOTE — PROGRESS NOTE ADULT - SUBJECTIVE AND OBJECTIVE BOX
Patient is a 77y old  Male who presents with a chief complaint of generalized weakness and AMS x 2 days (31 Dec 2022 14:50)      SUBJECTIVE / OVERNIGHT EVENTS:  Patient seen and examined.   Feels better.       Vital Signs Last 24 Hrs  T(C): 36.4 (01 Jan 2023 13:06), Max: 36.6 (31 Dec 2022 21:14)  T(F): 97.5 (01 Jan 2023 13:06), Max: 97.8 (31 Dec 2022 21:14)  HR: 66 (01 Jan 2023 13:06) (65 - 77)  BP: 124/71 (01 Jan 2023 13:06) (124/71 - 133/67)  BP(mean): --  RR: 18 (01 Jan 2023 13:06) (18 - 18)  SpO2: 94% (01 Jan 2023 13:06) (93% - 95%)    Parameters below as of 01 Jan 2023 13:06  Patient On (Oxygen Delivery Method): nasal cannula  O2 Flow (L/min): 2    I&O's Summary    31 Dec 2022 07:01  -  01 Jan 2023 07:00  --------------------------------------------------------  IN: 1110 mL / OUT: 2860 mL / NET: -1750 mL        PE:  GENERAL: NAD, AAOx3  CHEST/LUNG: wheezing  HEART: Regular rate and rhythm; no murmur  ABDOMEN: Soft, Nontender, Nondistended; Bowel sounds present  : enlarged R scrotum, erythematous, tender, gould; ir tube with drainage  EXTREMITIES:  2+ Peripheral Pulses, No edema  NEURO: No focal deficit  LABS:                        11.5   15.06 )-----------( 388      ( 01 Jan 2023 05:45 )             36.6     01-01    139  |  101  |  28<H>  ----------------------------<  101<H>  4.1   |  31  |  1.01    Ca    9.0      01 Jan 2023 05:45  Phos  3.1     01-01  Mg     2.2     01-01    TPro  5.9<L>  /  Alb  2.6<L>  /  TBili  0.3  /  DBili  x   /  AST  21  /  ALT  6<L>  /  AlkPhos  80  01-01    PT/INR - ( 01 Jan 2023 05:45 )   PT: 31.7 sec;   INR: 2.71 ratio         PTT - ( 01 Jan 2023 05:45 )  PTT:32.8 sec  CAPILLARY BLOOD GLUCOSE      POCT Blood Glucose.: 173 mg/dL (01 Jan 2023 12:17)  POCT Blood Glucose.: 131 mg/dL (01 Jan 2023 08:22)  POCT Blood Glucose.: 112 mg/dL (31 Dec 2022 22:06)  POCT Blood Glucose.: 228 mg/dL (31 Dec 2022 17:51)            RADIOLOGY & ADDITIONAL TESTS:    Imaging Personally Reviewed:  [x] YES  [ ] NO    Consultant(s) Notes Reviewed:  [x] YES  [ ] NO      MEDICATIONS  (STANDING):  albuterol/ipratropium for Nebulization 3 milliLiter(s) Nebulizer every 6 hours  benzonatate 100 milliGRAM(s) Oral three times a day  budesonide 160 MICROgram(s)/formoterol 4.5 MICROgram(s) Inhaler 2 Puff(s) Inhalation two times a day  carbidopa/levodopa  25/100 1 Tablet(s) Oral <User Schedule>  cyanocobalamin 1000 MICROGram(s) Oral daily  dextrose 5%. 1000 milliLiter(s) (100 mL/Hr) IV Continuous <Continuous>  dextrose 5%. 1000 milliLiter(s) (50 mL/Hr) IV Continuous <Continuous>  dextrose 50% Injectable 25 Gram(s) IV Push once  dextrose 50% Injectable 12.5 Gram(s) IV Push once  dextrose 50% Injectable 25 Gram(s) IV Push once  folic acid 1 milliGRAM(s) Oral daily  glucagon  Injectable 1 milliGRAM(s) IntraMuscular once  hydrochlorothiazide 25 milliGRAM(s) Oral daily  insulin glargine Injectable (LANTUS) 18 Unit(s) SubCutaneous at bedtime  insulin lispro (ADMELOG) corrective regimen sliding scale   SubCutaneous three times a day before meals  insulin lispro (ADMELOG) corrective regimen sliding scale   SubCutaneous at bedtime  insulin lispro Injectable (ADMELOG) 10 Unit(s) SubCutaneous three times a day before meals  lactobacillus acidophilus 1 Tablet(s) Oral daily  losartan 100 milliGRAM(s) Oral daily  metoprolol tartrate 25 milliGRAM(s) Oral two times a day  piperacillin/tazobactam IVPB.. 3.375 Gram(s) IV Intermittent every 8 hours  polyethylene glycol 3350 17 Gram(s) Oral daily  predniSONE   Tablet 40 milliGRAM(s) Oral daily  rOPINIRole 2 milliGRAM(s) Oral <User Schedule>  senna 2 Tablet(s) Oral at bedtime  trihexyphenidyl 1 milliGRAM(s) Oral <User Schedule>  warfarin 2.5 milliGRAM(s) Oral once    MEDICATIONS  (PRN):  acetaminophen     Tablet .. 650 milliGRAM(s) Oral every 6 hours PRN Temp greater or equal to 38C (100.4F), Mild Pain (1 - 3)  dextrose Oral Gel 15 Gram(s) Oral once PRN Blood Glucose LESS THAN 70 milliGRAM(s)/deciliter  melatonin 3 milliGRAM(s) Oral at bedtime PRN Insomnia  ondansetron Injectable 4 milliGRAM(s) IV Push every 8 hours PRN Nausea and/or Vomiting      Care Discussed with Consultants/Other Providers [x] YES  [ ] NO    HEALTH ISSUES - PROBLEM Dx:  Scrotal abscess    Sepsis    Chronic obstructive pulmonary disease (COPD)    H/O factor V Leiden mutation    Unspecified atrial fibrillation    Parkinsons    HTN (hypertension)    Preop exam for internal medicine    DM (diabetes mellitus)

## 2023-01-01 NOTE — PROGRESS NOTE ADULT - SUBJECTIVE AND OBJECTIVE BOX
Chief complaint    Patient is a 77y old  Male who presents with a chief complaint of generalized weakness and AMS x 2 days (01 Jan 2023 14:19)   Review of systems  Patient in bed, appears comfortable.    Labs and Fingersticks  CAPILLARY BLOOD GLUCOSE      POCT Blood Glucose.: 173 mg/dL (01 Jan 2023 12:17)  POCT Blood Glucose.: 131 mg/dL (01 Jan 2023 08:22)  POCT Blood Glucose.: 112 mg/dL (31 Dec 2022 22:06)  POCT Blood Glucose.: 228 mg/dL (31 Dec 2022 17:51)      Anion Gap, Serum: 7 (01-01 @ 05:45)  Anion Gap, Serum: 12 (12-31 @ 07:22)      Calcium, Total Serum: 9.0 (01-01 @ 05:45)  Calcium, Total Serum: 8.9 (12-31 @ 07:22)  Albumin, Serum: 2.6 *L* (01-01 @ 05:45)  Albumin, Serum: 2.6 *L* (12-31 @ 07:22)    Alanine Aminotransferase (ALT/SGPT): 6 *L* (01-01 @ 05:45)  Alanine Aminotransferase (ALT/SGPT): 9 *L* (12-31 @ 07:22)  Alkaline Phosphatase, Serum: 80 (01-01 @ 05:45)  Alkaline Phosphatase, Serum: 88 (12-31 @ 07:22)  Aspartate Aminotransferase (AST/SGOT): 21 (01-01 @ 05:45)  Aspartate Aminotransferase (AST/SGOT): 28 (12-31 @ 07:22)        01-01    139  |  101  |  28<H>  ----------------------------<  101<H>  4.1   |  31  |  1.01    Ca    9.0      01 Jan 2023 05:45  Phos  3.1     01-01  Mg     2.2     01-01    TPro  5.9<L>  /  Alb  2.6<L>  /  TBili  0.3  /  DBili  x   /  AST  21  /  ALT  6<L>  /  AlkPhos  80  01-01                        11.5   15.06 )-----------( 388      ( 01 Jan 2023 05:45 )             36.6     Medications  MEDICATIONS  (STANDING):  albuterol/ipratropium for Nebulization 3 milliLiter(s) Nebulizer every 6 hours  benzonatate 100 milliGRAM(s) Oral three times a day  budesonide 160 MICROgram(s)/formoterol 4.5 MICROgram(s) Inhaler 2 Puff(s) Inhalation two times a day  carbidopa/levodopa  25/100 1 Tablet(s) Oral <User Schedule>  cyanocobalamin 1000 MICROGram(s) Oral daily  dextrose 5%. 1000 milliLiter(s) (50 mL/Hr) IV Continuous <Continuous>  dextrose 5%. 1000 milliLiter(s) (100 mL/Hr) IV Continuous <Continuous>  dextrose 50% Injectable 25 Gram(s) IV Push once  dextrose 50% Injectable 12.5 Gram(s) IV Push once  dextrose 50% Injectable 25 Gram(s) IV Push once  folic acid 1 milliGRAM(s) Oral daily  glucagon  Injectable 1 milliGRAM(s) IntraMuscular once  hydrochlorothiazide 25 milliGRAM(s) Oral daily  insulin glargine Injectable (LANTUS) 18 Unit(s) SubCutaneous at bedtime  insulin lispro (ADMELOG) corrective regimen sliding scale   SubCutaneous at bedtime  insulin lispro (ADMELOG) corrective regimen sliding scale   SubCutaneous three times a day before meals  insulin lispro Injectable (ADMELOG) 10 Unit(s) SubCutaneous three times a day before meals  lactobacillus acidophilus 1 Tablet(s) Oral daily  losartan 100 milliGRAM(s) Oral daily  metoprolol tartrate 25 milliGRAM(s) Oral two times a day  piperacillin/tazobactam IVPB.. 3.375 Gram(s) IV Intermittent every 8 hours  polyethylene glycol 3350 17 Gram(s) Oral daily  predniSONE   Tablet 40 milliGRAM(s) Oral daily  rOPINIRole 2 milliGRAM(s) Oral <User Schedule>  senna 2 Tablet(s) Oral at bedtime  trihexyphenidyl 1 milliGRAM(s) Oral <User Schedule>  warfarin 2.5 milliGRAM(s) Oral once      Physical Exam  General: Patient appears comfortable.  Vital Signs Last 12 Hrs  T(F): 97.5 (01-01-23 @ 13:06), Max: 97.8 (01-01-23 @ 05:01)  HR: 66 (01-01-23 @ 13:06) (65 - 66)  BP: 124/71 (01-01-23 @ 13:06) (124/71 - 133/67)  BP(mean): --  RR: 18 (01-01-23 @ 13:06) (18 - 18)  SpO2: 94% (01-01-23 @ 13:06) (93% - 94%)  Neck: No palpable thyroid nodules.  CVS: S1S2, No murmurs  Respiratory: No wheezing, no crepitations  GI: Abdomen soft, non tender.  Musculoskeletal:  edema lower extremities.     Diagnostics

## 2023-01-01 NOTE — PROGRESS NOTE ADULT - ASSESSMENT
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    Sepsis 2/2 Scrotal abscess  Large L inguinal hernia  - imaging reviewed  - BCx NGTD  -  and surgery following--suspect superinfected hematoma  - s/p IR drainage on 12/25 - drained 20cc purulent fluid, drain in place  - Abscess cx with Staph caprae-- sensitivities noted   - afebrile, WBC trending up, has continued cough, no other new sx   - RVP negative, CXR reported with clearing  Recs:  - s/p vancomycin and pip-tazo  - IR following - drainage 10-15cc over last 24h   -- would repeat imaging/CTAP with contrast Monday to re-assess collection  - s/p cefazolin (12/28-12/31)  - broaden to pip-tazo and repeat CXR given increasing WBC and ongoing cough  - supportive care and pain management per primary team  - drain care per surgery/IR  - monitor temps, CBC, Cr      Surendra Connolly M.D.  [facility] Division of Infectious Diseases  [phone]  After 5pm on weekdays and all day on weekends - please call [phone]

## 2023-01-01 NOTE — PROGRESS NOTE ADULT - ASSESSMENT
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  admitted for sepsis 2/2 scrotal abscess.    # sepsis 2/2 right scrotal abscess  s/p IR drainage 12/25-- growing gram + cocci   evaluated by , suspect infected hematoma, given recent instrumentation will continue broad spectrum abx  Surgical intervention TBD by urology, fu urology  cefazolin per ID  ID consult appreciated     # Chronic obstructive pulmonary disease with exacerbation.  # Pleural effusion   - prednisone 40 mg x 5 days   -echo noted normal lvef   -CXR noted, chronic chronic changes + effusion; hold fluids ; cxr is better.  -per pt has chronic wheezing  Symbicort   duonebs q6h    # Large left inguinal hernia  -tender, may need surgical correction.   -gen surgery recs appreciated     #T2DM  -watch closely in setting of prednisone  - newly diagnosed but probably long standing   - insulin basal bolus   - a1c 8s   - endo consult appreciated     # H/O factor V Leiden mutation  coumadin DAILY INR.     # Parkinsons  continue carbidopa-levo-dopa   continue Ropinirole   continue Trihexyphenidyl    # HTN (hypertension)  continue Irbesartan (therapeutic equivalent)    dvt ppx  coumadin   Please contact with any questions or concerns [phone].

## 2023-01-01 NOTE — PROGRESS NOTE ADULT - ASSESSMENT
Assessment  DMT2: 77y Male with DM T2 with hyperglycemia, A1C 8%, now on low-dose basal bolus insulin, blood sugars are within acceptable range, no hypoglycemic episodes, eating meals.  Scrotal Abscess: on medications, stable, monitored.  COPD: On meds, breathing tx, stable.  HTN: Controlled,  on antihypertensive medications.  Parkinsons: On meds, stable, monitored.        Dima Keller MD  Cell: [phone]  Office: [phone]

## 2023-01-01 NOTE — PROGRESS NOTE ADULT - SUBJECTIVE AND OBJECTIVE BOX
[facility]  CHRISTI Putnam Y. Patel, S. Shah, G. Aureliano  [phone]  ([phone] - weekdays after 5pm and weekends)    Name: BOECKLE, ROBERT  Age/Gender: 77y Male  MRN: [id number]    Interval History:  Patient seen and examined this morning.   No new complaints noted.  Notes reviewed  No concerning overnight events  Afebrile   Allergies: garlic (Other (Mild))  Levaquin (Anaphylaxis)      Objective:  Vitals:   T(F): 97.5 (01-01-23 @ 21:52), Max: 97.8 (01-01-23 @ 05:01)  HR: 65 (01-01-23 @ 21:52) (65 - 72)  BP: 126/71 (01-01-23 @ 21:52) (124/71 - 133/67)  RR: 18 (01-01-23 @ 21:52) (18 - 18)  SpO2: 95% (01-01-23 @ 21:52) (93% - 95%)  Physical Examination:  General: no acute distress, obese  HEENT: NC/AT, anicteric, neck supple  Respiratory: no acc muscle use, breathing comfortably  Cardiovascular: S1 and S2 present  Gastrointestinal: normal appearing, nondistended  : scrotal swelling, no TTP, +drain with minimal output  Extremities: no edema, no cyanosis  Skin: no visible rash    Laboratory Studies:  CBC:                       11.5   15.06 )-----------( 388      ( 01 Jan 2023 05:45 )             36.6     WBC Trend:  15.06 01-01-23 @ 05:45  16.44 12-31-22 @ 07:24  15.46 12-30-22 @ 05:45  12.78 12-29-22 @ 07:18  13.89 12-28-22 @ 06:37  17.41 12-27-22 @ 07:05  15.05 12-26-22 @ 06:17    CMP: 01-01    139  |  101  |  28<H>  ----------------------------<  101<H>  4.1   |  31  |  1.01    Ca    9.0      01 Jan 2023 05:45  Phos  3.1     01-01  Mg     2.2     01-01    TPro  5.9<L>  /  Alb  2.6<L>  /  TBili  0.3  /  DBili  x   /  AST  21  /  ALT  6<L>  /  AlkPhos  80  01-01    Creatinine, Serum: 1.01 mg/dL (01-01-23 @ 05:45)  Creatinine, Serum: 0.98 mg/dL (12-31-22 @ 07:22)  Creatinine, Serum: 0.97 mg/dL (12-30-22 @ 05:45)  Creatinine, Serum: 0.90 mg/dL (12-29-22 @ 07:15)  Creatinine, Serum: 0.96 mg/dL (12-28-22 @ 06:37)  Creatinine, Serum: 1.01 mg/dL (12-27-22 @ 07:05)  Creatinine, Serum: 0.99 mg/dL (12-26-22 @ 06:17)      LIVER FUNCTIONS - ( 01 Jan 2023 05:45 )  Alb: 2.6 g/dL / Pro: 5.9 g/dL / ALK PHOS: 80 U/L / ALT: 6 U/L / AST: 21 U/L / GGT: x           Vancomycin Level, Trough: 19.4 ug/mL (12-28-22 @ 09:31)      Microbiology: reviewed     Culture - Body Fluid with Gram Stain (collected 12-25-22 @ 23:09)  Source: Abdominal Fl Abdominal Fluid  Gram Stain (12-26-22 @ 08:02):    Numerous polymorphonuclear leukocytes seen per low power field    Moderate Gram positive cocci in pairs seen per oil power field  Final Report (12-31-22 @ 08:35):    Few Staphylococcus caprae  Organism: Staphylococcus caprae (12-31-22 @ 08:35)  Organism: Staphylococcus caprae (12-31-22 @ 08:35)      -  Ampicillin/Sulbactam: S <=8/4      -  Cefazolin: S <=4      -  Clindamycin: S <=0.25      -  Erythromycin: S <=0.25      -  Gentamicin: S <=1 Should not be used as monotherapy      -  Oxacillin: S <=0.25      -  Rifampin: S <=1 Should not be used as monotherapy      -  Tetracycline: S <=1      -  Trimethoprim/Sulfamethoxazole: S <=0.5/9.5      -  Vancomycin: S 1      Method Type: [facility]    Culture - Blood (collected 12-23-22 @ 13:35)  Source: .Blood Blood-Peripheral  Final Report (12-28-22 @ 18:01):    No Growth Final    Culture - Blood (collected 12-23-22 @ 13:15)  Source: .Blood Blood-Peripheral  Final Report (12-28-22 @ 18:01):    No Growth Final    Radiology: reviewed     Medications:  acetaminophen     Tablet .. 650 milliGRAM(s) Oral every 6 hours PRN  albuterol/ipratropium for Nebulization 3 milliLiter(s) Nebulizer every 6 hours  benzonatate 100 milliGRAM(s) Oral three times a day  budesonide 160 MICROgram(s)/formoterol 4.5 MICROgram(s) Inhaler 2 Puff(s) Inhalation two times a day  carbidopa/levodopa  25/100 1 Tablet(s) Oral <User Schedule>  cyanocobalamin 1000 MICROGram(s) Oral daily  dextrose 5%. 1000 milliLiter(s) IV Continuous <Continuous>  dextrose 5%. 1000 milliLiter(s) IV Continuous <Continuous>  dextrose 50% Injectable 25 Gram(s) IV Push once  dextrose 50% Injectable 12.5 Gram(s) IV Push once  dextrose 50% Injectable 25 Gram(s) IV Push once  dextrose Oral Gel 15 Gram(s) Oral once PRN  folic acid 1 milliGRAM(s) Oral daily  glucagon  Injectable 1 milliGRAM(s) IntraMuscular once  hydrochlorothiazide 25 milliGRAM(s) Oral daily  insulin glargine Injectable (LANTUS) 18 Unit(s) SubCutaneous at bedtime  insulin lispro (ADMELOG) corrective regimen sliding scale   SubCutaneous three times a day before meals  insulin lispro (ADMELOG) corrective regimen sliding scale   SubCutaneous at bedtime  insulin lispro Injectable (ADMELOG) 10 Unit(s) SubCutaneous three times a day before meals  lactobacillus acidophilus 1 Tablet(s) Oral daily  losartan 100 milliGRAM(s) Oral daily  melatonin 3 milliGRAM(s) Oral at bedtime PRN  metoprolol tartrate 25 milliGRAM(s) Oral two times a day  ondansetron Injectable 4 milliGRAM(s) IV Push every 8 hours PRN  piperacillin/tazobactam IVPB.. 3.375 Gram(s) IV Intermittent every 8 hours  polyethylene glycol 3350 17 Gram(s) Oral daily  predniSONE   Tablet 40 milliGRAM(s) Oral daily  rOPINIRole 2 milliGRAM(s) Oral <User Schedule>  senna 2 Tablet(s) Oral at bedtime  trihexyphenidyl 1 milliGRAM(s) Oral <User Schedule>    Current Antimicrobials:  piperacillin/tazobactam IVPB.. 3.375 Gram(s) IV Intermittent every 8 hours    Prior/Completed Antimicrobials:  cefTRIAXone   IVPB  piperacillin/tazobactam IVPB.  piperacillin/tazobactam IVPB.-  piperacillin/tazobactam IVPB...  vancomycin  IVPB.

## 2023-01-02 LAB
ALBUMIN SERPL ELPH-MCNC: 2.7 G/DL — LOW (ref 3.3–5)
ALP SERPL-CCNC: 79 U/L — SIGNIFICANT CHANGE UP (ref 40–120)
ALT FLD-CCNC: 5 U/L — LOW (ref 10–45)
ANION GAP SERPL CALC-SCNC: 10 MMOL/L — SIGNIFICANT CHANGE UP (ref 5–17)
APTT BLD: 35 SEC — SIGNIFICANT CHANGE UP (ref 27.5–35.5)
AST SERPL-CCNC: 19 U/L — SIGNIFICANT CHANGE UP (ref 10–40)
BILIRUB SERPL-MCNC: 0.3 MG/DL — SIGNIFICANT CHANGE UP (ref 0.2–1.2)
BUN SERPL-MCNC: 30 MG/DL — HIGH (ref 7–23)
CALCIUM SERPL-MCNC: 8.8 MG/DL — SIGNIFICANT CHANGE UP (ref 8.4–10.5)
CHLORIDE SERPL-SCNC: 100 MMOL/L — SIGNIFICANT CHANGE UP (ref 96–108)
CO2 SERPL-SCNC: 28 MMOL/L — SIGNIFICANT CHANGE UP (ref 22–31)
CREAT SERPL-MCNC: 1.11 MG/DL — SIGNIFICANT CHANGE UP (ref 0.5–1.3)
EGFR: 68 ML/MIN/1.73M2 — SIGNIFICANT CHANGE UP
GLUCOSE SERPL-MCNC: 82 MG/DL — SIGNIFICANT CHANGE UP (ref 70–99)
HCT VFR BLD CALC: 38.2 % — LOW (ref 39–50)
HGB BLD-MCNC: 11.9 G/DL — LOW (ref 13–17)
INR BLD: 2.6 RATIO — HIGH (ref 0.88–1.16)
MAGNESIUM SERPL-MCNC: 2.2 MG/DL — SIGNIFICANT CHANGE UP (ref 1.6–2.6)
MCHC RBC-ENTMCNC: 29 PG — SIGNIFICANT CHANGE UP (ref 27–34)
MCHC RBC-ENTMCNC: 31.2 GM/DL — LOW (ref 32–36)
MCV RBC AUTO: 93.2 FL — SIGNIFICANT CHANGE UP (ref 80–100)
NRBC # BLD: 0 /100 WBCS — SIGNIFICANT CHANGE UP (ref 0–0)
PHOSPHATE SERPL-MCNC: 3.2 MG/DL — SIGNIFICANT CHANGE UP (ref 2.5–4.5)
PLATELET # BLD AUTO: 425 K/UL — HIGH (ref 150–400)
POTASSIUM SERPL-MCNC: 4.4 MMOL/L — SIGNIFICANT CHANGE UP (ref 3.5–5.3)
POTASSIUM SERPL-SCNC: 4.4 MMOL/L — SIGNIFICANT CHANGE UP (ref 3.5–5.3)
PROT SERPL-MCNC: 6.1 G/DL — SIGNIFICANT CHANGE UP (ref 6–8.3)
PROTHROM AB SERPL-ACNC: 30.2 SEC — HIGH (ref 10.5–13.4)
RBC # BLD: 4.1 M/UL — LOW (ref 4.2–5.8)
RBC # FLD: 17.3 % — HIGH (ref 10.3–14.5)
SODIUM SERPL-SCNC: 138 MMOL/L — SIGNIFICANT CHANGE UP (ref 135–145)
WBC # BLD: 15.41 K/UL — HIGH (ref 3.8–10.5)
WBC # FLD AUTO: 15.41 K/UL — HIGH (ref 3.8–10.5)

## 2023-01-02 RX ORDER — INSULIN LISPRO 100/ML
8 VIAL (ML) SUBCUTANEOUS
Refills: 0 | Status: DISCONTINUED | OUTPATIENT
Start: 2023-01-02 | End: 2023-01-04

## 2023-01-02 RX ORDER — INSULIN GLARGINE 100 [IU]/ML
14 INJECTION, SOLUTION SUBCUTANEOUS AT BEDTIME
Refills: 0 | Status: DISCONTINUED | OUTPATIENT
Start: 2023-01-02 | End: 2023-01-04

## 2023-01-02 RX ORDER — WARFARIN SODIUM 2.5 MG/1
2.5 TABLET ORAL ONCE
Refills: 0 | Status: COMPLETED | OUTPATIENT
Start: 2023-01-02 | End: 2023-01-02

## 2023-01-02 RX ADMIN — WARFARIN SODIUM 2.5 MILLIGRAM(S): 2.5 TABLET ORAL at 21:47

## 2023-01-02 RX ADMIN — Medication 25 MILLIGRAM(S): at 05:13

## 2023-01-02 RX ADMIN — Medication 1 MILLIGRAM(S): at 08:49

## 2023-01-02 RX ADMIN — Medication 1: at 13:02

## 2023-01-02 RX ADMIN — Medication 8 UNIT(S): at 13:03

## 2023-01-02 RX ADMIN — Medication 25 MILLIGRAM(S): at 17:43

## 2023-01-02 RX ADMIN — ROPINIROLE 2 MILLIGRAM(S): 8 TABLET, FILM COATED, EXTENDED RELEASE ORAL at 08:50

## 2023-01-02 RX ADMIN — BUDESONIDE AND FORMOTEROL FUMARATE DIHYDRATE 2 PUFF(S): 160; 4.5 AEROSOL RESPIRATORY (INHALATION) at 17:43

## 2023-01-02 RX ADMIN — Medication 8 UNIT(S): at 17:43

## 2023-01-02 RX ADMIN — INSULIN GLARGINE 14 UNIT(S): 100 INJECTION, SOLUTION SUBCUTANEOUS at 21:46

## 2023-01-02 RX ADMIN — Medication 1 TABLET(S): at 12:03

## 2023-01-02 RX ADMIN — Medication 100 MILLIGRAM(S): at 05:13

## 2023-01-02 RX ADMIN — Medication 1 MILLIGRAM(S): at 12:03

## 2023-01-02 RX ADMIN — ROPINIROLE 2 MILLIGRAM(S): 8 TABLET, FILM COATED, EXTENDED RELEASE ORAL at 14:36

## 2023-01-02 RX ADMIN — Medication 1 MILLIGRAM(S): at 17:43

## 2023-01-02 RX ADMIN — CARBIDOPA AND LEVODOPA 1 TABLET(S): 25; 100 TABLET ORAL at 17:44

## 2023-01-02 RX ADMIN — PIPERACILLIN AND TAZOBACTAM 25 GRAM(S): 4; .5 INJECTION, POWDER, LYOPHILIZED, FOR SOLUTION INTRAVENOUS at 21:56

## 2023-01-02 RX ADMIN — LOSARTAN POTASSIUM 100 MILLIGRAM(S): 100 TABLET, FILM COATED ORAL at 05:13

## 2023-01-02 RX ADMIN — ROPINIROLE 2 MILLIGRAM(S): 8 TABLET, FILM COATED, EXTENDED RELEASE ORAL at 17:44

## 2023-01-02 RX ADMIN — Medication 1 MILLIGRAM(S): at 23:07

## 2023-01-02 RX ADMIN — ROPINIROLE 2 MILLIGRAM(S): 8 TABLET, FILM COATED, EXTENDED RELEASE ORAL at 23:07

## 2023-01-02 RX ADMIN — Medication 100 MILLIGRAM(S): at 21:45

## 2023-01-02 RX ADMIN — CARBIDOPA AND LEVODOPA 1 TABLET(S): 25; 100 TABLET ORAL at 23:07

## 2023-01-02 RX ADMIN — SENNA PLUS 2 TABLET(S): 8.6 TABLET ORAL at 21:46

## 2023-01-02 RX ADMIN — Medication 3 MILLILITER(S): at 17:43

## 2023-01-02 RX ADMIN — CARBIDOPA AND LEVODOPA 1 TABLET(S): 25; 100 TABLET ORAL at 14:37

## 2023-01-02 RX ADMIN — CARBIDOPA AND LEVODOPA 1 TABLET(S): 25; 100 TABLET ORAL at 08:50

## 2023-01-02 RX ADMIN — Medication 40 MILLIGRAM(S): at 05:13

## 2023-01-02 RX ADMIN — PREGABALIN 1000 MICROGRAM(S): 225 CAPSULE ORAL at 12:04

## 2023-01-02 RX ADMIN — Medication 1 MILLIGRAM(S): at 14:36

## 2023-01-02 RX ADMIN — Medication 100 MILLIGRAM(S): at 14:36

## 2023-01-02 RX ADMIN — Medication 3 MILLILITER(S): at 05:13

## 2023-01-02 RX ADMIN — BUDESONIDE AND FORMOTEROL FUMARATE DIHYDRATE 2 PUFF(S): 160; 4.5 AEROSOL RESPIRATORY (INHALATION) at 05:14

## 2023-01-02 RX ADMIN — PIPERACILLIN AND TAZOBACTAM 25 GRAM(S): 4; .5 INJECTION, POWDER, LYOPHILIZED, FOR SOLUTION INTRAVENOUS at 05:13

## 2023-01-02 RX ADMIN — Medication 3 MILLILITER(S): at 12:04

## 2023-01-02 RX ADMIN — Medication 10 UNIT(S): at 08:50

## 2023-01-02 RX ADMIN — PIPERACILLIN AND TAZOBACTAM 25 GRAM(S): 4; .5 INJECTION, POWDER, LYOPHILIZED, FOR SOLUTION INTRAVENOUS at 14:37

## 2023-01-02 RX ADMIN — Medication 2: at 17:42

## 2023-01-02 RX ADMIN — Medication 3 MILLILITER(S): at 23:07

## 2023-01-02 NOTE — PROGRESS NOTE ADULT - SUBJECTIVE AND OBJECTIVE BOX
Patient is a 77y old  Male who presents with a chief complaint of generalized weakness and AMS x 2 days (01 Jan 2023 15:21)      SUBJECTIVE / OVERNIGHT EVENTS:  Patient seen and examined.   IR Drainage around 30 cc / 24 hr.       Vital Signs Last 24 Hrs  T(C): 36.7 (02 Jan 2023 05:03), Max: 36.7 (02 Jan 2023 05:03)  T(F): 98 (02 Jan 2023 05:03), Max: 98 (02 Jan 2023 05:03)  HR: 70 (02 Jan 2023 05:03) (65 - 72)  BP: 133/75 (02 Jan 2023 05:03) (126/71 - 133/75)  BP(mean): --  RR: 18 (02 Jan 2023 05:03) (18 - 18)  SpO2: 94% (02 Jan 2023 05:03) (94% - 95%)    Parameters below as of 02 Jan 2023 05:03  Patient On (Oxygen Delivery Method): nasal cannula  O2 Flow (L/min): 2    I&O's Summary    01 Jan 2023 07:01  -  02 Jan 2023 07:00  --------------------------------------------------------  IN: 1290 mL / OUT: 3681 mL / NET: -2391 mL        PE:  GENERAL: NAD, AAOx3  CHEST/LUNG: wheezing  HEART: Regular rate and rhythm; no murmur  ABDOMEN: Soft, Nontender, Nondistended; Bowel sounds present  : enlarged R scrotum, erythematous, tender, gould; ir tube with drainage  EXTREMITIES:  2+ Peripheral Pulses, No edema  NEURO: No focal deficit    LABS:                        11.9   15.41 )-----------( 425      ( 02 Jan 2023 07:25 )             38.2     01-02    138  |  100  |  30<H>  ----------------------------<  82  4.4   |  28  |  1.11    Ca    8.8      02 Jan 2023 07:26  Phos  3.2     01-02  Mg     2.2     01-02    TPro  6.1  /  Alb  2.7<L>  /  TBili  0.3  /  DBili  x   /  AST  19  /  ALT  5<L>  /  AlkPhos  79  01-02    PT/INR - ( 02 Jan 2023 07:27 )   PT: 30.2 sec;   INR: 2.60 ratio         PTT - ( 02 Jan 2023 07:27 )  PTT:35.0 sec  CAPILLARY BLOOD GLUCOSE      POCT Blood Glucose.: 184 mg/dL (02 Jan 2023 12:51)  POCT Blood Glucose.: 119 mg/dL (02 Jan 2023 08:33)  POCT Blood Glucose.: 109 mg/dL (01 Jan 2023 21:26)  POCT Blood Glucose.: 193 mg/dL (01 Jan 2023 17:21)            RADIOLOGY & ADDITIONAL TESTS:    Imaging Personally Reviewed:  [x] YES  [ ] NO    Consultant(s) Notes Reviewed:  [x] YES  [ ] NO      MEDICATIONS  (STANDING):  albuterol/ipratropium for Nebulization 3 milliLiter(s) Nebulizer every 6 hours  benzonatate 100 milliGRAM(s) Oral three times a day  budesonide 160 MICROgram(s)/formoterol 4.5 MICROgram(s) Inhaler 2 Puff(s) Inhalation two times a day  carbidopa/levodopa  25/100 1 Tablet(s) Oral <User Schedule>  cyanocobalamin 1000 MICROGram(s) Oral daily  dextrose 5%. 1000 milliLiter(s) (50 mL/Hr) IV Continuous <Continuous>  dextrose 5%. 1000 milliLiter(s) (100 mL/Hr) IV Continuous <Continuous>  dextrose 50% Injectable 25 Gram(s) IV Push once  dextrose 50% Injectable 12.5 Gram(s) IV Push once  dextrose 50% Injectable 25 Gram(s) IV Push once  folic acid 1 milliGRAM(s) Oral daily  glucagon  Injectable 1 milliGRAM(s) IntraMuscular once  hydrochlorothiazide 25 milliGRAM(s) Oral daily  insulin glargine Injectable (LANTUS) 14 Unit(s) SubCutaneous at bedtime  insulin lispro (ADMELOG) corrective regimen sliding scale   SubCutaneous at bedtime  insulin lispro (ADMELOG) corrective regimen sliding scale   SubCutaneous three times a day before meals  insulin lispro Injectable (ADMELOG) 8 Unit(s) SubCutaneous three times a day before meals  lactobacillus acidophilus 1 Tablet(s) Oral daily  losartan 100 milliGRAM(s) Oral daily  metoprolol tartrate 25 milliGRAM(s) Oral two times a day  piperacillin/tazobactam IVPB.. 3.375 Gram(s) IV Intermittent every 8 hours  polyethylene glycol 3350 17 Gram(s) Oral daily  rOPINIRole 2 milliGRAM(s) Oral <User Schedule>  senna 2 Tablet(s) Oral at bedtime  trihexyphenidyl 1 milliGRAM(s) Oral <User Schedule>  warfarin 2.5 milliGRAM(s) Oral once    MEDICATIONS  (PRN):  acetaminophen     Tablet .. 650 milliGRAM(s) Oral every 6 hours PRN Temp greater or equal to 38C (100.4F), Mild Pain (1 - 3)  dextrose Oral Gel 15 Gram(s) Oral once PRN Blood Glucose LESS THAN 70 milliGRAM(s)/deciliter  melatonin 3 milliGRAM(s) Oral at bedtime PRN Insomnia  ondansetron Injectable 4 milliGRAM(s) IV Push every 8 hours PRN Nausea and/or Vomiting      Care Discussed with Consultants/Other Providers [x] YES  [ ] NO    HEALTH ISSUES - PROBLEM Dx:  Scrotal abscess    Sepsis    Chronic obstructive pulmonary disease (COPD)    H/O factor V Leiden mutation    Unspecified atrial fibrillation    Parkinsons    HTN (hypertension)    Preop exam for internal medicine    DM (diabetes mellitus)

## 2023-01-02 NOTE — PROGRESS NOTE ADULT - SUBJECTIVE AND OBJECTIVE BOX
[facility] DIVISION OF INFECTIOUS DISEASES  CHRISTI Putnam Y. Patel, S. Shah, G. Aureliano  [phone]  ([phone] - weekdays after 5pm and weekends)    Name: BOECKLE, ROBERT  Age/Gender: 77y Male  MRN: [id number]    Interval History:  No new complaints noted.  Notes reviewed  Afebrile   Allergies: garlic (Other (Mild))  Levaquin (Anaphylaxis)      Objective:  Vitals:   T(F): 97.5 (01-02-23 @ 13:35), Max: 98 (01-02-23 @ 05:03)  HR: 81 (01-02-23 @ 13:35) (65 - 81)  BP: 128/68 (01-02-23 @ 13:35) (126/71 - 133/75)  RR: 18 (01-02-23 @ 13:35) (18 - 18)  SpO2: 93% (01-02-23 @ 13:35) (93% - 95%)  Physical Examination:  General: no acute distress  HEENT: NC/AT, anicteric, neck supple  Respiratory: no acc muscle use, breathing comfortably  Cardiovascular: S1 and S2 present  Gastrointestinal: normal appearing, nondistended  Extremities: no edema, no cyanosis  Skin: no visible rash    Laboratory Studies:  CBC:                       11.9   15.41 )-----------( 425      ( 02 Jan 2023 07:25 )             38.2     WBC Trend:  15.41 01-02-23 @ 07:25  15.06 01-01-23 @ 05:45  16.44 12-31-22 @ 07:24  15.46 12-30-22 @ 05:45  12.78 12-29-22 @ 07:18  13.89 12-28-22 @ 06:37  17.41 12-27-22 @ 07:05    CMP: 01-02    138  |  100  |  30<H>  ----------------------------<  82  4.4   |  28  |  1.11    Ca    8.8      02 Jan 2023 07:26  Phos  3.2     01-02  Mg     2.2     01-02    TPro  6.1  /  Alb  2.7<L>  /  TBili  0.3  /  DBili  x   /  AST  19  /  ALT  5<L>  /  AlkPhos  79  01-02    Creatinine, Serum: 1.11 mg/dL (01-02-23 @ 07:26)  Creatinine, Serum: 1.01 mg/dL (01-01-23 @ 05:45)  Creatinine, Serum: 0.98 mg/dL (12-31-22 @ 07:22)  Creatinine, Serum: 0.97 mg/dL (12-30-22 @ 05:45)  Creatinine, Serum: 0.90 mg/dL (12-29-22 @ 07:15)  Creatinine, Serum: 0.96 mg/dL (12-28-22 @ 06:37)  Creatinine, Serum: 1.01 mg/dL (12-27-22 @ 07:05)      LIVER FUNCTIONS - ( 02 Jan 2023 07:26 )  Alb: 2.7 g/dL / Pro: 6.1 g/dL / ALK PHOS: 79 U/L / ALT: 5 U/L / AST: 19 U/L / GGT: x           Microbiology: reviewed   Culture - Body Fluid with Gram Stain (collected 12-25-22 @ 23:09)  Source: Abdominal Fl Abdominal Fluid  Gram Stain (12-26-22 @ 08:02):    Numerous polymorphonuclear leukocytes seen per low power field    Moderate Gram positive cocci in pairs seen per oil power field  Final Report (12-31-22 @ 08:35):    Few Staphylococcus caprae  Organism: Staphylococcus caprae (12-31-22 @ 08:35)  Organism: Staphylococcus caprae (12-31-22 @ 08:35)      -  Ampicillin/Sulbactam: S <=8/4      -  Cefazolin: S <=4      -  Clindamycin: S <=0.25      -  Erythromycin: S <=0.25      -  Gentamicin: S <=1 Should not be used as monotherapy      -  Oxacillin: S <=0.25      -  Rifampin: S <=1 Should not be used as monotherapy      -  Tetracycline: S <=1      -  Trimethoprim/Sulfamethoxazole: S <=0.5/9.5      -  Vancomycin: S 1      Method Type: [facility] CARMEN    Culture - Blood (collected 12-23-22 @ 13:35)  Source: .Blood Blood-Peripheral  Final Report (12-28-22 @ 18:01):    No Growth Final    Culture - Blood (collected 12-23-22 @ 13:15)  Source: .Blood Blood-Peripheral  Final Report (12-28-22 @ 18:01):    No Growth Final    Radiology: reviewed     Medications:  acetaminophen     Tablet .. 650 milliGRAM(s) Oral every 6 hours PRN  albuterol/ipratropium for Nebulization 3 milliLiter(s) Nebulizer every 6 hours  benzonatate 100 milliGRAM(s) Oral three times a day  budesonide 160 MICROgram(s)/formoterol 4.5 MICROgram(s) Inhaler 2 Puff(s) Inhalation two times a day  carbidopa/levodopa  25/100 1 Tablet(s) Oral <User Schedule>  cyanocobalamin 1000 MICROGram(s) Oral daily  dextrose 5%. 1000 milliLiter(s) IV Continuous <Continuous>  dextrose 5%. 1000 milliLiter(s) IV Continuous <Continuous>  dextrose 50% Injectable 25 Gram(s) IV Push once  dextrose 50% Injectable 12.5 Gram(s) IV Push once  dextrose 50% Injectable 25 Gram(s) IV Push once  dextrose Oral Gel 15 Gram(s) Oral once PRN  folic acid 1 milliGRAM(s) Oral daily  glucagon  Injectable 1 milliGRAM(s) IntraMuscular once  hydrochlorothiazide 25 milliGRAM(s) Oral daily  insulin glargine Injectable (LANTUS) 14 Unit(s) SubCutaneous at bedtime  insulin lispro (ADMELOG) corrective regimen sliding scale   SubCutaneous three times a day before meals  insulin lispro (ADMELOG) corrective regimen sliding scale   SubCutaneous at bedtime  insulin lispro Injectable (ADMELOG) 8 Unit(s) SubCutaneous three times a day before meals  lactobacillus acidophilus 1 Tablet(s) Oral daily  losartan 100 milliGRAM(s) Oral daily  melatonin 3 milliGRAM(s) Oral at bedtime PRN  metoprolol tartrate 25 milliGRAM(s) Oral two times a day  ondansetron Injectable 4 milliGRAM(s) IV Push every 8 hours PRN  piperacillin/tazobactam IVPB.. 3.375 Gram(s) IV Intermittent every 8 hours  polyethylene glycol 3350 17 Gram(s) Oral daily  rOPINIRole 2 milliGRAM(s) Oral <User Schedule>  senna 2 Tablet(s) Oral at bedtime  trihexyphenidyl 1 milliGRAM(s) Oral <User Schedule>  warfarin 2.5 milliGRAM(s) Oral once    Current Antimicrobials:  piperacillin/tazobactam IVPB.. 3.375 Gram(s) IV Intermittent every 8 hours    Prior/Completed Antimicrobials:  cefTRIAXone   IVPB  piperacillin/tazobactam IVPB.  piperacillin/tazobactam IVPB.-  piperacillin/tazobactam IVPB...  vancomycin  IVPB.

## 2023-01-02 NOTE — PROGRESS NOTE ADULT - ASSESSMENT
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    Sepsis 2/2 Scrotal abscess  Large L inguinal hernia  - imaging reviewed  - BCx NGTD  -  and surgery following--suspect superinfected hematoma  - s/p IR drainage on 12/25 - drained 20cc purulent fluid, drain in place  - Abscess cx with Staph caprae-- sensitivities noted   - afebrile, WBC trending up, has continued cough, no other new sx   - RVP negative, CXR reported with clearing  Recs:  - s/p vancomycin and pip-tazo  - IR following - when output <15cc, repeat imaging to assess collection  - s/p cefazolin (12/28-12/31)  - broaden to pip-tazo and repeat CXR given increasing WBC and ongoing cough   -- complete pip-tazo x5d until 1/4 and then restart cefazolin  - supportive care and pain management per primary team  - drain care per surgery/IR  - monitor temps, CBC, Cr      Surendra Connolly M.D.  [facility] Division of Infectious Diseases  [phone]  After 5pm on weekdays and all day on weekends - please call [phone]

## 2023-01-02 NOTE — PROGRESS NOTE ADULT - ASSESSMENT
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  admitted for sepsis 2/2 scrotal abscess.    # sepsis 2/2 right scrotal abscess  s/p IR drainage 12/25-- growing gram + cocci   once ir drainage 15 cc or less / 24 will rpt imaging;   evaluated by , suspect infected hematoma, given recent instrumentation will continue broad spectrum abx  Surgical intervention TBD by urology, fu urology  patient complaining of some leakage of bulb please follow up with IR   cefazolin per ID  ID consult appreciated     # Chronic obstructive pulmonary disease with exacerbation.  # Pleural effusion   - prednisone 40 mg x 5 days   -echo noted normal lvef   -CXR noted, chronic chronic changes + effusion; hold fluids ; cxr is better.  -per pt has chronic wheezing  Symbicort   duonebs q6h    # Large left inguinal hernia  -tender, may need surgical correction.   -gen surgery recs appreciated     #T2DM  -watch closely in setting of prednisone  - newly diagnosed but probably long standing   - insulin basal bolus   - a1c 8s   - endo consult appreciated     # H/O factor V Leiden mutation  coumadin DAILY INR. ; couamdin 2.5 mg today     # Parkinsons  continue carbidopa-levo-dopa   continue Ropinirole   continue Trihexyphenidyl    # HTN (hypertension)  continue Irbesartan (therapeutic equivalent)    dvt ppx  coumadin   Please contact with any questions or concerns [phone].

## 2023-01-02 NOTE — PROGRESS NOTE ADULT - ASSESSMENT
Assessment  DMT2: 77y Male with DM T2 with hyperglycemia, A1C 8%, now on low-dose basal bolus insulin, blood sugars are within acceptable range, no hypoglycemic episodes, eating meals, no overnight events..  Scrotal Abscess: on medications, stable, monitored.  COPD: On meds, breathing tx, stable.  HTN: Controlled,  on antihypertensive medications.  Parkinsons: On meds, stable, monitored.        Dima Keller MD  Cell: [phone]  Office: [phone]

## 2023-01-02 NOTE — PROGRESS NOTE ADULT - SUBJECTIVE AND OBJECTIVE BOX
Chief complaint    Patient is a 77y old  Male who presents with a chief complaint of generalized weakness and AMS x 2 days (02 Jan 2023 14:07)   Review of systems  Patient in bed, appears comfortable.    Labs and Fingersticks  CAPILLARY BLOOD GLUCOSE      POCT Blood Glucose.: 184 mg/dL (02 Jan 2023 12:51)  POCT Blood Glucose.: 119 mg/dL (02 Jan 2023 08:33)  POCT Blood Glucose.: 109 mg/dL (01 Jan 2023 21:26)  POCT Blood Glucose.: 193 mg/dL (01 Jan 2023 17:21)      Anion Gap, Serum: 10 (01-02 @ 07:26)  Anion Gap, Serum: 7 (01-01 @ 05:45)      Calcium, Total Serum: 8.8 (01-02 @ 07:26)  Calcium, Total Serum: 9.0 (01-01 @ 05:45)  Albumin, Serum: 2.7 *L* (01-02 @ 07:26)  Albumin, Serum: 2.6 *L* (01-01 @ 05:45)    Alanine Aminotransferase (ALT/SGPT): 5 *L* (01-02 @ 07:26)  Alanine Aminotransferase (ALT/SGPT): 6 *L* (01-01 @ 05:45)  Alkaline Phosphatase, Serum: 79 (01-02 @ 07:26)  Alkaline Phosphatase, Serum: 80 (01-01 @ 05:45)  Aspartate Aminotransferase (AST/SGOT): 19 (01-02 @ 07:26)  Aspartate Aminotransferase (AST/SGOT): 21 (01-01 @ 05:45)        01-02    138  |  100  |  30<H>  ----------------------------<  82  4.4   |  28  |  1.11    Ca    8.8      02 Jan 2023 07:26  Phos  3.2     01-02  Mg     2.2     01-02    TPro  6.1  /  Alb  2.7<L>  /  TBili  0.3  /  DBili  x   /  AST  19  /  ALT  5<L>  /  AlkPhos  79  01-02                        11.9   15.41 )-----------( 425      ( 02 Jan 2023 07:25 )             38.2     Medications  MEDICATIONS  (STANDING):  albuterol/ipratropium for Nebulization 3 milliLiter(s) Nebulizer every 6 hours  benzonatate 100 milliGRAM(s) Oral three times a day  budesonide 160 MICROgram(s)/formoterol 4.5 MICROgram(s) Inhaler 2 Puff(s) Inhalation two times a day  carbidopa/levodopa  25/100 1 Tablet(s) Oral <User Schedule>  cyanocobalamin 1000 MICROGram(s) Oral daily  dextrose 5%. 1000 milliLiter(s) (50 mL/Hr) IV Continuous <Continuous>  dextrose 5%. 1000 milliLiter(s) (100 mL/Hr) IV Continuous <Continuous>  dextrose 50% Injectable 25 Gram(s) IV Push once  dextrose 50% Injectable 12.5 Gram(s) IV Push once  dextrose 50% Injectable 25 Gram(s) IV Push once  folic acid 1 milliGRAM(s) Oral daily  glucagon  Injectable 1 milliGRAM(s) IntraMuscular once  hydrochlorothiazide 25 milliGRAM(s) Oral daily  insulin glargine Injectable (LANTUS) 14 Unit(s) SubCutaneous at bedtime  insulin lispro (ADMELOG) corrective regimen sliding scale   SubCutaneous at bedtime  insulin lispro (ADMELOG) corrective regimen sliding scale   SubCutaneous three times a day before meals  insulin lispro Injectable (ADMELOG) 8 Unit(s) SubCutaneous three times a day before meals  lactobacillus acidophilus 1 Tablet(s) Oral daily  losartan 100 milliGRAM(s) Oral daily  metoprolol tartrate 25 milliGRAM(s) Oral two times a day  piperacillin/tazobactam IVPB.. 3.375 Gram(s) IV Intermittent every 8 hours  polyethylene glycol 3350 17 Gram(s) Oral daily  rOPINIRole 2 milliGRAM(s) Oral <User Schedule>  senna 2 Tablet(s) Oral at bedtime  trihexyphenidyl 1 milliGRAM(s) Oral <User Schedule>  warfarin 2.5 milliGRAM(s) Oral once      Physical Exam  General: Patient appears comfortable.  Vital Signs Last 12 Hrs  T(F): 97.5 (01-02-23 @ 13:35), Max: 98 (01-02-23 @ 05:03)  HR: 81 (01-02-23 @ 13:35) (70 - 81)  BP: 128/68 (01-02-23 @ 13:35) (128/68 - 133/75)  BP(mean): --  RR: 18 (01-02-23 @ 13:35) (18 - 18)  SpO2: 93% (01-02-23 @ 13:35) (93% - 94%)  Neck: No palpable thyroid nodules.  CVS: S1S2, No murmurs  Respiratory: No wheezing, no crepitations  GI: Abdomen soft, non tender.  Musculoskeletal:  edema lower extremities.     Diagnostics

## 2023-01-03 LAB
ALBUMIN SERPL ELPH-MCNC: 2.7 G/DL — LOW (ref 3.3–5)
ALP SERPL-CCNC: 76 U/L — SIGNIFICANT CHANGE UP (ref 40–120)
ALT FLD-CCNC: 7 U/L — LOW (ref 10–45)
ANION GAP SERPL CALC-SCNC: 6 MMOL/L — SIGNIFICANT CHANGE UP (ref 5–17)
APTT BLD: 33.2 SEC — SIGNIFICANT CHANGE UP (ref 27.5–35.5)
AST SERPL-CCNC: 18 U/L — SIGNIFICANT CHANGE UP (ref 10–40)
BILIRUB SERPL-MCNC: 0.3 MG/DL — SIGNIFICANT CHANGE UP (ref 0.2–1.2)
BUN SERPL-MCNC: 25 MG/DL — HIGH (ref 7–23)
CALCIUM SERPL-MCNC: 8.7 MG/DL — SIGNIFICANT CHANGE UP (ref 8.4–10.5)
CHLORIDE SERPL-SCNC: 102 MMOL/L — SIGNIFICANT CHANGE UP (ref 96–108)
CO2 SERPL-SCNC: 29 MMOL/L — SIGNIFICANT CHANGE UP (ref 22–31)
CREAT SERPL-MCNC: 1.08 MG/DL — SIGNIFICANT CHANGE UP (ref 0.5–1.3)
EGFR: 70 ML/MIN/1.73M2 — SIGNIFICANT CHANGE UP
GLUCOSE SERPL-MCNC: 92 MG/DL — SIGNIFICANT CHANGE UP (ref 70–99)
HCT VFR BLD CALC: 36.8 % — LOW (ref 39–50)
HGB BLD-MCNC: 11.7 G/DL — LOW (ref 13–17)
INR BLD: 2.65 RATIO — HIGH (ref 0.88–1.16)
MAGNESIUM SERPL-MCNC: 2.2 MG/DL — SIGNIFICANT CHANGE UP (ref 1.6–2.6)
MCHC RBC-ENTMCNC: 29.1 PG — SIGNIFICANT CHANGE UP (ref 27–34)
MCHC RBC-ENTMCNC: 31.8 GM/DL — LOW (ref 32–36)
MCV RBC AUTO: 91.5 FL — SIGNIFICANT CHANGE UP (ref 80–100)
NRBC # BLD: 0 /100 WBCS — SIGNIFICANT CHANGE UP (ref 0–0)
PHOSPHATE SERPL-MCNC: 3.2 MG/DL — SIGNIFICANT CHANGE UP (ref 2.5–4.5)
PLATELET # BLD AUTO: 387 K/UL — SIGNIFICANT CHANGE UP (ref 150–400)
POTASSIUM SERPL-MCNC: 4.3 MMOL/L — SIGNIFICANT CHANGE UP (ref 3.5–5.3)
POTASSIUM SERPL-SCNC: 4.3 MMOL/L — SIGNIFICANT CHANGE UP (ref 3.5–5.3)
PROT SERPL-MCNC: 5.9 G/DL — LOW (ref 6–8.3)
PROTHROM AB SERPL-ACNC: 31.1 SEC — HIGH (ref 10.5–13.4)
RBC # BLD: 4.02 M/UL — LOW (ref 4.2–5.8)
RBC # FLD: 17.4 % — HIGH (ref 10.3–14.5)
SODIUM SERPL-SCNC: 137 MMOL/L — SIGNIFICANT CHANGE UP (ref 135–145)
WBC # BLD: 14.56 K/UL — HIGH (ref 3.8–10.5)
WBC # FLD AUTO: 14.56 K/UL — HIGH (ref 3.8–10.5)

## 2023-01-03 PROCEDURE — 99231 SBSQ HOSP IP/OBS SF/LOW 25: CPT | Mod: FS

## 2023-01-03 PROCEDURE — 76870 US EXAM SCROTUM: CPT | Mod: 26

## 2023-01-03 RX ORDER — CEFAZOLIN SODIUM 1 G
2000 VIAL (EA) INJECTION EVERY 8 HOURS
Refills: 0 | Status: DISCONTINUED | OUTPATIENT
Start: 2023-01-03 | End: 2023-01-07

## 2023-01-03 RX ORDER — WARFARIN SODIUM 2.5 MG/1
2.5 TABLET ORAL ONCE
Refills: 0 | Status: COMPLETED | OUTPATIENT
Start: 2023-01-03 | End: 2023-01-03

## 2023-01-03 RX ADMIN — Medication 1 MILLIGRAM(S): at 17:34

## 2023-01-03 RX ADMIN — CARBIDOPA AND LEVODOPA 1 TABLET(S): 25; 100 TABLET ORAL at 23:15

## 2023-01-03 RX ADMIN — Medication 100 MILLIGRAM(S): at 15:56

## 2023-01-03 RX ADMIN — Medication 1: at 15:57

## 2023-01-03 RX ADMIN — Medication 3 MILLILITER(S): at 17:34

## 2023-01-03 RX ADMIN — LOSARTAN POTASSIUM 100 MILLIGRAM(S): 100 TABLET, FILM COATED ORAL at 05:53

## 2023-01-03 RX ADMIN — Medication 3 MILLILITER(S): at 05:54

## 2023-01-03 RX ADMIN — Medication 8 UNIT(S): at 17:35

## 2023-01-03 RX ADMIN — CARBIDOPA AND LEVODOPA 1 TABLET(S): 25; 100 TABLET ORAL at 17:34

## 2023-01-03 RX ADMIN — WARFARIN SODIUM 2.5 MILLIGRAM(S): 2.5 TABLET ORAL at 21:17

## 2023-01-03 RX ADMIN — ROPINIROLE 2 MILLIGRAM(S): 8 TABLET, FILM COATED, EXTENDED RELEASE ORAL at 12:56

## 2023-01-03 RX ADMIN — Medication 3 MILLILITER(S): at 23:15

## 2023-01-03 RX ADMIN — PIPERACILLIN AND TAZOBACTAM 25 GRAM(S): 4; .5 INJECTION, POWDER, LYOPHILIZED, FOR SOLUTION INTRAVENOUS at 05:54

## 2023-01-03 RX ADMIN — Medication 1 MILLIGRAM(S): at 12:56

## 2023-01-03 RX ADMIN — INSULIN GLARGINE 14 UNIT(S): 100 INJECTION, SOLUTION SUBCUTANEOUS at 21:54

## 2023-01-03 RX ADMIN — Medication 100 MILLIGRAM(S): at 05:53

## 2023-01-03 RX ADMIN — Medication 1 MILLIGRAM(S): at 08:51

## 2023-01-03 RX ADMIN — Medication 1 TABLET(S): at 12:56

## 2023-01-03 RX ADMIN — Medication 3 MILLILITER(S): at 12:56

## 2023-01-03 RX ADMIN — Medication 1 MILLIGRAM(S): at 23:15

## 2023-01-03 RX ADMIN — ROPINIROLE 2 MILLIGRAM(S): 8 TABLET, FILM COATED, EXTENDED RELEASE ORAL at 08:51

## 2023-01-03 RX ADMIN — CARBIDOPA AND LEVODOPA 1 TABLET(S): 25; 100 TABLET ORAL at 12:56

## 2023-01-03 RX ADMIN — PREGABALIN 1000 MICROGRAM(S): 225 CAPSULE ORAL at 12:56

## 2023-01-03 RX ADMIN — Medication 8 UNIT(S): at 08:52

## 2023-01-03 RX ADMIN — Medication 25 MILLIGRAM(S): at 17:34

## 2023-01-03 RX ADMIN — CARBIDOPA AND LEVODOPA 1 TABLET(S): 25; 100 TABLET ORAL at 08:51

## 2023-01-03 RX ADMIN — ROPINIROLE 2 MILLIGRAM(S): 8 TABLET, FILM COATED, EXTENDED RELEASE ORAL at 17:34

## 2023-01-03 RX ADMIN — Medication 100 MILLIGRAM(S): at 23:19

## 2023-01-03 RX ADMIN — ROPINIROLE 2 MILLIGRAM(S): 8 TABLET, FILM COATED, EXTENDED RELEASE ORAL at 23:15

## 2023-01-03 RX ADMIN — Medication 25 MILLIGRAM(S): at 05:53

## 2023-01-03 RX ADMIN — Medication 8 UNIT(S): at 15:58

## 2023-01-03 RX ADMIN — BUDESONIDE AND FORMOTEROL FUMARATE DIHYDRATE 2 PUFF(S): 160; 4.5 AEROSOL RESPIRATORY (INHALATION) at 17:35

## 2023-01-03 RX ADMIN — Medication 100 MILLIGRAM(S): at 21:17

## 2023-01-03 RX ADMIN — BUDESONIDE AND FORMOTEROL FUMARATE DIHYDRATE 2 PUFF(S): 160; 4.5 AEROSOL RESPIRATORY (INHALATION) at 05:53

## 2023-01-03 RX ADMIN — SENNA PLUS 2 TABLET(S): 8.6 TABLET ORAL at 21:17

## 2023-01-03 NOTE — PROGRESS NOTE ADULT - SUBJECTIVE AND OBJECTIVE BOX
Interventional Radiology Follow-Up Note    Patient seen and examined @ bedside     This is a 77 year old Male s/p scrotal drain placement on 12/25/22 in Interventional Radiology.     No complaint offered.    Medication:  hydrochlorothiazide: (01-03)  losartan: (01-03)  metoprolol tartrate: (01-03)  piperacillin/tazobactam IVPB..: (01-03)  warfarin: (01-02)  warfarin: (01-01)    Vitals:  T(F): 97.6, Max: 97.9 (01:24)  HR: 71  BP: 142/69  RR: 18  SpO2: 95%    Physical Exam:  General: Nontoxic, in NAD.  Abdomen: soft, NTND.   Drain Device: Drain intact attached to ALIZA bulb. Small amount of leakage seen on rosalia under drain. Flushed with 10cc normal saline with leaking seen at drain insertion site    24hr Drain output: 10cc purulent fluid      LABS:  Na: 137 (01-03 @ 06:48), 138 (01-02 @ 07:26), 139 (01-01 @ 05:45)  K: 4.3 (01-03 @ 06:48), 4.4 (01-02 @ 07:26), 4.1 (01-01 @ 05:45)  Cl: 102 (01-03 @ 06:48), 100 (01-02 @ 07:26), 101 (01-01 @ 05:45)  CO2: 29 (01-03 @ 06:48), 28 (01-02 @ 07:26), 31 (01-01 @ 05:45)  BUN: 25 (01-03 @ 06:48), 30 (01-02 @ 07:26), 28 (01-01 @ 05:45)  Cr: 1.08 (01-03 @ 06:48), 1.11 (01-02 @ 07:26), 1.01 (01-01 @ 05:45)  Glu: 92(01-03 @ 06:48), 82(01-02 @ 07:26), 101(01-01 @ 05:45)  Hgb: 11.7 (01-03 @ 06:48), 11.9 (01-02 @ 07:25), 11.5 (01-01 @ 05:45)  Hct: 36.8 (01-03 @ 06:48), 38.2 (01-02 @ 07:25), 36.6 (01-01 @ 05:45)  WBC: 14.56 (01-03 @ 06:48), 15.41 (01-02 @ 07:25), 15.06 (01-01 @ 05:45)  Plt: 387 (01-03 @ 06:48), 425 (01-02 @ 07:25), 388 (01-01 @ 05:45)  INR: 2.65 01-03-23 @ 06:48, 2.60 01-02-23 @ 07:27, 2.71 01-01-23 @ 05:45  PTT: 33.2 01-03-23 @ 06:48, 35.0 01-02-23 @ 07:27, 32.8 01-01-23 @ 05:45      LIVER FUNCTIONS - ( 03 Jan 2023 06:48 )  Alb: 2.7 g/dL / Pro: 5.9 g/dL / ALK PHOS: 76 U/L / ALT: 7 U/L / AST: 18 U/L / GGT: x         Bilirubin Total, Serum: 0.3 mg/dL (01-03-23 @ 06:48)  Aspartate Aminotransferase (AST/SGOT): 18 U/L (01-03-23 @ 06:48)  Alanine Aminotransferase (ALT/SGPT): 7 U/L (01-03-23 @ 06:48)  Aspartate Aminotransferase (AST/SGOT): 19 U/L (01-02-23 @ 07:26)  Alanine Aminotransferase (ALT/SGPT): 5 U/L (01-02-23 @ 07:26)  Bilirubin Total, Serum: 0.3 mg/dL (01-03-23 @ 06:48)  Bilirubin Total, Serum: 0.3 mg/dL (01-02-23 @ 07:26)  Bilirubin Total, Serum: 0.3 mg/dL (01-01-23 @ 05:45)  Bilirubin Total, Serum: 0.3 mg/dL (12-31-22 @ 07:22)  Bilirubin Total, Serum: 0.3 mg/dL (12-30-22 @ 05:45)      Assessment/Plan:  77 year old male with PMH of Parkinson's, COPD, 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 Pt most recently s/p scrotal drain placement on 12/25 in Interventional Radiology.     - Drain with low output x48hrs  - Please obtain ultrasound of scrotal abscess, IR to follow up ultrasound results  - Persistent leukocytosis, continue ABX per primary team  - Flush drain with 5cc normal saline daily forward only; DO NOT aspirate  - Change dressing q3 days or when dressing is saturated.  - Continue global management per primary team, IR will follow  - If drain output <15cc/24hrs x 2 consecutive days, please obtain repeat imaging to evaluate drain and consult IR for drain check  - If the patient is d/c home with drainage catheter, pt can make an appointment with IR by calling the IR booking office at [phone]; recommend IR follow in 7-10 days after discharge for tube evaluation.  - Pt will benefit from VNS service to help with drainage catheter care; Pt should continue same drainage catheter care as an outpatient.    Please call IR at [phone] with any questions, concerns, or issues regarding above.      Also available on TEAMS

## 2023-01-03 NOTE — PROGRESS NOTE ADULT - ASSESSMENT
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  admitted for sepsis 2/2 scrotal abscess.    # sepsis 2/2 right scrotal abscess  s/p IR drainage 12/25-- growing gram + cocci   drainage < 15 cc /day --- will rpt testicular ult today-- f/u IR when tube can be removed   evaluated by , suspect infected hematoma, given recent instrumentation will continue broad spectrum abx  Surgical intervention TBD by urology, fu urology--f/u urology when gould cathter can be removed   cefazolin per ID  ID consult appreciated     # Chronic obstructive pulmonary disease with exacerbation.  # Pleural effusion   - prednisone 40 mg x 5 days -- completed   -echo noted normal lvef   -CXR noted, chronic chronic changes + effusion; hold fluids ; cxr is better.  -per pt has chronic wheezing  Symbicort   duonebs q6h    # Large left inguinal hernia  -tender, may need surgical correction.   -gen surgery recs appreciated     #T2DM  -watch closely in setting of prednisone  - newly diagnosed but probably long standing   - insulin basal bolus   - a1c 8s   - endo consult appreciated     # H/O factor V Leiden mutation  coumadin DAILY INR. ; couamdin 2.5 mg today     # Parkinsons  continue carbidopa-levo-dopa   continue Ropinirole   continue Trihexyphenidyl    # HTN (hypertension)  continue Irbesartan (therapeutic equivalent)    dvt ppx  coumadin   Please contact with any questions or concerns [phone].

## 2023-01-03 NOTE — PROGRESS NOTE ADULT - ASSESSMENT
Patient is a 77 year old male with PMH of Parkinson's, COPD, DVT/PE factor V Leiden on Coumadin, s/p R hernia repair c/b scrotal hematoma p/w generalized weakness and  altered mental state for the past two days and admitted for sepsis 2/2 scrotal abscess; sepsis resolved.     Scrotal abscess  Large L inguinal hernia  -  and surgery following--suspect superinfected hematoma  - s/p IR drainage on 12/25 - drained 20cc purulent fluid, drain in place  - Abscess cx with [place] caprae-- sensitivities noted   - afebrile, WBC stable, cough better, no other new sx   - RVP negative, CXR reported with clearing   - s/p cefazolin (12/28-12/31) > pip-tazo d/t above  Recs:  - s/p vancomycin and pip-tazo  - IR following - low output -- planning for ultrasound today to assess collection  - discontinue pip-tazo  - restart on cefazolin 2g IV Q8h today  - supportive care and pain management per primary team  - drain care per IR  - monitor temps, CBC, Cr      Surendra Connolly M.D.  [facility], Division of Infectious Diseases  [phone]  After 5pm on weekdays and all day on weekends - please call [phone]

## 2023-01-03 NOTE — PROGRESS NOTE ADULT - SUBJECTIVE AND OBJECTIVE BOX
Chief complaint    Patient is a 77y old  Male who presents with a chief complaint of generalized weakness and AMS x 2 days (03 Jan 2023 15:11)   Review of systems  Patient in bed, appears comfortable.    Labs and Fingersticks  CAPILLARY BLOOD GLUCOSE      POCT Blood Glucose.: 84 mg/dL (03 Jan 2023 17:31)  POCT Blood Glucose.: 160 mg/dL (03 Jan 2023 15:40)  POCT Blood Glucose.: 88 mg/dL (03 Jan 2023 08:19)  POCT Blood Glucose.: 145 mg/dL (02 Jan 2023 21:19)      Anion Gap, Serum: 6 (01-03 @ 06:48)  Anion Gap, Serum: 10 (01-02 @ 07:26)      Calcium, Total Serum: 8.7 (01-03 @ 06:48)  Calcium, Total Serum: 8.8 (01-02 @ 07:26)  Albumin, Serum: 2.7 *L* (01-03 @ 06:48)  Albumin, Serum: 2.7 *L* (01-02 @ 07:26)    Alanine Aminotransferase (ALT/SGPT): 7 *L* (01-03 @ 06:48)  Alanine Aminotransferase (ALT/SGPT): 5 *L* (01-02 @ 07:26)  Alkaline Phosphatase, Serum: 76 (01-03 @ 06:48)  Alkaline Phosphatase, Serum: 79 (01-02 @ 07:26)  Aspartate Aminotransferase (AST/SGOT): 18 (01-03 @ 06:48)  Aspartate Aminotransferase (AST/SGOT): 19 (01-02 @ 07:26)        01-03    137  |  102  |  25<H>  ----------------------------<  92  4.3   |  29  |  1.08    Ca    8.7      03 Jan 2023 06:48  Phos  3.2     01-03  Mg     2.2     01-03    TPro  5.9<L>  /  Alb  2.7<L>  /  TBili  0.3  /  DBili  x   /  AST  18  /  ALT  7<L>  /  AlkPhos  76  01-03                        11.7   14.56 )-----------( 387      ( 03 Jan 2023 06:48 )             36.8     Medications  MEDICATIONS  (STANDING):  albuterol/ipratropium for Nebulization 3 milliLiter(s) Nebulizer every 6 hours  benzonatate 100 milliGRAM(s) Oral three times a day  budesonide 160 MICROgram(s)/formoterol 4.5 MICROgram(s) Inhaler 2 Puff(s) Inhalation two times a day  carbidopa/levodopa  25/100 1 Tablet(s) Oral <User Schedule>  ceFAZolin   IVPB 2000 milliGRAM(s) IV Intermittent every 8 hours  cyanocobalamin 1000 MICROGram(s) Oral daily  dextrose 5%. 1000 milliLiter(s) (100 mL/Hr) IV Continuous <Continuous>  dextrose 5%. 1000 milliLiter(s) (50 mL/Hr) IV Continuous <Continuous>  dextrose 50% Injectable 25 Gram(s) IV Push once  dextrose 50% Injectable 12.5 Gram(s) IV Push once  dextrose 50% Injectable 25 Gram(s) IV Push once  folic acid 1 milliGRAM(s) Oral daily  glucagon  Injectable 1 milliGRAM(s) IntraMuscular once  hydrochlorothiazide 25 milliGRAM(s) Oral daily  insulin glargine Injectable (LANTUS) 14 Unit(s) SubCutaneous at bedtime  insulin lispro (ADMELOG) corrective regimen sliding scale   SubCutaneous at bedtime  insulin lispro (ADMELOG) corrective regimen sliding scale   SubCutaneous three times a day before meals  insulin lispro Injectable (ADMELOG) 8 Unit(s) SubCutaneous three times a day before meals  lactobacillus acidophilus 1 Tablet(s) Oral daily  losartan 100 milliGRAM(s) Oral daily  metoprolol tartrate 25 milliGRAM(s) Oral two times a day  polyethylene glycol 3350 17 Gram(s) Oral daily  rOPINIRole 2 milliGRAM(s) Oral <User Schedule>  senna 2 Tablet(s) Oral at bedtime  trihexyphenidyl 1 milliGRAM(s) Oral <User Schedule>  warfarin 2.5 milliGRAM(s) Oral once      Physical Exam  General: Patient appears comfortable.  Vital Signs Last 12 Hrs  T(F): 97.5 (01-03-23 @ 13:17), Max: 97.5 (01-03-23 @ 13:17)  HR: 65 (01-03-23 @ 13:17) (65 - 65)  BP: 140/58 (01-03-23 @ 13:17) (140/58 - 140/58)  BP(mean): --  RR: 18 (01-03-23 @ 13:17) (18 - 18)  SpO2: 95% (01-03-23 @ 13:17) (95% - 95%)  Neck: No palpable thyroid nodules.  CVS: S1S2, No murmurs  Respiratory: No wheezing, no crepitations  GI: Abdomen soft, non tender.  Musculoskeletal:  edema lower extremities.     Diagnostics

## 2023-01-03 NOTE — PROGRESS NOTE ADULT - ASSESSMENT
Assessment  DMT2: 77y Male with DM T2 with hyperglycemia, A1C 8%, now on low-dose basal bolus insulin, blood sugars improving, no hypoglycemic episodes, eating meals.  Scrotal Abscess: on medications, stable, monitored.  COPD: On meds, breathing tx, stable.  HTN: Controlled,  on antihypertensive medications.  Parkinsons: On meds, stable, monitored.        Dima Keller MD  Cell: [phone]  Office: [phone]

## 2023-01-03 NOTE — PROGRESS NOTE ADULT - SUBJECTIVE AND OBJECTIVE BOX
[facility] DIV[facility] OF INFECTIOUS DISEASES  CHRISTI Putnam Y. Patel, S. Shah, G. [facility]  [phone]  ([phone] - weekdays after 5pm and weekends)    Name: BOECKLE, ROBERT  Age/Gender: 77y Male  MRN: [id number]    Interval History:  Patient seen and examined this morning.   Sitting in chair, feels better, coughing less.   No new complaints noted.  Notes reviewed. Afebrile   Allergies: garlic (Other (Mild))  Levaquin (Anaphylaxis)    Objective:  Vitals:   T(F): 97.6 (01-03-23 @ 05:15), Max: 97.9 (01-03-23 @ 01:24)  HR: 71 (01-03-23 @ 05:15) (67 - 81)  BP: 142/69 (01-03-23 @ 05:15) (126/67 - 142/69)  RR: 18 (01-03-23 @ 05:15) (18 - 18)  SpO2: 95% (01-03-23 @ 05:15) (93% - 95%)  Physical Examination:  General: no acute distress  HEENT: NC/AT, anicteric, neck supple  Respiratory: decreased breath sounds b/l  Cardiovascular: S1 and S2 present, normal rate  Gastrointestinal: normal appearing, nondistended  Extremities: no edema, no cyanosis  Skin: no visible rash    Laboratory Studies:  CBC:                       11.7   14.56 )-----------( 387      ( 03 Jan 2023 06:48 )             36.8     WBC Trend:  14.56 01-03-23 @ 06:48  15.41 01-02-23 @ 07:25  15.06 01-01-23 @ 05:45  16.44 12-31-22 @ 07:24  15.46 12-30-22 @ 05:45  12.78 12-29-22 @ 07:18  13.89 12-28-22 @ 06:37    CMP: 01-03    137  |  102  |  25<H>  ----------------------------<  92  4.3   |  29  |  1.08    Ca    8.7      03 Jan 2023 06:48  Phos  3.2     01-03  Mg     2.2     01-03    TPro  5.9<L>  /  Alb  2.7<L>  /  TBili  0.3  /  DBili  x   /  AST  18  /  ALT  7<L>  /  AlkPhos  76  01-03    Creatinine, Serum: 1.08 mg/dL (01-03-23 @ 06:48)  Creatinine, Serum: 1.11 mg/dL (01-02-23 @ 07:26)  Creatinine, Serum: 1.01 mg/dL (01-01-23 @ 05:45)  Creatinine, Serum: 0.98 mg/dL (12-31-22 @ 07:22)  Creatinine, Serum: 0.97 mg/dL (12-30-22 @ 05:45)  Creatinine, Serum: 0.90 mg/dL (12-29-22 @ 07:15)  Creatinine, Serum: 0.96 mg/dL (12-28-22 @ 06:37)    LIVER FUNCTIONS - ( 03 Jan 2023 06:48 )  Alb: 2.7 g/dL / Pro: 5.9 g/dL / ALK PHOS: 76 U/L / ALT: 7 U/L / AST: 18 U/L / GGT: x           Microbiology: reviewed     Culture - Body Fluid with Gram Stain (collected 12-25-22 @ 23:09)  Source: Abdominal Fl Abdominal Fluid  Gram Stain (12-26-22 @ 08:02):    Numerous polymorphonuclear leukocytes seen per low power field    Moderate Gram positive cocci in pairs seen per oil power field  Final Report (12-31-22 @ 08:35):    Few Staphylococcus caprae  Organism: Staphylococcus caprae (12-31-22 @ 08:35)  Organism: Staphylococcus caprae (12-31-22 @ 08:35)      -  Ampicillin/Sulbactam: S <=8/4      -  Cefazolin: S <=4      -  Clindamycin: S <=0.25      -  Erythromycin: S <=0.25      -  Gentamicin: S <=1 Should not be used as monotherapy      -  Oxacillin: S <=0.25      -  Rifampin: S <=1 Should not be used as monotherapy      -  Tetracycline: S <=1      -  Trimethoprim/Sulfamethoxazole: S <=0.5/9.5      -  Vancomycin: S 1      Method Type: MARIA DEL CARMEN    Culture - Blood (collected 12-23-22 @ 13:35)  Source: .Blood Blood-Peripheral  Final Report (12-28-22 @ 18:01):    No Growth Final    Culture - Blood (collected 12-23-22 @ 13:15)  Source: .Blood Blood-Peripheral  Final Report (12-28-22 @ 18:01):    No Growth Final    Radiology: reviewed     Medications:  acetaminophen     Tablet .. 650 milliGRAM(s) Oral every 6 hours PRN  albuterol/ipratropium for Nebulization 3 milliLiter(s) Nebulizer every 6 hours  benzonatate 100 milliGRAM(s) Oral three times a day  budesonide 160 MICROgram(s)/formoterol 4.5 MICROgram(s) Inhaler 2 Puff(s) Inhalation two times a day  carbidopa/levodopa  25/100 1 Tablet(s) Oral <User Schedule>  cyanocobalamin 1000 MICROGram(s) Oral daily  dextrose 5%. 1000 milliLiter(s) IV Continuous <Continuous>  dextrose 5%. 1000 milliLiter(s) IV Continuous <Continuous>  dextrose 50% Injectable 25 Gram(s) IV Push once  dextrose 50% Injectable 12.5 Gram(s) IV Push once  dextrose 50% Injectable 25 Gram(s) IV Push once  dextrose Oral Gel 15 Gram(s) Oral once PRN  folic acid 1 milliGRAM(s) Oral daily  glucagon  Injectable 1 milliGRAM(s) IntraMuscular once  hydrochlorothiazide 25 milliGRAM(s) Oral daily  insulin glargine Injectable (LANTUS) 14 Unit(s) SubCutaneous at bedtime  insulin lispro (ADMELOG) corrective regimen sliding scale   SubCutaneous at bedtime  insulin lispro (ADMELOG) corrective regimen sliding scale   SubCutaneous three times a day before meals  insulin lispro Injectable (ADMELOG) 8 Unit(s) SubCutaneous three times a day before meals  lactobacillus acidophilus 1 Tablet(s) Oral daily  losartan 100 milliGRAM(s) Oral daily  melatonin 3 milliGRAM(s) Oral at bedtime PRN  metoprolol tartrate 25 milliGRAM(s) Oral two times a day  ondansetron Injectable 4 milliGRAM(s) IV Push every 8 hours PRN  piperacillin/tazobactam IVPB.. 3.375 Gram(s) IV Intermittent every 8 hours  polyethylene glycol 3350 17 Gram(s) Oral daily  rOPINIRole 2 milliGRAM(s) Oral <User Schedule>  senna 2 Tablet(s) Oral at bedtime  trihexyphenidyl 1 milliGRAM(s) Oral <User Schedule>  warfarin 2.5 milliGRAM(s) Oral once    Current Antimicrobials:  piperacillin/tazobactam IVPB.. 3.375 Gram(s) IV Intermittent every 8 hours    Prior/Completed Antimicrobials:  cefTRIAXone   IVPB  piperacillin/tazobactam IVPB.  piperacillin/tazobactam IVPB.-  piperacillin/tazobactam IVPB...  vancomycin  IVPB.

## 2023-01-03 NOTE — PROGRESS NOTE ADULT - SUBJECTIVE AND OBJECTIVE BOX
Patient is a 77y old  Male who presents with a chief complaint of generalized weakness and AMS x 2 days (03 Jan 2023 10:45)      SUBJECTIVE / OVERNIGHT EVENTS:  Patient seen and examined.   Doing better.      Vital Signs Last 24 Hrs  T(C): 36.4 (03 Jan 2023 13:17), Max: 36.6 (03 Jan 2023 01:24)  T(F): 97.5 (03 Jan 2023 13:17), Max: 97.9 (03 Jan 2023 01:24)  HR: 65 (03 Jan 2023 13:17) (65 - 71)  BP: 140/58 (03 Jan 2023 13:17) (126/67 - 142/69)  BP(mean): --  RR: 18 (03 Jan 2023 13:17) (18 - 18)  SpO2: 95% (03 Jan 2023 13:17) (94% - 95%)    Parameters below as of 03 Jan 2023 13:17  Patient On (Oxygen Delivery Method): room air      I&O's Summary    02 Jan 2023 07:01  -  03 Jan 2023 07:00  --------------------------------------------------------  IN: 1080 mL / OUT: 2410 mL / NET: -1330 mL    03 Jan 2023 07:01  -  03 Jan 2023 15:16  --------------------------------------------------------  IN: 440 mL / OUT: 1115 mL / NET: -675 mL        PE:  GENERAL: NAD, AAOx3  CHEST/LUNG: wheezing  HEART: Regular rate and rhythm; no murmur  ABDOMEN: Soft, Nontender, Nondistended; Bowel sounds present  : enlarged R scrotum, erythematous, tender, gould; ir tube with drainage  EXTREMITIES:  2+ Peripheral Pulses, No edema  NEURO: No focal deficit    LABS:                        11.7   14.56 )-----------( 387      ( 03 Jan 2023 06:48 )             36.8     01-03    137  |  102  |  25<H>  ----------------------------<  92  4.3   |  29  |  1.08    Ca    8.7      03 Jan 2023 06:48  Phos  3.2     01-03  Mg     2.2     01-03    TPro  5.9<L>  /  Alb  2.7<L>  /  TBili  0.3  /  DBili  x   /  AST  18  /  ALT  7<L>  /  AlkPhos  76  01-03    PT/INR - ( 03 Jan 2023 06:48 )   PT: 31.1 sec;   INR: 2.65 ratio         PTT - ( 03 Jan 2023 06:48 )  PTT:33.2 sec  CAPILLARY BLOOD GLUCOSE      POCT Blood Glucose.: 88 mg/dL (03 Jan 2023 08:19)  POCT Blood Glucose.: 145 mg/dL (02 Jan 2023 21:19)  POCT Blood Glucose.: 234 mg/dL (02 Jan 2023 17:12)            RADIOLOGY & ADDITIONAL TESTS:    Imaging Personally Reviewed:  [x] YES  [ ] NO    Consultant(s) Notes Reviewed:  [x] YES  [ ] NO      MEDICATIONS  (STANDING):  albuterol/ipratropium for Nebulization 3 milliLiter(s) Nebulizer every 6 hours  benzonatate 100 milliGRAM(s) Oral three times a day  budesonide 160 MICROgram(s)/formoterol 4.5 MICROgram(s) Inhaler 2 Puff(s) Inhalation two times a day  carbidopa/levodopa  25/100 1 Tablet(s) Oral <User Schedule>  ceFAZolin   IVPB 2000 milliGRAM(s) IV Intermittent every 8 hours  cyanocobalamin 1000 MICROGram(s) Oral daily  dextrose 5%. 1000 milliLiter(s) (50 mL/Hr) IV Continuous <Continuous>  dextrose 5%. 1000 milliLiter(s) (100 mL/Hr) IV Continuous <Continuous>  dextrose 50% Injectable 25 Gram(s) IV Push once  dextrose 50% Injectable 12.5 Gram(s) IV Push once  dextrose 50% Injectable 25 Gram(s) IV Push once  folic acid 1 milliGRAM(s) Oral daily  glucagon  Injectable 1 milliGRAM(s) IntraMuscular once  hydrochlorothiazide 25 milliGRAM(s) Oral daily  insulin glargine Injectable (LANTUS) 14 Unit(s) SubCutaneous at bedtime  insulin lispro (ADMELOG) corrective regimen sliding scale   SubCutaneous at bedtime  insulin lispro (ADMELOG) corrective regimen sliding scale   SubCutaneous three times a day before meals  insulin lispro Injectable (ADMELOG) 8 Unit(s) SubCutaneous three times a day before meals  lactobacillus acidophilus 1 Tablet(s) Oral daily  losartan 100 milliGRAM(s) Oral daily  metoprolol tartrate 25 milliGRAM(s) Oral two times a day  polyethylene glycol 3350 17 Gram(s) Oral daily  rOPINIRole 2 milliGRAM(s) Oral <User Schedule>  senna 2 Tablet(s) Oral at bedtime  trihexyphenidyl 1 milliGRAM(s) Oral <User Schedule>  warfarin 2.5 milliGRAM(s) Oral once    MEDICATIONS  (PRN):  acetaminophen     Tablet .. 650 milliGRAM(s) Oral every 6 hours PRN Temp greater or equal to 38C (100.4F), Mild Pain (1 - 3)  dextrose Oral Gel 15 Gram(s) Oral once PRN Blood Glucose LESS THAN 70 milliGRAM(s)/deciliter  melatonin 3 milliGRAM(s) Oral at bedtime PRN Insomnia  ondansetron Injectable 4 milliGRAM(s) IV Push every 8 hours PRN Nausea and/or Vomiting      Care Discussed with Consultants/Other Providers [x] YES  [ ] NO    HEALTH ISSUES - PROBLEM Dx:  Scrotal abscess    Sepsis    Chronic obstructive pulmonary disease (COPD)    H/O factor V Leiden mutation    Unspecified atrial fibrillation    Parkinsons    HTN (hypertension)    Preop exam for internal medicine    DM (diabetes mellitus)

## 2023-01-04 LAB
ANION GAP SERPL CALC-SCNC: 9 MMOL/L — SIGNIFICANT CHANGE UP (ref 5–17)
ANISOCYTOSIS BLD QL: SLIGHT — SIGNIFICANT CHANGE UP
BASOPHILS # BLD AUTO: 0 K/UL — SIGNIFICANT CHANGE UP (ref 0–0.2)
BASOPHILS NFR BLD AUTO: 0 % — SIGNIFICANT CHANGE UP (ref 0–2)
BUN SERPL-MCNC: 24 MG/DL — HIGH (ref 7–23)
BURR CELLS BLD QL SMEAR: PRESENT — SIGNIFICANT CHANGE UP
CALCIUM SERPL-MCNC: 8.7 MG/DL — SIGNIFICANT CHANGE UP (ref 8.4–10.5)
CHLORIDE SERPL-SCNC: 100 MMOL/L — SIGNIFICANT CHANGE UP (ref 96–108)
CO2 SERPL-SCNC: 28 MMOL/L — SIGNIFICANT CHANGE UP (ref 22–31)
CREAT SERPL-MCNC: 1.06 MG/DL — SIGNIFICANT CHANGE UP (ref 0.5–1.3)
DACRYOCYTES BLD QL SMEAR: SLIGHT — SIGNIFICANT CHANGE UP
EGFR: 72 ML/MIN/1.73M2 — SIGNIFICANT CHANGE UP
ELLIPTOCYTES BLD QL SMEAR: SLIGHT — SIGNIFICANT CHANGE UP
EOSINOPHIL # BLD AUTO: 0.11 K/UL — SIGNIFICANT CHANGE UP (ref 0–0.5)
EOSINOPHIL NFR BLD AUTO: 0.9 % — SIGNIFICANT CHANGE UP (ref 0–6)
GLUCOSE SERPL-MCNC: 67 MG/DL — LOW (ref 70–99)
HCT VFR BLD CALC: 37.9 % — LOW (ref 39–50)
HGB BLD-MCNC: 11.9 G/DL — LOW (ref 13–17)
INR BLD: 2.56 RATIO — HIGH (ref 0.88–1.16)
LYMPHOCYTES # BLD AUTO: 18.6 % — SIGNIFICANT CHANGE UP (ref 13–44)
LYMPHOCYTES # BLD AUTO: 2.34 K/UL — SIGNIFICANT CHANGE UP (ref 1–3.3)
MACROCYTES BLD QL: SLIGHT — SIGNIFICANT CHANGE UP
MANUAL SMEAR VERIFICATION: SIGNIFICANT CHANGE UP
MCHC RBC-ENTMCNC: 29 PG — SIGNIFICANT CHANGE UP (ref 27–34)
MCHC RBC-ENTMCNC: 31.4 GM/DL — LOW (ref 32–36)
MCV RBC AUTO: 92.4 FL — SIGNIFICANT CHANGE UP (ref 80–100)
MONOCYTES # BLD AUTO: 0.99 K/UL — HIGH (ref 0–0.9)
MONOCYTES NFR BLD AUTO: 7.9 % — SIGNIFICANT CHANGE UP (ref 2–14)
MYELOCYTES NFR BLD: 0.9 % — HIGH (ref 0–0)
NEUTROPHILS # BLD AUTO: 9.01 K/UL — HIGH (ref 1.8–7.4)
NEUTROPHILS NFR BLD AUTO: 71.7 % — SIGNIFICANT CHANGE UP (ref 43–77)
PLAT MORPH BLD: NORMAL — SIGNIFICANT CHANGE UP
PLATELET # BLD AUTO: 393 K/UL — SIGNIFICANT CHANGE UP (ref 150–400)
POIKILOCYTOSIS BLD QL AUTO: SIGNIFICANT CHANGE UP
POTASSIUM SERPL-MCNC: 4.5 MMOL/L — SIGNIFICANT CHANGE UP (ref 3.5–5.3)
POTASSIUM SERPL-SCNC: 4.5 MMOL/L — SIGNIFICANT CHANGE UP (ref 3.5–5.3)
PROTHROM AB SERPL-ACNC: 29.7 SEC — HIGH (ref 10.5–13.4)
RBC # BLD: 4.1 M/UL — LOW (ref 4.2–5.8)
RBC # FLD: 17.8 % — HIGH (ref 10.3–14.5)
RBC BLD AUTO: ABNORMAL
SODIUM SERPL-SCNC: 137 MMOL/L — SIGNIFICANT CHANGE UP (ref 135–145)
TARGETS BLD QL SMEAR: SLIGHT — SIGNIFICANT CHANGE UP
WBC # BLD: 12.57 K/UL — HIGH (ref 3.8–10.5)
WBC # FLD AUTO: 12.57 K/UL — HIGH (ref 3.8–10.5)

## 2023-01-04 PROCEDURE — 99232 SBSQ HOSP IP/OBS MODERATE 35: CPT

## 2023-01-04 RX ORDER — INSULIN LISPRO 100/ML
5 VIAL (ML) SUBCUTANEOUS
Refills: 0 | Status: DISCONTINUED | OUTPATIENT
Start: 2023-01-04 | End: 2023-01-06

## 2023-01-04 RX ORDER — WARFARIN SODIUM 2.5 MG/1
2.5 TABLET ORAL ONCE
Refills: 0 | Status: COMPLETED | OUTPATIENT
Start: 2023-01-04 | End: 2023-01-04

## 2023-01-04 RX ORDER — INSULIN GLARGINE 100 [IU]/ML
9 INJECTION, SOLUTION SUBCUTANEOUS AT BEDTIME
Refills: 0 | Status: DISCONTINUED | OUTPATIENT
Start: 2023-01-04 | End: 2023-01-07

## 2023-01-04 RX ADMIN — Medication 3 MILLILITER(S): at 05:27

## 2023-01-04 RX ADMIN — PREGABALIN 1000 MICROGRAM(S): 225 CAPSULE ORAL at 11:51

## 2023-01-04 RX ADMIN — CARBIDOPA AND LEVODOPA 1 TABLET(S): 25; 100 TABLET ORAL at 17:47

## 2023-01-04 RX ADMIN — BUDESONIDE AND FORMOTEROL FUMARATE DIHYDRATE 2 PUFF(S): 160; 4.5 AEROSOL RESPIRATORY (INHALATION) at 17:48

## 2023-01-04 RX ADMIN — Medication 3 MILLILITER(S): at 17:48

## 2023-01-04 RX ADMIN — Medication 100 MILLIGRAM(S): at 16:18

## 2023-01-04 RX ADMIN — Medication 25 MILLIGRAM(S): at 17:48

## 2023-01-04 RX ADMIN — Medication 1 TABLET(S): at 11:51

## 2023-01-04 RX ADMIN — WARFARIN SODIUM 2.5 MILLIGRAM(S): 2.5 TABLET ORAL at 22:50

## 2023-01-04 RX ADMIN — Medication 3 MILLILITER(S): at 23:34

## 2023-01-04 RX ADMIN — Medication 1 MILLIGRAM(S): at 08:45

## 2023-01-04 RX ADMIN — Medication 100 MILLIGRAM(S): at 13:33

## 2023-01-04 RX ADMIN — CARBIDOPA AND LEVODOPA 1 TABLET(S): 25; 100 TABLET ORAL at 13:33

## 2023-01-04 RX ADMIN — INSULIN GLARGINE 9 UNIT(S): 100 INJECTION, SOLUTION SUBCUTANEOUS at 22:49

## 2023-01-04 RX ADMIN — Medication 5 UNIT(S): at 17:48

## 2023-01-04 RX ADMIN — Medication 100 MILLIGRAM(S): at 05:26

## 2023-01-04 RX ADMIN — ROPINIROLE 2 MILLIGRAM(S): 8 TABLET, FILM COATED, EXTENDED RELEASE ORAL at 08:45

## 2023-01-04 RX ADMIN — Medication 100 MILLIGRAM(S): at 22:50

## 2023-01-04 RX ADMIN — Medication 1 MILLIGRAM(S): at 11:51

## 2023-01-04 RX ADMIN — Medication 100 MILLIGRAM(S): at 07:23

## 2023-01-04 RX ADMIN — Medication 3 MILLILITER(S): at 11:52

## 2023-01-04 RX ADMIN — Medication 1 MILLIGRAM(S): at 22:50

## 2023-01-04 RX ADMIN — Medication 25 MILLIGRAM(S): at 05:26

## 2023-01-04 RX ADMIN — LOSARTAN POTASSIUM 100 MILLIGRAM(S): 100 TABLET, FILM COATED ORAL at 05:26

## 2023-01-04 RX ADMIN — POLYETHYLENE GLYCOL 3350 17 GRAM(S): 17 POWDER, FOR SOLUTION ORAL at 11:52

## 2023-01-04 RX ADMIN — ROPINIROLE 2 MILLIGRAM(S): 8 TABLET, FILM COATED, EXTENDED RELEASE ORAL at 17:47

## 2023-01-04 RX ADMIN — CARBIDOPA AND LEVODOPA 1 TABLET(S): 25; 100 TABLET ORAL at 08:45

## 2023-01-04 RX ADMIN — CARBIDOPA AND LEVODOPA 1 TABLET(S): 25; 100 TABLET ORAL at 22:51

## 2023-01-04 RX ADMIN — Medication 1 MILLIGRAM(S): at 13:33

## 2023-01-04 RX ADMIN — ROPINIROLE 2 MILLIGRAM(S): 8 TABLET, FILM COATED, EXTENDED RELEASE ORAL at 13:33

## 2023-01-04 RX ADMIN — ROPINIROLE 2 MILLIGRAM(S): 8 TABLET, FILM COATED, EXTENDED RELEASE ORAL at 22:51

## 2023-01-04 RX ADMIN — Medication 5 UNIT(S): at 12:50

## 2023-01-04 RX ADMIN — Medication 1 MILLIGRAM(S): at 17:48

## 2023-01-04 RX ADMIN — Medication 100 MILLIGRAM(S): at 22:51

## 2023-01-04 RX ADMIN — BUDESONIDE AND FORMOTEROL FUMARATE DIHYDRATE 2 PUFF(S): 160; 4.5 AEROSOL RESPIRATORY (INHALATION) at 05:31

## 2023-01-04 NOTE — PROGRESS NOTE ADULT - SUBJECTIVE AND OBJECTIVE BOX
Interventional Radiology Follow-Up Note.    Patient seen and examined @ bedside.    This is a 77 year old Male s/p scrotal drain placement on 12/25/22 in Interventional Radiology.     No complaint offered.      Medication:  ceFAZolin   IVPB: (01-04)  hydrochlorothiazide: (01-04)  losartan: (01-04)  metoprolol tartrate: (01-04)  piperacillin/tazobactam IVPB..: (01-03)  warfarin: (01-02)  warfarin: (01-03)    Vitals:  T(F): 97.9, Max: 97.9 (05:25)  HR: 63  BP: 131/69  RR: 18  SpO2: 94%    Physical Exam:  General: Nontoxic, in NAD.  Abdomen: soft, NTND.   Drain Device: Drain intact attached to ALIZA bulb.  Flushed with 5cc NS w/o difficulty, no leaking. Dressing clean, dry, intact.   24hr Drain output: 25cc purulent fluid          LABS:  Na: 137 (01-04 @ 07:21), 137 (01-03 @ 06:48), 138 (01-02 @ 07:26)  K: 4.5 (01-04 @ 07:21), 4.3 (01-03 @ 06:48), 4.4 (01-02 @ 07:26)  Cl: 100 (01-04 @ 07:21), 102 (01-03 @ 06:48), 100 (01-02 @ 07:26)  CO2: 28 (01-04 @ 07:21), 29 (01-03 @ 06:48), 28 (01-02 @ 07:26)  BUN: 24 (01-04 @ 07:21), 25 (01-03 @ 06:48), 30 (01-02 @ 07:26)  Cr: 1.06 (01-04 @ 07:21), 1.08 (01-03 @ 06:48), 1.11 (01-02 @ 07:26)  Glu: 67(01-04 @ 07:21), 92(01-03 @ 06:48), 82(01-02 @ 07:26)    Hgb: 11.9 (01-04 @ 07:21), 11.7 (01-03 @ 06:48), 11.9 (01-02 @ 07:25)  Hct: 37.9 (01-04 @ 07:21), 36.8 (01-03 @ 06:48), 38.2 (01-02 @ 07:25)  WBC: 12.57 (01-04 @ 07:21), 14.56 (01-03 @ 06:48), 15.41 (01-02 @ 07:25)  Plt: 393 (01-04 @ 07:21), 387 (01-03 @ 06:48), 425 (01-02 @ 07:25)    INR: 2.56 01-04-23 @ 07:21, 2.65 01-03-23 @ 06:48, 2.60 01-02-23 @ 07:27  PTT: 33.2 01-03-23 @ 06:48, 35.0 01-02-23 @ 07:27          LIVER FUNCTIONS - ( 03 Jan 2023 06:48 )  Alb: 2.7 g/dL / Pro: 5.9 g/dL / ALK PHOS: 76 U/L / ALT: 7 U/L / AST: 18 U/L / GGT: x           Aspartate Aminotransferase (AST/SGOT): 18 U/L (01-03-23 @ 06:48)  Alanine Aminotransferase (ALT/SGPT): 7 U/L (01-03-23 @ 06:48)    Bilirubin Total, Serum: 0.3 mg/dL (01-03-23 @ 06:48)  Bilirubin Total, Serum: 0.3 mg/dL (01-02-23 @ 07:26)          Radiology: < from: US Testicles (01.03.23 @ 15:20) >  IMPRESSION:    Interval decrease in size of right scrotal abscess, which now measures   12.2 x 5.9 x 6.6 cm.    Small left hydrocele with debris and calcifications.    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 Pt most recently s/p scrotal drain placement on 12/25 in Interventional Radiology.     - 1/3 scrotal US demonstrates interval decrease in size of right scrotal abscess, now measuring 12/2x 5.9 x 6.6cm.  - Drain output increased to 25cc/24 hrs, maintain drain to ALIZA bulb suction  - If drain output <15cc/24hrs x 2 consecutive days, please contact IR for drain check. IR will follow.  - leukocytosis improving, continue ABX per primary team  - Flush drain with 5cc normal saline daily forward only; DO NOT aspirate  - Change dressing q3 days or when dressing is saturated.  - Continue global management per primary team  - If the patient is d/c home with drainage catheter, pt can make an appointment with IR by calling the IR booking office at [phone]; recommend IR follow in 7-10 days after discharge for tube evaluation.  - Pt will benefit from VNS service to help with drainage catheter care; Pt should continue same drainage catheter care as an outpatient.  - Greater than 50% of the encounter was spent counseling and/ or coordination of care on drain care and follow up.      Please call IR at [phone] with any questions, concerns, or issues regarding above.    Also available on [employer] TEAMS.   Interventional Radiology Follow-Up Note.    Patient seen and examined @ bedside.    This is a 77 year old Male s/p scrotal drain placement on 12/25/22 in Interventional Radiology.     No complaint offered.      Medication:  ceFAZolin   IVPB: (01-04)  hydrochlorothiazide: (01-04)  losartan: (01-04)  metoprolol tartrate: (01-04)  piperacillin/tazobactam IVPB..: (01-03)  warfarin: (01-02)  warfarin: (01-03)    Vitals:  T(F): 97.9, Max: 97.9 (05:25)  HR: 63  BP: 131/69  RR: 18  SpO2: 94%    Physical Exam:  General: Nontoxic, in NAD.  Abdomen: soft, NTND.   Drain Device: Drain intact attached to ALIZA bulb.  Flushed with 5cc NS w/o difficulty, no leaking. Dressing clean, dry, intact.   24hr Drain output: 25cc purulent fluid          LABS:  Na: 137 (01-04 @ 07:21), 137 (01-03 @ 06:48), 138 (01-02 @ 07:26)  K: 4.5 (01-04 @ 07:21), 4.3 (01-03 @ 06:48), 4.4 (01-02 @ 07:26)  Cl: 100 (01-04 @ 07:21), 102 (01-03 @ 06:48), 100 (01-02 @ 07:26)  CO2: 28 (01-04 @ 07:21), 29 (01-03 @ 06:48), 28 (01-02 @ 07:26)  BUN: 24 (01-04 @ 07:21), 25 (01-03 @ 06:48), 30 (01-02 @ 07:26)  Cr: 1.06 (01-04 @ 07:21), 1.08 (01-03 @ 06:48), 1.11 (01-02 @ 07:26)  Glu: 67(01-04 @ 07:21), 92(01-03 @ 06:48), 82(01-02 @ 07:26)    Hgb: 11.9 (01-04 @ 07:21), 11.7 (01-03 @ 06:48), 11.9 (01-02 @ 07:25)  Hct: 37.9 (01-04 @ 07:21), 36.8 (01-03 @ 06:48), 38.2 (01-02 @ 07:25)  WBC: 12.57 (01-04 @ 07:21), 14.56 (01-03 @ 06:48), 15.41 (01-02 @ 07:25)  Plt: 393 (01-04 @ 07:21), 387 (01-03 @ 06:48), 425 (01-02 @ 07:25)    INR: 2.56 01-04-23 @ 07:21, 2.65 01-03-23 @ 06:48, 2.60 01-02-23 @ 07:27  PTT: 33.2 01-03-23 @ 06:48, 35.0 01-02-23 @ 07:27          LIVER FUNCTIONS - ( 03 Jan 2023 06:48 )  Alb: 2.7 g/dL / Pro: 5.9 g/dL / ALK PHOS: 76 U/L / ALT: 7 U/L / AST: 18 U/L / GGT: x           Aspartate Aminotransferase (AST/SGOT): 18 U/L (01-03-23 @ 06:48)  Alanine Aminotransferase (ALT/SGPT): 7 U/L (01-03-23 @ 06:48)    Bilirubin Total, Serum: 0.3 mg/dL (01-03-23 @ 06:48)  Bilirubin Total, Serum: 0.3 mg/dL (01-02-23 @ 07:26)          Radiology: < from: US Testicles (01.03.23 @ 15:20) >  IMPRESSION:    Interval decrease in size of right scrotal abscess, which now measures   12.2 x 5.9 x 6.6 cm.    Small left hydrocele with debris and calcifications.    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 Pt most recently s/p scrotal drain placement on 12/25 in Interventional Radiology.     - 1/3 scrotal US demonstrates interval decrease in size of right scrotal abscess, now measuring 12/2x 5.9 x 6.6cm. (previously 15cm).  - Drain output increased to 25cc/24 hrs, maintain drain to ALIZA bulb suction  - If drain output <15cc/24hrs x 2 consecutive days, please contact IR for drain check. IR will follow.  - leukocytosis improving, continue ABX per primary team  - Flush drain with 5cc normal saline daily forward only; DO NOT aspirate  - Change dressing q3 days or when dressing is saturated.  - Continue global management per primary team  - If the patient is d/c home with drainage catheter, pt can make an appointment with IR by calling the IR booking office at [phone]; recommend IR follow in 7-10 days after discharge for tube evaluation.  - Pt will benefit from VNS service to help with drainage catheter care; Pt should continue same drainage catheter care as an outpatient.  - Greater than 50% of the encounter was spent counseling and/ or coordination of care on drain care and follow up.      Please call IR at [phone] with any questions, concerns, or issues regarding above.    Also available on [employer] TEAMS.

## 2023-01-04 NOTE — PROGRESS NOTE ADULT - SUBJECTIVE AND OBJECTIVE BOX
Chief complaint    Patient is a 77y old  Male who presents with a chief complaint of generalized weakness and AMS x 2 days (04 Jan 2023 12:52)   Review of systems  Patient in bed, appears comfortable.    Labs and Fingersticks  CAPILLARY BLOOD GLUCOSE    POCT Blood Glucose.: 130 mg/dL (04 Jan 2023 11:58)  POCT Blood Glucose.: 134 mg/dL (04 Jan 2023 09:21)  POCT Blood Glucose.: 66 mg/dL (04 Jan 2023 08:43)  POCT Blood Glucose.: 92 mg/dL (03 Jan 2023 21:50)  POCT Blood Glucose.: 84 mg/dL (03 Jan 2023 17:31)      Anion Gap, Serum: 9 (01-04 @ 07:21)  Anion Gap, Serum: 6 (01-03 @ 06:48)      Calcium, Total Serum: 8.7 (01-04 @ 07:21)  Calcium, Total Serum: 8.7 (01-03 @ 06:48)  Albumin, Serum: 2.7 *L* (01-03 @ 06:48)    Alanine Aminotransferase (ALT/SGPT): 7 *L* (01-03 @ 06:48)  Alkaline Phosphatase, Serum: 76 (01-03 @ 06:48)  Aspartate Aminotransferase (AST/SGOT): 18 (01-03 @ 06:48)        01-04    137  |  100  |  24<H>  ----------------------------<  67<L>  4.5   |  28  |  1.06    Ca    8.7      04 Jan 2023 07:21  Phos  3.2     01-03  Mg     2.2     01-03    TPro  5.9<L>  /  Alb  2.7<L>  /  TBili  0.3  /  DBili  x   /  AST  18  /  ALT  7<L>  /  AlkPhos  76  01-03                        11.9   12.57 )-----------( 393      ( 04 Jan 2023 07:21 )             37.9     Medications  MEDICATIONS  (STANDING):  albuterol/ipratropium for Nebulization 3 milliLiter(s) Nebulizer every 6 hours  benzonatate 100 milliGRAM(s) Oral three times a day  budesonide 160 MICROgram(s)/formoterol 4.5 MICROgram(s) Inhaler 2 Puff(s) Inhalation two times a day  carbidopa/levodopa  25/100 1 Tablet(s) Oral <User Schedule>  ceFAZolin   IVPB 2000 milliGRAM(s) IV Intermittent every 8 hours  cyanocobalamin 1000 MICROGram(s) Oral daily  dextrose 5%. 1000 milliLiter(s) (100 mL/Hr) IV Continuous <Continuous>  dextrose 5%. 1000 milliLiter(s) (50 mL/Hr) IV Continuous <Continuous>  dextrose 50% Injectable 25 Gram(s) IV Push once  dextrose 50% Injectable 12.5 Gram(s) IV Push once  dextrose 50% Injectable 25 Gram(s) IV Push once  folic acid 1 milliGRAM(s) Oral daily  glucagon  Injectable 1 milliGRAM(s) IntraMuscular once  hydrochlorothiazide 25 milliGRAM(s) Oral daily  insulin glargine Injectable (LANTUS) 9 Unit(s) SubCutaneous at bedtime  insulin lispro (ADMELOG) corrective regimen sliding scale   SubCutaneous at bedtime  insulin lispro (ADMELOG) corrective regimen sliding scale   SubCutaneous three times a day before meals  insulin lispro Injectable (ADMELOG) 5 Unit(s) SubCutaneous three times a day before meals  lactobacillus acidophilus 1 Tablet(s) Oral daily  losartan 100 milliGRAM(s) Oral daily  metoprolol tartrate 25 milliGRAM(s) Oral two times a day  polyethylene glycol 3350 17 Gram(s) Oral daily  rOPINIRole 2 milliGRAM(s) Oral <User Schedule>  senna 2 Tablet(s) Oral at bedtime  trihexyphenidyl 1 milliGRAM(s) Oral <User Schedule>      Physical Exam  General: Patient appears comfortable.  Vital Signs Last 12 Hrs  T(F): 97.8 (01-04-23 @ 13:14), Max: 97.9 (01-04-23 @ 05:25)  HR: 71 (01-04-23 @ 13:14) (63 - 71)  BP: 102/68 (01-04-23 @ 13:14) (102/68 - 147/81)  BP(mean): --  RR: 18 (01-04-23 @ 13:14) (18 - 18)  SpO2: 93% (01-04-23 @ 13:14) (93% - 97%)  Neck: No palpable thyroid nodules.  CVS: S1S2, No murmurs  Respiratory: No wheezing, no crepitations  GI: Abdomen soft, non tender.  Musculoskeletal:  edema lower extremities.     Diagnostics

## 2023-01-04 NOTE — PROGRESS NOTE ADULT - SUBJECTIVE AND OBJECTIVE BOX
Patient is a 77y old  Male who presents with a chief complaint of generalized weakness and AMS x 2 days (04 Jan 2023 12:09)      SUBJECTIVE / OVERNIGHT EVENTS:    Patient seen and examined. feels fine. no cp sob.       Vital Signs Last 24 Hrs  T(C): 36.3 (04 Jan 2023 10:32), Max: 36.6 (04 Jan 2023 05:25)  T(F): 97.4 (04 Jan 2023 10:32), Max: 97.9 (04 Jan 2023 05:25)  HR: 66 (04 Jan 2023 10:32) (63 - 67)  BP: 147/81 (04 Jan 2023 10:32) (120/71 - 147/81)  BP(mean): --  RR: 18 (04 Jan 2023 10:32) (18 - 18)  SpO2: 97% (04 Jan 2023 10:32) (94% - 97%)    Parameters below as of 04 Jan 2023 10:32  Patient On (Oxygen Delivery Method): room air      I&O's Summary    03 Jan 2023 07:01  -  04 Jan 2023 07:00  --------------------------------------------------------  IN: 1000 mL / OUT: 2646 mL / NET: -1646 mL    04 Jan 2023 07:01  -  04 Jan 2023 12:52  --------------------------------------------------------  IN: 240 mL / OUT: 0 mL / NET: 240 mL        PE:  GENERAL: NAD, AAOx3  CHEST/LUNG: CTABL, No wheeze  HEART: Regular rate and rhythm; no murmur  ABDOMEN: Soft, Nontender, Nondistended; Bowel sounds present  : scrotal swelling gould  EXTREMITIES:  2+ Peripheral Pulses, No edema  NEURO: No focal deficits    LABS:                        11.9   12.57 )-----------( 393      ( 04 Jan 2023 07:21 )             37.9     01-04    137  |  100  |  24<H>  ----------------------------<  67<L>  4.5   |  28  |  1.06    Ca    8.7      04 Jan 2023 07:21  Phos  3.2     01-03  Mg     2.2     01-03    TPro  5.9<L>  /  Alb  2.7<L>  /  TBili  0.3  /  DBili  x   /  AST  18  /  ALT  7<L>  /  AlkPhos  76  01-03    PT/INR - ( 04 Jan 2023 07:21 )   PT: 29.7 sec;   INR: 2.56 ratio         PTT - ( 03 Jan 2023 06:48 )  PTT:33.2 sec  CAPILLARY BLOOD GLUCOSE      POCT Blood Glucose.: 130 mg/dL (04 Jan 2023 11:58)  POCT Blood Glucose.: 134 mg/dL (04 Jan 2023 09:21)  POCT Blood Glucose.: 66 mg/dL (04 Jan 2023 08:43)  POCT Blood Glucose.: 92 mg/dL (03 Jan 2023 21:50)  POCT Blood Glucose.: 84 mg/dL (03 Jan 2023 17:31)  POCT Blood Glucose.: 160 mg/dL (03 Jan 2023 15:40)            RADIOLOGY & ADDITIONAL TESTS:    Imaging Personally Reviewed:  [x] YES  [ ] NO    Consultant(s) Notes Reviewed:  [x] YES  [ ] NO    MEDICATIONS  (STANDING):  albuterol/ipratropium for Nebulization 3 milliLiter(s) Nebulizer every 6 hours  benzonatate 100 milliGRAM(s) Oral three times a day  budesonide 160 MICROgram(s)/formoterol 4.5 MICROgram(s) Inhaler 2 Puff(s) Inhalation two times a day  carbidopa/levodopa  25/100 1 Tablet(s) Oral <User Schedule>  ceFAZolin   IVPB 2000 milliGRAM(s) IV Intermittent every 8 hours  cyanocobalamin 1000 MICROGram(s) Oral daily  dextrose 5%. 1000 milliLiter(s) (100 mL/Hr) IV Continuous <Continuous>  dextrose 5%. 1000 milliLiter(s) (50 mL/Hr) IV Continuous <Continuous>  dextrose 50% Injectable 25 Gram(s) IV Push once  dextrose 50% Injectable 12.5 Gram(s) IV Push once  dextrose 50% Injectable 25 Gram(s) IV Push once  folic acid 1 milliGRAM(s) Oral daily  glucagon  Injectable 1 milliGRAM(s) IntraMuscular once  hydrochlorothiazide 25 milliGRAM(s) Oral daily  insulin glargine Injectable (LANTUS) 9 Unit(s) SubCutaneous at bedtime  insulin lispro (ADMELOG) corrective regimen sliding scale   SubCutaneous at bedtime  insulin lispro (ADMELOG) corrective regimen sliding scale   SubCutaneous three times a day before meals  insulin lispro Injectable (ADMELOG) 5 Unit(s) SubCutaneous three times a day before meals  lactobacillus acidophilus 1 Tablet(s) Oral daily  losartan 100 milliGRAM(s) Oral daily  metoprolol tartrate 25 milliGRAM(s) Oral two times a day  polyethylene glycol 3350 17 Gram(s) Oral daily  rOPINIRole 2 milliGRAM(s) Oral <User Schedule>  senna 2 Tablet(s) Oral at bedtime  trihexyphenidyl 1 milliGRAM(s) Oral <User Schedule>    MEDICATIONS  (PRN):  acetaminophen     Tablet .. 650 milliGRAM(s) Oral every 6 hours PRN Temp greater or equal to 38C (100.4F), Mild Pain (1 - 3)  dextrose Oral Gel 15 Gram(s) Oral once PRN Blood Glucose LESS THAN 70 milliGRAM(s)/deciliter  melatonin 3 milliGRAM(s) Oral at bedtime PRN Insomnia  ondansetron Injectable 4 milliGRAM(s) IV Push every 8 hours PRN Nausea and/or Vomiting      Care Discussed with Consultants/Other Providers [x] YES  [ ] NO    HEALTH ISSUES - PROBLEM Dx:  Scrotal abscess    Sepsis    Chronic obstructive pulmonary disease (COPD)    H/O factor V Leiden mutation    Unspecified atrial fibrillation    Parkinsons    HTN (hypertension)    Preop exam for internal medicine    DM (diabetes mellitus)

## 2023-01-04 NOTE — PROGRESS NOTE ADULT - SUBJECTIVE AND OBJECTIVE BOX
[facility] DIVISION OF INFECTIOUS DISEASES  CHRISTI Putnam Y. Patel, S. Shah, [facility]  [phone]  ([phone] - weekdays after 5pm and weekends)    Name: BOECKLE, ROBERT  Age/Gender: 77y Male  MRN: [id number]    Interval History:  Patient seen and examined this morning.   Sitting up in chair, feels well, cough better.   No new complaints noted.  Notes reviewed. Afebrile     Allergies: garlic (Other (Mild))  Levaquin (Anaphylaxis)    Objective:  Vitals:   T(F): 97.4 (01-04-23 @ 10:32), Max: 97.9 (01-04-23 @ 05:25)  HR: 66 (01-04-23 @ 10:32) (63 - 67)  BP: 147/81 (01-04-23 @ 10:32) (120/71 - 147/81)  RR: 18 (01-04-23 @ 10:32) (18 - 18)  SpO2: 97% (01-04-23 @ 10:32) (94% - 97%)  Physical Examination:  General: no acute distress  HEENT: NC/AT, anicteric, neck supple  Respiratory: no acc muscle use, breathing comfortably  Cardiovascular: S1 and S2 present  Gastrointestinal: normal appearing, nondistended  : scrotal edema, no erythema or TTP  Extremities: no edema, no cyanosis  Skin: no visible rash    Laboratory Studies:  CBC:                       11.9   12.57 )-----------( 393      ( 04 Jan 2023 07:21 )             37.9     WBC Trend:  12.57 01-04-23 @ 07:21  14.56 01-03-23 @ 06:48  15.41 01-02-23 @ 07:25  15.06 01-01-23 @ 05:45  16.44 12-31-22 @ 07:24  15.46 12-30-22 @ 05:45  12.78 12-29-22 @ 07:18    CMP: 01-04    137  |  100  |  24<H>  ----------------------------<  67<L>  4.5   |  28  |  1.06    Ca    8.7      04 Jan 2023 07:21  Phos  3.2     01-03  Mg     2.2     01-03    TPro  5.9<L>  /  Alb  2.7<L>  /  TBili  0.3  /  DBili  x   /  AST  18  /  ALT  7<L>  /  AlkPhos  76  01-03    Creatinine, Serum: 1.06 mg/dL (01-04-23 @ 07:21)  Creatinine, Serum: 1.08 mg/dL (01-03-23 @ 06:48)  Creatinine, Serum: 1.11 mg/dL (01-02-23 @ 07:26)  Creatinine, Serum: 1.01 mg/dL (01-01-23 @ 05:45)  Creatinine, Serum: 0.98 mg/dL (12-31-22 @ 07:22)  Creatinine, Serum: 0.97 mg/dL (12-30-22 @ 05:45)  Creatinine, Serum: 0.90 mg/dL (12-29-22 @ 07:15)    LIVER FUNCTIONS - ( 03 Jan 2023 06:48 )  Alb: 2.7 g/dL / Pro: 5.9 g/dL / ALK PHOS: 76 U/L / ALT: 7 U/L / AST: 18 U/L / GGT: x           Microbiology: reviewed   Culture - Body Fluid with Gram Stain (collected 12-25-22 @ 23:09)  Source: Abdominal Fl Abdominal Fluid  Gram Stain (12-26-22 @ 08:02):    Numerous polymorphonuclear leukocytes seen per low power field    Moderate Gram positive cocci in pairs seen per oil power field  Final Report (12-31-22 @ 08:35):    Few Staphylococcus caprae  Organism: Staphylococcus caprae (12-31-22 @ 08:35)  Organism: Staphylococcus caprae (12-31-22 @ 08:35)      -  Ampicillin/Sulbactam: S <=8/4      -  Cefazolin: S <=4      -  Clindamycin: S <=0.25      -  Erythromycin: S <=0.25      -  Gentamicin: S <=1 Should not be used as monotherapy      -  Oxacillin: S <=0.25      -  Rifampin: S <=1 Should not be used as monotherapy      -  Tetracycline: S <=1      -  Trimethoprim/Sulfamethoxazole: S <=0.5/9.5      -  Vancomycin: S 1      Method Type: MARIA DEL CARMEN    Culture - Blood (collected 12-23-22 @ 13:35)  Source: .Blood Blood-Peripheral  Final Report (12-28-22 @ 18:01):    No Growth Final    Culture - Blood (collected 12-23-22 @ 13:15)  Source: .Blood Blood-Peripheral  Final Report (12-28-22 @ 18:01):    No Growth Final    Radiology: reviewed   US Testicles (01.03.23 @ 15:20) >IMPRESSION: Interval decrease in size of right scrotal abscess, which now measures  12.2 x 5.9 x 6.6 cm. Small left hydrocele with debris and calcifications.    Medications:  acetaminophen     Tablet .. 650 milliGRAM(s) Oral every 6 hours PRN  albuterol/ipratropium for Nebulization 3 milliLiter(s) Nebulizer every 6 hours  benzonatate 100 milliGRAM(s) Oral three times a day  budesonide 160 MICROgram(s)/formoterol 4.5 MICROgram(s) Inhaler 2 Puff(s) Inhalation two times a day  carbidopa/levodopa  25/100 1 Tablet(s) Oral <User Schedule>  ceFAZolin   IVPB 2000 milliGRAM(s) IV Intermittent every 8 hours  cyanocobalamin 1000 MICROGram(s) Oral daily  dextrose 5%. 1000 milliLiter(s) IV Continuous <Continuous>  dextrose 5%. 1000 milliLiter(s) IV Continuous <Continuous>  dextrose 50% Injectable 25 Gram(s) IV Push once  dextrose 50% Injectable 12.5 Gram(s) IV Push once  dextrose 50% Injectable 25 Gram(s) IV Push once  dextrose Oral Gel 15 Gram(s) Oral once PRN  folic acid 1 milliGRAM(s) Oral daily  glucagon  Injectable 1 milliGRAM(s) IntraMuscular once  hydrochlorothiazide 25 milliGRAM(s) Oral daily  insulin glargine Injectable (LANTUS) 9 Unit(s) SubCutaneous at bedtime  insulin lispro (ADMELOG) corrective regimen sliding scale   SubCutaneous at bedtime  insulin lispro (ADMELOG) corrective regimen sliding scale   SubCutaneous three times a day before meals  insulin lispro Injectable (ADMELOG) 5 Unit(s) SubCutaneous three times a day before meals  lactobacillus acidophilus 1 Tablet(s) Oral daily  losartan 100 milliGRAM(s) Oral daily  melatonin 3 milliGRAM(s) Oral at bedtime PRN  metoprolol tartrate 25 milliGRAM(s) Oral two times a day  ondansetron Injectable 4 milliGRAM(s) IV Push every 8 hours PRN  polyethylene glycol 3350 17 Gram(s) Oral daily  rOPINIRole 2 milliGRAM(s) Oral <User Schedule>  senna 2 Tablet(s) Oral at bedtime  trihexyphenidyl 1 milliGRAM(s) Oral <User Schedule>    Current Antimicrobials:  ceFAZolin   IVPB 2000 milliGRAM(s) IV Intermittent every 8 hours    Prior/Completed Antimicrobials:  cefTRIAXone   IVPB  piperacillin/tazobactam IVPB.  piperacillin/tazobactam IVPB.-  piperacillin/tazobactam IVPB...  vancomycin  IVPB.

## 2023-01-04 NOTE — PROGRESS NOTE ADULT - ASSESSMENT
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 admitted for sepsis 2/2 scrotal abscess.    # sepsis 2/2 right scrotal abscess  s/p IR drainage 12/25  rpt testicular ult improving  f/u urology when gould catheter can be removed   cefazolin per ID, can change to cephalexin PO on dc    # Chronic obstructive pulmonary disease with exacerbation  # Pleural effusion   echo noted normal lvef   Symbicort   duonebs q6h    # Large left inguinal hernia  outpt fu    # T2DM  endo following  a1c 8s     # H/O factor V Leiden mutation  coumadin DAILY INR. coumadin dosing    # Parkinsons  continue carbidopa-levo-dopa   continue Ropinirole   continue Trihexyphenidyl    # HTN (hypertension)  continue Irbesartan (therapeutic equivalent)    dvt ppx coumadin     dispo: fu urology re rj gould planning, PT    Please contact with any questions or concerns [phone].

## 2023-01-04 NOTE — PROGRESS NOTE ADULT - ASSESSMENT
Assessment  DMT2: 77y Male with DM T2 with hyperglycemia, A1C 8%, now on low-dose basal bolus insulin, blood sugars improving, had hypoglycemic episodes, eating partial meals.  Scrotal Abscess: on medications, stable, monitored.  COPD: On meds, breathing tx, stable.  HTN: Controlled,  on antihypertensive medications.  Parkinsons: On meds, stable, monitored.        Dima Keller MD  Cell: [phone]  Office: [phone]

## 2023-01-05 ENCOUNTER — TRANSCRIPTION ENCOUNTER (OUTPATIENT)
Age: 78
End: 2023-01-05

## 2023-01-05 LAB
ANION GAP SERPL CALC-SCNC: 9 MMOL/L — SIGNIFICANT CHANGE UP (ref 5–17)
APTT BLD: 35.9 SEC — HIGH (ref 27.5–35.5)
BUN SERPL-MCNC: 22 MG/DL — SIGNIFICANT CHANGE UP (ref 7–23)
CALCIUM SERPL-MCNC: 8.8 MG/DL — SIGNIFICANT CHANGE UP (ref 8.4–10.5)
CHLORIDE SERPL-SCNC: 100 MMOL/L — SIGNIFICANT CHANGE UP (ref 96–108)
CO2 SERPL-SCNC: 27 MMOL/L — SIGNIFICANT CHANGE UP (ref 22–31)
CREAT SERPL-MCNC: 1.07 MG/DL — SIGNIFICANT CHANGE UP (ref 0.5–1.3)
EGFR: 71 ML/MIN/1.73M2 — SIGNIFICANT CHANGE UP
GLUCOSE SERPL-MCNC: 91 MG/DL — SIGNIFICANT CHANGE UP (ref 70–99)
HCT VFR BLD CALC: 39.8 % — SIGNIFICANT CHANGE UP (ref 39–50)
HGB BLD-MCNC: 12.6 G/DL — LOW (ref 13–17)
INR BLD: 2.35 RATIO — HIGH (ref 0.88–1.16)
MCHC RBC-ENTMCNC: 29 PG — SIGNIFICANT CHANGE UP (ref 27–34)
MCHC RBC-ENTMCNC: 31.7 GM/DL — LOW (ref 32–36)
MCV RBC AUTO: 91.5 FL — SIGNIFICANT CHANGE UP (ref 80–100)
NRBC # BLD: 0 /100 WBCS — SIGNIFICANT CHANGE UP (ref 0–0)
PLATELET # BLD AUTO: 347 K/UL — SIGNIFICANT CHANGE UP (ref 150–400)
POTASSIUM SERPL-MCNC: 4.5 MMOL/L — SIGNIFICANT CHANGE UP (ref 3.5–5.3)
POTASSIUM SERPL-SCNC: 4.5 MMOL/L — SIGNIFICANT CHANGE UP (ref 3.5–5.3)
PROTHROM AB SERPL-ACNC: 27.3 SEC — HIGH (ref 10.5–13.4)
RBC # BLD: 4.35 M/UL — SIGNIFICANT CHANGE UP (ref 4.2–5.8)
RBC # FLD: 17.9 % — HIGH (ref 10.3–14.5)
SODIUM SERPL-SCNC: 136 MMOL/L — SIGNIFICANT CHANGE UP (ref 135–145)
WBC # BLD: 11.36 K/UL — HIGH (ref 3.8–10.5)
WBC # FLD AUTO: 11.36 K/UL — HIGH (ref 3.8–10.5)

## 2023-01-05 PROCEDURE — 99231 SBSQ HOSP IP/OBS SF/LOW 25: CPT

## 2023-01-05 RX ORDER — CEPHALEXIN 500 MG
1 CAPSULE ORAL
Qty: 120 | Refills: 0
Start: 2023-01-05 | End: 2023-02-03

## 2023-01-05 RX ORDER — LINAGLIPTIN 5 MG/1
1 TABLET, FILM COATED ORAL
Qty: 30 | Refills: 0
Start: 2023-01-05 | End: 2023-02-03

## 2023-01-05 RX ORDER — WARFARIN SODIUM 2.5 MG/1
2.5 TABLET ORAL AT BEDTIME
Refills: 0 | Status: DISCONTINUED | OUTPATIENT
Start: 2023-01-05 | End: 2023-01-06

## 2023-01-05 RX ORDER — ALOGLIPTIN AND METFORMIN HYDROCHLORIDE 12.5; 5 MG/1; MG/1
1 TABLET, FILM COATED ORAL
Qty: 60 | Refills: 0
Start: 2023-01-05 | End: 2023-02-03

## 2023-01-05 RX ORDER — CEPHALEXIN 500 MG
1 CAPSULE ORAL
Qty: 56 | Refills: 0
Start: 2023-01-05 | End: 2023-01-18

## 2023-01-05 RX ADMIN — Medication 1 MILLIGRAM(S): at 11:04

## 2023-01-05 RX ADMIN — WARFARIN SODIUM 2.5 MILLIGRAM(S): 2.5 TABLET ORAL at 22:03

## 2023-01-05 RX ADMIN — ROPINIROLE 2 MILLIGRAM(S): 8 TABLET, FILM COATED, EXTENDED RELEASE ORAL at 07:53

## 2023-01-05 RX ADMIN — CARBIDOPA AND LEVODOPA 1 TABLET(S): 25; 100 TABLET ORAL at 07:53

## 2023-01-05 RX ADMIN — Medication 100 MILLIGRAM(S): at 05:45

## 2023-01-05 RX ADMIN — INSULIN GLARGINE 9 UNIT(S): 100 INJECTION, SOLUTION SUBCUTANEOUS at 22:03

## 2023-01-05 RX ADMIN — Medication 3 MILLILITER(S): at 11:04

## 2023-01-05 RX ADMIN — CARBIDOPA AND LEVODOPA 1 TABLET(S): 25; 100 TABLET ORAL at 17:22

## 2023-01-05 RX ADMIN — Medication 1 MILLIGRAM(S): at 07:53

## 2023-01-05 RX ADMIN — ROPINIROLE 2 MILLIGRAM(S): 8 TABLET, FILM COATED, EXTENDED RELEASE ORAL at 13:17

## 2023-01-05 RX ADMIN — ROPINIROLE 2 MILLIGRAM(S): 8 TABLET, FILM COATED, EXTENDED RELEASE ORAL at 22:03

## 2023-01-05 RX ADMIN — Medication 25 MILLIGRAM(S): at 17:22

## 2023-01-05 RX ADMIN — LOSARTAN POTASSIUM 100 MILLIGRAM(S): 100 TABLET, FILM COATED ORAL at 05:48

## 2023-01-05 RX ADMIN — CARBIDOPA AND LEVODOPA 1 TABLET(S): 25; 100 TABLET ORAL at 22:03

## 2023-01-05 RX ADMIN — Medication 100 MILLIGRAM(S): at 13:17

## 2023-01-05 RX ADMIN — BUDESONIDE AND FORMOTEROL FUMARATE DIHYDRATE 2 PUFF(S): 160; 4.5 AEROSOL RESPIRATORY (INHALATION) at 05:45

## 2023-01-05 RX ADMIN — Medication 1 MILLIGRAM(S): at 17:22

## 2023-01-05 RX ADMIN — Medication 25 MILLIGRAM(S): at 05:48

## 2023-01-05 RX ADMIN — POLYETHYLENE GLYCOL 3350 17 GRAM(S): 17 POWDER, FOR SOLUTION ORAL at 11:04

## 2023-01-05 RX ADMIN — BUDESONIDE AND FORMOTEROL FUMARATE DIHYDRATE 2 PUFF(S): 160; 4.5 AEROSOL RESPIRATORY (INHALATION) at 17:22

## 2023-01-05 RX ADMIN — Medication 5 UNIT(S): at 18:07

## 2023-01-05 RX ADMIN — Medication 3 MILLILITER(S): at 05:45

## 2023-01-05 RX ADMIN — ROPINIROLE 2 MILLIGRAM(S): 8 TABLET, FILM COATED, EXTENDED RELEASE ORAL at 17:22

## 2023-01-05 RX ADMIN — Medication 5 UNIT(S): at 07:53

## 2023-01-05 RX ADMIN — Medication 1 MILLIGRAM(S): at 22:04

## 2023-01-05 RX ADMIN — Medication 100 MILLIGRAM(S): at 22:03

## 2023-01-05 RX ADMIN — Medication 5 UNIT(S): at 13:18

## 2023-01-05 RX ADMIN — Medication 3 MILLILITER(S): at 17:22

## 2023-01-05 RX ADMIN — Medication 1 MILLIGRAM(S): at 13:17

## 2023-01-05 RX ADMIN — Medication 3 MILLILITER(S): at 23:39

## 2023-01-05 RX ADMIN — CARBIDOPA AND LEVODOPA 1 TABLET(S): 25; 100 TABLET ORAL at 13:17

## 2023-01-05 RX ADMIN — Medication 1 TABLET(S): at 11:04

## 2023-01-05 RX ADMIN — Medication 100 MILLIGRAM(S): at 05:48

## 2023-01-05 RX ADMIN — PREGABALIN 1000 MICROGRAM(S): 225 CAPSULE ORAL at 11:04

## 2023-01-05 NOTE — CHART NOTE - NSCHARTNOTEFT_GEN_A_CORE
Based on patients on going issues with deconditioning and generalized weakness secondary to patients dx Altered mental status, Sacral decubitus ulcer, Perkinsons, COPD, . Patient will require a semi electric hospital bed. This is necessary to achieve positioning and elevation not able to obtain in an ordinary bed. Bed pillows and wedges have been tried and ruled out.
Interval Hx: Informed by RN patient attempting to pull out gould catheter. Patient seen and evaluated at bedside.    Patient is A+Ox3, however with hx of Parkinsons and COPD, unclear if hx of sundowning. When asked why he was trying to remove his catheter he explained that he doesn't need it. Pt has Gould placed by urology for urinary retention in setting of scrotal abscess which was drained by IR earlier tonight.  Pt also noted to be wheezing RR 22. Duoneb to be given stat. Pt O2 on continuos pulse ox was in the high 80s on 2LNC. Increased to 3LNC. Would not overoxygenate in this COPD patient.  Likely wheezing secondary to COPD exacerbation, worsened by patient's bout of agitation. 40mg methylprednisolone IVP x 1 given.  ABG ordered.  Will reassess respiratory status post neb and steroid treatment.  Day team and attending to follow.    -Laurita Navarro PA-C, [id number], Dept of Medicine
Nutrition Follow Up Note  Patient seen for: Nutrition Consult for Assessment     Chart reviewed, events noted. 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 admitted for sepsis 2/2 scrotal abscess."    Source: [X] Patient       [x] EMR        [] RN        [] Family at bedside       [] Other:    -If unable to interview patient: [] Trach/Vent/BiPAP  [] Disoriented/confused/inappropriate to interview    Diet, Regular:   Consistent Carbohydrate {No Snacks} (CSTCHO)  Low Sodium (22)    - Is current order appropriate/adequate? [X] Yes  []  No:     - PO intake :   [X] >75%  Adequate    [] 50-75%  Fair       [] <50%  Poor    - Nutrition-related concerns:      - A1c= 8.0%, newly dx diabetes. Verbal diet education reviewed with pt. Declined written education        GI:  Last BM 23  Bowel Regimen? [X] Yes   [] No    Weights: Daily Weight in k.2 (12-28)  - No new weights to comment on. Monitor weight status as able during present admission     Nutritionally Pertinent MEDICATIONS  (STANDING):  cyanocobalamin  dextrose 5%.  dextrose 5%.  dextrose 50% Injectable  dextrose 50% Injectable  dextrose 50% Injectable  folic acid  glucagon  Injectable  hydrochlorothiazide  insulin glargine Injectable (LANTUS)  insulin lispro (ADMELOG) corrective regimen sliding scale  insulin lispro (ADMELOG) corrective regimen sliding scale  insulin lispro Injectable (ADMELOG)  losartan  metoprolol tartrate  piperacillin/tazobactam IVPB..  polyethylene glycol 3350  senna    Pertinent Labs:  @ 06:48: Na 137, BUN 25<H>, Cr 1.08, BG 92, K+ 4.3, Phos 3.2, Mg 2.2, Alk Phos 76, ALT/SGPT 7<L>, AST/SGOT 18, HbA1c --    A1C with Estimated Average Glucose Result: 8.0 % (22 @ 07:05)    Finger Sticks:  POCT Blood Glucose.: 88 mg/dL ( @ 08:19)  POCT Blood Glucose.: 145 mg/dL ( @ 21:19)  POCT Blood Glucose.: 234 mg/dL ( @ 17:12)  POCT Blood Glucose.: 184 mg/dL ( @ 12:51)      Skin per nursing documentation:  no pressure injuries noted per flow sheets   Edema:  + 4 edema noted to scrotum per flow sheets    Estimated Needs:   [X] no change since previous assessment  [] recalculated:     Previous Nutrition Diagnosis:   1. Overweight/Obesity  Nutrition Diagnosis is: [X] ongoing  [] resolved [] not applicable     New Nutrition Diagnosis: [X] Not applicable    Nutrition Care Plan:  [X] In Progress  [] Achieved  [] Not applicable    Nutrition Interventions:     Education Provided:       [X] Yes:  Provided education on T2DM nutrition therapy. Stressed the importance of a balanced meal to maintain blood glucose. Described HbA1c and stressed importance of its normal levels. Encouraged Pt to continue monitoring blood glucose at home. Discussed how to manage hypoglycemia. Recommended water consumption with avoidance of soda and juice. Discussed to avoid concentrated sweets. [] No:        Recommendations:      1. Continue current low sodium, Consistent Carbohydrate diet. Defer diet/texture modification to medical team/SLP as indicated   2. Encourage PO intakes as tolerated. Honor food preferences as appropriate and available.   3. Continue Micronutrient supplementation as prescribed: cyanocobalamin & folic acid  4. Monitor weight status as able and provide nutritional interventions as warranted     Monitoring and Evaluation:   Continue to monitor nutritional intake, tolerance to diet prescription, weights, labs, skin integrity    RD remains available upon request and will follow up per protocol  Sabi Briones, MS,RDN, CDN, Pager # [phone] or TEAMS
Patient is a 77y old  Male who presents with a chief complaint of generalized weakness and AMS x 2 days (24 Dec 2022 15:29)  Called by RN pt was rapid afib on telemetry.  Reviewed pt's history and spoke with his wife, they are not aware pt have afib.    No complaints of palpitations.  HR: 130-140 bpm.       HPI:    Vital Signs Last 24 Hrs  T(C): 37.2 (24 Dec 2022 16:12), Max: 37.2 (24 Dec 2022 16:12)  T(F): 99 (24 Dec 2022 16:12), Max: 99 (24 Dec 2022 16:12)  HR: 109 (24 Dec 2022 16:12) (92 - 115)  BP: 123/86 (24 Dec 2022 16:12) (117/71 - 148/72)  BP(mean): --  RR: 16 (24 Dec 2022 16:12) (16 - 19)  SpO2: 93% (24 Dec 2022 16:12) (93% - 97%)    Parameters below as of 24 Dec 2022 16:12  Patient On (Oxygen Delivery Method): nasal cannula  O2 Flow (L/min): 2    Gen: 76 y/o male wd/wn in nad.   Skin: Acyanotic, cool, dry and intact.   Respt: CTA without adventitious sounds.  CV: S1S2 Telemetry:  Afib HR: 139 bpm.   Chest: Symmetrical, equal lung expansion.   Extremities: (+) PPP no edema.     Laboratory:                            11.9   13.71 )-----------( 253      ( 24 Dec 2022 07:00 )             37.7       INR: 6.75     Impression:   Sepsis 2/2 scrotal abscess;   Scrotal hematoma  DVT/PE Factor V Leiden on Coumadin  Rapid Afib    Plan:   Rapid Afib  Lopressor 2.5 mg iv initially given with no results.   Lopressor 5 mg IV given 2 hours later.   Metoprolol 25 mg bid started.   Discussed findings with Attending.   Obtain TTE  Cardiology Consult called. Dr. CITLALI Proctor called.   Continue telemetry monitoring.     Supratherapeutic INR:   INR 6.75  Vitamin K 2.5 mg given.   Follow up repeat INR.     Urinary burning:   Pt complaining of burning from gould.   Spoke with urology. More in favor to keep the gould in 2/2 to avoid   getting surgical site (scotum) infected/wet with urine.  Pt has a buried penis.   Start pyridium x 2 days.     Will continue to monitor pt closely.       Niesha Carvalho [facility]  [facility] #[id number]

## 2023-01-05 NOTE — DISCHARGE NOTE PROVIDER - NSDCFUADDINST_GEN_ALL_CORE_FT
Flush drain with 5cc normal saline daily forward only; DO NOT aspirate  Change dressing q3 days or when dressing is saturated  dc with gould catheter and outpt fu with urology after drain removed Flush drain with 5cc normal saline daily forward only; DO NOT aspirate  Change dressing q3 days or when dressing is saturated  dc with gould catheter and outpt fu with urology after drain removed  F/U Urology  F/U ID  F/U IR

## 2023-01-05 NOTE — PROGRESS NOTE ADULT - ASSESSMENT
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 admitted for sepsis 2/2 scrotal abscess.    # sepsis 2/2 right scrotal abscess  s/p IR drainage 12/25  rpt testicular ult improving  will dc with drain, outpt fu with IR  Flush drain with 5cc normal saline daily forward only; DO NOT aspirate  Change dressing q3 days or when dressing is saturated  dc with gould catheter and outpt fu with urology after drain removed  cefazolin per ID, can change to cephalexin PO on dc    # Chronic obstructive pulmonary disease with exacerbation  # Pleural effusion   echo noted normal lvef   Symbicort   duonebs q6h    # Large left inguinal hernia  outpt fu    # T2DM  endo following, dc on tradjenta   a1c 8s     # H/O factor V Leiden mutation  coumadin DAILY INR. coumadin dosing    # Parkinsons  continue carbidopa-levo-dopa   continue Ropinirole   continue Trihexyphenidyl    # HTN (hypertension)  continue Irbesartan (therapeutic equivalent)    dvt ppx coumadin     dispo: dc planning, family declining MIS, pending hospital bed    Please contact with any questions or concerns [phone].

## 2023-01-05 NOTE — PROGRESS NOTE ADULT - SUBJECTIVE AND OBJECTIVE BOX
[facility] DIVISION OF INFECTIOUS DISEASES  CHRISTI Putnam Y. Patel, S. Shah, G. [facility]  [phone]  ([phone] - weekdays after 5pm and weekends)    Name: BOECKLE, ROBERT  Age/Gender: 77y Male  MRN: [id number]    Interval History:  Patient seen and examined this morning.   Feels well, minimal cough, no pain.   No new complaints noted.  Notes reviewed. Afebrile     Allergies: garlic (Other (Mild))  Levaquin (Anaphylaxis)    Objective:  Vitals:   T(F): 97.5 (01-05-23 @ 09:46), Max: 98.2 (01-05-23 @ 01:20)  HR: 75 (01-05-23 @ 09:46) (70 - 75)  BP: 93/57 (01-05-23 @ 09:46) (93/57 - 137/57)  RR: 18 (01-05-23 @ 09:46) (18 - 18)  SpO2: 92% (01-05-23 @ 09:46) (92% - 96%)  Physical Examination:  General: no acute distress  HEENT: NC/AT, anicteric, neck supple  Respiratory: no acc muscle use, breathing comfortably  Cardiovascular: S1 and S2 present  Gastrointestinal: normal appearing, nondistended  : scrotal edema, no erythema, +drain  Extremities: no edema, no cyanosis  Skin: no visible rash    Laboratory Studies:  CBC:                       12.6   11.36 )-----------( 347      ( 05 Jan 2023 07:21 )             39.8     WBC Trend:  11.36 01-05-23 @ 07:21  12.57 01-04-23 @ 07:21  14.56 01-03-23 @ 06:48  15.41 01-02-23 @ 07:25  15.06 01-01-23 @ 05:45  16.44 12-31-22 @ 07:24  15.46 12-30-22 @ 05:45    CMP: 01-05    136  |  100  |  22  ----------------------------<  91  4.5   |  27  |  1.07    Ca    8.8      05 Jan 2023 07:21      Creatinine, Serum: 1.07 mg/dL (01-05-23 @ 07:21)  Creatinine, Serum: 1.06 mg/dL (01-04-23 @ 07:21)  Creatinine, Serum: 1.08 mg/dL (01-03-23 @ 06:48)  Creatinine, Serum: 1.11 mg/dL (01-02-23 @ 07:26)  Creatinine, Serum: 1.01 mg/dL (01-01-23 @ 05:45)  Creatinine, Serum: 0.98 mg/dL (12-31-22 @ 07:22)  Creatinine, Serum: 0.97 mg/dL (12-30-22 @ 05:45)    Microbiology: reviewed   Culture - Body Fluid with Gram Stain (collected 12-25-22 @ 23:09)  Source: Abdominal Fl Abdominal Fluid  Gram Stain (12-26-22 @ 08:02):    Numerous polymorphonuclear leukocytes seen per low power field    Moderate Gram positive cocci in pairs seen per oil power field  Final Report (12-31-22 @ 08:35):    Few Staphylococcus caprae  Organism: Staphylococcus caprae (12-31-22 @ 08:35)  Organism: Staphylococcus caprae (12-31-22 @ 08:35)      -  Ampicillin/Sulbactam: S <=8/4      -  Cefazolin: S <=4      -  Clindamycin: S <=0.25      -  Erythromycin: S <=0.25      -  Gentamicin: S <=1 Should not be used as monotherapy      -  Oxacillin: S <=0.25      -  Rifampin: S <=1 Should not be used as monotherapy      -  Tetracycline: S <=1      -  Trimethoprim/Sulfamethoxazole: S <=0.5/9.5      -  Vancomycin: S 1      Method Type: MARIA DEL CARMEN    Culture - Blood (collected 12-23-22 @ 13:35)  Source: .Blood Blood-Peripheral  Final Report (12-28-22 @ 18:01):    No Growth Final    Culture - Blood (collected 12-23-22 @ 13:15)  Source: .Blood Blood-Peripheral  Final Report (12-28-22 @ 18:01):    No Growth Final    Radiology: reviewed     Medications:  acetaminophen     Tablet .. 650 milliGRAM(s) Oral every 6 hours PRN  albuterol/ipratropium for Nebulization 3 milliLiter(s) Nebulizer every 6 hours  benzonatate 100 milliGRAM(s) Oral three times a day  budesonide 160 MICROgram(s)/formoterol 4.5 MICROgram(s) Inhaler 2 Puff(s) Inhalation two times a day  carbidopa/levodopa  25/100 1 Tablet(s) Oral <User Schedule>  ceFAZolin   IVPB 2000 milliGRAM(s) IV Intermittent every 8 hours  cyanocobalamin 1000 MICROGram(s) Oral daily  dextrose 5%. 1000 milliLiter(s) IV Continuous <Continuous>  dextrose 5%. 1000 milliLiter(s) IV Continuous <Continuous>  dextrose 50% Injectable 25 Gram(s) IV Push once  dextrose 50% Injectable 12.5 Gram(s) IV Push once  dextrose 50% Injectable 25 Gram(s) IV Push once  dextrose Oral Gel 15 Gram(s) Oral once PRN  folic acid 1 milliGRAM(s) Oral daily  glucagon  Injectable 1 milliGRAM(s) IntraMuscular once  hydrochlorothiazide 25 milliGRAM(s) Oral daily  insulin glargine Injectable (LANTUS) 9 Unit(s) SubCutaneous at bedtime  insulin lispro (ADMELOG) corrective regimen sliding scale   SubCutaneous at bedtime  insulin lispro (ADMELOG) corrective regimen sliding scale   SubCutaneous three times a day before meals  insulin lispro Injectable (ADMELOG) 5 Unit(s) SubCutaneous three times a day before meals  lactobacillus acidophilus 1 Tablet(s) Oral daily  losartan 100 milliGRAM(s) Oral daily  melatonin 3 milliGRAM(s) Oral at bedtime PRN  metoprolol tartrate 25 milliGRAM(s) Oral two times a day  ondansetron Injectable 4 milliGRAM(s) IV Push every 8 hours PRN  polyethylene glycol 3350 17 Gram(s) Oral daily  rOPINIRole 2 milliGRAM(s) Oral <User Schedule>  senna 2 Tablet(s) Oral at bedtime  trihexyphenidyl 1 milliGRAM(s) Oral <User Schedule>    Current Antimicrobials:  ceFAZolin   IVPB 2000 milliGRAM(s) IV Intermittent every 8 hours    Prior/Completed Antimicrobials:  cefTRIAXone   IVPB  piperacillin/tazobactam IVPB.  piperacillin/tazobactam IVPB.-  piperacillin/tazobactam IVPB...  vancomycin  IVPB.

## 2023-01-05 NOTE — PROGRESS NOTE ADULT - SUBJECTIVE AND OBJECTIVE BOX
Chief complaint    Patient is a 77y old  Male who presents with a chief complaint of generalized weakness and AMS x 2 days (05 Jan 2023 09:21)   Review of systems  Patient in bed, appears comfortable.    Labs and Fingersticks  CAPILLARY BLOOD GLUCOSE      POCT Blood Glucose.: 97 mg/dL (05 Jan 2023 07:47)  POCT Blood Glucose.: 202 mg/dL (04 Jan 2023 22:00)  POCT Blood Glucose.: 149 mg/dL (04 Jan 2023 17:14)  POCT Blood Glucose.: 130 mg/dL (04 Jan 2023 11:58)      Anion Gap, Serum: 9 (01-05 @ 07:21)  Anion Gap, Serum: 9 (01-04 @ 07:21)      Calcium, Total Serum: 8.8 (01-05 @ 07:21)  Calcium, Total Serum: 8.7 (01-04 @ 07:21)          01-05    136  |  100  |  22  ----------------------------<  91  4.5   |  27  |  1.07    Ca    8.8      05 Jan 2023 07:21                          12.6   11.36 )-----------( 347      ( 05 Jan 2023 07:21 )             39.8     Medications  MEDICATIONS  (STANDING):  albuterol/ipratropium for Nebulization 3 milliLiter(s) Nebulizer every 6 hours  benzonatate 100 milliGRAM(s) Oral three times a day  budesonide 160 MICROgram(s)/formoterol 4.5 MICROgram(s) Inhaler 2 Puff(s) Inhalation two times a day  carbidopa/levodopa  25/100 1 Tablet(s) Oral <User Schedule>  ceFAZolin   IVPB 2000 milliGRAM(s) IV Intermittent every 8 hours  cyanocobalamin 1000 MICROGram(s) Oral daily  dextrose 5%. 1000 milliLiter(s) (100 mL/Hr) IV Continuous <Continuous>  dextrose 5%. 1000 milliLiter(s) (50 mL/Hr) IV Continuous <Continuous>  dextrose 50% Injectable 25 Gram(s) IV Push once  dextrose 50% Injectable 12.5 Gram(s) IV Push once  dextrose 50% Injectable 25 Gram(s) IV Push once  folic acid 1 milliGRAM(s) Oral daily  glucagon  Injectable 1 milliGRAM(s) IntraMuscular once  hydrochlorothiazide 25 milliGRAM(s) Oral daily  insulin glargine Injectable (LANTUS) 9 Unit(s) SubCutaneous at bedtime  insulin lispro (ADMELOG) corrective regimen sliding scale   SubCutaneous three times a day before meals  insulin lispro (ADMELOG) corrective regimen sliding scale   SubCutaneous at bedtime  insulin lispro Injectable (ADMELOG) 5 Unit(s) SubCutaneous three times a day before meals  lactobacillus acidophilus 1 Tablet(s) Oral daily  losartan 100 milliGRAM(s) Oral daily  metoprolol tartrate 25 milliGRAM(s) Oral two times a day  polyethylene glycol 3350 17 Gram(s) Oral daily  rOPINIRole 2 milliGRAM(s) Oral <User Schedule>  senna 2 Tablet(s) Oral at bedtime  trihexyphenidyl 1 milliGRAM(s) Oral <User Schedule>      Physical Exam  General: Patient appears comfortable.  Vital Signs Last 12 Hrs  T(F): 97.5 (01-05-23 @ 09:46), Max: 98.2 (01-05-23 @ 01:20)  HR: 75 (01-05-23 @ 09:46) (73 - 75)  BP: 93/57 (01-05-23 @ 09:46) (93/57 - 137/57)  BP(mean): --  RR: 18 (01-05-23 @ 09:46) (18 - 18)  SpO2: 92% (01-05-23 @ 09:46) (92% - 94%)  Neck: No palpable thyroid nodules.  CVS: S1S2, No murmurs  Respiratory: No wheezing, no crepitations  GI: Abdomen soft, non tender.  Musculoskeletal:  edema lower extremities.     Diagnostics

## 2023-01-05 NOTE — PROGRESS NOTE ADULT - ASSESSMENT
Assessment  DMT2: 77y Male with DM T2 with hyperglycemia, A1C 8%, now on low-dose basal bolus insulin, blood sugars improving, had hypoglycemic episodes, eating  meals.  Scrotal Abscess: on medications, stable, monitored.  COPD: On meds, breathing tx, stable.  HTN: Controlled,  on antihypertensive medications.  Parkinsons: On meds, stable, monitored.        Dima Keller MD  Cell: [phone]  Office: [phone]

## 2023-01-05 NOTE — DISCHARGE NOTE PROVIDER - PROVIDER TOKENS
PROVIDER:[TOKEN:[[id number]],FOLLOWUP:[1 week]],PROVIDER:[TOKEN:[[id number]],FOLLOWUP:[1 week]] PROVIDER:[TOKEN:[[id number]],FOLLOWUP:[1 week]],PROVIDER:[TOKEN:[[id number]],FOLLOWUP:[1 week]],PROVIDER:[TOKEN:[[id number]],FOLLOWUP:[1 week]]

## 2023-01-05 NOTE — PROGRESS NOTE ADULT - SUBJECTIVE AND OBJECTIVE BOX
Interventional Radiology Follow-Up Note.       Patient seen and examined @ bedside.    This is a 77 year old Male s/p scrotal drain placement on 12/25/22 in Interventional Radiology.     No complaint offered.      Medication:     ceFAZolin   IVPB: (01-05)  hydrochlorothiazide: (01-05)  losartan: (01-05)  metoprolol tartrate: (01-05)  warfarin: (01-03)  warfarin: (01-04)    Vitals:  T(F): 98.1, Max: 98.2 (01:20)  HR: 73  BP: 135/73  RR: 18  SpO2: 94%    Vitals:  T(F): 97.9, Max: 97.9 (05:25)  HR: 63  BP: 131/69  RR: 18  SpO2: 94%    Physical Exam:  General: Nontoxic, in NAD.  Abdomen: soft, NTND.   Drain Device: Drain intact attached to ALIZA bulb.  Flushed with 5cc NS w/o difficulty, no leaking. Dressing clean, dry, intact.   24hr Drain output: 60cc purulent fluid        LABS:  Na: 136 (01-05 @ 07:21), 137 (01-04 @ 07:21), 137 (01-03 @ 06:48)  K: 4.5 (01-05 @ 07:21), 4.5 (01-04 @ 07:21), 4.3 (01-03 @ 06:48)  Cl: 100 (01-05 @ 07:21), 100 (01-04 @ 07:21), 102 (01-03 @ 06:48)  CO2: 27 (01-05 @ 07:21), 28 (01-04 @ 07:21), 29 (01-03 @ 06:48)  BUN: 22 (01-05 @ 07:21), 24 (01-04 @ 07:21), 25 (01-03 @ 06:48)  Cr: 1.07 (01-05 @ 07:21), 1.06 (01-04 @ 07:21), 1.08 (01-03 @ 06:48)  Glu: 91(01-05 @ 07:21), 67(01-04 @ 07:21), 92(01-03 @ 06:48)    Hgb: 12.6 (01-05 @ 07:21), 11.9 (01-04 @ 07:21), 11.7 (01-03 @ 06:48)  Hct: 39.8 (01-05 @ 07:21), 37.9 (01-04 @ 07:21), 36.8 (01-03 @ 06:48)  WBC: 11.36 (01-05 @ 07:21), 12.57 (01-04 @ 07:21), 14.56 (01-03 @ 06:48)  Plt: 347 (01-05 @ 07:21), 393 (01-04 @ 07:21), 387 (01-03 @ 06:48)    INR: 2.35 01-05-23 @ 07:21, 2.56 01-04-23 @ 07:21, 2.65 01-03-23 @ 06:48  PTT: 35.9 01-05-23 @ 07:21, 33.2 01-03-23 @ 06:48       Culture - Body Fluid with Gram Stain (12.25.22 @ 23:09)    Gram Stain:   Numerous polymorphonuclear leukocytes seen per low power field  Moderate Gram positive cocci in pairs seen per oil power field    -  Ampicillin/Sulbactam: S <=8/4    -  Cefazolin: S <=4    -  Clindamycin: S <=0.25    -  Erythromycin: S <=0.25    -  Gentamicin: S <=1 Should not be used as monotherapy    -  Oxacillin: S <=0.25    -  Rifampin: S <=1 Should not be used as monotherapy    -  Tetracycline: S <=1    -  Trimethoprim/Sulfamethoxazole: S <=0.5/9.5    -  Vancomycin: S 1    Specimen Source: Abdominal Fl Abdominal Fluid    Culture Results:   Few Staphylococcus caprae    Organism Identification: Staphylococcus caprae    Organism: Staphylococcus caprae    Method Type: MARIA DEL CARMEN            Assessment/Plan:  77 year old male with PMH of Parkinson's, COPD, 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 Pt most recently s/p scrotal drain placement on 12/25 in Interventional Radiology.     - 1/3 scrotal US demonstrates interval decrease in size of right scrotal abscess, now measuring 12/2x 5.9 x 6.6cm. (previously 15cm).  - Drain output increased to 60cc/24 hrs, maintain drain to ALIZA bulb suction. purulent output   - If drain output <15cc/24hrs x 2 consecutive days, please contact IR for drain check. IR will follow.  - WBC improving, WBC: 11.36 (01-05 @ 07:21), 12.57 (01-04 @ 07:21), 14.56 (01-03 @ 06:48)  - continue ABX per primary team  - Flush drain with 5cc normal saline daily forward only; DO NOT aspirate  - Change dressing q3 days or when dressing is saturated.  - Continue global management per primary team  - If the patient is d/c home with drainage catheter, pt can make an appointment with IR by calling the IR booking office at [phone]; recommend IR follow in 7-10 days after discharge for tube evaluation.  - Pt will benefit from VNS service to help with drainage catheter care; Pt should continue same drainage catheter care as an outpatient.  - Greater than 50% of the encounter was spent counseling and/ or coordination of care on drain care and follow up.      Please call IR at [phone] with any questions, concerns, or issues regarding above.    Also available on [employer] TEAMS.

## 2023-01-05 NOTE — PROGRESS NOTE ADULT - ASSESSMENT
Patient is a 77 year old male with PMH of Parkinson's, COPD, DVT/PE factor V Leiden on Coumadin, s/p R hernia repair c/b scrotal hematoma p/w generalized weakness and  altered mental state for the past two days and admitted for sepsis 2/2 scrotal abscess; sepsis resolved.     Scrotal abscess  Large L inguinal hernia  -  and surgery following--suspect superinfected hematoma  - s/p IR drainage on 12/25 - drained 20cc purulent fluid, drain in place  - Abscess cx with Staph caprae-- sensitivities noted   - afebrile, WBC improved, cough better, no other new sx   - RVP negative, CXR reported with clearing   - s/p cefazolin (12/28-12/31) > pip-tazo (12/31-1/3)   - US testicle shows interval decrease of R scrotal abscess to 12.2 x 5.9 x 6.6 cm (was 15.0 x 12.8 x 10.2 cm on CT)  Recs:  - drain care per IR, tube check if drainage <15cc x2 consecutive days   - continue on cefazolin 2g IV Q8h while inpt   -- if discharged, switch to cephalexin 500mg PO Q6h - to continue while drain in place  - monitor temps, CBC      D/w Dr. Brendan Connolly M.D.  [facility], Division of Infectious Diseases  [phone]  After 5pm on weekdays and all day on weekends - please call [phone]

## 2023-01-05 NOTE — DISCHARGE NOTE PROVIDER - NSDCFUSCHEDAPPT_GEN_ALL_CORE_FT
Terence Geller  [facility]  Scheduled Appointment: 01/17/2023

## 2023-01-05 NOTE — DISCHARGE NOTE PROVIDER - ATTENDING DISCHARGE PHYSICAL EXAMINATION:
GENERAL: NAD, AAOx3  CHEST/LUNG: CTABL, No wheeze  HEART: Regular rate and rhythm; No murmur  ABDOMEN: Soft, Nontender, Nondistended; Bowel sounds present  gu enlarged scrotum, gould cath, drain in place  EXTREMITIES:  2+ Peripheral Pulses, No edema

## 2023-01-05 NOTE — PROGRESS NOTE ADULT - SUBJECTIVE AND OBJECTIVE BOX
Patient is a 77y old  Male who presents with a chief complaint of generalized weakness and AMS x 2 days (05 Jan 2023 10:06)      SUBJECTIVE / OVERNIGHT EVENTS:    Patient seen and examined. no cp sob      Vital Signs Last 24 Hrs  T(C): 36.4 (05 Jan 2023 09:46), Max: 36.8 (05 Jan 2023 01:20)  T(F): 97.5 (05 Jan 2023 09:46), Max: 98.2 (05 Jan 2023 01:20)  HR: 75 (05 Jan 2023 09:46) (70 - 75)  BP: 93/57 (05 Jan 2023 09:46) (93/57 - 137/57)  BP(mean): --  RR: 18 (05 Jan 2023 09:46) (18 - 18)  SpO2: 92% (05 Jan 2023 09:46) (92% - 96%)    Parameters below as of 05 Jan 2023 09:46  Patient On (Oxygen Delivery Method): room air      I&O's Summary    04 Jan 2023 07:01  -  05 Jan 2023 07:00  --------------------------------------------------------  IN: 1040 mL / OUT: 3310 mL / NET: -2270 mL    05 Jan 2023 07:01  -  05 Jan 2023 11:20  --------------------------------------------------------  IN: 180 mL / OUT: 520 mL / NET: -340 mL        PE:  GENERAL: NAD, AAOx3  CHEST/LUNG: CTABL, No wheeze  HEART: Regular rate and rhythm; no murmur  ABDOMEN: Soft, Nontender, Nondistended; Bowel sounds present  : scrotal swelling gould  EXTREMITIES:  2+ Peripheral Pulses, No edema  NEURO: No focal deficits    LABS:                        12.6   11.36 )-----------( 347      ( 05 Jan 2023 07:21 )             39.8     01-05    136  |  100  |  22  ----------------------------<  91  4.5   |  27  |  1.07    Ca    8.8      05 Jan 2023 07:21      PT/INR - ( 05 Jan 2023 07:21 )   PT: 27.3 sec;   INR: 2.35 ratio         PTT - ( 05 Jan 2023 07:21 )  PTT:35.9 sec  CAPILLARY BLOOD GLUCOSE      POCT Blood Glucose.: 97 mg/dL (05 Jan 2023 07:47)  POCT Blood Glucose.: 202 mg/dL (04 Jan 2023 22:00)  POCT Blood Glucose.: 149 mg/dL (04 Jan 2023 17:14)  POCT Blood Glucose.: 130 mg/dL (04 Jan 2023 11:58)            RADIOLOGY & ADDITIONAL TESTS:    Imaging Personally Reviewed:  [x] YES  [ ] NO    Consultant(s) Notes Reviewed:  [x] YES  [ ] NO    MEDICATIONS  (STANDING):  albuterol/ipratropium for Nebulization 3 milliLiter(s) Nebulizer every 6 hours  benzonatate 100 milliGRAM(s) Oral three times a day  budesonide 160 MICROgram(s)/formoterol 4.5 MICROgram(s) Inhaler 2 Puff(s) Inhalation two times a day  carbidopa/levodopa  25/100 1 Tablet(s) Oral <User Schedule>  ceFAZolin   IVPB 2000 milliGRAM(s) IV Intermittent every 8 hours  cyanocobalamin 1000 MICROGram(s) Oral daily  dextrose 5%. 1000 milliLiter(s) (100 mL/Hr) IV Continuous <Continuous>  dextrose 5%. 1000 milliLiter(s) (50 mL/Hr) IV Continuous <Continuous>  dextrose 50% Injectable 25 Gram(s) IV Push once  dextrose 50% Injectable 12.5 Gram(s) IV Push once  dextrose 50% Injectable 25 Gram(s) IV Push once  folic acid 1 milliGRAM(s) Oral daily  glucagon  Injectable 1 milliGRAM(s) IntraMuscular once  hydrochlorothiazide 25 milliGRAM(s) Oral daily  insulin glargine Injectable (LANTUS) 9 Unit(s) SubCutaneous at bedtime  insulin lispro (ADMELOG) corrective regimen sliding scale   SubCutaneous at bedtime  insulin lispro (ADMELOG) corrective regimen sliding scale   SubCutaneous three times a day before meals  insulin lispro Injectable (ADMELOG) 5 Unit(s) SubCutaneous three times a day before meals  lactobacillus acidophilus 1 Tablet(s) Oral daily  losartan 100 milliGRAM(s) Oral daily  metoprolol tartrate 25 milliGRAM(s) Oral two times a day  polyethylene glycol 3350 17 Gram(s) Oral daily  rOPINIRole 2 milliGRAM(s) Oral <User Schedule>  senna 2 Tablet(s) Oral at bedtime  trihexyphenidyl 1 milliGRAM(s) Oral <User Schedule>    MEDICATIONS  (PRN):  acetaminophen     Tablet .. 650 milliGRAM(s) Oral every 6 hours PRN Temp greater or equal to 38C (100.4F), Mild Pain (1 - 3)  dextrose Oral Gel 15 Gram(s) Oral once PRN Blood Glucose LESS THAN 70 milliGRAM(s)/deciliter  melatonin 3 milliGRAM(s) Oral at bedtime PRN Insomnia  ondansetron Injectable 4 milliGRAM(s) IV Push every 8 hours PRN Nausea and/or Vomiting      Care Discussed with Consultants/Other Providers [x] YES  [ ] NO    HEALTH ISSUES - PROBLEM Dx:  Scrotal abscess    Sepsis    Chronic obstructive pulmonary disease (COPD)    H/O factor V Leiden mutation    Unspecified atrial fibrillation    Parkinsons    HTN (hypertension)    Preop exam for internal medicine    DM (diabetes mellitus)

## 2023-01-05 NOTE — DISCHARGE NOTE PROVIDER - HOSPITAL COURSE
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 admitted for sepsis 2/2 scrotal abscess.    # sepsis 2/2 right scrotal abscess  s/p IR drainage 12/25  rpt testicular ult improving  will dc with drain, outpt fu with IR   Flush drain with 5cc normal saline daily forward only; DO NOT aspirate  Change dressing q3 days or when dressing is saturated  dc with gould catheter and outpt fu with urology after drain removed  cefazolin per ID, can change to cephalexin PO on dc switch to cephalexin 500mg PO Q6h - to continue while drain in place    # Chronic obstructive pulmonary disease with exacerbation  # Pleural effusion   echo noted normal lvef   Symbicort   duonebs q6h    # Large left inguinal hernia  outpt fu    # T2DM  endo following, dc on tradjenta   a1c 8s     # H/O factor V Leiden mutation  coumadin DAILY INR. coumadin dosing    # Parkinsons  continue carbidopa-levo-dopa   continue Ropinirole   continue Trihexyphenidyl    # HTN (hypertension)  continue Irbesartan (therapeutic equivalent)    dvt ppx coumadin     dispo: dc planning, family declining MIS, pending hospital bed     77M PMHx Parkinson's, COPD, DVT/PE factor V Leiden on Coumadin, s/p R hernia repair c/b scrotal hematoma p/w generalized weakness and  altered mental state for the past two days.  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 admitted for sepsis 2/2 scrotal abscess.    # sepsis 2/2 right scrotal abscess  s/p IR drainage 12/25  rpt testicular ult improving  will dc with drain, outpt fu with IR   Flush drain with 5cc normal saline daily forward only; DO NOT aspirate  Change dressing q3 days or when dressing is saturated  dc with gould catheter and outpt fu with urology after drain removed  cefazolin per ID, can change to cephalexin PO on dc switch to cephalexin 500mg PO Q6h - to continue while drain in place  F/U ID 1-2 weeks w/ Dr. Connolly    # Chronic obstructive pulmonary disease with exacerbation  # Pleural effusion   echo noted normal lvef   Symbicort   duonebs q6h    # Large left inguinal hernia  outpt fu    # T2DM  endo following, dc on tradjenta   a1c 8s     # H/O factor V Leiden mutation  coumadin DAILY INR. coumadin dosing    # Parkinsons  continue carbidopa-levo-dopa   continue Ropinirole   continue Trihexyphenidyl    # HTN (hypertension)  continue Irbesartan (therapeutic equivalent)    dvt ppx coumadin     dispo: dc planning, family declining MIS, pending hospital bed     77M PMHx Parkinson's, COPD, DVT/PE factor V Leiden on Coumadin, s/p R hernia repair c/b scrotal hematoma p/w generalized weakness and  altered mental state for the past two days.  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 admitted for sepsis 2/2 scrotal abscess.    # sepsis 2/2 right scrotal abscess  s/p IR drainage 12/25  rpt testicular ult improving  will dc with drain, outpt fu with IR   Flush drain with 5cc normal saline daily forward only; DO NOT aspirate  Change dressing q3 days or when dressing is saturated  dc with gould catheter and outpt fu with urology after drain removed  cefazolin per ID, can change to cephalexin PO on dc switch to cephalexin 500mg PO Q6h - to continue while drain in place  F/U ID 1-2 weeks w/ Dr. Connolly    # Chronic obstructive pulmonary disease with exacerbation  # Pleural effusion   echo noted normal lvef   Symbicort   duonebs q6h    # Large left inguinal hernia  outpt fu    # T2DM  endo following, dc on januvia   a1c 8s     # H/O factor V Leiden mutation  coumadin DAILY INR. coumadin dosing    # Parkinsons  continue carbidopa-levo-dopa   continue Ropinirole   continue Trihexyphenidyl    # HTN (hypertension)  continue Irbesartan (therapeutic equivalent)    dvt ppx coumadin     dispo: dc planning, family declining MIS, pending hospital bed     77M PMHx Parkinson's, COPD, DVT/PE factor V Leiden on Coumadin, s/p R hernia repair c/b scrotal hematoma p/w generalized weakness and  altered mental state for the past two days.  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 admitted for sepsis 2/2 scrotal abscess.    # sepsis 2/2 right scrotal abscess  s/p IR drainage 12/25  rpt testicular ult improving  will dc with drain, outpt fu with IR   Flush drain with 5cc normal saline daily forward only; DO NOT aspirate  Change dressing q3 days or when dressing is saturated  dc with gould catheter and outpt fu with urology after drain removed  cefazolin per ID, can change to cephalexin PO on dc switch to cephalexin 500mg PO Q6h - to continue while drain in place  F/U ID 1-2 weeks w/ Dr. Connolly    # Chronic obstructive pulmonary disease with exacerbation  # Pleural effusion   echo noted normal lvef   Symbicort   duonebs q6h    # Large left inguinal hernia  outpt fu    # T2DM  endo following, dc on januvia   a1c 8s     # H/O factor V Leiden mutation  coumadin DAILY INR. coumadin dosing    # Parkinsons  continue carbidopa-levo-dopa   continue Ropinirole   continue Trihexyphenidyl    # HTN (hypertension)  continue Irbesartan (therapeutic equivalent)    dvt ppx coumadin     dispo: dc planning, family declining MIS, pending hospital bed    Case discussed with attending.  Given patient's improved clinical status and current hemodynamic stability, the decision was made for discharge.    This is a summary of the patients hospitalization.  For full hospital course, please see medical record. 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 admitted for sepsis 2/2 scrotal abscess.    # sepsis 2/2 right scrotal abscess  s/p IR drainage 12/25  rpt testicular ult improving  will dc with drain, outpt fu with IR   Flush drain with 5cc normal saline daily forward only; DO NOT aspirate  Change dressing q3 days or when dressing is saturated  dc with gould catheter and outpt fu with urology after drain removed  cefazolin per ID, can change to cephalexin PO on dc switch to cephalexin 500mg PO Q6h - to continue while drain in place  F/U ID 1-2 weeks w/ Dr. Connolly    # Chronic obstructive pulmonary disease with exacerbation  # Pleural effusion   echo noted normal lvef   Symbicort   duonebs q6h    # Large left inguinal hernia  outpt fu    # T2DM  endo following, dc on januvia   a1c 8s     # H/O factor V Leiden mutation  coumadin DAILY INR. coumadin dosing    # Parkinsons  continue carbidopa-levo-dopa   continue Ropinirole   continue Trihexyphenidyl    # HTN (hypertension)  continue Irbesartan (therapeutic equivalent)    dvt ppx coumadin     dispo: dc planning, family declining MIS, as per d/w CM hospital bed has arrived at home, pt stable for discharge    Case discussed with attending.  Given patient's improved clinical status and current hemodynamic stability, the decision was made for discharge.    This is a summary of the patients hospitalization.  For full hospital course, please see medical record.

## 2023-01-05 NOTE — DISCHARGE NOTE PROVIDER - CARE PROVIDER_API CALL
Dmitriy Loya)  Urology  [address] [place] [address]  Phone: [phone]  Fax: [phone]  Follow Up Time: 1 week    Halaibeh, Mohammad A (MD)  Radiology General  [address] [place] [address]  Phone: [phone]  Fax: [phone]  Follow Up Time: 1 week   Dmitriy Loya)  Urology  [address] [place] [address]  Phone: [phone]  Fax: [phone]  Follow Up Time: 1 week    Halaibeh, Mohammad A (MD)  Radiology General  [address] [place] [address]  Phone: [phone]  Fax: [phone]  Follow Up Time: 1 week    Surendra Connolly)  Infectious Disease; Internal Medicine  [address] [place] [address], [facility]  [place] [address]  Phone: [phone]  Fax: [phone]  Follow Up Time: 1 week

## 2023-01-05 NOTE — DISCHARGE NOTE PROVIDER - NSDCCPCAREPLAN_GEN_ALL_CORE_FT
PRINCIPAL DISCHARGE DIAGNOSIS  Diagnosis: Scrotal abscess  Assessment and Plan of Treatment: c/w Oral Abx as long as drain stay. F/U IR when drain output <15cc x2, F/U Urology for gould cath

## 2023-01-05 NOTE — DISCHARGE NOTE PROVIDER - NSDCMRMEDTOKEN_GEN_ALL_CORE_FT
Albuterol (Eqv-ProAir HFA) 90 mcg/inh inhalation aerosol: 2 puff(s) inhaled every 6 hours  carbidopa-levodopa 25 mg-100 mg oral tablet: 1 tab(s) orally 4 times a day  cephalexin 500 mg oral tablet: 1 tab(s) orally every 6 hours   Coumadin 5 mg oral tablet: 0.5 tab(s) orally once a day  Hospital Bed: Hospital Bed For Genaralised Weakness  irbesartan-hydrochlorothiazide 300 mg-25 mg oral tablet: 1 tab(s) orally once a day  linagliptin 5 mg oral tablet: 1 tab(s) orally once a day   rOPINIRole 2 mg oral tablet: 1 tab(s) orally 4 times a day  Symbicort 160 mcg-4.5 mcg/inh inhalation aerosol: 2 puff(s) inhaled 2 times a day  trihexyphenidyl 2 mg oral tablet: 0.5 tab(s) orally 3 times a day   Albuterol (Eqv-ProAir HFA) 90 mcg/inh inhalation aerosol: 2 puff(s) inhaled every 6 hours  carbidopa-levodopa 25 mg-100 mg oral tablet: 1 tab(s) orally 4 times a day  cephalexin 500 mg oral tablet: 1 tab(s) orally every 6 hours   Coumadin 5 mg oral tablet: 0.5 tab(s) orally once a day  irbesartan-hydrochlorothiazide 300 mg-25 mg oral tablet: 1 tab(s) orally once a day  Januvia 50 mg oral tablet: 1 tab(s) orally once a day   rOPINIRole 2 mg oral tablet: 1 tab(s) orally 4 times a day  Symbicort 160 mcg-4.5 mcg/inh inhalation aerosol: 2 puff(s) inhaled 2 times a day  trihexyphenidyl 2 mg oral tablet: 0.5 tab(s) orally 3 times a day   Albuterol (Eqv-ProAir HFA) 90 mcg/inh inhalation aerosol: 2 puff(s) inhaled every 6 hours  carbidopa-levodopa 25 mg-100 mg oral tablet: 1 tab(s) orally 4 times a day  cephalexin 500 mg oral tablet: 1 tab(s) orally every 6 hours   Coumadin 5 mg oral tablet: 0.5 tab(s) orally once a day  irbesartan-hydrochlorothiazide 300 mg-25 mg oral tablet: 1 tab(s) orally once a day  Januvia 50 mg oral tablet: 1 tab(s) orally once a day   metoprolol tartrate 25 mg oral tablet: 1 tab(s) orally 2 times a day  rOPINIRole 2 mg oral tablet: 1 tab(s) orally 4 times a day  Symbicort 160 mcg-4.5 mcg/inh inhalation aerosol: 2 puff(s) inhaled 2 times a day  trihexyphenidyl 2 mg oral tablet: 0.5 tab(s) orally 3 times a day

## 2023-01-06 ENCOUNTER — TRANSCRIPTION ENCOUNTER (OUTPATIENT)
Age: 78
End: 2023-01-06

## 2023-01-06 LAB
ANION GAP SERPL CALC-SCNC: 9 MMOL/L — SIGNIFICANT CHANGE UP (ref 5–17)
APTT BLD: 35.8 SEC — HIGH (ref 27.5–35.5)
BUN SERPL-MCNC: 20 MG/DL — SIGNIFICANT CHANGE UP (ref 7–23)
CALCIUM SERPL-MCNC: 9.1 MG/DL — SIGNIFICANT CHANGE UP (ref 8.4–10.5)
CHLORIDE SERPL-SCNC: 101 MMOL/L — SIGNIFICANT CHANGE UP (ref 96–108)
CO2 SERPL-SCNC: 25 MMOL/L — SIGNIFICANT CHANGE UP (ref 22–31)
CREAT SERPL-MCNC: 0.94 MG/DL — SIGNIFICANT CHANGE UP (ref 0.5–1.3)
EGFR: 83 ML/MIN/1.73M2 — SIGNIFICANT CHANGE UP
GLUCOSE SERPL-MCNC: 84 MG/DL — SIGNIFICANT CHANGE UP (ref 70–99)
HCT VFR BLD CALC: 38.6 % — LOW (ref 39–50)
HGB BLD-MCNC: 12.3 G/DL — LOW (ref 13–17)
INR BLD: 2.06 RATIO — HIGH (ref 0.88–1.16)
MCHC RBC-ENTMCNC: 29.2 PG — SIGNIFICANT CHANGE UP (ref 27–34)
MCHC RBC-ENTMCNC: 31.9 GM/DL — LOW (ref 32–36)
MCV RBC AUTO: 91.7 FL — SIGNIFICANT CHANGE UP (ref 80–100)
NRBC # BLD: 0 /100 WBCS — SIGNIFICANT CHANGE UP (ref 0–0)
PLATELET # BLD AUTO: 289 K/UL — SIGNIFICANT CHANGE UP (ref 150–400)
POTASSIUM SERPL-MCNC: 4.4 MMOL/L — SIGNIFICANT CHANGE UP (ref 3.5–5.3)
POTASSIUM SERPL-SCNC: 4.4 MMOL/L — SIGNIFICANT CHANGE UP (ref 3.5–5.3)
PROTHROM AB SERPL-ACNC: 23.9 SEC — HIGH (ref 10.5–13.4)
RBC # BLD: 4.21 M/UL — SIGNIFICANT CHANGE UP (ref 4.2–5.8)
RBC # FLD: 17.9 % — HIGH (ref 10.3–14.5)
SODIUM SERPL-SCNC: 135 MMOL/L — SIGNIFICANT CHANGE UP (ref 135–145)
WBC # BLD: 10.5 K/UL — SIGNIFICANT CHANGE UP (ref 3.8–10.5)
WBC # FLD AUTO: 10.5 K/UL — SIGNIFICANT CHANGE UP (ref 3.8–10.5)

## 2023-01-06 RX ORDER — CEPHALEXIN 500 MG
1 CAPSULE ORAL
Qty: 120 | Refills: 0
Start: 2023-01-06 | End: 2023-02-04

## 2023-01-06 RX ORDER — WARFARIN SODIUM 2.5 MG/1
2.5 TABLET ORAL AT BEDTIME
Refills: 0 | Status: COMPLETED | OUTPATIENT
Start: 2023-01-06 | End: 2023-01-06

## 2023-01-06 RX ORDER — SITAGLIPTIN 50 MG/1
1 TABLET, FILM COATED ORAL
Qty: 30 | Refills: 0
Start: 2023-01-06 | End: 2023-02-04

## 2023-01-06 RX ADMIN — ROPINIROLE 2 MILLIGRAM(S): 8 TABLET, FILM COATED, EXTENDED RELEASE ORAL at 09:23

## 2023-01-06 RX ADMIN — BUDESONIDE AND FORMOTEROL FUMARATE DIHYDRATE 2 PUFF(S): 160; 4.5 AEROSOL RESPIRATORY (INHALATION) at 05:11

## 2023-01-06 RX ADMIN — CARBIDOPA AND LEVODOPA 1 TABLET(S): 25; 100 TABLET ORAL at 17:53

## 2023-01-06 RX ADMIN — PREGABALIN 1000 MICROGRAM(S): 225 CAPSULE ORAL at 12:11

## 2023-01-06 RX ADMIN — LOSARTAN POTASSIUM 100 MILLIGRAM(S): 100 TABLET, FILM COATED ORAL at 05:10

## 2023-01-06 RX ADMIN — Medication 3 MILLILITER(S): at 12:12

## 2023-01-06 RX ADMIN — Medication 100 MILLIGRAM(S): at 05:10

## 2023-01-06 RX ADMIN — Medication 100 MILLIGRAM(S): at 14:12

## 2023-01-06 RX ADMIN — CARBIDOPA AND LEVODOPA 1 TABLET(S): 25; 100 TABLET ORAL at 22:34

## 2023-01-06 RX ADMIN — Medication 1 MILLIGRAM(S): at 09:23

## 2023-01-06 RX ADMIN — ROPINIROLE 2 MILLIGRAM(S): 8 TABLET, FILM COATED, EXTENDED RELEASE ORAL at 17:52

## 2023-01-06 RX ADMIN — Medication 100 MILLIGRAM(S): at 05:11

## 2023-01-06 RX ADMIN — Medication 1 MILLIGRAM(S): at 17:53

## 2023-01-06 RX ADMIN — ROPINIROLE 2 MILLIGRAM(S): 8 TABLET, FILM COATED, EXTENDED RELEASE ORAL at 14:12

## 2023-01-06 RX ADMIN — Medication 1 MILLIGRAM(S): at 22:29

## 2023-01-06 RX ADMIN — Medication 1 TABLET(S): at 12:11

## 2023-01-06 RX ADMIN — ROPINIROLE 2 MILLIGRAM(S): 8 TABLET, FILM COATED, EXTENDED RELEASE ORAL at 22:30

## 2023-01-06 RX ADMIN — WARFARIN SODIUM 2.5 MILLIGRAM(S): 2.5 TABLET ORAL at 22:28

## 2023-01-06 RX ADMIN — CARBIDOPA AND LEVODOPA 1 TABLET(S): 25; 100 TABLET ORAL at 09:23

## 2023-01-06 RX ADMIN — CARBIDOPA AND LEVODOPA 1 TABLET(S): 25; 100 TABLET ORAL at 14:12

## 2023-01-06 RX ADMIN — Medication 3 MILLILITER(S): at 17:53

## 2023-01-06 RX ADMIN — Medication 25 MILLIGRAM(S): at 17:53

## 2023-01-06 RX ADMIN — SENNA PLUS 2 TABLET(S): 8.6 TABLET ORAL at 22:29

## 2023-01-06 RX ADMIN — Medication 25 MILLIGRAM(S): at 05:10

## 2023-01-06 RX ADMIN — BUDESONIDE AND FORMOTEROL FUMARATE DIHYDRATE 2 PUFF(S): 160; 4.5 AEROSOL RESPIRATORY (INHALATION) at 17:53

## 2023-01-06 RX ADMIN — Medication 1 MILLIGRAM(S): at 14:12

## 2023-01-06 RX ADMIN — Medication 100 MILLIGRAM(S): at 22:28

## 2023-01-06 RX ADMIN — Medication 1 MILLIGRAM(S): at 12:11

## 2023-01-06 RX ADMIN — Medication 100 MILLIGRAM(S): at 22:24

## 2023-01-06 RX ADMIN — INSULIN GLARGINE 9 UNIT(S): 100 INJECTION, SOLUTION SUBCUTANEOUS at 22:21

## 2023-01-06 RX ADMIN — Medication 3 MILLILITER(S): at 05:10

## 2023-01-06 NOTE — PROGRESS NOTE ADULT - ASSESSMENT
Assessment  DMT2: 77y Male with DM T2 with hyperglycemia, A1C 8%, now on low-dose basal bolus insulin, blood sugars improving, no new hypoglycemic episodes, eating  meals.  Scrotal Abscess: on medications, stable, monitored.  COPD: On meds, breathing tx, stable.  HTN: Controlled,  on antihypertensive medications.  Parkinsons: On meds, stable, monitored.        Dima Keller MD  Cell: [phone]  Office: [phone]

## 2023-01-06 NOTE — PROGRESS NOTE ADULT - SUBJECTIVE AND OBJECTIVE BOX
Chief complaint    Patient is a 77y old  Male who presents with a chief complaint of generalized weakness and AMS x 2 days (06 Jan 2023 13:12)   Review of systems  Patient in bed, appears comfortable.    Labs and Fingersticks  CAPILLARY BLOOD GLUCOSE      POCT Blood Glucose.: 115 mg/dL (06 Jan 2023 18:11)  POCT Blood Glucose.: 133 mg/dL (06 Jan 2023 12:53)  POCT Blood Glucose.: 80 mg/dL (06 Jan 2023 09:27)  POCT Blood Glucose.: 123 mg/dL (05 Jan 2023 21:19)      Anion Gap, Serum: 9 (01-06 @ 07:21)  Anion Gap, Serum: 9 (01-05 @ 07:21)      Calcium, Total Serum: 9.1 (01-06 @ 07:21)  Calcium, Total Serum: 8.8 (01-05 @ 07:21)          01-06    135  |  101  |  20  ----------------------------<  84  4.4   |  25  |  0.94    Ca    9.1      06 Jan 2023 07:21                          12.3   10.50 )-----------( 289      ( 06 Jan 2023 07:21 )             38.6     Medications  MEDICATIONS  (STANDING):  albuterol/ipratropium for Nebulization 3 milliLiter(s) Nebulizer every 6 hours  benzonatate 100 milliGRAM(s) Oral three times a day  budesonide 160 MICROgram(s)/formoterol 4.5 MICROgram(s) Inhaler 2 Puff(s) Inhalation two times a day  carbidopa/levodopa  25/100 1 Tablet(s) Oral <User Schedule>  ceFAZolin   IVPB 2000 milliGRAM(s) IV Intermittent every 8 hours  cyanocobalamin 1000 MICROGram(s) Oral daily  dextrose 5%. 1000 milliLiter(s) (100 mL/Hr) IV Continuous <Continuous>  dextrose 5%. 1000 milliLiter(s) (50 mL/Hr) IV Continuous <Continuous>  dextrose 50% Injectable 25 Gram(s) IV Push once  dextrose 50% Injectable 12.5 Gram(s) IV Push once  dextrose 50% Injectable 25 Gram(s) IV Push once  folic acid 1 milliGRAM(s) Oral daily  glucagon  Injectable 1 milliGRAM(s) IntraMuscular once  hydrochlorothiazide 25 milliGRAM(s) Oral daily  insulin glargine Injectable (LANTUS) 9 Unit(s) SubCutaneous at bedtime  insulin lispro (ADMELOG) corrective regimen sliding scale   SubCutaneous three times a day before meals  insulin lispro (ADMELOG) corrective regimen sliding scale   SubCutaneous at bedtime  lactobacillus acidophilus 1 Tablet(s) Oral daily  losartan 100 milliGRAM(s) Oral daily  metoprolol tartrate 25 milliGRAM(s) Oral two times a day  polyethylene glycol 3350 17 Gram(s) Oral daily  rOPINIRole 2 milliGRAM(s) Oral <User Schedule>  senna 2 Tablet(s) Oral at bedtime  trihexyphenidyl 1 milliGRAM(s) Oral <User Schedule>  warfarin 2.5 milliGRAM(s) Oral at bedtime      Physical Exam  General: Patient appears comfortable.  Vital Signs Last 12 Hrs  T(F): 97.6 (01-06-23 @ 13:03), Max: 97.6 (01-06-23 @ 13:03)  HR: 63 (01-06-23 @ 13:03) (63 - 63)  BP: 115/62 (01-06-23 @ 13:03) (115/62 - 115/62)  BP(mean): --  RR: 18 (01-06-23 @ 13:03) (18 - 18)  SpO2: 94% (01-06-23 @ 13:03) (94% - 94%)  Neck: No palpable thyroid nodules.  CVS: S1S2, No murmurs  Respiratory: No wheezing, no crepitations  GI: Abdomen soft, non tender.  Musculoskeletal:  edema lower extremities.     Diagnostics

## 2023-01-06 NOTE — PROGRESS NOTE ADULT - ASSESSMENT
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 admitted for sepsis 2/2 scrotal abscess.    # sepsis 2/2 right scrotal abscess  s/p IR drainage 12/25  rpt testicular ult improving  will dc with drain, outpt fu with IR  Flush drain with 5cc normal saline daily forward only; DO NOT aspirate  Change dressing q3 days or when dressing is saturated  dc with gould catheter and outpt fu with urology after drain removed for gould removal  cefazolin per ID, can change to cephalexin PO on dc, outpt fu ID 1 week    # Chronic obstructive pulmonary disease with exacerbation  # Pleural effusion   echo noted normal lvef   Symbicort   duonebs q6h    # Large left inguinal hernia  outpt fu    # T2DM  endo following, dc on januvia 50  a1c 8s     # H/O factor V Leiden mutation  coumadin DAILY INR. coumadin dosing    # Parkinsons  continue carbidopa-levo-dopa   continue Ropinirole   continue Trihexyphenidyl    # HTN (hypertension)  continue Irbesartan (therapeutic equivalent)    dvt ppx coumadin     dispo: dc planning, family declining MIS, pending hospital bed    Please contact with any questions or concerns [phone].

## 2023-01-06 NOTE — DISCHARGE NOTE NURSING/CASE MANAGEMENT/SOCIAL WORK - PATIENT PORTAL LINK FT
You can access the FollowMyHealth Patient Portal offered by [facility] by registering at the following website: [email or web address]. By joining [facility]’s FollowMyHealth portal, you will also be able to view your health information using other applications (apps) compatible with our system.

## 2023-01-06 NOTE — PROGRESS NOTE ADULT - ASSESSMENT
Patient is a 77 year old male with PMH of Parkinson's, COPD, DVT/PE factor V Leiden on Coumadin, s/p R hernia repair c/b scrotal hematoma p/w generalized weakness and  altered mental state for the past two days and admitted for sepsis 2/2 scrotal abscess; sepsis resolved.     Scrotal abscess  Large L inguinal hernia  -  and surgery following--suspect superinfected hematoma  - s/p IR drainage on 12/25 - drained 20cc purulent fluid, drain in place   - abscess cx with Staph caprae-- sensitivities noted   - afebrile, WBC improved, overall feeling better, cough minimal  - RVP negative, CXR reported with clearing  - s/p cefazolin (12/28-12/31) > pip-tazo (12/31-1/3)  - US testicle shows interval decrease of R scrotal abscess to 12.2 x 5.9 x 6.6 cm (was 15.0 x 12.8 x 10.2 cm on CT)  Recs:  - drain care per IR, tube check if drainage <15cc x2 consecutive days   - continue on cefazolin 2g IV Q8h while inpt   -- if discharged, switch to cephalexin 500mg PO Q6h - to continue while drain in place   -- patient to follow up with me as outpatient next week   Discharge planning per primary team    Over the weekend Dr. Tima Camacho will be covering for our group. If you have any questions, concerns or new micro data, please reach out to them at [phone].    D/w Dr. Brendan Connolly M.D.  [facility], Division of Infectious Diseases  [phone]  After 5pm on weekdays and all day on weekends - please call [phone]

## 2023-01-06 NOTE — PROGRESS NOTE ADULT - SUBJECTIVE AND OBJECTIVE BOX
Patient is a 77y old  Male who presents with a chief complaint of generalized weakness and AMS x 2 days (06 Jan 2023 10:23)      SUBJECTIVE / OVERNIGHT EVENTS:    Patient seen and examined. no cp sob.       Vital Signs Last 24 Hrs  T(C): 36.4 (06 Jan 2023 13:03), Max: 36.8 (05 Jan 2023 22:12)  T(F): 97.6 (06 Jan 2023 13:03), Max: 98.2 (05 Jan 2023 22:12)  HR: 63 (06 Jan 2023 13:03) (63 - 71)  BP: 115/62 (06 Jan 2023 13:03) (115/62 - 133/72)  BP(mean): --  RR: 18 (06 Jan 2023 13:03) (17 - 18)  SpO2: 94% (06 Jan 2023 13:03) (93% - 95%)    Parameters below as of 06 Jan 2023 13:03  Patient On (Oxygen Delivery Method): room air      I&O's Summary    05 Jan 2023 07:01  -  06 Jan 2023 07:00  --------------------------------------------------------  IN: 1060 mL / OUT: 2450 mL / NET: -1390 mL    06 Jan 2023 07:01  -  06 Jan 2023 13:12  --------------------------------------------------------  IN: 480 mL / OUT: 1220 mL / NET: -740 mL        PE:  GENERAL: NAD, AAOx3  CHEST/LUNG: CTABL, No wheeze  HEART: Regular rate and rhythm; no murmur  ABDOMEN: Soft, Nontender, Nondistended; Bowel sounds present  : scrotal swelling gould, drain  EXTREMITIES:  2+ Peripheral Pulses, No edema  NEURO: No focal deficits    LABS:                        12.3   10.50 )-----------( 289      ( 06 Jan 2023 07:21 )             38.6     01-06    135  |  101  |  20  ----------------------------<  84  4.4   |  25  |  0.94    Ca    9.1      06 Jan 2023 07:21      PT/INR - ( 06 Jan 2023 07:21 )   PT: 23.9 sec;   INR: 2.06 ratio         PTT - ( 06 Jan 2023 07:21 )  PTT:35.8 sec  CAPILLARY BLOOD GLUCOSE      POCT Blood Glucose.: 133 mg/dL (06 Jan 2023 12:53)  POCT Blood Glucose.: 80 mg/dL (06 Jan 2023 09:27)  POCT Blood Glucose.: 123 mg/dL (05 Jan 2023 21:19)  POCT Blood Glucose.: 115 mg/dL (05 Jan 2023 18:06)            RADIOLOGY & ADDITIONAL TESTS:    Imaging Personally Reviewed:  [x] YES  [ ] NO    Consultant(s) Notes Reviewed:  [x] YES  [ ] NO    MEDICATIONS  (STANDING):  albuterol/ipratropium for Nebulization 3 milliLiter(s) Nebulizer every 6 hours  benzonatate 100 milliGRAM(s) Oral three times a day  budesonide 160 MICROgram(s)/formoterol 4.5 MICROgram(s) Inhaler 2 Puff(s) Inhalation two times a day  carbidopa/levodopa  25/100 1 Tablet(s) Oral <User Schedule>  ceFAZolin   IVPB 2000 milliGRAM(s) IV Intermittent every 8 hours  cyanocobalamin 1000 MICROGram(s) Oral daily  dextrose 5%. 1000 milliLiter(s) (100 mL/Hr) IV Continuous <Continuous>  dextrose 5%. 1000 milliLiter(s) (50 mL/Hr) IV Continuous <Continuous>  dextrose 50% Injectable 25 Gram(s) IV Push once  dextrose 50% Injectable 12.5 Gram(s) IV Push once  dextrose 50% Injectable 25 Gram(s) IV Push once  folic acid 1 milliGRAM(s) Oral daily  glucagon  Injectable 1 milliGRAM(s) IntraMuscular once  hydrochlorothiazide 25 milliGRAM(s) Oral daily  insulin glargine Injectable (LANTUS) 9 Unit(s) SubCutaneous at bedtime  insulin lispro (ADMELOG) corrective regimen sliding scale   SubCutaneous at bedtime  insulin lispro (ADMELOG) corrective regimen sliding scale   SubCutaneous three times a day before meals  lactobacillus acidophilus 1 Tablet(s) Oral daily  losartan 100 milliGRAM(s) Oral daily  metoprolol tartrate 25 milliGRAM(s) Oral two times a day  polyethylene glycol 3350 17 Gram(s) Oral daily  rOPINIRole 2 milliGRAM(s) Oral <User Schedule>  senna 2 Tablet(s) Oral at bedtime  trihexyphenidyl 1 milliGRAM(s) Oral <User Schedule>  warfarin 2.5 milliGRAM(s) Oral at bedtime    MEDICATIONS  (PRN):  acetaminophen     Tablet .. 650 milliGRAM(s) Oral every 6 hours PRN Temp greater or equal to 38C (100.4F), Mild Pain (1 - 3)  dextrose Oral Gel 15 Gram(s) Oral once PRN Blood Glucose LESS THAN 70 milliGRAM(s)/deciliter  melatonin 3 milliGRAM(s) Oral at bedtime PRN Insomnia  ondansetron Injectable 4 milliGRAM(s) IV Push every 8 hours PRN Nausea and/or Vomiting      Care Discussed with Consultants/Other Providers [x] YES  [ ] NO    HEALTH ISSUES - PROBLEM Dx:  Scrotal abscess  c/w Oral Abx as long as drain stay. F/U IR when drain output &lt;15cc x2, F/U Urology for gould cath, F/U ID 1 week    Sepsis    Chronic obstructive pulmonary disease (COPD)    H/O factor V Leiden mutation    Unspecified atrial fibrillation    Parkinsons    HTN (hypertension)    Preop exam for internal medicine    DM (diabetes mellitus)

## 2023-01-06 NOTE — DISCHARGE NOTE NURSING/CASE MANAGEMENT/SOCIAL WORK - NSDCVIVACCINE_GEN_ALL_CORE_FT
Td (adult) preservative free; 31-May-2014 15:13; Christine Neves (ELIDA); [id number]; IntraMuscular; Deltoid Left.; 0.5 milliLiter(s);

## 2023-01-06 NOTE — PROGRESS NOTE ADULT - SUBJECTIVE AND OBJECTIVE BOX
[facility] DIVISION OF INFECTIOUS DISEASES  CHRISTI Putnam Y. Patel, S. Shah, G. Aureliano  [phone]  ([phone] - weekdays after 5pm and weekends)    Name: BOECKLE, ROBERT  Age/Gender: 77y Male  MRN: [id number]    Interval History:  Patient seen and examined this morning.   Feels well with no new complaints.  Cough minimal, no pain or fevers.   Notes reviewed  No concerning overnight events  Afebrile   Allergies: garlic (Other (Mild))  Levaquin (Anaphylaxis)      Objective:  Vitals:   T(F): 98 (01-06-23 @ 05:07), Max: 98.2 (01-05-23 @ 22:12)  HR: 69 (01-06-23 @ 05:07) (66 - 81)  BP: 133/72 (01-06-23 @ 05:07) (115/57 - 133/72)  RR: 18 (01-06-23 @ 05:07) (17 - 18)  SpO2: 93% (01-06-23 @ 05:07) (93% - 98%)  Physical Examination:  General: no acute distress  HEENT: NC/AT, anicteric, neck supple  Respiratory: no acc muscle use, breathing comfortably  Cardiovascular: S1 and S2 present  Gastrointestinal: normal appearing, nondistended  : scrotal swelling, drain with ss output  Extremities: no edema, no cyanosis  Skin: no visible rash    Laboratory Studies:  CBC:                       12.3   10.50 )-----------( 289      ( 06 Jan 2023 07:21 )             38.6     WBC Trend:  10.50 01-06-23 @ 07:21  11.36 01-05-23 @ 07:21  12.57 01-04-23 @ 07:21  14.56 01-03-23 @ 06:48  15.41 01-02-23 @ 07:25  15.06 01-01-23 @ 05:45  16.44 12-31-22 @ 07:24    CMP: 01-06    135  |  101  |  20  ----------------------------<  84  4.4   |  25  |  0.94    Ca    9.1      06 Jan 2023 07:21      Creatinine, Serum: 0.94 mg/dL (01-06-23 @ 07:21)  Creatinine, Serum: 1.07 mg/dL (01-05-23 @ 07:21)  Creatinine, Serum: 1.06 mg/dL (01-04-23 @ 07:21)  Creatinine, Serum: 1.08 mg/dL (01-03-23 @ 06:48)  Creatinine, Serum: 1.11 mg/dL (01-02-23 @ 07:26)  Creatinine, Serum: 1.01 mg/dL (01-01-23 @ 05:45)  Creatinine, Serum: 0.98 mg/dL (12-31-22 @ 07:22)    Microbiology: reviewed   Culture - Body Fluid with Gram Stain (collected 12-25-22 @ 23:09)  Source: Abdominal Fl Abdominal Fluid  Gram Stain (12-26-22 @ 08:02):    Numerous polymorphonuclear leukocytes seen per low power field    Moderate Gram positive cocci in pairs seen per oil power field  Final Report (12-31-22 @ 08:35):    Few Staphylococcus caprae  Organism: Staphylococcus caprae (12-31-22 @ 08:35)  Organism: Staphylococcus caprae (12-31-22 @ 08:35)      -  Ampicillin/Sulbactam: S <=8/4      -  Cefazolin: S <=4      -  Clindamycin: S <=0.25      -  Erythromycin: S <=0.25      -  Gentamicin: S <=1 Should not be used as monotherapy      -  Oxacillin: S <=0.25      -  Rifampin: S <=1 Should not be used as monotherapy      -  Tetracycline: S <=1      -  Trimethoprim/Sulfamethoxazole: S <=0.5/9.5      -  Vancomycin: S 1      Method Type: [facility] CARMEN    Culture - Blood (collected 12-23-22 @ 13:35)  Source: .Blood Blood-Peripheral  Final Report (12-28-22 @ 18:01):    No Growth Final    Culture - Blood (collected 12-23-22 @ 13:15)  Source: .Blood Blood-Peripheral  Final Report (12-28-22 @ 18:01):    No Growth Final    Radiology: reviewed     Medications:  acetaminophen     Tablet .. 650 milliGRAM(s) Oral every 6 hours PRN  albuterol/ipratropium for Nebulization 3 milliLiter(s) Nebulizer every 6 hours  benzonatate 100 milliGRAM(s) Oral three times a day  budesonide 160 MICROgram(s)/formoterol 4.5 MICROgram(s) Inhaler 2 Puff(s) Inhalation two times a day  carbidopa/levodopa  25/100 1 Tablet(s) Oral <User Schedule>  ceFAZolin   IVPB 2000 milliGRAM(s) IV Intermittent every 8 hours  cyanocobalamin 1000 MICROGram(s) Oral daily  dextrose 5%. 1000 milliLiter(s) IV Continuous <Continuous>  dextrose 5%. 1000 milliLiter(s) IV Continuous <Continuous>  dextrose 50% Injectable 25 Gram(s) IV Push once  dextrose 50% Injectable 12.5 Gram(s) IV Push once  dextrose 50% Injectable 25 Gram(s) IV Push once  dextrose Oral Gel 15 Gram(s) Oral once PRN  folic acid 1 milliGRAM(s) Oral daily  glucagon  Injectable 1 milliGRAM(s) IntraMuscular once  hydrochlorothiazide 25 milliGRAM(s) Oral daily  insulin glargine Injectable (LANTUS) 9 Unit(s) SubCutaneous at bedtime  insulin lispro (ADMELOG) corrective regimen sliding scale   SubCutaneous three times a day before meals  insulin lispro (ADMELOG) corrective regimen sliding scale   SubCutaneous at bedtime  lactobacillus acidophilus 1 Tablet(s) Oral daily  losartan 100 milliGRAM(s) Oral daily  melatonin 3 milliGRAM(s) Oral at bedtime PRN  metoprolol tartrate 25 milliGRAM(s) Oral two times a day  ondansetron Injectable 4 milliGRAM(s) IV Push every 8 hours PRN  polyethylene glycol 3350 17 Gram(s) Oral daily  rOPINIRole 2 milliGRAM(s) Oral <User Schedule>  senna 2 Tablet(s) Oral at bedtime  trihexyphenidyl 1 milliGRAM(s) Oral <User Schedule>  warfarin 2.5 milliGRAM(s) Oral at bedtime    Current Antimicrobials:  ceFAZolin   IVPB 2000 milliGRAM(s) IV Intermittent every 8 hours    Prior/Completed Antimicrobials:  cefTRIAXone   IVPB  piperacillin/tazobactam IVPB.  piperacillin/tazobactam IVPB.-  piperacillin/tazobactam IVPB...  vancomycin  IVPB.

## 2023-01-07 VITALS
TEMPERATURE: 98 F | DIASTOLIC BLOOD PRESSURE: 68 MMHG | OXYGEN SATURATION: 93 % | SYSTOLIC BLOOD PRESSURE: 129 MMHG | HEART RATE: 65 BPM | RESPIRATION RATE: 17 BRPM

## 2023-01-07 LAB
APTT BLD: 35 SEC — SIGNIFICANT CHANGE UP (ref 27.5–35.5)
HCT VFR BLD CALC: 38.1 % — LOW (ref 39–50)
HGB BLD-MCNC: 12.1 G/DL — LOW (ref 13–17)
INR BLD: 1.86 RATIO — HIGH (ref 0.88–1.16)
MCHC RBC-ENTMCNC: 29.4 PG — SIGNIFICANT CHANGE UP (ref 27–34)
MCHC RBC-ENTMCNC: 31.8 GM/DL — LOW (ref 32–36)
MCV RBC AUTO: 92.7 FL — SIGNIFICANT CHANGE UP (ref 80–100)
NRBC # BLD: 0 /100 WBCS — SIGNIFICANT CHANGE UP (ref 0–0)
PLATELET # BLD AUTO: 264 K/UL — SIGNIFICANT CHANGE UP (ref 150–400)
PROTHROM AB SERPL-ACNC: 21.7 SEC — HIGH (ref 10.5–13.4)
RBC # BLD: 4.11 M/UL — LOW (ref 4.2–5.8)
RBC # FLD: 17.6 % — HIGH (ref 10.3–14.5)
WBC # BLD: 10.89 K/UL — HIGH (ref 3.8–10.5)
WBC # FLD AUTO: 10.89 K/UL — HIGH (ref 3.8–10.5)

## 2023-01-07 PROCEDURE — 94640 AIRWAY INHALATION TREATMENT: CPT

## 2023-01-07 PROCEDURE — 97530 THERAPEUTIC ACTIVITIES: CPT

## 2023-01-07 PROCEDURE — 84100 ASSAY OF PHOSPHORUS: CPT

## 2023-01-07 PROCEDURE — 81001 URINALYSIS AUTO W/SCOPE: CPT

## 2023-01-07 PROCEDURE — 85610 PROTHROMBIN TIME: CPT

## 2023-01-07 PROCEDURE — 85027 COMPLETE CBC AUTOMATED: CPT

## 2023-01-07 PROCEDURE — 82962 GLUCOSE BLOOD TEST: CPT

## 2023-01-07 PROCEDURE — 85018 HEMOGLOBIN: CPT

## 2023-01-07 PROCEDURE — 97116 GAIT TRAINING THERAPY: CPT

## 2023-01-07 PROCEDURE — 84295 ASSAY OF SERUM SODIUM: CPT

## 2023-01-07 PROCEDURE — 10030 IMG GID FLU COLL DRG SFT TIS: CPT

## 2023-01-07 PROCEDURE — 97110 THERAPEUTIC EXERCISES: CPT

## 2023-01-07 PROCEDURE — 97162 PT EVAL MOD COMPLEX 30 MIN: CPT

## 2023-01-07 PROCEDURE — 82803 BLOOD GASES ANY COMBINATION: CPT

## 2023-01-07 PROCEDURE — 85014 HEMATOCRIT: CPT

## 2023-01-07 PROCEDURE — 82947 ASSAY GLUCOSE BLOOD QUANT: CPT

## 2023-01-07 PROCEDURE — 82330 ASSAY OF CALCIUM: CPT

## 2023-01-07 PROCEDURE — 74177 CT ABD & PELVIS W/CONTRAST: CPT | Mod: MA

## 2023-01-07 PROCEDURE — 80048 BASIC METABOLIC PNL TOTAL CA: CPT

## 2023-01-07 PROCEDURE — 80053 COMPREHEN METABOLIC PANEL: CPT

## 2023-01-07 PROCEDURE — 87040 BLOOD CULTURE FOR BACTERIA: CPT

## 2023-01-07 PROCEDURE — 93005 ELECTROCARDIOGRAM TRACING: CPT

## 2023-01-07 PROCEDURE — 99285 EMERGENCY DEPT VISIT HI MDM: CPT | Mod: 25

## 2023-01-07 PROCEDURE — 93306 TTE W/DOPPLER COMPLETE: CPT

## 2023-01-07 PROCEDURE — 84443 ASSAY THYROID STIM HORMONE: CPT

## 2023-01-07 PROCEDURE — 84132 ASSAY OF SERUM POTASSIUM: CPT

## 2023-01-07 PROCEDURE — 85025 COMPLETE CBC W/AUTO DIFF WBC: CPT

## 2023-01-07 PROCEDURE — 87070 CULTURE OTHR SPECIMN AEROBIC: CPT

## 2023-01-07 PROCEDURE — 71250 CT THORAX DX C-: CPT | Mod: MA

## 2023-01-07 PROCEDURE — 36415 COLL VENOUS BLD VENIPUNCTURE: CPT

## 2023-01-07 PROCEDURE — 83605 ASSAY OF LACTIC ACID: CPT

## 2023-01-07 PROCEDURE — 76870 US EXAM SCROTUM: CPT

## 2023-01-07 PROCEDURE — C1729: CPT

## 2023-01-07 PROCEDURE — 83036 HEMOGLOBIN GLYCOSYLATED A1C: CPT

## 2023-01-07 PROCEDURE — 80202 ASSAY OF VANCOMYCIN: CPT

## 2023-01-07 PROCEDURE — 0225U NFCT DS DNA&RNA 21 SARSCOV2: CPT

## 2023-01-07 PROCEDURE — 87077 CULTURE AEROBIC IDENTIFY: CPT

## 2023-01-07 PROCEDURE — 82565 ASSAY OF CREATININE: CPT

## 2023-01-07 PROCEDURE — 83735 ASSAY OF MAGNESIUM: CPT

## 2023-01-07 PROCEDURE — 82435 ASSAY OF BLOOD CHLORIDE: CPT

## 2023-01-07 PROCEDURE — 83880 ASSAY OF NATRIURETIC PEPTIDE: CPT

## 2023-01-07 PROCEDURE — 36600 WITHDRAWAL OF ARTERIAL BLOOD: CPT

## 2023-01-07 PROCEDURE — 87186 SC STD MICRODIL/AGAR DIL: CPT

## 2023-01-07 PROCEDURE — 85730 THROMBOPLASTIN TIME PARTIAL: CPT

## 2023-01-07 PROCEDURE — 71045 X-RAY EXAM CHEST 1 VIEW: CPT

## 2023-01-07 PROCEDURE — 87205 SMEAR GRAM STAIN: CPT

## 2023-01-07 PROCEDURE — 87075 CULTR BACTERIA EXCEPT BLOOD: CPT

## 2023-01-07 PROCEDURE — 84439 ASSAY OF FREE THYROXINE: CPT

## 2023-01-07 PROCEDURE — 96374 THER/PROPH/DIAG INJ IV PUSH: CPT

## 2023-01-07 RX ORDER — WARFARIN SODIUM 2.5 MG/1
3 TABLET ORAL ONCE
Refills: 0 | Status: DISCONTINUED | OUTPATIENT
Start: 2023-01-07 | End: 2023-01-07

## 2023-01-07 RX ORDER — METOPROLOL TARTRATE 50 MG
1 TABLET ORAL
Qty: 60 | Refills: 0
Start: 2023-01-07 | End: 2023-02-05

## 2023-01-07 RX ADMIN — Medication 3 MILLILITER(S): at 12:46

## 2023-01-07 RX ADMIN — ROPINIROLE 2 MILLIGRAM(S): 8 TABLET, FILM COATED, EXTENDED RELEASE ORAL at 12:49

## 2023-01-07 RX ADMIN — PREGABALIN 1000 MICROGRAM(S): 225 CAPSULE ORAL at 12:48

## 2023-01-07 RX ADMIN — Medication 1 MILLIGRAM(S): at 12:47

## 2023-01-07 RX ADMIN — Medication 3 MILLILITER(S): at 00:11

## 2023-01-07 RX ADMIN — Medication 100 MILLIGRAM(S): at 13:31

## 2023-01-07 RX ADMIN — BUDESONIDE AND FORMOTEROL FUMARATE DIHYDRATE 2 PUFF(S): 160; 4.5 AEROSOL RESPIRATORY (INHALATION) at 06:15

## 2023-01-07 RX ADMIN — Medication 25 MILLIGRAM(S): at 06:16

## 2023-01-07 RX ADMIN — Medication 100 MILLIGRAM(S): at 06:16

## 2023-01-07 RX ADMIN — Medication 100 MILLIGRAM(S): at 13:30

## 2023-01-07 RX ADMIN — Medication 100 MILLIGRAM(S): at 06:15

## 2023-01-07 RX ADMIN — ROPINIROLE 2 MILLIGRAM(S): 8 TABLET, FILM COATED, EXTENDED RELEASE ORAL at 08:16

## 2023-01-07 RX ADMIN — LOSARTAN POTASSIUM 100 MILLIGRAM(S): 100 TABLET, FILM COATED ORAL at 06:16

## 2023-01-07 RX ADMIN — CARBIDOPA AND LEVODOPA 1 TABLET(S): 25; 100 TABLET ORAL at 08:16

## 2023-01-07 RX ADMIN — Medication 1 MILLIGRAM(S): at 08:15

## 2023-01-07 RX ADMIN — CARBIDOPA AND LEVODOPA 1 TABLET(S): 25; 100 TABLET ORAL at 12:49

## 2023-01-07 RX ADMIN — Medication 3 MILLILITER(S): at 06:14

## 2023-01-07 RX ADMIN — Medication 1 MILLIGRAM(S): at 12:48

## 2023-01-07 RX ADMIN — Medication 1 TABLET(S): at 12:47

## 2023-01-07 NOTE — PROGRESS NOTE ADULT - SUBJECTIVE AND OBJECTIVE BOX
Chief complaint    Patient is a 77y old  Male who presents with a chief complaint of generalized weakness and AMS x 2 days (06 Jan 2023 20:10)   Review of systems  Patient in bed, appears comfortable.    Labs and Fingersticks  CAPILLARY BLOOD GLUCOSE      POCT Blood Glucose.: 81 mg/dL (07 Jan 2023 09:29)  POCT Blood Glucose.: 178 mg/dL (06 Jan 2023 21:52)  POCT Blood Glucose.: 115 mg/dL (06 Jan 2023 18:11)  POCT Blood Glucose.: 133 mg/dL (06 Jan 2023 12:53)      Anion Gap, Serum: 9 (01-06 @ 07:21)      Calcium, Total Serum: 9.1 (01-06 @ 07:21)          01-06    135  |  101  |  20  ----------------------------<  84  4.4   |  25  |  0.94    Ca    9.1      06 Jan 2023 07:21                          12.1   10.89 )-----------( 264      ( 07 Jan 2023 07:19 )             38.1     Medications  MEDICATIONS  (STANDING):  albuterol/ipratropium for Nebulization 3 milliLiter(s) Nebulizer every 6 hours  benzonatate 100 milliGRAM(s) Oral three times a day  budesonide 160 MICROgram(s)/formoterol 4.5 MICROgram(s) Inhaler 2 Puff(s) Inhalation two times a day  carbidopa/levodopa  25/100 1 Tablet(s) Oral <User Schedule>  ceFAZolin   IVPB 2000 milliGRAM(s) IV Intermittent every 8 hours  cyanocobalamin 1000 MICROGram(s) Oral daily  dextrose 5%. 1000 milliLiter(s) (50 mL/Hr) IV Continuous <Continuous>  dextrose 5%. 1000 milliLiter(s) (100 mL/Hr) IV Continuous <Continuous>  dextrose 50% Injectable 25 Gram(s) IV Push once  dextrose 50% Injectable 12.5 Gram(s) IV Push once  dextrose 50% Injectable 25 Gram(s) IV Push once  folic acid 1 milliGRAM(s) Oral daily  glucagon  Injectable 1 milliGRAM(s) IntraMuscular once  hydrochlorothiazide 25 milliGRAM(s) Oral daily  insulin glargine Injectable (LANTUS) 9 Unit(s) SubCutaneous at bedtime  insulin lispro (ADMELOG) corrective regimen sliding scale   SubCutaneous three times a day before meals  insulin lispro (ADMELOG) corrective regimen sliding scale   SubCutaneous at bedtime  lactobacillus acidophilus 1 Tablet(s) Oral daily  losartan 100 milliGRAM(s) Oral daily  metoprolol tartrate 25 milliGRAM(s) Oral two times a day  polyethylene glycol 3350 17 Gram(s) Oral daily  rOPINIRole 2 milliGRAM(s) Oral <User Schedule>  senna 2 Tablet(s) Oral at bedtime  trihexyphenidyl 1 milliGRAM(s) Oral <User Schedule>  warfarin 3 milliGRAM(s) Oral once      Physical Exam  General: Patient appears comfortable.  Vital Signs Last 12 Hrs  T(F): 97.8 (01-07-23 @ 10:26), Max: 97.9 (01-07-23 @ 05:00)  HR: 68 (01-07-23 @ 10:26) (68 - 70)  BP: 131/77 (01-07-23 @ 10:26) (131/77 - 145/72)  BP(mean): --  RR: 17 (01-07-23 @ 10:26) (17 - 18)  SpO2: 94% (01-07-23 @ 10:26) (94% - 94%)  Neck: No palpable thyroid nodules.  CVS: S1S2, No murmurs  Respiratory: No wheezing, no crepitations  GI: Abdomen soft, non tender.  Musculoskeletal:  edema lower extremities.     Diagnostics

## 2023-01-07 NOTE — PROGRESS NOTE ADULT - PROBLEM SELECTOR PLAN 3
On medications,  no chest pain, stable, monitored and followed up by primary team

## 2023-01-07 NOTE — PROGRESS NOTE ADULT - PROBLEM SELECTOR PLAN 1
Will continue current insulin regimen for now. Will continue monitoring  blood sugars, will Follow up.  Patient counseled for compliance with consistent low carb diet.  .
Will continue current insulin regimen for now. Will continue monitoring  blood sugars, will Follow up.  May get DC home on Tradjenta 5mg PO daily FU 8 weeks  Patient counseled for compliance with consistent low carb diet.  .
Will continue current insulin regimen for now. Will continue monitoring  blood sugars, will Follow up.  Patient counseled for compliance with consistent low carb diet.  .
Will continue current insulin regimen for now. Will continue monitoring  blood sugars, will Follow up.  May get DC home on Tradjenta 5mg PO daily FU 8 weeks  Patient counseled for compliance with consistent low carb diet.  .
Will continue current insulin regimen for now. Will continue monitoring  blood sugars, will Follow up.  Patient counseled for compliance with consistent low carb diet.  .
Will continue current insulin regimen for now. Will continue monitoring  blood sugars, will Follow up.  Patient counseled for compliance with consistent low carb diet.  .
Will decrease Lantus to 9 units at bed time.  Will decrease Admelog to 5 units before each meal in addition to Admelog correction scale coverage.  Patient counseled for compliance with consistent low carb diet.  .
Will continue current insulin regimen for now. Will continue monitoring  blood sugars, will Follow up.  Patient counseled for compliance with consistent low carb diet.  .
Will continue current insulin regimen for now. Will continue monitoring  blood sugars, will Follow up.  May get DC home on Januvia 50mg PO daily FU 8 weeks  Patient counseled for compliance with consistent low carb diet.  .
Will continue current insulin regimen for now. Will continue monitoring  blood sugars, will Follow up.  Patient counseled for compliance with consistent low carb diet.  .
Will continue current insulin regimen for now. Will continue monitoring  blood sugars, will Follow up.  Patient counseled for compliance with consistent low carb diet.  .

## 2023-01-07 NOTE — PROGRESS NOTE ADULT - PROBLEM SELECTOR PROBLEM 2
Scrotal abscess

## 2023-01-07 NOTE — PROGRESS NOTE ADULT - PROVIDER SPECIALTY LIST ADULT
Infectious Disease
Infectious Disease
Internal Medicine
Intervent Radiology
Intervent Radiology
Internal Medicine
Internal Medicine
Intervent Radiology
Surgery
Urology
Infectious Disease
Internal Medicine
Intervent Radiology
Intervent Radiology
Endocrinology
Endocrinology
Infectious Disease
Internal Medicine
Urology
Intervent Radiology
Endocrinology
Intervent Radiology
Endocrinology

## 2023-01-07 NOTE — PROGRESS NOTE ADULT - ASSESSMENT
Assessment  DMT2: 77y Male with DM T2 with hyperglycemia, A1C 8%, now on low-dose basal bolus insulin, blood sugars improving, no new hypoglycemic episodes, eating  meals, planing DC.  Scrotal Abscess: on medications, stable, monitored.  COPD: On meds, breathing tx, stable.  HTN: Controlled,  on antihypertensive medications.  Parkinsons: On meds, stable, monitored.        Dima Keller MD  Cell: [phone]  Office: [phone]

## 2023-01-07 NOTE — PROGRESS NOTE ADULT - PROBLEM SELECTOR PROBLEM 1
DM (diabetes mellitus)
Scrotal abscess
DM (diabetes mellitus)

## 2023-01-07 NOTE — PROGRESS NOTE ADULT - REASON FOR ADMISSION
generalized weakness and AMS x 2 days

## 2023-01-07 NOTE — PROGRESS NOTE ADULT - PROBLEM SELECTOR PLAN 2
Suggest to continue medications, monitoring, FU primary team recommendations. .

## 2023-01-07 NOTE — PROGRESS NOTE ADULT - PROBLEM SELECTOR PROBLEM 4
Parkinsons

## 2023-01-07 NOTE — PROGRESS NOTE ADULT - PROBLEM SELECTOR PROBLEM 3
Chronic obstructive pulmonary disease (COPD)

## 2023-01-14 ENCOUNTER — OUTPATIENT (OUTPATIENT)
Dept: OUTPATIENT SERVICES | Facility: HOSPITAL | Age: 78
LOS: 1 days | End: 2023-01-14
Payer: MEDICARE

## 2023-01-14 DIAGNOSIS — M50.20 OTHER CERVICAL DISC DISPLACEMENT, UNSPECIFIED CERVICAL REGION: Chronic | ICD-10-CM

## 2023-01-14 DIAGNOSIS — Z98.890 OTHER SPECIFIED POSTPROCEDURAL STATES: Chronic | ICD-10-CM

## 2023-01-14 DIAGNOSIS — Z11.52 ENCOUNTER FOR SCREENING FOR COVID-19: ICD-10-CM

## 2023-01-14 LAB — SARS-COV-2 RNA SPEC QL NAA+PROBE: SIGNIFICANT CHANGE UP

## 2023-01-14 PROCEDURE — C9803: CPT

## 2023-01-14 PROCEDURE — U0003: CPT

## 2023-01-14 PROCEDURE — U0005: CPT

## 2023-01-17 ENCOUNTER — OUTPATIENT (OUTPATIENT)
Dept: OUTPATIENT SERVICES | Facility: HOSPITAL | Age: 78
LOS: 1 days | End: 2023-01-17

## 2023-01-17 ENCOUNTER — RESULT REVIEW (OUTPATIENT)
Age: 78
End: 2023-01-17

## 2023-01-17 ENCOUNTER — APPOINTMENT (OUTPATIENT)
Dept: CT IMAGING | Facility: HOSPITAL | Age: 78
End: 2023-01-17

## 2023-01-17 VITALS
HEIGHT: 67 IN | WEIGHT: 201.06 LBS | DIASTOLIC BLOOD PRESSURE: 66 MMHG | RESPIRATION RATE: 20 BRPM | OXYGEN SATURATION: 98 % | HEART RATE: 97 BPM | SYSTOLIC BLOOD PRESSURE: 138 MMHG

## 2023-01-17 DIAGNOSIS — Z98.890 OTHER SPECIFIED POSTPROCEDURAL STATES: Chronic | ICD-10-CM

## 2023-01-17 DIAGNOSIS — L03.90 CELLULITIS, UNSPECIFIED: ICD-10-CM

## 2023-01-17 DIAGNOSIS — M50.20 OTHER CERVICAL DISC DISPLACEMENT, UNSPECIFIED CERVICAL REGION: Chronic | ICD-10-CM

## 2023-01-17 NOTE — ASU DISCHARGE PLAN (ADULT/PEDIATRIC) - ASU DC SPECIAL INSTRUCTIONSFT
Scrotal abscess evaluation    Discharge Instructions  - You had an ultrasound of your scrotum    Notify your primary physician and/or Interventional Radiology IMMEDIATELY if you experience any of the following       - Fever of 101F or 38C       - Chills or Rigors/ Shakes       - Swelling and/or Redness in the area of the puncture site       - Worsening Pain       - Blood soaked bandages or worsening bleeding       - Lightheadedness and/or dizziness upon standing       - Chest Pain/ Tightness       - Shortness of Breath       - Difficulty walking    If you have a problem that you believe requires IMMEDIATE attention, please go to your NEAREST Emergency Room. If you believe your problem can safely wait until you speak to a physician, please call Interventional Radiology for any concerns.    Please feel free to contact us at [phone] if any problems arise. After 6PM, Monday through Friday, on weekends and on holidays, please call [phone] and ask for the radiology resident on call to be paged.

## 2023-01-17 NOTE — ASU PATIENT PROFILE, ADULT - FALL HARM RISK - RISK INTERVENTIONS

## 2023-01-17 NOTE — ASU PATIENT PROFILE, ADULT - NSTOBACCO TYPE_GEN_A_CORE_RD
Cigarettes

## 2023-01-17 NOTE — PRE PROCEDURE NOTE - PRE PROCEDURE EVALUATION
Interventional Radiology    HPI: 77y Male with PMH of Parkinson's, COPD, DVT/PE factor V Leiden on Coumadin, s/p R hernia repair c/b scrotal hematoma/ abscess s/p scrotal drain placement on 12/25 in Interventional Radiology with Dr. Halaibeh. Patient presents with c/o dislodged drainage catheter yesterday. Reports output was ~5-10cc a day over the past few days. Denies pain, fever, chills.     Allergies: Levaquin (Anaphylaxis)    Medications (Abx/Cardiac/Anticoagulation/Blood Products)      Data:  170.2  91.2  T(C): --  HR: 97  BP: 138/66  RR: 20  SpO2: 98%    Exam  General: No acute distress  Chest: Non labored breathing      Plan: 77y Male presents for Right scrotal abscess evaluation   -Risks/Benefits/alternatives explained with the patient and/or healthcare proxy and witnessed informed consent obtained.    Interventional Radiology    HPI: 77y Male with PMH of Parkinson's, COPD, DVT/PE factor V Leiden on Coumadin, s/p R hernia repair c/b scrotal hematoma/ abscess s/p scrotal drain placement on 12/25 in Interventional Radiology with Dr. Halaibeh. Patient presents with c/o dislodged drainage catheter 2 days ago. Reports output was ~5-10cc a day over the past few days. Denies pain, fever, chills.     Allergies: Levaquin (Anaphylaxis)    Medications (Abx/Cardiac/Anticoagulation/Blood Products)      Data:  170.2  91.2  T(C): --  HR: 97  BP: 138/66  RR: 20  SpO2: 98%    Exam  General: No acute distress  Chest: Non labored breathing      Plan: 77y Male presents for Right scrotal abscess evaluation   -Will do POC US for further assessment

## 2023-01-17 NOTE — ASU DISCHARGE PLAN (ADULT/PEDIATRIC) - NS MD DC FALL RISK RISK
For information on Fall & Injury Prevention, visit: [email or web address] OR  [email or web address] OR  https://www.cdc.gov/steadi/patient.html

## 2023-01-24 ENCOUNTER — APPOINTMENT (OUTPATIENT)
Dept: UROLOGY | Facility: CLINIC | Age: 78
End: 2023-01-24
Payer: MEDICARE

## 2023-01-24 ENCOUNTER — OUTPATIENT (OUTPATIENT)
Dept: OUTPATIENT SERVICES | Facility: HOSPITAL | Age: 78
LOS: 1 days | End: 2023-01-24
Payer: MEDICARE

## 2023-01-24 VITALS — HEART RATE: 88 BPM | TEMPERATURE: 98 F | DIASTOLIC BLOOD PRESSURE: 71 MMHG | SYSTOLIC BLOOD PRESSURE: 149 MMHG

## 2023-01-24 DIAGNOSIS — M50.20 OTHER CERVICAL DISC DISPLACEMENT, UNSPECIFIED CERVICAL REGION: Chronic | ICD-10-CM

## 2023-01-24 DIAGNOSIS — Z98.890 OTHER SPECIFIED POSTPROCEDURAL STATES: Chronic | ICD-10-CM

## 2023-01-24 DIAGNOSIS — R35.0 FREQUENCY OF MICTURITION: ICD-10-CM

## 2023-01-24 PROCEDURE — 51798 US URINE CAPACITY MEASURE: CPT

## 2023-01-24 PROCEDURE — 51700 IRRIGATION OF BLADDER: CPT

## 2023-01-25 ENCOUNTER — NON-APPOINTMENT (OUTPATIENT)
Age: 78
End: 2023-01-25

## 2023-01-25 DIAGNOSIS — S30.22XA CONTUSION OF SCROTUM AND TESTES, INITIAL ENCOUNTER: ICD-10-CM

## 2023-01-25 DIAGNOSIS — N49.2 INFLAMMATORY DISORDERS OF SCROTUM: ICD-10-CM

## 2023-01-31 ENCOUNTER — OUTPATIENT (OUTPATIENT)
Dept: OUTPATIENT SERVICES | Facility: HOSPITAL | Age: 78
LOS: 1 days | End: 2023-01-31
Payer: MEDICARE

## 2023-01-31 VITALS
DIASTOLIC BLOOD PRESSURE: 72 MMHG | WEIGHT: 209 LBS | HEIGHT: 67 IN | SYSTOLIC BLOOD PRESSURE: 122 MMHG | OXYGEN SATURATION: 97 % | HEART RATE: 79 BPM | RESPIRATION RATE: 18 BRPM | TEMPERATURE: 98 F

## 2023-01-31 DIAGNOSIS — Z01.818 ENCOUNTER FOR OTHER PREPROCEDURAL EXAMINATION: ICD-10-CM

## 2023-01-31 DIAGNOSIS — S30.22XA CONTUSION OF SCROTUM AND TESTES, INITIAL ENCOUNTER: ICD-10-CM

## 2023-01-31 DIAGNOSIS — M50.20 OTHER CERVICAL DISC DISPLACEMENT, UNSPECIFIED CERVICAL REGION: Chronic | ICD-10-CM

## 2023-01-31 DIAGNOSIS — Z29.9 ENCOUNTER FOR PROPHYLACTIC MEASURES, UNSPECIFIED: ICD-10-CM

## 2023-01-31 DIAGNOSIS — Z98.890 OTHER SPECIFIED POSTPROCEDURAL STATES: Chronic | ICD-10-CM

## 2023-01-31 LAB
ANION GAP SERPL CALC-SCNC: 10 MMOL/L — SIGNIFICANT CHANGE UP (ref 5–17)
BUN SERPL-MCNC: 14 MG/DL — SIGNIFICANT CHANGE UP (ref 7–23)
CALCIUM SERPL-MCNC: 9.6 MG/DL — SIGNIFICANT CHANGE UP (ref 8.4–10.5)
CHLORIDE SERPL-SCNC: 102 MMOL/L — SIGNIFICANT CHANGE UP (ref 96–108)
CO2 SERPL-SCNC: 26 MMOL/L — SIGNIFICANT CHANGE UP (ref 22–31)
CREAT SERPL-MCNC: 0.84 MG/DL — SIGNIFICANT CHANGE UP (ref 0.5–1.3)
EGFR: 89 ML/MIN/1.73M2 — SIGNIFICANT CHANGE UP
GLUCOSE SERPL-MCNC: 138 MG/DL — HIGH (ref 70–99)
HCT VFR BLD CALC: 36.5 % — LOW (ref 39–50)
HGB BLD-MCNC: 11.6 G/DL — LOW (ref 13–17)
MCHC RBC-ENTMCNC: 29.6 PG — SIGNIFICANT CHANGE UP (ref 27–34)
MCHC RBC-ENTMCNC: 31.8 GM/DL — LOW (ref 32–36)
MCV RBC AUTO: 93.1 FL — SIGNIFICANT CHANGE UP (ref 80–100)
NRBC # BLD: 0 /100 WBCS — SIGNIFICANT CHANGE UP (ref 0–0)
PLATELET # BLD AUTO: 299 K/UL — SIGNIFICANT CHANGE UP (ref 150–400)
POTASSIUM SERPL-MCNC: 3.7 MMOL/L — SIGNIFICANT CHANGE UP (ref 3.5–5.3)
POTASSIUM SERPL-SCNC: 3.7 MMOL/L — SIGNIFICANT CHANGE UP (ref 3.5–5.3)
RBC # BLD: 3.92 M/UL — LOW (ref 4.2–5.8)
RBC # FLD: 15.8 % — HIGH (ref 10.3–14.5)
SARS-COV-2 RNA SPEC QL NAA+PROBE: SIGNIFICANT CHANGE UP
SODIUM SERPL-SCNC: 138 MMOL/L — SIGNIFICANT CHANGE UP (ref 135–145)
WBC # BLD: 7.26 K/UL — SIGNIFICANT CHANGE UP (ref 3.8–10.5)
WBC # FLD AUTO: 7.26 K/UL — SIGNIFICANT CHANGE UP (ref 3.8–10.5)

## 2023-01-31 PROCEDURE — 85027 COMPLETE CBC AUTOMATED: CPT

## 2023-01-31 PROCEDURE — 87086 URINE CULTURE/COLONY COUNT: CPT

## 2023-01-31 PROCEDURE — U0003: CPT

## 2023-01-31 PROCEDURE — 80048 BASIC METABOLIC PNL TOTAL CA: CPT

## 2023-01-31 PROCEDURE — 36415 COLL VENOUS BLD VENIPUNCTURE: CPT

## 2023-01-31 PROCEDURE — U0005: CPT

## 2023-01-31 PROCEDURE — G0463: CPT

## 2023-01-31 NOTE — H&P PST ADULT - PROBLEM SELECTOR PLAN 2
Pt's last dose Coumadin on 1/29/23 and bridging with Lovenox injections. Pt's h/o Factor V Leiden; Coumadin managed by Dr. Chris Rivas. Clearance to be obtained on 2/1/23. Pt h/o Factor V Leiden; Coumadin managed by Dr. Chris Rivas. Clearance to be obtained on 2/1/23. Pt's last dose Coumadin on 1/29/23; bridging with Lovenox injections.

## 2023-01-31 NOTE — H&P PST ADULT - CARDIOVASCULAR
negative +1 BLLE/normal/regular rate and rhythm/S1 S2 present/no gallops/no rub/no murmur/peripheral edema

## 2023-01-31 NOTE — H&P PST ADULT - ASSESSMENT
DASI score:  3.39  DASI activity: dependent on home aides for self care activities, uses wheelchair   Loose teeth or denture: no    mallampati 3

## 2023-01-31 NOTE — H&P PST ADULT - NSANTHOSAYNRD_GEN_A_CORE
No. AL screening performed.  STOP BANG Legend: 0-2 = LOW Risk; 3-4 = INTERMEDIATE Risk; 5-8 = HIGH Risk Yes

## 2023-01-31 NOTE — H&P PST ADULT - RESPIRATORY
normal/no wheezes/no rales/no rhonchi/no respiratory distress/diminished breath sounds, L/diminished breath sounds, R

## 2023-01-31 NOTE — H&P PST ADULT - HISTORY OF PRESENT ILLNESS
76 yo M with PMHx significant for COPD, Factor V Leiden (on Coumadin), B/L PE's, Parkinson's,       Denies Recent travel, Exposure or Covid symptoms  Covid test 1/31/23 78 yo M poor historian, accompanied by his wife, with PMHx significant for COPD, Factor V Leiden (on Coumadin), B/L PE's, HTN, Parkinson's, Morgagni Hernia (caused collapse of LLL), PVD, Spinal Stenosis, B/L inguinal hernia, sp R inguinal hernia repair (8/2022), sp IR drainage of right scrotal abscess with drain? who reports developing a hematoma and subsequently an abscess on right scrotum sp Right inguinal hernia repair in August 2022. He is scheduled for right scrotal exploration, debridement & washout on 2/3/23.     Denies Recent travel, Exposure or Covid symptoms  Covid test 1/31/23 78 yo M former smoker and poor historian, accompanied by his wife, with PMHx significant for COPD, Factor V Leiden (on Coumadin), B/L PE's, HTN, Parkinson's, Morgagni Hernia (caused collapse of LLL), PVD, Spinal Stenosis, B/L inguinal hernia, sp R inguinal hernia repair (8/2022), sp IR drainage of right scrotal abscess with drain? who reports developing a hematoma and subsequently an abscess on right scrotum sp Right inguinal hernia repair in August 2022. He is scheduled for right scrotal exploration, debridement & washout on 2/3/23.     Denies Recent travel, Exposure or Covid symptoms  Covid test 1/31/23 78 yo M former smoker and poor historian, accompanied by his wife, with PMHx significant for COPD, AL (not on CPAP), Factor V Leiden (on Coumadin), B/L PE's, HTN, Parkinson's, Morgagni Hernia (caused collapse of LLL), PVD, Spinal Stenosis, B/L inguinal hernia, sp R inguinal hernia repair (8/2022), sp IR drainage of right scrotal abscess with drain? who reports developing a hematoma and subsequently an abscess on right scrotum sp Right inguinal hernia repair in August 2022. He is scheduled for right scrotal exploration, debridement & washout on 2/3/23.     Denies Recent travel, Exposure or Covid symptoms  Covid test 1/31/23 76 yo M former smoker and poor historian, accompanied by his wife, with PMHx significant for COPD, AL (not on CPAP), Factor V Leiden (on Coumadin), B/L PE's, DM (diet controlled), HTN, Parkinson's, Morgagni Hernia (caused collapse of LLL), PVD, Spinal Stenosis, B/L inguinal hernia, sp R inguinal hernia repair (8/2022), sp IR drainage of right scrotal abscess with drain? who reports developing a hematoma and subsequently an abscess on right scrotum sp Right inguinal hernia repair in August 2022. He is scheduled for right scrotal exploration, debridement & washout on 2/3/23.     Denies Recent travel, Exposure or Covid symptoms  Covid test 1/31/23 78 yo M former smoker and poor historian, accompanied by his wife, with PMHx significant for COPD, AL (not on CPAP), Factor V Leiden (on Coumadin), B/L PE's, DM (diet controlled), HTN, Parkinson's, Morgagni Hernia (caused collapse of LLL), PVD, Spinal Stenosis, B/L inguinal hernia, sp R inguinal hernia repair (8/2022) who reports developing a hematoma and subsequently an abscess on right scrotum sp Right inguinal hernia repair in August 2022. He was hospitalized at [facility] from 12/23/22 - 1/7/23 for generalized weakness and AMS; he was diagnosed with sepsis secondary to right scrotal abscess and underwent IR drainage on 12/25/22 with drain placed and dc'ed with gould catheter for fu with urology after drain removal; treated with Ancef and discharged on Cephalexin 500mg q6h. He is scheduled for right scrotal exploration, debridement & washout on 2/3/23.     Denies Recent travel, Exposure or Covid symptoms  Covid test 1/31/23 78 yo M former smoker and poor historian, accompanied by his wife, with PMHx significant for COPD, AL (not on CPAP), Factor V Leiden (on Coumadin), B/L PE's, DM (diet controlled), HTN, Parkinson's, Morgagni Hernia (caused collapse of LLL), PVD, Spinal Stenosis, B/L inguinal hernia, sp R inguinal hernia repair (8/2022) who reports developing a hematoma and subsequently an abscess on right scrotum sp Right inguinal hernia repair in August 2022. He was hospitalized at [facility] from 12/23/22 - 1/7/23 for generalized weakness and AMS; he was diagnosed with sepsis secondary to right scrotal abscess and underwent IR drainage on 12/25/22 with drain placed and dc'ed with gould catheter for fu with urology after drain removal; treated with Ancef and dc'ed on Cephalexin 500mg PO q6h. He is scheduled for right scrotal exploration, debridement & washout on 2/3/23.     Denies Recent travel, Exposure or Covid symptoms  Covid test 1/31/23

## 2023-01-31 NOTE — H&P PST ADULT - SKIN
right groin swelling (unable to fully assess patient as he is wheelchair bound and unable to undress)/warm and dry/color normal/normal/no rashes/no ulcers

## 2023-01-31 NOTE — H&P PST ADULT - MUSCULOSKELETAL
pt uses wheelchair to ambulate/ROM intact/strength 5/5 bilateral upper extremities negative pt uses wheelchair to ambulate/ROM intact/no calf tenderness/strength 5/5 bilateral upper extremities/no chest wall tenderness

## 2023-01-31 NOTE — H&P PST ADULT - NSICDXPASTMEDICALHX_GEN_ALL_CORE_FT
PAST MEDICAL HISTORY:  COPD (chronic obstructive pulmonary disease)     Factor V Leiden     Hypertension     Memory loss     Parkinsons     Personal history of PE (pulmonary embolism)     Restless leg syndrome     Right inguinal hernia     Spinal stenosis      PAST MEDICAL HISTORY:  COPD (chronic obstructive pulmonary disease)     Factor V Leiden     Hypertension     Memory loss     AL (obstructive sleep apnea)     Parkinsons     Personal history of PE (pulmonary embolism)     Restless leg syndrome     Right inguinal hernia     Spinal stenosis      PAST MEDICAL HISTORY:  COPD (chronic obstructive pulmonary disease)     Diabetes     Factor V Leiden     Hypertension     Memory loss     AL (obstructive sleep apnea)     Parkinsons     Personal history of PE (pulmonary embolism)     Restless leg syndrome     Right inguinal hernia     Spinal stenosis

## 2023-02-01 LAB
CULTURE RESULTS: NO GROWTH — SIGNIFICANT CHANGE UP
SPECIMEN SOURCE: SIGNIFICANT CHANGE UP

## 2023-02-02 ENCOUNTER — TRANSCRIPTION ENCOUNTER (OUTPATIENT)
Age: 78
End: 2023-02-02

## 2023-02-03 ENCOUNTER — TRANSCRIPTION ENCOUNTER (OUTPATIENT)
Age: 78
End: 2023-02-03

## 2023-02-03 ENCOUNTER — APPOINTMENT (OUTPATIENT)
Dept: UROLOGY | Facility: HOSPITAL | Age: 78
End: 2023-02-03

## 2023-02-03 ENCOUNTER — OUTPATIENT (OUTPATIENT)
Dept: INPATIENT UNIT | Facility: HOSPITAL | Age: 78
LOS: 1 days | End: 2023-02-03
Payer: MEDICARE

## 2023-02-03 VITALS
OXYGEN SATURATION: 96 % | SYSTOLIC BLOOD PRESSURE: 129 MMHG | WEIGHT: 209 LBS | HEART RATE: 89 BPM | DIASTOLIC BLOOD PRESSURE: 74 MMHG | HEIGHT: 67 IN | RESPIRATION RATE: 18 BRPM | TEMPERATURE: 98 F

## 2023-02-03 VITALS — HEART RATE: 88 BPM | RESPIRATION RATE: 16 BRPM

## 2023-02-03 DIAGNOSIS — M50.20 OTHER CERVICAL DISC DISPLACEMENT, UNSPECIFIED CERVICAL REGION: Chronic | ICD-10-CM

## 2023-02-03 DIAGNOSIS — S30.22XA CONTUSION OF SCROTUM AND TESTES, INITIAL ENCOUNTER: ICD-10-CM

## 2023-02-03 DIAGNOSIS — Z98.890 OTHER SPECIFIED POSTPROCEDURAL STATES: Chronic | ICD-10-CM

## 2023-02-03 LAB
GLUCOSE BLDC GLUCOMTR-MCNC: 126 MG/DL — HIGH (ref 70–99)
INR BLD: 1.1 RATIO — SIGNIFICANT CHANGE UP (ref 0.88–1.16)
PROTHROM AB SERPL-ACNC: 12.8 SEC — SIGNIFICANT CHANGE UP (ref 10.5–13.4)

## 2023-02-03 PROCEDURE — 54700 I&D EPDIDYMS TSTIS&/SCROT SP: CPT | Mod: RT

## 2023-02-03 PROCEDURE — 87070 CULTURE OTHR SPECIMN AEROBIC: CPT

## 2023-02-03 PROCEDURE — 85610 PROTHROMBIN TIME: CPT

## 2023-02-03 PROCEDURE — C9399: CPT

## 2023-02-03 PROCEDURE — C1889: CPT

## 2023-02-03 PROCEDURE — 82962 GLUCOSE BLOOD TEST: CPT

## 2023-02-03 PROCEDURE — 87077 CULTURE AEROBIC IDENTIFY: CPT

## 2023-02-03 PROCEDURE — 55110 EXPLORE SCROTUM: CPT | Mod: RT

## 2023-02-03 PROCEDURE — 87186 SC STD MICRODIL/AGAR DIL: CPT

## 2023-02-03 DEVICE — SURGIFLO MATRIX WITH THROMBIN KIT: Type: IMPLANTABLE DEVICE | Site: RIGHT | Status: FUNCTIONAL

## 2023-02-03 RX ORDER — FEXOFENADINE HCL 30 MG
1 TABLET ORAL
Qty: 0 | Refills: 0 | DISCHARGE

## 2023-02-03 RX ORDER — CARBIDOPA AND LEVODOPA 25; 100 MG/1; MG/1
1 TABLET ORAL
Qty: 0 | Refills: 0 | DISCHARGE

## 2023-02-03 RX ORDER — ALBUTEROL 90 UG/1
2 AEROSOL, METERED ORAL
Qty: 0 | Refills: 0 | DISCHARGE

## 2023-02-03 RX ORDER — CHOLECALCIFEROL (VITAMIN D3) 125 MCG
1 CAPSULE ORAL
Qty: 0 | Refills: 0 | DISCHARGE

## 2023-02-03 RX ORDER — WARFARIN SODIUM 2.5 MG/1
0.5 TABLET ORAL
Qty: 0 | Refills: 0 | DISCHARGE

## 2023-02-03 RX ORDER — IRBESARTAN AND HYDROCHLOROTHIAZIDE 12.5; 3 MG/1; MG/1
1 TABLET ORAL
Qty: 0 | Refills: 0 | DISCHARGE

## 2023-02-03 RX ORDER — CEPHALEXIN 500 MG
1 CAPSULE ORAL
Qty: 15 | Refills: 0
Start: 2023-02-03 | End: 2023-02-07

## 2023-02-03 RX ORDER — CEFAZOLIN SODIUM 1 G
2000 VIAL (EA) INJECTION ONCE
Refills: 0 | Status: COMPLETED | OUTPATIENT
Start: 2023-02-03 | End: 2023-02-03

## 2023-02-03 RX ORDER — BUDESONIDE AND FORMOTEROL FUMARATE DIHYDRATE 160; 4.5 UG/1; UG/1
2 AEROSOL RESPIRATORY (INHALATION)
Qty: 0 | Refills: 0 | DISCHARGE

## 2023-02-03 RX ORDER — PREGABALIN 225 MG/1
1 CAPSULE ORAL
Qty: 0 | Refills: 0 | DISCHARGE

## 2023-02-03 RX ORDER — HYDROMORPHONE HYDROCHLORIDE 2 MG/ML
0.25 INJECTION INTRAMUSCULAR; INTRAVENOUS; SUBCUTANEOUS
Refills: 0 | Status: DISCONTINUED | OUTPATIENT
Start: 2023-02-03 | End: 2023-02-03

## 2023-02-03 RX ORDER — POLYETHYLENE GLYCOL 3350 17 G/17G
17 POWDER, FOR SOLUTION ORAL
Qty: 119 | Refills: 0
Start: 2023-02-03 | End: 2023-02-09

## 2023-02-03 RX ORDER — SODIUM CHLORIDE 9 MG/ML
1000 INJECTION INTRAMUSCULAR; INTRAVENOUS; SUBCUTANEOUS
Refills: 0 | Status: DISCONTINUED | OUTPATIENT
Start: 2023-02-03 | End: 2023-02-03

## 2023-02-03 RX ORDER — TRIHEXYPHENIDYL HCL 2 MG
0.5 TABLET ORAL
Qty: 0 | Refills: 0 | DISCHARGE

## 2023-02-03 RX ORDER — OXYCODONE HYDROCHLORIDE 5 MG/1
1 TABLET ORAL
Qty: 5 | Refills: 0
Start: 2023-02-03

## 2023-02-03 RX ORDER — ROPINIROLE 8 MG/1
1 TABLET, FILM COATED, EXTENDED RELEASE ORAL
Qty: 0 | Refills: 0 | DISCHARGE

## 2023-02-03 RX ORDER — FOLIC ACID 0.8 MG
1 TABLET ORAL
Qty: 0 | Refills: 0 | DISCHARGE

## 2023-02-03 RX ORDER — LIDOCAINE HCL 20 MG/ML
0.2 VIAL (ML) INJECTION ONCE
Refills: 0 | Status: DISCONTINUED | OUTPATIENT
Start: 2023-02-03 | End: 2023-02-03

## 2023-02-03 RX ORDER — SODIUM CHLORIDE 9 MG/ML
3 INJECTION INTRAMUSCULAR; INTRAVENOUS; SUBCUTANEOUS EVERY 8 HOURS
Refills: 0 | Status: DISCONTINUED | OUTPATIENT
Start: 2023-02-03 | End: 2023-02-03

## 2023-02-03 NOTE — BRIEF OPERATIVE NOTE - NSICDXBRIEFPROCEDURE_GEN_ALL_CORE_FT
PROCEDURES:  Open drainage of scrotum 03-Feb-2023 12:23:36 right scrotal exploration and evacution of hematoma Aleisha Resendez

## 2023-02-03 NOTE — PRE-ANESTHESIA EVALUATION ADULT - NSANTHPMHFT_GEN_ALL_CORE
[facility] Delivery Note  Date of Admission: 2017 11:54 PM    Attending: Dr. Choi  Primary CNM: has seen all in the group  Delivering CNM:  Soledad Montgomery CNM       12/10/2017, by Last Menstrual Period    Stage I (3hours 35minutes): Term pregnancy, presents in spontaneous labor with contractions every 6 minutes since about 8pm on 17.  SVE upon admission: 3/70/-1  Requested and received epidural analgesia. Was able to rest comfortably, made cervical change to 6cm at 0527, and after bladder was emptied and SROM (thin Meconium), made rapid progress to complete with strong urge to push.     Stage II (9minutes): Spontaneous vaginal delivery of viable male fetus. NICU team called to the room for meconium and infant was delivered prior to arrival .   of viable male  in DENEEN position. Mother in Semiseated position.  There were no difficulties delivering the anterior or posterior shoulder. Infant vigorous at birth. Infant placed on mothers chest.    Stage III (9minutes):   Placenta and Cord:          Mechanism: spontaneous        Description:  complete, 3 vessel cord, membranes intact      Cord clamped following cessation of pulsation.  Cord cut by pt mother.       Fundus firm with massage at umbilicus.    Lacerations: 2nd degree vaginal and perineal   Suture: 3.0 Vicryl    Estimated Blood Loss:  300 mL    Specimens: Fetal cord blood    Complications:  precipitous delivery           Condition: stable, transfer to postpartum.     Birth Outcomes:   Infant Wt: weight not completed prior to note completion.   Apgars: 1' 8  /  5' 9   Cord pH: N/A      Infant Information:     Feeding: breast   Desires circumcision: Yes  Condition: stable       Soledad Montgomery CNM     
chart and consultant notes reviewed. scrtoal abscess following inguinal hernia repair. hx PD, took am sinemet. large intrathoracic hernia- pulm note reviewed, states pt is optimized, pt states he is at clinical baseline. no hx cad/chf - states et ~1 flight without cp. recent tte, essentially unremarkable. dm, poorly controlled a1c ~8, fs 126 . hx pe, coumadin bridged to lovenox. ?jourdan, unclear formal dx? no cpap

## 2023-02-03 NOTE — ASU PATIENT PROFILE, ADULT - NSICDXPASTMEDICALHX_GEN_ALL_CORE_FT
PAST MEDICAL HISTORY:  COPD (chronic obstructive pulmonary disease)     Diabetes     Factor V Leiden     Hypertension     Memory loss     AL (obstructive sleep apnea)     Parkinsons     Personal history of PE (pulmonary embolism)     Restless leg syndrome     Right inguinal hernia     Spinal stenosis

## 2023-02-03 NOTE — ASU DISCHARGE PLAN (ADULT/PEDIATRIC) - NS MD DC FALL RISK RISK
For information on Fall & Injury Prevention, visit: [email or web address] OR  [email or web address] OR  https://www.cdc.gov/steadi/patient.html

## 2023-02-03 NOTE — ASU PATIENT PROFILE, ADULT - FALL HARM RISK - HARM RISK INTERVENTIONS

## 2023-02-03 NOTE — ASU PATIENT PROFILE, ADULT - WAS YOUR LAST COVID-19 VACCINE GREATER THAN OR EQUAL TO TWO MONTHS AGO?
2333-TRANSFER - IN REPORT:    Verbal report received from [facility] [place]) on Malachi Sanchez  being received from ER(unit) for routine progression of care      Report consisted of patients Situation, Background, Assessment and   Recommendations(SBAR). Information from the following report(s) ED Summary, Intake/Output, MAR, Cardiac Rhythm NSR and Dual Neuro Assessment was reviewed with the receiving nurse. Opportunity for questions and clarification was provided. Assessment completed upon patients arrival to unit and care assumed.        0000-received pt into ICU 7/cardene gtt infusing at 25mg to keep sbp <140  Pt assisted off stretcher to side of bed to void then assisted into bed/passed STAND  0100-cardene stopped  0730-bedside report given to RN using sbar format Yes

## 2023-02-03 NOTE — ASU DISCHARGE PLAN (ADULT/PEDIATRIC) - ASU DC SPECIAL INSTRUCTIONSFT
patient can resume lovenox as of tomorrow and stary bridging with warfarin tomorrow  Follow up with dr Loya in clinic on Tuesday for penrose drain removal

## 2023-02-03 NOTE — ASU PATIENT PROFILE, ADULT - PREOP PAIN SCORE
Patient verified name and . Order for consent found in EHR and matches case posting; patient verified. Type 3 surgery, Walk in assessment complete. Labs per surgeon: CBC,BMP, PT/PTT, Hgb A1c,Nicotine, Albumin, T/S DOS ; results pending  Labs per anesthesia protocol: no additional  EKG:EKG 3.18.21 and cardiac clearance in EHR on 21    Chart flagged to have anesthesia review ECHO with EF 30-35% on 3.2.21 and follow up on pending Nicotine results. Pt given card with information to contact [phone] for assistance with establishing a PCP. Patient COVID test date 21 ; Patient aware. The testing center [facility] [address], [place]  and telephone number [phone] provided to the patient if not appointment found. MRSA/MSSA swab collected; pharmacy to review and dose antibiotic as appropriate. Hospital approved surgical skin cleanser and instructions to return bottle on DOS given per hospital policy. Patient provided with handouts including Guide to Surgery, Pain Management, Hand Hygiene, Blood Transfusion Education, and [facility] [facility] Brochure. Patient answered medical/surgical history questions at their best of ability. All prior to admission medications documented in [facility]. Original medication prescription bottle were not visualized during patient appointment. Patient instructed to hold all vitamins 3 weeks prior to surgery and NSAIDS 5 days prior to surgery. Patient teach back successful and patient demonstrates knowledge of instruction. 0

## 2023-02-03 NOTE — PRE-ANESTHESIA EVALUATION ADULT - NSANTHADDINFOFT_GEN_ALL_CORE
extensive rba dw pt at bedside. pt extensively counseled on GA alternatives, ie neuraxial, given significant baseline pulmonary impairment. also d/w pt possible need for post op ventilation. pt voices understanding, but refuses neuraxial. GA explained, pt understands and agrees with plan

## 2023-02-03 NOTE — ASU DISCHARGE PLAN (ADULT/PEDIATRIC) - CARE PROVIDER_API CALL
Dmitriy Loya)  Urology  [address] [place] [address]  Phone: [phone]  Fax: [phone]  Follow Up Time:

## 2023-02-03 NOTE — ASU PREOP CHECKLIST - BSA (M2)
Currently, no beds in [facility] (which was pt first choice) or [facility]    Patient agreeable to [facility] in [facility], if bed available over weekend    Message sent to [address] [place] [address] via [facility], admission will not occur over the weekend, patient and wife now opting again for [facility] 2.06

## 2023-02-05 LAB
-  AMIKACIN: SIGNIFICANT CHANGE UP
-  AMOXICILLIN/CLAVULANIC ACID: SIGNIFICANT CHANGE UP
-  AMPICILLIN/SULBACTAM: SIGNIFICANT CHANGE UP
-  AMPICILLIN: SIGNIFICANT CHANGE UP
-  AZTREONAM: SIGNIFICANT CHANGE UP
-  CEFAZOLIN: SIGNIFICANT CHANGE UP
-  CEFEPIME: SIGNIFICANT CHANGE UP
-  CEFOXITIN: SIGNIFICANT CHANGE UP
-  CEFTRIAXONE: SIGNIFICANT CHANGE UP
-  CIPROFLOXACIN: SIGNIFICANT CHANGE UP
-  ERTAPENEM: SIGNIFICANT CHANGE UP
-  GENTAMICIN: SIGNIFICANT CHANGE UP
-  IMIPENEM: SIGNIFICANT CHANGE UP
-  LEVOFLOXACIN: SIGNIFICANT CHANGE UP
-  MEROPENEM: SIGNIFICANT CHANGE UP
-  PIPERACILLIN/TAZOBACTAM: SIGNIFICANT CHANGE UP
-  TOBRAMYCIN: SIGNIFICANT CHANGE UP
-  TRIMETHOPRIM/SULFAMETHOXAZOLE: SIGNIFICANT CHANGE UP
CULTURE RESULTS: SIGNIFICANT CHANGE UP
METHOD TYPE: SIGNIFICANT CHANGE UP
ORGANISM # SPEC MICROSCOPIC CNT: SIGNIFICANT CHANGE UP
ORGANISM # SPEC MICROSCOPIC CNT: SIGNIFICANT CHANGE UP
SPECIMEN SOURCE: SIGNIFICANT CHANGE UP

## 2023-02-06 PROBLEM — R41.3 OTHER AMNESIA: Chronic | Status: ACTIVE | Noted: 2023-01-31

## 2023-02-06 PROBLEM — G47.33 OBSTRUCTIVE SLEEP APNEA (ADULT) (PEDIATRIC): Chronic | Status: ACTIVE | Noted: 2023-01-31

## 2023-02-06 PROBLEM — G20 PARKINSON'S DISEASE: Chronic | Status: ACTIVE | Noted: 2023-01-31

## 2023-02-06 PROBLEM — K40.90 UNILATERAL INGUINAL HERNIA, WITHOUT OBSTRUCTION OR GANGRENE, NOT SPECIFIED AS RECURRENT: Chronic | Status: ACTIVE | Noted: 2023-01-31

## 2023-02-06 PROBLEM — D68.51 ACTIVATED PROTEIN C RESISTANCE: Chronic | Status: ACTIVE | Noted: 2023-01-31

## 2023-02-06 PROBLEM — M48.00 SPINAL STENOSIS, SITE UNSPECIFIED: Chronic | Status: ACTIVE | Noted: 2023-01-31

## 2023-02-06 PROBLEM — E11.9 TYPE 2 DIABETES MELLITUS WITHOUT COMPLICATIONS: Chronic | Status: ACTIVE | Noted: 2023-01-31

## 2023-02-07 ENCOUNTER — APPOINTMENT (OUTPATIENT)
Dept: UROLOGY | Facility: CLINIC | Age: 78
End: 2023-02-07
Payer: MEDICARE

## 2023-02-07 VITALS
HEIGHT: 67 IN | WEIGHT: 210 LBS | DIASTOLIC BLOOD PRESSURE: 74 MMHG | BODY MASS INDEX: 32.96 KG/M2 | SYSTOLIC BLOOD PRESSURE: 174 MMHG | RESPIRATION RATE: 17 BRPM | HEART RATE: 88 BPM

## 2023-02-07 PROCEDURE — 99024 POSTOP FOLLOW-UP VISIT: CPT

## 2023-02-07 RX ORDER — SULFAMETHOXAZOLE AND TRIMETHOPRIM 800; 160 MG/1; MG/1
800-160 TABLET ORAL TWICE DAILY
Qty: 14 | Refills: 0 | Status: ACTIVE | COMMUNITY
Start: 2023-02-07 | End: 1900-01-01

## 2023-02-07 NOTE — REASON FOR VISIT
Micha Ambrosio 62 y.o. male is here today for Blood Pressure check.   History of HTN yes.    Review of patient's allergies indicates:  No Known Allergies  Creatinine   Date Value Ref Range Status   02/07/2020 1.1 0.5 - 1.4 mg/dL Final     Sodium   Date Value Ref Range Status   02/07/2020 138 136 - 145 mmol/L Final     Potassium   Date Value Ref Range Status   02/07/2020 4.3 3.5 - 5.1 mmol/L Final   ]  Patient verifies taking blood pressure medications on a regular basis at the same time of the day.     Current Outpatient Medications:     lisinopril (PRINIVIL,ZESTRIL) 20 MG tablet, Take 1 tablet (20 mg total) by mouth once daily., Disp: 90 tablet, Rfl: 3  Does patient have record of home blood pressure readings no.   Last dose of blood pressure medication was taken at 7am this morning.  Patient is asymptomatic.     /90 , Pulse: 80 .    Blood pressure reading after 15 minutes was 136/82  Dr. Carbajal notified.         Previous BP  160/120     [Follow-up Visit ___] : a follow-up visit  for [unfilled] [Spouse] : spouse [Formal Caregiver] : formal caregiver

## 2023-02-08 NOTE — END OF VISIT
[FreeTextEntry3] : OR wound culture = Enterobacter Cloacae \par MDR\par resistant to keflex\par pt received in OR ancef, flagyl and gent\par \par SS to BActrim\par started on Bactrim x 7 days

## 2023-02-08 NOTE — ASSESSMENT
[FreeTextEntry1] : Plan:\par \par B/l Penrose drain removed without difficulty - sutures removed \par \par one of the stitches on the incision site is loosen - instructed patient/family to apply bacitricin daily and gauze for absorption of additional draining \par \par wound culture reviewed with patient - will extend with 7 days of abx. \par \par Bactrim sent to pharmacy on file

## 2023-02-08 NOTE — HISTORY OF PRESENT ILLNESS
[FreeTextEntry1] : 77 yr old male patient with hx of B/L inguinal hernia, s/p right sided repair in Aug 2022 with General surgeon outside of [facility] with complication of hematoma then developed into abscess. Currently following up with ID - Dr. Connolly and general surgeon. \par \par S/P Right scrotal exploration with debridement and washout of hematoma and insertion of Penrose drain pm 2/3/23. Presents here to day for Penrose drain removal. \par \par currently on Keflex from hospital \par \par \par

## 2023-02-16 ENCOUNTER — APPOINTMENT (OUTPATIENT)
Dept: UROLOGY | Facility: CLINIC | Age: 78
End: 2023-02-16
Payer: MEDICARE

## 2023-02-16 PROCEDURE — 99024 POSTOP FOLLOW-UP VISIT: CPT

## 2023-02-16 PROCEDURE — 99215 OFFICE O/P EST HI 40 MIN: CPT | Mod: 24

## 2023-02-16 NOTE — ASSESSMENT
[FreeTextEntry1] : 77 year old male patient s/p evacuation of right scrotal hematoma \par \par Assessment/ Plan:\par \par Wound still has some discharge.\par wound swab culture was collected today\par wound packing done\par Wife was educated about wound packing and daily changing \par patient will follow up next Tuesday with Aleyda \par

## 2023-02-16 NOTE — PHYSICAL EXAM
[FreeTextEntry1] : right scrotal wound site still open, suture not fully dissolved, some serosanguineous discharge

## 2023-02-16 NOTE — END OF VISIT
[FreeTextEntry3] : s/p 2/3/23 scrotal i&d, washout, drainage of hematoma\par \par drain is out\par abx complete - mdr, ss bactrim\par \par new culture obtained today\par healing well, no signs of infection\par \par slight opening packed with 2' of 1/4" iodoform\par wife will change q1 day\par rto next week tuesday to check\par \par \par The total time spent with the patient includes face to face time as well as time for documentation, ordering medications/labs/procedures, and care coordination, but I acknowledge it does not include time spent on any procedures performed (eg PVR, UDS, Cystoscopy, catheter changes, etc).  Time includes reviewing the chart prior to visit, documentation, and correspondence.\par

## 2023-02-16 NOTE — HISTORY OF PRESENT ILLNESS
[FreeTextEntry1] : 77 year old male patient who is 3 weeks s/p scrotal exploration and evacuation of hematoma.\par Patient has finished his course of Bactrim 2 days ago\par still has some discharge from the wound\par wound is still open \par no fevers or chills

## 2023-02-21 ENCOUNTER — APPOINTMENT (OUTPATIENT)
Dept: UROLOGY | Facility: CLINIC | Age: 78
End: 2023-02-21
Payer: MEDICARE

## 2023-02-21 VITALS
BODY MASS INDEX: 32.49 KG/M2 | HEIGHT: 67 IN | DIASTOLIC BLOOD PRESSURE: 70 MMHG | WEIGHT: 207 LBS | SYSTOLIC BLOOD PRESSURE: 145 MMHG | RESPIRATION RATE: 17 BRPM | HEART RATE: 88 BPM

## 2023-02-21 DIAGNOSIS — N49.2 INFLAMMATORY DISORDERS OF SCROTUM: ICD-10-CM

## 2023-02-21 LAB — BACTERIA WND CULT: ABNORMAL

## 2023-02-21 PROCEDURE — 99024 POSTOP FOLLOW-UP VISIT: CPT

## 2023-02-21 RX ORDER — AMOXICILLIN AND CLAVULANATE POTASSIUM 875; 125 MG/1; MG/1
875-125 TABLET, COATED ORAL
Qty: 10 | Refills: 0 | Status: ACTIVE | COMMUNITY
Start: 2023-02-21 | End: 1900-01-01

## 2023-02-21 NOTE — HISTORY OF PRESENT ILLNESS
[FreeTextEntry1] : 77 yr old male patient with hx of B/L inguinal hernia, s/p right sided repair in Aug 2022 with General surgeon outside of [facility] with hematoma.  Pt hematoma was drained in the office, then developed into infected hematoma.  Pt was admitted to [facility] from 12/23/22 to 1/7/23 for generalized weakness and AMS.  Urology was consulted for the scrotal hematoma.  Urology recommended abx.  General surgery was later consulted and recommended IR to place drain, which fell out shortly after discharge.  \par \par Patient was advised to have scrotal hematoma addressed at time of left sided inguinal hernia.   The general surgeon requested the infected hematoma be surgically drained before performing left sided hernia surgery.\par \par Currently following up with ID - Dr. Connolly and general surgeon. \par \par 2/3: Right scrotal exploration with debridement and washout of hematoma and insertion of Penrose drain - received ancef, flagyl, and gent\par \par  2/7:  Penrose drain removal.  changed abx to bactrim given resistant enterobacter culture from OR to keflex\par 2/16: 1 cm area of dehiscence, packed with 2' of iodoform - wound culture obtained\par \par 2/21:  incision looking better, a small area in medial aspect on incision slight dehiscence.  packed 2 areas, changed abx to augmentin, wound culture R to bactrim\par \par 
High Risk ([facility])

## 2023-02-27 ENCOUNTER — APPOINTMENT (OUTPATIENT)
Dept: UROLOGY | Facility: CLINIC | Age: 78
End: 2023-02-27
Payer: MEDICARE

## 2023-02-27 VITALS
HEART RATE: 68 BPM | SYSTOLIC BLOOD PRESSURE: 121 MMHG | TEMPERATURE: 98.8 F | DIASTOLIC BLOOD PRESSURE: 74 MMHG | RESPIRATION RATE: 16 BRPM

## 2023-02-27 PROCEDURE — 99024 POSTOP FOLLOW-UP VISIT: CPT

## 2023-02-27 NOTE — HISTORY OF PRESENT ILLNESS
[FreeTextEntry1] : 77 yr old male patient with hx of B/L inguinal hernia, s/p right sided repair in Aug 2022 with General surgeon outside of [facility] with hematoma.  Pt hematoma was drained in the office, then developed into infected hematoma.  Pt was admitted to [facility] from 12/23/22 to 1/7/23 for generalized weakness and AMS.  Urology was consulted for the scrotal hematoma.  Urology recommended abx.  General surgery was later consulted and recommended IR to place drain, which fell out shortly after discharge.  \par \par Patient was advised to have scrotal hematoma addressed at time of left sided inguinal hernia.   The general surgeon requested the infected hematoma be surgically drained before performing left sided hernia surgery.\par \par Currently following up with ID - Dr. Connolly and general surgeon. \par \par 2/3: Right scrotal exploration with debridement and washout of hematoma and insertion of Penrose drain - received ancef, flagyl, and gent\par \par  2/7:  Penrose drain removal.  changed abx to bactrim given resistant enterobacter culture from OR to keflex\par 2/16: 1 cm area of dehiscence, packed with 2' of iodoform - wound culture obtained\par \par 2/21:  incision looking better, a small area in medial aspect on incision slight dehiscence.  packed 2 areas, changed abx to augmentin, wound culture R to bactrim\par \par 2/27: Patient here for 1 week follow up. Incision look better, wife claims that there has been less drainage since last visit. WIfe continuing with daily dressing changes. Completed 1 week abx course of augmentin for prior + wound cultures. Seems that both incision sites are connected superficially. Would culture obtained but will plan to not prescribe antibiotics until patient has change in symptoms (increase discharge, purulent discharge, ETC.). \par \par \par

## 2023-02-27 NOTE — ASSESSMENT
[FreeTextEntry1] : Plan\par - Continue with daily dressing changes and f/u in 1 week.\par - Wound culture obtained, will not plan to treat unless patients symptoms change\par

## 2023-02-27 NOTE — PHYSICAL EXAM
[General Appearance - Well Developed] : well developed [General Appearance - Well Nourished] : well nourished [Normal Appearance] : normal appearance [FreeTextEntry1] : Patient with two superficial incisions on R scrotum, both of which look improved from prior visit. Depth of larger incision significantly decreased from prior visit. Both incisions do communicate.

## 2023-03-07 ENCOUNTER — APPOINTMENT (OUTPATIENT)
Dept: UROLOGY | Facility: CLINIC | Age: 78
End: 2023-03-07
Payer: MEDICARE

## 2023-03-07 VITALS
DIASTOLIC BLOOD PRESSURE: 81 MMHG | BODY MASS INDEX: 32.18 KG/M2 | HEART RATE: 96 BPM | WEIGHT: 205 LBS | TEMPERATURE: 97.6 F | RESPIRATION RATE: 17 BRPM | HEIGHT: 67 IN | SYSTOLIC BLOOD PRESSURE: 156 MMHG | OXYGEN SATURATION: 95 %

## 2023-03-07 DIAGNOSIS — D68.51 ACTIVATED PROTEIN C RESISTANCE: ICD-10-CM

## 2023-03-07 LAB — BACTERIA WND CULT: ABNORMAL

## 2023-03-07 PROCEDURE — 99213 OFFICE O/P EST LOW 20 MIN: CPT | Mod: 24

## 2023-03-07 NOTE — HISTORY OF PRESENT ILLNESS
[FreeTextEntry1] : 77 yr old male patient with hx of B/L inguinal hernia, s/p right sided repair in Aug 2022 with General surgeon outside of [facility] with hematoma. Pt hematoma was drained in the office, then developed into infected hematoma. Pt was admitted to [facility] from 12/23/22 to 1/7/23 for generalized weakness and AMS. Urology was consulted for the scrotal hematoma. Urology recommended abx. General surgery was later consulted and recommended IR to place drain, which fell out shortly after discharge. \par \par Patient was advised to have scrotal hematoma addressed at time of left sided inguinal hernia. The general surgeon requested the infected hematoma be surgically drained before performing left sided hernia surgery.\par \par Currently following up with general surgeon. \par \par 2/3: Right scrotal exploration with debridement and washout of hematoma and insertion of Penrose drain - received ancef, flagyl, and gent\par \par 2/7: Penrose drain removal. changed abx to bactrim given resistant enterobacter culture from OR to keflex\par \par 2/16: 1 cm area of dehiscence, packed with 2' of iodoform - wound culture obtained\par \par 2/21: incision looking better, a small area in medial aspect on incision slight dehiscence. packed 2 areas, changed abx to augmentin, wound culture R to bactrim\par \par 2/27: Incision look better, wife claims that there has been less drainage since last visit. Wife continuing with daily dressing changes. Completed 1 week abx course of augmentin for prior + wound cultures. Seems that both incision sites are connected superficially. Would culture obtained but will plan to not prescribe antibiotics until patient has change in symptoms (increase discharge, purulent discharge, ETC.). \par \par 3/7: pt here for 1 weeks follow up, incisions look improving, wife still changing it daily. not on any abx. the superior incision site is about 1.5cm in depth. the inferior site is about 1cm in depth, no tunneling. \par \par

## 2023-03-13 ENCOUNTER — APPOINTMENT (OUTPATIENT)
Dept: UROLOGY | Facility: CLINIC | Age: 78
End: 2023-03-13
Payer: MEDICARE

## 2023-03-13 PROCEDURE — 99024 POSTOP FOLLOW-UP VISIT: CPT

## 2023-03-13 NOTE — END OF VISIT
[] : Fellow [Time Spent: ___ minutes] : I have spent [unfilled] minutes of time on the encounter. [FreeTextEntry3] : incision healing well\par stop packing mid incision\par \par cont packing medial edge\par \par no signs of infection, no culture needed\par

## 2023-03-13 NOTE — ASSESSMENT
[FreeTextEntry1] : 77 year old male patient with right scrotal wound s/p evacuation of hematoma.\par \par Assessment and plan:\par \par We educated the patient and his wife to continue packing daily.\par We reassured that the wound looks better this week.\par Patient will follow up in 2 weeks \par He has general surgery appointment tomorrow regarding his left hernia repair

## 2023-03-13 NOTE — PHYSICAL EXAM
[General Appearance - Well Developed] : well developed [General Appearance - Well Nourished] : well nourished [Normal Appearance] : normal appearance [General Appearance - In No Acute Distress] : no acute distress [Urethral Meatus] : meatus normal [Skin Color & Pigmentation] : normal skin color and pigmentation [Skin Lesions] : no skin lesions [Edema] : no peripheral edema [] : no respiratory distress [Mood] : the mood was normal [Not Anxious] : not anxious [FreeTextEntry1] : incision looks better, still deep at one site which is being packed daily. no discharges

## 2023-03-13 NOTE — HISTORY OF PRESENT ILLNESS
[FreeTextEntry1] : 77 yr old male patient with hx of B/L inguinal hernia, s/p right sided repair in Aug 2022 with General surgeon outside of [facility] with hematoma. Pt hematoma was drained in the office, then developed into infected hematoma. Pt was admitted to [facility] from 12/23/22 to 1/7/23 for generalized weakness and AMS. Urology was consulted for the scrotal hematoma. Urology recommended abx. General surgery was later consulted and recommended IR to place drain, which fell out shortly after discharge. \par \par Patient was advised to have scrotal hematoma addressed at time of left sided inguinal hernia. The general surgeon requested the infected hematoma be surgically drained before performing left sided hernia surgery.\par \par Currently following up with general surgeon. \par \par 2/3: Right scrotal exploration with debridement and washout of hematoma and insertion of Penrose drain - received ancef, flagyl, and gent\par \par 2/7: Penrose drain removal. changed abx to bactrim given resistant enterobacter culture from OR to keflex\par \par 2/16: 1 cm area of dehiscence, packed with 2' of iodoform - wound culture obtained\par \par 2/21: incision looking better, a small area in medial aspect on incision slight dehiscence. packed 2 areas, changed abx to augmentin, wound culture R to bactrim\par \par 2/27: Incision look better, wife claims that there has been less drainage since last visit. Wife continuing with daily dressing changes. Completed 1 week abx course of augmentin for prior + wound cultures. Seems that both incision sites are connected superficially. Would culture obtained but will plan to not prescribe antibiotics until patient has change in symptoms (increase discharge, purulent discharge, ETC.). \par \par 3/7: pt here for 1 weeks follow up, incisions look improving, wife still changing it daily. not on any abx. the superior incision site is about 1.5cm in depth. the inferior site is about 1cm in depth, no tunneling. \par \par 3/13: patient is here today for 1 week follow up, incision are improving, still being changed daily. No signs of infection, the lateral incision in now superficial, the medial uperior one in still deep around 1.8 cm.\par \par

## 2023-03-17 ENCOUNTER — APPOINTMENT (OUTPATIENT)
Dept: UROLOGY | Facility: CLINIC | Age: 78
End: 2023-03-17
Payer: MEDICARE

## 2023-03-17 PROCEDURE — 99024 POSTOP FOLLOW-UP VISIT: CPT

## 2023-03-17 NOTE — ASSESSMENT
[FreeTextEntry1] : Plan:\par \par con't packing with the medial superior incision site, change daily\par \par educated patient and wife that firm nodule could possibly be scar tissues with absence of redness and warmth. \par \par advised to call with any new onset of symptoms \par \par RTO 3/30 for follow up

## 2023-03-17 NOTE — HISTORY OF PRESENT ILLNESS
[FreeTextEntry1] : 77 yr old male patient with hx of B/L inguinal hernia, s/p right sided repair in Aug 2022 with General surgeon outside of [facility] with hematoma. Pt hematoma was drained in the office, then developed into infected hematoma. Pt was admitted to [facility] from 12/23/22 to 1/7/23 for generalized weakness and AMS. Urology was consulted for the scrotal hematoma. Urology recommended abx. General surgery was later consulted and recommended IR to place drain, which fell out shortly after discharge. \par \par Patient was advised to have scrotal hematoma addressed at time of left sided inguinal hernia. The general surgeon requested the infected hematoma be surgically drained before performing left sided hernia surgery.\par \par Currently following up with general surgeon. \par \par 2/3: Right scrotal exploration with debridement and washout of hematoma and insertion of Penrose drain - received ancef, flagyl, and gent\par \par 2/7: Penrose drain removal. changed abx to bactrim given resistant enterobacter culture from OR to keflex\par \par 2/16: 1 cm area of dehiscence, packed with 2' of iodoform - wound culture obtained\par \par 2/21: incision looking better, a small area in medial aspect on incision slight dehiscence. packed 2 areas, changed abx to augmentin, wound culture R to bactrim\par \par 2/27: Incision look better, wife claims that there has been less drainage since last visit. Wife continuing with daily dressing changes. Completed 1 week abx course of augmentin for prior + wound cultures. Seems that both incision sites are connected superficially. Would culture obtained but will plan to not prescribe antibiotics until patient has change in symptoms (increase discharge, purulent discharge, ETC.). \par \par 3/7: pt here for 1 weeks follow up, incisions look improving, wife still changing it daily. not on any abx. the superior incision site is about 1.5cm in depth. the inferior site is about 1cm in depth, no tunneling. \par \par 3/13: patient is here today for 1 week follow up, incision are improving, still being changed daily. No signs of infection, the lateral incision in now superficial, the medial uperior one in still deep around 1.8 cm.\par \par 3/17: pt is here for follow up because spouse called with concern of finding a firm palpable nodule near the lateral incision. wife packing the medial superior incision daily. denies fever. no s/s of infections noted. \par \par

## 2023-03-30 ENCOUNTER — APPOINTMENT (OUTPATIENT)
Dept: UROLOGY | Facility: CLINIC | Age: 78
End: 2023-03-30
Payer: MEDICARE

## 2023-03-30 VITALS
WEIGHT: 210 LBS | HEART RATE: 105 BPM | BODY MASS INDEX: 32.96 KG/M2 | DIASTOLIC BLOOD PRESSURE: 79 MMHG | TEMPERATURE: 97.7 F | OXYGEN SATURATION: 95 % | HEIGHT: 67 IN | SYSTOLIC BLOOD PRESSURE: 160 MMHG

## 2023-03-30 DIAGNOSIS — T14.8XXA OTHER INJURY OF UNSPECIFIED BODY REGION, INITIAL ENCOUNTER: ICD-10-CM

## 2023-03-30 PROCEDURE — 99024 POSTOP FOLLOW-UP VISIT: CPT

## 2023-03-30 NOTE — PHYSICAL EXAM
[General Appearance - Well Developed] : well developed [General Appearance - Well Nourished] : well nourished [Normal Appearance] : normal appearance [Well Groomed] : well groomed [General Appearance - In No Acute Distress] : no acute distress [Urethral Meatus] : meatus normal [Penis Abnormality] : normal uncircumcised penis [Skin Color & Pigmentation] : normal skin color and pigmentation [Skin Lesions] : no skin lesions [Edema] : no peripheral edema [] : no respiratory distress [Mood] : the mood was normal [Not Anxious] : not anxious [FreeTextEntry1] : scrotal incisions healing better

## 2023-03-30 NOTE — ASSESSMENT
[FreeTextEntry1] : 77 year old male patient s/p drainage o right scrotal hematoma \par \par assessment and plan\par \par Patient is doing well \par continue packing of the wound\par will follow up with fili in 2 weeks

## 2023-03-30 NOTE — HISTORY OF PRESENT ILLNESS
[FreeTextEntry1] : 77 yr old male patient with hx of B/L inguinal hernia, s/p right sided repair in Aug 2022 with General surgeon outside of [facility] with hematoma. Pt hematoma was drained in the office, then developed into infected hematoma. Pt was admitted to [facility] from 12/23/22 to 1/7/23 for generalized weakness and AMS. Urology was consulted for the scrotal hematoma. Urology recommended abx. General surgery was later consulted and recommended IR to place drain, which fell out shortly after discharge. \par \par Patient was advised to have scrotal hematoma addressed at time of left sided inguinal hernia. The general surgeon requested the infected hematoma be surgically drained before performing left sided hernia surgery.\par \par Currently following up with general surgeon. \par \par 2/3: Right scrotal exploration with debridement and washout of hematoma and insertion of Penrose drain - received ancef, flagyl, and gent\par \par 2/7: Penrose drain removal. changed abx to bactrim given resistant enterobacter culture from OR to keflex\par \par 2/16: 1 cm area of dehiscence, packed with 2' of iodoform - wound culture obtained\par \par 2/21: incision looking better, a small area in medial aspect on incision slight dehiscence. packed 2 areas, changed abx to augmentin, wound culture R to bactrim\par \par 2/27: Incision look better, wife claims that there has been less drainage since last visit. Wife continuing with daily dressing changes. Completed 1 week abx course of augmentin for prior + wound cultures. Seems that both incision sites are connected superficially. Would culture obtained but will plan to not prescribe antibiotics until patient has change in symptoms (increase discharge, purulent discharge, ETC.). \par \par 3/7: pt here for 1 weeks follow up, incisions look improving, wife still changing it daily. not on any abx. the superior incision site is about 1.5cm in depth. the inferior site is about 1cm in depth, no tunneling. \par \par 3/13: patient is here today for 1 week follow up, incision are improving, still being changed daily. No signs of infection, the lateral incision in now superficial, the medial uperior one in still deep around 1.8 cm.\par \par 3/17: pt is here for follow up because spouse called with concern of finding a firm palpable nodule near the lateral incision. wife packing the medial superior incision daily. denies fever. no s/s of infections noted. \par \par 3/30\par No signs of infection.\par Their is only one opening that is still draining minimally.\par Patient has  an appointment with general surgery next week\par will follow up in 2 weeks

## 2023-04-03 NOTE — HISTORY OF PRESENT ILLNESS
[de-identified] : HEATH  is a 77 year  male  here for a consultation for possible left inguinal hernia.

## 2023-04-04 ENCOUNTER — EMERGENCY (EMERGENCY)
Facility: HOSPITAL | Age: 78
LOS: 1 days | Discharge: HOME | End: 2023-04-04
Attending: STUDENT IN AN ORGANIZED HEALTH CARE EDUCATION/TRAINING PROGRAM
Payer: MEDICARE

## 2023-04-04 VITALS
DIASTOLIC BLOOD PRESSURE: 68 MMHG | HEIGHT: 67 IN | RESPIRATION RATE: 16 BRPM | TEMPERATURE: 98 F | HEART RATE: 96 BPM | WEIGHT: 210.1 LBS | OXYGEN SATURATION: 94 % | SYSTOLIC BLOOD PRESSURE: 160 MMHG

## 2023-04-04 DIAGNOSIS — Z98.890 OTHER SPECIFIED POSTPROCEDURAL STATES: Chronic | ICD-10-CM

## 2023-04-04 DIAGNOSIS — M50.20 OTHER CERVICAL DISC DISPLACEMENT, UNSPECIFIED CERVICAL REGION: Chronic | ICD-10-CM

## 2023-04-04 LAB
ALBUMIN SERPL ELPH-MCNC: 3.8 G/DL — SIGNIFICANT CHANGE UP (ref 3.3–5)
ALP SERPL-CCNC: 83 U/L — SIGNIFICANT CHANGE UP (ref 40–120)
ALT FLD-CCNC: 6 U/L — LOW (ref 10–45)
ANION GAP SERPL CALC-SCNC: 10 MMOL/L — SIGNIFICANT CHANGE UP (ref 5–17)
APTT BLD: 38.1 SEC — HIGH (ref 27.5–35.5)
AST SERPL-CCNC: 22 U/L — SIGNIFICANT CHANGE UP (ref 10–40)
BASOPHILS # BLD AUTO: 0.03 K/UL — SIGNIFICANT CHANGE UP (ref 0–0.2)
BASOPHILS NFR BLD AUTO: 0.4 % — SIGNIFICANT CHANGE UP (ref 0–2)
BILIRUB SERPL-MCNC: 0.8 MG/DL — SIGNIFICANT CHANGE UP (ref 0.2–1.2)
BUN SERPL-MCNC: 20 MG/DL — SIGNIFICANT CHANGE UP (ref 7–23)
CALCIUM SERPL-MCNC: 9.3 MG/DL — SIGNIFICANT CHANGE UP (ref 8.4–10.5)
CHLORIDE SERPL-SCNC: 100 MMOL/L — SIGNIFICANT CHANGE UP (ref 96–108)
CO2 SERPL-SCNC: 26 MMOL/L — SIGNIFICANT CHANGE UP (ref 22–31)
CREAT SERPL-MCNC: 0.86 MG/DL — SIGNIFICANT CHANGE UP (ref 0.5–1.3)
EGFR: 89 ML/MIN/1.73M2 — SIGNIFICANT CHANGE UP
EOSINOPHIL # BLD AUTO: 0.05 K/UL — SIGNIFICANT CHANGE UP (ref 0–0.5)
EOSINOPHIL NFR BLD AUTO: 0.7 % — SIGNIFICANT CHANGE UP (ref 0–6)
GLUCOSE SERPL-MCNC: 181 MG/DL — HIGH (ref 70–99)
HCT VFR BLD CALC: 40.3 % — SIGNIFICANT CHANGE UP (ref 39–50)
HGB BLD-MCNC: 13.1 G/DL — SIGNIFICANT CHANGE UP (ref 13–17)
IMM GRANULOCYTES NFR BLD AUTO: 0.4 % — SIGNIFICANT CHANGE UP (ref 0–0.9)
INR BLD: 2.34 RATIO — HIGH (ref 0.88–1.16)
LYMPHOCYTES # BLD AUTO: 1.08 K/UL — SIGNIFICANT CHANGE UP (ref 1–3.3)
LYMPHOCYTES # BLD AUTO: 14.1 % — SIGNIFICANT CHANGE UP (ref 13–44)
MCHC RBC-ENTMCNC: 30.9 PG — SIGNIFICANT CHANGE UP (ref 27–34)
MCHC RBC-ENTMCNC: 32.5 GM/DL — SIGNIFICANT CHANGE UP (ref 32–36)
MCV RBC AUTO: 95 FL — SIGNIFICANT CHANGE UP (ref 80–100)
MONOCYTES # BLD AUTO: 0.88 K/UL — SIGNIFICANT CHANGE UP (ref 0–0.9)
MONOCYTES NFR BLD AUTO: 11.5 % — SIGNIFICANT CHANGE UP (ref 2–14)
NEUTROPHILS # BLD AUTO: 5.6 K/UL — SIGNIFICANT CHANGE UP (ref 1.8–7.4)
NEUTROPHILS NFR BLD AUTO: 72.9 % — SIGNIFICANT CHANGE UP (ref 43–77)
NRBC # BLD: 0 /100 WBCS — SIGNIFICANT CHANGE UP (ref 0–0)
PLATELET # BLD AUTO: 211 K/UL — SIGNIFICANT CHANGE UP (ref 150–400)
POTASSIUM SERPL-MCNC: 3.7 MMOL/L — SIGNIFICANT CHANGE UP (ref 3.5–5.3)
POTASSIUM SERPL-SCNC: 3.7 MMOL/L — SIGNIFICANT CHANGE UP (ref 3.5–5.3)
PROT SERPL-MCNC: 6.4 G/DL — SIGNIFICANT CHANGE UP (ref 6–8.3)
PROTHROM AB SERPL-ACNC: 27.2 SEC — HIGH (ref 10.5–13.4)
RBC # BLD: 4.24 M/UL — SIGNIFICANT CHANGE UP (ref 4.2–5.8)
RBC # FLD: 14.6 % — HIGH (ref 10.3–14.5)
SODIUM SERPL-SCNC: 136 MMOL/L — SIGNIFICANT CHANGE UP (ref 135–145)
WBC # BLD: 7.67 K/UL — SIGNIFICANT CHANGE UP (ref 3.8–10.5)
WBC # FLD AUTO: 7.67 K/UL — SIGNIFICANT CHANGE UP (ref 3.8–10.5)

## 2023-04-04 PROCEDURE — 85025 COMPLETE CBC W/AUTO DIFF WBC: CPT

## 2023-04-04 PROCEDURE — 82947 ASSAY GLUCOSE BLOOD QUANT: CPT

## 2023-04-04 PROCEDURE — 96365 THER/PROPH/DIAG IV INF INIT: CPT | Mod: XU

## 2023-04-04 PROCEDURE — 71260 CT THORAX DX C+: CPT | Mod: 26,MA

## 2023-04-04 PROCEDURE — 82803 BLOOD GASES ANY COMBINATION: CPT

## 2023-04-04 PROCEDURE — 73030 X-RAY EXAM OF SHOULDER: CPT | Mod: 26,RT

## 2023-04-04 PROCEDURE — 80053 COMPREHEN METABOLIC PANEL: CPT

## 2023-04-04 PROCEDURE — 84132 ASSAY OF SERUM POTASSIUM: CPT

## 2023-04-04 PROCEDURE — 90471 IMMUNIZATION ADMIN: CPT

## 2023-04-04 PROCEDURE — 74177 CT ABD & PELVIS W/CONTRAST: CPT | Mod: 26,MA

## 2023-04-04 PROCEDURE — 99285 EMERGENCY DEPT VISIT HI MDM: CPT | Mod: FS

## 2023-04-04 PROCEDURE — 70450 CT HEAD/BRAIN W/O DYE: CPT | Mod: MA

## 2023-04-04 PROCEDURE — 74177 CT ABD & PELVIS W/CONTRAST: CPT | Mod: MA

## 2023-04-04 PROCEDURE — 70450 CT HEAD/BRAIN W/O DYE: CPT | Mod: 26,MA

## 2023-04-04 PROCEDURE — 72125 CT NECK SPINE W/O DYE: CPT | Mod: 26,MA

## 2023-04-04 PROCEDURE — 72125 CT NECK SPINE W/O DYE: CPT | Mod: MA

## 2023-04-04 PROCEDURE — 73060 X-RAY EXAM OF HUMERUS: CPT

## 2023-04-04 PROCEDURE — 82435 ASSAY OF BLOOD CHLORIDE: CPT

## 2023-04-04 PROCEDURE — 85018 HEMOGLOBIN: CPT

## 2023-04-04 PROCEDURE — 82330 ASSAY OF CALCIUM: CPT

## 2023-04-04 PROCEDURE — 99285 EMERGENCY DEPT VISIT HI MDM: CPT | Mod: 25

## 2023-04-04 PROCEDURE — 73060 X-RAY EXAM OF HUMERUS: CPT | Mod: 26,RT

## 2023-04-04 PROCEDURE — 83605 ASSAY OF LACTIC ACID: CPT

## 2023-04-04 PROCEDURE — 71260 CT THORAX DX C+: CPT | Mod: MA

## 2023-04-04 PROCEDURE — 85610 PROTHROMBIN TIME: CPT

## 2023-04-04 PROCEDURE — 73030 X-RAY EXAM OF SHOULDER: CPT

## 2023-04-04 PROCEDURE — 85014 HEMATOCRIT: CPT

## 2023-04-04 PROCEDURE — 90715 TDAP VACCINE 7 YRS/> IM: CPT

## 2023-04-04 PROCEDURE — 84295 ASSAY OF SERUM SODIUM: CPT

## 2023-04-04 PROCEDURE — 93005 ELECTROCARDIOGRAM TRACING: CPT

## 2023-04-04 PROCEDURE — 85730 THROMBOPLASTIN TIME PARTIAL: CPT

## 2023-04-04 RX ORDER — ACETAMINOPHEN 500 MG
1000 TABLET ORAL ONCE
Refills: 0 | Status: COMPLETED | OUTPATIENT
Start: 2023-04-04 | End: 2023-04-04

## 2023-04-04 RX ORDER — TETANUS TOXOID, REDUCED DIPHTHERIA TOXOID AND ACELLULAR PERTUSSIS VACCINE, ADSORBED 5; 2.5; 8; 8; 2.5 [IU]/.5ML; [IU]/.5ML; UG/.5ML; UG/.5ML; UG/.5ML
0.5 SUSPENSION INTRAMUSCULAR ONCE
Refills: 0 | Status: COMPLETED | OUTPATIENT
Start: 2023-04-04 | End: 2023-04-04

## 2023-04-04 RX ADMIN — Medication 1000 MILLIGRAM(S): at 11:26

## 2023-04-04 RX ADMIN — TETANUS TOXOID, REDUCED DIPHTHERIA TOXOID AND ACELLULAR PERTUSSIS VACCINE, ADSORBED 0.5 MILLILITER(S): 5; 2.5; 8; 8; 2.5 SUSPENSION INTRAMUSCULAR at 13:17

## 2023-04-04 RX ADMIN — Medication 400 MILLIGRAM(S): at 11:03

## 2023-04-04 NOTE — ED PROVIDER NOTE - PROGRESS NOTE DETAILS
Discussed all findings with pt and wife at bedside. Ortho/spine evaluated pt and placed them in cervcical collar. Patient instructed to keep collar on until f/u with ortho. Ortho states that he is ok for d/c and can f/u in office next few days with Dr. Ledezma. Pt agreeable to plan. Stable for d/c. Elvie Hopper PA-C

## 2023-04-04 NOTE — ED PROVIDER NOTE - CONTACT TIME
04-Apr-2023 11:19 Secondary Intention Text (Leave Blank If You Do Not Want): The defect will heal with secondary intention.

## 2023-04-04 NOTE — ED ADULT TRIAGE NOTE - CHIEF COMPLAINT QUOTE
pt fell backwards approximately 12 steps last night. Hit his head- no loc- on coumadin. PT hx of Parkinson's. Was able to get up and ambulate up the stairs after fall. Urgent care sent in for neck pain/bruising to upper back.

## 2023-04-04 NOTE — ED PROVIDER NOTE - CARE PROVIDER_API CALL
Topher Ledezma)  Orthopaedic Surgery  [address], [address]  [place] [address]  Phone: [phone]  Fax: [phone]  Follow Up Time:

## 2023-04-04 NOTE — ED PROVIDER NOTE - PHYSICAL EXAMINATION
CONSTITUTIONAL: Patient is awake, alert and oriented x 3. Patient is well appearing and in no acute distress.  HEAD: NCAT  EYES: PERRL bilaterally,  ENT: Airway patent, Nasal mucosa clear.   NECK: Supple,  LUNGS: CTA B/L,   HEART: RRR.+S1S2   ABDOMEN: Soft, non-tender to palpation throughout all four quadrants,   MSK: FROM upper and lower ext b/l, (+) midline ttp to cervical and thoracic spine, (+) ttp right anterior shoulder and pain with active ROM;   SKIN: +) moderate area of ecchymosis to upper central back, multiple skin abrasions  NEURO: No focal deficits,

## 2023-04-04 NOTE — ED PROVIDER NOTE - ATTENDING APP SHARED VISIT CONTRIBUTION OF CARE
I have personally performed a face to face medical and diagnostic evaluation of the patient. I have discussed with and reviewed the ACP's note and agree with the History, ROS, Physical Exam and MDM unless otherwise indicated. A brief summary of my personal evaluation and impression can be found below.     77-year-old male with past medical history of factor V Leiden deficiency, on Coumadin status post PE presenting with chief complaint of left-sided back pain, neck pain   After slip and fall yesterday.  Patient states that it was mechanical, went  down 12 steps on his back.  Did hit his head on the way.  No LOC.  Patient states that he lost his balance because he was only using 1 hand holding an object in the other.  Has been ambulating since with mild hip pain.  No other complaints at this time.  On exam, bruising to the left posterior chest wall with tenderness to palpation.  Lung sounds equal bilaterally.  Midline tenderness of the C-spine.  No hematomas of the head noted.  Abrasion to right forearm and right shin.  No hip or pelvis instability.  Able to actively range bilateral legs.  Given anticoagulation will obtain trauma scans to assess for underlying injury.  Plan on updating Tdap.  Plan for pain control.  Will check INR given patient is on Coumadin.  Reassess to dispo. Nabila Hill, ED Attending

## 2023-04-04 NOTE — ED PROVIDER NOTE - CARE PROVIDERS DIRECT ADDRESSES
[email or web address]

## 2023-04-04 NOTE — CONSULT NOTE ADULT - ASSESSMENT
A/P: 77y Male with T1 superior endplate fracture s/p fall, but no neurologic deficits    Pain control  WBAT with assistive devices as needed  C -collar at all times until follow up  Follow up outpatient with Dr. Ledezma this week. Please call [phone] to schedule an appointment

## 2023-04-04 NOTE — ED PROVIDER NOTE - ATTENDING CONTRIBUTION TO CARE
Vish Campuzano called and indicated patient had protime 08/25/2022 currently on 6mg daily. I have personally performed a face to face medical and diagnostic evaluation of the patient. I have discussed with and reviewed the Resident's note and agree with the History, ROS, Physical Exam and MDM unless otherwise indicated. A brief summary of my personal evaluation and impression can be found below.     77-year-old male with past medical history of factor V Leiden deficiency, on Coumadin status post PE presenting with chief complaint of left-sided back pain, neck pain   After slip and fall yesterday.  Patient states that it was mechanical, went  down 12 steps on his back.  Did hit his head on the way.  No LOC.  Patient states that he lost his balance because he was only using 1 hand holding an object in the other.  Has been ambulating since with mild hip pain.  No other complaints at this time.  On exam, bruising to the left posterior chest wall with tenderness to palpation.  Lung sounds equal bilaterally.  Midline tenderness of the C-spine.  No hematomas of the head noted.  Abrasion to right forearm and right shin.  No hip or pelvis instability.  Able to actively range bilateral legs.  Given anticoagulation will obtain trauma scans to assess for underlying injury.  Plan on updating Tdap.  Plan for pain control.  Will check INR given patient is on Coumadin.  Reassess to dispo. Nabila Hill, ED Attending

## 2023-04-04 NOTE — ED PROVIDER NOTE - PATIENT PORTAL LINK FT
You can access the FollowMyHealth Patient Portal offered by [facility] by registering at the following website: [email or web address]. By joining [facility]’s FollowMyHealth portal, you will also be able to view your health information using other applications (apps) compatible with our system.

## 2023-04-04 NOTE — ED ADULT NURSE NOTE - NSIMPLEMENTINTERV_GEN_ALL_ED
Implemented All Fall with Harm Risk Interventions:  [place] to call system. Call bell, personal items and telephone within reach. Instruct patient to call for assistance. Room bathroom lighting operational. Non-slip footwear when patient is off stretcher. Physically safe environment: no spills, clutter or unnecessary equipment. Stretcher in lowest position, wheels locked, appropriate side rails in place. Provide visual cue, wrist band, yellow gown, etc. Monitor gait and stability. Monitor for mental status changes and reorient to person, place, and time. Review medications for side effects contributing to fall risk. Reinforce activity limits and safety measures with patient and family. Provide visual clues: red socks.

## 2023-04-04 NOTE — ED PROVIDER NOTE - NSFOLLOWUPINSTRUCTIONS_ED_ALL_ED_FT
1. Follow up with your PCP within 2-3 days. Follow up with Spine within 2-3 days.   2. Take tylenol 650 mg every 6 hours as needed for pain.   3. Rest. hydrate.   4. Return to the emergency department if you develop worsening pain, falling, dizziness, weakness, fevers or any other concerning symptoms.

## 2023-04-04 NOTE — ED ADULT NURSE NOTE - OBJECTIVE STATEMENT
First encounter with the pt, pt received from triage with complaints of upper back pain post fall downs 12 steps yesterday. Pt states that he wasn't dizzy before falling only tripped. Pt admits to hitting his head but denies passing out and hitting head. Pt admits to taking blood thinners. Pt also have lower groin dressings from home from recent hernia sx. Pt is a/ox4 and verbal. Speech is clear and able to speak in full sentences without distress. Airway is patent with no obstruction nor blocking secretions. Breathing is even and unlabored on room air. Pt has strong pulses palpated bilaterally. Neuro check is wnl. Full rom but requires assistance. Pt denies chest pain, n/v and sob. No acute distress noted. Pt has call light within the reach of the pt at the bedside. Will continue to monitor the pt.

## 2023-04-04 NOTE — ED ADULT TRIAGE NOTE - PAIN: PRESENCE, MLM
Pt with c/o right shoulder pain. Pt works in a [employer]. Pt c/o pain ongoing for the last few days.
complains of pain/discomfort

## 2023-04-04 NOTE — ED PROVIDER NOTE - MDM ORDERS SUBMITTED SELECTION
How Severe Is Your Dry Skin?: mild How Many Showers Or Baths Do You Take In One Day?: 1 Labs/Imaging Studies/Medications

## 2023-04-04 NOTE — CONSULT NOTE ADULT - SUBJECTIVE AND OBJECTIVE BOX
Patient is a 77y Male with PSH including C5-6 ACDF in 2005 (surgeon since retired) who presents c/o upper back pain  sp fall down stairs. Patient was up at night getting ice cream and fell down the stairs on his way back up after losing his balance. Reports HS, denies LOC. Denies pain/injury elsewhere. Denies numbness/tingling/paresthesias/weakness. Denies bowel/bladder incontinence. Denies fevers/chills. No other complaints at this time.    HEALTH ISSUES - PROBLEM Dx:          MEDICATIONS  (STANDING):      Allergies    garlic (Other (Mild))  Levaquin (Anaphylaxis)    Intolerances        PAST MEDICAL & SURGICAL HISTORY:  Personal history of PE (pulmonary embolism)    COPD (chronic obstructive pulmonary disease)    Hypertension    Restless leg syndrome    Factor V Leiden    Parkinsons    Spinal stenosis    Memory loss    Right inguinal hernia    AL (obstructive sleep apnea)    Diabetes    Herniated cervical disc    H/O hernia repair                              13.1   7.67  )-----------( 211      ( 04 Apr 2023 11:07 )             40.3       04 Apr 2023 11:07    136    |  100    |  20     ----------------------------<  181    3.7     |  26     |  0.86     Ca    9.3        04 Apr 2023 11:07    TPro  6.4    /  Alb  3.8    /  TBili  0.8    /  DBili  x      /  AST  22     /  ALT  6      /  AlkPhos  83     04 Apr 2023 11:07      PT/INR - ( 04 Apr 2023 11:07 )   PT: 27.2 sec;   INR: 2.34 ratio         PTT - ( 04 Apr 2023 11:07 )  PTT:38.1 sec        Vital Signs Last 24 Hrs  T(C): 36.6 (04-04-23 @ 10:03), Max: 36.6 (04-04-23 @ 10:03)  T(F): 97.9 (04-04-23 @ 10:03), Max: 97.9 (04-04-23 @ 10:03)  HR: 96 (04-04-23 @ 10:03) (96 - 96)  BP: 160/68 (04-04-23 @ 10:03) (160/68 - 160/68)  BP(mean): --  RR: 16 (04-04-23 @ 10:03) (16 - 16)  SpO2: 94% (04-04-23 @ 10:03) (94% - 94%)    Gen: NAD    Spine PE:  Skin intact  Bruising on midline of upper thoracic spine  No gross deformity  Midline TTP in upper thoracic spine in area of ecchymosis  No bony step offs  No paraspinal muscle ttp/hypertonicity   Negative Straight leg raise  Negative clonus  Negative babinski  Negative mark  No saddle anesthesia    Motor:                   C5                C6              C7               C8           T1   R            5/5                5/5            5/5             5/5          5/5  L             5/5               5/5             5/5             5/5          5/5                L2             L3             L4               L5            S1  R         5/5           5/5          5/5             5/5           5/5  L          5/5          5/5           5/5             5/5           5/5    Sensory:            C5         C6         C7      C8       T1        (0=absent, 1=impaired, 2=normal, NT=not testable)  R         2            2           2        2         2  L          2            2           2        2         2               L2          L3         L4      L5       S1         (0=absent, 1=impaired, 2=normal, NT=not testable)  R         2            2            2        2        2  L          2            2           2        2         2    Imaging: CT shows a fracture of the superior endplate of the T1 vertebral body with no bony retropulsion as well as intact ACDF hardware with no evidence of complication

## 2023-04-04 NOTE — ED PROVIDER NOTE - NS ED ATTENDING STATEMENT MOD
Attending with This was a shared visit with the HELDER. I reviewed and verified the documentation and independently performed the documented:

## 2023-04-04 NOTE — ED PROVIDER NOTE - OBJECTIVE STATEMENT
76 y/o male with COPD, Factor V Leiden (on Coumadin), B/L PE's, DM, HTN, Parkinson's, Morgagni Hernia (caused collapse of LLL), PVD, Spinal Stenosis, B/L inguinal hernia, sp R inguinal hernia repair (8/2022) presents to the ED s/p fall last night around 10 pm. Patient was walking up the steps carrying ice cream and he accidently tripped and fell backwards. He slid down approximately 12 steps on his back. Patient did hit his head. Patient takes Coumadin. He denies LOC. Was able to get up and ambulate after fall. He complains of pain to his head neck, back as well as the right shoulder. No other pain or injury. Tetanus is up to date. Patient has had multiple other falls lately. No recent illness, fever, chills, chest pain, sob, cough, n/v/d.

## 2023-04-05 ENCOUNTER — APPOINTMENT (OUTPATIENT)
Dept: ORTHOPEDIC SURGERY | Facility: CLINIC | Age: 78
End: 2023-04-05
Payer: MEDICARE

## 2023-04-05 ENCOUNTER — APPOINTMENT (OUTPATIENT)
Dept: SURGERY | Facility: CLINIC | Age: 78
End: 2023-04-05

## 2023-04-05 VITALS — BODY MASS INDEX: 32.96 KG/M2 | WEIGHT: 210 LBS | HEIGHT: 67 IN | TEMPERATURE: 98 F

## 2023-04-05 PROCEDURE — 99203 OFFICE O/P NEW LOW 30 MIN: CPT

## 2023-04-05 PROCEDURE — 72040 X-RAY EXAM NECK SPINE 2-3 VW: CPT

## 2023-04-05 NOTE — DISCUSSION/SUMMARY
[de-identified] : We discussed further treatment options.  He wished to continue with conservative measures.  He will continue with the cervical collar.  Follow-up in 4 weeks time for repeat evaluation with x-ray.

## 2023-04-05 NOTE — PHYSICAL EXAM
[Walker] : ambulates with walker [de-identified] : He is in a cervical collar.  Full range of motion bilateral shoulders without evidence of impingement. No instability of bilateral upper extremities.  Cranial nerves II through XII grossly intact. Intact sensation bilateral upper extremities. 5/5 deltoids biceps triceps wrist extensors wrist flexors finger flexors and hand intrinsics. 1+ biceps triceps and brachioradialis reflexes. Negative Patel's. 2+ radial pulse. Negative Tinel's over the cubital and carpal tunnel. No skin lesions on the right and left upper extremities. [de-identified] : Review of his CT scan from the emergency room reveals prior ACDF of C5-6.  T1 compression fracture.\par \par AP lateral cervical x-rays reveals maintained alignment

## 2023-04-05 NOTE — HISTORY OF PRESENT ILLNESS
[de-identified] : Mr. ROBERT BOECKLE  is a 77 year old male who presents with upper back pain since Monday night when he fell backwards down an entire flight of stairs.   He went to  the ER and was placed in a cervical collar secondary to a T1 fx.  Denies any LE radicular symptoms.  Normal bowel and bladder control.   Denies any recent fevers, chills, sweats, weight loss, or infection.  \par \par The patients past medical history, past surgical history, medications, allergies, and social history were reviewed by me today with the patient and documented accordingly.  In addition, the patient's family history, which is noncontributory to their visit, was also reviewed.\par

## 2023-04-10 ENCOUNTER — APPOINTMENT (OUTPATIENT)
Dept: UROLOGY | Facility: CLINIC | Age: 78
End: 2023-04-10

## 2023-04-11 ENCOUNTER — APPOINTMENT (OUTPATIENT)
Dept: UROLOGY | Facility: CLINIC | Age: 78
End: 2023-04-11
Payer: MEDICARE

## 2023-04-11 VITALS
HEART RATE: 105 BPM | HEIGHT: 67 IN | TEMPERATURE: 98.2 F | DIASTOLIC BLOOD PRESSURE: 83 MMHG | RESPIRATION RATE: 17 BRPM | SYSTOLIC BLOOD PRESSURE: 163 MMHG | BODY MASS INDEX: 32.96 KG/M2 | WEIGHT: 210 LBS

## 2023-04-11 DIAGNOSIS — S30.22XA CONTUSION OF SCROTUM AND TESTES, INITIAL ENCOUNTER: ICD-10-CM

## 2023-04-11 PROCEDURE — 99213 OFFICE O/P EST LOW 20 MIN: CPT | Mod: 24

## 2023-04-11 NOTE — HISTORY OF PRESENT ILLNESS
[FreeTextEntry1] : 77 yr old male patient with hx of B/L inguinal hernia, s/p right sided repair in Aug 2022 with General surgeon outside of [facility] with hematoma. Pt hematoma was drained in the office, then developed into infected hematoma. Pt was admitted to [facility] from 12/23/22 to 1/7/23 for generalized weakness and AMS. Urology was consulted for the scrotal hematoma. Urology recommended abx. General surgery was later consulted and recommended IR to place drain, which fell out shortly after discharge. \par \par Patient was advised to have scrotal hematoma addressed at time of left sided inguinal hernia. The general surgeon requested the infected hematoma be surgically drained before performing left sided hernia surgery.\par \par Currently following up with general surgeon. \par \par 2/3: Right scrotal exploration with debridement and washout of hematoma and insertion of Penrose drain - received ancef, flagyl, and gent\par \par 2/7: Penrose drain removal. changed abx to bactrim given resistant enterobacter culture from OR to keflex\par \par 2/16: 1 cm area of dehiscence, packed with 2' of iodoform - wound culture obtained\par \par 2/21: incision looking better, a small area in medial aspect on incision slight dehiscence. packed 2 areas, changed abx to augmentin, wound culture R to bactrim\par \par 2/27: Incision look better, wife claims that there has been less drainage since last visit. Wife continuing with daily dressing changes. Completed 1 week abx course of augmentin for prior + wound cultures. Seems that both incision sites are connected superficially. Would culture obtained but will plan to not prescribe antibiotics until patient has change in symptoms (increase discharge, purulent discharge, ETC.). \par \par 3/7: pt here for 1 weeks follow up, incisions look improving, wife still changing it daily. not on any abx. the superior incision site is about 1.5cm in depth. the inferior site is about 1cm in depth, no tunneling. \par \par 3/13: patient is here today for 1 week follow up, incision are improving, still being changed daily. No signs of infection, the lateral incision in now superficial, the medial uperior one in still deep around 1.8 cm.\par \par 3/17: pt is here for follow up because spouse called with concern of finding a firm palpable nodule near the lateral incision. wife packing the medial superior incision daily. denies fever. no s/s of infections noted. \par \par 3/30: No signs of infection.\par Their is only one opening that is still draining minimally.\par Patient has an appointment with general surgery next week\par \par 4/11: Incision sites healed. stopping the need for packing. \par patient fell from first floor to his basement 4/3 and resulted of T1 fracture\par hold off on surgery for the left hernia till the fracture is healed in about 8-12 weeks. \par

## 2023-04-11 NOTE — END OF VISIT
[Time Spent: ___ minutes] : I have spent [unfilled] minutes of time on the encounter. [FreeTextEntry3] : no further packing needed\par pt f/u prn\par pending left inguinal hernia surgery now

## 2023-04-11 NOTE — PHYSICAL EXAM
[General Appearance - Well Developed] : well developed [Normal Appearance] : normal appearance [Abdomen Soft] : soft [] : no rash [Edema] : no peripheral edema [Not Anxious] : not anxious [FreeTextEntry1] : 4 point walker

## 2023-05-03 ENCOUNTER — APPOINTMENT (OUTPATIENT)
Dept: ORTHOPEDIC SURGERY | Facility: CLINIC | Age: 78
End: 2023-05-03
Payer: MEDICARE

## 2023-05-03 VITALS — WEIGHT: 210 LBS | BODY MASS INDEX: 32.96 KG/M2 | HEIGHT: 67 IN | TEMPERATURE: 98.2 F

## 2023-05-03 PROCEDURE — 99214 OFFICE O/P EST MOD 30 MIN: CPT

## 2023-05-03 NOTE — HISTORY OF PRESENT ILLNESS
[de-identified] : Mr. ROBERT BOECKLE  is a 77 year old male who presents to the office for a follow-up visit. \par

## 2023-05-03 NOTE — PHYSICAL EXAM
[Walker] : ambulates with walker [de-identified] : He is in a cervical collar.  Full range of motion bilateral shoulders without evidence of impingement. No instability of bilateral upper extremities.  Cranial nerves II through XII grossly intact. Intact sensation bilateral upper extremities. 5/5 deltoids biceps triceps wrist extensors wrist flexors finger flexors and hand intrinsics. 1+ biceps triceps and brachioradialis reflexes. Negative Patel's. 2+ radial pulse. Negative Tinel's over the cubital and carpal tunnel. No skin lesions on the right and left upper extremities. [de-identified] : Review of his CT scan from the emergency room reveals prior ACDF of C5-6.  T1 compression fracture.\par \par AP lateral cervical x-rays reveals maintained alignment

## 2023-05-03 NOTE — DISCUSSION/SUMMARY
[de-identified] : Overall, he continues to recover well.  Restrictions were reviewed.  Follow-up in another 6 weeks time for reevaluation and likely flexion-extension x-rays.

## 2023-05-16 ENCOUNTER — OFFICE (OUTPATIENT)
Dept: URBAN - METROPOLITAN AREA CLINIC 70 | Facility: CLINIC | Age: 78
Setting detail: OPHTHALMOLOGY
End: 2023-05-16
Payer: MEDICARE

## 2023-05-16 DIAGNOSIS — H16.223: ICD-10-CM

## 2023-05-16 DIAGNOSIS — H35.54: ICD-10-CM

## 2023-05-16 DIAGNOSIS — Z96.1: ICD-10-CM

## 2023-05-16 DIAGNOSIS — D31.32: ICD-10-CM

## 2023-05-16 DIAGNOSIS — H16.222: ICD-10-CM

## 2023-05-16 DIAGNOSIS — H35.40: ICD-10-CM

## 2023-05-16 DIAGNOSIS — H16.221: ICD-10-CM

## 2023-05-16 DIAGNOSIS — E11.9: ICD-10-CM

## 2023-05-16 PROBLEM — H43.811 POSTERIOR VITREOUS DETACHMENT; RIGHT EYE: Status: ACTIVE | Noted: 2023-05-16

## 2023-05-16 PROCEDURE — 92134 CPTRZ OPH DX IMG PST SGM RTA: CPT | Performed by: OPHTHALMOLOGY

## 2023-05-16 PROCEDURE — 99213 OFFICE O/P EST LOW 20 MIN: CPT | Performed by: OPHTHALMOLOGY

## 2023-05-16 ASSESSMENT — AXIALLENGTH_DERIVED
OD_AL: 23.3864
OS_AL: 22.7908
OD_AL: 23.1496
OD_AL: 23.4825
OS_AL: 22.7455
OS_AL: 22.7003

## 2023-05-16 ASSESSMENT — REFRACTION_CURRENTRX
OD_SPHERE: -0.50
OS_VPRISM_DIRECTION: PROGS
OS_ADD: +2.00
OS_CYLINDER: -0.50
OS_AXIS: 072
OD_AXIS: 090
OS_ADD: +2.00
OS_OVR_VA: 20/
OS_ADD: +2.50
OD_SPHERE: -0.50
OS_SPHERE: +0.50
OS_AXIS: 066
OD_CYLINDER: -1.25
OD_CYLINDER: -1.50
OS_AXIS: 081
OD_VPRISM_DIRECTION: PROGS
OD_OVR_VA: 20/
OS_OVR_VA: 20/
OS_SPHERE: +0.75
OD_CYLINDER: -1.00
OD_VPRISM_DIRECTION: PROGS
OD_OVR_VA: 20/
OD_SPHERE: +0.50
OS_OVR_VA: 20/
OD_AXIS: 085
OD_ADD: +2.50
OD_AXIS: 088
OD_ADD: +2.25
OD_OVR_VA: 20/
OS_CYLINDER: -0.25
OS_VPRISM_DIRECTION: PROGS
OD_ADD: +2.50
OS_CYLINDER: -0.25
OS_SPHERE: +0.50

## 2023-05-16 ASSESSMENT — VISUAL ACUITY
OS_BCVA: 20/40
OD_BCVA: 20/25-

## 2023-05-16 ASSESSMENT — REFRACTION_MANIFEST
OD_ADD: +2.75
OS_AXIS: 75
OS_ADD: +2.50
OD_AXIS: 090
OD_ADD: +2.50
OD_CYLINDER: -1.00
OD_CYLINDER: -1.25
OD_VA2: 20/20
OD_SPHERE: +0.25
OS_CYLINDER: -0.50
OD_VA2: 20/20
OS_CYLINDER: -0.25
OS_VA2: 20/20
OU_VA: 20/20
OS_SPHERE: +0.50
OS_VA2: 20/20
OD_VA1: 20/20
OD_SPHERE: -0.75
OS_VA1: 20/25
OD_VA1: 20/25
OD_AXIS: 90
OS_AXIS: 065
OS_SPHERE: +0.25
OS_VA1: 20/20
OS_ADD: +2.75

## 2023-05-16 ASSESSMENT — SPHEQUIV_DERIVED
OS_SPHEQUIV: 0.25
OD_SPHEQUIV: -1.25
OS_SPHEQUIV: 0.375
OS_SPHEQUIV: 0.125
OD_SPHEQUIV: -1
OD_SPHEQUIV: -0.375

## 2023-05-16 ASSESSMENT — SUPERFICIAL PUNCTATE KERATITIS (SPK)
OS_SPK: 1+
OD_SPK: 1+

## 2023-05-16 ASSESSMENT — CONFRONTATIONAL VISUAL FIELD TEST (CVF)
OS_FINDINGS: FULL
OD_FINDINGS: FULL

## 2023-05-16 ASSESSMENT — REFRACTION_AUTOREFRACTION
OS_AXIS: 074
OD_SPHERE: 0.00
OD_AXIS: 084
OD_CYLINDER: -2.00
OS_SPHERE: +1.00
OS_CYLINDER: -1.25

## 2023-05-16 ASSESSMENT — KERATOMETRY
OS_K2POWER_DIOPTERS: 46.00
OS_K1POWER_DIOPTERS: 45.25
OS_AXISANGLE_DEGREES: 159
OD_AXISANGLE_DEGREES: 174
OD_K2POWER_DIOPTERS: 45.75
OD_K1POWER_DIOPTERS: 44.50

## 2023-05-17 ENCOUNTER — APPOINTMENT (OUTPATIENT)
Dept: SURGERY | Facility: CLINIC | Age: 78
End: 2023-05-17

## 2023-07-03 NOTE — PROCEDURE NOTE - PROCEDURE FINDINGS AND DETAILS
Assessment  Diagnoses and all orders for this visit:    Rotator cuff arthropathy of left shoulder, s/p reverse total shoulder    Rotator cuff arthropathy of right shoulder    Discussion and Plan:    · 4 months s/p left reverse total shoulder arthroplasty performed on 2/14/23, doing well. · Continue to perform daily activities as tolerated with modifications to avoid pain  · As for his right shoulder, he has a similar diagnosis to his left but with progressing symptoms. He is considering a reverse total shoulder for this side as well but would like to wait until the fall. · Follow up in 3 months to obtain informed consent for a right reverse total shoulder arthroplasty    Subjective:   Patient ID: Gabi Bartlett is a 70 y.o. male    Patient presents today 4 months s/p left reverse total shoulder arthroplasty performed on 2/14/23. Patient repots that he continues to do well post operatively. He is using the left shoulder for his daily activities without pain. Overall, he is doing well so far post operatively. No numbness or tingling. No fevers or chills. He has continued restrictions with his right shoulder and is noting progressing symptoms and pain for the past several months        The following portions of the patient's history were reviewed and updated as appropriate: allergies, current medications, past family history, past medical history, past social history, past surgical history and problem list.    Objective:  /83 (BP Location: Right arm, Patient Position: Sitting, Cuff Size: Adult)   Pulse 90   Ht 5' 10" (1.778 m)   Wt 106 kg (234 lb)   BMI 33.58 kg/m²       Left Shoulder Exam     Range of Motion   External rotation: 50   Forward flexion: 150   Internal rotation 0 degrees: Sacrum     Other   Erythema: absent  Scars: present (Well healed incision)  Sensation: normal  Pulse: present             Physical Exam  Constitutional:       General: He is not in acute distress.      Appearance: He is well-developed. Eyes:      Conjunctiva/sclera: Conjunctivae normal.      Pupils: Pupils are equal, round, and reactive to light. Cardiovascular:      Rate and Rhythm: Normal rate and regular rhythm. Pulmonary:      Effort: Pulmonary effort is normal.      Breath sounds: Normal breath sounds. Abdominal:      General: Bowel sounds are normal.      Palpations: Abdomen is soft. Musculoskeletal:      Cervical back: Normal range of motion and neck supple. Skin:     General: Skin is warm and dry. Findings: No erythema or rash. Neurological:      Mental Status: He is alert and oriented to person, place, and time. Deep Tendon Reflexes: Reflexes are normal and symmetric.    Psychiatric:         Behavior: Behavior normal.       Scribe Attestation    I,:  Jaxon Jameson am acting as a scribe while in the presence of the attending physician.:       I,:  Nathan George MD personally performed the services described in this documentation    as scribed in my presence.: Limited US of the right scrotum demonstrated presence of multiloculated collection. The role for percutaneous scrotal management is limited. Recommend further evaluation by Urology.

## 2023-07-05 ENCOUNTER — APPOINTMENT (OUTPATIENT)
Dept: ORTHOPEDIC SURGERY | Facility: CLINIC | Age: 78
End: 2023-07-05
Payer: MEDICARE

## 2023-07-05 VITALS — WEIGHT: 210 LBS | HEIGHT: 67 IN | BODY MASS INDEX: 32.96 KG/M2

## 2023-07-05 DIAGNOSIS — S22.009A UNSPECIFIED FRACTURE OF UNSPECIFIED THORACIC VERTEBRA, INITIAL ENCOUNTER FOR CLOSED FRACTURE: ICD-10-CM

## 2023-07-05 PROCEDURE — 72050 X-RAY EXAM NECK SPINE 4/5VWS: CPT

## 2023-07-05 PROCEDURE — 99214 OFFICE O/P EST MOD 30 MIN: CPT

## 2023-07-05 NOTE — PHYSICAL EXAM
[Walker] : ambulates with walker [de-identified] : Cervical collar was removed today.  No pain with range of motion.  No posterior tenderness.  Full range of motion bilateral shoulders without evidence of impingement. No instability of bilateral upper extremities.  Cranial nerves II through XII grossly intact. Intact sensation bilateral upper extremities. 5/5 deltoids biceps triceps wrist extensors wrist flexors finger flexors and hand intrinsics. 1+ biceps triceps and brachioradialis reflexes. Negative Patel's. 2+ radial pulse. Negative Tinel's over the cubital and carpal tunnel. No skin lesions on the right and left upper extremities. [de-identified] : Review of his CT scan from the emergency room reveals prior ACDF of C5-6.  T1 compression fracture.\par \par AP lateral flexion-extension views of the cervical spine did not reveal any gross instability

## 2023-07-05 NOTE — DISCUSSION/SUMMARY
[de-identified] : Overall, he is recovered well.  He will gradually increase his activities.  Time or sooner with any changes or worsening of his symptoms.

## 2023-07-05 NOTE — HISTORY OF PRESENT ILLNESS
[de-identified] : Mr. ROBERT BOECKLE  is a 77 year old male who presents to the office for a follow-up visit.   He has minimal neck pain and denies any UE radicular symptoms. \par

## 2023-12-04 ENCOUNTER — APPOINTMENT (OUTPATIENT)
Dept: UROLOGY | Facility: CLINIC | Age: 78
End: 2023-12-04
Payer: MEDICARE

## 2023-12-04 DIAGNOSIS — N43.3 HYDROCELE, UNSPECIFIED: ICD-10-CM

## 2023-12-04 DIAGNOSIS — K40.20 BILATERAL INGUINAL HERNIA, W/OUT OBSTRUCTION OR GANGRENE, NOT SPECIFIED AS RECURRENT: ICD-10-CM

## 2023-12-04 DIAGNOSIS — Z98.890 OTHER SPECIFIED POSTPROCEDURAL STATES: ICD-10-CM

## 2023-12-04 PROCEDURE — 99213 OFFICE O/P EST LOW 20 MIN: CPT

## 2024-01-10 ENCOUNTER — OFFICE (OUTPATIENT)
Dept: URBAN - METROPOLITAN AREA CLINIC 70 | Facility: CLINIC | Age: 79
Setting detail: OPHTHALMOLOGY
End: 2024-01-10
Payer: MEDICARE

## 2024-01-10 DIAGNOSIS — H35.40: ICD-10-CM

## 2024-01-10 DIAGNOSIS — Z96.1: ICD-10-CM

## 2024-01-10 DIAGNOSIS — H35.54: ICD-10-CM

## 2024-01-10 DIAGNOSIS — D31.32: ICD-10-CM

## 2024-01-10 DIAGNOSIS — H43.811: ICD-10-CM

## 2024-01-10 DIAGNOSIS — E11.9: ICD-10-CM

## 2024-01-10 DIAGNOSIS — H16.223: ICD-10-CM

## 2024-01-10 PROCEDURE — 92250 FUNDUS PHOTOGRAPHY W/I&R: CPT | Performed by: OPHTHALMOLOGY

## 2024-01-10 PROCEDURE — 92014 COMPRE OPH EXAM EST PT 1/>: CPT | Performed by: OPHTHALMOLOGY

## 2024-01-10 ASSESSMENT — REFRACTION_MANIFEST
OD_CYLINDER: -1.50
OS_SPHERE: +0.25
OS_VA1: 20/25
OD_VA2: 20/20(J1+)
OS_ADD: +2.50
OD_AXIS: 090
OS_VA2: 20/20(J1+)
OU_VA: 20/20
OS_CYLINDER: -0.25
OS_VA2: 20/20
OS_VA1: 20/20
OS_AXIS: 080
OS_AXIS: 065
OD_SPHERE: -0.75
OD_VA1: 20/25
OS_SPHERE: +0.50
OD_SPHERE: +0.25
OS_VA1: 20/20
OD_ADD: +2.75
OD_ADD: +2.75
OD_AXIS: 90
OS_ADD: +2.75
OD_CYLINDER: -1.25
OD_VA2: 20/20
OS_CYLINDER: -1.00
OD_ADD: +2.50
OD_CYLINDER: -1.00
OD_SPHERE: PLANO
OS_SPHERE: +0.75
OD_AXIS: 090
OD_VA1: 20/20
OD_VA1: 20/20-
OS_VA2: 20/20
OS_ADD: +2.75
OS_CYLINDER: -0.50
OU_VA: 20/20
OD_VA2: 20/20
OS_AXIS: 75

## 2024-01-10 ASSESSMENT — REFRACTION_CURRENTRX
OD_ADD: +2.25
OD_OVR_VA: 20/
OS_SPHERE: +0.50
OS_CYLINDER: -0.25
OS_VPRISM_DIRECTION: PROGS
OD_AXIS: 090
OD_ADD: +2.50
OS_SPHERE: +0.75
OS_OVR_VA: 20/
OD_OVR_VA: 20/
OD_SPHERE: -0.50
OD_AXIS: 085
OS_ADD: +2.00
OD_VPRISM_DIRECTION: PROGS
OD_CYLINDER: -1.25
OS_VPRISM_DIRECTION: PROGS
OD_ADD: +2.25
OS_OVR_VA: 20/
OS_CYLINDER: -0.25
OD_CYLINDER: -1.25
OS_OVR_VA: 20/
OD_AXIS: 090
OS_AXIS: 072
OS_AXIS: 081
OD_OVR_VA: 20/
OD_CYLINDER: -1.00
OS_CYLINDER: -0.50
OS_ADD: +2.00
OD_SPHERE: +0.50
OS_SPHERE: +0.50
OD_SPHERE: -0.50
OS_VPRISM_DIRECTION: PROGS
OS_AXIS: 066
OS_ADD: +2.25
OD_VPRISM_DIRECTION: PROGS
OD_VPRISM_DIRECTION: PROGS

## 2024-01-10 ASSESSMENT — CONFRONTATIONAL VISUAL FIELD TEST (CVF)
OD_FINDINGS: FULL
OS_FINDINGS: FULL

## 2024-01-10 ASSESSMENT — SUPERFICIAL PUNCTATE KERATITIS (SPK)
OS_SPK: 1+
OD_SPK: 1+

## 2024-01-10 ASSESSMENT — REFRACTION_AUTOREFRACTION
OD_AXIS: 078
OS_CYLINDER: -2.00
OS_SPHERE: +1.25
OS_AXIS: 073
OD_SPHERE: 0.00
OD_CYLINDER: -2.25

## 2024-01-10 ASSESSMENT — SPHEQUIV_DERIVED
OD_SPHEQUIV: -1.125
OD_SPHEQUIV: -1.25
OD_SPHEQUIV: -0.375
OS_SPHEQUIV: 0.25
OS_SPHEQUIV: 0.125
OS_SPHEQUIV: 0.25
OS_SPHEQUIV: 0.25

## 2024-07-10 ENCOUNTER — OFFICE (OUTPATIENT)
Dept: URBAN - METROPOLITAN AREA CLINIC 70 | Facility: CLINIC | Age: 79
Setting detail: OPHTHALMOLOGY
End: 2024-07-10
Payer: MEDICARE

## 2024-07-10 DIAGNOSIS — D31.32: ICD-10-CM

## 2024-07-10 DIAGNOSIS — H35.40: ICD-10-CM

## 2024-07-10 DIAGNOSIS — H43.811: ICD-10-CM

## 2024-07-10 DIAGNOSIS — H35.54: ICD-10-CM

## 2024-07-10 DIAGNOSIS — Z96.1: ICD-10-CM

## 2024-07-10 DIAGNOSIS — E11.9: ICD-10-CM

## 2024-07-10 DIAGNOSIS — H16.223: ICD-10-CM

## 2024-07-10 PROCEDURE — 92014 COMPRE OPH EXAM EST PT 1/>: CPT | Performed by: OPHTHALMOLOGY

## 2024-07-10 PROCEDURE — 92250 FUNDUS PHOTOGRAPHY W/I&R: CPT | Performed by: OPHTHALMOLOGY

## 2024-07-10 ASSESSMENT — CONFRONTATIONAL VISUAL FIELD TEST (CVF)
OD_FINDINGS: FULL
OS_FINDINGS: FULL

## 2024-07-29 ENCOUNTER — APPOINTMENT (OUTPATIENT)
Dept: WOUND CARE | Facility: HOSPITAL | Age: 79
End: 2024-07-29
Payer: MEDICARE

## 2024-07-29 ENCOUNTER — OUTPATIENT (OUTPATIENT)
Dept: OUTPATIENT SERVICES | Facility: HOSPITAL | Age: 79
LOS: 1 days | Discharge: HOME | End: 2024-07-29
Payer: MEDICARE

## 2024-07-29 VITALS
TEMPERATURE: 99.4 F | DIASTOLIC BLOOD PRESSURE: 73 MMHG | WEIGHT: 210 LBS | RESPIRATION RATE: 17 BRPM | SYSTOLIC BLOOD PRESSURE: 154 MMHG | HEIGHT: 67 IN | OXYGEN SATURATION: 93 % | HEART RATE: 92 BPM | BODY MASS INDEX: 32.96 KG/M2

## 2024-07-29 DIAGNOSIS — Z98.890 OTHER SPECIFIED POSTPROCEDURAL STATES: Chronic | ICD-10-CM

## 2024-07-29 DIAGNOSIS — L97.801 NON-PRESSURE CHRONIC ULCER OF OTHER PART OF UNSPECIFIED LOWER LEG LIMITED TO BREAKDOWN OF SKIN: ICD-10-CM

## 2024-07-29 DIAGNOSIS — L97.812 NON-PRESSURE CHRONIC ULCER OF OTHER PART OF RIGHT LOWER LEG WITH FAT LAYER EXPOSED: ICD-10-CM

## 2024-07-29 DIAGNOSIS — L03.115 CELLULITIS OF RIGHT LOWER LIMB: ICD-10-CM

## 2024-07-29 DIAGNOSIS — I83.218 VARICOSE VEINS OF RIGHT LOWER EXTREMITY WITH BOTH ULCER OF OTHER PART OF LOWER EXTREMITY AND INFLAMMATION: ICD-10-CM

## 2024-07-29 DIAGNOSIS — L97.919 VARICOSE VEINS OF RIGHT LOWER EXTREMITY WITH BOTH ULCER OF OTHER PART OF LOWER EXTREMITY AND INFLAMMATION: ICD-10-CM

## 2024-07-29 PROCEDURE — G0463: CPT

## 2024-07-29 PROCEDURE — 99214 OFFICE O/P EST MOD 30 MIN: CPT

## 2024-07-29 RX ORDER — FLUTICASONE PROPIONATE 0.5 MG/G
0.05 CREAM TOPICAL
Qty: 60 | Refills: 0 | Status: ACTIVE | COMMUNITY
Start: 2024-03-12

## 2024-07-29 RX ORDER — AMLODIPINE BESYLATE 5 MG/1
5 TABLET ORAL
Refills: 0 | Status: ACTIVE | COMMUNITY

## 2024-07-29 RX ORDER — ALBUTEROL 90 MCG
AEROSOL (GRAM) INHALATION
Refills: 0 | Status: ACTIVE | COMMUNITY

## 2024-07-29 RX ORDER — DOXYCYCLINE HYCLATE 100 MG/1
100 CAPSULE ORAL
Qty: 20 | Refills: 0 | Status: ACTIVE | COMMUNITY
Start: 2024-07-24

## 2024-07-29 RX ORDER — IPRATROPIUM BROMIDE AND ALBUTEROL SULFATE 2.5; .5 MG/3ML; MG/3ML
0.5-2.5 (3) SOLUTION RESPIRATORY (INHALATION)
Qty: 360 | Refills: 0 | Status: ACTIVE | COMMUNITY
Start: 2024-04-22

## 2024-07-29 RX ORDER — ROPINIROLE 2 MG/1
2 TABLET, FILM COATED ORAL
Refills: 0 | Status: ACTIVE | COMMUNITY

## 2024-07-29 RX ORDER — DOXYCYCLINE HYCLATE 100 MG/1
100 CAPSULE ORAL
Qty: 10 | Refills: 0 | Status: ACTIVE | COMMUNITY
Start: 2024-07-29 | End: 1900-01-01

## 2024-07-29 RX ORDER — MONTELUKAST 10 MG/1
10 TABLET, FILM COATED ORAL
Qty: 90 | Refills: 0 | Status: ACTIVE | COMMUNITY
Start: 2024-05-06

## 2024-07-29 RX ORDER — HYDROCHLOROTHIAZIDE 25 MG/1
25 TABLET ORAL
Refills: 0 | Status: ACTIVE | COMMUNITY

## 2024-07-29 NOTE — PHYSICAL EXAM
[Normal Breath Sounds] : Normal breath sounds [Normal Heart Sounds] : normal heart sounds [2+] : left 2+ [1+] : left 1+ [0] : left 0 [Ankle Swelling (On Exam)] : present [] : bilaterally [Ankle Swelling Bilaterally] : severe [Alert] : alert [Oriented to Person] : oriented to person [Oriented to Place] : oriented to place [Oriented to Time] : oriented to time [Calm] : calm [de-identified] : Well-developed, Well-nourished in no acute distress. [de-identified] : Within normal limits. [de-identified] : Within normal limits. [de-identified] : Within normal limits. [de-identified] : Right leg with one ulcer, base is a mixture of slough and granulation tissue, some serous drainage, periwound is warm and red.   [4 x 4] : 4 x 4  [FreeTextEntry1] : Right Leg [FreeTextEntry2] : 2.0cm [FreeTextEntry3] : 3.5cm [FreeTextEntry4] : 0.1 [de-identified] : Serosanguinous [de-identified] : Edema [de-identified] : Patient expressed comport post application, Circ/Neuromuscular function WNL [de-identified] : Silver Alginate [de-identified] :  Mechanically cleansed with NS 0.9%, Sterile Gauze Kerlix  Dorsalis Pedis: R Palpable, Regular, 2+ L Palpable, Regular, 2+ Posterior Tibialis: R Palpable, Regular, 2+ L Palpable, Regular, 2+ Extremity Color: Pigmented Extremity Temperature: Warm Capillary Refill: < 3 seconds bilaterally   [TWNoteComboBox4] : Moderate [TWNoteComboBox5] : No [TWNoteComboBox6] : Venous [de-identified] : No [de-identified] : Erythema [de-identified] : None [de-identified] : None [de-identified] : Ace wraps [de-identified] : 3x Weekly [de-identified] : Primary Dressing

## 2024-07-29 NOTE — PLAN
[FreeTextEntry1] : Doxycycline for anther 5 days, Silver Alginate, dry dressing, ACE wrap, venous study, return to office in two weeks. 35 minutes spent for patient care and medical decision making.

## 2024-07-29 NOTE — REASON FOR VISIT
[Consultation] : a consultation visit [Spouse] : spouse [FreeTextEntry1] : Initial Visit right leg ulcer

## 2024-07-29 NOTE — ASSESSMENT
[Verbal] : Verbal [Written] : Written [Demo] : Demo [Patient] : Patient [Good - alert, interested, motivated] : Good - alert, interested, motivated [Verbalizes knowledge/Understanding] : Verbalizes knowledge/understanding [Dressing changes] : dressing changes [Skin Care] : skin care [Signs and symptoms of infection] : sign and symptoms of infection [Nutrition] : nutrition [How and When to Call] : how and when to call [Pain Management] : pain management [Patient responsibility to plan of care] : patient responsibility to plan of care [] : Yes [Stable] : stable [Home] : Home [Ambulatory] : Ambulatory [Not Applicable - Long Term Care/Home Health Agency] : Long Term Care/Home Health Agency: Not Applicable [FreeTextEntry2] : Infection prevention wound care Maintain optimal Skin Integrity to high pressure areas. Compression therapy Nutrition and Wound Healing Elevation and low sodium compliance Pressure relief/Pressure redistribution    [FreeTextEntry3] : Initial Visit  [FreeTextEntry4] : Vascular studies and consult submitted Wife is able to perform dressing changes, small amount of supplies provided, supply order submitted Follow Up in 2 weeks [FreeTextEntry1] : Venous stasis ulcer right leg with cellulitis.

## 2024-07-29 NOTE — HISTORY OF PRESENT ILLNESS
[FreeTextEntry1] : For past 10 days patient has developed open blister right leg and now has an ulcer, was seen at urgent care placed on Doxycycline. Patient has had venous study seven years ago and was told he has venous insufficiency but does not wear compression stocking.

## 2024-07-31 ENCOUNTER — EMERGENCY (EMERGENCY)
Facility: HOSPITAL | Age: 79
LOS: 1 days | Discharge: ANOTHER HOSPITAL | End: 2024-07-31
Attending: EMERGENCY MEDICINE | Admitting: EMERGENCY MEDICINE
Payer: MEDICARE

## 2024-07-31 ENCOUNTER — INPATIENT (INPATIENT)
Facility: HOSPITAL | Age: 79
LOS: 0 days | Discharge: HOME | DRG: 84 | End: 2024-08-01
Attending: SURGERY | Admitting: SURGERY
Payer: MEDICARE

## 2024-07-31 VITALS
RESPIRATION RATE: 18 BRPM | DIASTOLIC BLOOD PRESSURE: 72 MMHG | SYSTOLIC BLOOD PRESSURE: 145 MMHG | OXYGEN SATURATION: 98 % | HEART RATE: 86 BPM | TEMPERATURE: 98 F

## 2024-07-31 VITALS
RESPIRATION RATE: 18 BRPM | SYSTOLIC BLOOD PRESSURE: 167 MMHG | DIASTOLIC BLOOD PRESSURE: 110 MMHG | HEART RATE: 94 BPM | HEIGHT: 67 IN | TEMPERATURE: 98 F | OXYGEN SATURATION: 98 %

## 2024-07-31 VITALS
OXYGEN SATURATION: 98 % | WEIGHT: 205.03 LBS | RESPIRATION RATE: 16 BRPM | TEMPERATURE: 97 F | HEART RATE: 88 BPM | SYSTOLIC BLOOD PRESSURE: 149 MMHG | DIASTOLIC BLOOD PRESSURE: 74 MMHG | HEIGHT: 67 IN

## 2024-07-31 DIAGNOSIS — Z82.49 FAMILY HISTORY OF ISCHEMIC HEART DISEASE AND OTHER DISEASES OF THE CIRCULATORY SYSTEM: ICD-10-CM

## 2024-07-31 DIAGNOSIS — Z98.890 OTHER SPECIFIED POSTPROCEDURAL STATES: Chronic | ICD-10-CM

## 2024-07-31 DIAGNOSIS — Z98.890 OTHER SPECIFIED POSTPROCEDURAL STATES: ICD-10-CM

## 2024-07-31 DIAGNOSIS — G20.A1 PARKINSON'S DISEASE WITHOUT DYSKINESIA, WITHOUT MENTION OF FLUCTUATIONS: ICD-10-CM

## 2024-07-31 DIAGNOSIS — Z88.1 ALLERGY STATUS TO OTHER ANTIBIOTIC AGENTS: ICD-10-CM

## 2024-07-31 DIAGNOSIS — Z80.1 FAMILY HISTORY OF MALIGNANT NEOPLASM OF TRACHEA, BRONCHUS AND LUNG: ICD-10-CM

## 2024-07-31 DIAGNOSIS — J44.9 CHRONIC OBSTRUCTIVE PULMONARY DISEASE, UNSPECIFIED: ICD-10-CM

## 2024-07-31 DIAGNOSIS — M50.20 OTHER CERVICAL DISC DISPLACEMENT, UNSPECIFIED CERVICAL REGION: Chronic | ICD-10-CM

## 2024-07-31 DIAGNOSIS — R73.03 PREDIABETES: ICD-10-CM

## 2024-07-31 DIAGNOSIS — Z87.891 PERSONAL HISTORY OF NICOTINE DEPENDENCE: ICD-10-CM

## 2024-07-31 DIAGNOSIS — Z86.711 PERSONAL HISTORY OF PULMONARY EMBOLISM: ICD-10-CM

## 2024-07-31 DIAGNOSIS — I83.218 VARICOSE VEINS OF RIGHT LOWER EXTREMITY WITH BOTH ULCER OF OTHER PART OF LOWER EXTREMITY AND INFLAMMATION: ICD-10-CM

## 2024-07-31 DIAGNOSIS — Z87.81 PERSONAL HISTORY OF (HEALED) TRAUMATIC FRACTURE: ICD-10-CM

## 2024-07-31 DIAGNOSIS — L97.812 NON-PRESSURE CHRONIC ULCER OF OTHER PART OF RIGHT LOWER LEG WITH FAT LAYER EXPOSED: ICD-10-CM

## 2024-07-31 DIAGNOSIS — L03.115 CELLULITIS OF RIGHT LOWER LIMB: ICD-10-CM

## 2024-07-31 DIAGNOSIS — D68.51 ACTIVATED PROTEIN C RESISTANCE: ICD-10-CM

## 2024-07-31 DIAGNOSIS — S06.5XAA TRAUMATIC SUBDURAL HEMORRHAGE WITH LOSS OF CONSCIOUSNESS STATUS UNKNOWN, INITIAL ENCOUNTER: ICD-10-CM

## 2024-07-31 DIAGNOSIS — Z98.1 ARTHRODESIS STATUS: ICD-10-CM

## 2024-07-31 DIAGNOSIS — Z82.5 FAMILY HISTORY OF ASTHMA AND OTHER CHRONIC LOWER RESPIRATORY DISEASES: ICD-10-CM

## 2024-07-31 LAB
ALBUMIN SERPL ELPH-MCNC: 3.4 G/DL — SIGNIFICANT CHANGE UP (ref 3.3–5)
ALBUMIN SERPL ELPH-MCNC: 3.8 G/DL — SIGNIFICANT CHANGE UP (ref 3.3–5.2)
ALP SERPL-CCNC: 104 U/L — SIGNIFICANT CHANGE UP (ref 40–120)
ALP SERPL-CCNC: 93 U/L — SIGNIFICANT CHANGE UP (ref 40–120)
ALT FLD-CCNC: 10 U/L — LOW (ref 12–78)
ALT FLD-CCNC: <5 U/L — SIGNIFICANT CHANGE UP
ANION GAP SERPL CALC-SCNC: 14 MMOL/L — SIGNIFICANT CHANGE UP (ref 5–17)
ANION GAP SERPL CALC-SCNC: 6 MMOL/L — SIGNIFICANT CHANGE UP (ref 5–17)
APTT BLD: 33.1 SEC — SIGNIFICANT CHANGE UP (ref 24.5–35.6)
APTT BLD: 39.5 SEC — HIGH (ref 24.5–35.6)
AST SERPL-CCNC: 22 U/L — SIGNIFICANT CHANGE UP
AST SERPL-CCNC: 23 U/L — SIGNIFICANT CHANGE UP (ref 15–37)
BASOPHILS # BLD AUTO: 0.06 K/UL — SIGNIFICANT CHANGE UP (ref 0–0.2)
BASOPHILS # BLD AUTO: 0.07 K/UL — SIGNIFICANT CHANGE UP (ref 0–0.2)
BASOPHILS NFR BLD AUTO: 0.6 % — SIGNIFICANT CHANGE UP (ref 0–2)
BASOPHILS NFR BLD AUTO: 0.6 % — SIGNIFICANT CHANGE UP (ref 0–2)
BILIRUB SERPL-MCNC: 0.9 MG/DL — SIGNIFICANT CHANGE UP (ref 0.2–1.2)
BILIRUB SERPL-MCNC: 1.1 MG/DL — SIGNIFICANT CHANGE UP (ref 0.4–2)
BUN SERPL-MCNC: 11.9 MG/DL — SIGNIFICANT CHANGE UP (ref 8–20)
BUN SERPL-MCNC: 15 MG/DL — SIGNIFICANT CHANGE UP (ref 7–23)
CALCIUM SERPL-MCNC: 9.3 MG/DL — SIGNIFICANT CHANGE UP (ref 8.4–10.5)
CALCIUM SERPL-MCNC: 9.3 MG/DL — SIGNIFICANT CHANGE UP (ref 8.5–10.1)
CHLORIDE SERPL-SCNC: 104 MMOL/L — SIGNIFICANT CHANGE UP (ref 96–108)
CHLORIDE SERPL-SCNC: 97 MMOL/L — SIGNIFICANT CHANGE UP (ref 96–108)
CO2 SERPL-SCNC: 26 MMOL/L — SIGNIFICANT CHANGE UP (ref 22–29)
CO2 SERPL-SCNC: 30 MMOL/L — SIGNIFICANT CHANGE UP (ref 22–31)
CREAT SERPL-MCNC: 0.76 MG/DL — SIGNIFICANT CHANGE UP (ref 0.5–1.3)
CREAT SERPL-MCNC: 0.98 MG/DL — SIGNIFICANT CHANGE UP (ref 0.5–1.3)
EGFR: 78 ML/MIN/1.73M2 — SIGNIFICANT CHANGE UP
EGFR: 91 ML/MIN/1.73M2 — SIGNIFICANT CHANGE UP
EOSINOPHIL # BLD AUTO: 0.1 K/UL — SIGNIFICANT CHANGE UP (ref 0–0.5)
EOSINOPHIL # BLD AUTO: 0.13 K/UL — SIGNIFICANT CHANGE UP (ref 0–0.5)
EOSINOPHIL NFR BLD AUTO: 1 % — SIGNIFICANT CHANGE UP (ref 0–6)
EOSINOPHIL NFR BLD AUTO: 1.2 % — SIGNIFICANT CHANGE UP (ref 0–6)
GLUCOSE SERPL-MCNC: 123 MG/DL — HIGH (ref 70–99)
GLUCOSE SERPL-MCNC: 165 MG/DL — HIGH (ref 70–99)
HCT VFR BLD CALC: 40.8 % — SIGNIFICANT CHANGE UP (ref 39–50)
HCT VFR BLD CALC: 42.4 % — SIGNIFICANT CHANGE UP (ref 39–50)
HGB BLD-MCNC: 13.9 G/DL — SIGNIFICANT CHANGE UP (ref 13–17)
HGB BLD-MCNC: 14.6 G/DL — SIGNIFICANT CHANGE UP (ref 13–17)
IMM GRANULOCYTES NFR BLD AUTO: 0.3 % — SIGNIFICANT CHANGE UP (ref 0–0.9)
IMM GRANULOCYTES NFR BLD AUTO: 0.4 % — SIGNIFICANT CHANGE UP (ref 0–0.9)
INR BLD: 1.25 RATIO — HIGH (ref 0.85–1.18)
INR BLD: 1.98 RATIO — HIGH (ref 0.85–1.18)
LYMPHOCYTES # BLD AUTO: 1.11 K/UL — SIGNIFICANT CHANGE UP (ref 1–3.3)
LYMPHOCYTES # BLD AUTO: 1.48 K/UL — SIGNIFICANT CHANGE UP (ref 1–3.3)
LYMPHOCYTES # BLD AUTO: 11.3 % — LOW (ref 13–44)
LYMPHOCYTES # BLD AUTO: 13.3 % — SIGNIFICANT CHANGE UP (ref 13–44)
MCHC RBC-ENTMCNC: 32.4 PG — SIGNIFICANT CHANGE UP (ref 27–34)
MCHC RBC-ENTMCNC: 32.4 PG — SIGNIFICANT CHANGE UP (ref 27–34)
MCHC RBC-ENTMCNC: 34.1 GM/DL — SIGNIFICANT CHANGE UP (ref 32–36)
MCHC RBC-ENTMCNC: 34.4 GM/DL — SIGNIFICANT CHANGE UP (ref 32–36)
MCV RBC AUTO: 94 FL — SIGNIFICANT CHANGE UP (ref 80–100)
MCV RBC AUTO: 95.1 FL — SIGNIFICANT CHANGE UP (ref 80–100)
MONOCYTES # BLD AUTO: 1.07 K/UL — HIGH (ref 0–0.9)
MONOCYTES # BLD AUTO: 1.38 K/UL — HIGH (ref 0–0.9)
MONOCYTES NFR BLD AUTO: 10.9 % — SIGNIFICANT CHANGE UP (ref 2–14)
MONOCYTES NFR BLD AUTO: 12.4 % — SIGNIFICANT CHANGE UP (ref 2–14)
NEUTROPHILS # BLD AUTO: 7.48 K/UL — HIGH (ref 1.8–7.4)
NEUTROPHILS # BLD AUTO: 7.99 K/UL — HIGH (ref 1.8–7.4)
NEUTROPHILS NFR BLD AUTO: 72.1 % — SIGNIFICANT CHANGE UP (ref 43–77)
NEUTROPHILS NFR BLD AUTO: 75.9 % — SIGNIFICANT CHANGE UP (ref 43–77)
NRBC # BLD: 0 /100 WBCS — SIGNIFICANT CHANGE UP (ref 0–0)
PLATELET # BLD AUTO: 202 K/UL — SIGNIFICANT CHANGE UP (ref 150–400)
PLATELET # BLD AUTO: 207 K/UL — SIGNIFICANT CHANGE UP (ref 150–400)
POTASSIUM SERPL-MCNC: 3.8 MMOL/L — SIGNIFICANT CHANGE UP (ref 3.5–5.3)
POTASSIUM SERPL-MCNC: 3.8 MMOL/L — SIGNIFICANT CHANGE UP (ref 3.5–5.3)
POTASSIUM SERPL-SCNC: 3.8 MMOL/L — SIGNIFICANT CHANGE UP (ref 3.5–5.3)
POTASSIUM SERPL-SCNC: 3.8 MMOL/L — SIGNIFICANT CHANGE UP (ref 3.5–5.3)
PROT SERPL-MCNC: 6.6 G/DL — SIGNIFICANT CHANGE UP (ref 6–8.3)
PROT SERPL-MCNC: 6.7 G/DL — SIGNIFICANT CHANGE UP (ref 6.6–8.7)
PROTHROM AB SERPL-ACNC: 13.8 SEC — HIGH (ref 9.5–13)
PROTHROM AB SERPL-ACNC: 22.7 SEC — HIGH (ref 9.5–13)
RBC # BLD: 4.29 M/UL — SIGNIFICANT CHANGE UP (ref 4.2–5.8)
RBC # BLD: 4.51 M/UL — SIGNIFICANT CHANGE UP (ref 4.2–5.8)
RBC # FLD: 13.2 % — SIGNIFICANT CHANGE UP (ref 10.3–14.5)
RBC # FLD: 13.3 % — SIGNIFICANT CHANGE UP (ref 10.3–14.5)
SODIUM SERPL-SCNC: 137 MMOL/L — SIGNIFICANT CHANGE UP (ref 135–145)
SODIUM SERPL-SCNC: 140 MMOL/L — SIGNIFICANT CHANGE UP (ref 135–145)
WBC # BLD: 11.09 K/UL — HIGH (ref 3.8–10.5)
WBC # BLD: 9.85 K/UL — SIGNIFICANT CHANGE UP (ref 3.8–10.5)
WBC # FLD AUTO: 11.09 K/UL — HIGH (ref 3.8–10.5)
WBC # FLD AUTO: 9.85 K/UL — SIGNIFICANT CHANGE UP (ref 3.8–10.5)

## 2024-07-31 PROCEDURE — 36415 COLL VENOUS BLD VENIPUNCTURE: CPT

## 2024-07-31 PROCEDURE — 99282 EMERGENCY DEPT VISIT SF MDM: CPT

## 2024-07-31 PROCEDURE — 86901 BLOOD TYPING SEROLOGIC RH(D): CPT

## 2024-07-31 PROCEDURE — 86900 BLOOD TYPING SEROLOGIC ABO: CPT

## 2024-07-31 PROCEDURE — 72125 CT NECK SPINE W/O DYE: CPT | Mod: 26,MC

## 2024-07-31 PROCEDURE — 99285 EMERGENCY DEPT VISIT HI MDM: CPT | Mod: 25

## 2024-07-31 PROCEDURE — 72125 CT NECK SPINE W/O DYE: CPT | Mod: MC

## 2024-07-31 PROCEDURE — 96365 THER/PROPH/DIAG IV INF INIT: CPT

## 2024-07-31 PROCEDURE — 73110 X-RAY EXAM OF WRIST: CPT | Mod: 26,RT

## 2024-07-31 PROCEDURE — 85025 COMPLETE CBC W/AUTO DIFF WBC: CPT

## 2024-07-31 PROCEDURE — 99285 EMERGENCY DEPT VISIT HI MDM: CPT

## 2024-07-31 PROCEDURE — 70450 CT HEAD/BRAIN W/O DYE: CPT | Mod: MC

## 2024-07-31 PROCEDURE — 73130 X-RAY EXAM OF HAND: CPT | Mod: 26,RT

## 2024-07-31 PROCEDURE — 86850 RBC ANTIBODY SCREEN: CPT

## 2024-07-31 PROCEDURE — 85730 THROMBOPLASTIN TIME PARTIAL: CPT

## 2024-07-31 PROCEDURE — 85610 PROTHROMBIN TIME: CPT

## 2024-07-31 PROCEDURE — 72170 X-RAY EXAM OF PELVIS: CPT | Mod: 26

## 2024-07-31 PROCEDURE — 80053 COMPREHEN METABOLIC PANEL: CPT

## 2024-07-31 PROCEDURE — 96368 THER/DIAG CONCURRENT INF: CPT

## 2024-07-31 PROCEDURE — 99285 EMERGENCY DEPT VISIT HI MDM: CPT | Mod: GC

## 2024-07-31 PROCEDURE — 70450 CT HEAD/BRAIN W/O DYE: CPT | Mod: 26,MC,77

## 2024-07-31 PROCEDURE — 70450 CT HEAD/BRAIN W/O DYE: CPT | Mod: 26,MC

## 2024-07-31 PROCEDURE — 71045 X-RAY EXAM CHEST 1 VIEW: CPT | Mod: 26

## 2024-07-31 RX ORDER — LORATADINE 10 MG
10 TABLET ORAL DAILY
Refills: 0 | Status: DISCONTINUED | OUTPATIENT
Start: 2024-07-31 | End: 2024-08-01

## 2024-07-31 RX ORDER — PROTHROMBIN COMPLEX CONCENTRATE (HUMAN) 25.5; 16.5; 24; 22; 22; 26 [IU]/ML; [IU]/ML; [IU]/ML; [IU]/ML; [IU]/ML; [IU]/ML
1500 POWDER, FOR SOLUTION INTRAVENOUS ONCE
Refills: 0 | Status: COMPLETED | OUTPATIENT
Start: 2024-07-31 | End: 2024-07-31

## 2024-07-31 RX ORDER — LEVETIRACETAM 1000 MG/1
500 TABLET, FILM COATED ORAL
Refills: 0 | Status: DISCONTINUED | OUTPATIENT
Start: 2024-08-01 | End: 2024-08-01

## 2024-07-31 RX ORDER — AMLODIPINE BESYLATE 2.5 MG/1
1 TABLET ORAL
Refills: 0 | DISCHARGE

## 2024-07-31 RX ORDER — PHYTONADIONE 5 MG/1
5 TABLET ORAL ONCE
Refills: 0 | Status: COMPLETED | OUTPATIENT
Start: 2024-07-31 | End: 2024-07-31

## 2024-07-31 RX ORDER — TRIHEXYPHENIDYL HCL 5 MG
1 TABLET ORAL THREE TIMES A DAY
Refills: 0 | Status: DISCONTINUED | OUTPATIENT
Start: 2024-07-31 | End: 2024-08-01

## 2024-07-31 RX ORDER — BUDESONIDE AND FORMOTEROL FUMARATE DIHYDRATE 80; 4.5 UG/1; UG/1
2 AEROSOL RESPIRATORY (INHALATION)
Refills: 0 | Status: DISCONTINUED | OUTPATIENT
Start: 2024-07-31 | End: 2024-08-01

## 2024-07-31 RX ORDER — LEVETIRACETAM 1000 MG/1
500 TABLET, FILM COATED ORAL ONCE
Refills: 0 | Status: COMPLETED | OUTPATIENT
Start: 2024-07-31 | End: 2024-07-31

## 2024-07-31 RX ORDER — CARBIDOPA AND LEVODOPA 25; 100 MG/1; MG/1
1 TABLET ORAL
Refills: 0 | Status: DISCONTINUED | OUTPATIENT
Start: 2024-07-31 | End: 2024-08-01

## 2024-07-31 RX ORDER — MULTIVITAMIN/IRON/FOLIC ACID 18MG-0.4MG
1 TABLET ORAL DAILY
Refills: 0 | Status: DISCONTINUED | OUTPATIENT
Start: 2024-07-31 | End: 2024-08-01

## 2024-07-31 RX ORDER — ACETAMINOPHEN 500 MG
975 TABLET ORAL EVERY 6 HOURS
Refills: 0 | Status: DISCONTINUED | OUTPATIENT
Start: 2024-07-31 | End: 2024-08-01

## 2024-07-31 RX ORDER — ONDANSETRON HCL/PF 4 MG/2 ML
4 VIAL (ML) INJECTION EVERY 4 HOURS
Refills: 0 | Status: DISCONTINUED | OUTPATIENT
Start: 2024-07-31 | End: 2024-08-01

## 2024-07-31 RX ORDER — ALBUTEROL 90 MCG
2 AEROSOL REFILL (GRAM) INHALATION EVERY 6 HOURS
Refills: 0 | Status: DISCONTINUED | OUTPATIENT
Start: 2024-07-31 | End: 2024-08-01

## 2024-07-31 RX ORDER — LOSARTAN POTASSIUM 50 MG/1
100 TABLET, FILM COATED ORAL DAILY
Refills: 0 | Status: DISCONTINUED | OUTPATIENT
Start: 2024-07-31 | End: 2024-08-01

## 2024-07-31 RX ORDER — ROPINIROLE 1 MG/1
2 TABLET ORAL
Refills: 0 | Status: DISCONTINUED | OUTPATIENT
Start: 2024-07-31 | End: 2024-08-01

## 2024-07-31 RX ORDER — DOXYCYCLINE 100 MG/1
100 CAPSULE ORAL EVERY 12 HOURS
Refills: 0 | Status: DISCONTINUED | OUTPATIENT
Start: 2024-07-31 | End: 2024-08-01

## 2024-07-31 RX ORDER — MONTELUKAST SODIUM 10 MG/1
1 TABLET, FILM COATED ORAL
Refills: 0 | DISCHARGE

## 2024-07-31 RX ADMIN — PHYTONADIONE 5 MILLIGRAM(S): 5 TABLET ORAL at 14:02

## 2024-07-31 RX ADMIN — Medication 975 MILLIGRAM(S): at 21:42

## 2024-07-31 RX ADMIN — CARBIDOPA AND LEVODOPA 1 TABLET(S): 25; 100 TABLET ORAL at 21:42

## 2024-07-31 RX ADMIN — BUDESONIDE AND FORMOTEROL FUMARATE DIHYDRATE 2 PUFF(S): 80; 4.5 AEROSOL RESPIRATORY (INHALATION) at 21:47

## 2024-07-31 RX ADMIN — PROTHROMBIN COMPLEX CONCENTRATE (HUMAN) 400 INTERNATIONAL UNIT(S): 25.5; 16.5; 24; 22; 22; 26 POWDER, FOR SOLUTION INTRAVENOUS at 13:05

## 2024-07-31 RX ADMIN — LEVETIRACETAM 400 MILLIGRAM(S): 1000 TABLET, FILM COATED ORAL at 18:17

## 2024-07-31 RX ADMIN — ROPINIROLE 2 MILLIGRAM(S): 1 TABLET ORAL at 21:43

## 2024-07-31 RX ADMIN — PROTHROMBIN COMPLEX CONCENTRATE (HUMAN) 1500 INTERNATIONAL UNIT(S): 25.5; 16.5; 24; 22; 22; 26 POWDER, FOR SOLUTION INTRAVENOUS at 14:02

## 2024-07-31 RX ADMIN — Medication 1 MILLIGRAM(S): at 21:42

## 2024-07-31 RX ADMIN — PHYTONADIONE 101 MILLIGRAM(S): 5 TABLET ORAL at 13:31

## 2024-07-31 RX ADMIN — Medication 2 PUFF(S): at 21:50

## 2024-07-31 RX ADMIN — DOXYCYCLINE 100 MILLIGRAM(S): 100 CAPSULE ORAL at 21:42

## 2024-07-31 NOTE — ED PROVIDER NOTE - CLINICAL SUMMARY MEDICAL DECISION MAKING FREE TEXT BOX
Nonsyncopal fall in this elderly individual with Parkinson's striking head sustaining headache/head injury now requiring thorough independent history and physical done by myself as well as imaging to include brain and cervical spine.

## 2024-07-31 NOTE — PATIENT PROFILE ADULT - NSPROPTRIGHTNOTIFY_GEN_A_NUR
Thalidomide Counseling: I discussed with the patient the risks of thalidomide including but not limited to birth defects, anxiety, weakness, chest pain, dizziness, cough and severe allergy. declines

## 2024-07-31 NOTE — ED ADULT NURSE NOTE - NS ED NURSE LEVEL OF CONSCIOUSNESS SPEECH
-chronic LLE edema, not worse from baseline. US negative for DVT.
-Pt w/ chronic LLE edema, not worse from baseline. US in 2016 negative for DVT but did reveal large pelvic adenopathy with compression of the left external iliac vein.
-chronic LLE edema, not worse from baseline. US negative for DVT.
-Pt w/ chronic LLE edema, not worse from baseline. US negative for DVT.
Speaking Coherently

## 2024-07-31 NOTE — CHART NOTE - NSCHARTNOTEFT_GEN_A_CORE
- Discussion had with trauma service regarding neurochecks.  - Rpt CTH done and read as stable. CTH reviewed with Dr. Crow.    Plan:  - Given that patient was on Coumadin prior to fall. Recommend  that patient be admitted to trauma per protocol and have Q2 neurochecks until 24 hour stability scan completed, read and reviewed with NSG Attending  -Case and plan per Dr. Crow

## 2024-07-31 NOTE — H&P ADULT - ATTENDING COMMENTS
Patient seen and examined in trauma bay, GCS 15, no complaints, hemodynamically stable. CT scan repeated x 2, with punctate stable SDH, INR reversed at [place]. NSG requiring Q2 neuro checks in this stable elderly gentleman. No need for any additional trauma workup. Management per NSG.

## 2024-07-31 NOTE — ED PROVIDER NOTE - OBJECTIVE STATEMENT
Patient is an 79-year-old white male with history of Parkinson's disease and frequent falls brought to the emergency department at [facility] today for evaluation post fall striking head on ground.  Patient apparently has frequent falls and then today lost his balance fell backwards striking occiput now here for evaluation there was no loss of consciousness no neck chest abdomen back hip or knee pain.  No nausea no vomiting no weakness or paresthesias in any extremity.

## 2024-07-31 NOTE — CONSULT NOTE ADULT - NS ATTEND OPT1 GEN_ALL_CORE
patient states "ready to go home"/feeling better I attest my time as attending is greater than 50% of the total combined time spent on qualifying patient care activities by the PA/NP and attending.

## 2024-07-31 NOTE — PHARMACOTHERAPY INTERVENTION NOTE - COMMENTS
80 yo male presenting s/p fall w/ head strike. Medication history completed with patient/wife at bedside, family supplied home medication list, [facility] and [facility]. Of note:    - Patient on Coumadin 2.5 mg daily (prescribed 5 mg daily). Patient's wife reports that point of care INR test on Monday, 7/29/24 reflected a supratherapeutic INR of 3.4. Patient held their warfarin dose on 7/30/24 but patient did take their warfarin 2.5 x 1 dose today, 7/31/24.   - Wife denies any OTC aspirin or other blood thinners.   - Patient is completing a course of Doxycycline 100 mg BID starting 7/24.     Medication reconciliation completed. Findings relayed to Dr. Feldman.  78 yo male presenting s/p fall w/ head strike. Medication history completed with patient/wife at bedside, family supplied home medication list, [facility] and [facility]. Of note:    - Patient on Coumadin 2.5 mg daily (prescribed 5 mg daily). Patient's wife reports that point of care INR test on Monday, 7/29/24 reflected a supratherapeutic INR of 3.4. Patient held their warfarin dose on 7/30/24 but patient did take their warfarin 2.5 x 1 dose today, 7/31/24.   - Wife denies any OTC aspirin or other blood thinners.   - Patient is completing a course of Doxycycline 100 mg BID started 7/24.     Medication reconciliation completed. Findings relayed to Dr. Feldman.

## 2024-07-31 NOTE — ED ADULT NURSE NOTE - CHPI ED NUR SYMPTOMS NEG
no blurred vision/no change in level of consciousness/no confusion/no dizziness/no loss of consciousness/no nausea/no seizure

## 2024-07-31 NOTE — PATIENT PROFILE ADULT - FALL HARM RISK - HARM RISK INTERVENTIONS

## 2024-07-31 NOTE — ED ADULT NURSE NOTE - OBJECTIVE STATEMENT
pt presents s/p 2 falls today with head strike and Right wrist pain. pt denies visual changed, vomiting. pt has hematoma noted to back of head and c/o r wrist pain. pt is able to mobilize wrist. wife states pt ambulates with walker and often falls back due to his parkinsons. pt is also being treated at wound care for infection to wound on right LE. denies fevers.

## 2024-07-31 NOTE — ED PROVIDER NOTE - PROGRESS NOTE DETAILS
Case reviewed with Dr. Crow at [facility] neurosurgery who request patient to be sent ER to ER for evaluation and admission at his facility [facility].

## 2024-07-31 NOTE — ED PROVIDER NOTE - PHYSICAL EXAMINATION
Gen: Elderly man laying in stretcher, having generalized parkinsonian tremors, appears startled but non toxic, not in distress  HENT: Atraumatic, normocephalic, neck is supple without adenopathy, no JVD  Eyes: PERRLA, conjuctivae are clear, non-icteric sclera  NEURO: A&Ox3, no focal deficits, moving all extremities spontaneously, CN II-XII grossly intact  Resp: CTAB, no wrr, mildly increased work of breathing with some accessory muscle use during inhalation  Cardio: Tachycardic rate/ regular rhytum, S1/S2 heard, no murmurs, gallops or rubs  Abdominal: Soft, non-tender, non-distended, no appreciable masses, normoactive bowel sounds  : No CVA tenderness  Back: No tenderness to spinous processes, no step off  Extremities: R hand pain with RPM, ecchymosis, L hand in hard cast covering fingers to mid forearm, hemosiderin staining of b/l lower extremities near ankles  Skin: Warm, dry, intact without rashes or lesions Gen: Elderly man laying in stretcher, having generalized parkinsonian tremors, appears startled but non toxic, not in distress  HENT: Atraumatic with mild swelling to posterior skull, normocephalic, neck is supple without adenopathy, no JVD  Eyes: PERRLA, conjuctivae are clear, non-icteric sclera  NEURO: A&Ox3, no focal deficits, moving all extremities spontaneously, CN II-XII grossly intact  Resp: CTAB, no wrr, mildly increased work of breathing with some accessory muscle use during inhalation  Cardio: Tachycardic rate/ regular rhytum, S1/S2 heard, no murmurs, gallops or rubs  Abdominal: Soft, non-tender, non-distended, no appreciable masses, normoactive bowel sounds  : No CVA tenderness  Back: No tenderness to spinous processes, no step off  Extremities: R hand pain with RPM, ecchymosis, L hand in hard cast covering fingers to mid forearm, hemosiderin staining of b/l lower extremities near ankles  Skin: Warm, dry, intact without rashes or lesions

## 2024-07-31 NOTE — ED ADULT NURSE REASSESSMENT NOTE - NS ED NURSE REASSESS COMMENT FT1
A 2 RN skin check has been performed on admission to SDU with RN witness Vivienne ENCARNACION.  A pressure injury was not identified.  Documentation in the assessment to support findings.
Assumed care of patient at 19:18 from dayshift RN Steve, charting as noted, patient is awake, calm, RR even and unlabored, denies sob, denies chest pain, nsr on cm, reports hx of parkinson's, family at bedside, able to move all extremities, stretcher locked in lowest position.
Pt resting comfortably in stretcher pending CT and x-ray results. Denies any complaints at the moment. Denies chest pain, SOB, abd pain, back pain, headaches, dizziness, lightheadedness, fevers, chills, nausea, vomitting, diarrhea, constipation and dysuria.

## 2024-07-31 NOTE — H&P ADULT - NSHPLABSRESULTS_GEN_ALL_CORE
Patient/Caregiver requests family/friend to interpret. 14.6   11.09 )-----------( 202 ( 31 Jul 2024 17:00 )             42.4   07-31    137  |  97  |  11.9  ----------------------------<  123<H>  3.8   |  26.0  |  0.76    Ca    9.3      31 Jul 2024 17:00    TPro  6.7  /  Alb  3.8  /  TBili  1.1  /  DBili  x   /  AST  22  /  ALT  <5  /  AlkPhos  104  07-31    < from: CT Head No Cont (07.31.24 @ 18:28) >      IMPRESSION:  Stable small subdural hematomas along the anterior right frontal   convexity and anterior falx. No significant mass effect.    < end of copied text >

## 2024-07-31 NOTE — H&P ADULT - ASSESSMENT
Assessment: Patient is a medically complex 78 y/o male presenting as a tx from [place] after fall while on coumadin. Found to have SDH that is stable on repeat CT after receiving reversal.    Plan:  -Admit to trauma under Dr. Ness  -Will go to step down per NSGY for Q2  -Hold DVT ppx and coumadin. Appreciate NSGY recs for restarting AC in the setting of factor V leiden  -MM pain control  -f/u hand x-ray  -repeat CTH in 24 hours   -Home meds restarted   -Dressing changes to RLE daily   -PT eval  -OOB/IS as tolerated  -DASH diet     Seen and discussed with attending surgeon Dr. Ness

## 2024-07-31 NOTE — ED PROVIDER NOTE - PHYSICAL EXAMINATION
Examination reveals a elderly white male no acute distress.  HEENT normocephalic soft tissue tenderness swelling right occiput pupils are equal round reactive to light and accommodation extraocular movements are intact neck there is no midline cervical tenderness.  Chest wall is nontender lungs are clear heart S1-S2 without any murmurs rubs gallops abdomen is soft nontender all 4 quadrants positive bowel sounds extremities are free from any clubbing cyanosis or edema.  Left upper extremity has a short arm cast on.  Pelvis hips both stable and nontender as well as ankles and right wrist and hand.

## 2024-07-31 NOTE — CONSULT NOTE ADULT - SUBJECTIVE AND OBJECTIVE BOX
HPI:  79M with PMH of PE on coumadin, COPD, HTN, and Parkinson's presenting to ED as transfer from [place] due to SDH s/p mechanical fall, +HS, -LOC. Pt reports having fall earlier this morning prompting visit. Pt c/o frontal headache. INR 1.98 at [place]. Pt received Kcentra and vitamin K. Denies back pain, weakness, numbness tingling, visual changes, dizziness, lightheadedness, nausea, vomiting. CTH shows thin acute SDH along anterior aspect of R anterior cranial fossa as well as R anterior falx measuring up to 3 mm in thickness. No associated mass effect.    PAST MEDICAL & SURGICAL HISTORY:  Personal history of PE (pulmonary embolism)  COPD (chronic obstructive pulmonary disease)  Hypertension  Restless leg syndrome  Factor V Leiden  Parkinsons  Spinal stenosis  Memory loss  Right inguinal hernia  AL (obstructive sleep apnea)  Diabetes  Herniated cervical disc  H/O hernia repair    FAMILY HISTORY:  FH: lung cancer (Father)    Allergies  garlic (Other (Mild))  Levaquin (Anaphylaxis)    REVIEW OF SYSTEMS  Negative except as noted in HPI    HOME MEDICATIONS:  Home Medications:  Albuterol (Eqv-ProAir HFA) 90 mcg/inh inhalation aerosol: 2 puff(s) inhaled every 4 hours as needed for  shortness of breath and/or wheezing (31 Jul 2024 13:10)  amLODIPine 5 mg oral tablet: 1 tab(s) orally once a day (in the evening) (31 Jul 2024 13:22)  carbidopa-levodopa 25 mg-100 mg oral tablet: 1 tab(s) orally 4 times a day 8am,1pm,6pm,11p (31 Jul 2024 13:22)  Coumadin 5 mg oral tablet: 0.5 tab(s) orally once a day (31 Jul 2024 13:11)  doxycycline hyclate 100 mg oral delayed release tablet: 1 tab(s) orally 2 times a day for 10 days (filled 7/24/24) (31 Jul 2024 14:59)  fexofenadine 180 mg oral tablet: 1 tab(s) orally once a day (31 Jul 2024 13:21)  folic acid 1 mg oral tablet: 1 tab(s) orally once a day (31 Jul 2024 13:21)  irbesartan-hydrochlorothiazide 300 mg-25 mg oral tablet: 1 tab(s) orally once a day (31 Jul 2024 13:11)  montelukast 10 mg oral tablet: 1 tab(s) orally once a day (at bedtime) (31 Jul 2024 13:10)  rOPINIRole 2 mg oral tablet: 1 tab(s) orally 4 times a day 8am, 1pm, 6pm, 11pm (31 Jul 2024 13:23)  Symbicort 160 mcg-4.5 mcg/inh inhalation aerosol: 2 puff(s) inhaled 2 times a day (31 Jul 2024 13:11)  trihexyphenidyl 2 mg oral tablet: 0.5 tab(s) orally 3 times a day 8am, 1pm, 6pm (31 Jul 2024 13:24)    Vital Signs Last 24 Hrs  T(C): 36.7 (31 Jul 2024 16:51), Max: 36.8 (31 Jul 2024 14:05)  T(F): 98 (31 Jul 2024 16:51), Max: 98.2 (31 Jul 2024 14:05)  HR: 105 (31 Jul 2024 17:00) (86 - 105)  BP: 180/113 (31 Jul 2024 17:00) (145/72 - 180/113)  BP(mean): --  RR: 19 (31 Jul 2024 17:00) (16 - 19)  SpO2: 94% (31 Jul 2024 17:00) (94% - 98%)    Parameters below as of 31 Jul 2024 17:00  Patient On (Oxygen Delivery Method): room air    PHYSICAL EXAM:  GENERAL: NAD, well-groomed  HEAD: Atraumatic, normocephalic  JAYME COMA SCORE: E-4 V-5 M-6 = 15  MENTAL STATUS: AAO x3; Awake; Opens eyes spontaneously; Appropriately conversant without aphasia; following simple commands  CRANIAL NERVES: PERRL. EOMI without nystagmus. Facial sensation intact V1-3 distribution b/l. Face symmetric w/ normal eye closure and smile, tongue midline. Hearing grossly intact. Speech clear.   MOTOR: strength 5/5 b/l upper and lower extremities  SENSATION: grossly intact to light touch all extremities  CHEST/LUNG: Nonlabored breathing  SKIN: Warm, dry    LABS:                        13.9   9.85  )-----------( 207      ( 31 Jul 2024 12:20 )             40.8     07-31    140  |  104  |  15  ----------------------------<  165<H>  3.8   |  30  |  0.98    Ca    9.3      31 Jul 2024 12:20    TPro  6.6  /  Alb  3.4  /  TBili  0.9  /  DBili  x   /  AST  23  /  ALT  10<L>  /  AlkPhos  93  07-31    PT/INR - ( 31 Jul 2024 12:20 )   PT: 22.7 sec;   INR: 1.98 ratio      PTT - ( 31 Jul 2024 12:20 )  PTT:39.5 sec  Urinalysis Basic - ( 31 Jul 2024 12:20 )    Color: x / Appearance: x / SG: x / pH: x  Gluc: 165 mg/dL / Ketone: x  / Bili: x / Urobili: x   Blood: x / Protein: x / Nitrite: x   Leuk Esterase: x / RBC: x / WBC x   Sq Epi: x / Non Sq Epi: x / Bacteria: x    RADIOLOGY & ADDITIONAL STUDIES:  < from: CT Head No Cont (07.31.24 @ 11:25) >  IMPRESSION:  HEAD CT:  Thin acute subdural hematoma along the anterior aspect of the   right anterior cranial fossa as well as along the right side of anterior   falx measuring up to 3 mm in thickness. No associated mass effect.   Interval follow-up recommended.  CERVICAL SPINE CT:  No evidence of an acute cervical spine fracture.

## 2024-07-31 NOTE — ED PROVIDER NOTE - CHIEF COMPLAINT
The patient is a 79y Male complaining of  The patient is a 79y Male complaining of fall. The patient is a 79y Male complaining of fall on AC found to have ICH transferred from [facility].

## 2024-07-31 NOTE — CONSULT NOTE ADULT - ASSESSMENT
79M with PMH of PE on coumadin, COPD, HTN, and Parkinson's presenting to ED as transfer from [place] due to SDH s/p mechanical fall, +HS, -LOC. Pt reports having fall earlier this morning prompting visit. Pt c/o frontal headache. INR 1.98 at [place]. Pt received Kcentra and vitamin K.     Plan:  - Q2 neuro checks   - All imaging reviewed: [place] CTH shows thin acute SDH along anterior aspect of R anterior cranial fossa as well as R anterior falx measuring up to 3 mm in thickness. No associated mass effect.  - Repeat 6 hour CTH at 5:15pm  - Repeat 24 hour CTH at 11:15am tomorrow   - Keppra 500 BID x 7 days  - SBP goal <160   - Normonatremia   - Hold all AC/AP  - DVT ppx: SCDs only  - Trauma evaluation   - Further medical management per primary team   - Discussed case with Dr. Crow

## 2024-07-31 NOTE — PHARMACOTHERAPY INTERVENTION NOTE - COMMENTS
78 yo male presenting s/p fall w/ head strike. CT head + for thin acute subdural hematoma along the anterior aspect of the right anterior cranial fossa as well as along the right side of anterior falx measuring up to 3 mm in thickness. Per medication history, patient on warfarin 2.5 mg daily, last dose today 7/31/2024 for Factor V Leiden? Currently ordered for KCentra 1500 mg x1. Discussed with Dr. Feldman and recommended Vitamin K IVPB 5-10 mg per protocol for reversal. Provider elected for lower dose of 5 mg - order entered per discussion.

## 2024-07-31 NOTE — ED PROVIDER NOTE - EYES NEGATIVE STATEMENT, MLM
no discharge, no irritation, no pain, no redness, and no visual changes. Epidermal Closure Graft Donor Site (Optional): simple interrupted

## 2024-07-31 NOTE — ED PROVIDER NOTE - ATTENDING CONTRIBUTION TO CARE
I, Kings Arechiga, performed a face to face bedside interview with this patient regarding history of present illness, and completed an independent physical examination. I personally made/approved the management plan and take responsibility for the patient management. I have communicated the patient’s plan of care and disposition with the resident  79-year-old male with past medical history of Parkinson's disease, PE on anticoagulation presents as a transfer from [facility].  Patient had a mechanical trip and fall with a head strike, no LOC, found to have subdural hematoma at [facility], given Kcentra and transferred here for neurosurgery and trauma evaluation  Gen: NAD, well appearing  CV: RRR  Pul: CTA b/l  Abd: Soft, non-distended, non-tender  Neuro: no focal deficits  msk: no midline spinal ttp  Pt admitted to trauma

## 2024-07-31 NOTE — ED PROVIDER NOTE - DIFFERENTIAL DIAGNOSIS
Differential diagnosis includes possible subdural possible epidural possible subarachnoid hemorrhage rule out cervical spine injury rule out fracture Differential Diagnosis

## 2024-07-31 NOTE — ED ADULT TRIAGE NOTE - CHIEF COMPLAINT QUOTE
S/P multiple falls. C/O Pain to right wrist. Swelling to back of the head. Denies LOC. patient is on Coumadin

## 2024-07-31 NOTE — ED ADULT NURSE NOTE - OBJECTIVE STATEMENT
Aox4. Pt presenting to Emergency Department BIBA as transfer from [facility] due to ICH s/p fall. Pt reports having fall earlier this morning prompting visit. Pt on coumadin at home for previous PE. Denies LOC, syncope, chest pain, SOB, abd pain, back pain, dizziness, lightheadedness, fevers, chills, nausea, vomiting, diarrhea, constipation and dysuria. Pt on cardiac monitor in NS 98 HR and  96% RA. RR even and unlabored. Skin warm to touch. Aox4. Pt presenting to Emergency Department BIBA as transfer from [facility] due to SDH s/p fall. Pt reports having fall earlier this morning prompting visit. Pt on coumadin at home for previous PE. Denies LOC, syncope, chest pain, SOB, abd pain, back pain, dizziness, lightheadedness, fevers, chills, nausea, vomiting, diarrhea, constipation and dysuria. Pt on cardiac monitor in NS 98 HR and  96% RA. RR even and unlabored. Skin warm to touch.

## 2024-07-31 NOTE — ED PROVIDER NOTE - OBJECTIVE STATEMENT
79 y M PMHx parkinsons disease(>15 years), COPD (not on home O2), chronic HTN, peripheral vascular disease, history of PE presents as a transfer from [facility]. Patient states this morning he fell backwards after he lost his balance and hit is head on the vinyl daysi. Patient did not lose consciousness but states he "saw stars." Patient was on the floor for 20 mins before he got back up. Patient had another episode of falling about two hours after the first. During the second episode patient lost their balance and fell backwards and hit their head again. Patient reports he is on coumadin at home. Patient was seen at [place] and imaging found patient to have ICH. On arrival at [facility], patient tachycardic, hypertensive, but AxOx3 and otherwise stable. Patient complains of a headache, some confusion, and R wrist and hand pain. Denies dizziness, denies visual changes, denies chest pain, denies shortness of breath. 79 y M PMHx parkinsons disease(>15 years), COPD (not on home O2), chronic HTN, peripheral vascular disease, history of PE presents as a transfer from [facility]. Patient states this morning he fell backwards after he lost his balance and hit is head on the vinyl daysi. Patient did not lose consciousness but states he "saw stars." Patient was on the floor for 20 mins before he got back up. Patient had another episode of falling about two hours after the first. During the second episode patient lost their balance and fell backwards and hit their head again. Patient reports he is on coumadin at home. Patient was seen at [place] and imaging found patient to have ICH. On arrival at [facility], patient tachycardic, hypertensive, but AxOx3 and otherwise stable. Patient complains of a headache, some confusion, and R wrist and hand pain. Denies dizziness, denies visual changes, denies chest pain, denies shortness of breath. No history of seizure disorder. 79 y M PMHx Parkinson's Disease(>15 years), COPD (not on home O2), chronic HTN, peripheral vascular disease, history of PE, morgagni hernia presents as a transfer from [facility]. Patient states this morning was using his walker in ambulate in his home, lost his balance and fell backwards. Patient states he hit his head on the vinyl daysi. Patient did not lose consciousness. Patient was able to get up immediately after the episode with the help of his wife. Patient had another episode of falling about 45mins after the first. During the second episode patient lost their balance and fell backwards and hit their head. Patient did not lose consciousness but states he "saw stars."  Patient was on the floor for 20 mins before he got back up. Patient reports he is on coumadin at home. Patient was seen at [place] and imaging found patient to have subdural hematoma. On arrival at [facility], patient tachycardic, hypertensive, but AxOx3 and otherwise stable. Patient complains of a headache, some confusion, and R wrist and hand pain. Denies dizziness, denies visual changes, denies chest pain, denies shortness of breath. No history of seizure disorder.

## 2024-07-31 NOTE — CONSULT NOTE ADULT - NS ATTEND AMEND GEN_ALL_CORE FT
Neurosurgery Attending Attestation:    Patient seen and examined at bedside. I have made the necessary edits to the note above. Agree with plan and note as documented above.    79M with PMH of PE on coumadin, COPD, HTN, and Parkinson's presents s/p fall with CT Head demonstrating 3mm R frontal convexity and falcine SDH without significant mass effect. On exam, Patient is awake, alert, oriented to self, hospital, and year, PERRL, EOMI, FS, TML, and moving BUE and BLE without drift. Patient was given KCentra and Vitamin K at [place]. Recommend keppra 500mg bid, SBP<160, q1h neurochecks and repeat CT Head in 6h for stability.    -Shaw Crow MD

## 2024-08-01 ENCOUNTER — NON-APPOINTMENT (OUTPATIENT)
Age: 79
End: 2024-08-01

## 2024-08-01 ENCOUNTER — TRANSCRIPTION ENCOUNTER (OUTPATIENT)
Age: 79
End: 2024-08-01

## 2024-08-01 VITALS
OXYGEN SATURATION: 92 % | TEMPERATURE: 98 F | DIASTOLIC BLOOD PRESSURE: 81 MMHG | HEART RATE: 86 BPM | SYSTOLIC BLOOD PRESSURE: 135 MMHG | RESPIRATION RATE: 18 BRPM

## 2024-08-01 LAB
ANION GAP SERPL CALC-SCNC: 9 MMOL/L — SIGNIFICANT CHANGE UP (ref 5–17)
BASOPHILS # BLD AUTO: 0.04 K/UL — SIGNIFICANT CHANGE UP (ref 0–0.2)
BASOPHILS NFR BLD AUTO: 0.5 % — SIGNIFICANT CHANGE UP (ref 0–2)
BUN SERPL-MCNC: 12.1 MG/DL — SIGNIFICANT CHANGE UP (ref 8–20)
CALCIUM SERPL-MCNC: 8.8 MG/DL — SIGNIFICANT CHANGE UP (ref 8.4–10.5)
CHLORIDE SERPL-SCNC: 98 MMOL/L — SIGNIFICANT CHANGE UP (ref 96–108)
CO2 SERPL-SCNC: 28 MMOL/L — SIGNIFICANT CHANGE UP (ref 22–29)
CREAT SERPL-MCNC: 0.85 MG/DL — SIGNIFICANT CHANGE UP (ref 0.5–1.3)
EGFR: 88 ML/MIN/1.73M2 — SIGNIFICANT CHANGE UP
EOSINOPHIL # BLD AUTO: 0.07 K/UL — SIGNIFICANT CHANGE UP (ref 0–0.5)
EOSINOPHIL NFR BLD AUTO: 0.8 % — SIGNIFICANT CHANGE UP (ref 0–6)
GLUCOSE SERPL-MCNC: 139 MG/DL — HIGH (ref 70–99)
HCT VFR BLD CALC: 39.4 % — SIGNIFICANT CHANGE UP (ref 39–50)
HGB BLD-MCNC: 13.6 G/DL — SIGNIFICANT CHANGE UP (ref 13–17)
IMM GRANULOCYTES NFR BLD AUTO: 0.5 % — SIGNIFICANT CHANGE UP (ref 0–0.9)
INR BLD: 1.22 RATIO — HIGH (ref 0.85–1.18)
LYMPHOCYTES # BLD AUTO: 0.7 K/UL — LOW (ref 1–3.3)
LYMPHOCYTES # BLD AUTO: 8.5 % — LOW (ref 13–44)
MAGNESIUM SERPL-MCNC: 1.8 MG/DL — SIGNIFICANT CHANGE UP (ref 1.6–2.6)
MCHC RBC-ENTMCNC: 32.5 PG — SIGNIFICANT CHANGE UP (ref 27–34)
MCHC RBC-ENTMCNC: 34.5 GM/DL — SIGNIFICANT CHANGE UP (ref 32–36)
MCV RBC AUTO: 94 FL — SIGNIFICANT CHANGE UP (ref 80–100)
MONOCYTES # BLD AUTO: 0.44 K/UL — SIGNIFICANT CHANGE UP (ref 0–0.9)
MONOCYTES NFR BLD AUTO: 5.3 % — SIGNIFICANT CHANGE UP (ref 2–14)
NEUTROPHILS # BLD AUTO: 6.95 K/UL — SIGNIFICANT CHANGE UP (ref 1.8–7.4)
NEUTROPHILS NFR BLD AUTO: 84.4 % — HIGH (ref 43–77)
PHOSPHATE SERPL-MCNC: 3 MG/DL — SIGNIFICANT CHANGE UP (ref 2.4–4.7)
PLATELET # BLD AUTO: 182 K/UL — SIGNIFICANT CHANGE UP (ref 150–400)
POTASSIUM SERPL-MCNC: 3.7 MMOL/L — SIGNIFICANT CHANGE UP (ref 3.5–5.3)
POTASSIUM SERPL-SCNC: 3.7 MMOL/L — SIGNIFICANT CHANGE UP (ref 3.5–5.3)
PROTHROM AB SERPL-ACNC: 13.5 SEC — HIGH (ref 9.5–13)
RBC # BLD: 4.19 M/UL — LOW (ref 4.2–5.8)
RBC # FLD: 13.3 % — SIGNIFICANT CHANGE UP (ref 10.3–14.5)
SODIUM SERPL-SCNC: 135 MMOL/L — SIGNIFICANT CHANGE UP (ref 135–145)
WBC # BLD: 8.24 K/UL — SIGNIFICANT CHANGE UP (ref 3.8–10.5)
WBC # FLD AUTO: 8.24 K/UL — SIGNIFICANT CHANGE UP (ref 3.8–10.5)

## 2024-08-01 PROCEDURE — 80048 BASIC METABOLIC PNL TOTAL CA: CPT

## 2024-08-01 PROCEDURE — 72170 X-RAY EXAM OF PELVIS: CPT

## 2024-08-01 PROCEDURE — 85025 COMPLETE CBC W/AUTO DIFF WBC: CPT

## 2024-08-01 PROCEDURE — 85730 THROMBOPLASTIN TIME PARTIAL: CPT

## 2024-08-01 PROCEDURE — 71045 X-RAY EXAM CHEST 1 VIEW: CPT

## 2024-08-01 PROCEDURE — 70450 CT HEAD/BRAIN W/O DYE: CPT | Mod: 26

## 2024-08-01 PROCEDURE — 94640 AIRWAY INHALATION TREATMENT: CPT

## 2024-08-01 PROCEDURE — 70450 CT HEAD/BRAIN W/O DYE: CPT | Mod: MC

## 2024-08-01 PROCEDURE — 73130 X-RAY EXAM OF HAND: CPT

## 2024-08-01 PROCEDURE — 85610 PROTHROMBIN TIME: CPT

## 2024-08-01 PROCEDURE — 73110 X-RAY EXAM OF WRIST: CPT

## 2024-08-01 PROCEDURE — 83735 ASSAY OF MAGNESIUM: CPT

## 2024-08-01 PROCEDURE — 97163 PT EVAL HIGH COMPLEX 45 MIN: CPT

## 2024-08-01 PROCEDURE — 80053 COMPREHEN METABOLIC PANEL: CPT

## 2024-08-01 PROCEDURE — 84100 ASSAY OF PHOSPHORUS: CPT

## 2024-08-01 PROCEDURE — 36415 COLL VENOUS BLD VENIPUNCTURE: CPT

## 2024-08-01 PROCEDURE — 99232 SBSQ HOSP IP/OBS MODERATE 35: CPT | Mod: FS

## 2024-08-01 PROCEDURE — 99233 SBSQ HOSP IP/OBS HIGH 50: CPT | Mod: FS

## 2024-08-01 RX ORDER — FOLIC ACID
1 POWDER (GRAM) MISCELLANEOUS
Refills: 0 | DISCHARGE

## 2024-08-01 RX ORDER — ALBUTEROL 90 MCG
2 AEROSOL REFILL (GRAM) INHALATION EVERY 6 HOURS
Refills: 0 | Status: DISCONTINUED | OUTPATIENT
Start: 2024-08-01 | End: 2024-08-01

## 2024-08-01 RX ORDER — MAGNESIUM SULFATE 500 MG/ML
2 VIAL (ML) INJECTION ONCE
Refills: 0 | Status: COMPLETED | OUTPATIENT
Start: 2024-08-01 | End: 2024-08-01

## 2024-08-01 RX ORDER — MULTIVITAMIN/IRON/FOLIC ACID 18MG-0.4MG
1 TABLET ORAL
Qty: 0 | Refills: 0 | DISCHARGE
Start: 2024-08-01

## 2024-08-01 RX ORDER — METHYLPREDNISOLONE ACETATE 40 MG/ML
125 INJECTION, SUSPENSION INTRA-ARTICULAR; INTRALESIONAL; INTRAMUSCULAR; INTRASYNOVIAL; SOFT TISSUE ONCE
Refills: 0 | Status: COMPLETED | OUTPATIENT
Start: 2024-08-01 | End: 2024-08-01

## 2024-08-01 RX ORDER — LEVETIRACETAM 1000 MG/1
1 TABLET, FILM COATED ORAL
Qty: 12 | Refills: 0
Start: 2024-08-01

## 2024-08-01 RX ORDER — METHYLPREDNISOLONE ACETATE 40 MG/ML
40 INJECTION, SUSPENSION INTRA-ARTICULAR; INTRALESIONAL; INTRAMUSCULAR; INTRASYNOVIAL; SOFT TISSUE EVERY 8 HOURS
Refills: 0 | Status: DISCONTINUED | OUTPATIENT
Start: 2024-08-01 | End: 2024-08-01

## 2024-08-01 RX ORDER — IPRATROPIUM BROMIDE AND ALBUTEROL SULFATE 2.5; .5 MG/3ML; MG/3ML
3 SOLUTION RESPIRATORY (INHALATION) ONCE
Refills: 0 | Status: COMPLETED | OUTPATIENT
Start: 2024-08-01 | End: 2024-08-01

## 2024-08-01 RX ORDER — AMLODIPINE BESYLATE 2.5 MG/1
5 TABLET ORAL DAILY
Refills: 0 | Status: DISCONTINUED | OUTPATIENT
Start: 2024-08-01 | End: 2024-08-01

## 2024-08-01 RX ORDER — FEXOFENADINE HCL 180 MG
1 TABLET ORAL
Refills: 0 | DISCHARGE

## 2024-08-01 RX ORDER — MULTIVITAMIN/IRON/FOLIC ACID 18MG-0.4MG
1 TABLET ORAL DAILY
Refills: 0 | Status: DISCONTINUED | OUTPATIENT
Start: 2024-08-01 | End: 2024-08-01

## 2024-08-01 RX ORDER — AMLODIPINE BESYLATE 2.5 MG/1
1 TABLET ORAL
Refills: 0 | DISCHARGE

## 2024-08-01 RX ORDER — DOXYCYCLINE 100 MG/1
1 CAPSULE ORAL
Refills: 0 | DISCHARGE

## 2024-08-01 RX ORDER — POTASSIUM CHLORIDE 1500 MG/1
20 TABLET, EXTENDED RELEASE ORAL ONCE
Refills: 0 | Status: COMPLETED | OUTPATIENT
Start: 2024-08-01 | End: 2024-08-01

## 2024-08-01 RX ORDER — ALBUTEROL 90 MCG
2 AEROSOL REFILL (GRAM) INHALATION
Refills: 0 | DISCHARGE

## 2024-08-01 RX ORDER — LOSARTAN POTASSIUM 50 MG/1
100 TABLET, FILM COATED ORAL DAILY
Refills: 0 | Status: DISCONTINUED | OUTPATIENT
Start: 2024-08-01 | End: 2024-08-01

## 2024-08-01 RX ORDER — LORATADINE 10 MG
1 TABLET ORAL
Qty: 0 | Refills: 0 | DISCHARGE
Start: 2024-08-01

## 2024-08-01 RX ORDER — ACETAMINOPHEN 500 MG
3 TABLET ORAL
Qty: 0 | Refills: 0 | DISCHARGE
Start: 2024-08-01

## 2024-08-01 RX ORDER — LOSARTAN POTASSIUM 50 MG/1
1 TABLET, FILM COATED ORAL
Qty: 0 | Refills: 0 | DISCHARGE
Start: 2024-08-01

## 2024-08-01 RX ADMIN — Medication 10 MILLIGRAM(S): at 08:12

## 2024-08-01 RX ADMIN — Medication 2 PUFF(S): at 08:03

## 2024-08-01 RX ADMIN — CARBIDOPA AND LEVODOPA 1 TABLET(S): 25; 100 TABLET ORAL at 12:13

## 2024-08-01 RX ADMIN — Medication 975 MILLIGRAM(S): at 12:05

## 2024-08-01 RX ADMIN — BUDESONIDE AND FORMOTEROL FUMARATE DIHYDRATE 2 PUFF(S): 80; 4.5 AEROSOL RESPIRATORY (INHALATION) at 08:02

## 2024-08-01 RX ADMIN — LOSARTAN POTASSIUM 100 MILLIGRAM(S): 50 TABLET, FILM COATED ORAL at 05:08

## 2024-08-01 RX ADMIN — DOXYCYCLINE 100 MILLIGRAM(S): 100 CAPSULE ORAL at 05:09

## 2024-08-01 RX ADMIN — LEVETIRACETAM 500 MILLIGRAM(S): 1000 TABLET, FILM COATED ORAL at 05:09

## 2024-08-01 RX ADMIN — Medication 975 MILLIGRAM(S): at 08:12

## 2024-08-01 RX ADMIN — Medication 975 MILLIGRAM(S): at 03:09

## 2024-08-01 RX ADMIN — CARBIDOPA AND LEVODOPA 1 TABLET(S): 25; 100 TABLET ORAL at 05:12

## 2024-08-01 RX ADMIN — METHYLPREDNISOLONE ACETATE 125 MILLIGRAM(S): 40 INJECTION, SUSPENSION INTRA-ARTICULAR; INTRALESIONAL; INTRAMUSCULAR; INTRASYNOVIAL; SOFT TISSUE at 02:27

## 2024-08-01 RX ADMIN — METHYLPREDNISOLONE ACETATE 40 MILLIGRAM(S): 40 INJECTION, SUSPENSION INTRA-ARTICULAR; INTRALESIONAL; INTRAMUSCULAR; INTRASYNOVIAL; SOFT TISSUE at 13:02

## 2024-08-01 RX ADMIN — Medication 1 MILLIGRAM(S): at 12:13

## 2024-08-01 RX ADMIN — IPRATROPIUM BROMIDE AND ALBUTEROL SULFATE 3 MILLILITER(S): 2.5; .5 SOLUTION RESPIRATORY (INHALATION) at 02:28

## 2024-08-01 RX ADMIN — ROPINIROLE 2 MILLIGRAM(S): 1 TABLET ORAL at 12:13

## 2024-08-01 RX ADMIN — Medication 2 PUFF(S): at 14:14

## 2024-08-01 RX ADMIN — Medication 1 MILLIGRAM(S): at 08:12

## 2024-08-01 RX ADMIN — Medication 1 MILLIGRAM(S): at 05:10

## 2024-08-01 RX ADMIN — Medication 25 GRAM(S): at 08:12

## 2024-08-01 RX ADMIN — POTASSIUM CHLORIDE 20 MILLIEQUIVALENT(S): 1500 TABLET, EXTENDED RELEASE ORAL at 08:12

## 2024-08-01 RX ADMIN — ROPINIROLE 2 MILLIGRAM(S): 1 TABLET ORAL at 08:12

## 2024-08-01 NOTE — DISCHARGE NOTE PROVIDER - NSDCFUSCHEDAPPT_GEN_ALL_CORE_FT
Donovan Gutierrez  [facility]  [facility] [place]  Scheduled Appointment: 08/06/2024    Donovan Gutierrez  [facility]  [facility]  [place]  Scheduled Appointment: 08/12/2024

## 2024-08-01 NOTE — PROGRESS NOTE ADULT - ASSESSMENT
79M with PMH of PE on coumadin, COPD, HTN, and Parkinson's presenting to ED as transfer from [place] due to SDH s/p mechanical fall, +HS, -LOC. Pt reports having fall morning of 07/31/24 prompting visit. Pt c/o frontal headache. INR 1.98 at [place]. Pt received Kcentra and vitamin K.     Plan:  - Q2 neuro checks   - All imaging reviewed: 6 hr repeat CTH shows stable small SDH. F/u 24 hr.  - Repeat 24 hour CTH at 11:15am tomorrow   - Keppra 500 BID x 7 days  - SBP goal <160   - Normonatremia   - Hold all AC/AP  - DVT ppx: SCDs only  - Further medical management per primary team   - Discussed case with Dr. Crow  79M with PMH of PE on coumadin, COPD, HTN, and Parkinson's presenting to ED as transfer from [place] due to SDH s/p mechanical fall, +HS, -LOC. Pt reports having fall morning of 07/31/24 prompting visit. Pt c/o frontal headache. INR 1.98 at [place]. Pt received Kcentra and vitamin K.     Plan:  - Q2 neuro checks   - All imaging reviewed: 6 hr repeat CTH shows stable small SDH.   - Repeat 24 hour CTH at 11:15am, will follow up results  - Keppra 500 BID x 7 days  - SBP goal <160   - Normonatremia   - Hold all AC/AP pending 24 hr scan  - DVT ppx: SCDs only  - Further medical management per primary team   - Discussed case with Dr. Crow  79M with PMH of PE on coumadin, COPD, HTN, and Parkinson's presenting to ED as transfer from [place] due to SDH s/p mechanical fall, +HS, -LOC. Pt reports having fall morning of 07/31/24 prompting visit. Pt c/o frontal headache. INR 1.98 at [place]. Pt received Kcentra and vitamin K.     Plan:  - Q4 neuro checks   - All imaging reviewed: 24 hr repeat CTH shows stable small SDH.   - Keppra 500 BID x 7 days  - SBP goal <160   - Normonatremia   - Hold Coumadin, should follow up in 2 weeks with prescriber for bridging, etc  - Ok for chemical DVT ppx  - Further medical management per primary team   - Discussed case with Dr. Crow   - Neurosurgery will sign off at this time. Reconsult if needed. 79M with PMH of PE on coumadin, COPD, HTN, and Parkinson's presenting to ED as transfer from [place] due to SDH s/p mechanical fall, +HS, -LOC. Pt reports having fall morning of 07/31/24 prompting visit. Pt c/o frontal headache. INR 1.98 at [place]. Pt received Kcentra and vitamin K.     Plan:  - Q4 neuro checks   - All imaging reviewed: 24 hr repeat CTH shows stable small SDH.   - Keppra 500 BID x 7 days  - SBP goal <160   - Normonatremia   - Hold Coumadin, should follow up in 2 weeks with prescriber for bridging, etc  - Ok for chemical DVT ppx  - Further medical management per primary team   - Discussed case with Dr. Crow - pt should follow up in neurosurgery outpatient clinic in 2-4 weeks ([phone])  - Neurosurgery will sign off at this time. Reconsult if needed.

## 2024-08-01 NOTE — DISCHARGE NOTE PROVIDER - HOSPITAL COURSE
79M with PMH of PE on coumadin, COPD, HTN, Factor V leiden and Parkinson's presenting to ED as transfer from [place] due to SDH s/p mechanical fall, +HS, -LOC. Pt reports having fall earlier this morning prompting visit. Pt c/o frontal headache.  Pt received Kcentra and vitamin K. Patient remains neurologically intact, repeat CT head stable. Patient's other imaging negative.  79M with PMH of PE on coumadin, COPD, HTN, Factor V leiden and Parkinson's presenting to ED as transfer from [place] due to SDH s/p mechanical fall, +HS, -LOC. Pt reports having fall earlier this morning prompting visit. Pt c/o frontal headache.  Pt received Kcentra and vitamin K. Patient remains neurologically intact, repeat CT head stable. Patient's other imaging negative. Patient had acute episode of bronchospasm requiring steroids and albuterol treatment. Patient clinically improved. Vitals remain stable. Patient worked well with PT and given option of home vs. MIS. Patient's family at bedside would like to take patient home.  We discussed possible need for geriatric consult and family refused, would prefer to follow up with home PMD. Stable for safe discharge.

## 2024-08-01 NOTE — PROGRESS NOTE ADULT - SUBJECTIVE AND OBJECTIVE BOX
HPI: 79M with PMH of PE on coumadin, COPD, HTN, Factor V leiden and Parkinson's presenting to ED as transfer from [place] due to SDH s/p mechanical fall, +HS, -LOC. Pt reports having fall earlier this morning prompting visit. Pt c/o frontal headache. INR 1.98 at [place]. Pt received Kcentra and vitamin K. Denies back pain, weakness, numbness tingling, visual changes, dizziness, lightheadedness, nausea, vomiting. CTH shows thin acute SDH along anterior aspect of R anterior cranial fossa as well as R anterior falx measuring up to 3 mm in thickness. No associated mass effect. Repeat CT head stable, NSGY requested q2 hour neurochecks and trauma was consulted for admission.  (31 Jul 2024 20:40)      INTERVAL HPI/OVERNIGHT EVENTS:  79y Male seen lying comfortably in bed. Tolerating diet. Passing gas/BM. Voiding. Denies headache, weakness, numbness, n/v/d, fevers, chills, chest pain, SOB.     Vital Signs Last 24 Hrs  T(C): 36.4 (01 Aug 2024 07:48), Max: 36.8 (31 Jul 2024 14:05)  T(F): 97.5 (01 Aug 2024 07:48), Max: 98.2 (31 Jul 2024 14:05)  HR: 109 (01 Aug 2024 07:48) (74 - 109)  BP: 134/80 (01 Aug 2024 07:48) (114/71 - 180/113)  BP(mean): 105 (01 Aug 2024 05:40) (101 - 105)  RR: 18 (01 Aug 2024 07:48) (16 - 22)  SpO2: 94% (01 Aug 2024 07:48) (92% - 98%)    Parameters below as of 01 Aug 2024 07:48  Patient On (Oxygen Delivery Method): nasal cannula  O2 Flow (L/min): 2      PHYSICAL EXAM:  GENERAL: NAD  HEAD:  Atraumatic, normocephalic  JAYME COMA SCORE: E- V- M- = 15     MENTAL STATUS: AAO x3; Awake/Comatose; Opens eyes spontaneously/to voice/to light touch/to noxious stimuli; Appropriately conversant without aphasia/Nonverbal; following simple commands/mimicking/not following commands  CRANIAL NERVES: Visual acuity normal for age, visual fields full to confrontation, PERRL. EOMI without nystagmus. Facial sensation intact V1-3 distribution b/l. Face symmetric w/ normal eye closure and smile, tongue midline. Hearing grossly intact. Speech clear. Head turning and shoulder shrug intact.   REFLEXES: PERRL. Corneals intact b/l. Gag intact. Cough intact. Oculocephalic reflex intact (Doll's eye). Negative Patel's b/l. Negative clonus b/l  MOTOR: strength 5/5 b/l upper and lower extremities  Uppers     Delt (C5/6)     Bicep (C5/6)     Wrist Extend (C6)     Tricep (C7)     HG (C8/T1)  R                     5/5                 5/5                         5/5                           5/5                   5/5  L                      5/5                 5/5                         5/5                           5/5                   5/5  Lowers      HF(L1/L2)     KE (L3)     DF (L4)     EHL (L5)     PF (S1)      R                     5/5              5/5           5/5           5/5            5/5  L                     5/5               5/5          5/5            5/5            5/5  SENSATION: grossly intact to light touch all extremities  COORDINATION: Gait intact; rapid alternating movements intact; heel to shin intact; no upper extremity dysmetria  CHEST/LUNG: Clear to auscultation bilaterally; no rales, rhonchi, wheezing, or rubs  HEART: +S1/+S2; Regular rate and rhythm; no murmurs, rubs, or gallops  ABDOMEN: Soft, nontender, nondistended; bowel sounds present all four quadrants  EXTREMITIES:  2+ peripheral pulses, no clubbing, cyanosis, or edema  SKIN: Warm, dry; no rashes or lesions    LABS:                        13.6   8.24  )-----------( 182      ( 01 Aug 2024 04:30 )             39.4     08-01    135  |  98  |  12.1  ----------------------------<  139<H>  3.7   |  28.0  |  0.85    Ca    8.8      01 Aug 2024 04:30  Phos  3.0     08-01  Mg     1.8     08-01    TPro  6.7  /  Alb  3.8  /  TBili  1.1  /  DBili  x   /  AST  22  /  ALT  <5  /  AlkPhos  104  07-31    PT/INR - ( 01 Aug 2024 05:23 )   PT: 13.5 sec;   INR: 1.22 ratio         PTT - ( 31 Jul 2024 17:00 )  PTT:33.1 sec  Urinalysis Basic - ( 01 Aug 2024 04:30 )    Color: x / Appearance: x / SG: x / pH: x  Gluc: 139 mg/dL / Ketone: x  / Bili: x / Urobili: x   Blood: x / Protein: x / Nitrite: x   Leuk Esterase: x / RBC: x / WBC x   Sq Epi: x / Non Sq Epi: x / Bacteria: x    RADIOLOGY & ADDITIONAL TESTS:  < from: CT Head No Cont (07.31.24 @ 18:28) >  EXTRA-AXIAL: Stable size of an evolving interhemispheric subdural   hematoma along the anterior falx which measures 3 mm in greatest   thickness. Stable trace evolving subdural hematoma along the anterior   right frontal convexity measuring 3 mm in greatest thickness.    IMPRESSION:  Stable small subdural hematomas along the anterior right frontal   convexity and anterior falx. No significant mass effect.    POOJA VANN MD; Attending Radiologist  This document has been electronically signed. Jul 31 2024  7:32PM     HPI: 79M with PMH of PE on coumadin, COPD, HTN, Factor V leiden and Parkinson's presenting to ED as transfer from [place] due to SDH s/p mechanical fall, +HS, -LOC. Pt reports having fall earlier this morning prompting visit. Pt c/o frontal headache. INR 1.98 at [place]. Pt received Kcentra and vitamin K. Denies back pain, weakness, numbness tingling, visual changes, dizziness, lightheadedness, nausea, vomiting. CTH shows thin acute SDH along anterior aspect of R anterior cranial fossa as well as R anterior falx measuring up to 3 mm in thickness. No associated mass effect. Repeat CT head stable, NSGY requested q2 hour neurochecks and trauma was consulted for admission.  (31 Jul 2024 20:40)      INTERVAL HPI/OVERNIGHT EVENTS:  79y Male seen lying comfortably in bed. Tolerating diet. Passing gas/BM. Voiding. Denies headache, weakness, numbness, n/v/d, fevers, chills, chest pain, SOB.     Vital Signs Last 24 Hrs  T(C): 36.4 (01 Aug 2024 07:48), Max: 36.8 (31 Jul 2024 14:05)  T(F): 97.5 (01 Aug 2024 07:48), Max: 98.2 (31 Jul 2024 14:05)  HR: 109 (01 Aug 2024 07:48) (74 - 109)  BP: 134/80 (01 Aug 2024 07:48) (114/71 - 180/113)  BP(mean): 105 (01 Aug 2024 05:40) (101 - 105)  RR: 18 (01 Aug 2024 07:48) (16 - 22)  SpO2: 94% (01 Aug 2024 07:48) (92% - 98%)    Parameters below as of 01 Aug 2024 07:48  Patient On (Oxygen Delivery Method): nasal cannula  O2 Flow (L/min): 2      PHYSICAL EXAM:  GENERAL: NAD  HEAD:  Atraumatic, normocephalic  JAYME COMA SCORE: E- V- M- = 15     MENTAL STATUS: AAO x3; Awake/Comatose; Opens eyes spontaneously/to voice/to light touch/to noxious stimuli; Appropriately conversant without aphasia/Nonverbal; following simple commands/mimicking/not following commands  CRANIAL NERVES: Visual acuity normal for age, visual fields full to confrontation, PERRL. EOMI without nystagmus. Facial sensation intact V1-3 distribution b/l. Face symmetric w/ normal eye closure and smile, tongue midline. Hearing grossly intact. Speech clear. Head turning and shoulder shrug intact.   REFLEXES: PERRL. Corneals intact b/l. Gag intact. Cough intact. Oculocephalic reflex intact (Doll's eye). Negative Patel's b/l. Negative clonus b/l  MOTOR: strength 5/5 b/l upper and lower extremities  SENSATION: grossly intact to light touch all extremities  CHEST/LUNG: non-labored breathing  EXTREMITIES: cast on left wrist   SKIN: Warm, dry    LABS:                        13.6   8.24  )-----------( 182      ( 01 Aug 2024 04:30 )             39.4     08-01    135  |  98  |  12.1  ----------------------------<  139<H>  3.7   |  28.0  |  0.85    Ca    8.8      01 Aug 2024 04:30  Phos  3.0     08-01  Mg     1.8     08-01    TPro  6.7  /  Alb  3.8  /  TBili  1.1  /  DBili  x   /  AST  22  /  ALT  <5  /  AlkPhos  104  07-31    PT/INR - ( 01 Aug 2024 05:23 )   PT: 13.5 sec;   INR: 1.22 ratio         PTT - ( 31 Jul 2024 17:00 )  PTT:33.1 sec  Urinalysis Basic - ( 01 Aug 2024 04:30 )    Color: x / Appearance: x / SG: x / pH: x  Gluc: 139 mg/dL / Ketone: x  / Bili: x / Urobili: x   Blood: x / Protein: x / Nitrite: x   Leuk Esterase: x / RBC: x / WBC x   Sq Epi: x / Non Sq Epi: x / Bacteria: x    RADIOLOGY & ADDITIONAL TESTS:  < from: CT Head No Cont (07.31.24 @ 18:28) >  EXTRA-AXIAL: Stable size of an evolving interhemispheric subdural   hematoma along the anterior falx which measures 3 mm in greatest   thickness. Stable trace evolving subdural hematoma along the anterior   right frontal convexity measuring 3 mm in greatest thickness.    IMPRESSION:  Stable small subdural hematomas along the anterior right frontal   convexity and anterior falx. No significant mass effect.    POOJA VANN MD; Attending Radiologist  This document has been electronically signed. Jul 31 2024  7:32PM     HPI: 79M with PMH of PE on coumadin, COPD, HTN, Factor V leiden and Parkinson's presenting to ED as transfer from [place] due to SDH s/p mechanical fall, +HS, -LOC. Pt reports having fall earlier this morning prompting visit. Pt c/o frontal headache. INR 1.98 at [place]. Pt received Kcentra and vitamin K. Denies back pain, weakness, numbness tingling, visual changes, dizziness, lightheadedness, nausea, vomiting. CTH shows thin acute SDH along anterior aspect of R anterior cranial fossa as well as R anterior falx measuring up to 3 mm in thickness. No associated mass effect. Repeat CT head stable, NSGY requested q2 hour neurochecks and trauma was consulted for admission.  (31 Jul 2024 20:40)      INTERVAL HPI/OVERNIGHT EVENTS:  79y Male seen lying comfortably in bed. Tolerating diet. Passing gas/BM. Voiding. Denies headache, weakness, numbness, n/v/d, fevers, chills, chest pain, SOB.     Vital Signs Last 24 Hrs  T(C): 36.4 (01 Aug 2024 07:48), Max: 36.8 (31 Jul 2024 14:05)  T(F): 97.5 (01 Aug 2024 07:48), Max: 98.2 (31 Jul 2024 14:05)  HR: 109 (01 Aug 2024 07:48) (74 - 109)  BP: 134/80 (01 Aug 2024 07:48) (114/71 - 180/113)  BP(mean): 105 (01 Aug 2024 05:40) (101 - 105)  RR: 18 (01 Aug 2024 07:48) (16 - 22)  SpO2: 94% (01 Aug 2024 07:48) (92% - 98%)    Parameters below as of 01 Aug 2024 07:48  Patient On (Oxygen Delivery Method): nasal cannula  O2 Flow (L/min): 2      PHYSICAL EXAM:  GENERAL: NAD  HEAD:  Atraumatic, normocephalic  JAYME COMA SCORE: E- V- M- = 15     MENTAL STATUS: AAO x3; Awake/Comatose; Opens eyes spontaneously/to voice/to light touch/to noxious stimuli; Appropriately conversant without aphasia/Nonverbal; following simple commands/mimicking/not following commands  CRANIAL NERVES: Visual acuity normal for age, visual fields full to confrontation, PERRL. EOMI without nystagmus. Facial sensation intact V1-3 distribution b/l. Face symmetric w/ normal eye closure and smile, tongue midline. Hearing grossly intact. Speech clear. Head turning and shoulder shrug intact.   REFLEXES: PERRL. Corneals intact b/l. Gag intact. Cough intact. Oculocephalic reflex intact (Doll's eye). Negative Patel's b/l. Negative clonus b/l  MOTOR: strength 5/5 b/l upper and lower extremities  SENSATION: grossly intact to light touch all extremities  CHEST/LUNG: non-labored breathing  EXTREMITIES: cast on left wrist   SKIN: Warm, dry    LABS:                        13.6   8.24  )-----------( 182      ( 01 Aug 2024 04:30 )             39.4     08-01    135  |  98  |  12.1  ----------------------------<  139<H>  3.7   |  28.0  |  0.85    Ca    8.8      01 Aug 2024 04:30  Phos  3.0     08-01  Mg     1.8     08-01    TPro  6.7  /  Alb  3.8  /  TBili  1.1  /  DBili  x   /  AST  22  /  ALT  <5  /  AlkPhos  104  07-31    PT/INR - ( 01 Aug 2024 05:23 )   PT: 13.5 sec;   INR: 1.22 ratio         PTT - ( 31 Jul 2024 17:00 )  PTT:33.1 sec  Urinalysis Basic - ( 01 Aug 2024 04:30 )    Color: x / Appearance: x / SG: x / pH: x  Gluc: 139 mg/dL / Ketone: x  / Bili: x / Urobili: x   Blood: x / Protein: x / Nitrite: x   Leuk Esterase: x / RBC: x / WBC x   Sq Epi: x / Non Sq Epi: x / Bacteria: x    RADIOLOGY & ADDITIONAL TESTS:  < from: CT Head No Cont (08.01.24 @ 12:01) >  IMPRESSION: Age-appropriate involutional and ischemic gliotic changes. No   change in small right inferior frontal subdural hemorrhage and right para   falcine subdural hemorrhage compared with 7/31/2024.  --- End of Report ---  JOSE A DIOP MD; Attending Radiologist  This document has been electronically signed. Aug  1 2024 12:40PM    < end of copied text >

## 2024-08-01 NOTE — DISCHARGE NOTE PROVIDER - NSDCACTIVITY_GEN_ALL_CORE
Do not drive or operate machinery/Showering allowed/Do not make important decisions/No heavy lifting/straining/Follow Instructions Provided by your Surgical Team/Activity as tolerated

## 2024-08-01 NOTE — DISCHARGE NOTE PROVIDER - CARE PROVIDER_API CALL
Shaw Crow  Neurosurgery  [address] [place] [address]  Phone: [phone]  Fax: [phone]  Follow Up Time:

## 2024-08-01 NOTE — CHART NOTE - NSCHARTNOTEFT_GEN_A_CORE
Tertiary Trauma Survey (TTS)    Date of TTS: 08-01-24 @ 09:17                             Admit Date: 07-31-24 @ 20:39      Trauma Activation: Consult    List Injuries Identified to Date:  SDH    List Operative and Interventional Radiological Procedures:   N/A    Tertiary [x]  Geriatric Consult [PENDING]  PTSD [N/A]   GOC [x]   CODE STATUS: FULL CODE     Physical Exam:    Neuro: A and Ox3, NAD, Nonfocal.     HEENT: Normal cephalic, atraumatic. Trachea midline.     Pulm/Chest: No increased work of breathing, decreased breathe sounds B/L.     Cardiac: NSR    GI / Abdomen: Nontender, nondistended.     Musculoskeletal / Extremities: L wrist cast in place from previous fracture. Rest of extremities atraumatic.     Integumentary: Intact. RLE cellulitis infectious in dressing. C/D/I      RADIOLOGICAL FINDINGS REVIEW:  HEAD CT:  Thin acute subdural hematoma along the anterior aspect of the right anterior cranial fossa as well as along the right side of anterior falx measuring up to 3 mm in thickness. No associated mass effect. Interval follow-up recommended.  CERVICAL SPINE CT:  No evidence of an acute cervical spine fracture.    XR Chest  Marked elevation of LEFT hemidiaphragm. No large airspace consolidation.  Lateral radiograph recommended to evaluate posterior lung bases.    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 Discussed w/ patient at bedside. Patient aware of this previously and has followed up outpatient. Tertiary Trauma Survey (TTS)    Date of TTS: 08-01-24 @ 09:17                             Admit Date: 07-31-24 @ 20:39      Trauma Activation: Consult    List Injuries Identified to Date:  SDH    List Operative and Interventional Radiological Procedures:   N/A    Tertiary [x]  Geriatric Consult - patient deemed ready for discharge per neurosurgery. As per SICU attending - will not admit patient for geriatric medicine eval.  PTSD [N/A]   GOC [x]   CODE STATUS: FULL CODE     Physical Exam:    Neuro: A and Ox3, NAD, Nonfocal.     HEENT: Normal cephalic, atraumatic. Trachea midline.     Pulm/Chest: No increased work of breathing, decreased breathe sounds B/L.     Cardiac: NSR    GI / Abdomen: Nontender, nondistended.     Musculoskeletal / Extremities: L wrist cast in place from previous fracture. Rest of extremities atraumatic.     Integumentary: Intact. RLE cellulitis infectious in dressing. C/D/I    RADIOLOGICAL FINDINGS REVIEW:  HEAD CT:  Thin acute subdural hematoma along the anterior aspect of the right anterior cranial fossa as well as along the right side of anterior falx measuring up to 3 mm in thickness. No associated mass effect. Interval follow-up recommended.    CERVICAL SPINE CT:  No evidence of an acute cervical spine fracture.    XR Chest  Marked elevation of LEFT hemidiaphragm. No large airspace consolidation.  Lateral radiograph recommended to evaluate posterior lung bases.    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 Discussed w/ patient at bedside. Patient aware of this previously and has followed up outpatient.

## 2024-08-01 NOTE — DISCHARGE NOTE NURSING/CASE MANAGEMENT/SOCIAL WORK - PATIENT PORTAL LINK FT
You can access the FollowMyHealth Patient Portal offered by [facility] by registering at the following website: [email or web address]. By joining [facility] FollowMyHealth portal, you will also be able to view your health information using other applications (apps) compatible with our system.

## 2024-08-01 NOTE — DISCHARGE NOTE NURSING/CASE MANAGEMENT/SOCIAL WORK - NSDCVIVACCINE_GEN_ALL_CORE_FT
Td (adult) preservative free; 31-May-2014 15:13; Christine Neves (RN); [id number]; IntraMuscular; Deltoid Left.; 0.5 milliLiter(s);   Tdap; 04-Apr-2023 13:17; Yumiko Alatorre (RN); [employer]; [id number] (Exp. Date: 01-Jun-2024); IntraMuscular; Deltoid Right.; 0.5 milliLiter(s); VIS (VIS Published: 09-May-2013, VIS Presented: 04-Apr-2023);

## 2024-08-01 NOTE — PROGRESS NOTE ADULT - SUBJECTIVE AND OBJECTIVE BOX
Seen and examined during SICU rounds.     No acute events.         ICU Vital Signs Last 24 Hrs  T(C): 36.4 (01 Aug 2024 15:49), Max: 36.8 (31 Jul 2024 22:30)  T(F): 97.6 (01 Aug 2024 15:49), Max: 98.2 (31 Jul 2024 22:30)  HR: 86 (01 Aug 2024 15:49) (74 - 109)  BP: 135/81 (01 Aug 2024 15:49) (114/71 - 180/113)  BP(mean): 105 (01 Aug 2024 05:40) (101 - 105)  ABP: --  ABP(mean): --  RR: 18 (01 Aug 2024 15:49) (18 - 22)  SpO2: 92% (01 Aug 2024 15:49) (92% - 96%)    O2 Parameters below as of 01 Aug 2024 15:49  Patient On (Oxygen Delivery Method): room air          Alert, oriented, appropriately interactive  No respiratory distress  Cranial nerves grossly intact, moving all extremities spontaneously and to command             LABS:  cret                        13.6   8.24  )-----------( 182      ( 01 Aug 2024 04:30 )             39.4     08-01    135  |  98  |  12.1  ----------------------------<  139<H>  3.7   |  28.0  |  0.85    Ca    8.8      01 Aug 2024 04:30  Phos  3.0     08-01  Mg     1.8     08-01    TPro  6.7  /  Alb  3.8  /  TBili  1.1  /  DBili  x   /  AST  22  /  ALT  <5  /  AlkPhos  104  07-31    PT/INR - ( 01 Aug 2024 05:23 )   PT: 13.5 sec;   INR: 1.22 ratio         PTT - ( 31 Jul 2024 17:00 )  PTT:33.1 sec    Albumin: 3.8 g/dL (07-31-24 @ 17:00)            ##Repeat CT heads stable. Discussed with patient and family at bedside: they report patient has gait instability with known cause of falls. Patient denies LOC or amnesia. Patient and family report appropriate instituted/baseline follow up clinicians and twice a week physical therapy sessions and requesting dc home.     --MMPR  --Clear by NSG  --Will dc as per above   --To f/u w/ NSG outpatient

## 2024-08-01 NOTE — DISCHARGE NOTE PROVIDER - NSDCCPCAREPLAN_GEN_ALL_CORE_FT
PRINCIPAL DISCHARGE DIAGNOSIS  Diagnosis: Subdural hematoma  Assessment and Plan of Treatment: Patient should NOT take his Coumadin until follow up with neurosurgery in 2 weeks. Please hold all blood thinners and NSAIDs including but not limited to Aspirin, motrin, advil, ibupfren etc. Patient should call to make an appointment with neurosrugery in 2 weeks, please call to make an appointment upon discharge. Patient is advised to RETURN TO THE EMERGENCY DEPARTMENT for any of the following - worsening pain, headache, dizziness, fever/chills, nausea/vomiting, altered mental status, chest pain, shortness of breath, or any other new / worsening symptom.       PRINCIPAL DISCHARGE DIAGNOSIS  Diagnosis: Subdural hematoma  Assessment and Plan of Treatment: Patient should NOT take his Coumadin until follow up with neurosurgery in 2 weeks. Please hold all blood thinners and NSAIDs including but not limited to Aspirin, motrin, advil, ibupfren etc. Patient should call to make an appointment with neurosrugery in 2 weeks, please call to make an appointment upon discharge. Please take keppra for 2 weeks total (12 more days). And discuss with neurosurgeon at time of appointment.  Patient is advised to RETURN TO THE EMERGENCY DEPARTMENT for any of the following - worsening pain, headache, dizziness, fever/chills, nausea/vomiting, altered mental status, chest pain, shortness of breath, or any other new / worsening symptom.        SECONDARY DISCHARGE DIAGNOSES  Diagnosis: COPD exacerbation  Assessment and Plan of Treatment: Patient should continue with all inhalers prescribed by his doctor. Patient should follow up with pulmonologist and primary care doctor within 2 weeks discharge. Patient is advised to RETURN TO THE EMERGENCY DEPARTMENT for any of the following - for difficulty breathing and chest pain.

## 2024-08-01 NOTE — DISCHARGE NOTE PROVIDER - NSDCMRMEDTOKEN_GEN_ALL_CORE_FT
Albuterol (Eqv-ProAir HFA) 90 mcg/inh inhalation aerosol: 2 puff(s) inhaled every 4 hours as needed for  shortness of breath and/or wheezing  carbidopa-levodopa 25 mg-100 mg oral tablet: 1 tab(s) orally 4 times a day 8am,1pm,6pm,11p  Coumadin 5 mg oral tablet: 0.5 tab(s) orally once a day  irbesartan-hydrochlorothiazide 300 mg-25 mg oral tablet: 1 tab(s) orally once a day  Norvasc 5 mg oral tablet: 1 tab(s) orally once a day  rOPINIRole 2 mg oral tablet: 1 tab(s) orally 4 times a day 8am, 1pm, 6pm, 11pm  Symbicort 160 mcg-4.5 mcg/inh inhalation aerosol: 2 puff(s) inhaled 2 times a day  trihexyphenidyl 2 mg oral tablet: 0.5 tab(s) orally 3 times a day 8am, 1pm, 6pm   acetaminophen 325 mg oral tablet: 3 tab(s) orally every 6 hours  Albuterol (Eqv-ProAir HFA) 90 mcg/inh inhalation aerosol: 2 puff(s) inhaled every 4 hours as needed for  shortness of breath and/or wheezing  carbidopa-levodopa 25 mg-100 mg oral tablet: 1 tab(s) orally 4 times a day 8am,1pm,6pm,11p  folic acid 1 mg oral tablet: 1 tab(s) orally once a day  irbesartan-hydrochlorothiazide 300 mg-25 mg oral tablet: 1 tab(s) orally once a day  levETIRAcetam 500 mg oral tablet: 1 tab(s) orally 2 times a day  loratadine 10 mg oral tablet: 1 tab(s) orally once a day  losartan 100 mg oral tablet: 1 tab(s) orally once a day  Norvasc 5 mg oral tablet: 1 tab(s) orally once a day  rOPINIRole 2 mg oral tablet: 1 tab(s) orally 4 times a day 8am, 1pm, 6pm, 11pm  Symbicort 160 mcg-4.5 mcg/inh inhalation aerosol: 2 puff(s) inhaled 2 times a day  trihexyphenidyl 2 mg oral tablet: 0.5 tab(s) orally 3 times a day 8am, 1pm, 6pm

## 2024-08-01 NOTE — GOALS OF CARE CONVERSATION - ADVANCED CARE PLANNING - CONVERSATION DETAILS
Patient states that they would like there wife Dayna ([phone]), to make decisions for them if the patient was unable to make decisions for themselves. Discussed/defined DNR/DNI w/patient, including explaining the use of medications/electricity to restart the heart and the use of ETT/mechanical ventilation. Patient is clear that they WOULD want chest compressions, shocks or medications to restart the heart should it stop beating or he have a rhythm not compatible with life. Patient is also clear that they WOULD want to be intubated and put on a ventilator should they require it.     Patient to be: [X] FULL CODE

## 2024-08-01 NOTE — PHYSICAL THERAPY INITIAL EVALUATION ADULT - ADDITIONAL COMMENTS
Pt lives with wife in a 1 story house with 2 steps to enter. Modified Independent with all with RW. Required assist with ADLs and self care.

## 2024-08-01 NOTE — PHYSICAL THERAPY INITIAL EVALUATION ADULT - RANGE OF MOTION EXAMINATION, REHAB EVAL
HTN (hypertension)    Prostate disease bilateral upper extremity ROM was WFL (within functional limits)/bilateral lower extremity ROM was WFL (within functional limits)

## 2024-08-12 ENCOUNTER — APPOINTMENT (OUTPATIENT)
Dept: VASCULAR SURGERY | Facility: CLINIC | Age: 79
End: 2024-08-12
Payer: MEDICARE

## 2024-08-12 ENCOUNTER — APPOINTMENT (OUTPATIENT)
Dept: WOUND CARE | Facility: HOSPITAL | Age: 79
End: 2024-08-12

## 2024-08-12 VITALS
SYSTOLIC BLOOD PRESSURE: 115 MMHG | BODY MASS INDEX: 32.96 KG/M2 | HEIGHT: 67 IN | WEIGHT: 210 LBS | DIASTOLIC BLOOD PRESSURE: 74 MMHG | OXYGEN SATURATION: 94 % | HEART RATE: 83 BPM | RESPIRATION RATE: 16 BRPM

## 2024-08-12 PROCEDURE — 99203 OFFICE O/P NEW LOW 30 MIN: CPT

## 2024-08-12 NOTE — PHYSICAL EXAM
[JVD] : no jugular venous distention  [Normal Breath Sounds] : Normal breath sounds [Normal Rate and Rhythm] : normal rate and rhythm [2+] : left 2+ [Ankle Swelling (On Exam)] : present [Ankle Swelling Bilaterally] : bilaterally  [Ankle Swelling On The Right] : mild [de-identified] : well appearing flat affect [de-identified] : wnl

## 2024-08-12 NOTE — ASSESSMENT
[FreeTextEntry1] : 80yo male with IVC filter placed about 1 week ago by IR due to recent SDH and contraindication to AC while being high risk for recurrent VTE -will follow up with NSG for clearance to restart coumadin -will also see PCP/pulm to decide whether AC safe given comorbidities follow up in4 weeks to discussed removal of IVC filter will also reach outto Dr. Hurt as he is the physician who placed the filter

## 2024-08-12 NOTE — HISTORY OF PRESENT ILLNESS
[FreeTextEntry1] : 79-year-old male with multiple comorbidities including a history of a PE with hypercoagulable disorder of factor V Leiden along with a secondary disorder with Parkinson's COPD and hypertension who presents after placement of an IVC filter.  Patient received an IVC filter approximately 1 week ago done by Dr. Hurt from interventional radiology for a new subdural hematoma and having to stop anticoagulation while he is very high risk.  Patient's wife states that his primary care physician and pulmonologist are both considering a permanent filter given his Parkinson's and multiple and high risk for fall.

## 2024-08-18 ENCOUNTER — NON-APPOINTMENT (OUTPATIENT)
Age: 79
End: 2024-08-18

## 2024-08-19 ENCOUNTER — APPOINTMENT (OUTPATIENT)
Dept: NEUROSURGERY | Facility: CLINIC | Age: 79
End: 2024-08-19
Payer: MEDICARE

## 2024-08-19 VITALS
SYSTOLIC BLOOD PRESSURE: 143 MMHG | WEIGHT: 204 LBS | HEIGHT: 67 IN | BODY MASS INDEX: 32.02 KG/M2 | HEART RATE: 90 BPM | OXYGEN SATURATION: 93 % | TEMPERATURE: 98.1 F | DIASTOLIC BLOOD PRESSURE: 79 MMHG

## 2024-08-19 DIAGNOSIS — S06.5XAA TRAUMATIC SUBDURAL HEMORRHAGE WITH LOSS OF CONSCIOUSNESS STATUS UNKNOWN, INITIAL ENCOUNTER: ICD-10-CM

## 2024-08-19 PROCEDURE — 99214 OFFICE O/P EST MOD 30 MIN: CPT

## 2024-08-19 NOTE — HISTORY OF PRESENT ILLNESS
[de-identified] : 80y/o male patient, with PMH of PE (was on coumadin), COPD, HTN, Factor V leiden and Parkinson's, who originally presented to [facility] ED as transfer from [place] due to SDH s/p mechanical fall. He had CT brain and cervical spine 7/31/24 at [facility]. Today patient presents with his wife for follow up. Patient states headaches subsided; he is no longer experiencing headaches. He also denies any nausea or vomiting. Patient's wife states he has baseline Parkinson's confusion and memory loss. He also has some weakness in bilateral legs and uses a rolling walker to walk. Patient states he was on Keppra for 5 days but no longer on Keppra. His Warfarin was stopped post fall, however his pulmonologist would like to put him back on Warfarin when subdural hematoma resolves. He recently had IVC filter placed at [facility] on 8/06/24.

## 2024-08-19 NOTE — PHYSICAL EXAM
[Person] : oriented to person [Place] : oriented to place [Time] : oriented to time [de-identified] : awake, alert oriented to self, doctor's office, and year PERRL, EOMI, FS, TML no drift 5/5 RUE 5/5 LUE 4-/5 RLE 4+/5 LLE SILT

## 2024-08-19 NOTE — ASSESSMENT
[FreeTextEntry1] : 80y/o male patient, with PMH of PE (was on coumadin), COPD, HTN, Factor V leiden and Parkinson's, who originally presented to [facility] ED as transfer from [place] due to SDH s/p mechanical fall. He had CT brain and cervical spine 7/31/24 at [facility]. Today patient presents with his wife for follow up. Patient states headaches subsided; he is no longer experiencing headaches. He also denies any nausea or vomiting. Patient's wife states he has baseline Parkinson's confusion and memory loss. He also has some weakness in bilateral legs and uses a rolling walker to walk. His Warfarin was stopped post fall, however his pulmonologist would like to put him back on Warfarin when subdural hematoma resolves. He recently had IVC filter placed at [facility] on 8/06/24.    Plan:  Repeat head CT scan now.  Follow up via phone call after CT scan is done to review results.  Dependent on CT results, will communicate with pulmonologist in regards to Warfarin.  Physical therapy for gait training.

## 2024-08-19 NOTE — DATA REVIEWED
[de-identified] : CT brain 8/01/24 at [facility]:   IMPRESSION: Age-appropriate involutional and ischemic gliotic changes. No change in small right inferior frontal subdural hemorrhage and right para falcine subdural hemorrhage compared with 7/31/2024.  --- End of Report ---    CT brain and Cervical Spine 7/31/24 at [facility]:   IMPRESSION:  HEAD CT: Thin acute subdural hematoma along the anterior aspect of the right anterior cranial fossa as well as along the right side of anterior falx measuring up to 3 mm in thickness. No associated mass effect. Interval follow-up recommended. CERVICAL SPINE CT: No evidence of an acute cervical spine fracture.

## 2024-08-22 ENCOUNTER — NON-APPOINTMENT (OUTPATIENT)
Age: 79
End: 2024-08-22

## 2024-09-05 NOTE — DIETITIAN INITIAL EVALUATION ADULT - PERTINENT MEDS FT
.[facility]  [address] [facility]  [address]  [phone] phone     [phone] fax  Hours of Operation: Monday- Friday 8:00am- 5:00pm  After hours phone  [phone]  Hematology / Oncology Physicians on call    Dr. Dean Dimas           Nurse Practitioners:     Char Michel, JUDITH Rivas, YESENIA Reyes, JUDITH Zamora, JUDITH Redmond, NP    Please don't hesitate to call if you have any concerns.      FALL PREVENTION   Falls often occur due to slipping, tripping or losing your balance. Here are ways to reduce your risk of falling again.   Was there anything that caused your fall that can be fixed, removed or replaced?   Make your home safe by keeping walkways clear of objects you may trip over.   Use non-slip pads under rugs.   Do not walk in poorly lit areas.   Do not stand on chairs or wobbly ladders.   Use caution when reaching overhead or looking upward. This position can cause a loss of balance.   Be sure your shoes fit properly, have non-slip bottoms and are in good condition.   Be cautious when going up and down stairs, curbs, and when walking on uneven sidewalks.   If your balance is poor, consider using a cane or walker.   If your fall was related to alcohol use, stop or limit alcohol intake.   If your fall was related to use of sleeping medicines, talk to your doctor about this. You may need to reduce your dosage at bedtime if you awaken during the night to go to the bathroom.   To reduce the need for nighttime bathroom trips:   Avoid drinking fluids for several hours before going to bed   Empty your bladder before going to bed   Men can keep a urinal at the bedside   © 6746-1519 [employer], [address] [place] [address], [place] [address]. All rights reserved. This information is not intended as a substitute for professional medical care. Always follow your healthcare  professional's instructions.    WAYS TO HELP PREVENT INFECTION        WASH YOUR HANDS OFTEN DURING THE DAY, ESPECIALLY BEFORE YOU EAT, AFTER USING THE BATHROOM, AND AFTER TOUCHING ANIMALS    STAY AWAY FROM PEOPLE WHO HAVE ILLNESSES YOU CAN CATCH; SUCH AS COLDS, FLU, CHICKEN POX    TRY TO AVOID CROWDS    STAY AWAY FROM CHILDREN WHO RECENTLY HAVE RECEIVED LIVE VIRUS VACCINES    MAINTAIN GOOD MOUTH CARE    DO NOT SQUEEZE OR SCRATCH PIMPLES    CLEAN CUTS & SCRAPES RIGHT AWAY AND DAILY UNTIL HEALED WITH WARM WATER, SOAP & AN ANTISEPTIC    AVOID CONTACT WITH LITTER BOXES, BIRD CAGES, & FISH TANKS    AVOID STANDING WATER, IE., BIRD BATHS, FLOWER POTS/VASES, OR HUMIDIFIERS    WEAR GLOVES WHEN GARDENING OR CLEANING UP AFTER OTHERS, ESPECIALLY BABIES & SMALL CHILDREN    DO NOT EAT RAW FISH, SEAFOOD, MEAT, OR EGGS     MEDICATIONS  (STANDING):  albuterol/ipratropium for Nebulization 3 milliLiter(s) Nebulizer every 6 hours  budesonide 160 MICROgram(s)/formoterol 4.5 MICROgram(s) Inhaler 2 Puff(s) Inhalation two times a day  carbidopa/levodopa  25/100 1 Tablet(s) Oral <User Schedule>  ceFAZolin   IVPB 2000 milliGRAM(s) IV Intermittent every 8 hours  cyanocobalamin 1000 MICROGram(s) Oral daily  dextrose 5%. 1000 milliLiter(s) (50 mL/Hr) IV Continuous <Continuous>  dextrose 5%. 1000 milliLiter(s) (100 mL/Hr) IV Continuous <Continuous>  dextrose 50% Injectable 25 Gram(s) IV Push once  dextrose 50% Injectable 12.5 Gram(s) IV Push once  dextrose 50% Injectable 25 Gram(s) IV Push once  folic acid 1 milliGRAM(s) Oral daily  glucagon  Injectable 1 milliGRAM(s) IntraMuscular once  heparin  Infusion.  Unit(s)/Hr (17 mL/Hr) IV Continuous <Continuous>  hydrochlorothiazide 25 milliGRAM(s) Oral daily  insulin glargine Injectable (LANTUS) 18 Unit(s) SubCutaneous at bedtime  insulin lispro (ADMELOG) corrective regimen sliding scale   SubCutaneous at bedtime  insulin lispro (ADMELOG) corrective regimen sliding scale   SubCutaneous three times a day before meals  insulin lispro Injectable (ADMELOG) 7 Unit(s) SubCutaneous three times a day before meals  lactobacillus acidophilus 1 Tablet(s) Oral daily  losartan 100 milliGRAM(s) Oral daily  metoprolol tartrate 25 milliGRAM(s) Oral two times a day  prednisone   Tablet 40 milliGRAM(s) Oral daily  rOPINIRole 2 milliGRAM(s) Oral <User Schedule>  trihexyphenidyl 1 milliGRAM(s) Oral <User Schedule>    MEDICATIONS  (PRN):  acetaminophen     Tablet .. 650 milliGRAM(s) Oral every 6 hours PRN Temp greater or equal to 38C (100.4F), Mild Pain (1 - 3)  dextrose Oral Gel 15 Gram(s) Oral once PRN Blood Glucose LESS THAN 70 milliGRAM(s)/deciliter  heparin   Injectable 7500 Unit(s) IV Push every 6 hours PRN For aPTT less than 40  heparin   Injectable 3500 Unit(s) IV Push every 6 hours PRN For aPTT between 40 - 57  melatonin 3 milliGRAM(s) Oral at bedtime PRN Insomnia  ondansetron Injectable 4 milliGRAM(s) IV Push every 8 hours PRN Nausea and/or Vomiting

## 2024-09-18 ENCOUNTER — APPOINTMENT (OUTPATIENT)
Dept: VASCULAR SURGERY | Facility: CLINIC | Age: 79
End: 2024-09-18
Payer: MEDICARE

## 2024-09-18 VITALS
DIASTOLIC BLOOD PRESSURE: 88 MMHG | WEIGHT: 204 LBS | HEIGHT: 70 IN | SYSTOLIC BLOOD PRESSURE: 143 MMHG | BODY MASS INDEX: 29.2 KG/M2 | HEART RATE: 79 BPM | OXYGEN SATURATION: 94 %

## 2024-09-18 DIAGNOSIS — I89.0 LYMPHEDEMA, NOT ELSEWHERE CLASSIFIED: ICD-10-CM

## 2024-09-18 PROCEDURE — 99213 OFFICE O/P EST LOW 20 MIN: CPT

## 2024-09-23 PROBLEM — I89.0 LYMPHEDEMA, LIMB: Status: ACTIVE | Noted: 2024-09-23

## 2024-09-23 NOTE — ASSESSMENT
[FreeTextEntry1] : 78yo male with lower extremity edema which is multifactorial at least partially secondary to lymphedema; discussed management with patient and family -will refer for lymphedema pump -20-30mmHg compression stockings to be worn daily; prescription given and explained proper wear and use -calf muscle exercises and leg elevation when possible -Monitor for unilateral swelling, redness and pain if any of these symptoms occur recommend calling office immediately as these are signs of DVT/SVT

## 2024-09-23 NOTE — PHYSICAL EXAM
[2+] : left 2+ [Ankle Swelling (On Exam)] : present [Ankle Swelling Bilaterally] : bilaterally  [Ankle Swelling On The Left] : moderate [de-identified] : well appearing [de-identified] : wnl [FreeTextEntry1] : b/l leg lymphedema changes

## 2024-09-23 NOTE — HISTORY OF PRESENT ILLNESS
[FreeTextEntry1] : 79-year-old male with multiple comorbidities including a history of a PE with hypercoagulable disorder of factor V Leiden along with a secondary disorder with Parkinson's COPD and hypertension who presents after placement of an IVC filter. Patient received an IVC filter approximately 1 week ago done by Dr. Hurt from interventional radiology for a new subdural hematoma and having to stop anticoagulation while he is very high risk. Patient's wife states that his primary care physician and pulmonologist are both considering a permanent filter given his Parkinson's and multiple and high risk for fall. [de-identified] : Doing well states that he is back on AC. However after discussion with his PCP and heme he will keep the IVC filter incase he ever comes off AC.  He otherwise has lower extremity lymphedema which he is wearing compression stockings for. He used to own a pump but it has not worked in over 5 years. No wound but increased swelling heaviness and fatigue

## 2025-01-14 ENCOUNTER — OUTPATIENT (OUTPATIENT)
Dept: OUTPATIENT SERVICES | Facility: HOSPITAL | Age: 80
LOS: 1 days | Discharge: HOME | End: 2025-01-14
Payer: MEDICARE

## 2025-01-14 ENCOUNTER — APPOINTMENT (OUTPATIENT)
Dept: WOUND CARE | Facility: HOSPITAL | Age: 80
End: 2025-01-14
Payer: MEDICARE

## 2025-01-14 VITALS
DIASTOLIC BLOOD PRESSURE: 74 MMHG | TEMPERATURE: 98.7 F | HEART RATE: 100 BPM | OXYGEN SATURATION: 94 % | WEIGHT: 204 LBS | HEIGHT: 70 IN | RESPIRATION RATE: 18 BRPM | SYSTOLIC BLOOD PRESSURE: 159 MMHG | BODY MASS INDEX: 29.2 KG/M2

## 2025-01-14 DIAGNOSIS — L97.801 NON-PRESSURE CHRONIC ULCER OF OTHER PART OF UNSPECIFIED LOWER LEG LIMITED TO BREAKDOWN OF SKIN: ICD-10-CM

## 2025-01-14 DIAGNOSIS — S81.812D LACERATION W/OUT FOREIGN BODY, LEFT LOWER LEG, SUBSEQUENT ENCOUNTER: ICD-10-CM

## 2025-01-14 DIAGNOSIS — Z98.890 OTHER SPECIFIED POSTPROCEDURAL STATES: Chronic | ICD-10-CM

## 2025-01-14 DIAGNOSIS — I89.0 LYMPHEDEMA, NOT ELSEWHERE CLASSIFIED: ICD-10-CM

## 2025-01-14 DIAGNOSIS — M50.20 OTHER CERVICAL DISC DISPLACEMENT, UNSPECIFIED CERVICAL REGION: Chronic | ICD-10-CM

## 2025-01-14 PROCEDURE — 99213 OFFICE O/P EST LOW 20 MIN: CPT

## 2025-01-14 PROCEDURE — G0463: CPT

## 2025-01-15 DIAGNOSIS — D68.51 ACTIVATED PROTEIN C RESISTANCE: ICD-10-CM

## 2025-01-15 DIAGNOSIS — G20.A1 PARKINSON'S DISEASE WITHOUT DYSKINESIA, WITHOUT MENTION OF FLUCTUATIONS: ICD-10-CM

## 2025-01-15 DIAGNOSIS — Z80.1 FAMILY HISTORY OF MALIGNANT NEOPLASM OF TRACHEA, BRONCHUS AND LUNG: ICD-10-CM
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
## 2025-01-15 DIAGNOSIS — Z86.711 PERSONAL HISTORY OF PULMONARY EMBOLISM: ICD-10-CM

## 2025-01-15 DIAGNOSIS — Z87.891 PERSONAL HISTORY OF NICOTINE DEPENDENCE: ICD-10-CM

## 2025-01-15 DIAGNOSIS — R73.03 PREDIABETES: ICD-10-CM

## 2025-01-15 DIAGNOSIS — Z98.890 OTHER SPECIFIED POSTPROCEDURAL STATES: ICD-10-CM

## 2025-01-15 DIAGNOSIS — S81.812D LACERATION WITHOUT FOREIGN BODY, LEFT LOWER LEG, SUBSEQUENT ENCOUNTER: ICD-10-CM

## 2025-01-15 DIAGNOSIS — I89.0 LYMPHEDEMA, NOT ELSEWHERE CLASSIFIED: ICD-10-CM

## 2025-01-15 DIAGNOSIS — J44.9 CHRONIC OBSTRUCTIVE PULMONARY DISEASE, UNSPECIFIED: ICD-10-CM
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
## 2025-01-15 DIAGNOSIS — Z87.81 PERSONAL HISTORY OF (HEALED) TRAUMATIC FRACTURE: ICD-10-CM

## 2025-01-15 DIAGNOSIS — Z88.1 ALLERGY STATUS TO OTHER ANTIBIOTIC AGENTS: ICD-10-CM

## 2025-01-15 DIAGNOSIS — Z98.1 ARTHRODESIS STATUS: ICD-10-CM

## 2025-01-15 DIAGNOSIS — Z82.5 FAMILY HISTORY OF ASTHMA AND OTHER CHRONIC LOWER RESPIRATORY DISEASES: ICD-10-CM
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
## 2025-01-15 DIAGNOSIS — Z82.49 FAMILY HISTORY OF ISCHEMIC HEART DISEASE AND OTHER DISEASES OF THE CIRCULATORY SYSTEM: ICD-10-CM

## 2025-01-27 ENCOUNTER — APPOINTMENT (OUTPATIENT)
Dept: WOUND CARE | Facility: HOSPITAL | Age: 80
End: 2025-01-27

## 2025-02-23 ENCOUNTER — INPATIENT (INPATIENT)
Facility: HOSPITAL | Age: 80
LOS: 11 days | Discharge: HOME | DRG: 189 | End: 2025-03-07
Attending: INTERNAL MEDICINE | Admitting: INTERNAL MEDICINE
Payer: MEDICARE

## 2025-02-23 VITALS — HEART RATE: 110 BPM | OXYGEN SATURATION: 97 %

## 2025-02-23 DIAGNOSIS — J96.01 ACUTE RESPIRATORY FAILURE WITH HYPOXIA: ICD-10-CM

## 2025-02-23 DIAGNOSIS — Z98.890 OTHER SPECIFIED POSTPROCEDURAL STATES: Chronic | ICD-10-CM

## 2025-02-23 DIAGNOSIS — M50.20 OTHER CERVICAL DISC DISPLACEMENT, UNSPECIFIED CERVICAL REGION: Chronic | ICD-10-CM

## 2025-02-23 LAB
ALBUMIN SERPL ELPH-MCNC: 3.6 G/DL — SIGNIFICANT CHANGE UP (ref 3.3–5)
ALP SERPL-CCNC: 92 U/L — SIGNIFICANT CHANGE UP (ref 40–120)
ALT FLD-CCNC: 12 U/L — SIGNIFICANT CHANGE UP (ref 12–78)
ANION GAP SERPL CALC-SCNC: 6 MMOL/L — SIGNIFICANT CHANGE UP (ref 5–17)
APTT BLD: 39.9 SEC — HIGH (ref 24.5–35.6)
AST SERPL-CCNC: 19 U/L — SIGNIFICANT CHANGE UP (ref 15–37)
BASE EXCESS BLDV CALC-SCNC: 2.9 MMOL/L — SIGNIFICANT CHANGE UP (ref -2–3)
BASOPHILS # BLD AUTO: 0.04 K/UL — SIGNIFICANT CHANGE UP (ref 0–0.2)
BASOPHILS NFR BLD AUTO: 0.3 % — SIGNIFICANT CHANGE UP (ref 0–2)
BILIRUB SERPL-MCNC: 0.9 MG/DL — SIGNIFICANT CHANGE UP (ref 0.2–1.2)
BLOOD GAS COMMENTS, VENOUS: SIGNIFICANT CHANGE UP
BUN SERPL-MCNC: 17 MG/DL — SIGNIFICANT CHANGE UP (ref 7–23)
CALCIUM SERPL-MCNC: 9.5 MG/DL — SIGNIFICANT CHANGE UP (ref 8.5–10.1)
CHLORIDE SERPL-SCNC: 103 MMOL/L — SIGNIFICANT CHANGE UP (ref 96–108)
CO2 SERPL-SCNC: 28 MMOL/L — SIGNIFICANT CHANGE UP (ref 22–31)
CREAT SERPL-MCNC: 1.1 MG/DL — SIGNIFICANT CHANGE UP (ref 0.5–1.3)
EGFR: 68 ML/MIN/1.73M2 — SIGNIFICANT CHANGE UP
EGFR: 68 ML/MIN/1.73M2 — SIGNIFICANT CHANGE UP
EOSINOPHIL # BLD AUTO: 0.05 K/UL — SIGNIFICANT CHANGE UP (ref 0–0.5)
EOSINOPHIL NFR BLD AUTO: 0.4 % — SIGNIFICANT CHANGE UP (ref 0–6)
FLUAV AG NPH QL: SIGNIFICANT CHANGE UP
FLUBV AG NPH QL: SIGNIFICANT CHANGE UP
GAS PNL BLDV: SIGNIFICANT CHANGE UP
GLUCOSE SERPL-MCNC: 159 MG/DL — HIGH (ref 70–99)
HCO3 BLDV-SCNC: 29 MMOL/L — SIGNIFICANT CHANGE UP (ref 22–29)
HCT VFR BLD CALC: 44.6 % — SIGNIFICANT CHANGE UP (ref 39–50)
HGB BLD-MCNC: 15.1 G/DL — SIGNIFICANT CHANGE UP (ref 13–17)
IMM GRANULOCYTES NFR BLD AUTO: 0.3 % — SIGNIFICANT CHANGE UP (ref 0–0.9)
INR BLD: 2.2 RATIO — HIGH (ref 0.85–1.16)
LACTATE SERPL-SCNC: 1.4 MMOL/L — SIGNIFICANT CHANGE UP (ref 0.7–2)
LACTATE SERPL-SCNC: 2.9 MMOL/L — HIGH (ref 0.7–2)
LYMPHOCYTES # BLD AUTO: 1.11 K/UL — SIGNIFICANT CHANGE UP (ref 1–3.3)
LYMPHOCYTES # BLD AUTO: 9.5 % — LOW (ref 13–44)
MCHC RBC-ENTMCNC: 32.3 PG — SIGNIFICANT CHANGE UP (ref 27–34)
MCHC RBC-ENTMCNC: 33.9 G/DL — SIGNIFICANT CHANGE UP (ref 32–36)
MCV RBC AUTO: 95.3 FL — SIGNIFICANT CHANGE UP (ref 80–100)
MONOCYTES # BLD AUTO: 0.94 K/UL — HIGH (ref 0–0.9)
MONOCYTES NFR BLD AUTO: 8 % — SIGNIFICANT CHANGE UP (ref 2–14)
NEUTROPHILS # BLD AUTO: 9.52 K/UL — HIGH (ref 1.8–7.4)
NEUTROPHILS NFR BLD AUTO: 81.5 % — HIGH (ref 43–77)
NRBC BLD AUTO-RTO: 0 /100 WBCS — SIGNIFICANT CHANGE UP (ref 0–0)
NT-PROBNP SERPL-SCNC: 339 PG/ML — SIGNIFICANT CHANGE UP (ref 0–450)
PCO2 BLDV: 58 MMHG — HIGH (ref 42–55)
PH BLDV: 7.31 — LOW (ref 7.32–7.43)
PLATELET # BLD AUTO: 217 K/UL — SIGNIFICANT CHANGE UP (ref 150–400)
PO2 BLDV: 42 MMHG — SIGNIFICANT CHANGE UP (ref 25–45)
POTASSIUM SERPL-MCNC: 4.3 MMOL/L — SIGNIFICANT CHANGE UP (ref 3.5–5.3)
POTASSIUM SERPL-SCNC: 4.3 MMOL/L — SIGNIFICANT CHANGE UP (ref 3.5–5.3)
PROT SERPL-MCNC: 7.1 G/DL — SIGNIFICANT CHANGE UP (ref 6–8.3)
PROTHROM AB SERPL-ACNC: 25.8 SEC — HIGH (ref 9.9–13.4)
RBC # BLD: 4.68 M/UL — SIGNIFICANT CHANGE UP (ref 4.2–5.8)
RBC # FLD: 13.2 % — SIGNIFICANT CHANGE UP (ref 10.3–14.5)
RSV RNA NPH QL NAA+NON-PROBE: SIGNIFICANT CHANGE UP
SAO2 % BLDV: 68.3 % — SIGNIFICANT CHANGE UP (ref 67–88)
SARS-COV-2 RNA SPEC QL NAA+PROBE: SIGNIFICANT CHANGE UP
SODIUM SERPL-SCNC: 137 MMOL/L — SIGNIFICANT CHANGE UP (ref 135–145)
TROPONIN I, HIGH SENSITIVITY RESULT: 10 NG/L — SIGNIFICANT CHANGE UP
WBC # BLD: 11.69 K/UL — HIGH (ref 3.8–10.5)
WBC # FLD AUTO: 11.69 K/UL — HIGH (ref 3.8–10.5)

## 2025-02-23 PROCEDURE — 93010 ELECTROCARDIOGRAM REPORT: CPT

## 2025-02-23 PROCEDURE — 99291 CRITICAL CARE FIRST HOUR: CPT

## 2025-02-23 PROCEDURE — 71045 X-RAY EXAM CHEST 1 VIEW: CPT | Mod: 26

## 2025-02-23 PROCEDURE — 99223 1ST HOSP IP/OBS HIGH 75: CPT

## 2025-02-23 PROCEDURE — 71275 CT ANGIOGRAPHY CHEST: CPT | Mod: 26

## 2025-02-23 RX ORDER — FOLIC ACID 1 MG/1
1 TABLET ORAL DAILY
Refills: 0 | Status: DISCONTINUED | OUTPATIENT
Start: 2025-02-23 | End: 2025-03-07

## 2025-02-23 RX ORDER — MAGNESIUM SULFATE 500 MG/ML
2 SYRINGE (ML) INJECTION ONCE
Refills: 0 | Status: COMPLETED | OUTPATIENT
Start: 2025-02-23 | End: 2025-02-23

## 2025-02-23 RX ORDER — METHYLPREDNISOLONE ACETATE 80 MG/ML
80 INJECTION, SUSPENSION INTRA-ARTICULAR; INTRALESIONAL; INTRAMUSCULAR; SOFT TISSUE EVERY 8 HOURS
Refills: 0 | Status: DISCONTINUED | OUTPATIENT
Start: 2025-02-23 | End: 2025-02-24

## 2025-02-23 RX ORDER — MELATONIN 5 MG
3 TABLET ORAL AT BEDTIME
Refills: 0 | Status: DISCONTINUED | OUTPATIENT
Start: 2025-02-23 | End: 2025-03-07

## 2025-02-23 RX ORDER — ONDANSETRON HCL/PF 4 MG/2 ML
4 VIAL (ML) INJECTION EVERY 8 HOURS
Refills: 0 | Status: DISCONTINUED | OUTPATIENT
Start: 2025-02-23 | End: 2025-03-07

## 2025-02-23 RX ORDER — CARBIDOPA/LEVODOPA 25MG-100MG
1 TABLET ORAL
Refills: 0 | Status: DISCONTINUED | OUTPATIENT
Start: 2025-02-23 | End: 2025-03-07

## 2025-02-23 RX ORDER — MAGNESIUM, ALUMINUM HYDROXIDE 200-200 MG
30 TABLET,CHEWABLE ORAL EVERY 4 HOURS
Refills: 0 | Status: DISCONTINUED | OUTPATIENT
Start: 2025-02-23 | End: 2025-03-07

## 2025-02-23 RX ORDER — IPRATROPIUM BROMIDE AND ALBUTEROL SULFATE .5; 2.5 MG/3ML; MG/3ML
3 SOLUTION RESPIRATORY (INHALATION)
Refills: 0 | Status: COMPLETED | OUTPATIENT
Start: 2025-02-23 | End: 2025-02-23

## 2025-02-23 RX ORDER — TRIHEXYPHENIDYL HYDROCHLORIDE 2 MG/1
1 TABLET ORAL
Refills: 0 | DISCHARGE

## 2025-02-23 RX ORDER — TRIHEXYPHENIDYL HYDROCHLORIDE 2 MG/1
1 TABLET ORAL THREE TIMES A DAY
Refills: 0 | Status: DISCONTINUED | OUTPATIENT
Start: 2025-02-23 | End: 2025-02-23

## 2025-02-23 RX ORDER — AZITHROMYCIN 250 MG
500 CAPSULE ORAL DAILY
Refills: 0 | Status: COMPLETED | OUTPATIENT
Start: 2025-02-23 | End: 2025-02-28

## 2025-02-23 RX ORDER — ACETAMINOPHEN 500 MG/5ML
650 LIQUID (ML) ORAL EVERY 6 HOURS
Refills: 0 | Status: DISCONTINUED | OUTPATIENT
Start: 2025-02-23 | End: 2025-03-07

## 2025-02-23 RX ORDER — METHYLPREDNISOLONE ACETATE 80 MG/ML
125 INJECTION, SUSPENSION INTRA-ARTICULAR; INTRALESIONAL; INTRAMUSCULAR; SOFT TISSUE ONCE
Refills: 0 | Status: COMPLETED | OUTPATIENT
Start: 2025-02-23 | End: 2025-02-23

## 2025-02-23 RX ORDER — ROPINIROLE 5 MG/1
2 TABLET, FILM COATED ORAL
Refills: 0 | Status: DISCONTINUED | OUTPATIENT
Start: 2025-02-23 | End: 2025-03-07

## 2025-02-23 RX ORDER — IPRATROPIUM BROMIDE AND ALBUTEROL SULFATE .5; 2.5 MG/3ML; MG/3ML
3 SOLUTION RESPIRATORY (INHALATION) EVERY 6 HOURS
Refills: 0 | Status: DISCONTINUED | OUTPATIENT
Start: 2025-02-23 | End: 2025-03-07

## 2025-02-23 RX ORDER — TRIHEXYPHENIDYL HYDROCHLORIDE 2 MG/1
1 TABLET ORAL THREE TIMES A DAY
Refills: 0 | Status: DISCONTINUED | OUTPATIENT
Start: 2025-02-23 | End: 2025-03-07

## 2025-02-23 RX ADMIN — Medication 2 GRAM(S): at 16:35

## 2025-02-23 RX ADMIN — METHYLPREDNISOLONE ACETATE 80 MILLIGRAM(S): 80 INJECTION, SUSPENSION INTRA-ARTICULAR; INTRALESIONAL; INTRAMUSCULAR; SOFT TISSUE at 22:23

## 2025-02-23 RX ADMIN — Medication 150 GRAM(S): at 16:09

## 2025-02-23 RX ADMIN — IPRATROPIUM BROMIDE AND ALBUTEROL SULFATE 3 MILLILITER(S): .5; 2.5 SOLUTION RESPIRATORY (INHALATION) at 16:11

## 2025-02-23 RX ADMIN — METHYLPREDNISOLONE ACETATE 125 MILLIGRAM(S): 80 INJECTION, SUSPENSION INTRA-ARTICULAR; INTRALESIONAL; INTRAMUSCULAR; SOFT TISSUE at 16:08

## 2025-02-23 RX ADMIN — IPRATROPIUM BROMIDE AND ALBUTEROL SULFATE 3 MILLILITER(S): .5; 2.5 SOLUTION RESPIRATORY (INHALATION) at 16:45

## 2025-02-23 RX ADMIN — ROPINIROLE 2 MILLIGRAM(S): 5 TABLET, FILM COATED ORAL at 23:24

## 2025-02-23 RX ADMIN — IPRATROPIUM BROMIDE AND ALBUTEROL SULFATE 3 MILLILITER(S): .5; 2.5 SOLUTION RESPIRATORY (INHALATION) at 16:31

## 2025-02-23 RX ADMIN — Medication 1 TABLET(S): at 23:24

## 2025-02-23 NOTE — ED ADULT NURSE NOTE - CHIEF COMPLAINT QUOTE
Pt very SOB at home and sats 805 with EMS on RA. Pt placed on bipa and sat 96%. Pt BP was 200/100s and decreased to 100s over 90s with help of bipap as per EMS.

## 2025-02-23 NOTE — ED PROVIDER NOTE - CLINICAL SUMMARY MEDICAL DECISION MAKING FREE TEXT BOX
Patient with concern for COPD exacerbation given his history of similar presentation.  However he was initially reportedly hypertensive so concern for potential heart failure/heart disease.  Will check for pneumonia as well.  Possible flu versus COVID.  Plan on lab work, imaging, medication and reassessment.  Patient initially placed on BiPAP and given nebulizer treatment and magnesium.

## 2025-02-23 NOTE — ED PROVIDER NOTE - CARE PLAN
1 Principal Discharge DX:	Acute respiratory failure with hypoxia  Secondary Diagnosis:	COPD exacerbation

## 2025-02-23 NOTE — ED PROVIDER NOTE - NS ED MD DISPO ISOLATION TYPES
Is very important to try to gain 10# by the late fall, to increase strength and immune system.    Eat 3x/day, do not skip meals. Also add a bedtime snack such as peanut butter on toast w milk.  
None

## 2025-02-23 NOTE — ED ADULT NURSE NOTE - OBJECTIVE STATEMENT
patient presents to ED with respiratory distress.  patient arrived on CPAP with o2 sats >92%.  wife is with patient.  patient has dementia and parkinsons so wife states that he is a poor historian.

## 2025-02-23 NOTE — ED PROVIDER NOTE - CCCP TRG CHIEF CMPLNT
shortness of breath consider Seroquel 12.5mg daily standing continue medical workup for medical cause  as per PCA pt was agitated; unable to indicate she had to go to the bathroom; was calm after she was taken; pt may not be able to express physical discomfort that may contribute to new onset agitation  may consider Seroquel 12.5mg daily standing  Qtc 418ms

## 2025-02-23 NOTE — H&P ADULT - HISTORY OF PRESENT ILLNESS
80 yo Male with a past medical history of PE on warfarin, COPD, hypertension, Parkinson disease is presenting with complaints of shortness of breath.  States that he was short of breath starting this morning.  Tried his nebulizer without relief in symptoms.  And then upon walking to the bathroom, his symptoms acutely got worse so his wife called 911 for evaluation.  No recent fevers or sick contacts.  Patient has been coughing.  No nausea or vomiting.  No leg swelling.  No history of heart failure. No fever at home. Initially in ED he was placed on BIPAP, now improved and placed on NC oxygen. He feels comfortable now with his breathing.

## 2025-02-23 NOTE — ED PROVIDER NOTE - PHYSICAL EXAMINATION
Constitutional: Awake, Alert, non-toxic. moderate respiratory distress.  HEAD: Normocephalic, atraumatic.   EYES: PERRL, EOM intact, conjunctiva and sclera are clear bilaterally.  ENT: External ears normal. No rhinorrhea, no tracheal deviation   NECK: Supple, non-tender  CARDIOVASCULAR: tachycardic rate and rhythm.  RESPIRATORY: increased respiratory effort; breath sounds are diminished b/l, able to speak short sentences through bipap  ABDOMEN: Soft; non-tender, non-distended. No rebound or guarding.   MSK:  no lower extremity edema, no deformities  SKIN: Warm, dry  NEURO: A&O x3. Sensory and motor functions are grossly intact. Speech is normal. No facial droop.  PSYCH: Appearance and judgement seem appropriate for gender and age.

## 2025-02-23 NOTE — H&P ADULT - NSHPPHYSICALEXAM_GEN_ALL_CORE
T(C): 36.7 (02-23-25 @ 17:42), Max: 36.7 (02-23-25 @ 15:42)  HR: 98 (02-23-25 @ 17:42) (97 - 116)  BP: 129/57 (02-23-25 @ 17:42) (108/56 - 129/57)  RR: 18 (02-23-25 @ 17:42) (18 - 22)  SpO2: 99% (02-23-25 @ 17:42) (96% - 99%)    CONSTITUTIONAL: Well groomed, no apparent distress  EYES: PERRLA and symmetric, EOMI, No conjunctival or scleral injection, non-icteric  ENMT: Oral mucosa with moist membranes. no pharyngeal injection or exudates             NECK: Supple, symmetric and without tracheal deviation   RESP: No respiratory distress, no use of accessory muscles; Bilateral wheezing, on NC oxygen   CV: RRR, +S1S2, no MRG; no JVD; no peripheral edema  GI: Soft, NT, ND, no rebound, no guarding; no palpable masses;   LYMPH: No cervical LAD or tenderness;   MSK: Normal ROM without pain, no spinal tenderness, normal muscle strength/tone  SKIN: No rashes or ulcers noted;   NEURO: CN II-XII intact; sensation intact in upper and lower extremities b/l to light touch   PSYCH: Appropriate insight/judgment; A+O x 3, mood and affect appropriate, recent/remote memory intact

## 2025-02-23 NOTE — ED ADULT NURSE NOTE - NS ED NURSE DISCH DISPOSITION
73 y/o with PMHx of HTN, Hemodialysis MWF, Glaucoma, ESRD, DVT, Cataract and PSHx of AV Fistula, S/p KEVEN-BSO, Glaucoma Surgery presenting to ED with heaviness to tongue, hand and feet; onset at 11AM while in kitchen. EMS arrived shortly thereafter and found pt to be hypertensive. Pt reports sxs are persisting and is currently c/o dull HA. Reports  being compliant with medications, with her last dialysis being yesterday. Pt does not have a h/o CVA in the past. Allergy: Penicillin [rxn: Rash].
Admitted

## 2025-02-23 NOTE — ED ADULT NURSE NOTE - NSFALLRISKINTERV_ED_ALL_ED
Assistance OOB with selected safe patient handling equipment if applicable/Assistance with ambulation/Communicate fall risk and risk factors to all staff, patient, and family/Monitor gait and stability/Monitor for mental status changes and reorient to person, place, and time, as needed/Provide visual cue: yellow wristband, yellow gown, etc/Reinforce activity limits and safety measures with patient and family/Toileting schedule using arm’s reach rule for commode and bathroom/Use of alarms - bed, stretcher, chair and/or video monitoring/Call bell, personal items and telephone in reach/Instruct patient to call for assistance before getting out of bed/chair/stretcher/Non-slip footwear applied when patient is off stretcher/[place] to call system/Physically safe environment - no spills, clutter or unnecessary equipment/Purposeful Proactive Rounding/Room/bathroom lighting operational, light cord in reach

## 2025-02-23 NOTE — H&P ADULT - ASSESSMENT
A/P:    Acute Hypoxic respiratory failure  COPD exacerbation  Cough  -IV solumedrol  -Duoneb  -Advair(instead of Symbicort)  -Zithromax  -NC oxygen as needed to keep Ox sat >.88%    h/o PE  -on coumadin  -follow INR    Parkinsonism  -Sinemet  -Ropinirole    Coumadin for DVT ppx  Full code

## 2025-02-23 NOTE — ED ADULT NURSE NOTE - CHPI ED NUR SYMPTOMS NEG
[Follow-Up Visit] : a follow-up [FreeTextEntry2] : Recurrent Endometrial Cancer  no body aches/no chest pain/no chills/no cough/no diaphoresis/no edema/no fever/no wheezing

## 2025-02-23 NOTE — ED ADULT NURSE NOTE - AVIAN FLU SYMPTOMS
PAST SURGICAL HISTORY:  History of ovarian cystectomy left    History of surgery Whipple procedure 8/2/2021 with Dr. Nino at [facility]    History of tonsillectomy 1959    Kidney stone 2017    Status post phlebectomy 10/2018    
No

## 2025-02-23 NOTE — ED PROVIDER NOTE - OBJECTIVE STATEMENT
Patient with a past medical history of PE on warfarin, COPD, hypertension, Parkinson disease is presenting with complaints of shortness of breath.  History obtained from patient wife and patient at bedside as well as with EMS.  States that he was short of breath starting this morning.  Tried his nebulizer without relief in symptoms.  And then upon walking to the bathroom, his symptoms acutely got worse so his wife called 911 for evaluation.  No recent fevers or sick contacts.  Patient has been coughing.  No nausea or vomiting.  No leg swelling.  No history of heart failure.

## 2025-02-23 NOTE — ED ADULT NURSE REASSESSMENT NOTE - NS ED NURSE REASSESS COMMENT FT1
Spoke to Dr. Weaver and was advised that patient cannot be downgraded to Remote tele since patient needs to be monitored

## 2025-02-23 NOTE — ED ADULT TRIAGE NOTE - ARRIVAL FROM
Patient prepared for and ready to be discharged. Dressed in clothes and given belongings. Patient discharged at this time in no acute distress after pt verbalized understanding of discharge instructions. Patient walked to [facility].      Mariano Villafana RN  06/19/23 [id number]
Home

## 2025-02-23 NOTE — ED PROVIDER NOTE - PROGRESS NOTE DETAILS
Patient reassessed, breathing better, transition to 6 L nasal cannula without significant tachypnea.  Pending CTA chest.  Discussed with Dr. Muller for admission.  Houston Padilla MD.

## 2025-02-24 LAB
ANION GAP SERPL CALC-SCNC: 5 MMOL/L — SIGNIFICANT CHANGE UP (ref 5–17)
BASOPHILS # BLD AUTO: 0 K/UL — SIGNIFICANT CHANGE UP (ref 0–0.2)
BASOPHILS NFR BLD AUTO: 0 % — SIGNIFICANT CHANGE UP (ref 0–2)
BUN SERPL-MCNC: 21 MG/DL — SIGNIFICANT CHANGE UP (ref 7–23)
CALCIUM SERPL-MCNC: 9.6 MG/DL — SIGNIFICANT CHANGE UP (ref 8.5–10.1)
CHLORIDE SERPL-SCNC: 105 MMOL/L — SIGNIFICANT CHANGE UP (ref 96–108)
CO2 SERPL-SCNC: 28 MMOL/L — SIGNIFICANT CHANGE UP (ref 22–31)
CREAT SERPL-MCNC: 1.2 MG/DL — SIGNIFICANT CHANGE UP (ref 0.5–1.3)
EGFR: 61 ML/MIN/1.73M2 — SIGNIFICANT CHANGE UP
EGFR: 61 ML/MIN/1.73M2 — SIGNIFICANT CHANGE UP
EOSINOPHIL # BLD AUTO: 0 K/UL — SIGNIFICANT CHANGE UP (ref 0–0.5)
EOSINOPHIL NFR BLD AUTO: 0 % — SIGNIFICANT CHANGE UP (ref 0–6)
GLUCOSE SERPL-MCNC: 209 MG/DL — HIGH (ref 70–99)
HCT VFR BLD CALC: 43.6 % — SIGNIFICANT CHANGE UP (ref 39–50)
HGB BLD-MCNC: 14.8 G/DL — SIGNIFICANT CHANGE UP (ref 13–17)
IMM GRANULOCYTES NFR BLD AUTO: 0.4 % — SIGNIFICANT CHANGE UP (ref 0–0.9)
INR BLD: 2.48 RATIO — HIGH (ref 0.85–1.16)
LYMPHOCYTES # BLD AUTO: 0.64 K/UL — LOW (ref 1–3.3)
LYMPHOCYTES # BLD AUTO: 7.9 % — LOW (ref 13–44)
MAGNESIUM SERPL-MCNC: 2.4 MG/DL — SIGNIFICANT CHANGE UP (ref 1.6–2.6)
MCHC RBC-ENTMCNC: 32 PG — SIGNIFICANT CHANGE UP (ref 27–34)
MCHC RBC-ENTMCNC: 33.9 G/DL — SIGNIFICANT CHANGE UP (ref 32–36)
MCV RBC AUTO: 94.2 FL — SIGNIFICANT CHANGE UP (ref 80–100)
MONOCYTES # BLD AUTO: 0.1 K/UL — SIGNIFICANT CHANGE UP (ref 0–0.9)
MONOCYTES NFR BLD AUTO: 1.2 % — LOW (ref 2–14)
NEUTROPHILS # BLD AUTO: 7.36 K/UL — SIGNIFICANT CHANGE UP (ref 1.8–7.4)
NEUTROPHILS NFR BLD AUTO: 90.5 % — HIGH (ref 43–77)
NRBC BLD AUTO-RTO: 0 /100 WBCS — SIGNIFICANT CHANGE UP (ref 0–0)
PLATELET # BLD AUTO: 206 K/UL — SIGNIFICANT CHANGE UP (ref 150–400)
POTASSIUM SERPL-MCNC: 4.1 MMOL/L — SIGNIFICANT CHANGE UP (ref 3.5–5.3)
POTASSIUM SERPL-SCNC: 4.1 MMOL/L — SIGNIFICANT CHANGE UP (ref 3.5–5.3)
PROTHROM AB SERPL-ACNC: 28.8 SEC — HIGH (ref 9.9–13.4)
RBC # BLD: 4.63 M/UL — SIGNIFICANT CHANGE UP (ref 4.2–5.8)
RBC # FLD: 12.9 % — SIGNIFICANT CHANGE UP (ref 10.3–14.5)
SODIUM SERPL-SCNC: 138 MMOL/L — SIGNIFICANT CHANGE UP (ref 135–145)
WBC # BLD: 8.13 K/UL — SIGNIFICANT CHANGE UP (ref 3.8–10.5)
WBC # FLD AUTO: 8.13 K/UL — SIGNIFICANT CHANGE UP (ref 3.8–10.5)

## 2025-02-24 RX ADMIN — ROPINIROLE 2 MILLIGRAM(S): 5 TABLET, FILM COATED ORAL at 07:09

## 2025-02-24 RX ADMIN — TRIHEXYPHENIDYL HYDROCHLORIDE 1 MILLIGRAM(S): 2 TABLET ORAL at 21:41

## 2025-02-24 RX ADMIN — Medication 1 DOSE(S): at 21:26

## 2025-02-24 RX ADMIN — Medication 2.5 MILLIGRAM(S): at 21:41

## 2025-02-24 RX ADMIN — ROPINIROLE 2 MILLIGRAM(S): 5 TABLET, FILM COATED ORAL at 21:26

## 2025-02-24 RX ADMIN — IPRATROPIUM BROMIDE AND ALBUTEROL SULFATE 3 MILLILITER(S): .5; 2.5 SOLUTION RESPIRATORY (INHALATION) at 07:36

## 2025-02-24 RX ADMIN — METHYLPREDNISOLONE ACETATE 80 MILLIGRAM(S): 80 INJECTION, SUSPENSION INTRA-ARTICULAR; INTRALESIONAL; INTRAMUSCULAR; SOFT TISSUE at 07:12

## 2025-02-24 RX ADMIN — IPRATROPIUM BROMIDE AND ALBUTEROL SULFATE 3 MILLILITER(S): .5; 2.5 SOLUTION RESPIRATORY (INHALATION) at 13:57

## 2025-02-24 RX ADMIN — TRIHEXYPHENIDYL HYDROCHLORIDE 1 MILLIGRAM(S): 2 TABLET ORAL at 07:09

## 2025-02-24 RX ADMIN — Medication 1 TABLET(S): at 17:55

## 2025-02-24 RX ADMIN — Medication 10 MILLIGRAM(S): at 11:40

## 2025-02-24 RX ADMIN — Medication 1 DOSE(S): at 08:41

## 2025-02-24 RX ADMIN — FOLIC ACID 1 MILLIGRAM(S): 1 TABLET ORAL at 11:40

## 2025-02-24 RX ADMIN — IPRATROPIUM BROMIDE AND ALBUTEROL SULFATE 3 MILLILITER(S): .5; 2.5 SOLUTION RESPIRATORY (INHALATION) at 20:36

## 2025-02-24 RX ADMIN — Medication 1 TABLET(S): at 11:40

## 2025-02-24 RX ADMIN — Medication 500 MILLIGRAM(S): at 11:40

## 2025-02-24 RX ADMIN — Medication 1 TABLET(S): at 07:09

## 2025-02-24 RX ADMIN — ROPINIROLE 2 MILLIGRAM(S): 5 TABLET, FILM COATED ORAL at 11:40

## 2025-02-24 RX ADMIN — TRIHEXYPHENIDYL HYDROCHLORIDE 1 MILLIGRAM(S): 2 TABLET ORAL at 13:55

## 2025-02-24 RX ADMIN — Medication 3 MILLIGRAM(S): at 21:26

## 2025-02-24 NOTE — CARE COORDINATION ASSESSMENT. - NSCAREPROVIDERS_GEN_ALL_CORE_FT
CARE PROVIDERS:  Accepting Physician: Asya Muller  Administration: Clarisse Barnard  Administration: Guanakito Rutledge  Administration: Starr Singh  Admitting: Asya Muller  Attending: Asya Muller  Case Management: Olayinka Lomeli  Clinical Doc. Improvement: Carolyn Walls  Consultant: Grayson Gao  Covering Team: Salina Weaver  Covering Team: Flakita Stewart  ED Attending: Houston Padilla  ED Nurse: Carmen Aguilar  ED Nurse 2: Vickie Murphy  Emergency Medicine: Srinivasa Grande  Nurse: Rizwana Hunt  Nurse: Vickie Murphy  Ordered: ADM, User  Ordered: Doctor, Unknown  Outpatient Provider: Asya Muller  PCA/Nursing Assistant: Dilcia Lopez  PCA/Nursing Assistant: Kaden Abrams  Primary Team: Allen Yanez  Primary Team: Tulio Sanchez  Respiratory Therapy: Gerald Baron  Respiratory Therapy: Daniel Hood  : Sarah Almanzar  : Zeina Lennon  Speech Pathology: Mirela Dash  Team: Tripping TCM, Team  UR// Supp. Assoc.: Mackenzie Hernandez

## 2025-02-24 NOTE — ED ADULT NURSE REASSESSMENT NOTE - NS ED NURSE REASSESS COMMENT FT1
Pt received from night RN. Pt A&Ox4 on 4L NC. Pt denies any pain at this time. Respiratory at bedside to administer respiratory tx. Pt remains resting in hospital bed with call light at bedside. Pt received from night RN. Pt A&Ox3 with periods of confusion on 4L NC. Pt denies any pain at this time. Respiratory at bedside to administer respiratory tx. Pt remains resting in hospital bed with call light at bedside.

## 2025-02-24 NOTE — CARE COORDINATION ASSESSMENT. - NSPASTMEDSURGHISTORY_GEN_ALL_CORE_FT
PAST MEDICAL & SURGICAL HISTORY:  Restless leg syndrome      Hypertension      COPD (chronic obstructive pulmonary disease)      Personal history of PE (pulmonary embolism)      Herniated cervical disc      H/O hernia repair      Diabetes      AL (obstructive sleep apnea)      Right inguinal hernia      Memory loss      Spinal stenosis      Parkinsons      Factor V Leiden

## 2025-02-24 NOTE — CONSULT NOTE ADULT - SUBJECTIVE AND OBJECTIVE BOX
Date/Time Patient Seen:  		  Referring MD:   Data Reviewed	       Patient is a 79y old  Male who presents with a chief complaint of COPD exacerbation (23 Feb 2025 19:17)      Subjective/HPI   Reason for Admission: COPD exacerbation  History of Present Illness:   78 yo Male with a past medical history of PE on warfarin, COPD, hypertension, Parkinson disease is presenting with complaints of shortness of breath.  States that he was short of breath starting this morning.  Tried his nebulizer without relief in symptoms.  And then upon walking to the bathroom, his symptoms acutely got worse so his wife called 911 for evaluation.  No recent fevers or sick contacts.  Patient has been coughing.  No nausea or vomiting.  No leg swelling.  No history of heart failure. No fever at home. Initially in ED he was placed on BIPAP, now improved and placed on NC oxygen. He feels comfortable now with his breathing.        Review of Systems:  Review of Systems: Gen: No fever, chills, weakness  ENT: No visual changes or throat pain  Neck: No pain or stiffness  Respiratory: No cough or wheezing  Cardiovascular: No chest pain or palpitations  Gastrointestinal: No abdominal pain, nausea, vomiting, constipation, or diarrhea  Hematologic: No easy bleeding or bruising  Neurologic: No numbness or focal weakness  Psych: No depression or insomnia  Skin: No rash or itching      Allergies and Intolerances:        Allergies:  	Levaquin: Drug, Anaphylaxis  	garlic: Food, Other (Mild), unknown    Home Medications:   * Patient Currently Takes Medications as of 23-Feb-2025 18:59 documented in Structured Notes  · 	folic acid 1 mg oral tablet: Last Dose Taken:  , 1 tab(s) orally once a day  · 	irbesartan-hydrochlorothiazide 300 mg-25 mg oral tablet: Last Dose Taken:  , 1 tab(s) orally once a day  · 	rOPINIRole 2 mg oral tablet: Last Dose Taken:  , 1 tab(s) orally 4 times a day 8am, 1pm, 6pm, 11pm  · 	fexofenadine 180 mg oral tablet: Last Dose Taken:  , 1 tab(s) orally once a day  · 	carbidopa-levodopa 25 mg-100 mg oral tablet: Last Dose Taken:  , 1 tab(s) orally 4 times a day 8am,1pm,6pm,11p  · 	trihexyphenidyl: Last Dose Taken:  , 1 milligram(s) 3 times a day  · 	Albuterol (Eqv-ProAir HFA) 90 mcg/inh inhalation aerosol: Last Dose Taken:  , 2 puff(s) inhaled every 4 hours as needed for  shortness of breath and/or wheezing  · 	Coumadin 2.5 mg oral tablet: Last Dose Taken:  , 1 tab(s) orally once a day 8am  · 	Symbicort 160 mcg-4.5 mcg/inh inhalation aerosol: Last Dose Taken:  , 2 puff(s) inhaled 2 times a day    Patient History:    Past Medical, Past Surgical, and Family History:  PAST MEDICAL HISTORY:  COPD (chronic obstructive pulmonary disease)     Diabetes     Factor V Leiden     Hypertension     Memory loss     AL (obstructive sleep apnea)     Parkinsons     Personal history of PE (pulmonary embolism)     Restless leg syndrome     Right inguinal hernia     Spinal stenosis.     PAST SURGICAL HISTORY:  H/O hernia repair     Herniated cervical disc.     FAMILY HISTORY:  Father  Still living? Unknown  FH: lung cancer, Age at diagnosis: Age Unknown.     Social History:  · Substance use	No  · Social History (marital status, living situation, occupation, and sexual history)	Lives at home.  Non smoker.  Non alcoholic.     Tobacco Screening:  · Core Measure Site	Yes  · Has the patient used tobacco in the past 30 days?	No    Risk Assessment:    Present on Admission:  Deep Venous Thrombosis	no  Pulmonary Embolus	no  Urinary Catheter	no     HIV Screening:  · In accordance with [place] State law, we offer every patient who comes to our ED an HIV test. Would you like to be tested today?	Opt out     Hepatitis C Test Questions:  · In accordance with [place] State Law, we offer every patient a Hepatitis C test. Would you like to be tested today?	Opt out    PAST MEDICAL & SURGICAL HISTORY:  Personal history of PE (pulmonary embolism)    COPD (chronic obstructive pulmonary disease)    Hypertension    Restless leg syndrome    Factor V Leiden    Parkinsons    Spinal stenosis    Memory loss    Right inguinal hernia    AL (obstructive sleep apnea)    Diabetes    Herniated cervical disc    H/O hernia repair          Medication list         MEDICATIONS  (STANDING):  albuterol/ipratropium for Nebulization 3 milliLiter(s) Nebulizer every 6 hours  azithromycin   Tablet 500 milliGRAM(s) Oral daily  carbidopa/levodopa  25/100 1 Tablet(s) Oral four times a day  cetirizine 10 milliGRAM(s) Oral daily  fluticasone propionate/ salmeterol 250-50 MICROgram(s) Diskus 1 Dose(s) Inhalation two times a day  folic acid 1 milliGRAM(s) Oral daily  methylPREDNISolone sodium succinate Injectable 80 milliGRAM(s) IV Push every 8 hours  rOPINIRole 2 milliGRAM(s) Oral four times a day  trihexyphenidyl 1 milliGRAM(s) Oral three times a day  warfarin 2.5 milliGRAM(s) Oral once    MEDICATIONS  (PRN):  acetaminophen     Tablet .. 650 milliGRAM(s) Oral every 6 hours PRN Temp greater or equal to 38C (100.4F), Mild Pain (1 - 3)  aluminum hydroxide/magnesium hydroxide/simethicone Suspension 30 milliLiter(s) Oral every 4 hours PRN Dyspepsia  melatonin 3 milliGRAM(s) Oral at bedtime PRN Insomnia  ondansetron Injectable 4 milliGRAM(s) IV Push every 8 hours PRN Nausea and/or Vomiting         Vitals log        ICU Vital Signs Last 24 Hrs  T(C): 36.9 (24 Feb 2025 07:59), Max: 36.9 (23 Feb 2025 22:46)  T(F): 98.4 (24 Feb 2025 07:59), Max: 98.5 (23 Feb 2025 22:46)  HR: 94 (24 Feb 2025 08:09) (90 - 116)  BP: 134/81 (24 Feb 2025 07:59) (108/56 - 141/83)  BP(mean): 95 (24 Feb 2025 06:16) (95 - 99)  ABP: --  ABP(mean): --  RR: 20 (24 Feb 2025 07:59) (18 - 22)  SpO2: 95% (24 Feb 2025 08:09) (93% - 99%)    O2 Parameters below as of 24 Feb 2025 08:09  Patient On (Oxygen Delivery Method): nasal cannula, 3 LPM                 Input and Output:  I&O's Detail      Lab Data                        14.8   8.13  )-----------( 206      ( 24 Feb 2025 07:20 )             43.6     02-24    138  |  105  |  21  ----------------------------<  209[H]  4.1   |  28  |  1.20    Ca    9.6      24 Feb 2025 07:20  Mg     2.4     02-24    TPro  7.1  /  Alb  3.6  /  TBili  0.9  /  DBili  x   /  AST  19  /  ALT  12  /  AlkPhos  92  02-23            Review of Systems	  sob  montoya  weakness  ataxic gait  cough  o2 support  all systems reviewed      Objective     Physical Examination    heart s1s2  lung dc bS  head nc  verbal  alert  tremor  o2 support      Pertinent Lab findings & Imaging      Mishra:  NO   Adequate UO     I&O's Detail           Discussed with:     Cultures:	        Radiology      ACC: [id number] EXAM:  CT ANGIO CHEST PULM ART WAWIC   ORDERED BY:  RACHELLE AVENDAÑO     PROCEDURE DATE:  02/23/2025          INTERPRETATION:  CLINICAL INFORMATION: SOB, hx DVT.    COMPARISON: Report from a CT chest abdomen pelvis dated 4/4/2023, images   not available for direct comparison.    CONTRAST/COMPLICATIONS:  IV Contrast: Omnipaque 350  70 cc administered   30 cc discarded  Oral Contrast: NONE    PROCEDURE:  CT Angiogram of the chest was obtained with intravenous contrast. Three   dimensional maximum intensity projection (MIP) images were generated.    FINDINGS:    Technical quality: Diagnostic.    CARDIOVASCULAR:  -There are no filling defects in the pulmonary arteries that would   indicate pulmonary embolism.  -No pericardial effusion. Normal heart size, the heart displaced to the   right of midline from the large diaphragmatic hernia.  -No significant aortic or any other potentially significant   cardiovascular abnormalities.  LUNGS/AIRWAYS: Patent central airways. Mild left basilar atelectasis   and/or scarring related to the large diaphragmatic hernia. Some mild   likely atelectasis or scarring in the right lower lobe with no definite   evidence of pneumonia a no significant pulmonary nodules.  HIRA/MEDIASTINUM: No adenopathy or mass.  PLEURA: No pleural effusion or pneumothorax.  CHEST WALL/AXILLA: Unremarkable.  VISUALIZED PORTIONS OF THE UPPER ABDOMEN: Partially imaged are some   gallbladder calcifications also reported previously. Likewise, partially   imaged is a small exophytic cyst upper pole right kidney.  MUSCULOSKELETAL: No acute findings. No concerning lytic or sclerotic   osseous lesions.  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    IMPRESSION:    1. No pulmonary embolism.    2. 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    3. There is some bibasilar atelectasis and/or scarring. No definite acute   pneumonia.    --- End of Report ---            XU ZAVALA MD; Attending Radiologist  This document has been electronically signed. Feb 23 2025  5:35PM

## 2025-02-24 NOTE — ED ADULT NURSE REASSESSMENT NOTE - AS TEMP SITE
History     Chief Complaint   Patient presents with     Hip Pain     fall at home, c/o right hip pain rated 10/10     HPI  Jimena Mensah is a 65 year old female who [facility] via EMS after being in her apartment with dozens of beer cans surrounding her reportedly falling at approximately 6 PM.  Only notes right hip pain.  Denies any other injury or trauma.  Presents now at 0230 hrs. denies chest pain.  Denies shortness of breath.  Denies headache.  Denies blood thinner usage.  Denies abdominal pain.  No other associated symptoms.  Does note drinking alcohol and medics report multiple beer cans in the residence.  No further history available from patient as she notes no other specifics on query likely due to level of EtOH intoxication    Allergies:  Allergies   Allergen Reactions     Cats      Other reaction(s): *Unknown  Cat/feline product derivatives  Fur-animal dander     Dust Mite Extract      Other reaction(s): *Unknown  Trees, pollen, ragweed     Dust Mites      House dust     Fluticasone      Other reaction(s): Headache  Other reaction(s): Headache     Fluticasone Propionate Other (See Comments)     Flonase  headaches     Mometasone Other (See Comments)     Other reaction(s): Headache     Mometasone Furoate      Nasonex  Headaches     Ragweeds      Trees        Problem List:    Patient Active Problem List    Diagnosis Date Noted     Hyponatremia 01/19/2021     Priority: Medium     Non-traumatic rhabdomyolysis 01/19/2021     Priority: Medium     Colon cancer stage II 05/01/2013     Priority: Medium     Alcohol abuse 05/01/2013     Priority: Medium     Smoker 05/01/2013     Priority: Medium     Liver failure (H) 05/01/2013     Priority: Medium     Chronic neck pain 05/01/2013     Priority: Medium     Hypothyroidism 05/01/2013     Priority: Medium     Secondary to thyroid ablation       COPD (chronic obstructive pulmonary disease) (H) 05/01/2013     Priority: Medium     Balance disorder 05/01/2013     Priority:  Medium     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 Provider to review          Past Medical History:    Past Medical History:   Diagnosis Date     Alcoholism (H)      Allergic rhinitis, cause unspecified 2012     Arthritis      Follow-up examination, following other surgery 2012     Hallucinations 10/23/2008     Hypertension      Mixed hearing loss, unspecified 2012     Sensorineural hearing loss, unspecified 2012     Thyroid disease        Past Surgical History:    Past Surgical History:   Procedure Laterality Date     bleeding,fibroids      NOBLE/BSO     cancer      bowel resection;      CARDIAC SURGERY      angiogram      delivery x 2       COLONOSCOPY       COLONOSCOPY  6/3/2013    Procedure: COLONOSCOPY;  COLONSCOPY WITH POSSIBLE BIOPSY;  Surgeon: Yudy Kamara DO;  Location: [facility]     COLONOSCOPY N/A 10/23/2014    Procedure: COLONOSCOPY;  Surgeon: Jordan Stephenson MD;  Location: [facility]     COLONOSCOPY - HIM SCAN  2006    Colonoscopy/flex diagnostic 2006     COLONOSCOPY - HIM SCAN  2008    Colonoscopy/flex diagnostic     COLONOSCOPY - HIM SCAN  2004    Colonoscopy with bx-large fungating mass encountered Rt side of colon midway down near cecum endocarcinoma 3/24/2004     COLONOSCOPY - HIM SCAN  2005    Colonoscopy with bx-mild inflammatory change consistent with anastomotic site 2005     GYN SURGERY      hysterectomy     PHACOEMULSIFICATION WITH STANDARD INTRAOCULAR LENS IMPLANT Left 10/16/2018    Procedure: CATARACT EXTRACTION LEFT EYE WITH IMPLANT;  Surgeon: Jamarcus Pham MD;  Location: [facility] OR     PHACOEMULSIFICATION WITH STANDARD INTRAOCULAR  "LENS IMPLANT Right 2018    Procedure: RIGHT CATARACT EXTRACTION WITH IMPLANT;  Surgeon: Jamarcus Pham MD;  Location: [facility] OR     Rotator cuff repair LT       sigmoid Diverticulitis      colonoscopy with polypectomy; 3/22/2010; Provider [facility]      sinus surgery x 2  2000     SOFT TISSUE SURGERY      lesion removal     TYMPANOMASTOIDECTOMY      RT       Family History:    Family History   Problem Relation Age of Onset     Cancer Mother 39        colon, cause of death     Cancer Maternal Uncle         bone     Cancer Maternal Uncle         bone     Cancer Other         colon, grandmother     Heart Disease Maternal Grandmother         heart disease     Hypertension Maternal Grandmother      Heart Disease Paternal Uncle         heart disease     Heart Disease Paternal Uncle         heart disease       Social History:  Marital Status:  Single [1]  Social History     Tobacco Use     Smoking status: Current Every Day Smoker     Packs/day: 0.50     Years: 30.00     Pack years: 15.00     Types: Cigarettes     Last attempt to quit: 3/23/2016     Years since quittin.8     Smokeless tobacco: Never Used     Tobacco comment: passive exposure: yes   Substance Use Topics     Alcohol use: Yes     Comment: beer, \"way, way a lot\"     Drug use: No        Medications:         atorvastatin (LIPITOR) 10 MG tablet       cetirizine (ZYRTEC) 10 MG tablet       Citalopram Hydrobromide (CELEXA PO)       diazepam (VALIUM) 2 MG tablet       diclofenac-misoprostol (ARTHROTEC 75) 75-0.2 MG per EC tablet       hydrochlorothiazide (HYDRODIURIL) 25 MG tablet       levothyroxine (SYNTHROID/LEVOTHROID) 112 MCG tablet       olopatadine (PATANOL) 0.1 % ophthalmic solution       traZODone (DESYREL) 50 MG tablet       triamcinolone (KENALOG) 0.1 % cream       albuterol (2.5 MG/3ML) 0.083% neb solution       albuterol (PROAIR HFA/PROVENTIL HFA/VENTOLIN HFA) 108 (90 BASE) MCG/ACT Inhaler       albuterol (PROVENTIL) (5 MG/ML) 0.5% " nebulizer solution       EPINEPHrine (EPIPEN) 0.3 MG/0.3ML injection       ipratropium - albuterol 0.5 mg/2.5 mg/3 mL (DUONEB) 0.5-2.5 (3) MG/3ML nebulization       polycarbophil (FIBERCON) 625 MG tablet       tolterodine (DETROL LA) 4 MG 24 hr capsule       triamcinolone (NASACORT) 55 MCG/ACT nasal inhaler        Review of Systems  Respiratory denies.  Cardiovascular denies.  GI denies.  Heme denies blood thinner usage.  Musculoskeletal per HPI.  Neurologic denies.  Remainder of complete 10 point review of systems negative.    Physical Exam   BP: 178/72  Pulse: 96  Temp: 97.8  F (36.6  C)  Resp: 20  SpO2: 95 %      Physical Exam  Constitutional:       Appearance: Normal appearance.   HENT:      Head: Normocephalic and atraumatic.      Nose: Nose normal. No congestion.      Mouth/Throat:      Mouth: Mucous membranes are moist.   Eyes:      Conjunctiva/sclera: Conjunctivae normal.      Pupils: Pupils are equal, round, and reactive to light.   Neck:      Musculoskeletal: Normal range of motion and neck supple.   Cardiovascular:      Comments: Tachycardia at time of exam  Pulmonary:      Effort: Pulmonary effort is normal. No respiratory distress.      Breath sounds: No wheezing.   Abdominal:      General: There is no distension.      Palpations: Abdomen is soft.      Tenderness: There is no abdominal tenderness.   Musculoskeletal:      Comments: Patient citing discomfort about the right hip.  No suprapubic discomfort.  No axial loading discomfort.  Range of motion with discomfort about the right hip.   Skin:     General: Skin is warm and dry.      Capillary Refill: Capillary refill takes less than 2 seconds.   Neurological:      Mental Status: She is alert and oriented to person, place, and time.   Psychiatric:      Comments: Patient appears clinically intoxicated on arrival       EKG: Read in real-time by the emergency physician shows left bundle.  Prior EKG from May 2020 with same morphology.  Ventricular rate 90,  CT interval 178, , .      Results for orders placed or performed during the hospital encounter of 01/19/21 (from the past 24 hour(s))   Alcohol ethyl   Result Value Ref Range    Ethanol g/dL 0.30 (H) 0.01 g/dL   Basic metabolic panel   Result Value Ref Range    Sodium 123 (L) 133 - 144 mmol/L    Potassium 4.7 3.4 - 5.3 mmol/L    Chloride 87 (L) 94 - 109 mmol/L    Carbon Dioxide 22 20 - 32 mmol/L    Anion Gap 14 3 - 14 mmol/L    Glucose 86 70 - 99 mg/dL    Urea Nitrogen 10 7 - 30 mg/dL    Creatinine 0.53 0.52 - 1.04 mg/dL    GFR Estimate >90 >60 mL/min/[1.73_m2]    GFR Estimate If Black >90 >60 mL/min/[1.73_m2]    Calcium 8.9 8.5 - 10.1 mg/dL   CBC with platelets differential   Result Value Ref Range    WBC 15.2 (H) 4.0 - 11.0 10e9/L    RBC Count 5.23 (H) 3.8 - 5.2 10e12/L    Hemoglobin 15.9 (H) 11.7 - 15.7 g/dL    Hematocrit 45.2 35.0 - 47.0 %    MCV 86 78 - 100 fl    MCH 30.4 26.5 - 33.0 pg    MCHC 35.2 31.5 - 36.5 g/dL    RDW 13.7 10.0 - 15.0 %    Platelet Count 243 150 - 450 10e9/L    Diff Method Manual Differential     % Neutrophils 87.0 %    % Lymphocytes 8.0 %    % Monocytes 5.0 %    % Eosinophils 0.0 %    % Basophils 0.0 %    Absolute Neutrophil 13.2 (H) 1.6 - 8.3 10e9/L    Absolute Lymphocytes 1.2 0.8 - 5.3 10e9/L    Absolute Monocytes 0.8 0.0 - 1.3 10e9/L    Absolute Eosinophils 0.0 0.0 - 0.7 10e9/L    Absolute Basophils 0.0 0.0 - 0.2 10e9/L    RBC Morphology Consistent with reported results     Platelet Estimate       Automated count confirmed.  Platelet morphology is normal.   INR   Result Value Ref Range    INR 0.93 0.86 - 1.14   Troponin I   Result Value Ref Range    Troponin I ES 0.026 0.000 - 0.045 ug/L   TSH   Result Value Ref Range    TSH 2.72 0.40 - 4.00 mU/L   CK total   Result Value Ref Range    CK Total 3,051 (H) 30 - 225 U/L   UA reflex to Microscopic and Culture    Specimen: Catheterized Urine   Result Value Ref Range    Color Urine Light Yellow     Appearance Urine Clear      Glucose Urine Negative NEG^Negative mg/dL    Bilirubin Urine Negative NEG^Negative    Ketones Urine 10 (A) NEG^Negative mg/dL    Specific Gravity Urine 1.010 1.003 - 1.035    Blood Urine Moderate (A) NEG^Negative    pH Urine 5.5 4.7 - 8.0 pH    Protein Albumin Urine 10 (A) NEG^Negative mg/dL    Urobilinogen mg/dL Normal 0.0 - 2.0 mg/dL    Nitrite Urine Negative NEG^Negative    Leukocyte Esterase Urine Negative NEG^Negative    Source Catheterized Urine     RBC Urine 0 0 - 2 /HPF    WBC Urine <1 0 - 5 /HPF    Bacteria Urine Few (A) NEG^Negative /HPF    Squamous Epithelial /HPF Urine 2 (H) 0 - 1 /HPF    Mucous Urine Present (A) NEG^Negative /LPF    Hyaline Casts 5 (A) OTO2^O - 2 /LPF     X-ray of the chest, limited due to portable AP nature though without overt pathology as read by me in real-time.  X-ray of the right hip shows no obvious evidence for fracture.  Read in real-time by the emergency physician.  CT of the pelvis shows no evidence for fracture as read by the overnight radiologist preliminarily.  Formal radiology reading pending    Medications   0.9% sodium chloride BOLUS (1,000 mLs Intravenous New Bag 1/19/21 0421)   OLANZapine zydis (zyPREXA) ODT tab 15 mg (15 mg Oral Given 1/19/21 8913)       Assessments & Plan (with Medical Decision Making)   Patient presenting after being down for nearly 7 hours on the ground with # beer cans found around her, intoxicated reporting right hip pain.  No evidence for fracture about the right hip on xray and subsequent CT.  Laboratories show concerning elevation of the CK to 3000 and hyponatremia to 123 as well as elevated alcohol level.  Patient hydrated here in the ED and plan for admission for treatment of hyponatremia and elevated CK.  Patient agreeable with plan.  Much thanks to Dr. Adair for graciously admitting Ms Mensah.  Please see Dr Adair's note for ongoing evaluation and management.    New Prescriptions    No medications on file       Final diagnoses:    Non-traumatic rhabdomyolysis   Hyponatremia   Alcohol abuse   Leukocytosis, unspecified type       1/19/2021   [facility]     Ramses Olsen MD  01/19/21 0402     oral

## 2025-02-24 NOTE — ED ADULT NURSE REASSESSMENT NOTE - NSFALLOOBATTEMPT_ED_ALL_ED
We care about your health; the following recommendations(s) have been made today:    Testing ordered today: Please call Central Scheduling at [phone] to schedule your CT  one week prior to your appointment in June.     Please follow up with the above recommendation within the next couple of days, if you have any further questions or concerns, please contact one of the offices of Dr. Conte:    [facility] at [phone]   [facility] - [phone]     Thank you for choosing this clinic for your medical care.     Pulmonary and Sleep Medicine  [facility]   
No

## 2025-02-24 NOTE — PATIENT PROFILE ADULT - FALL HARM RISK - HARM RISK INTERVENTIONS

## 2025-02-24 NOTE — CARE COORDINATION ASSESSMENT. - OTHER PERTINENT REFERRAL INFORMATION
Sw met with Pt and spouse at bedside.  Pt is A & O x4 and able to make his needs known. Sw introduced self and role and pt and spouse expressed vebral understanding. Pt lives in a pvt ranch home with spouse and there are 12 steps to basement in which the patient has his model train set and only goes down when he has his aide with him. Pt has a pvt aide M, W F for 8 hours and more if his spouse needs him. Pts spouse and aide help him with his adls and pt has a walker, shower chair and grab bars in the bathroom. [facility] but pt and spouse would prefer him to maintain his outpatient PT.  PCP: Mehdi Arechiga [phone]

## 2025-02-24 NOTE — PROGRESS NOTE ADULT - ASSESSMENT
78 yo Male with a past medical history of PE on warfarin, COPD, hypertension, Parkinson disease is presenting with complaints of shortness of breath    Acute Hypoxic respiratory failure  COPD exacerbation  Cough  -IV solumedrol  -Duoneb  -Advair(instead of Symbicort)  -Zithromax  -NC oxygen as needed to keep Ox sat >.88%  pulm cs fu    h/o PE  -on coumadin  -follow INR    Parkinsonism  -Sinemet  -Ropinirole    Coumadin for DVT ppx  Full code     [facility]   [phone]

## 2025-02-24 NOTE — GOALS OF CARE CONVERSATION - ADVANCED CARE PLANNING - CONVERSATION DETAILS
Spoke to pt wife at bedside. She states she is HCP and did not want to discuss CPR or other advance directives.

## 2025-02-25 LAB
ANION GAP SERPL CALC-SCNC: 6 MMOL/L — SIGNIFICANT CHANGE UP (ref 5–17)
BUN SERPL-MCNC: 30 MG/DL — HIGH (ref 7–23)
CALCIUM SERPL-MCNC: 9.2 MG/DL — SIGNIFICANT CHANGE UP (ref 8.5–10.1)
CHLORIDE SERPL-SCNC: 103 MMOL/L — SIGNIFICANT CHANGE UP (ref 96–108)
CO2 SERPL-SCNC: 29 MMOL/L — SIGNIFICANT CHANGE UP (ref 22–31)
CREAT SERPL-MCNC: 1.1 MG/DL — SIGNIFICANT CHANGE UP (ref 0.5–1.3)
EGFR: 68 ML/MIN/1.73M2 — SIGNIFICANT CHANGE UP
EGFR: 68 ML/MIN/1.73M2 — SIGNIFICANT CHANGE UP
GLUCOSE SERPL-MCNC: 163 MG/DL — HIGH (ref 70–99)
HCT VFR BLD CALC: 40 % — SIGNIFICANT CHANGE UP (ref 39–50)
HGB BLD-MCNC: 13.8 G/DL — SIGNIFICANT CHANGE UP (ref 13–17)
INR BLD: 2.48 RATIO — HIGH (ref 0.85–1.16)
MCHC RBC-ENTMCNC: 32.5 PG — SIGNIFICANT CHANGE UP (ref 27–34)
MCHC RBC-ENTMCNC: 34.5 G/DL — SIGNIFICANT CHANGE UP (ref 32–36)
MCV RBC AUTO: 94.1 FL — SIGNIFICANT CHANGE UP (ref 80–100)
NRBC BLD AUTO-RTO: 0 /100 WBCS — SIGNIFICANT CHANGE UP (ref 0–0)
PLATELET # BLD AUTO: 199 K/UL — SIGNIFICANT CHANGE UP (ref 150–400)
POTASSIUM SERPL-MCNC: 4 MMOL/L — SIGNIFICANT CHANGE UP (ref 3.5–5.3)
POTASSIUM SERPL-SCNC: 4 MMOL/L — SIGNIFICANT CHANGE UP (ref 3.5–5.3)
PROTHROM AB SERPL-ACNC: 29 SEC — HIGH (ref 9.9–13.4)
RBC # BLD: 4.25 M/UL — SIGNIFICANT CHANGE UP (ref 4.2–5.8)
RBC # FLD: 13.4 % — SIGNIFICANT CHANGE UP (ref 10.3–14.5)
SODIUM SERPL-SCNC: 138 MMOL/L — SIGNIFICANT CHANGE UP (ref 135–145)
WBC # BLD: 15.91 K/UL — HIGH (ref 3.8–10.5)
WBC # FLD AUTO: 15.91 K/UL — HIGH (ref 3.8–10.5)

## 2025-02-25 RX ORDER — BENZONATATE 100 MG
100 CAPSULE ORAL THREE TIMES A DAY
Refills: 0 | Status: DISCONTINUED | OUTPATIENT
Start: 2025-02-25 | End: 2025-03-07

## 2025-02-25 RX ADMIN — Medication 500 MILLIGRAM(S): at 11:41

## 2025-02-25 RX ADMIN — Medication 1 DOSE(S): at 18:32

## 2025-02-25 RX ADMIN — Medication 1 DOSE(S): at 11:41

## 2025-02-25 RX ADMIN — IPRATROPIUM BROMIDE AND ALBUTEROL SULFATE 3 MILLILITER(S): .5; 2.5 SOLUTION RESPIRATORY (INHALATION) at 13:07

## 2025-02-25 RX ADMIN — TRIHEXYPHENIDYL HYDROCHLORIDE 1 MILLIGRAM(S): 2 TABLET ORAL at 06:21

## 2025-02-25 RX ADMIN — Medication 100 MILLIGRAM(S): at 18:30

## 2025-02-25 RX ADMIN — IPRATROPIUM BROMIDE AND ALBUTEROL SULFATE 3 MILLILITER(S): .5; 2.5 SOLUTION RESPIRATORY (INHALATION) at 20:17

## 2025-02-25 RX ADMIN — ROPINIROLE 2 MILLIGRAM(S): 5 TABLET, FILM COATED ORAL at 02:30

## 2025-02-25 RX ADMIN — ROPINIROLE 2 MILLIGRAM(S): 5 TABLET, FILM COATED ORAL at 17:03

## 2025-02-25 RX ADMIN — TRIHEXYPHENIDYL HYDROCHLORIDE 1 MILLIGRAM(S): 2 TABLET ORAL at 21:50

## 2025-02-25 RX ADMIN — Medication 100 MILLIGRAM(S): at 23:36

## 2025-02-25 RX ADMIN — Medication 1 TABLET(S): at 02:30

## 2025-02-25 RX ADMIN — IPRATROPIUM BROMIDE AND ALBUTEROL SULFATE 3 MILLILITER(S): .5; 2.5 SOLUTION RESPIRATORY (INHALATION) at 02:32

## 2025-02-25 RX ADMIN — Medication 2.5 MILLIGRAM(S): at 21:50

## 2025-02-25 RX ADMIN — TRIHEXYPHENIDYL HYDROCHLORIDE 1 MILLIGRAM(S): 2 TABLET ORAL at 13:16

## 2025-02-25 RX ADMIN — Medication 10 MILLIGRAM(S): at 11:40

## 2025-02-25 RX ADMIN — Medication 100 MILLIGRAM(S): at 13:16

## 2025-02-25 RX ADMIN — ROPINIROLE 2 MILLIGRAM(S): 5 TABLET, FILM COATED ORAL at 23:36

## 2025-02-25 RX ADMIN — ROPINIROLE 2 MILLIGRAM(S): 5 TABLET, FILM COATED ORAL at 06:21

## 2025-02-25 RX ADMIN — Medication 1 TABLET(S): at 11:41

## 2025-02-25 RX ADMIN — IPRATROPIUM BROMIDE AND ALBUTEROL SULFATE 3 MILLILITER(S): .5; 2.5 SOLUTION RESPIRATORY (INHALATION) at 07:48

## 2025-02-25 RX ADMIN — Medication 1 TABLET(S): at 23:36

## 2025-02-25 RX ADMIN — Medication 1 TABLET(S): at 05:03

## 2025-02-25 RX ADMIN — ROPINIROLE 2 MILLIGRAM(S): 5 TABLET, FILM COATED ORAL at 11:41

## 2025-02-25 RX ADMIN — Medication 1 TABLET(S): at 06:22

## 2025-02-25 RX ADMIN — FOLIC ACID 1 MILLIGRAM(S): 1 TABLET ORAL at 11:40

## 2025-02-25 NOTE — PROGRESS NOTE ADULT - ASSESSMENT
78 yo Male with a past medical history of PE on warfarin, COPD, hypertension, Parkinson disease is presenting with complaints of shortness of breath    Acute Hypoxic respiratory failure  COPD exacerbation  Cough  -IV solumedrol  -Duoneb  -Advair  -Zithromax  -NC oxygen as needed to keep O2 sat >.88%  pulm cs fu    h/o PE  -on coumadin  -follow INR    Parkinsonism  -Sinemet  -Ropinirole    Coumadin for DVT ppx  Full code     [facility]   [phone]

## 2025-02-25 NOTE — PROGRESS NOTE ADULT - ASSESSMENT
80 yo Male with a past medical history of PE on warfarin, COPD, hypertension, Parkinson disease is presenting with complaints of shortness of breath.  States that he was short of breath starting this morning.  Tried his nebulizer without relief in symptoms.  And then upon walking to the bathroom, his symptoms acutely got worse so his wife called 911 for evaluation.    dysphagia  aspiration  copd  diaphragmatic hernia  Atelectasis  PE hx  OP  OA  Ataxic gait  Parkinson's disease  HTN  GERD    GOC - FULL CODE  vs noted  nebs   meds reviewed    SLP eval  asp prec  HOB elev  oral hygiene  skin care  assist with needs  fio2 support - keep sat > 88 pct  NEBS for COPD component - hold off on Systemic steroids for now  cough rx regimen PRN -   AC - hx of PE -   cvs rx regimen  BP control  nutritional assessment  spoke with WIFE  pt follows with Dr Rivas - Pulm Med - [place] - [facility] office  strong suspicion for Aspiration - chronic - as per pt and wife - coughs and has sx/signs of dysphagia  CT chest reviewed  VBG c/w COPD

## 2025-02-25 NOTE — SBIRT NOTE ADULT - NSSBIRTALCNOACTINTDET_GEN_A_CORE
Per wife he has mild dementia and she monitors what he drinks, typically 1 glass of wine per day, sometimes 2.

## 2025-02-25 NOTE — SOCIAL WORK PROGRESS NOTE - NSSWPROGRESSNOTE_GEN_ALL_CORE
Discussed at daily rounds. I met with patient and wife at bedside today due to SBIRT referral. Wife states he has some mild dementia and does not always answer accurately.  She answered most questions, SBIRT 4. No issues. Patient currently on oxygen which wife states he does not have at home. He was going for out pt PT pta and was doing fine.  to follow.

## 2025-02-26 LAB
ANION GAP SERPL CALC-SCNC: 5 MMOL/L — SIGNIFICANT CHANGE UP (ref 5–17)
BUN SERPL-MCNC: 27 MG/DL — HIGH (ref 7–23)
CALCIUM SERPL-MCNC: 8.5 MG/DL — SIGNIFICANT CHANGE UP (ref 8.5–10.1)
CHLORIDE SERPL-SCNC: 106 MMOL/L — SIGNIFICANT CHANGE UP (ref 96–108)
CO2 SERPL-SCNC: 29 MMOL/L — SIGNIFICANT CHANGE UP (ref 22–31)
CREAT SERPL-MCNC: 0.87 MG/DL — SIGNIFICANT CHANGE UP (ref 0.5–1.3)
EGFR: 87 ML/MIN/1.73M2 — SIGNIFICANT CHANGE UP
EGFR: 87 ML/MIN/1.73M2 — SIGNIFICANT CHANGE UP
GLUCOSE SERPL-MCNC: 128 MG/DL — HIGH (ref 70–99)
HCT VFR BLD CALC: 38.7 % — LOW (ref 39–50)
HGB BLD-MCNC: 12.9 G/DL — LOW (ref 13–17)
INR BLD: 3.43 RATIO — HIGH (ref 0.85–1.16)
MCHC RBC-ENTMCNC: 32 PG — SIGNIFICANT CHANGE UP (ref 27–34)
MCHC RBC-ENTMCNC: 33.3 G/DL — SIGNIFICANT CHANGE UP (ref 32–36)
MCV RBC AUTO: 96 FL — SIGNIFICANT CHANGE UP (ref 80–100)
NRBC BLD AUTO-RTO: 0 /100 WBCS — SIGNIFICANT CHANGE UP (ref 0–0)
PLATELET # BLD AUTO: 150 K/UL — SIGNIFICANT CHANGE UP (ref 150–400)
POTASSIUM SERPL-MCNC: 3.7 MMOL/L — SIGNIFICANT CHANGE UP (ref 3.5–5.3)
POTASSIUM SERPL-SCNC: 3.7 MMOL/L — SIGNIFICANT CHANGE UP (ref 3.5–5.3)
PROTHROM AB SERPL-ACNC: 39.7 SEC — HIGH (ref 9.9–13.4)
RBC # BLD: 4.03 M/UL — LOW (ref 4.2–5.8)
RBC # FLD: 13.5 % — SIGNIFICANT CHANGE UP (ref 10.3–14.5)
SODIUM SERPL-SCNC: 140 MMOL/L — SIGNIFICANT CHANGE UP (ref 135–145)
WBC # BLD: 11.02 K/UL — HIGH (ref 3.8–10.5)
WBC # FLD AUTO: 11.02 K/UL — HIGH (ref 3.8–10.5)

## 2025-02-26 RX ADMIN — TRIHEXYPHENIDYL HYDROCHLORIDE 1 MILLIGRAM(S): 2 TABLET ORAL at 06:31

## 2025-02-26 RX ADMIN — Medication 1 DOSE(S): at 06:39

## 2025-02-26 RX ADMIN — IPRATROPIUM BROMIDE AND ALBUTEROL SULFATE 3 MILLILITER(S): .5; 2.5 SOLUTION RESPIRATORY (INHALATION) at 19:53

## 2025-02-26 RX ADMIN — IPRATROPIUM BROMIDE AND ALBUTEROL SULFATE 3 MILLILITER(S): .5; 2.5 SOLUTION RESPIRATORY (INHALATION) at 07:35

## 2025-02-26 RX ADMIN — FOLIC ACID 1 MILLIGRAM(S): 1 TABLET ORAL at 12:50

## 2025-02-26 RX ADMIN — ROPINIROLE 2 MILLIGRAM(S): 5 TABLET, FILM COATED ORAL at 06:30

## 2025-02-26 RX ADMIN — IPRATROPIUM BROMIDE AND ALBUTEROL SULFATE 3 MILLILITER(S): .5; 2.5 SOLUTION RESPIRATORY (INHALATION) at 01:22

## 2025-02-26 RX ADMIN — ROPINIROLE 2 MILLIGRAM(S): 5 TABLET, FILM COATED ORAL at 22:01

## 2025-02-26 RX ADMIN — Medication 100 MILLIGRAM(S): at 18:17

## 2025-02-26 RX ADMIN — ROPINIROLE 2 MILLIGRAM(S): 5 TABLET, FILM COATED ORAL at 18:01

## 2025-02-26 RX ADMIN — Medication 10 MILLIGRAM(S): at 12:50

## 2025-02-26 RX ADMIN — TRIHEXYPHENIDYL HYDROCHLORIDE 1 MILLIGRAM(S): 2 TABLET ORAL at 18:02

## 2025-02-26 RX ADMIN — Medication 1 DOSE(S): at 22:04

## 2025-02-26 RX ADMIN — Medication 1 TABLET(S): at 18:01

## 2025-02-26 RX ADMIN — Medication 100 MILLIGRAM(S): at 09:12

## 2025-02-26 RX ADMIN — Medication 1 TABLET(S): at 22:01

## 2025-02-26 RX ADMIN — IPRATROPIUM BROMIDE AND ALBUTEROL SULFATE 3 MILLILITER(S): .5; 2.5 SOLUTION RESPIRATORY (INHALATION) at 14:31

## 2025-02-26 RX ADMIN — TRIHEXYPHENIDYL HYDROCHLORIDE 1 MILLIGRAM(S): 2 TABLET ORAL at 22:01

## 2025-02-26 RX ADMIN — Medication 1 TABLET(S): at 12:50

## 2025-02-26 RX ADMIN — Medication 1 TABLET(S): at 06:29

## 2025-02-26 RX ADMIN — ROPINIROLE 2 MILLIGRAM(S): 5 TABLET, FILM COATED ORAL at 12:50

## 2025-02-26 RX ADMIN — Medication 500 MILLIGRAM(S): at 12:50

## 2025-02-26 NOTE — PROGRESS NOTE ADULT - ASSESSMENT
80 yo Male with a past medical history of PE on warfarin, COPD, hypertension, Parkinson disease is presenting with complaints of shortness of breath    Acute Hypoxic respiratory failure  COPD exacerbation  Cough  s/p solumedrol  -Duoneb  -Advair  stable on oxygen  arranged for home oxygen  pulm cs fu    h/o PE  -on coumadin  -follow INR    Parkinsonism  -Sinemet  -Ropinirole    Coumadin for DVT ppx  Full code     dispo  medically ready for dc  per family aides will be arranged for dc friday am    [facility]   [phone]

## 2025-02-26 NOTE — SWALLOW BEDSIDE ASSESSMENT ADULT - COMMENTS
[facility] 2/25 "78 yo Male with a past medical history of PE on warfarin, COPD, hypertension, Parkinson disease is presenting with complaints of shortness of breath  Acute Hypoxic respiratory failure  COPD exacerbation  Cough".     CXR 2/23 "Right basilar linear atelectasis and left basilar subsegmental atelectasis."    Patient seen at bedside, awake/alert, during clinical swallow evaluation. Patient was cooperative and able to follow simple directions, and verbalize yes/no responses. Patient denied pain and accepted all PO trials.

## 2025-02-26 NOTE — SWALLOW BEDSIDE ASSESSMENT ADULT - SWALLOW EVAL: DIAGNOSIS
1) Functional oral stage of swallow for regular solids, puree, mildly thick, and thin liquids marked by adequate bolus manipulation and timely AP transfer. Adequate oral clearance noted. 2) Functional pharyngeal stage of swallow for regular solids, puree, mildly thick, and thin liquids marked by initiation of pharyngeal swallow trigger and hyolaryngeal excursion noted upon digital palpation. No overt signs/symptoms of penetration/aspiration noted.

## 2025-02-26 NOTE — DISCHARGE NOTE NURSING/CASE MANAGEMENT/SOCIAL WORK - NSDCPEFALRISK_GEN_ALL_CORE
For information on Fall & Injury Prevention, visit: [email or web address] OR  [email or web address] OR  https://www.cdc.gov/steadi/patient.html
Her/She

## 2025-02-26 NOTE — DISCHARGE NOTE NURSING/CASE MANAGEMENT/SOCIAL WORK - NURSING SECTION COMPLETE
Palliative Care Progress Note    Patient Name: Meka Rand  YOB: 1938  MRN: [id number]    Date of Visit: 4/20/2020   Visit Made By: Astrid Suarez CNP  Location or unit: [facility] 3a  Facility:[facility]     Primary Care Physician: Deloris Nair MD  Office Phone #: [phone]  Fax Phone #: [phone]    Reason for Palliative Care: Assistance with Advance Care Planning / Care Coordination , Education, Patient/Family and Psychosocial Support, Patient/Family     Subjective  Sitting up in chair feeling better, eating more but still poor intake, ate an omelet per daughter. 4/17 large bore chest tube placed with improvement. She is more lethargic today, received late trazodone. She was not sleeping well. Daughter does not want her to have trazodone and melatonin as she started having nightmares.     Palliative care following goals of care and advanced care planning given the patient's acute illness and prolonged hospitalization.    Pain Assessment  As above, no  Pain    Palliative Performance Scale 40%  PTA - independent, functional, active  Palliative Performance Scale  Palliative Performance Scale: Ambulation Mainly in Bed. Unable to do Any Work Extensive Disease. Self-Care Mainly Assistance. Intake Normal or Reduced. Consciousness Level Full or Drowsy or Confusion.     Objective  Physical Exam   Patient was not personally seen and examined in order to minimize risk of COVID-19 transmission/exposure. Information was gathered by chart review and care team.     Vitals:    04/20/20 0321 04/20/20 0548 04/20/20 0727 04/20/20 1034   BP: 112/68  101/58 101/62   Pulse: 74  73 73   Resp:   18 18   Temp:   98.1 °F (36.7 °C) 97.5 °F (36.4 °C)   TempSrc:   Oral Oral   SpO2: 92%  98% 92%   Weight:  62.5 kg (137 lb 12.6 oz)     Height:          Labs & Imaging   Recent Results (from the past 24 hour(s))   ABO/RH Confirmation    Collection Time: 04/19/20 10:55 AM   Result Value  Ref Range    BLOOD BANK COMMENT SEE COMMENTS    CBC No Differential    Collection Time: 20 12:02 PM   Result Value Ref Range    WBC 12.5 (H) 4.2 - 11.0 K/mcL    RBC 2.54 (L) 4.00 - 5.20 mil/mcL    HGB 7.4 (L) 12.0 - 15.5 g/dL    HCT 24.5 (L) 36.0 - 46.5 %    MCV 96.5 78.0 - 100.0 fl    MCH 29.1 26.0 - 34.0 pg    MCHC 30.2 (L) 32.0 - 36.5 g/dL    RDW-CV 16.9 (H) 11.0 - 15.0 %     140 - 450 K/mcL    NRBC 0 0 /100 WBC   TYPE/SCREEN    Collection Time: 20 12:02 PM   Result Value Ref Range    ABO/RH(D) O POSITIVE     ANTIBODY SCREEN NEGATIVE     CROSSMATCH  2020    Basic Metabolic Panel    Collection Time: 20  6:36 AM   Result Value Ref Range    Sodium 143 135 - 145 mmol/L    Potassium 3.3 (L) 3.4 - 5.1 mmol/L    Chloride 113 (H) 98 - 107 mmol/L    Carbon Dioxide 27 21 - 32 mmol/L    Anion Gap 6 (L) 10 - 20 mmol/L    Glucose 96 65 - 99 mg/dL    BUN 9 6 - 20 mg/dL    Creatinine 0.46 (L) 0.51 - 0.95 mg/dL    GFR Estimate,  >90     GFR Estimate, Non African American >90     BUN/Creatinine Ratio 20 7 - 25    CALCIUM 8.3 (L) 8.4 - 10.2 mg/dL   Magnesium    Collection Time: 20  6:36 AM   Result Value Ref Range    MAGNESIUM 1.7 1.7 - 2.4 mg/dL   CBC & Auto Differential    Collection Time: 20  9:00 AM   Result Value Ref Range    WBC 10.1 4.2 - 11.0 K/mcL    RBC 2.46 (L) 4.00 - 5.20 mil/mcL    HGB 7.1 (L) 12.0 - 15.5 g/dL    HCT 23.9 (L) 36.0 - 46.5 %    MCV 97.2 78.0 - 100.0 fl    MCH 28.9 26.0 - 34.0 pg    MCHC 29.7 (L) 32.0 - 36.5 g/dL    RDW-CV 17.2 (H) 11.0 - 15.0 %     140 - 450 K/mcL    NRBC 0 0 /100 WBC    DIFF TYPE PENDING     SEG PENDING %       Assessment and Plan   Acute respiratory failure (CMS/HCC)  Secondary to right empyema  O2 @3L  ID following with abx  Surgery following CT which has been removed  Pulmonology following  : CXR today increasing right atalectasis and effusion  4/15: right pleurex placed in IR  : right pneumothorax, CT  placed - will need for extended time per Dr. Morrison  Palliative following GOC pending progress    Palliative care encounter  Consult for GOC considering prolonged hospitalization and lack of clear improvement  Marginally decisional  No POA  Family involved  Daughter Lynn is SDM    Encephalopathy  Supportive therapy, reorientation, safe environment, fall precautions.     Weakness  Weakness/debility   2/2 prolonged hospitalization  PT and OT following  Currently planning SNF for rehab following discharge    Esophageal perforation  Resulting in empyema and subsequent respiratory failure  GI following  Esophageal stent in place (plan to remove after 4 weeks)    Malnutrition (CMS/[facility])  Dietitian following with calorie count.  Diet is puree, esophageal stent.   ST following as well - thin liquids ok  Encourage PO intake through nutrient dense foods, small frequent meals, supplements.  Consider pharmacologic measures such as mirtazapine.  Daughter stated she would want her to have AN if not eating.  Calorie count is poor but improving - not candidate for enteral nutrition currently d/t esophageal stent, TPN not appropriate given ability to take PO intake and infection. Will continue to monitor for now nutritional status and readdress if decline or failure to improve.    Symptom Management:   Dyspnea - Continue O2 per NC. CT in place 4/17. Given MS will hold off on adding Morphine and/or Ativan for symptomatic management. Nonpharmacologic measures incorporated as able.      Bowel Regimen:  Monitor  LBM 3x 4/19 per chart review     Supportive Therapies:  PT/OT - noted recommendations for ongoing therapy, rehab  Speech Therapy - dysphagia 1 diet with thin liquids  Dietitian - noted recommendations, calorie count, daughter wants AN if needed. Discussed at length plan of care.      Psychosocial/Spiritual Needs:  Daughter distressed over her mother's illness and condition as well as multiple other family members were  currently hospitalized. Emotional support provided.     Care Coordination:  Pending clinical condition, if improving consideration for [facility] for [facility] however if decline will need to consider alternative, family not willing to address this at this time.  Dr. Morrison recommending LTAC - daughter refusing  Living Arrangements: Home with   Home Based Resources: None     Plan Discussed with:   Rosy PRAR, Dr. Smith, Dr. Guerrero, Jeanne RD, daughter/SDM    Advanced Care Planning/Goals of Care  4/20: Discussed with Lynn daughter over the phone on plan of care and patient condition.  She feels her mother is improving overall she is more alert.  She is eating better.  Still concerned about her appetite and infection but understands she seems to be improving.  Concerned about anemia we discussed this.  She is distressed about the suggestion of LTAC and does not want her mother transferred due to her ongoing acute complications as well as reduction of exposure to COVID-19.  I did follow-up with daughter regarding POA, she spoke with her father and they were not able to locate any of power of  for healthcare forms.  They did discuss their wishes regarding her care and that they would not want her in a \"vegetative state \"we discussed if she were to decline CPR and intubation would not be recommended because her prognosis would be quite poor even if not in a vegetative state per se.  Daughter also inquired about ongoing aggressive measures in order for family to come and say goodbye which again I recommended against due to multiple factors including patient decompensation, additional patient suffering, added family emotional distress.  We did discuss her nutrition and will continue to monitor her nutritional intake.  She agrees to the addition of mirtazapine and reevaluate if not improving with her condition.     4/17: Patient is not fully decisional. Her daughter Lynn (SDM) I had a  discussion with her daughter regarding her current clinical condition as well as plan of care.  Discussed that she is overall not improving and we will attempt ongoing measures per pulmonology to improve her clinical condition.  Overall continues to be concerned that if she is not eating well and will need to be addressed in the next few days.  Calorie count revealing the same.  Daughter would like her to have artificial nutrition if that becomes necessary which it appears it may.  We also discussed care in the event that the patient declines.  Initiated conversation about CODE STATUS and recommendations would be against aggressive measures such as CPR intubation etc. Due to the resulting QOL given her current clinical condition and decline as well as the fact that the daughter expressed her independence and value on this prior to illness.  Daughter is emotional with this conversation but is able to state that she knows what her mom would want and she would not want these things even they would not result in subsequent quality of life acceptable per the patient.    We did discuss at length decision making and making decisions on the patient's behalf as daughter expressed guilt over decision making and also was concerned about her father's guilty over decision making. We did discuss that decisions should be made based on the patient's wishes and understanding of her wishes should she be able to receive this information.  Daughter feels that the patient may have a healthcare power of  somewhere at home or in a lock box and she will discuss this with her dad.  As well as discussed with family if patient has had conversations regarding these types of goals of care in the past making her wishes known.    4/15:  Lynn (daughter/SDM) is at the bedside, patient also agreeable to her niece Kirti being present over the phone as she is a nurse. Updated family on patient's clinical condition at length and all  questions answered to satisfaction. Family is frustrated they feel care has been delayed such as taking out chest tube, discussed process for decision to remove and monitoring prior to reinsertion as in this care. We also discussed her mental status and contributing factors at length. We discussed her poor nutrition/oral intake as well. Daughter reports that a week ago she was doing very well and she expects her to get back to this. She also reports her prior functional status was exceptional and she expects her to get back to this as well with time. We did agree that her baseline allows her more of a chance of recovery however I recommended that we also need to give serious thought to the QOL she would appreciate if not able to recover to this point and it should be discussed what she would deem an acceptable QOL. Family did not want to think about this at this time. The concern about her nutrition also had family inquiring about artificial nutrition. We discussed at length that oral is preferable route, I would not recommend TPN again due to higher risk than with enteral AN. However, reinforced that this would not be a solution to her acute problem including infection, debilitated state, or AMS. I recommended we hope for the best but plan for alternate scenarios. Daughter was adamant against this and was very emotionally distressed.  This should be addressed in the future and I would not recommend aggressive measures although given our conversation today her family would like her to undergo aggressive measures.   4/14: Patient not participatory. In summary introduced palliative care role to daughter, discussed her long and fluctuating hospitalization and implications for future improvement. We will follow.     Ethical, Legal Decision-making capacity: not decisional (likely temporary)   POA/Surrogate: Surrogate is Lynn (daughter).  POLST: No, patient is full code.   Code Status: FULL CODE   Prognosis: Pending  improvement, regardless will require long course to improve to baseline      POA at End of Visit: No  Reason if No POA on Chart Prior to DC: HEALTHCARE SURROGATE  POLST at End of Visit: No  LET Order at Time of Disch: No  Discharge Disposition: Disposition Not Yet Determined    Total combined time for today's Face to Face encounter was 80 minutes, with > 50% of that time spent counseling and coordinating care regarding the above.   Encounter started at 10:15-10:45, 12:30-1:20    Thank you for involving Palliative Care.    Please contact the covering provider via [employer] with further questions or concerns.  Astrid Martinez  [phone]   Patient/Caregiver provided printed discharge information.

## 2025-02-26 NOTE — DISCHARGE NOTE NURSING/CASE MANAGEMENT/SOCIAL WORK - FINANCIAL ASSISTANCE
[facility] provides services at a reduced cost to those who are determined to be eligible through [facility]’s financial assistance program. Information regarding [facility]’s financial assistance program can be found by going to [email or web address] or by calling [phone].

## 2025-02-26 NOTE — DISCHARGE NOTE NURSING/CASE MANAGEMENT/SOCIAL WORK - NSDCVIVACCINE_GEN_ALL_CORE_FT
Td (adult) preservative free; 31-May-2014 15:13; Christine Neves (RN); [id number]; IntraMuscular; Deltoid Left.; 0.5 milliLiter(s);   Tdap; 04-Apr-2023 13:17; Yumiko Alatorre (RN); [employer]; [id number] (Exp. Date: 01-Jun-2024); IntraMuscular; Deltoid Right.; 0.5 milliLiter(s); VIS (VIS Published: 09-May-2013, VIS Presented: 04-Apr-2023);

## 2025-02-26 NOTE — DISCHARGE NOTE NURSING/CASE MANAGEMENT/SOCIAL WORK - NSSCNAMETXT_GEN_ALL_CORE
[facility] -(  Nurse to visit the day after hospital discharge; physical therapist to follow. Please contact the home care agency at the above phone number if you have not heard from them by 12 noon on the day after your hospital discharge.

## 2025-02-26 NOTE — SWALLOW BEDSIDE ASSESSMENT ADULT - ADDITIONAL RECOMMENDATIONS
This department to follow up as schedule permits to assess for diet tolerance and/or need for objective assessment. Medical team further advised to reconsult if there is a change in medical status and/or observed change in patient's tolerance of recommended PO diet.

## 2025-02-26 NOTE — PROGRESS NOTE ADULT - ASSESSMENT
78 yo Male with a past medical history of PE on warfarin, COPD, hypertension, Parkinson disease is presenting with complaints of shortness of breath.  States that he was short of breath starting this morning.  Tried his nebulizer without relief in symptoms.  And then upon walking to the bathroom, his symptoms acutely got worse so his wife called 911 for evaluation.    dysphagia  aspiration  copd  diaphragmatic hernia  Atelectasis  PE hx  OP  OA  Ataxic gait  Parkinson's disease  HTN  GERD    GOC - FULL CODE  may need home o2 if he meets criteria  cm f/u  follows with Rob SHARPE for Pulm Med -     SLP eval  asp prec  HOB elev  oral hygiene  skin care  assist with needs  fio2 support - keep sat > 88 pct  NEBS for COPD component - hold off on Systemic steroids for now  cough rx regimen PRN -   AC - hx of PE -   cvs rx regimen  BP control  nutritional assessment  spoke with WIFE  pt follows with Dr Rivas - Pulm Med - [place] - [facility] office  strong suspicion for Aspiration - chronic - as per pt and wife - coughs and has sx/signs of dysphagia  CT chest reviewed  VBG c/w COPD

## 2025-02-26 NOTE — CAREGIVER ENGAGEMENT NOTE - CAREGIVER OUTREACH NOTES - FREE TEXT
CM met with patient and spouse at bedside. Pt gave permission for CM to speak with spouse. Pt remains on oxygen 2L. CM discussed that patient may need home oxygen when he is cleared for transition to home. Pt and spouse are agreeable to receiving home oxygen. CM educated both that patient will receive a portable oxygen concentrator and a concentrator pending insurance approval. CM discussed that the DME company that the hospital utilizes is [employer]. Pt and spouse are agreeable to utilizing [employer]. Patient is identified as a CMS STAR patient. Transition care management program explained and patient agreeable to receiving home care visiting nurse services through [facility]..

## 2025-02-26 NOTE — DISCHARGE NOTE NURSING/CASE MANAGEMENT/SOCIAL WORK - PATIENT PORTAL LINK FT
You can access the FollowMyHealth Patient Portal offered by [facility] by registering at the following website: [email or web address]. By joining [employer]’s FollowMyHealth portal, you will also be able to view your health information using other applications (apps) compatible with our system.

## 2025-02-27 ENCOUNTER — TRANSCRIPTION ENCOUNTER (OUTPATIENT)
Age: 80
End: 2025-02-27

## 2025-02-27 LAB
ANION GAP SERPL CALC-SCNC: 4 MMOL/L — LOW (ref 5–17)
BUN SERPL-MCNC: 17 MG/DL — SIGNIFICANT CHANGE UP (ref 7–23)
CALCIUM SERPL-MCNC: 8.8 MG/DL — SIGNIFICANT CHANGE UP (ref 8.5–10.1)
CHLORIDE SERPL-SCNC: 104 MMOL/L — SIGNIFICANT CHANGE UP (ref 96–108)
CO2 SERPL-SCNC: 30 MMOL/L — SIGNIFICANT CHANGE UP (ref 22–31)
CREAT SERPL-MCNC: 0.87 MG/DL — SIGNIFICANT CHANGE UP (ref 0.5–1.3)
EGFR: 87 ML/MIN/1.73M2 — SIGNIFICANT CHANGE UP
EGFR: 87 ML/MIN/1.73M2 — SIGNIFICANT CHANGE UP
GLUCOSE SERPL-MCNC: 126 MG/DL — HIGH (ref 70–99)
HCT VFR BLD CALC: 39.1 % — SIGNIFICANT CHANGE UP (ref 39–50)
HGB BLD-MCNC: 13.1 G/DL — SIGNIFICANT CHANGE UP (ref 13–17)
INR BLD: 2.7 RATIO — HIGH (ref 0.85–1.16)
MCHC RBC-ENTMCNC: 31.9 PG — SIGNIFICANT CHANGE UP (ref 27–34)
MCHC RBC-ENTMCNC: 33.5 G/DL — SIGNIFICANT CHANGE UP (ref 32–36)
MCV RBC AUTO: 95.1 FL — SIGNIFICANT CHANGE UP (ref 80–100)
NRBC BLD AUTO-RTO: 0 /100 WBCS — SIGNIFICANT CHANGE UP (ref 0–0)
PLATELET # BLD AUTO: 146 K/UL — LOW (ref 150–400)
POTASSIUM SERPL-MCNC: 3.6 MMOL/L — SIGNIFICANT CHANGE UP (ref 3.5–5.3)
POTASSIUM SERPL-SCNC: 3.6 MMOL/L — SIGNIFICANT CHANGE UP (ref 3.5–5.3)
PROTHROM AB SERPL-ACNC: 31.6 SEC — HIGH (ref 9.9–13.4)
RBC # BLD: 4.11 M/UL — LOW (ref 4.2–5.8)
RBC # FLD: 13.2 % — SIGNIFICANT CHANGE UP (ref 10.3–14.5)
SODIUM SERPL-SCNC: 138 MMOL/L — SIGNIFICANT CHANGE UP (ref 135–145)
WBC # BLD: 7.28 K/UL — SIGNIFICANT CHANGE UP (ref 3.8–10.5)
WBC # FLD AUTO: 7.28 K/UL — SIGNIFICANT CHANGE UP (ref 3.8–10.5)

## 2025-02-27 RX ORDER — BENZONATATE 100 MG
1 CAPSULE ORAL
Qty: 15 | Refills: 0
Start: 2025-02-27

## 2025-02-27 RX ORDER — BENZONATATE 100 MG
1 CAPSULE ORAL
Qty: 0 | Refills: 0 | DISCHARGE
Start: 2025-02-27

## 2025-02-27 RX ADMIN — Medication 1 DOSE(S): at 21:14

## 2025-02-27 RX ADMIN — Medication 1 TABLET(S): at 23:04

## 2025-02-27 RX ADMIN — ROPINIROLE 2 MILLIGRAM(S): 5 TABLET, FILM COATED ORAL at 05:37

## 2025-02-27 RX ADMIN — ROPINIROLE 2 MILLIGRAM(S): 5 TABLET, FILM COATED ORAL at 12:24

## 2025-02-27 RX ADMIN — FOLIC ACID 1 MILLIGRAM(S): 1 TABLET ORAL at 12:24

## 2025-02-27 RX ADMIN — Medication 1 TABLET(S): at 18:11

## 2025-02-27 RX ADMIN — TRIHEXYPHENIDYL HYDROCHLORIDE 1 MILLIGRAM(S): 2 TABLET ORAL at 15:02

## 2025-02-27 RX ADMIN — Medication 1 TABLET(S): at 12:24

## 2025-02-27 RX ADMIN — Medication 10 MILLIGRAM(S): at 12:24

## 2025-02-27 RX ADMIN — Medication 1 TABLET(S): at 05:36

## 2025-02-27 RX ADMIN — IPRATROPIUM BROMIDE AND ALBUTEROL SULFATE 3 MILLILITER(S): .5; 2.5 SOLUTION RESPIRATORY (INHALATION) at 07:58

## 2025-02-27 RX ADMIN — TRIHEXYPHENIDYL HYDROCHLORIDE 1 MILLIGRAM(S): 2 TABLET ORAL at 21:13

## 2025-02-27 RX ADMIN — IPRATROPIUM BROMIDE AND ALBUTEROL SULFATE 3 MILLILITER(S): .5; 2.5 SOLUTION RESPIRATORY (INHALATION) at 01:17

## 2025-02-27 RX ADMIN — Medication 1 DOSE(S): at 05:37

## 2025-02-27 RX ADMIN — Medication 500 MILLIGRAM(S): at 12:24

## 2025-02-27 RX ADMIN — ROPINIROLE 2 MILLIGRAM(S): 5 TABLET, FILM COATED ORAL at 23:04

## 2025-02-27 RX ADMIN — Medication 2 MILLIGRAM(S): at 21:13

## 2025-02-27 RX ADMIN — ROPINIROLE 2 MILLIGRAM(S): 5 TABLET, FILM COATED ORAL at 18:11

## 2025-02-27 RX ADMIN — IPRATROPIUM BROMIDE AND ALBUTEROL SULFATE 3 MILLILITER(S): .5; 2.5 SOLUTION RESPIRATORY (INHALATION) at 20:17

## 2025-02-27 RX ADMIN — TRIHEXYPHENIDYL HYDROCHLORIDE 1 MILLIGRAM(S): 2 TABLET ORAL at 05:36

## 2025-02-27 NOTE — PROGRESS NOTE ADULT - ASSESSMENT
78 yo Male with a past medical history of PE on warfarin, COPD, hypertension, Parkinson disease is presenting with complaints of shortness of breath    Acute Hypoxic respiratory failure  COPD exacerbation  Cough  s/p solumedrol  -Duoneb  -Advair  stable on oxygen  arranged for home oxygen  pulm cs fu    h/o PE  -on coumadin  -follow INR    Parkinsonism  -Sinemet  -Ropinirole    Coumadin for DVT ppx  Full code     dispo  medically ready for dc  per family aides will be arranged for dc friday am    [employer]   [phone]

## 2025-02-27 NOTE — DISCHARGE NOTE PROVIDER - HOSPITAL COURSE
78 yo Male with a past medical history of PE on warfarin, COPD, hypertension, Parkinson disease is presenting with complaints of shortness of breath.  States that he was short of breath starting this morning.  Tried his nebulizer without relief in symptoms.  And then upon walking to the bathroom, his symptoms acutely got worse so his wife called 911 for evaluation.  No recent fevers or sick contacts.  Patient has been coughing.  No nausea or vomiting.  No leg swelling.  No history of heart failure. No fever at home. Initially in ED he was placed on BIPAP, now improved and placed on NC oxygen. He feels comfortable now with his breathing. Pt was admitted and treated for copd exacerbation with steroids and bronchodilators. his breathing improved but he was still requiring supplemental oxygen. He was dced home with o2  LABS:                        13.1   7.28  )-----------( 146      ( 27 Feb 2025 06:30 )             39.1     02-27    138  |  104  |  17  ----------------------------<  126[H]  3.6   |  30  |  0.87    Ca    8.8      27 Feb 2025 06:30    RADIOLOGY & ADDITIONAL TESTS:  < from: CT Angio Chest PE Protocol w/ IV Cont (02.23.25 @ 17:08) >    CARDIOVASCULAR:  -There are no filling defects in the pulmonary arteries that would   indicate pulmonary embolism.  -No pericardial effusion. Normal heart size, the heart displaced to the   right of midline from the large diaphragmatic hernia.  -No significant aortic or any other potentially significant   cardiovascular abnormalities.  LUNGS/AIRWAYS: Patent central airways. Mild left basilar atelectasis   and/or scarring related to the large diaphragmatic hernia. Some mild   likely atelectasis or scarring in the right lower lobe with no definite   evidence of pneumonia a no significant pulmonary nodules.  HIRA/MEDIASTINUM: No adenopathy or mass.  PLEURA: No pleural effusion or pneumothorax.  CHEST WALL/AXILLA: Unremarkable.  VISUALIZED PORTIONS OF THE UPPER ABDOMEN: Partially imaged are some   gallbladder calcifications also reported previously. Likewise, partially   imaged is a small exophytic cyst upper pole right kidney.  MUSCULOSKELETAL: No acute findings. No concerning lytic or sclerotic   osseous lesions.  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    IMPRESSION:    1. No pulmonary embolism.    2. 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    3. There is some bibasilar atelectasis and/or scarring. No definite acute   pneumonia.    < end of copied text >     80 yo Male with a past medical history of PE on warfarin, COPD, hypertension, Parkinson disease is presenting with complaints of shortness of breath.  States that he was short of breath starting this morning.  Tried his nebulizer without relief in symptoms.  And then upon walking to the bathroom, his symptoms acutely got worse so his wife called 911 for evaluation.  No recent fevers or sick contacts.  Patient has been coughing.  No nausea or vomiting.  No leg swelling.  No history of heart failure. No fever at home. Initially in ED he was placed on BIPAP, now improved and placed on NC oxygen. He feels comfortable now with his breathing. Pt was admitted and treated for copd exacerbation with steroids and bronchodilators. his breathing improved but he was still requiring supplemental oxygen. He was dced home with o2  LABS:                        13.1   7.28  )-----------( 146      ( 27 Feb 2025 06:30 )             39.1     02-27    138  |  104  |  17  ----------------------------<  126[H]  3.6   |  30  |  0.87    Ca    8.8      27 Feb 2025 06:30    RADIOLOGY & ADDITIONAL TESTS:  CT Angio Chest PE Protocol w/ IV Cont     CARDIOVASCULAR:  -There are no filling defects in the pulmonary arteries that would   indicate pulmonary embolism.  -No pericardial effusion. Normal heart size, the heart displaced to the   right of midline from the large diaphragmatic hernia.  -No significant aortic or any other potentially significant   cardiovascular abnormalities.  LUNGS/AIRWAYS: Patent central airways. Mild left basilar atelectasis   and/or scarring related to the large diaphragmatic hernia. Some mild   likely atelectasis or scarring in the right lower lobe with no definite   evidence of pneumonia a no significant pulmonary nodules.  HIRA/MEDIASTINUM: No adenopathy or mass.  PLEURA: No pleural effusion or pneumothorax.  CHEST WALL/AXILLA: Unremarkable.  VISUALIZED PORTIONS OF THE UPPER ABDOMEN: Partially imaged are some   gallbladder calcifications also reported previously. Likewise, partially   imaged is a small exophytic cyst upper pole right kidney.  MUSCULOSKELETAL: No acute findings. No concerning lytic or sclerotic   osseous lesions.  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    IMPRESSION:    1. No pulmonary embolism.    2. 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    3. There is some bibasilar atelectasis and/or scarring. No definite acute   pneumonia.       78 yo Male with a past medical history of PE on warfarin, COPD, hypertension, Parkinson disease is presenting with complaints of shortness of breath.  States that he was short of breath starting this morning.  Tried his nebulizer without relief in symptoms.  And then upon walking to the bathroom, his symptoms acutely got worse so his wife called 911 for evaluation.  No recent fevers or sick contacts.  Patient has been coughing.  No nausea or vomiting.  No leg swelling.  No history of heart failure. No fever at home. Initially in ED he was placed on BIPAP, now improved and placed on NC oxygen. He feels comfortable now with his breathing. Pt was admitted and treated for copd exacerbation with steroids and bronchodilators. his breathing improved but he was still requiring supplemental oxygen. He was dced home with o2.  LABS:                        13.1   7.28  )-----------( 146      ( 27 Feb 2025 06:30 )             39.1     02-27    138  |  104  |  17  ----------------------------<  126[H]  3.6   |  30  |  0.87    Ca    8.8      27 Feb 2025 06:30    RADIOLOGY & ADDITIONAL TESTS:  CT Angio Chest PE Protocol w/ IV Cont     CARDIOVASCULAR:  -There are no filling defects in the pulmonary arteries that would   indicate pulmonary embolism.  -No pericardial effusion. Normal heart size, the heart displaced to the   right of midline from the large diaphragmatic hernia.  -No significant aortic or any other potentially significant   cardiovascular abnormalities.  LUNGS/AIRWAYS: Patent central airways. Mild left basilar atelectasis   and/or scarring related to the large diaphragmatic hernia. Some mild   likely atelectasis or scarring in the right lower lobe with no definite   evidence of pneumonia a no significant pulmonary nodules.  HIRA/MEDIASTINUM: No adenopathy or mass.  PLEURA: No pleural effusion or pneumothorax.  CHEST WALL/AXILLA: Unremarkable.  VISUALIZED PORTIONS OF THE UPPER ABDOMEN: Partially imaged are some   gallbladder calcifications also reported previously. Likewise, partially   imaged is a small exophytic cyst upper pole right kidney.  MUSCULOSKELETAL: No acute findings. No concerning lytic or sclerotic   osseous lesions.  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    IMPRESSION:    1. No pulmonary embolism.    2. 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    3. There is some bibasilar atelectasis and/or scarring. No definite acute   pneumonia.  < from: CT Chest No Cont (03.06.25 @ 11:25) >    1. Redemonstrated very large anterior left diaphragmatic hernia   containing numerous loops of bowel extending close to the left lung apex,   with near complete collapse of the entire left lung. There is mild   mediastinal shift to the right. These findings have not significantly   changed from the most recent prior study.

## 2025-02-27 NOTE — DISCHARGE NOTE PROVIDER - CARE PROVIDERS DIRECT ADDRESSES
[email or web address]

## 2025-02-27 NOTE — DISCHARGE NOTE PROVIDER - NSDCCPCAREPLAN_GEN_ALL_CORE_FT
PRINCIPAL DISCHARGE DIAGNOSIS  Diagnosis: Acute respiratory failure with hypoxia  Assessment and Plan of Treatment:       SECONDARY DISCHARGE DIAGNOSES  Diagnosis: COPD exacerbation  Assessment and Plan of Treatment:      PRINCIPAL DISCHARGE DIAGNOSIS  Diagnosis: Acute respiratory failure with hypoxia  Assessment and Plan of Treatment: admitted and treated for copd exacerbation with steroids and bronchodilators. his breathing improved but he was still requiring supplemental oxygen. He was dced home with o2      SECONDARY DISCHARGE DIAGNOSES  Diagnosis: COPD exacerbation  Assessment and Plan of Treatment:      PRINCIPAL DISCHARGE DIAGNOSIS  Diagnosis: Acute respiratory failure with hypoxia  Assessment and Plan of Treatment: admitted and treated for copd exacerbation with steroids and bronchodilators. his breathing improved but he was still requiring supplemental oxygen. He was dced home with o2  steroid induced hyperglycemia- outpt fu  # CT with large anterior left diaphragmatic hernia containing numerous loops of bowel extending close to the left lung apex, with near complete collapse of the entire left lung,  seen by thoracic sx  - No acute surgical intervention   - known hx of large anterior left diaphragmatic hernia for 20 years  - was told by previous surgeons he isn't a good candidate for this major surgery  - outpatient thoracic surgery followup w Dr. Sequeira        SECONDARY DISCHARGE DIAGNOSES  Diagnosis: COPD exacerbation  Assessment and Plan of Treatment:

## 2025-02-27 NOTE — DISCHARGE NOTE PROVIDER - PROVIDER TOKENS
PROVIDER:[TOKEN:[[id number]]],PROVIDER:[TOKEN:[[id number]]] PROVIDER:[TOKEN:[[id number]]],PROVIDER:[TOKEN:[[id number]]],PROVIDER:[TOKEN:[[id number]],FOLLOWUP:[1 week]]

## 2025-02-27 NOTE — DISCHARGE NOTE PROVIDER - CARE PROVIDER_API CALL
Mehdi Arechiga  Internal Medicine  [facility], [address]  [place] [address]  Phone: [phone]  Fax: [phone]  Follow Up Time:     Chris Rivas  Pulmonary Disease  [address], [address]  [place] [address]  Phone: [phone]  Fax: [phone]  Follow Up Time:    Mehdi Arechiga  Internal Medicine  [address], [address]  [place] [address]  Phone: [phone]  Fax: [phone]  Follow Up Time:     Chris Rivas  Pulmonary Disease  [address], [address]  [place] [address]  Phone: [phone]  Fax: [phone]  Follow Up Time:     Darío Sequeira  Thoracic Surgery  [address] [place] [id number]  Phone: [phone]  Fax: [phone]  Follow Up Time: 1 week

## 2025-02-27 NOTE — DISCHARGE NOTE PROVIDER - NSDCMRMEDTOKEN_GEN_ALL_CORE_FT
Albuterol (Eqv-ProAir HFA) 90 mcg/inh inhalation aerosol: 2 puff(s) inhaled every 4 hours as needed for  shortness of breath and/or wheezing  benzonatate 100 mg oral capsule: 1 cap(s) orally 3 times a day As needed Cough  carbidopa-levodopa 25 mg-100 mg oral tablet: 1 tab(s) orally 4 times a day 8am,1pm,6pm,11p  Coumadin 2.5 mg oral tablet: 1 tab(s) orally once a day 8am  fexofenadine 180 mg oral tablet: 1 tab(s) orally once a day  folic acid 1 mg oral tablet: 1 tab(s) orally once a day  irbesartan-hydrochlorothiazide 300 mg-25 mg oral tablet: 1 tab(s) orally once a day  rOPINIRole 2 mg oral tablet: 1 tab(s) orally 4 times a day 8am, 1pm, 6pm, 11pm  Symbicort 160 mcg-4.5 mcg/inh inhalation aerosol: 2 puff(s) inhaled 2 times a day  trihexyphenidyl: 1 milligram(s) 3 times a day   Albuterol (Eqv-ProAir HFA) 90 mcg/inh inhalation aerosol: 2 puff(s) inhaled every 4 hours as needed for  shortness of breath and/or wheezing  benzonatate 100 mg oral capsule: 1 cap(s) orally 3 times a day as needed for Cough  carbidopa-levodopa 25 mg-100 mg oral tablet: 1 tab(s) orally 4 times a day 8am,1pm,6pm,11p  Coumadin 2.5 mg oral tablet: 1 tab(s) orally once a day 8am  DilTIAZem (Eqv-Cardizem CD) 240 mg/24 hours oral capsule, extended release: 1 cap(s) orally once a day  fexofenadine 180 mg oral tablet: 1 tab(s) orally once a day  folic acid 1 mg oral tablet: 1 tab(s) orally once a day  irbesartan-hydrochlorothiazide 300 mg-25 mg oral tablet: 1 tab(s) orally once a day  rOPINIRole 2 mg oral tablet: 1 tab(s) orally 4 times a day 8am, 1pm, 6pm, 11pm  Symbicort 160 mcg-4.5 mcg/inh inhalation aerosol: 2 puff(s) inhaled 2 times a day  trihexyphenidyl: 1 milligram(s) 3 times a day   Albuterol (Eqv-ProAir HFA) 90 mcg/inh inhalation aerosol: 2 puff(s) inhaled every 4 hours as needed for  shortness of breath and/or wheezing  benzonatate 100 mg oral capsule: 1 cap(s) orally 3 times a day as needed for Cough  carbidopa-levodopa 25 mg-100 mg oral tablet: 1 tab(s) orally 4 times a day 8am,1pm,6pm,11p  Coumadin 2.5 mg oral tablet: 1 tab(s) orally once a day 8am  DilTIAZem (Eqv-Cardizem CD) 240 mg/24 hours oral capsule, extended release: 1 cap(s) orally once a day  fexofenadine 180 mg oral tablet: 1 tab(s) orally once a day  folic acid 1 mg oral tablet: 1 tab(s) orally once a day  rOPINIRole 2 mg oral tablet: 1 tab(s) orally 4 times a day 8am, 1pm, 6pm, 11pm  Symbicort 160 mcg-4.5 mcg/inh inhalation aerosol: 2 puff(s) inhaled 2 times a day  trihexyphenidyl: 1 milligram(s) 3 times a day

## 2025-02-28 LAB
ANION GAP SERPL CALC-SCNC: 5 MMOL/L — SIGNIFICANT CHANGE UP (ref 5–17)
BUN SERPL-MCNC: 19 MG/DL — SIGNIFICANT CHANGE UP (ref 7–23)
CALCIUM SERPL-MCNC: 8.6 MG/DL — SIGNIFICANT CHANGE UP (ref 8.5–10.1)
CHLORIDE SERPL-SCNC: 104 MMOL/L — SIGNIFICANT CHANGE UP (ref 96–108)
CO2 SERPL-SCNC: 29 MMOL/L — SIGNIFICANT CHANGE UP (ref 22–31)
CREAT SERPL-MCNC: 0.87 MG/DL — SIGNIFICANT CHANGE UP (ref 0.5–1.3)
CRP SERPL-MCNC: 34 MG/L — HIGH
CULTURE RESULTS: SIGNIFICANT CHANGE UP
CULTURE RESULTS: SIGNIFICANT CHANGE UP
EGFR: 87 ML/MIN/1.73M2 — SIGNIFICANT CHANGE UP
EGFR: 87 ML/MIN/1.73M2 — SIGNIFICANT CHANGE UP
GLUCOSE SERPL-MCNC: 160 MG/DL — HIGH (ref 70–99)
HCT VFR BLD CALC: 39.6 % — SIGNIFICANT CHANGE UP (ref 39–50)
HGB BLD-MCNC: 13.5 G/DL — SIGNIFICANT CHANGE UP (ref 13–17)
INR BLD: 1.87 RATIO — HIGH (ref 0.85–1.16)
MCHC RBC-ENTMCNC: 32.1 PG — SIGNIFICANT CHANGE UP (ref 27–34)
MCHC RBC-ENTMCNC: 34.1 G/DL — SIGNIFICANT CHANGE UP (ref 32–36)
MCV RBC AUTO: 94.3 FL — SIGNIFICANT CHANGE UP (ref 80–100)
NRBC BLD AUTO-RTO: 0 /100 WBCS — SIGNIFICANT CHANGE UP (ref 0–0)
PLATELET # BLD AUTO: 171 K/UL — SIGNIFICANT CHANGE UP (ref 150–400)
POTASSIUM SERPL-MCNC: 3.8 MMOL/L — SIGNIFICANT CHANGE UP (ref 3.5–5.3)
POTASSIUM SERPL-SCNC: 3.8 MMOL/L — SIGNIFICANT CHANGE UP (ref 3.5–5.3)
PROTHROM AB SERPL-ACNC: 21.7 SEC — HIGH (ref 9.9–13.4)
RBC # BLD: 4.2 M/UL — SIGNIFICANT CHANGE UP (ref 4.2–5.8)
RBC # FLD: 13 % — SIGNIFICANT CHANGE UP (ref 10.3–14.5)
SODIUM SERPL-SCNC: 138 MMOL/L — SIGNIFICANT CHANGE UP (ref 135–145)
SPECIMEN SOURCE: SIGNIFICANT CHANGE UP
SPECIMEN SOURCE: SIGNIFICANT CHANGE UP
WBC # BLD: 8.12 K/UL — SIGNIFICANT CHANGE UP (ref 3.8–10.5)
WBC # FLD AUTO: 8.12 K/UL — SIGNIFICANT CHANGE UP (ref 3.8–10.5)

## 2025-02-28 PROCEDURE — 71045 X-RAY EXAM CHEST 1 VIEW: CPT | Mod: 26

## 2025-02-28 PROCEDURE — 74176 CT ABD & PELVIS W/O CONTRAST: CPT | Mod: 26

## 2025-02-28 PROCEDURE — 71250 CT THORAX DX C-: CPT | Mod: 26

## 2025-02-28 RX ORDER — IPRATROPIUM BROMIDE AND ALBUTEROL SULFATE .5; 2.5 MG/3ML; MG/3ML
3 SOLUTION RESPIRATORY (INHALATION) ONCE
Refills: 0 | Status: COMPLETED | OUTPATIENT
Start: 2025-02-28 | End: 2025-02-28

## 2025-02-28 RX ADMIN — Medication 1 DOSE(S): at 11:43

## 2025-02-28 RX ADMIN — TRIHEXYPHENIDYL HYDROCHLORIDE 1 MILLIGRAM(S): 2 TABLET ORAL at 21:32

## 2025-02-28 RX ADMIN — ROPINIROLE 2 MILLIGRAM(S): 5 TABLET, FILM COATED ORAL at 22:49

## 2025-02-28 RX ADMIN — IPRATROPIUM BROMIDE AND ALBUTEROL SULFATE 3 MILLILITER(S): .5; 2.5 SOLUTION RESPIRATORY (INHALATION) at 13:54

## 2025-02-28 RX ADMIN — Medication 10 MILLIGRAM(S): at 11:43

## 2025-02-28 RX ADMIN — TRIHEXYPHENIDYL HYDROCHLORIDE 1 MILLIGRAM(S): 2 TABLET ORAL at 14:24

## 2025-02-28 RX ADMIN — ROPINIROLE 2 MILLIGRAM(S): 5 TABLET, FILM COATED ORAL at 17:39

## 2025-02-28 RX ADMIN — IPRATROPIUM BROMIDE AND ALBUTEROL SULFATE 3 MILLILITER(S): .5; 2.5 SOLUTION RESPIRATORY (INHALATION) at 07:48

## 2025-02-28 RX ADMIN — Medication 1 TABLET(S): at 17:38

## 2025-02-28 RX ADMIN — Medication 1 TABLET(S): at 05:05

## 2025-02-28 RX ADMIN — ROPINIROLE 2 MILLIGRAM(S): 5 TABLET, FILM COATED ORAL at 11:43

## 2025-02-28 RX ADMIN — Medication 1 TABLET(S): at 11:43

## 2025-02-28 RX ADMIN — TRIHEXYPHENIDYL HYDROCHLORIDE 1 MILLIGRAM(S): 2 TABLET ORAL at 05:05

## 2025-02-28 RX ADMIN — IPRATROPIUM BROMIDE AND ALBUTEROL SULFATE 3 MILLILITER(S): .5; 2.5 SOLUTION RESPIRATORY (INHALATION) at 00:54

## 2025-02-28 RX ADMIN — Medication 1 TABLET(S): at 22:48

## 2025-02-28 RX ADMIN — IPRATROPIUM BROMIDE AND ALBUTEROL SULFATE 3 MILLILITER(S): .5; 2.5 SOLUTION RESPIRATORY (INHALATION) at 20:39

## 2025-02-28 RX ADMIN — ROPINIROLE 2 MILLIGRAM(S): 5 TABLET, FILM COATED ORAL at 05:05

## 2025-02-28 RX ADMIN — Medication 1 DOSE(S): at 18:57

## 2025-02-28 RX ADMIN — FOLIC ACID 1 MILLIGRAM(S): 1 TABLET ORAL at 11:43

## 2025-02-28 RX ADMIN — Medication 500 MILLIGRAM(S): at 11:43

## 2025-02-28 NOTE — CAREGIVER ENGAGEMENT NOTE - CAREGIVER OUTREACH NOTES - FREE TEXT
Discussed that per MD plan is for possible transition home tomorrow. Pt was accepted to [facility] for visiting nurse and home physical therapy. Pt was approved for home oxygen, portable oxygen to be delivered to bedside and concentrator to be delivered to home. Pt will need new oxygen sats tomorrow and new oxygen form left on chart MD already signed, prior to delivering the POC. POC to be left in the office. Discharge Notice reviewed, copy given to spouse. Spouse verbalized understanding and is in agreement with patient transitioning home tomorrow if cleared by MD. Spouse to transport patient home.

## 2025-02-28 NOTE — PROGRESS NOTE ADULT - ASSESSMENT
78 yo Male with a past medical history of PE on warfarin, COPD, hypertension, Parkinson disease is presenting with complaints of shortness of breath    Acute Hypoxic respiratory failure  COPD exacerbation  Cough  s/p solumedrol  -Duoneb  -Advair  stable on oxygen  arranged for home oxygen  pulm cs fu    h/o PE  -on coumadin  -follow INR    Parkinsonism  -Sinemet  -Ropinirole    Coumadin for DVT ppx  Full code     dispo  dc delayed due to hypoxic events  imaging with more atelectasis  labs reassuring  advised to use incentive spirometer and use oxygen continuously    [facility]   [phone]

## 2025-02-28 NOTE — CASE MANAGEMENT PROGRESS NOTE - NSCMPROGRESSNOTE_GEN_ALL_CORE
Discussed pt in rounds, pt remains acute, dest to 82% overnight. Now on oxygen 3L. Ct chest and abdomen done. Oxygen approved but per MD patient not stable for transition home today. CM notified MD that patient will need new home oxygen evaluation note and new script, as previous script and home oxygen will be over 48 hours.

## 2025-02-28 NOTE — CHART NOTE - NSCHARTNOTEFT_GEN_A_CORE
80 yo Male with a past medical history of PE on warfarin, COPD, hypertension, Parkinson disease is presenting with complaints of shortness of breath. Called by RN that pt O2 dropped to 82% on RA. Pt seen and examined at bedside.     Vital Signs Last 24 Hrs  T(C): 36.4 (27 Feb 2025 20:44), Max: 36.6 (27 Feb 2025 04:30)  T(F): 97.5 (27 Feb 2025 20:44), Max: 97.9 (27 Feb 2025 11:20)  HR: 79 (27 Feb 2025 20:44) (79 - 89)  BP: 154/84 (27 Feb 2025 20:44) (146/79 - 156/76)  BP(mean): --  RR: 18 (27 Feb 2025 20:44) (18 - 20)  SpO2: 99% (27 Feb 2025 20:44) (90% - 99%)    Parameters below as of 27 Feb 2025 20:44  Patient On (Oxygen Delivery Method): room air        I&O's Detail    27 Feb 2025 07:01  -  28 Feb 2025 01:17  --------------------------------------------------------  IN:    Oral Fluid: 360 mL  Total IN: 360 mL    OUT:    Voided (mL): 1376 mL  Total OUT: 1376 mL    Total NET: -1016 mL          PE:  Gen: Appears comfortable  CV: RR s1s2  Pulm: CTA b/l  Abd: Soft, appropriately tender, no rebound  Ext: NT b/l    Labs:                        13.1   7.28  )-----------( 146      ( 27 Feb 2025 06:30 )             39.1                         12.9   11.02 )-----------( 150      ( 26 Feb 2025 07:50 )             38.7       PT/INR - ( 27 Feb 2025 06:30 )   PT: 31.6 sec;   INR: 2.70 ratio             Fibrinogen:     Lactate:     MEDICATIONS  (STANDING):  albuterol/ipratropium for Nebulization 3 milliLiter(s) Nebulizer every 6 hours  azithromycin   Tablet 500 milliGRAM(s) Oral daily  carbidopa/levodopa  25/100 1 Tablet(s) Oral four times a day  cetirizine 10 milliGRAM(s) Oral daily  fluticasone propionate/ salmeterol 250-50 MICROgram(s) Diskus 1 Dose(s) Inhalation two times a day  folic acid 1 milliGRAM(s) Oral daily  rOPINIRole 2 milliGRAM(s) Oral four times a day  trihexyphenidyl 1 milliGRAM(s) Oral three times a day      Assessment:    Differential Dx :    Plan:               ------------------------------------------------------------------------------------------------------------- 80 yo Male with a past medical history of PE on warfarin, COPD, hypertension, Parkinson disease is presenting with complaints of shortness of breath and hypoxia. Called by RN that pt O2 dropped to 82% on RA. Pt seen and examined at bedside.   Pt currently getting duoneb tx. Pt's O2 with tx is 94%.     Vital Signs Last 24 Hrs  T(C): 36.4 (27 Feb 2025 20:44), Max: 36.6 (27 Feb 2025 04:30)  T(F): 97.5 (27 Feb 2025 20:44), Max: 97.9 (27 Feb 2025 11:20)  HR: 79 (27 Feb 2025 20:44) (79 - 89)  BP: 154/84 (27 Feb 2025 20:44) (146/79 - 156/76)  BP(mean): --  RR: 18 (27 Feb 2025 20:44) (18 - 20)  SpO2: 99% (27 Feb 2025 20:44) (90% - 99%)    Parameters below as of 27 Feb 2025 20:44  Patient On (Oxygen Delivery Method): room air        I&O's Detail    27 Feb 2025 07:01  -  28 Feb 2025 01:17  --------------------------------------------------------  IN:    Oral Fluid: 360 mL  Total IN: 360 mL    OUT:    Voided (mL): 1376 mL  Total OUT: 1376 mL    Total NET: -1016 mL          PE:  Gen: Appears comfortable  CV: RR s1s2  Pulm: CTA b/l  Abd: Soft, appropriately tender, no rebound  Ext: NT b/l    Labs:                        13.1   7.28  )-----------( 146      ( 27 Feb 2025 06:30 )             39.1                         12.9   11.02 )-----------( 150      ( 26 Feb 2025 07:50 )             38.7       PT/INR - ( 27 Feb 2025 06:30 )   PT: 31.6 sec;   INR: 2.70 ratio             Fibrinogen:     Lactate:     MEDICATIONS  (STANDING):  albuterol/ipratropium for Nebulization 3 milliLiter(s) Nebulizer every 6 hours  azithromycin   Tablet 500 milliGRAM(s) Oral daily  carbidopa/levodopa  25/100 1 Tablet(s) Oral four times a day  cetirizine 10 milliGRAM(s) Oral daily  fluticasone propionate/ salmeterol 250-50 MICROgram(s) Diskus 1 Dose(s) Inhalation two times a day  folic acid 1 milliGRAM(s) Oral daily  rOPINIRole 2 milliGRAM(s) Oral four times a day  trihexyphenidyl 1 milliGRAM(s) Oral three times a day      Assessment: 80 yo Male with a past medical history of 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 Called by RN that pt O2 dropped to 82% on RA. Pt started on prn duoneb tx. Pt seen and examined at bedside. Pt sitting up comfortably in bed. Pt reports he is coughing and having difficulty breathing but it's improved since the duoneb tx. Denies chest pain, dizziness, lightheadedness.    Vital Signs Last 24 Hrs  T(C): 36.4 (27 Feb 2025 20:44), Max: 36.6 (27 Feb 2025 04:30)  T(F): 97.5 (27 Feb 2025 20:44), Max: 97.9 (27 Feb 2025 11:20)  HR: 79 (27 Feb 2025 20:44) (79 - 89)  BP: 154/84 (27 Feb 2025 20:44) (146/79 - 156/76)  BP(mean): --  RR: 18 (27 Feb 2025 20:44) (18 - 20)  SpO2: 99% (27 Feb 2025 20:44) (90% - 99%)    Parameters below as of 27 Feb 2025 20:44  Patient On (Oxygen Delivery Method): room air        I&O's Detail    27 Feb 2025 07:01  -  28 Feb 2025 01:17  --------------------------------------------------------  IN:    Oral Fluid: 360 mL  Total IN: 360 mL    OUT:    Voided (mL): 1376 mL  Total OUT: 1376 mL    Total NET: -1016 mL          PE:  Gen: Appears comfortable  CV: RR s1s2  Pulm: decreased breath sounds b/l   Abd: Soft, appropriately tender, no rebound  Ext: NT b/l    Labs:                        13.1   7.28  )-----------( 146      ( 27 Feb 2025 06:30 )             39.1                         12.9   11.02 )-----------( 150      ( 26 Feb 2025 07:50 )             38.7       PT/INR - ( 27 Feb 2025 06:30 )   PT: 31.6 sec;   INR: 2.70 ratio             Fibrinogen:     Lactate:     MEDICATIONS  (STANDING):  albuterol/ipratropium for Nebulization 3 milliLiter(s) Nebulizer every 6 hours  azithromycin   Tablet 500 milliGRAM(s) Oral daily  carbidopa/levodopa  25/100 1 Tablet(s) Oral four times a day  cetirizine 10 milliGRAM(s) Oral daily  fluticasone propionate/ salmeterol 250-50 MICROgram(s) Diskus 1 Dose(s) Inhalation two times a day  folic acid 1 milliGRAM(s) Oral daily  rOPINIRole 2 milliGRAM(s) Oral four times a day  trihexyphenidyl 1 milliGRAM(s) Oral three times a day      Assessment: 80 yo Male with a past medical history of 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     #SOB  #hypoxia    Plan:  -give prn duoneb tx x1  -order STAT CXR   -O2 93% on 2L, will wean as tolerated   -as per hospitalist note, pt was due to be discharged on home O2 however pt has been on RA for a few days   -will continue to monitor   -RN to call with changes           ------------------------------------------------------------------------------------------------------------- 80 yo Male with a past medical history of PE on warfarin, COPD, hypertension, Parkinson disease is presenting with complaints of shortness of breath and hypoxia. Called by RN that pt O2 dropped to 82% on RA. Pt started on prn duoneb tx. Pt seen and examined at bedside. Pt sitting up comfortably in bed. Pt reports he is coughing and having difficulty breathing but it's improved since the duoneb tx. Denies chest pain, dizziness, lightheadedness.    Vital Signs Last 24 Hrs  T(C): 36.4 (27 Feb 2025 20:44), Max: 36.6 (27 Feb 2025 04:30)  T(F): 97.5 (27 Feb 2025 20:44), Max: 97.9 (27 Feb 2025 11:20)  HR: 79 (27 Feb 2025 20:44) (79 - 89)  BP: 154/84 (27 Feb 2025 20:44) (146/79 - 156/76)  BP(mean): --  RR: 18 (27 Feb 2025 20:44) (18 - 20)  SpO2: 99% (27 Feb 2025 20:44) (90% - 99%)    Parameters below as of 27 Feb 2025 20:44  Patient On (Oxygen Delivery Method): room air        I&O's Detail    27 Feb 2025 07:01  -  28 Feb 2025 01:17  --------------------------------------------------------  IN:    Oral Fluid: 360 mL  Total IN: 360 mL    OUT:    Voided (mL): 1376 mL  Total OUT: 1376 mL    Total NET: -1016 mL          PE:  Gen: Appears comfortable  CV: RR s1s2  Pulm: decreased breath sounds b/l   Abd: Soft, appropriately tender, no rebound  Ext: NT b/l    Labs:                        13.1   7.28  )-----------( 146      ( 27 Feb 2025 06:30 )             39.1                         12.9   11.02 )-----------( 150      ( 26 Feb 2025 07:50 )             38.7       PT/INR - ( 27 Feb 2025 06:30 )   PT: 31.6 sec;   INR: 2.70 ratio             Fibrinogen:     Lactate:     MEDICATIONS  (STANDING):  albuterol/ipratropium for Nebulization 3 milliLiter(s) Nebulizer every 6 hours  azithromycin   Tablet 500 milliGRAM(s) Oral daily  carbidopa/levodopa  25/100 1 Tablet(s) Oral four times a day  cetirizine 10 milliGRAM(s) Oral daily  fluticasone propionate/ salmeterol 250-50 MICROgram(s) Diskus 1 Dose(s) Inhalation two times a day  folic acid 1 milliGRAM(s) Oral daily  rOPINIRole 2 milliGRAM(s) Oral four times a day  trihexyphenidyl 1 milliGRAM(s) Oral three times a day      Assessment: 80 yo Male with a past medical history of 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     #SOB  #hypoxia    Plan:  -give prn duoneb tx x1  -order STAT CXR   -O2 92% on 2L, will wean as tolerated   -as per hospitalist note, pt was due to be discharged on home O2 however pt has been on RA for a few days   -will continue to monitor   -RN to call with changes           ------------------------------------------------------------------------------------------------------------- 80 yo Male with a past medical history of PE on warfarin, COPD, hypertension, Parkinson disease is presenting with complaints of shortness of breath and hypoxia. Called by RN that pt O2 dropped to 82% on RA. Pt started on prn duoneb tx. Pt seen and examined at bedside. Pt sitting up in bed. Pt reports he is coughing and having difficulty breathing but it's improved since the duoneb tx. Denies chest pain, dizziness, lightheadedness.    Vital Signs Last 24 Hrs  T(C): 36.4 (27 Feb 2025 20:44), Max: 36.6 (27 Feb 2025 04:30)  T(F): 97.5 (27 Feb 2025 20:44), Max: 97.9 (27 Feb 2025 11:20)  HR: 79 (27 Feb 2025 20:44) (79 - 89)  BP: 154/84 (27 Feb 2025 20:44) (146/79 - 156/76)  BP(mean): --  RR: 18 (27 Feb 2025 20:44) (18 - 20)  SpO2: 99% (27 Feb 2025 20:44) (90% - 99%)    Parameters below as of 27 Feb 2025 20:44  Patient On (Oxygen Delivery Method): room air        I&O's Detail    27 Feb 2025 07:01  -  28 Feb 2025 01:17  --------------------------------------------------------  IN:    Oral Fluid: 360 mL  Total IN: 360 mL    OUT:    Voided (mL): 1376 mL  Total OUT: 1376 mL    Total NET: -1016 mL          PE:  Gen: Appears comfortable  CV: RR s1s2  Pulm: decreased breath sounds b/l   Abd: Soft, appropriately tender, no rebound  Ext: NT b/l    Labs:                        13.1   7.28  )-----------( 146      ( 27 Feb 2025 06:30 )             39.1                         12.9   11.02 )-----------( 150      ( 26 Feb 2025 07:50 )             38.7       PT/INR - ( 27 Feb 2025 06:30 )   PT: 31.6 sec;   INR: 2.70 ratio               MEDICATIONS  (STANDING):  albuterol/ipratropium for Nebulization 3 milliLiter(s) Nebulizer every 6 hours  azithromycin   Tablet 500 milliGRAM(s) Oral daily  carbidopa/levodopa  25/100 1 Tablet(s) Oral four times a day  cetirizine 10 milliGRAM(s) Oral daily  fluticasone propionate/ salmeterol 250-50 MICROgram(s) Diskus 1 Dose(s) Inhalation two times a day  folic acid 1 milliGRAM(s) Oral daily  rOPINIRole 2 milliGRAM(s) Oral four times a day  trihexyphenidyl 1 milliGRAM(s) Oral three times a day      Assessment: 80 yo Male with a past medical history of 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     #SOB  #hypoxia    Plan:  -give prn duoneb tx x1  -order STAT CXR -> on wet read, noted worsening hiatal hernia compared to CXR and CT chest on 2/23   -order urgent CT chest non contrast   -O2 92% on 2L, will wean as tolerated   -as per hospitalist note, pt was due to be discharged on home O2 however pt has been on RA for a few days   -will continue to monitor   -RN to call with changes         ------------------------------------------------------------------------------------------------------------- 80 yo Male with a past medical history of PE on warfarin, COPD, hypertension, Parkinson disease is presenting with complaints of shortness of breath and hypoxia. Called by RN that pt O2 dropped to 82% on RA. Pt started on prn duoneb tx. Pt seen and examined at bedside. Pt sitting up in bed. Pt reports he is coughing and having difficulty breathing but it's improved since the duoneb tx. Denies chest pain, dizziness, lightheadedness.    Vital Signs Last 24 Hrs  T(C): 36.4 (27 Feb 2025 20:44), Max: 36.6 (27 Feb 2025 04:30)  T(F): 97.5 (27 Feb 2025 20:44), Max: 97.9 (27 Feb 2025 11:20)  HR: 79 (27 Feb 2025 20:44) (79 - 89)  BP: 154/84 (27 Feb 2025 20:44) (146/79 - 156/76)  BP(mean): --  RR: 18 (27 Feb 2025 20:44) (18 - 20)  SpO2: 99% (27 Feb 2025 20:44) (90% - 99%)    Parameters below as of 27 Feb 2025 20:44  Patient On (Oxygen Delivery Method): room air        I&O's Detail    27 Feb 2025 07:01  -  28 Feb 2025 01:17  --------------------------------------------------------  IN:    Oral Fluid: 360 mL  Total IN: 360 mL    OUT:    Voided (mL): 1376 mL  Total OUT: 1376 mL    Total NET: -1016 mL          PE:  Gen: Appears comfortable  CV: RR s1s2  Pulm: decreased breath sounds b/l   Abd: Soft, appropriately tender, no rebound  Ext: NT b/l    Labs:                        13.1   7.28  )-----------( 146      ( 27 Feb 2025 06:30 )             39.1                         12.9   11.02 )-----------( 150      ( 26 Feb 2025 07:50 )             38.7       PT/INR - ( 27 Feb 2025 06:30 )   PT: 31.6 sec;   INR: 2.70 ratio               MEDICATIONS  (STANDING):  albuterol/ipratropium for Nebulization 3 milliLiter(s) Nebulizer every 6 hours  azithromycin   Tablet 500 milliGRAM(s) Oral daily  carbidopa/levodopa  25/100 1 Tablet(s) Oral four times a day  cetirizine 10 milliGRAM(s) Oral daily  fluticasone propionate/ salmeterol 250-50 MICROgram(s) Diskus 1 Dose(s) Inhalation two times a day  folic acid 1 milliGRAM(s) Oral daily  rOPINIRole 2 milliGRAM(s) Oral four times a day  trihexyphenidyl 1 milliGRAM(s) Oral three times a day      Assessment: 80 yo Male with a past medical history of 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     #SOB  #hypoxia  - likely 2/2 COPD exacerbation vs worsening hiatal hernia causing lung atelectasis    Plan:  -give prn duoneb tx x1  -order STAT CXR -> on wet read, noted worsening hiatal hernia compared to CXR and CT chest on 2/23   -order urgent CT chest abdomen/pelvis non contrast   -O2 92% on 2L, will wean as tolerated   -as per hospitalist note, pt was due to be discharged on home O2 however pt has been on RA for a few days   -RN to call with changes       Addendum:    Imaging results:  ACC: [id number] EXAM:  CT ABDOMEN AND PELVIS   ORDERED BY: COLTEN SHELTON   ACC: [id number] EXAM:  CT CHEST   ORDERED BY: COLTEN SHELTON   PROCEDURE DATE:  02/28/2025    INTERPRETATION:  CLINICAL INFORMATION: Shortness of breath  COMPARISON: 2/23/2025  TECHNIQUE: CT imaging of the chest, abdomen, and pelvis was obtained without IV contrast. MIP images were also obtained.    FINDINGS:  CHEST:  LUNGS AND LARGE AIRWAYS: Patent central airways. 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 Subsegmental atelectasis at the right lung base.  PLEURA: Small left-sided pleural effusion.  VESSELS: Atherosclerotic changes of the aorta and coronary arteries.  HEART: Heart size is normal. No pericardial effusion.  MEDIASTINUM AND HIRA: No lymphadenopathy.  CHEST WALL AND LOWER NECK: Within normal limits.    ABDOMEN AND PELVIS:  LIVER: Within normal limits.  BILE DUCTS: Normal caliber.  GALLBLADDER: Gallbladder wall calcification.  SPLEEN: Within normal limits.  PANCREAS: Fatty atrophy. A 2.5 cm cystic lesion in the pancreatic head is better evaluated on previous contrast-enhanced study.  ADRENALS: Within normal limits.  KIDNEYS/URETERS: No hydronephrosis. 4 mm nonobstructing calculus in the right renal pelvis. Right renal cyst.    BLADDER: Within normal limits.  REPRODUCTIVE ORGANS: Prostatectomy.    BOWEL: No bowel obstruction. Appendix is normal.  PERITONEUM: No ascites.  VESSELS: Atherosclerotic changes. Infrarenal IVC filter.  RETROPERITONEUM/LYMPH NODES: No lymphadenopathy.  ABDOMINAL WALL: Left inguinal hernia repair. Small fat-containing umbilical hernia.  BONES: Degenerative changes. Old right-sided transverse process fractures. Partially visualized cervical spinal hardware. Chronic mild superior endplate height loss of the T1 vertebral body.    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  No bowel obstruction.    VIKY ADHIKARI MD; Attending Radiologist. This document has been electronically signed. Feb 28 2025  3:08AM      Plan:  Pt is hemodynamically stable  Day team to consider Surgical consult  RN to notify with any changes. 80 yo Male with a past medical history of PE on warfarin, COPD, hypertension, Parkinson disease is presenting with complaints of shortness of breath and hypoxia. Called by RN that pt O2 dropped to 82% on RA. Pt started on prn duoneb tx. Pt seen and examined at bedside. Pt sitting up in bed. Pt reports he is coughing and having difficulty breathing but it's improved since the duoneb tx. Denies chest pain, dizziness, lightheadedness.    Vital Signs Last 24 Hrs  T(C): 36.4 (27 Feb 2025 20:44), Max: 36.6 (27 Feb 2025 04:30)  T(F): 97.5 (27 Feb 2025 20:44), Max: 97.9 (27 Feb 2025 11:20)  HR: 79 (27 Feb 2025 20:44) (79 - 89)  BP: 154/84 (27 Feb 2025 20:44) (146/79 - 156/76)  BP(mean): --  RR: 18 (27 Feb 2025 20:44) (18 - 20)  SpO2: 99% (27 Feb 2025 20:44) (90% - 99%)    Parameters below as of 27 Feb 2025 20:44  Patient On (Oxygen Delivery Method): room air        I&O's Detail    27 Feb 2025 07:01  -  28 Feb 2025 01:17  --------------------------------------------------------  IN:    Oral Fluid: 360 mL  Total IN: 360 mL    OUT:    Voided (mL): 1376 mL  Total OUT: 1376 mL    Total NET: -1016 mL        PE:  Gen: Appears comfortable  CV: RR s1s2  Pulm: decreased breath sounds b/l   Abd: Soft, appropriately tender, no rebound  Ext: NT b/l    Labs:                        13.1   7.28  )-----------( 146      ( 27 Feb 2025 06:30 )             39.1                         12.9   11.02 )-----------( 150      ( 26 Feb 2025 07:50 )             38.7       PT/INR - ( 27 Feb 2025 06:30 )   PT: 31.6 sec;   INR: 2.70 ratio               MEDICATIONS  (STANDING):  albuterol/ipratropium for Nebulization 3 milliLiter(s) Nebulizer every 6 hours  azithromycin   Tablet 500 milliGRAM(s) Oral daily  carbidopa/levodopa  25/100 1 Tablet(s) Oral four times a day  cetirizine 10 milliGRAM(s) Oral daily  fluticasone propionate/ salmeterol 250-50 MICROgram(s) Diskus 1 Dose(s) Inhalation two times a day  folic acid 1 milliGRAM(s) Oral daily  rOPINIRole 2 milliGRAM(s) Oral four times a day  trihexyphenidyl 1 milliGRAM(s) Oral three times a day      Assessment: 80 yo Male with a past medical history of 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     #SOB  #hypoxia  - likely 2/2 COPD exacerbation vs worsening hiatal hernia causing lung atelectasis    Plan:  -give prn duoneb tx x1  -order STAT CXR -> on wet read, noted worsening hiatal hernia compared to CXR and CT chest on 2/23   -order urgent CT chest abdomen/pelvis non contrast   -O2 92% on 2L, will wean as tolerated   -as per hospitalist note, pt was due to be discharged on home O2 however pt has been on RA for a few days   -RN to call with changes       Addendum:    Imaging results:  ACC: [id number] EXAM:  CT ABDOMEN AND PELVIS   ORDERED BY: COLTEN SHELTON   ACC: [id number] EXAM:  CT CHEST   ORDERED BY: COLTEN SHELTON   PROCEDURE DATE:  02/28/2025    INTERPRETATION:  CLINICAL INFORMATION: Shortness of breath  COMPARISON: 2/23/2025  TECHNIQUE: CT imaging of the chest, abdomen, and pelvis was obtained without IV contrast. MIP images were also obtained.    FINDINGS:  CHEST:  LUNGS AND LARGE AIRWAYS: Patent central airways. 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 Subsegmental atelectasis at the right lung base.  PLEURA: Small left-sided pleural effusion.  VESSELS: Atherosclerotic changes of the aorta and coronary arteries.  HEART: Heart size is normal. No pericardial effusion.  MEDIASTINUM AND HIRA: No lymphadenopathy.  CHEST WALL AND LOWER NECK: Within normal limits.    ABDOMEN AND PELVIS:  LIVER: Within normal limits.  BILE DUCTS: Normal caliber.  GALLBLADDER: Gallbladder wall calcification.  SPLEEN: Within normal limits.  PANCREAS: Fatty atrophy. A 2.5 cm cystic lesion in the pancreatic head is better evaluated on previous contrast-enhanced study.  ADRENALS: Within normal limits.  KIDNEYS/URETERS: No hydronephrosis. 4 mm nonobstructing calculus in the right renal pelvis. Right renal cyst.    BLADDER: Within normal limits.  REPRODUCTIVE ORGANS: Prostatectomy.    BOWEL: No bowel obstruction. Appendix is normal.  PERITONEUM: No ascites.  VESSELS: Atherosclerotic changes. Infrarenal IVC filter.  RETROPERITONEUM/LYMPH NODES: No lymphadenopathy.  ABDOMINAL WALL: Left inguinal hernia repair. Small fat-containing umbilical hernia.  BONES: Degenerative changes. Old right-sided transverse process fractures. Partially visualized cervical spinal hardware. Chronic mild superior endplate height loss of the T1 vertebral body.    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  No bowel obstruction.    VIKY ADHIKARI MD; Attending Radiologist. This document has been electronically signed. Feb 28 2025  3:08AM      Plan:  Pt is hemodynamically stable  Day team to consider Surgical consult  RN to notify with any changes.      ------------------------------------------------------------------------------------------------------------------------------    Addendum:  Called by RN at 6:00 that pt became hypoxic with O2 82% on 2L NC. Pt seen and examined at bedside. As per RN pt was trying to get up to sit on the side of the bed when they found him. Pt reports he is SOB but nothing out of the usual for him. Pt was adjusted to sit up in the bed and head of bed was elevated. On exam, lung sounds decreased more so on the left with mild expiratory wheezing noted. Order duoneb tx x1. Advised pt to avoid getting out of the bed as this is what precipitated the hypoxia. Pt currently 93% on 3L NC and will wean as tolerated with goal 88-92%. RN to call with changes. 78 yo Male with a past medical history of PE on warfarin, COPD, hypertension, Parkinson disease is presenting with complaints of shortness of breath and hypoxia. Called by RN that pt O2 dropped to 82% on RA. Pt started on prn duoneb tx. Pt seen and examined at bedside. Pt sitting up in bed. Pt reports he is coughing and having difficulty breathing but it's improved since the duoneb tx. Denies chest pain, dizziness, lightheadedness.    Vital Signs Last 24 Hrs  T(C): 36.4 (27 Feb 2025 20:44), Max: 36.6 (27 Feb 2025 04:30)  T(F): 97.5 (27 Feb 2025 20:44), Max: 97.9 (27 Feb 2025 11:20)  HR: 79 (27 Feb 2025 20:44) (79 - 89)  BP: 154/84 (27 Feb 2025 20:44) (146/79 - 156/76)  BP(mean): --  RR: 18 (27 Feb 2025 20:44) (18 - 20)  SpO2: 99% (27 Feb 2025 20:44) (90% - 99%)    Parameters below as of 27 Feb 2025 20:44  Patient On (Oxygen Delivery Method): room air        I&O's Detail    27 Feb 2025 07:01  -  28 Feb 2025 01:17  --------------------------------------------------------  IN:    Oral Fluid: 360 mL  Total IN: 360 mL    OUT:    Voided (mL): 1376 mL  Total OUT: 1376 mL    Total NET: -1016 mL        PE:  Gen: Appears comfortable  CV: RR s1s2  Pulm: decreased breath sounds b/l   Abd: Soft, appropriately tender, no rebound  Ext: NT b/l    Labs:                        13.1   7.28  )-----------( 146      ( 27 Feb 2025 06:30 )             39.1                         12.9   11.02 )-----------( 150      ( 26 Feb 2025 07:50 )             38.7       PT/INR - ( 27 Feb 2025 06:30 )   PT: 31.6 sec;   INR: 2.70 ratio               MEDICATIONS  (STANDING):  albuterol/ipratropium for Nebulization 3 milliLiter(s) Nebulizer every 6 hours  azithromycin   Tablet 500 milliGRAM(s) Oral daily  carbidopa/levodopa  25/100 1 Tablet(s) Oral four times a day  cetirizine 10 milliGRAM(s) Oral daily  fluticasone propionate/ salmeterol 250-50 MICROgram(s) Diskus 1 Dose(s) Inhalation two times a day  folic acid 1 milliGRAM(s) Oral daily  rOPINIRole 2 milliGRAM(s) Oral four times a day  trihexyphenidyl 1 milliGRAM(s) Oral three times a day      Assessment: 78 yo Male with a past medical history of 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     #SOB  #hypoxia  - likely 2/2 COPD exacerbation vs worsening hiatal hernia causing lung atelectasis    Plan:  -give prn duoneb tx x1  -order STAT CXR -> on wet read, noted worsening hiatal hernia compared to CXR and CT chest on 2/23   -order urgent CT chest abdomen/pelvis non contrast   -O2 92% on 2L, will wean as tolerated   - discussed with Dr. Estrada, agrees with plab  -as per hospitalist note, pt was due to be discharged on home O2 however pt has been on RA for a few days   -RN to call with changes       Addendum:    Imaging results:  ACC: [id number] EXAM:  CT ABDOMEN AND PELVIS   ORDERED BY: COLTEN SHELTON   ACC: [id number] EXAM:  CT CHEST   ORDERED BY: COLTEN SHELTON   PROCEDURE DATE:  02/28/2025    INTERPRETATION:  CLINICAL INFORMATION: Shortness of breath  COMPARISON: 2/23/2025  TECHNIQUE: CT imaging of the chest, abdomen, and pelvis was obtained without IV contrast. MIP images were also obtained.    FINDINGS:  CHEST:  LUNGS AND LARGE AIRWAYS: Patent central airways. 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 Subsegmental atelectasis at the right lung base.  PLEURA: Small left-sided pleural effusion.  VESSELS: Atherosclerotic changes of the aorta and coronary arteries.  HEART: Heart size is normal. No pericardial effusion.  MEDIASTINUM AND HIRA: No lymphadenopathy.  CHEST WALL AND LOWER NECK: Within normal limits.    ABDOMEN AND PELVIS:  LIVER: Within normal limits.  BILE DUCTS: Normal caliber.  GALLBLADDER: Gallbladder wall calcification.  SPLEEN: Within normal limits.  PANCREAS: Fatty atrophy. A 2.5 cm cystic lesion in the pancreatic head is better evaluated on previous contrast-enhanced study.  ADRENALS: Within normal limits.  KIDNEYS/URETERS: No hydronephrosis. 4 mm nonobstructing calculus in the right renal pelvis. Right renal cyst.    BLADDER: Within normal limits.  REPRODUCTIVE ORGANS: Prostatectomy.    BOWEL: No bowel obstruction. Appendix is normal.  PERITONEUM: No ascites.  VESSELS: Atherosclerotic changes. Infrarenal IVC filter.  RETROPERITONEUM/LYMPH NODES: No lymphadenopathy.  ABDOMINAL WALL: Left inguinal hernia repair. Small fat-containing umbilical hernia.  BONES: Degenerative changes. Old right-sided transverse process fractures. Partially visualized cervical spinal hardware. Chronic mild superior endplate height loss of the T1 vertebral body.    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  No bowel obstruction.    VIKY ADHIKARI MD; Attending Radiologist. This document has been electronically signed. Feb 28 2025  3:08AM      Plan:  Pt is hemodynamically stable  Day team to consider Surgical consult  RN to notify with any changes.      ------------------------------------------------------------------------------------------------------------------------------    Addendum:  Called by RN at 6:00 that pt became hypoxic with O2 82% on 2L NC. Pt seen and examined at bedside. As per RN pt was trying to get up to sit on the side of the bed when they found him. Pt reports he is SOB but nothing out of the usual for him. Pt was adjusted to sit up in the bed and head of bed was elevated. On exam, lung sounds decreased more so on the left with mild expiratory wheezing noted. Order duoneb tx x1. Advised pt to avoid getting out of the bed as this is what precipitated the hypoxia. Pt currently 93% on 3L NC and will wean as tolerated with goal 88-92%. RN to call with changes.

## 2025-03-01 LAB
ANION GAP SERPL CALC-SCNC: 6 MMOL/L — SIGNIFICANT CHANGE UP (ref 5–17)
BUN SERPL-MCNC: 14 MG/DL — SIGNIFICANT CHANGE UP (ref 7–23)
CALCIUM SERPL-MCNC: 8.5 MG/DL — SIGNIFICANT CHANGE UP (ref 8.5–10.1)
CHLORIDE SERPL-SCNC: 103 MMOL/L — SIGNIFICANT CHANGE UP (ref 96–108)
CO2 SERPL-SCNC: 30 MMOL/L — SIGNIFICANT CHANGE UP (ref 22–31)
CREAT SERPL-MCNC: 0.9 MG/DL — SIGNIFICANT CHANGE UP (ref 0.5–1.3)
EGFR: 86 ML/MIN/1.73M2 — SIGNIFICANT CHANGE UP
EGFR: 86 ML/MIN/1.73M2 — SIGNIFICANT CHANGE UP
GLUCOSE SERPL-MCNC: 138 MG/DL — HIGH (ref 70–99)
INR BLD: 1.87 RATIO — HIGH (ref 0.85–1.16)
MAGNESIUM SERPL-MCNC: 2.1 MG/DL — SIGNIFICANT CHANGE UP (ref 1.6–2.6)
PHOSPHATE SERPL-MCNC: 3.5 MG/DL — SIGNIFICANT CHANGE UP (ref 2.5–4.5)
POTASSIUM SERPL-MCNC: 4.2 MMOL/L — SIGNIFICANT CHANGE UP (ref 3.5–5.3)
POTASSIUM SERPL-SCNC: 4.2 MMOL/L — SIGNIFICANT CHANGE UP (ref 3.5–5.3)
PROTHROM AB SERPL-ACNC: 21.7 SEC — HIGH (ref 9.9–13.4)
SODIUM SERPL-SCNC: 139 MMOL/L — SIGNIFICANT CHANGE UP (ref 135–145)

## 2025-03-01 PROCEDURE — 93010 ELECTROCARDIOGRAM REPORT: CPT

## 2025-03-01 RX ORDER — METOPROLOL SUCCINATE 50 MG/1
25 TABLET, EXTENDED RELEASE ORAL
Refills: 0 | Status: DISCONTINUED | OUTPATIENT
Start: 2025-03-01 | End: 2025-03-01

## 2025-03-01 RX ORDER — METOPROLOL SUCCINATE 50 MG/1
50 TABLET, EXTENDED RELEASE ORAL EVERY 6 HOURS
Refills: 0 | Status: DISCONTINUED | OUTPATIENT
Start: 2025-03-01 | End: 2025-03-01

## 2025-03-01 RX ORDER — DILTIAZEM HYDROCHLORIDE 240 MG/1
60 TABLET, EXTENDED RELEASE ORAL
Refills: 0 | Status: DISCONTINUED | OUTPATIENT
Start: 2025-03-01 | End: 2025-03-03

## 2025-03-01 RX ADMIN — Medication 1 TABLET(S): at 11:09

## 2025-03-01 RX ADMIN — Medication 1 DOSE(S): at 05:43

## 2025-03-01 RX ADMIN — METOPROLOL SUCCINATE 25 MILLIGRAM(S): 50 TABLET, EXTENDED RELEASE ORAL at 11:09

## 2025-03-01 RX ADMIN — Medication 1 TABLET(S): at 18:11

## 2025-03-01 RX ADMIN — FOLIC ACID 1 MILLIGRAM(S): 1 TABLET ORAL at 11:09

## 2025-03-01 RX ADMIN — IPRATROPIUM BROMIDE AND ALBUTEROL SULFATE 3 MILLILITER(S): .5; 2.5 SOLUTION RESPIRATORY (INHALATION) at 07:39

## 2025-03-01 RX ADMIN — TRIHEXYPHENIDYL HYDROCHLORIDE 1 MILLIGRAM(S): 2 TABLET ORAL at 15:00

## 2025-03-01 RX ADMIN — ROPINIROLE 2 MILLIGRAM(S): 5 TABLET, FILM COATED ORAL at 18:11

## 2025-03-01 RX ADMIN — TRIHEXYPHENIDYL HYDROCHLORIDE 1 MILLIGRAM(S): 2 TABLET ORAL at 05:44

## 2025-03-01 RX ADMIN — Medication 1 DOSE(S): at 21:39

## 2025-03-01 RX ADMIN — Medication 1 TABLET(S): at 05:44

## 2025-03-01 RX ADMIN — IPRATROPIUM BROMIDE AND ALBUTEROL SULFATE 3 MILLILITER(S): .5; 2.5 SOLUTION RESPIRATORY (INHALATION) at 14:18

## 2025-03-01 RX ADMIN — ROPINIROLE 2 MILLIGRAM(S): 5 TABLET, FILM COATED ORAL at 05:44

## 2025-03-01 RX ADMIN — Medication 10 MILLIGRAM(S): at 11:09

## 2025-03-01 RX ADMIN — TRIHEXYPHENIDYL HYDROCHLORIDE 1 MILLIGRAM(S): 2 TABLET ORAL at 21:40

## 2025-03-01 RX ADMIN — ROPINIROLE 2 MILLIGRAM(S): 5 TABLET, FILM COATED ORAL at 11:10

## 2025-03-01 RX ADMIN — Medication 2.5 MILLIGRAM(S): at 21:40

## 2025-03-01 NOTE — CONSULT NOTE ADULT - ASSESSMENT
80y/o seen at [facility] telemetry. Wife and aide at bedside  History Memory changes, PAF, Parkinson disease, HTN, COPD, mild sleep apnea, former smoker, pulmonary embolus, DVT, Leiden Factor V deficiency, spinal stenosis    Admitted for shortness of breath and cough  Early evening yesterday, the patient went into asymptomatic A-Fib  Mag-2.1    Phos-3.5    Impression  Exacerbation of COPD  PAF as above    Plan:  - Will start Metoprolol tartrate-25mg BID with parameter and titrate as needed  - Echocardiogram  - Chest CAT scan with partial left lung atelectasis resulting in Morgani hernia containing small and large bowel  - Patient already on Warfarin, however, he has a history of frequent falls    Risks, benefits, diagnosis and prognosis explained, and if he continues to keep falling then the risks may out-weigh the benefits    He is to get up slowly, no straining in bathroom, etc    He may benefit from using a wheel-chair and not a walker    However, if he uses a walker, then this should be done with assistance only
78 yo Male with a past medical history of PE on warfarin, COPD, hypertension, Parkinson disease is presenting with complaints of shortness of breath.  States that he was short of breath starting this morning.  Tried his nebulizer without relief in symptoms.  And then upon walking to the bathroom, his symptoms acutely got worse so his wife called 911 for evaluation.    dysphagia  aspiration  copd  diaphragmatic hernia  Atelectasis  PE hx  OP  OA  Ataxic gait  Parkinson's disease  HTN  GERD    SLP eval  asp prec  HOB elev  oral hygiene  skin care  assist with needs  fio2 support - keep sat > 88 pct  NEBS for COPD component - hold off on Systemic steroids for now  cough rx regimen PRN -   AC - hx of PE -   cvs rx regimen  BP control  nutritional assessment  spoke with WIFE  pt follows with Dr Rivas - Pulm Med - [place] - [facility] office  strong suspicion for Aspiration - chronic - as per pt and wife - coughs and has sx/signs of dysphagia  CT chest reviewed  VBG c/w COPD

## 2025-03-01 NOTE — PROGRESS NOTE ADULT - ASSESSMENT
80 yo Male with a past medical history of PE on warfarin, COPD, hypertension, Parkinson disease is presenting with complaints of shortness of breath    Acute Hypoxic respiratory failure  COPD exacerbation  Cough  s/p solumedrol  -Duoneb  -Advair  stable on oxygen  arranged for home oxygen  pulm cs fu    AF  tachy-tobi  seen by cardio and EP  echo  TSH  already on ac  cardizem as tolerated    h/o PE  -on coumadin  -follow INR    Parkinsonism  -Sinemet  -Ropinirole    Coumadin for DVT ppx  Full code     dispo  dc delayed again due to new af    [facility]   [phone]

## 2025-03-01 NOTE — CASE MANAGEMENT PROGRESS NOTE - NSCMPROGRESSNOTE_GEN_ALL_CORE
Patient discussed in rounds and remains acute with new development of rapid a fib.  Discharge disposition is home with [facility] and oxygen (if needed) when stable.  Wife will transport home. CM remains available throughout hospital stay.

## 2025-03-01 NOTE — PROVIDER CONTACT NOTE (OTHER) - ACTION/TREATMENT ORDERED:
no new intervention
will take care he said
STAT ekg done. Will continue to monitor pt.
MD made aware. O2 in place, chest xray and CTs ordered and preformed. Will continue to monitor this shift.

## 2025-03-01 NOTE — CONSULT NOTE ADULT - SUBJECTIVE AND OBJECTIVE BOX
EP Attending  HISTORY OF PRESENT ILLNESS: HPI:  78 yo Male with a past medical history of PE on warfarin, COPD, hypertension, Parkinson disease is presenting with complaints of shortness of breath.  States that he was short of breath starting this morning.  Tried his nebulizer without relief in symptoms.  And then upon walking to the bathroom, his symptoms acutely got worse so his wife called 911 for evaluation.  No recent fevers or sick contacts.  Patient has been coughing.  No nausea or vomiting.  No leg swelling.  No history of heart failure. No fever at home. Initially in ED he was placed on BIPAP, now improved and placed on NC oxygen. He feels comfortable now with his breathing.  (23 Feb 2025 19:17)    Presented in sinus rhythm.  While hospitalized, demonstrated AFib which is now sustained x 3 days on telemetry, HR mostly >100bpm.  Hx of COPD, on home O2, uses nebulized albuterol once a day and albuterol inhaler 3x/day.  Never intubatd for his COPD.  Has remote hx of PE, on Warfarin, has never tried Apixaban or Rivaroxaban.  No history of palpitations, even now. No fainting.  A 10 pt ROS is otherwise negative.    PAST MEDICAL & SURGICAL HISTORY:  Personal history of PE (pulmonary embolism)  COPD (chronic obstructive pulmonary disease)  Hypertension  Restless leg syndrome  Factor V Leiden  Parkinsons  Spinal stenosis  Memory loss  Right inguinal hernia  AL (obstructive sleep apnea)  Diabetes  Herniated cervical disc  H/O hernia repair      MEDICATIONS  (STANDING):  albuterol/ipratropium for Nebulization 3 milliLiter(s) Nebulizer every 6 hours  carbidopa/levodopa  25/100 1 Tablet(s) Oral four times a day  cetirizine 10 milliGRAM(s) Oral daily  diltiazem    Tablet 60 milliGRAM(s) Oral four times a day  fluticasone propionate/ salmeterol 250-50 MICROgram(s) Diskus 1 Dose(s) Inhalation two times a day  folic acid 1 milliGRAM(s) Oral daily  rOPINIRole 2 milliGRAM(s) Oral four times a day  trihexyphenidyl 1 milliGRAM(s) Oral three times a day    Allergies    Levaquin (Anaphylaxis)  garlic (Other (Mild))    Intolerances    FAMILY HISTORY:  FH: lung cancer (Father)    Non-contributary for premature coronary disease or sudden cardiac death    SOCIAL HISTORY:    [ x] Non-smoker  [ ] Smoker  [ ] Alcohol      PHYSICAL EXAM:  T(C): 36.2 (03-01-25 @ 11:54), Max: 36.6 (03-01-25 @ 02:57)  HR: 59 (03-01-25 @ 16:30) (59 - 106)  BP: 102/48 (03-01-25 @ 16:30) (102/48 - 158/77)  RR: 18 (03-01-25 @ 16:30) (18 - 20)  SpO2: 98% (03-01-25 @ 16:30) (94% - 98%)  Wt(kg): --    Appearance: elderly man in no acute distress	  HEENT:   Normal oral mucosa, PERRL, EOMI	  Lymphatic: No lymphadenopathy , no edema  Cardiovascular: rapid irregular S1 S2, No JVD, No murmurs , Peripheral pulses palpable 2+ bilaterally  Respiratory: audible wheezing at the mouth and all lung fields.  Gastrointestinal:  Soft, Non-tender, + BS	  Skin: No rashes, No ecchymoses, No cyanosis, warm to touch  Musculoskeletal: Normal range of motion, normal strength  Psychiatry:  Mood & affect appropriate      TELEMETRY: AF, narrow QRS, rapid ventricular rates	    ECG: AFib  Echo:  < from: Transthoracic Echocardiogram (12.27.22 @ 10:06) >  Observations:  Mitral Valve: Mitral annular calcification. No mitral valve  regurgitation seen.  Aortic Valve/Aorta: Transcatheter aortic valve replacement.  Peak transaortic valve gradient equals 8 mm Hg, which is  probably normal in the presence of a transcatheter aortic  valve replacement. No aortic valve regurgitation seen.  Not well visualized.  Left Atrium: Left atrium not well visualized.  Left Ventricle: Endocardium not well visualized; grossly  Overall preserved left ventricular ejection fraction.  Unable to evaluate diastology.  Right Heart: Right atrium not well visualized. The right  ventricle is not well visualized; grossly normal right  ventricular systolic function. Tricuspid valve not well  visualized, probably normal. Pulmonic valve not well  visualized.  Pericardium/Pleura: No pericardial effusion seen.  Hemodynamic: RAP 3mmHG Unable to estimate RVSP.  ------------------------------------------------------------------------  Conclusions:  1. Endocardium not well visualized; grossly Overall  preserved left ventricular ejection fraction.  2. The right ventricle is not well visualized; grossly  normal right ventricular systolic function.  3. No pericardial effusion seen.  *** No previous Echo exam.    < end of copied text >    	  	  LABS:	 	                          13.5   8.12  )-----------( 171      ( 28 Feb 2025 12:45 )             39.6     03-01    139  |  103  |  14  ----------------------------<  138[H]  4.2   |  30  |  0.90    Ca    8.5      01 Mar 2025 03:20  Phos  3.5     03-01  Mg     2.1     03-01      ASSESSMENT/PLAN: Mr Boeckle (pronounced "Rebecca") is a very pleasant 79y Male here with shortness of breath.  He presented with audible wheezing, and known history of COPD.  He demonstrated AFib after being hospitalized, and has been in persistent fib for a few days at time of consult.  No prior history of documented AFib.  Already therapeutically anticoagulated with Warfarin, INR 2-3, for remote history of pulmonary embolus, which will also cover AFib stroke prevention.  Alternatively, if affordable, could prescribe Rivaroxban 20mg qPM or Apixaban 5mg BID instead of Warfarin.  Given active wheezing, frequent albuterol use, and historically normal LVEF, will discontinue metoprolol and start Diltiazem PO Q6hrs with holding parameters.  Please repeat an echocardiogram. Last was 3 yrs ago, rule out systolic heart failure.  Active wheezing makes him an anesthesia risk for AFIA, so he's not a good candidate for this right now, nor Amiodarone /Sotalol use.  He could be an ablation candidate once optimized from a Pulm perspective, as an outpatient.  will follow with you.      Rashawn Dunaway M.D.  Cardiac Electrophysiology    office [phone]  pager [phone]

## 2025-03-01 NOTE — CHART NOTE - NSCHARTNOTEFT_GEN_A_CORE
80 yo Male with a past medical history of PE on warfarin, COPD, hypertension, Parkinson disease is presenting with complaints of shortness of breath and hypoxia.  Called by RN that pt is in afib as noted on tele monitor. No hx of afib noted in chart. Pt seen and examined at bedside. Pt is resting comfortably and denies prior hx of afib. Pt denies chest pain, palpitations, SOB, dizziness, lightheadedness. Vitals are stable.     Vital Signs Last 24 Hrs  T(C): 36.6 (01 Mar 2025 02:57), Max: 36.7 (28 Feb 2025 11:57)  T(F): 97.8 (01 Mar 2025 02:57), Max: 98 (28 Feb 2025 11:57)  HR: 99 (01 Mar 2025 02:57) (71 - 99)  BP: 118/74 (01 Mar 2025 02:57) (118/74 - 180/87)  BP(mean): --  RR: 19 (01 Mar 2025 02:57) (18 - 22)  SpO2: 96% (01 Mar 2025 02:57) (90% - 98%)    Parameters below as of 01 Mar 2025 02:57  Patient On (Oxygen Delivery Method): nasal cannula  O2 Flow (L/min): 4      I&O's Detail    27 Feb 2025 07:01  -  28 Feb 2025 07:00  --------------------------------------------------------  IN:    Oral Fluid: 360 mL  Total IN: 360 mL    OUT:    Stool (mL): 1 mL    Voided (mL): 1676 mL  Total OUT: 1677 mL    Total NET: -1317 mL          PE:  Gen: Appears comfortable  CV: RR s1s2  Pulm: CTA b/l  Abd: Soft, appropriately tender, no rebound  Ext: NT b/l    Labs:                        13.5   8.12  )-----------( 171      ( 28 Feb 2025 12:45 )             39.6                         13.1   7.28  )-----------( 146      ( 27 Feb 2025 06:30 )             39.1       PT/INR - ( 28 Feb 2025 07:20 )   PT: 21.7 sec;   INR: 1.87 ratio             Fibrinogen:     Lactate:     MEDICATIONS  (STANDING):  albuterol/ipratropium for Nebulization 3 milliLiter(s) Nebulizer every 6 hours  carbidopa/levodopa  25/100 1 Tablet(s) Oral four times a day  cetirizine 10 milliGRAM(s) Oral daily  fluticasone propionate/ salmeterol 250-50 MICROgram(s) Diskus 1 Dose(s) Inhalation two times a day  folic acid 1 milliGRAM(s) Oral daily  rOPINIRole 2 milliGRAM(s) Oral four times a day  trihexyphenidyl 1 milliGRAM(s) Oral three times a day      Assessment: 80 yo Male with a past medical history of 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     #arrythmia   #heartblock     Plan:  -ordered STAT EKG -> HR 92, possible Mobitz type II. significant changes from prior EKG on 2/23   -order stat BMP, Mg, Phos   -pt hemodynamically stable and asymptomatic at this time   -spoke to ICU PA who agreed that this is likely Mobitz type II and will continue to watch  -will continue to monitor HR and BP. Pads placed on pt if needed.   -pt is FULL CODE   -RN to call with changes            ------------------------------------------------------------------------------------------------------------- 78 yo Male with a past medical history of PE on warfarin, COPD, hypertension, Parkinson disease is presenting with complaints of shortness of breath and hypoxia.  Called by RN that pt is in afib as noted on tele monitor. No hx of afib noted in chart. Pt seen and examined at bedside. Pt is resting comfortably and denies prior hx of afib. Pt denies chest pain, palpitations, SOB, dizziness, lightheadedness. Vitals are stable.     Vital Signs Last 24 Hrs  T(C): 36.6 (01 Mar 2025 02:57), Max: 36.7 (28 Feb 2025 11:57)  T(F): 97.8 (01 Mar 2025 02:57), Max: 98 (28 Feb 2025 11:57)  HR: 99 (01 Mar 2025 02:57) (71 - 99)  BP: 118/74 (01 Mar 2025 02:57) (118/74 - 180/87)  BP(mean): --  RR: 19 (01 Mar 2025 02:57) (18 - 22)  SpO2: 96% (01 Mar 2025 02:57) (90% - 98%)    Parameters below as of 01 Mar 2025 02:57  Patient On (Oxygen Delivery Method): nasal cannula  O2 Flow (L/min): 4      I&O's Detail    27 Feb 2025 07:01  -  28 Feb 2025 07:00  --------------------------------------------------------  IN:    Oral Fluid: 360 mL  Total IN: 360 mL    OUT:    Stool (mL): 1 mL    Voided (mL): 1676 mL  Total OUT: 1677 mL    Total NET: -1317 mL          PE:  Gen: Appears comfortable  CV: irregular rhythm, s1s2  Pulm: decreased lung sounds b/l, more so on left   Abd: Soft, appropriately tender, no rebound  Ext: NT b/l    Labs:                        13.5   8.12  )-----------( 171      ( 28 Feb 2025 12:45 )             39.6                         13.1   7.28  )-----------( 146      ( 27 Feb 2025 06:30 )             39.1       PT/INR - ( 28 Feb 2025 07:20 )   PT: 21.7 sec;   INR: 1.87 ratio             Fibrinogen:     Lactate:     MEDICATIONS  (STANDING):  albuterol/ipratropium for Nebulization 3 milliLiter(s) Nebulizer every 6 hours  carbidopa/levodopa  25/100 1 Tablet(s) Oral four times a day  cetirizine 10 milliGRAM(s) Oral daily  fluticasone propionate/ salmeterol 250-50 MICROgram(s) Diskus 1 Dose(s) Inhalation two times a day  folic acid 1 milliGRAM(s) Oral daily  rOPINIRole 2 milliGRAM(s) Oral four times a day  trihexyphenidyl 1 milliGRAM(s) Oral three times a day      Assessment: 78 yo Male with a past medical history of 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     #arrythmia   #heartblock     Plan:  -ordered STAT EKG -> HR 92, possible Mobitz type II. significant changes from prior EKG on 2/23   -order stat BMP, Mg, Phos   -pt hemodynamically stable and asymptomatic at this time   -spoke to ICU PA who agreed that this is likely Mobitz type II and will continue to watch  -will continue to monitor HR and BP. Pads placed on pt if needed.   -pt is FULL CODE   -RN to call with changes            ------------------------------------------------------------------------------------------------------------- 80 yo Male with a past medical history of PE on warfarin, COPD, hypertension, Parkinson disease is presenting with complaints of shortness of breath and hypoxia.  Called by RN that pt is in afib as noted on tele monitor. No hx of afib noted in chart. Pt seen and examined at bedside. Pt is resting comfortably and denies prior hx of afib. Pt denies chest pain, palpitations, SOB, dizziness, lightheadedness. Vitals are stable.     Vital Signs Last 24 Hrs  T(C): 36.6 (01 Mar 2025 02:57), Max: 36.7 (28 Feb 2025 11:57)  T(F): 97.8 (01 Mar 2025 02:57), Max: 98 (28 Feb 2025 11:57)  HR: 99 (01 Mar 2025 02:57) (71 - 99)  BP: 118/74 (01 Mar 2025 02:57) (118/74 - 180/87)  BP(mean): --  RR: 19 (01 Mar 2025 02:57) (18 - 22)  SpO2: 96% (01 Mar 2025 02:57) (90% - 98%)    Parameters below as of 01 Mar 2025 02:57  Patient On (Oxygen Delivery Method): nasal cannula  O2 Flow (L/min): 4      I&O's Detail    27 Feb 2025 07:01  -  28 Feb 2025 07:00  --------------------------------------------------------  IN:    Oral Fluid: 360 mL  Total IN: 360 mL    OUT:    Stool (mL): 1 mL    Voided (mL): 1676 mL  Total OUT: 1677 mL    Total NET: -1317 mL          PE:  Gen: Appears comfortable  CV: irregular rhythm, s1s2  Pulm: decreased lung sounds b/l, more so on left   Abd: Soft, appropriately tender, no rebound  Ext: NT b/l    Labs:                        13.5   8.12  )-----------( 171      ( 28 Feb 2025 12:45 )             39.6                         13.1   7.28  )-----------( 146      ( 27 Feb 2025 06:30 )             39.1       PT/INR - ( 28 Feb 2025 07:20 )   PT: 21.7 sec;   INR: 1.87 ratio             Fibrinogen:     Lactate:     MEDICATIONS  (STANDING):  albuterol/ipratropium for Nebulization 3 milliLiter(s) Nebulizer every 6 hours  carbidopa/levodopa  25/100 1 Tablet(s) Oral four times a day  cetirizine 10 milliGRAM(s) Oral daily  fluticasone propionate/ salmeterol 250-50 MICROgram(s) Diskus 1 Dose(s) Inhalation two times a day  folic acid 1 milliGRAM(s) Oral daily  rOPINIRole 2 milliGRAM(s) Oral four times a day  trihexyphenidyl 1 milliGRAM(s) Oral three times a day      Assessment: 80 yo Male with a past medical history of 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     #arrythmia   #heartblock     Plan:  -ordered STAT EKG -> HR 92, possible Mobitz type II. significant changes from prior EKG on 2/23   -order stat BMP, Mg, Phos   -pt hemodynamically stable and asymptomatic at this time   -spoke to ICU PA who agreed that this is likely Mobitz type II and will continue to watch  -will continue to monitor HR and BP. Pads placed on pt if needed.   -pt is FULL CODE   -Dr. Eubanks updated  -Consulted Dr. Heaton   -RN to call with changes            -------------------------------------------------------------------------------------------------------------

## 2025-03-01 NOTE — CONSULT NOTE ADULT - SUBJECTIVE AND OBJECTIVE BOX
CARDIOLOGY CONSULT NOTE    Patient is a 79y Male with a known history of :    HPI:  80 yo Male with a past medical history of PE on warfarin, COPD, hypertension, Parkinson disease is presenting with complaints of shortness of breath.  States that he was short of breath starting this morning.  Tried his nebulizer without relief in symptoms.  And then upon walking to the bathroom, his symptoms acutely got worse so his wife called 911 for evaluation.  No recent fevers or sick contacts.  Patient has been coughing.  No nausea or vomiting.  No leg swelling.  No history of heart failure. No fever at home. Initially in ED he was placed on BIPAP, now improved and placed on NC oxygen. He feels comfortable now with his breathing.  (23 Feb 2025 19:17)      REVIEW OF SYSTEMS:    CONSTITUTIONAL: No fever, weight loss, or fatigue  EYES: No eye pain, visual disturbances, or discharge  ENMT:  No difficulty hearing, tinnitus, vertigo; No sinus or throat pain  NECK: No pain or stiffness  BREASTS: No pain, masses, or nipple discharge  RESPIRATORY: No cough, wheezing, chills or hemoptysis; No shortness of breath  CARDIOVASCULAR: No chest pain, palpitations, dizziness, or leg swelling  GASTROINTESTINAL: No abdominal or epigastric pain. No nausea, vomiting, or hematemesis; No diarrhea or constipation. No melena or hematochezia.  GENITOURINARY: No dysuria, frequency, hematuria, or incontinence  NEUROLOGICAL: No headaches, memory loss, loss of strength, numbness, or tremors  SKIN: No itching, burning, rashes, or lesions   LYMPH NODES: No enlarged glands  ENDOCRINE: No heat or cold intolerance; No hair loss  MUSCULOSKELETAL: No joint pain or swelling; No muscle, back, or extremity pain  PSYCHIATRIC: No depression, anxiety, mood swings, or difficulty sleeping  HEME/LYMPH: No easy bruising, or bleeding gums  ALLERGY AND IMMUNOLOGIC: No hives or eczema    MEDICATIONS  (STANDING):  albuterol/ipratropium for Nebulization 3 milliLiter(s) Nebulizer every 6 hours  carbidopa/levodopa  25/100 1 Tablet(s) Oral four times a day  cetirizine 10 milliGRAM(s) Oral daily  fluticasone propionate/ salmeterol 250-50 MICROgram(s) Diskus 1 Dose(s) Inhalation two times a day  folic acid 1 milliGRAM(s) Oral daily  metoprolol tartrate 25 milliGRAM(s) Oral two times a day  rOPINIRole 2 milliGRAM(s) Oral four times a day  trihexyphenidyl 1 milliGRAM(s) Oral three times a day    MEDICATIONS  (PRN):  acetaminophen     Tablet .. 650 milliGRAM(s) Oral every 6 hours PRN Temp greater or equal to 38C (100.4F), Mild Pain (1 - 3)  aluminum hydroxide/magnesium hydroxide/simethicone Suspension 30 milliLiter(s) Oral every 4 hours PRN Dyspepsia  benzonatate 100 milliGRAM(s) Oral three times a day PRN Cough  melatonin 3 milliGRAM(s) Oral at bedtime PRN Insomnia  ondansetron Injectable 4 milliGRAM(s) IV Push every 8 hours PRN Nausea and/or Vomiting      ALLERGIES: Levaquin (Anaphylaxis)  garlic (Other (Mild))      FAMILY HISTORY:  FH: lung cancer (Father)        Social History:  Alochol:   Smoking:   Drug Use:   Marital Status:     I&O's Detail      PHYSICAL EXAMINATION:  -----------------------------  T(C): 36.3 (03-01-25 @ 04:26), Max: 36.7 (02-28-25 @ 11:57)  HR: 100 (03-01-25 @ 09:05) (71 - 106)  BP: 145/72 (03-01-25 @ 09:05) (118/74 - 155/81)  RR: 20 (03-01-25 @ 09:05) (19 - 20)  SpO2: 96% (03-01-25 @ 09:05) (94% - 98%)  Wt(kg): --        Constitutional: well developed, normal appearance, well groomed, well nourished, no deformities and no acute distress.   Eyes: the conjunctiva exhibited no abnormalities and the eyelids demonstrated no xanthelasmas.   HEENT: normal oral mucosa, no oral pallor and no oral cyanosis.   Neck: normal jugular venous A waves present, normal jugular venous V waves present and no jugular venous alamo A waves.   Pulmonary: no respiratory distress, normal respiratory rhythm and effort, no accessory muscle use and lungs were clear to auscultation bilaterally.   Cardiovascular: heart rate and rhythm were irreg/irreg, normal S1 and S2 and no murmur, gallop, rub, heave or thrill are present.    Musculoskeletal: the gait could not be assessed.   Extremities: no clubbing of the fingernails, no localized cyanosis, no petechial hemorrhages and no ischemic changes.   Skin: normal skin color and pigmentation, no rash, no venous stasis, no skin lesions, no skin ulcer and no xanthoma was observed.   Psychiatric: oriented to person, place, and time, the affect was normal, the mood was normal and not feeling anxious.     LABS:   --------  03-01    139  |  103  |  14  ----------------------------<  138[H]  4.2   |  30  |  0.90    Ca    8.5      01 Mar 2025 03:20  Phos  3.5     03-01  Mg     2.1     03-01                           13.5   8.12  )-----------( 171      ( 28 Feb 2025 12:45 )             39.6     PT/INR - ( 01 Mar 2025 03:20 )   PT: 21.7 sec;   INR: 1.87 ratio                       RADIOLOGY:  -----------------    < from: Transthoracic Echocardiogram (12.27.22 @ 10:06) >    Patient name: BOECKLE, ROBERT  YOB: 1945   Age: 77 (M)   MR#: [id number]  Study Date: 12/27/2022  Location: [address] [place], [place] [id number]: [facility]  Study quality: Technically limited  Referring Physician: Alexandra Cardona MD  Blood Pressure: 149/65 mmHg  Height: 170 cm  Weight: 95 kg  BSA: 2.1 m2  ------------------------------------------------------------------------  PROCEDURE: Transthoracic echocardiogram with 2-D, M-Mode  and complete spectral and color flow Doppler.  INDICATION: Unspecified atrial fibrillation (I48.91)  ------------------------------------------------------------------------  Dimensions:    Normal Values:  LA:            2.0 - 4.0 cm  Ao:            2.0 - 3.8 cm  SEPTUM:        0.6 - 1.2 cm  PWT:   0.6 - 1.1 cm  LVIDd:         3.0 - 5.6 cm  LVIDs:         1.8 - 4.0 cm  EF (Visual Estimate): 65 %  Doppler Peak Velocity (m/sec): AoV=1.4  ------------------------------------------------------------------------  Observations:  Mitral Valve: Mitral annular calcification. No mitral valve  regurgitation seen.  Aortic Valve/Aorta: Transcatheter aortic valve replacement.  Peak transaortic valve gradient equals 8 mm Hg, which is  probably normal in the presence of a transcatheter aortic  valve replacement. No aortic valve regurgitation seen.  Not well visualized.  Left Atrium: Left atrium not well visualized.  Left Ventricle: Endocardium not well visualized; grossly  Overall preserved left ventricular ejection fraction.  Unable to evaluate diastology.  Right Heart: Right atrium not well visualized. The right  ventricle is not well visualized; grossly normal right  ventricular systolic function. Tricuspid valve not well  visualized, probably normal. Pulmonic valve not well  visualized.  Pericardium/Pleura: No pericardial effusion seen.  Hemodynamic: RAP 3mmHG Unable to estimate RVSP.  ------------------------------------------------------------------------  Conclusions:  1. Endocardium not well visualized; grossly Overall  preserved left ventricular ejection fraction.  2. The right ventricle is not well visualized; grossly  normal right ventricular systolic function.  3. No pericardial effusion seen.  *** No previous Echo exam.  ------------------------------------------------------------------------  Confirmed on  12/27/2022 - 18:24:21 by DENNY Mckenzie  ------------------------------------------------------------------------    < end of copied text >      ECG: Admitting EKG-undetermined rhythm at 102/minute, ?ASWMI

## 2025-03-02 LAB
ANION GAP SERPL CALC-SCNC: 4 MMOL/L — LOW (ref 5–17)
BUN SERPL-MCNC: 19 MG/DL — SIGNIFICANT CHANGE UP (ref 7–23)
CALCIUM SERPL-MCNC: 8.6 MG/DL — SIGNIFICANT CHANGE UP (ref 8.5–10.1)
CHLORIDE SERPL-SCNC: 105 MMOL/L — SIGNIFICANT CHANGE UP (ref 96–108)
CO2 SERPL-SCNC: 29 MMOL/L — SIGNIFICANT CHANGE UP (ref 22–31)
CREAT SERPL-MCNC: 0.94 MG/DL — SIGNIFICANT CHANGE UP (ref 0.5–1.3)
EGFR: 82 ML/MIN/1.73M2 — SIGNIFICANT CHANGE UP
EGFR: 82 ML/MIN/1.73M2 — SIGNIFICANT CHANGE UP
GLUCOSE SERPL-MCNC: 136 MG/DL — HIGH (ref 70–99)
HCT VFR BLD CALC: 40.1 % — SIGNIFICANT CHANGE UP (ref 39–50)
HGB BLD-MCNC: 13.5 G/DL — SIGNIFICANT CHANGE UP (ref 13–17)
INR BLD: 1.68 RATIO — HIGH (ref 0.85–1.16)
MCHC RBC-ENTMCNC: 32.2 PG — SIGNIFICANT CHANGE UP (ref 27–34)
MCHC RBC-ENTMCNC: 33.7 G/DL — SIGNIFICANT CHANGE UP (ref 32–36)
MCV RBC AUTO: 95.7 FL — SIGNIFICANT CHANGE UP (ref 80–100)
NRBC BLD AUTO-RTO: 0 /100 WBCS — SIGNIFICANT CHANGE UP (ref 0–0)
PLATELET # BLD AUTO: 220 K/UL — SIGNIFICANT CHANGE UP (ref 150–400)
POTASSIUM SERPL-MCNC: 3.7 MMOL/L — SIGNIFICANT CHANGE UP (ref 3.5–5.3)
POTASSIUM SERPL-SCNC: 3.7 MMOL/L — SIGNIFICANT CHANGE UP (ref 3.5–5.3)
PROTHROM AB SERPL-ACNC: 19.5 SEC — HIGH (ref 9.9–13.4)
RBC # BLD: 4.19 M/UL — LOW (ref 4.2–5.8)
RBC # FLD: 13 % — SIGNIFICANT CHANGE UP (ref 10.3–14.5)
SODIUM SERPL-SCNC: 138 MMOL/L — SIGNIFICANT CHANGE UP (ref 135–145)
TSH SERPL-MCNC: 2.45 UIU/ML — SIGNIFICANT CHANGE UP (ref 0.36–3.74)
WBC # BLD: 11.45 K/UL — HIGH (ref 3.8–10.5)
WBC # FLD AUTO: 11.45 K/UL — HIGH (ref 3.8–10.5)

## 2025-03-02 RX ADMIN — Medication 10 MILLIGRAM(S): at 11:48

## 2025-03-02 RX ADMIN — Medication 1 TABLET(S): at 18:24

## 2025-03-02 RX ADMIN — DILTIAZEM HYDROCHLORIDE 60 MILLIGRAM(S): 240 TABLET, EXTENDED RELEASE ORAL at 00:16

## 2025-03-02 RX ADMIN — Medication 1 DOSE(S): at 18:25

## 2025-03-02 RX ADMIN — ROPINIROLE 2 MILLIGRAM(S): 5 TABLET, FILM COATED ORAL at 05:56

## 2025-03-02 RX ADMIN — Medication 2.5 MILLIGRAM(S): at 22:14

## 2025-03-02 RX ADMIN — Medication 1 DOSE(S): at 05:57

## 2025-03-02 RX ADMIN — IPRATROPIUM BROMIDE AND ALBUTEROL SULFATE 3 MILLILITER(S): .5; 2.5 SOLUTION RESPIRATORY (INHALATION) at 01:24

## 2025-03-02 RX ADMIN — DILTIAZEM HYDROCHLORIDE 60 MILLIGRAM(S): 240 TABLET, EXTENDED RELEASE ORAL at 11:52

## 2025-03-02 RX ADMIN — ROPINIROLE 2 MILLIGRAM(S): 5 TABLET, FILM COATED ORAL at 18:25

## 2025-03-02 RX ADMIN — ROPINIROLE 2 MILLIGRAM(S): 5 TABLET, FILM COATED ORAL at 11:48

## 2025-03-02 RX ADMIN — IPRATROPIUM BROMIDE AND ALBUTEROL SULFATE 3 MILLILITER(S): .5; 2.5 SOLUTION RESPIRATORY (INHALATION) at 07:41

## 2025-03-02 RX ADMIN — IPRATROPIUM BROMIDE AND ALBUTEROL SULFATE 3 MILLILITER(S): .5; 2.5 SOLUTION RESPIRATORY (INHALATION) at 19:51

## 2025-03-02 RX ADMIN — Medication 1 TABLET(S): at 05:55

## 2025-03-02 RX ADMIN — TRIHEXYPHENIDYL HYDROCHLORIDE 1 MILLIGRAM(S): 2 TABLET ORAL at 16:27

## 2025-03-02 RX ADMIN — FOLIC ACID 1 MILLIGRAM(S): 1 TABLET ORAL at 11:48

## 2025-03-02 RX ADMIN — DILTIAZEM HYDROCHLORIDE 60 MILLIGRAM(S): 240 TABLET, EXTENDED RELEASE ORAL at 05:55

## 2025-03-02 RX ADMIN — TRIHEXYPHENIDYL HYDROCHLORIDE 1 MILLIGRAM(S): 2 TABLET ORAL at 05:56

## 2025-03-02 RX ADMIN — DILTIAZEM HYDROCHLORIDE 60 MILLIGRAM(S): 240 TABLET, EXTENDED RELEASE ORAL at 18:25

## 2025-03-02 RX ADMIN — Medication 1 TABLET(S): at 00:16

## 2025-03-02 RX ADMIN — TRIHEXYPHENIDYL HYDROCHLORIDE 1 MILLIGRAM(S): 2 TABLET ORAL at 22:14

## 2025-03-02 RX ADMIN — Medication 1 TABLET(S): at 11:48

## 2025-03-02 RX ADMIN — IPRATROPIUM BROMIDE AND ALBUTEROL SULFATE 3 MILLILITER(S): .5; 2.5 SOLUTION RESPIRATORY (INHALATION) at 13:51

## 2025-03-02 RX ADMIN — ROPINIROLE 2 MILLIGRAM(S): 5 TABLET, FILM COATED ORAL at 00:16

## 2025-03-02 NOTE — PHYSICAL THERAPY INITIAL EVALUATION ADULT - PERTINENT HX OF CURRENT PROBLEM, REHAB EVAL
78 yo Male with a past medical history of PE on warfarin, COPD, hypertension, Parkinson disease is presenting with complaints of shortness of breath.  States that he was short of breath started in the morning.  Tried his nebulizer without relief in symptoms.  And then upon walking to the bathroom, his symptoms acutely got worse so his wife called 911 for evaluation.  Patient has been coughing.  Initially in ED he was placed on BIPAP, now improved and placed on NC oxygen. He feels comfortable now with his breathing.

## 2025-03-02 NOTE — DIETITIAN INITIAL EVALUATION ADULT - ADD RECOMMEND
1. consider consistent cho low Na diet 2. recommend MVI 3. trend weights and blood sugars this admit  1. consider consistent cho low Na diet per MD discretion  2. recommend MVI 3. trend weights and blood sugars this admit

## 2025-03-02 NOTE — CASE MANAGEMENT PROGRESS NOTE - NSCMPROGRESSNOTE_GEN_ALL_CORE
Patient discussed in rounds and remains acute with new development of rapid a fib.  Discharge disposition is home with [facility] and oxygen (if needed) when stable.  Requested this morning new oxygen home evaluation to floor nurse. Wife will transport home when cleared by cardio. CM remains available throughout hospital stay.

## 2025-03-02 NOTE — DIETITIAN INITIAL EVALUATION ADULT - ORAL INTAKE PTA/DIET HISTORY
patient seen with spouse at bedside. patient per family is dx with pre DM (noted A1c 6.6% this admit) per spouse patient on steroids. reports uses splenda reports follows JASON [facility] style diet. no big breakfast eater has toast (encouraged whole grains) eats lunch and dinner and snacks a lot per family. family accepted verbal review of diet at this time. garlic allergy noted.

## 2025-03-02 NOTE — DIETITIAN INITIAL EVALUATION ADULT - WEIGHT (LBS)
"Surgical Progress Note:    POD# 1  S/P lap adelfo   No acute events  Some abdominal pain, but well controlled  Tolerating clears    PE:  /75 mmHg  Pulse 67  Temp(Src) 36.7 °C (98 °F)  Resp 17  Ht 1.578 m (5' 2.13\")  Wt 98.8 kg (217 lb 13 oz)  BMI 39.68 kg/m2  SpO2 99%  LMP 02/20/2017  Breastfeeding? No    I/O:   Intake/Output Summary (Last 24 hours) at 03/11/17 1154  Last data filed at 03/11/17 1100   Gross per 24 hour   Intake   2780 ml   Output   1350 ml   Net   1430 ml     UOP:  PO:  IV:    GEN: NAd  COR: RRR  PULM: CTA  ABD: soft, ND. TTP at incisions and RUQ, drain 90 cc serosanguinous      Labs:  Recent Labs      03/10/17   0940  03/11/17   0156   WBC  9.6  18.9*   RBC  5.36  4.69   HEMOGLOBIN  14.6  12.6   HEMATOCRIT  44.9  39.6   MCV  83.8  84.4   MCH  27.2  26.9*   RDW  41.3  41.8   PLATELETCT  411  424   MPV  9.2  9.8   NEUTSPOLYS  68.50  89.50*   LYMPHOCYTES  23.20  6.40*   MONOCYTES  5.10  3.20   EOSINOPHILS  1.70  0.00   BASOPHILS  0.80  0.10     Recent Labs      03/10/17   0940  03/11/17   0156   SODIUM  140  137   POTASSIUM  4.3  4.6   CHLORIDE  105  107   CO2  24  22   GLUCOSE  95  163*   BUN  9  8         A/P: POD# 1  S/P lap adelfo for supparative cholecystitis  Overall doing well  IV antibiotics until leukocytosis improves  Advance diet     Uli Cotto M.D.  [facility]  [phone]      " 162

## 2025-03-02 NOTE — PROGRESS NOTE ADULT - ASSESSMENT
78y/o seen at [facility] telemetry. Wife and aide at bedside  History Memory changes, PAF, Parkinson disease, HTN, COPD, mild sleep apnea, former smoker, pulmonary embolus, DVT, Leiden Factor V deficiency, spinal stenosis    Admitted for shortness of breath and cough  Early evening yesterday, the patient went into asymptomatic A-Fib  Mag-2.1    Phos-3.5    3/2/25  Seen at [facility] telemetry  Lying flat, sleeping comfortably  Monitor with regular rhythm-?NSR with APC's  No recurrence of PAF noted    Impression  Exacerbation of COPD  PAF as above    Plan:  - Seen by EPS and Metoprolol changed to Cardizem  - Echocardiogram  - Chest CAT scan with partial left lung atelectasis resulting in Morgani hernia containing small and large bowel  - Patient already on Warfarin, however, he has a history of frequent falls    Risks, benefits, diagnosis and prognosis explained, and if he continues to keep falling then the risks may out-weigh the benefits    He is to get up slowly, no straining in bathroom, etc    He may benefit from using a wheel-chair and not a walker    However, if he uses a walker, then this should be done with assistance only

## 2025-03-02 NOTE — DIETITIAN INITIAL EVALUATION ADULT - PERTINENT LABORATORY DATA
03-02    138  |  105  |  19  ----------------------------<  136[H]  3.7   |  29  |  0.94    Ca    8.6      02 Mar 2025 06:55  Phos  3.5     03-01  Mg     2.1     03-01    A1C with Estimated Average Glucose Result: 6.6 % (02-24-25 @ 07:20)

## 2025-03-02 NOTE — PROGRESS NOTE ADULT - ASSESSMENT
78 yo Male with a past medical history of PE on warfarin, COPD, hypertension, Parkinson disease is presenting with complaints of shortness of breath    Acute Hypoxic respiratory failure  COPD exacerbation  Cough  s/p solumedrol  -Duoneb  -Advair  stable on oxygen  arranged for home oxygen  pulm cs fu    AF  tachy-tobi  seen by cardio and EP  echo  TSH  already on ac  cardizem as tolerated    h/o PE  -on coumadin  -follow INR    Parkinsonism  -Sinemet  -Ropinirole    Coumadin for DVT ppx  Full code         [facility]   [phone]

## 2025-03-02 NOTE — PROGRESS NOTE ADULT - ASSESSMENT
78 yo Male with a past medical history of PE on warfarin, COPD, hypertension, Parkinson disease is presenting with complaints of shortness of breath.  States that he was short of breath starting this morning.  Tried his nebulizer without relief in symptoms.  And then upon walking to the bathroom, his symptoms acutely got worse so his wife called 911 for evaluation.    dysphagia  aspiration  copd  diaphragmatic hernia  Atelectasis  PE hx  OP  OA  Ataxic gait  Parkinson's disease  HTN  GERD    poss home o2  vs noted  meds reviewed  chest imaging reviewed    SLP eval  asp prec  HOB elev  oral hygiene  skin care  assist with needs  fio2 support - keep sat > 88 pct  NEBS for COPD component - hold off on Systemic steroids for now  cough rx regimen PRN -   AC - hx of PE -   cvs rx regimen  BP control  nutritional assessment  spoke with WIFE  pt follows with Dr Rivas - Pulm Med - [place] - [facility] office  strong suspicion for Aspiration - chronic - as per pt and wife - coughs and has sx/signs of dysphagia  CT chest reviewed  VBG c/w COPD

## 2025-03-02 NOTE — DIETITIAN INITIAL EVALUATION ADULT - PERTINENT MEDS FT
MEDICATIONS  (STANDING):  albuterol/ipratropium for Nebulization 3 milliLiter(s) Nebulizer every 6 hours  carbidopa/levodopa  25/100 1 Tablet(s) Oral four times a day  cetirizine 10 milliGRAM(s) Oral daily  diltiazem    Tablet 60 milliGRAM(s) Oral four times a day  fluticasone propionate/ salmeterol 250-50 MICROgram(s) Diskus 1 Dose(s) Inhalation two times a day  folic acid 1 milliGRAM(s) Oral daily  rOPINIRole 2 milliGRAM(s) Oral four times a day  trihexyphenidyl 1 milliGRAM(s) Oral three times a day  warfarin 2.5 milliGRAM(s) Oral once    MEDICATIONS  (PRN):  acetaminophen     Tablet .. 650 milliGRAM(s) Oral every 6 hours PRN Temp greater or equal to 38C (100.4F), Mild Pain (1 - 3)  aluminum hydroxide/magnesium hydroxide/simethicone Suspension 30 milliLiter(s) Oral every 4 hours PRN Dyspepsia  benzonatate 100 milliGRAM(s) Oral three times a day PRN Cough  melatonin 3 milliGRAM(s) Oral at bedtime PRN Insomnia  ondansetron Injectable 4 milliGRAM(s) IV Push every 8 hours PRN Nausea and/or Vomiting

## 2025-03-02 NOTE — DIETITIAN INITIAL EVALUATION ADULT - NSFNSGIIOFT_GEN_A_CORE
03-01-25 @ 07:01  -  03-02-25 @ 07:00  --------------------------------------------------------  OUT:    Stool (mL): 1 mL  Total OUT: 1 mL    Total NET: -1 mL

## 2025-03-03 ENCOUNTER — RESULT REVIEW (OUTPATIENT)
Age: 80
End: 2025-03-03

## 2025-03-03 LAB
ANION GAP SERPL CALC-SCNC: 4 MMOL/L — LOW (ref 5–17)
BUN SERPL-MCNC: 20 MG/DL — SIGNIFICANT CHANGE UP (ref 7–23)
CALCIUM SERPL-MCNC: 8.8 MG/DL — SIGNIFICANT CHANGE UP (ref 8.5–10.1)
CHLORIDE SERPL-SCNC: 106 MMOL/L — SIGNIFICANT CHANGE UP (ref 96–108)
CO2 SERPL-SCNC: 29 MMOL/L — SIGNIFICANT CHANGE UP (ref 22–31)
CREAT SERPL-MCNC: 0.92 MG/DL — SIGNIFICANT CHANGE UP (ref 0.5–1.3)
EGFR: 84 ML/MIN/1.73M2 — SIGNIFICANT CHANGE UP
EGFR: 84 ML/MIN/1.73M2 — SIGNIFICANT CHANGE UP
GLUCOSE SERPL-MCNC: 159 MG/DL — HIGH (ref 70–99)
HCT VFR BLD CALC: 37.4 % — LOW (ref 39–50)
HGB BLD-MCNC: 12.5 G/DL — LOW (ref 13–17)
INR BLD: 1.62 RATIO — HIGH (ref 0.85–1.16)
MCHC RBC-ENTMCNC: 32.3 PG — SIGNIFICANT CHANGE UP (ref 27–34)
MCHC RBC-ENTMCNC: 33.4 G/DL — SIGNIFICANT CHANGE UP (ref 32–36)
MCV RBC AUTO: 96.6 FL — SIGNIFICANT CHANGE UP (ref 80–100)
NRBC BLD AUTO-RTO: 0 /100 WBCS — SIGNIFICANT CHANGE UP (ref 0–0)
PLATELET # BLD AUTO: 190 K/UL — SIGNIFICANT CHANGE UP (ref 150–400)
POTASSIUM SERPL-MCNC: 3.7 MMOL/L — SIGNIFICANT CHANGE UP (ref 3.5–5.3)
POTASSIUM SERPL-SCNC: 3.7 MMOL/L — SIGNIFICANT CHANGE UP (ref 3.5–5.3)
PROTHROM AB SERPL-ACNC: 18.9 SEC — HIGH (ref 9.9–13.4)
RAPID RVP RESULT: SIGNIFICANT CHANGE UP
RBC # BLD: 3.87 M/UL — LOW (ref 4.2–5.8)
RBC # FLD: 13.2 % — SIGNIFICANT CHANGE UP (ref 10.3–14.5)
SARS-COV-2 RNA SPEC QL NAA+PROBE: SIGNIFICANT CHANGE UP
SODIUM SERPL-SCNC: 139 MMOL/L — SIGNIFICANT CHANGE UP (ref 135–145)
WBC # BLD: 9.76 K/UL — SIGNIFICANT CHANGE UP (ref 3.8–10.5)
WBC # FLD AUTO: 9.76 K/UL — SIGNIFICANT CHANGE UP (ref 3.8–10.5)

## 2025-03-03 PROCEDURE — 71045 X-RAY EXAM CHEST 1 VIEW: CPT | Mod: 26

## 2025-03-03 PROCEDURE — 93010 ELECTROCARDIOGRAM REPORT: CPT

## 2025-03-03 RX ORDER — DILTIAZEM HYDROCHLORIDE 240 MG/1
240 TABLET, EXTENDED RELEASE ORAL DAILY
Refills: 0 | Status: DISCONTINUED | OUTPATIENT
Start: 2025-03-03 | End: 2025-03-07

## 2025-03-03 RX ADMIN — Medication 1 DOSE(S): at 05:57

## 2025-03-03 RX ADMIN — Medication 10 MILLIGRAM(S): at 11:03

## 2025-03-03 RX ADMIN — ROPINIROLE 2 MILLIGRAM(S): 5 TABLET, FILM COATED ORAL at 05:56

## 2025-03-03 RX ADMIN — Medication 3 MILLIGRAM(S): at 20:46

## 2025-03-03 RX ADMIN — IPRATROPIUM BROMIDE AND ALBUTEROL SULFATE 3 MILLILITER(S): .5; 2.5 SOLUTION RESPIRATORY (INHALATION) at 13:50

## 2025-03-03 RX ADMIN — ROPINIROLE 2 MILLIGRAM(S): 5 TABLET, FILM COATED ORAL at 11:03

## 2025-03-03 RX ADMIN — Medication 1 TABLET(S): at 17:11

## 2025-03-03 RX ADMIN — Medication 1 DOSE(S): at 20:48

## 2025-03-03 RX ADMIN — TRIHEXYPHENIDYL HYDROCHLORIDE 1 MILLIGRAM(S): 2 TABLET ORAL at 23:27

## 2025-03-03 RX ADMIN — IPRATROPIUM BROMIDE AND ALBUTEROL SULFATE 3 MILLILITER(S): .5; 2.5 SOLUTION RESPIRATORY (INHALATION) at 00:40

## 2025-03-03 RX ADMIN — IPRATROPIUM BROMIDE AND ALBUTEROL SULFATE 3 MILLILITER(S): .5; 2.5 SOLUTION RESPIRATORY (INHALATION) at 07:34

## 2025-03-03 RX ADMIN — Medication 1 TABLET(S): at 00:14

## 2025-03-03 RX ADMIN — FOLIC ACID 1 MILLIGRAM(S): 1 TABLET ORAL at 11:03

## 2025-03-03 RX ADMIN — DILTIAZEM HYDROCHLORIDE 60 MILLIGRAM(S): 240 TABLET, EXTENDED RELEASE ORAL at 00:14

## 2025-03-03 RX ADMIN — Medication 1 TABLET(S): at 23:27

## 2025-03-03 RX ADMIN — TRIHEXYPHENIDYL HYDROCHLORIDE 1 MILLIGRAM(S): 2 TABLET ORAL at 05:57

## 2025-03-03 RX ADMIN — ROPINIROLE 2 MILLIGRAM(S): 5 TABLET, FILM COATED ORAL at 00:14

## 2025-03-03 RX ADMIN — IPRATROPIUM BROMIDE AND ALBUTEROL SULFATE 3 MILLILITER(S): .5; 2.5 SOLUTION RESPIRATORY (INHALATION) at 20:12

## 2025-03-03 RX ADMIN — Medication 1 TABLET(S): at 05:57

## 2025-03-03 RX ADMIN — ROPINIROLE 2 MILLIGRAM(S): 5 TABLET, FILM COATED ORAL at 17:11

## 2025-03-03 RX ADMIN — ROPINIROLE 2 MILLIGRAM(S): 5 TABLET, FILM COATED ORAL at 20:46

## 2025-03-03 RX ADMIN — Medication 1 TABLET(S): at 11:03

## 2025-03-03 RX ADMIN — Medication 2.5 MILLIGRAM(S): at 20:46

## 2025-03-03 RX ADMIN — TRIHEXYPHENIDYL HYDROCHLORIDE 1 MILLIGRAM(S): 2 TABLET ORAL at 13:56

## 2025-03-03 RX ADMIN — DILTIAZEM HYDROCHLORIDE 60 MILLIGRAM(S): 240 TABLET, EXTENDED RELEASE ORAL at 05:56

## 2025-03-03 NOTE — PROGRESS NOTE ADULT - ASSESSMENT
80 yo Male with a past medical history of PE on warfarin, COPD, hypertension, Parkinson disease is presenting with complaints of shortness of breath.  States that he was short of breath starting this morning.  Tried his nebulizer without relief in symptoms.  And then upon walking to the bathroom, his symptoms acutely got worse so his wife called 911 for evaluation.    dysphagia  aspiration  copd  diaphragmatic hernia  Atelectasis  PE hx  OP  OA  Ataxic gait  Parkinson's disease  HTN  GERD    on Cardizem  poss home o2  vs noted  meds reviewed  chest imaging reviewed    SLP eval  asp prec  HOB elev  oral hygiene  skin care  assist with needs  fio2 support - keep sat > 88 pct  NEBS for COPD component - hold off on Systemic steroids for now  cough rx regimen PRN -   AC - hx of PE -   cvs rx regimen  BP control  nutritional assessment  spoke with WIFE  pt follows with Dr Rivas - Pulm Med - [place] - [facility] office  strong suspicion for Aspiration - chronic - as per pt and wife - coughs and has sx/signs of dysphagia  CT chest reviewed  VBG c/w COPD

## 2025-03-03 NOTE — PROGRESS NOTE ADULT - ASSESSMENT
78 yo Male with a past medical history of PE on warfarin, COPD, hypertension, Parkinson disease is presenting with complaints of shortness of breath    Acute Hypoxic respiratory failure  COPD exacerbation  Cough  s/p solumedrol  -Duoneb  -Advair  stable on oxygen  arranged for home oxygen  pulm following    AF  tachy-tobi  seen by cardio and EP  echo performed  TSH  already on ac  cardizem as tolerated    h/o PE  -on coumadin  -follow INR    Parkinsonism  -Sinemet  -Ropinirole    Coumadin for DVT ppx  Full code         [facility]   [phone]

## 2025-03-03 NOTE — PROGRESS NOTE ADULT - ASSESSMENT
The patient is an 79 year old male with a history of HTN, DVT/PE, COPD, Parkinson's disease, paroxysmal atrial fibrillation, AL who presented with cough and shortness of breath.    Plan:  - ECG with atrial fibrillation and nonspecific findings  - Echo pending  - CT chest with atelectasis and hernia  - Continue diltiazem  mg daily  - Continue warfarin for goal INR 2-3  - Can consider transition to apixaban if patient agreeable

## 2025-03-03 NOTE — SOCIAL WORK PROGRESS NOTE - NSSWPROGRESSNOTE_GEN_ALL_CORE
Discussed at daily rounds.  PT is now recommending sub-acute rehab ([facility]). I met with wife at bedside today. She prefers home but needs him to be doing more with PT. States she has reached out to the aide she has to see if she can increase hours. She is concerned about rehab due to his mild dementia. Support provided. She provided following rehab choices if this becomes plan 1. [facility]. [facility] 3. [facility]. PT scheduled for tomorrow. Social work to follow.

## 2025-03-03 NOTE — CASE MANAGEMENT PROGRESS NOTE - NSCMPROGRESSNOTE_GEN_ALL_CORE
Discussed pt in rounds, pt remains acute on oxygen 2L, new afib. Pt was started on Cardizemm receiving Coumadin, INR 1.62 today.

## 2025-03-04 LAB
INR BLD: 1.77 RATIO — HIGH (ref 0.85–1.16)
PROTHROM AB SERPL-ACNC: 20.8 SEC — HIGH (ref 9.9–13.4)

## 2025-03-04 RX ADMIN — TRIHEXYPHENIDYL HYDROCHLORIDE 1 MILLIGRAM(S): 2 TABLET ORAL at 05:58

## 2025-03-04 RX ADMIN — Medication 3 MILLIGRAM(S): at 22:02

## 2025-03-04 RX ADMIN — Medication 1 TABLET(S): at 17:53

## 2025-03-04 RX ADMIN — Medication 1 TABLET(S): at 05:58

## 2025-03-04 RX ADMIN — FOLIC ACID 1 MILLIGRAM(S): 1 TABLET ORAL at 12:07

## 2025-03-04 RX ADMIN — Medication 1 TABLET(S): at 12:08

## 2025-03-04 RX ADMIN — DILTIAZEM HYDROCHLORIDE 240 MILLIGRAM(S): 240 TABLET, EXTENDED RELEASE ORAL at 05:58

## 2025-03-04 RX ADMIN — Medication 1 DOSE(S): at 17:53

## 2025-03-04 RX ADMIN — IPRATROPIUM BROMIDE AND ALBUTEROL SULFATE 3 MILLILITER(S): .5; 2.5 SOLUTION RESPIRATORY (INHALATION) at 13:33

## 2025-03-04 RX ADMIN — IPRATROPIUM BROMIDE AND ALBUTEROL SULFATE 3 MILLILITER(S): .5; 2.5 SOLUTION RESPIRATORY (INHALATION) at 20:33

## 2025-03-04 RX ADMIN — Medication 10 MILLIGRAM(S): at 12:08

## 2025-03-04 RX ADMIN — ROPINIROLE 2 MILLIGRAM(S): 5 TABLET, FILM COATED ORAL at 05:59

## 2025-03-04 RX ADMIN — Medication 1 DOSE(S): at 06:02

## 2025-03-04 RX ADMIN — ROPINIROLE 2 MILLIGRAM(S): 5 TABLET, FILM COATED ORAL at 12:08

## 2025-03-04 RX ADMIN — TRIHEXYPHENIDYL HYDROCHLORIDE 1 MILLIGRAM(S): 2 TABLET ORAL at 22:02

## 2025-03-04 RX ADMIN — IPRATROPIUM BROMIDE AND ALBUTEROL SULFATE 3 MILLILITER(S): .5; 2.5 SOLUTION RESPIRATORY (INHALATION) at 01:34

## 2025-03-04 RX ADMIN — ROPINIROLE 2 MILLIGRAM(S): 5 TABLET, FILM COATED ORAL at 17:53

## 2025-03-04 RX ADMIN — IPRATROPIUM BROMIDE AND ALBUTEROL SULFATE 3 MILLILITER(S): .5; 2.5 SOLUTION RESPIRATORY (INHALATION) at 07:44

## 2025-03-04 RX ADMIN — TRIHEXYPHENIDYL HYDROCHLORIDE 1 MILLIGRAM(S): 2 TABLET ORAL at 16:02

## 2025-03-04 NOTE — PROGRESS NOTE ADULT - ASSESSMENT
78 yo Male with a past medical history of PE on warfarin, COPD, hypertension, Parkinson disease is presenting with complaints of shortness of breath.  States that he was short of breath starting this morning.  Tried his nebulizer without relief in symptoms.  And then upon walking to the bathroom, his symptoms acutely got worse so his wife called 911 for evaluation.    dysphagia  aspiration  copd  diaphragmatic hernia  Atelectasis  PE hx  OP  OA  Ataxic gait  Parkinson's disease  HTN  GERD    on Cardizem  RVP rpt - NEG - 3 3 25 -   vs noted  meds reviewed  chest imaging reviewed    SLP eval  asp prec  HOB elev  oral hygiene  skin care  assist with needs  fio2 support - keep sat > 88 pct  NEBS for COPD component - hold off on Systemic steroids for now  cough rx regimen PRN -   AC - hx of PE -   cvs rx regimen  BP control  nutritional assessment  spoke with WIFE  pt follows with Dr Rivas - Pulm Med - [place] - [facility] office  strong suspicion for Aspiration - chronic - as per pt and wife - coughs and has sx/signs of dysphagia  CT chest reviewed  VBG c/w COPD

## 2025-03-04 NOTE — SOCIAL WORK PROGRESS NOTE - NSSWPROGRESSNOTE_GEN_ALL_CORE
Discussed at daily rounds. Patient did better with PT today and recommends home. I met with wife at bedside and she was here for PT session. She is agreeable to return home upon d/c with Home Care and confirmed she has 24 hour private aides available.  aware and will follow case.

## 2025-03-04 NOTE — CASE MANAGEMENT PROGRESS NOTE - NSCMPROGRESSNOTE_GEN_ALL_CORE
Discussed pt in rounds, pt remains acute on oxygen. INR 1.77 today. per cardio needs to be between 2-3. Plan for home when stable, spouse to increase private pay aides to 24/7 to assist in caring for patient. 8 pounds 8 1/2 ounces

## 2025-03-04 NOTE — PROGRESS NOTE ADULT - ASSESSMENT
80 yo Male with a past medical history of PE on warfarin, COPD, hypertension, Parkinson disease is presenting with complaints of shortness of breath    Acute Hypoxic respiratory failure  COPD exacerbation  Cough  s/p solumedrol  -Duoneb  -Advair  stable on oxygen  arranged for home oxygen  pulm following    AF  tachy-tobi  seen by cardio and EP  echo nondiagnostic  TSH ok  already on ac  cardizem as tolerated    h/o PE  -on coumadin  -follow INR    Parkinsonism  -Sinemet  -Ropinirole    Coumadin for DVT ppx  Full code     plan for dc on thursday when home aides are available    [facility]   [phone]

## 2025-03-04 NOTE — PROGRESS NOTE ADULT - ASSESSMENT
The patient is an 79 year old male with a history of HTN, DVT/PE, COPD, Parkinson's disease, paroxysmal atrial fibrillation, AL who presented with cough and shortness of breath.    Plan:  - ECG with atrial fibrillation and nonspecific findings  - Echo technically difficult, ?normal LV systolic function although poor windows  - CT chest with atelectasis and hernia  - Continue diltiazem  mg daily  - Continue warfarin for goal INR 2-3  - Can consider transition to apixaban if patient agreeable

## 2025-03-05 LAB
ANION GAP SERPL CALC-SCNC: 6 MMOL/L — SIGNIFICANT CHANGE UP (ref 5–17)
BUN SERPL-MCNC: 15 MG/DL — SIGNIFICANT CHANGE UP (ref 7–23)
CALCIUM SERPL-MCNC: 9.4 MG/DL — SIGNIFICANT CHANGE UP (ref 8.5–10.1)
CHLORIDE SERPL-SCNC: 102 MMOL/L — SIGNIFICANT CHANGE UP (ref 96–108)
CO2 SERPL-SCNC: 27 MMOL/L — SIGNIFICANT CHANGE UP (ref 22–31)
CREAT SERPL-MCNC: 0.95 MG/DL — SIGNIFICANT CHANGE UP (ref 0.5–1.3)
EGFR: 81 ML/MIN/1.73M2 — SIGNIFICANT CHANGE UP
EGFR: 81 ML/MIN/1.73M2 — SIGNIFICANT CHANGE UP
GLUCOSE SERPL-MCNC: 200 MG/DL — HIGH (ref 70–99)
HCT VFR BLD CALC: 42.5 % — SIGNIFICANT CHANGE UP (ref 39–50)
HGB BLD-MCNC: 14.2 G/DL — SIGNIFICANT CHANGE UP (ref 13–17)
INR BLD: 1.9 RATIO — HIGH (ref 0.85–1.16)
MCHC RBC-ENTMCNC: 31.7 PG — SIGNIFICANT CHANGE UP (ref 27–34)
MCHC RBC-ENTMCNC: 33.4 G/DL — SIGNIFICANT CHANGE UP (ref 32–36)
MCV RBC AUTO: 94.9 FL — SIGNIFICANT CHANGE UP (ref 80–100)
NRBC BLD AUTO-RTO: 0 /100 WBCS — SIGNIFICANT CHANGE UP (ref 0–0)
PLATELET # BLD AUTO: 270 K/UL — SIGNIFICANT CHANGE UP (ref 150–400)
POTASSIUM SERPL-MCNC: 4.5 MMOL/L — SIGNIFICANT CHANGE UP (ref 3.5–5.3)
POTASSIUM SERPL-SCNC: 4.5 MMOL/L — SIGNIFICANT CHANGE UP (ref 3.5–5.3)
PROTHROM AB SERPL-ACNC: 22.1 SEC — HIGH (ref 9.9–13.4)
RBC # BLD: 4.48 M/UL — SIGNIFICANT CHANGE UP (ref 4.2–5.8)
RBC # FLD: 13.2 % — SIGNIFICANT CHANGE UP (ref 10.3–14.5)
SODIUM SERPL-SCNC: 135 MMOL/L — SIGNIFICANT CHANGE UP (ref 135–145)
WBC # BLD: 12.72 K/UL — HIGH (ref 3.8–10.5)
WBC # FLD AUTO: 12.72 K/UL — HIGH (ref 3.8–10.5)

## 2025-03-05 RX ORDER — IPRATROPIUM BROMIDE AND ALBUTEROL SULFATE .5; 2.5 MG/3ML; MG/3ML
3 SOLUTION RESPIRATORY (INHALATION) ONCE
Refills: 0 | Status: COMPLETED | OUTPATIENT
Start: 2025-03-05 | End: 2025-03-05

## 2025-03-05 RX ORDER — METHYLPREDNISOLONE ACETATE 80 MG/ML
125 INJECTION, SUSPENSION INTRA-ARTICULAR; INTRALESIONAL; INTRAMUSCULAR; SOFT TISSUE ONCE
Refills: 0 | Status: COMPLETED | OUTPATIENT
Start: 2025-03-05 | End: 2025-03-05

## 2025-03-05 RX ORDER — ACETYLCYSTEINE 200 MG/ML
4 INHALANT RESPIRATORY (INHALATION) ONCE
Refills: 0 | Status: DISCONTINUED | OUTPATIENT
Start: 2025-03-05 | End: 2025-03-05

## 2025-03-05 RX ORDER — METHYLPREDNISOLONE ACETATE 80 MG/ML
40 INJECTION, SUSPENSION INTRA-ARTICULAR; INTRALESIONAL; INTRAMUSCULAR; SOFT TISSUE ONCE
Refills: 0 | Status: DISCONTINUED | OUTPATIENT
Start: 2025-03-05 | End: 2025-03-05

## 2025-03-05 RX ORDER — METHYLPREDNISOLONE ACETATE 80 MG/ML
40 INJECTION, SUSPENSION INTRA-ARTICULAR; INTRALESIONAL; INTRAMUSCULAR; SOFT TISSUE EVERY 12 HOURS
Refills: 0 | Status: DISCONTINUED | OUTPATIENT
Start: 2025-03-05 | End: 2025-03-06

## 2025-03-05 RX ADMIN — Medication 10 MILLIGRAM(S): at 13:17

## 2025-03-05 RX ADMIN — Medication 1 TABLET(S): at 18:32

## 2025-03-05 RX ADMIN — METHYLPREDNISOLONE ACETATE 125 MILLIGRAM(S): 80 INJECTION, SUSPENSION INTRA-ARTICULAR; INTRALESIONAL; INTRAMUSCULAR; SOFT TISSUE at 05:57

## 2025-03-05 RX ADMIN — IPRATROPIUM BROMIDE AND ALBUTEROL SULFATE 3 MILLILITER(S): .5; 2.5 SOLUTION RESPIRATORY (INHALATION) at 02:34

## 2025-03-05 RX ADMIN — ROPINIROLE 2 MILLIGRAM(S): 5 TABLET, FILM COATED ORAL at 13:17

## 2025-03-05 RX ADMIN — ROPINIROLE 2 MILLIGRAM(S): 5 TABLET, FILM COATED ORAL at 23:02

## 2025-03-05 RX ADMIN — Medication 1 TABLET(S): at 23:02

## 2025-03-05 RX ADMIN — Medication 4 MILLILITER(S): at 18:31

## 2025-03-05 RX ADMIN — Medication 1 TABLET(S): at 13:17

## 2025-03-05 RX ADMIN — IPRATROPIUM BROMIDE AND ALBUTEROL SULFATE 3 MILLILITER(S): .5; 2.5 SOLUTION RESPIRATORY (INHALATION) at 18:31

## 2025-03-05 RX ADMIN — Medication 1 DOSE(S): at 13:18

## 2025-03-05 RX ADMIN — ROPINIROLE 2 MILLIGRAM(S): 5 TABLET, FILM COATED ORAL at 00:35

## 2025-03-05 RX ADMIN — Medication 3.5 MILLIGRAM(S): at 22:56

## 2025-03-05 RX ADMIN — METHYLPREDNISOLONE ACETATE 40 MILLIGRAM(S): 80 INJECTION, SUSPENSION INTRA-ARTICULAR; INTRALESIONAL; INTRAMUSCULAR; SOFT TISSUE at 18:29

## 2025-03-05 RX ADMIN — ROPINIROLE 2 MILLIGRAM(S): 5 TABLET, FILM COATED ORAL at 05:15

## 2025-03-05 RX ADMIN — TRIHEXYPHENIDYL HYDROCHLORIDE 1 MILLIGRAM(S): 2 TABLET ORAL at 13:16

## 2025-03-05 RX ADMIN — Medication 1 DOSE(S): at 20:01

## 2025-03-05 RX ADMIN — ROPINIROLE 2 MILLIGRAM(S): 5 TABLET, FILM COATED ORAL at 18:33

## 2025-03-05 RX ADMIN — TRIHEXYPHENIDYL HYDROCHLORIDE 1 MILLIGRAM(S): 2 TABLET ORAL at 22:56

## 2025-03-05 RX ADMIN — TRIHEXYPHENIDYL HYDROCHLORIDE 1 MILLIGRAM(S): 2 TABLET ORAL at 05:16

## 2025-03-05 RX ADMIN — IPRATROPIUM BROMIDE AND ALBUTEROL SULFATE 3 MILLILITER(S): .5; 2.5 SOLUTION RESPIRATORY (INHALATION) at 06:14

## 2025-03-05 RX ADMIN — DILTIAZEM HYDROCHLORIDE 240 MILLIGRAM(S): 240 TABLET, EXTENDED RELEASE ORAL at 05:15

## 2025-03-05 RX ADMIN — IPRATROPIUM BROMIDE AND ALBUTEROL SULFATE 3 MILLILITER(S): .5; 2.5 SOLUTION RESPIRATORY (INHALATION) at 12:59

## 2025-03-05 RX ADMIN — Medication 1 TABLET(S): at 05:19

## 2025-03-05 RX ADMIN — FOLIC ACID 1 MILLIGRAM(S): 1 TABLET ORAL at 13:16

## 2025-03-05 RX ADMIN — IPRATROPIUM BROMIDE AND ALBUTEROL SULFATE 3 MILLILITER(S): .5; 2.5 SOLUTION RESPIRATORY (INHALATION) at 07:51

## 2025-03-05 RX ADMIN — Medication 1 TABLET(S): at 00:35

## 2025-03-05 NOTE — PROGRESS NOTE ADULT - ASSESSMENT
The patient is an 79 year old male with a history of HTN, DVT/PE, COPD, Parkinson's disease, paroxysmal atrial fibrillation, AL who presented with cough and shortness of breath.    Plan:  - ECG with atrial fibrillation and nonspecific findings  - Echo technically difficult, ?normal LV systolic function although poor windows  - CT chest with atelectasis and hernia  - Continue diltiazem  mg daily  - Continue warfarin for goal INR 2-3  - Can consider transition to apixaban if patient agreeable  - Pulm follow-up

## 2025-03-05 NOTE — PROGRESS NOTE ADULT - ASSESSMENT
78 yo Male with a past medical history of PE on warfarin, COPD, hypertension, Parkinson disease is presenting with complaints of shortness of breath.  States that he was short of breath starting this morning.  Tried his nebulizer without relief in symptoms.  And then upon walking to the bathroom, his symptoms acutely got worse so his wife called 911 for evaluation.    dysphagia  aspiration  copd  diaphragmatic hernia  Atelectasis  PE hx  OP  OA  Ataxic gait  Parkinson's disease  HTN  GERD    vs noted  meds reviewed  chest imaging reviewed  HOME o2 support - CM f/u noted    SLP eval  asp prec  HOB elev  oral hygiene  skin care  assist with needs  fio2 support - keep sat > 88 pct  NEBS for COPD component - hold off on Systemic steroids for now  cough rx regimen PRN -   AC - hx of PE -   cvs rx regimen  BP control  nutritional assessment  spoke with WIFE  pt follows with Dr Rivas - Pulm Med - [place] - [facility] office  strong suspicion for Aspiration - chronic - as per pt and wife - coughs and has sx/signs of dysphagia  CT chest reviewed  VBG c/w COPD     80 yo Male with a past medical history of PE on warfarin, COPD, hypertension, Parkinson disease is presenting with complaints of shortness of breath.  States that he was short of breath starting this morning.  Tried his nebulizer without relief in symptoms.  And then upon walking to the bathroom, his symptoms acutely got worse so his wife called 911 for evaluation.    dysphagia  aspiration  copd  diaphragmatic hernia  Atelectasis  PE hx  OP  OA  Ataxic gait  Parkinson's disease  HTN  GERD    vs noted  meds reviewed  chest imaging reviewed  HOME o2 support - CM f/u noted  bronchospasm this am - got a Tx with NEB and a Solumedrol Injection - tele monitored - fio2 support in progress -     SLP eval  asp prec  HOB elev  oral hygiene  skin care  assist with needs  fio2 support - keep sat > 88 pct  NEBS for COPD component - hold off on Systemic steroids for now  cough rx regimen PRN -   AC - hx of PE -   cvs rx regimen  BP control  nutritional assessment  spoke with WIFE  pt follows with Dr Rivas - Pulm Med - [place] - [facility] office  strong suspicion for Aspiration - chronic - as per pt and wife - coughs and has sx/signs of dysphagia  CT chest reviewed  VBG c/w COPD

## 2025-03-05 NOTE — PROGRESS NOTE ADULT - ASSESSMENT
78 yo Male with a past medical history of PE on warfarin, COPD, hypertension, Parkinson disease is presenting with complaints of shortness of breath    Acute Hypoxic respiratory failure  COPD exacerbation  Cough  s/p solumedrol  -Duoneb  -Advair  stable on oxygen  arranged for home oxygen  pulm following    AF  tachy-tobi  seen by cardio and EP  echo nondiagnostic  TSH ok  already on ac  cardizem as tolerated    h/o PE  -on coumadin  -follow INR    Parkinsonism  -Sinemet  -Ropinirole    Coumadin for DVT ppx  Full code     plan for dc on thursday when home aides are available    [facility]   [phone]

## 2025-03-05 NOTE — CAREGIVER ENGAGEMENT NOTE - CAREGIVER OUTREACH NOTES - FREE TEXT
Discussed that per MD plan is for patient to be transitioned home tomorrow with home oxygen and visiting nurse services. CM will send new home oxygen sats to [facility]. Portable oxygen to be delivered to bedside and concentrator to be delivered to home.  Discussed that per MD plan is for patient to be transitioned home tomorrow with home oxygen and visiting nurse services. CM will send new home oxygen sats and RX to [facility]. Portable oxygen to be delivered to bedside and concentrator to be delivered to home. Discharge Notice reviewed, with spouse, copy given to patient. Both are in agreement with transitioning home tomorrow, spouse to transport patient home.  Patient is identified as a CMS STAR patient. Per Spouse pateint has a follow up appointment with Dr Mason on Monday at 1:45pm.

## 2025-03-05 NOTE — PROVIDER CONTACT NOTE (OTHER) - BACKGROUND
Pt is A&O x3-4, resting in bed on tele. Pt converted from NSR to afib.
admitted with hypoxia acute respiratory failure COPD
pt admitted with COPD exacerbation.
COPD, last duoneb tx was 2AM, next tx 7 AM.
admitted with Acute respiratory failure with hypoxia and COPD

## 2025-03-05 NOTE — CHART NOTE - NSCHARTNOTEFT_GEN_A_CORE
Resident Rapid Response Note    Rapid response was called at 6:13pm for hypoxia.     Events leading up to Rapid Response: Pt here for COPD exacerbation with continued wheezing. Rapid called for hypoxia with O2 in 80s while eating dinner.     Patient was seen and examined at the bedside by the rapid response team. Dr. Barr at bedside.    Rapid Response Vital Signs:  BP: 193/127  HR: 112  RR: 20  SpO2: 96% on NRB   Temp: 98.8  FS: 328          Physical Exam:  Gen: well appearing, NAD  HEENT: NCAT, PEERLA b/l, EOMI b/l  Cardio: regular rate and rhythm, +s1s2, no murmurs, rubs, or gallops  Pulm: CTA b/l, no wheezes, rales or rhonchi  Abdomen: soft, nontender, nondistended, +BS x4 quadrants, no guarding  Extremities: no cyanosis or edema, +2 pedal pulses  Neuro: AAOx3, CNII-XII intact, 5/5 strength all extremities, sensation intact  Skin: warm and dry      Assessment/Plan:  80 yo Male with a past medical history of PE on warfarin, COPD, hypertension, Parkinson disease is presenting with complaints of shortness of breath. Rapid response called for hypoxia.     -  Hypoxia likely 2/2 COPD exacerbation     -order STAT solumedrol IV 40mg  -order STAT duonebs   -order hypertonic saline   -current O2 93% on NRB on 9L  -repeat BP:   -RN to call if any changes.  -Discussed with Dr. Muller agrees with above plan  -Family updated by ____    --------------------------------------------------------------------------  Addendum: Resident Rapid Response Note    Rapid response was called at 6:13pm for hypoxia.     Events leading up to Rapid Response: Pt here for COPD exacerbation with continued wheezing. Rapid called for hypoxia with O2 in 80s while eating dinner.     Patient was seen and examined at the bedside by the rapid response team. Dr. Barr at bedside.    Rapid Response Vital Signs:  BP: 193/127  HR: 112  RR: 20  SpO2: 96% on NRB   Temp: 98.8  FS: 328          Physical Exam:  Gen: ill appearing male in acute distress  HEENT: NCAT, PEERLA b/l, EOMI b/l  Cardio: tachycardiac, no murmurs or rubs   Pulm: BS on left lung fields no breath sounds, decreased breath sounds on Right Lung Field  Abdomen: soft, nontender, nondistended, +BS x4 quadrants, no guarding  Extremities: no cyanosis or edema, +2 pedal pulses  Neuro: AAOx3, CNII-XII intact, 5/5 strength all extremities, sensation intact  Skin: warm and dry      Assessment/Plan:  78 yo Male with a past medical history of PE on warfarin, COPD, hypertension, Parkinson disease is presenting with complaints of shortness of breath. Rapid response called for hypoxia.     -  Hypoxia likely 2/2 COPD exacerbation     -order STAT solumedrol IV 40mg  -order STAT duonebs   -order hypertonic saline   -current O2 93% on NRB on 9L  -repeat BP:   -RN to call if any changes.  -Discussed with Dr. Muller agrees with above plan  -Family updated by ____    --------------------------------------------------------------------------  Addendum:

## 2025-03-05 NOTE — PROVIDER CONTACT NOTE (OTHER) - SITUATION
The above Pt -140/mt
Pt admitted with acute resp. failure. Pt converted from NSR to afib on tele monitor.
Pt has labored breathing, was A+O4 and just pulled out his IV, Does not know where is and appears uncomfortable
Patient vitals are nit stable HR fluctuating going down  up to 40 and back to 65 not sustaining
pt had episodes of SOB with desaturation when trying to ambulate and get out of bed. pt O2 sat went down to 82% on room air.

## 2025-03-05 NOTE — PROVIDER CONTACT NOTE (OTHER) - ASSESSMENT
pt is in bed and appears restless, with wheezing noted.
denies any chest pain alert and oriented, no SOB see vitals in flow sheet
denies any chest pain or S O B vitals are in accepted range 4LPM oxygen on flow  see vitals on flow sheet
RR 20 , tachypnea, moist skin, afebrile bp 159/72, 
VSS; pt denies chest pain, SOB. Pt reports no h/o afib. HR is in mid to high 90s.

## 2025-03-05 NOTE — PROVIDER CONTACT NOTE (OTHER) - REASON
Pt RR 20-22, expiratory breathing increased
Rhythn showing AFib 135-140 /mt
Pt converted to afib
desat on RA
HR went upto 42-43/mt

## 2025-03-05 NOTE — PROVIDER CONTACT NOTE (OTHER) - RECOMMENDATIONS
MD made aware, pt placed on 2L O2 nasal per MD order.
Please assess pt/ lasix?
need any intervention
need to be evaluate by Doctor not on any cardiac medicine and to be seen by cardiologist

## 2025-03-06 LAB
INR BLD: 2.1 RATIO — HIGH (ref 0.85–1.16)
PROTHROM AB SERPL-ACNC: 24.6 SEC — HIGH (ref 9.9–13.4)

## 2025-03-06 PROCEDURE — 71250 CT THORAX DX C-: CPT | Mod: 26

## 2025-03-06 RX ORDER — DILTIAZEM HYDROCHLORIDE 240 MG/1
1 TABLET, EXTENDED RELEASE ORAL
Qty: 15 | Refills: 0
Start: 2025-03-06 | End: 2025-03-20

## 2025-03-06 RX ADMIN — TRIHEXYPHENIDYL HYDROCHLORIDE 1 MILLIGRAM(S): 2 TABLET ORAL at 05:05

## 2025-03-06 RX ADMIN — TRIHEXYPHENIDYL HYDROCHLORIDE 1 MILLIGRAM(S): 2 TABLET ORAL at 21:18

## 2025-03-06 RX ADMIN — Medication 1 TABLET(S): at 23:28

## 2025-03-06 RX ADMIN — Medication 1 TABLET(S): at 17:25

## 2025-03-06 RX ADMIN — IPRATROPIUM BROMIDE AND ALBUTEROL SULFATE 3 MILLILITER(S): .5; 2.5 SOLUTION RESPIRATORY (INHALATION) at 08:19

## 2025-03-06 RX ADMIN — Medication 1 DOSE(S): at 19:50

## 2025-03-06 RX ADMIN — Medication 1 DOSE(S): at 05:48

## 2025-03-06 RX ADMIN — METHYLPREDNISOLONE ACETATE 40 MILLIGRAM(S): 80 INJECTION, SUSPENSION INTRA-ARTICULAR; INTRALESIONAL; INTRAMUSCULAR; SOFT TISSUE at 05:04

## 2025-03-06 RX ADMIN — ROPINIROLE 2 MILLIGRAM(S): 5 TABLET, FILM COATED ORAL at 12:14

## 2025-03-06 RX ADMIN — DILTIAZEM HYDROCHLORIDE 240 MILLIGRAM(S): 240 TABLET, EXTENDED RELEASE ORAL at 05:04

## 2025-03-06 RX ADMIN — IPRATROPIUM BROMIDE AND ALBUTEROL SULFATE 3 MILLILITER(S): .5; 2.5 SOLUTION RESPIRATORY (INHALATION) at 00:26

## 2025-03-06 RX ADMIN — IPRATROPIUM BROMIDE AND ALBUTEROL SULFATE 3 MILLILITER(S): .5; 2.5 SOLUTION RESPIRATORY (INHALATION) at 17:41

## 2025-03-06 RX ADMIN — Medication 4 MILLILITER(S): at 08:19

## 2025-03-06 RX ADMIN — Medication 4 MILLILITER(S): at 17:42

## 2025-03-06 RX ADMIN — TRIHEXYPHENIDYL HYDROCHLORIDE 1 MILLIGRAM(S): 2 TABLET ORAL at 12:15

## 2025-03-06 RX ADMIN — ROPINIROLE 2 MILLIGRAM(S): 5 TABLET, FILM COATED ORAL at 17:25

## 2025-03-06 RX ADMIN — Medication 1 TABLET(S): at 05:05

## 2025-03-06 RX ADMIN — Medication 3 MILLIGRAM(S): at 21:18

## 2025-03-06 RX ADMIN — Medication 1 TABLET(S): at 12:14

## 2025-03-06 RX ADMIN — FOLIC ACID 1 MILLIGRAM(S): 1 TABLET ORAL at 12:15

## 2025-03-06 RX ADMIN — Medication 4 MILLILITER(S): at 00:26

## 2025-03-06 RX ADMIN — Medication 10 MILLIGRAM(S): at 12:14

## 2025-03-06 RX ADMIN — ROPINIROLE 2 MILLIGRAM(S): 5 TABLET, FILM COATED ORAL at 23:28

## 2025-03-06 RX ADMIN — ROPINIROLE 2 MILLIGRAM(S): 5 TABLET, FILM COATED ORAL at 05:49

## 2025-03-06 NOTE — CONSULT NOTE ADULT - SUBJECTIVE AND OBJECTIVE BOX
THORACIC SURGERY PA CONSULT NOTE:    CHIEF COMPLAINT:  Patient is a 79y old  Male who presents with a chief complaint of COPD exacerbation (06 Mar 2025 05:36)      HPI FROM ED:  80 yo Male with a past medical history of PE on warfarin, COPD, hypertension, Parkinson disease is presenting with complaints of shortness of breath.  States that he was short of breath starting this morning.  Tried his nebulizer without relief in symptoms.  And then upon walking to the bathroom, his symptoms acutely got worse so his wife called 911 for evaluation.  No recent fevers or sick contacts.  Patient has been coughing.  No nausea or vomiting.  No leg swelling.  No history of heart failure. No fever at home. Initially in ED he was placed on BIPAP, now improved and placed on NC oxygen. He feels comfortable now with his breathing.  (23 Feb 2025 19:17)    INTERVAL HPI:  History as stated above. Patient known hx of large anterior left diaphragmatic hernia for 20 years. Patient's wife states shes noticed his breathing getting worse. for about 2 weeks. and noticed he had a cough which made the SOB worse.    PAST MEDICAL HISTORY:  Personal history of PE (pulmonary embolism)  COPD (chronic obstructive pulmonary disease)  Hypertension  Restless leg syndrome  Factor V Leiden  Parkinsons  Spinal stenosis  Memory loss  Right inguinal hernia  AL (obstructive sleep apnea)  Diabetes    PAST SURGICAL HISTORY:  Herniated cervical disc  H/O hernia repair    REVIEW OF SYSTEMS:  See HPI    MEDICATIONS:  Home Medications:  Albuterol (Eqv-ProAir HFA) 90 mcg/inh inhalation aerosol: 2 puff(s) inhaled every 4 hours as needed for  shortness of breath and/or wheezing (23 Feb 2025 17:58)  carbidopa-levodopa 25 mg-100 mg oral tablet: 1 tab(s) orally 4 times a day 8am,1pm,6pm,11p (23 Feb 2025 17:58)  Coumadin 2.5 mg oral tablet: 1 tab(s) orally once a day 8am (23 Feb 2025 17:54)  fexofenadine 180 mg oral tablet: 1 tab(s) orally once a day (23 Feb 2025 17:57)  folic acid 1 mg oral tablet: 1 tab(s) orally once a day (23 Feb 2025 17:58)  irbesartan-hydrochlorothiazide 300 mg-25 mg oral tablet: 1 tab(s) orally once a day (23 Feb 2025 17:58)  rOPINIRole 2 mg oral tablet: 1 tab(s) orally 4 times a day 8am, 1pm, 6pm, 11pm (23 Feb 2025 17:58)  Symbicort 160 mcg-4.5 mcg/inh inhalation aerosol: 2 puff(s) inhaled 2 times a day (23 Feb 2025 17:58)  trihexyphenidyl: 1 milligram(s) 3 times a day (23 Feb 2025 17:53)    MEDICATIONS  (STANDING):  albuterol/ipratropium for Nebulization 3 milliLiter(s) Nebulizer every 6 hours  carbidopa/levodopa  25/100 1 Tablet(s) Oral four times a day  cetirizine 10 milliGRAM(s) Oral daily  diltiazem    milliGRAM(s) Oral daily  fluticasone propionate/ salmeterol 250-50 MICROgram(s) Diskus 1 Dose(s) Inhalation two times a day  folic acid 1 milliGRAM(s) Oral daily  rOPINIRole 2 milliGRAM(s) Oral four times a day  sodium chloride 3%  Inhalation 4 milliLiter(s) Inhalation every 6 hours  trihexyphenidyl 1 milliGRAM(s) Oral three times a day  warfarin 3 milliGRAM(s) Oral once    MEDICATIONS  (PRN):  acetaminophen     Tablet .. 650 milliGRAM(s) Oral every 6 hours PRN Temp greater or equal to 38C (100.4F), Mild Pain (1 - 3)  aluminum hydroxide/magnesium hydroxide/simethicone Suspension 30 milliLiter(s) Oral every 4 hours PRN Dyspepsia  benzonatate 100 milliGRAM(s) Oral three times a day PRN Cough  melatonin 3 milliGRAM(s) Oral at bedtime PRN Insomnia  ondansetron Injectable 4 milliGRAM(s) IV Push every 8 hours PRN Nausea and/or Vomiting      ALLERGIES:  Levaquin (Anaphylaxis)  garlic (Other (Mild))    SOCIAL HISTORY:  Lives at home.  Non smoker.  Non alcoholic.     FAMILY HISTORY:  FH: lung cancer (Father)    VITAL SIGNS:  Vital Signs Last 24 Hrs  T(C): 36.4 (06 Mar 2025 11:47), Max: 36.7 (06 Mar 2025 04:50)  T(F): 97.5 (06 Mar 2025 11:47), Max: 98 (06 Mar 2025 04:50)  HR: 89 (06 Mar 2025 17:45) (72 - 101)  BP: 142/64 (06 Mar 2025 11:47) (127/78 - 157/90)    RR: 21 (06 Mar 2025 11:47) (19 - 21)  SpO2: 95% (06 Mar 2025 17:45) (89% - 96%)    Parameters below as of 06 Mar 2025 17:45  Patient On (Oxygen Delivery Method): nasal cannula, 3L    PHYSICAL EXAM:  GENERAL: NAD, lying in bed comfortably  HEAD:  Atraumatic, normocephalic  EYES: EOMI, PERRLA, conjunctiva and sclera clear  NECK: Supple, trachea midline, no JVD  HEART: Regular rate and rhythm  LUNGS: Diminished lung sounds bilaterally, on nasal cannula 3L, tachypneic   ABDOMEN: Soft, nontender, nondistended,   EXTREMITIES: No calf tenderness  NERVOUS SYSTEM:  A&Ox3  SKIN: No rashes or lesions    LABS:                        14.2   12.72 )-----------( 270      ( 05 Mar 2025 06:55 )             42.5     03-05    135  |  102  |  15  ----------------------------<  200[H]  4.5   |  27  |  0.95    Ca    9.4      05 Mar 2025 06:55      PT/INR - ( 06 Mar 2025 07:25 )   PT: 24.6 sec;   INR: 2.10 ratio        Urinalysis Basic - ( 05 Mar 2025 06:55 )    Color: x / Appearance: x / SG: x / pH: x  Gluc: 200 mg/dL / Ketone: x  / Bili: x / Urobili: x   Blood: x / Protein: x / Nitrite: x   Leuk Esterase: x / RBC: x / WBC x   Sq Epi: x / Non Sq Epi: x / Bacteria: x      RADIOLOGY & ADDITIONAL STUDIES:  < from: CT Chest No Cont (03.06.25 @ 11:25) >    ACC: [id number] EXAM:  CT CHEST   ORDERED BY: WENDY SALCEDO   PROCEDURE DATE:  03/06/2025      INTERPRETATION:  CLINICAL INFORMATION: Shortness of breath and dyspnea   upon exertion with wheezing    COMPARISON: February 28, 2025    CONTRAST/COMPLICATIONS:  IV Contrast: NONE  Oral Contrast: NONE    PROCEDURE:  CT of the Chest was performed.  Sagittal and coronal reformats were performed.    FINDINGS:    LUNGS AND LARGE AIRWAYS: Worsening compressive atelectasis of the left   upper lobe, with unchanged complete atelectasis of the left lower lobe   and lingula, secondary to a very large bowel containing anterior   diaphragmatic hernia that extends to the upper lung field. There is mild   mediastinal shift to the right. The right lung is clear.  PLEURA: No pleural effusion.  VESSELS: Aortic calcifications. Coronary artery calcifications.  HEART: Heart size is normal. No pericardial effusion.  MEDIASTINUM AND HIRA: No lymphadenopathy.  CHEST WALL AND LOWER NECK: Unremarkable  VISUALIZED UPPER ABDOMEN: Partially imaged intramural calcification in   the gallbladder fundus  BONES: ACDF at C5-6.    IMPRESSION:    1. Redemonstrated very large anterior left diaphragmatic hernia   containing numerous loops of bowel extending close to the left lung apex,   with near complete collapse of the entire left lung. There is mild   mediastinal shift to the right. These findings have not significantly   changed from the most recent prior study.    --- End of Report ---      MOISES MALDONADO MD; Attending Radiologist  This document has been electronically signed. Mar  6 2025  2:57PM      ASSESSMENT:  80 yo Male with a past medical history of PE on warfarin, COPD, hypertension, Parkinson disease admitted with COPD exacerbation CT with large anterior left diaphragmatic hernia containing numerous loops of bowel extending close to the left lung apex, with near complete collapse of the entire left lung,    PLAN:  - No acute surgical intervention   - known hx of large anterior left diaphragmatic hernia for 20 years  - was told by previous surgeons he isn't a good candidate for this major surgery  - outpatient thoracic surgery followup   - Dr. Sequeira to speak to patient tomorrow   - Case discussed with Dr. Sequeira THORACIC SURGERY PA CONSULT NOTE:    CHIEF COMPLAINT:  Patient is a 79y old  Male who presents with a chief complaint of COPD exacerbation (06 Mar 2025 05:36)      HPI FROM ED:  80 yo Male with a past medical history of PE on warfarin, COPD, hypertension, Parkinson disease is presenting with complaints of shortness of breath.  States that he was short of breath starting this morning.  Tried his nebulizer without relief in symptoms.  And then upon walking to the bathroom, his symptoms acutely got worse so his wife called 911 for evaluation.  No recent fevers or sick contacts.  Patient has been coughing.  No nausea or vomiting.  No leg swelling.  No history of heart failure. No fever at home. Initially in ED he was placed on BIPAP, now improved and placed on NC oxygen. He feels comfortable now with his breathing.  (23 Feb 2025 19:17)    INTERVAL HPI:  History as stated above. Patient known hx of large anterior left diaphragmatic hernia for 20 years. Patient's wife states shes noticed his breathing getting worse. for about 2 weeks. and noticed he had a cough which made the SOB worse. admits to normal BMs and passing flatus     PAST MEDICAL HISTORY:  Personal history of PE (pulmonary embolism)  COPD (chronic obstructive pulmonary disease)  Hypertension  Restless leg syndrome  Factor V Leiden  Parkinsons  Spinal stenosis  Memory loss  Right inguinal hernia  AL (obstructive sleep apnea)  Diabetes    PAST SURGICAL HISTORY:  Herniated cervical disc  H/O hernia repair    REVIEW OF SYSTEMS:  See HPI    MEDICATIONS:  Home Medications:  Albuterol (Eqv-ProAir HFA) 90 mcg/inh inhalation aerosol: 2 puff(s) inhaled every 4 hours as needed for  shortness of breath and/or wheezing (23 Feb 2025 17:58)  carbidopa-levodopa 25 mg-100 mg oral tablet: 1 tab(s) orally 4 times a day 8am,1pm,6pm,11p (23 Feb 2025 17:58)  Coumadin 2.5 mg oral tablet: 1 tab(s) orally once a day 8am (23 Feb 2025 17:54)  fexofenadine 180 mg oral tablet: 1 tab(s) orally once a day (23 Feb 2025 17:57)  folic acid 1 mg oral tablet: 1 tab(s) orally once a day (23 Feb 2025 17:58)  irbesartan-hydrochlorothiazide 300 mg-25 mg oral tablet: 1 tab(s) orally once a day (23 Feb 2025 17:58)  rOPINIRole 2 mg oral tablet: 1 tab(s) orally 4 times a day 8am, 1pm, 6pm, 11pm (23 Feb 2025 17:58)  Symbicort 160 mcg-4.5 mcg/inh inhalation aerosol: 2 puff(s) inhaled 2 times a day (23 Feb 2025 17:58)  trihexyphenidyl: 1 milligram(s) 3 times a day (23 Feb 2025 17:53)    MEDICATIONS  (STANDING):  albuterol/ipratropium for Nebulization 3 milliLiter(s) Nebulizer every 6 hours  carbidopa/levodopa  25/100 1 Tablet(s) Oral four times a day  cetirizine 10 milliGRAM(s) Oral daily  diltiazem    milliGRAM(s) Oral daily  fluticasone propionate/ salmeterol 250-50 MICROgram(s) Diskus 1 Dose(s) Inhalation two times a day  folic acid 1 milliGRAM(s) Oral daily  rOPINIRole 2 milliGRAM(s) Oral four times a day  sodium chloride 3%  Inhalation 4 milliLiter(s) Inhalation every 6 hours  trihexyphenidyl 1 milliGRAM(s) Oral three times a day  warfarin 3 milliGRAM(s) Oral once    MEDICATIONS  (PRN):  acetaminophen     Tablet .. 650 milliGRAM(s) Oral every 6 hours PRN Temp greater or equal to 38C (100.4F), Mild Pain (1 - 3)  aluminum hydroxide/magnesium hydroxide/simethicone Suspension 30 milliLiter(s) Oral every 4 hours PRN Dyspepsia  benzonatate 100 milliGRAM(s) Oral three times a day PRN Cough  melatonin 3 milliGRAM(s) Oral at bedtime PRN Insomnia  ondansetron Injectable 4 milliGRAM(s) IV Push every 8 hours PRN Nausea and/or Vomiting      ALLERGIES:  Levaquin (Anaphylaxis)  garlic (Other (Mild))    SOCIAL HISTORY:  Lives at home.  Non smoker.  Non alcoholic.     FAMILY HISTORY:  FH: lung cancer (Father)    VITAL SIGNS:  Vital Signs Last 24 Hrs  T(C): 36.4 (06 Mar 2025 11:47), Max: 36.7 (06 Mar 2025 04:50)  T(F): 97.5 (06 Mar 2025 11:47), Max: 98 (06 Mar 2025 04:50)  HR: 89 (06 Mar 2025 17:45) (72 - 101)  BP: 142/64 (06 Mar 2025 11:47) (127/78 - 157/90)    RR: 21 (06 Mar 2025 11:47) (19 - 21)  SpO2: 95% (06 Mar 2025 17:45) (89% - 96%)    Parameters below as of 06 Mar 2025 17:45  Patient On (Oxygen Delivery Method): nasal cannula, 3L    PHYSICAL EXAM:  GENERAL: NAD, lying in bed comfortably  HEAD:  Atraumatic, normocephalic  EYES: EOMI, PERRLA, conjunctiva and sclera clear  NECK: Supple, trachea midline, no JVD  HEART: Regular rate and rhythm  LUNGS: Diminished lung sounds bilaterally, on nasal cannula 3L, tachypneic   ABDOMEN: Soft, nontender, nondistended,   EXTREMITIES: No calf tenderness  NERVOUS SYSTEM:  A&Ox3  SKIN: No rashes or lesions    LABS:                        14.2   12.72 )-----------( 270      ( 05 Mar 2025 06:55 )             42.5     03-05    135  |  102  |  15  ----------------------------<  200[H]  4.5   |  27  |  0.95    Ca    9.4      05 Mar 2025 06:55      PT/INR - ( 06 Mar 2025 07:25 )   PT: 24.6 sec;   INR: 2.10 ratio        Urinalysis Basic - ( 05 Mar 2025 06:55 )    Color: x / Appearance: x / SG: x / pH: x  Gluc: 200 mg/dL / Ketone: x  / Bili: x / Urobili: x   Blood: x / Protein: x / Nitrite: x   Leuk Esterase: x / RBC: x / WBC x   Sq Epi: x / Non Sq Epi: x / Bacteria: x      RADIOLOGY & ADDITIONAL STUDIES:  < from: CT Chest No Cont (03.06.25 @ 11:25) >    ACC: [id number] EXAM:  CT CHEST   ORDERED BY: WENDY SALCEDO   PROCEDURE DATE:  03/06/2025      INTERPRETATION:  CLINICAL INFORMATION: Shortness of breath and dyspnea   upon exertion with wheezing    COMPARISON: February 28, 2025    CONTRAST/COMPLICATIONS:  IV Contrast: NONE  Oral Contrast: NONE    PROCEDURE:  CT of the Chest was performed.  Sagittal and coronal reformats were performed.    FINDINGS:    LUNGS AND LARGE AIRWAYS: Worsening compressive atelectasis of the left   upper lobe, with unchanged complete atelectasis of the left lower lobe   and lingula, secondary to a very large bowel containing anterior   diaphragmatic hernia that extends to the upper lung field. There is mild   mediastinal shift to the right. The right lung is clear.  PLEURA: No pleural effusion.  VESSELS: Aortic calcifications. Coronary artery calcifications.  HEART: Heart size is normal. No pericardial effusion.  MEDIASTINUM AND HIRA: No lymphadenopathy.  CHEST WALL AND LOWER NECK: Unremarkable  VISUALIZED UPPER ABDOMEN: Partially imaged intramural calcification in   the gallbladder fundus  BONES: ACDF at C5-6.    IMPRESSION:    1. Redemonstrated very large anterior left diaphragmatic hernia   containing numerous loops of bowel extending close to the left lung apex,   with near complete collapse of the entire left lung. There is mild   mediastinal shift to the right. These findings have not significantly   changed from the most recent prior study.    --- End of Report ---      MOISES MALDONADO MD; Attending Radiologist  This document has been electronically signed. Mar  6 2025  2:57PM      ASSESSMENT:  80 yo Male with a past medical history of PE on warfarin, COPD, hypertension, Parkinson disease admitted with COPD exacerbation CT with large anterior left diaphragmatic hernia containing numerous loops of bowel extending close to the left lung apex, with near complete collapse of the entire left lung,    PLAN:  - No acute surgical intervention   - known hx of large anterior left diaphragmatic hernia for 20 years  - was told by previous surgeons he isn't a good candidate for this major surgery  - outpatient thoracic surgery followup   - Dr. Sequeira to speak to patient tomorrow   - Case discussed with Dr. Sequeira THORACIC SURGERY PA CONSULT NOTE:    CHIEF COMPLAINT:  Patient is a 79y old  Male who presents with a chief complaint of COPD exacerbation (06 Mar 2025 05:36)      HPI FROM ED:  78 yo Male with a past medical history of Factor V leiden hx of  PE on warfarin, COPD, hypertension, Parkinson disease is presenting with complaints of shortness of breath.  States that he was short of breath starting this morning.  Tried his nebulizer without relief in symptoms.  And then upon walking to the bathroom, his symptoms acutely got worse so his wife called 911 for evaluation.  No recent fevers or sick contacts.  Patient has been coughing.  No nausea or vomiting.  No leg swelling.  No history of heart failure. No fever at home. Initially in ED he was placed on BIPAP, now improved and placed on NC oxygen. He feels comfortable now with his breathing.  (23 Feb 2025 19:17)    INTERVAL HPI:  History as stated above. Patient known hx of large anterior left diaphragmatic hernia for 20 years. Patient's wife states shes noticed his breathing getting worse. for about 2 weeks. and noticed he had a cough which made the SOB worse. admits to normal BMs and passing flatus     PAST MEDICAL HISTORY:  Personal history of PE (pulmonary embolism)  COPD (chronic obstructive pulmonary disease)  Hypertension  Restless leg syndrome  Factor V Leiden  Parkinsons  Spinal stenosis  Memory loss  Right inguinal hernia  AL (obstructive sleep apnea)  Diabetes    PAST SURGICAL HISTORY:  Herniated cervical disc  H/O hernia repair    REVIEW OF SYSTEMS:  See HPI    MEDICATIONS:  Home Medications:  Albuterol (Eqv-ProAir HFA) 90 mcg/inh inhalation aerosol: 2 puff(s) inhaled every 4 hours as needed for  shortness of breath and/or wheezing (23 Feb 2025 17:58)  carbidopa-levodopa 25 mg-100 mg oral tablet: 1 tab(s) orally 4 times a day 8am,1pm,6pm,11p (23 Feb 2025 17:58)  Coumadin 2.5 mg oral tablet: 1 tab(s) orally once a day 8am (23 Feb 2025 17:54)  fexofenadine 180 mg oral tablet: 1 tab(s) orally once a day (23 Feb 2025 17:57)  folic acid 1 mg oral tablet: 1 tab(s) orally once a day (23 Feb 2025 17:58)  irbesartan-hydrochlorothiazide 300 mg-25 mg oral tablet: 1 tab(s) orally once a day (23 Feb 2025 17:58)  rOPINIRole 2 mg oral tablet: 1 tab(s) orally 4 times a day 8am, 1pm, 6pm, 11pm (23 Feb 2025 17:58)  Symbicort 160 mcg-4.5 mcg/inh inhalation aerosol: 2 puff(s) inhaled 2 times a day (23 Feb 2025 17:58)  trihexyphenidyl: 1 milligram(s) 3 times a day (23 Feb 2025 17:53)    MEDICATIONS  (STANDING):  albuterol/ipratropium for Nebulization 3 milliLiter(s) Nebulizer every 6 hours  carbidopa/levodopa  25/100 1 Tablet(s) Oral four times a day  cetirizine 10 milliGRAM(s) Oral daily  diltiazem    milliGRAM(s) Oral daily  fluticasone propionate/ salmeterol 250-50 MICROgram(s) Diskus 1 Dose(s) Inhalation two times a day  folic acid 1 milliGRAM(s) Oral daily  rOPINIRole 2 milliGRAM(s) Oral four times a day  sodium chloride 3%  Inhalation 4 milliLiter(s) Inhalation every 6 hours  trihexyphenidyl 1 milliGRAM(s) Oral three times a day  warfarin 3 milliGRAM(s) Oral once    MEDICATIONS  (PRN):  acetaminophen     Tablet .. 650 milliGRAM(s) Oral every 6 hours PRN Temp greater or equal to 38C (100.4F), Mild Pain (1 - 3)  aluminum hydroxide/magnesium hydroxide/simethicone Suspension 30 milliLiter(s) Oral every 4 hours PRN Dyspepsia  benzonatate 100 milliGRAM(s) Oral three times a day PRN Cough  melatonin 3 milliGRAM(s) Oral at bedtime PRN Insomnia  ondansetron Injectable 4 milliGRAM(s) IV Push every 8 hours PRN Nausea and/or Vomiting      ALLERGIES:  Levaquin (Anaphylaxis)  garlic (Other (Mild))    SOCIAL HISTORY:  Lives at home.  Non smoker.  Non alcoholic.     FAMILY HISTORY:  FH: lung cancer (Father)    VITAL SIGNS:  Vital Signs Last 24 Hrs  T(C): 36.4 (06 Mar 2025 11:47), Max: 36.7 (06 Mar 2025 04:50)  T(F): 97.5 (06 Mar 2025 11:47), Max: 98 (06 Mar 2025 04:50)  HR: 89 (06 Mar 2025 17:45) (72 - 101)  BP: 142/64 (06 Mar 2025 11:47) (127/78 - 157/90)    RR: 21 (06 Mar 2025 11:47) (19 - 21)  SpO2: 95% (06 Mar 2025 17:45) (89% - 96%)    Parameters below as of 06 Mar 2025 17:45  Patient On (Oxygen Delivery Method): nasal cannula, 3L    PHYSICAL EXAM:  GENERAL: NAD, lying in bed comfortably  HEAD:  Atraumatic, normocephalic  EYES: EOMI, PERRLA, conjunctiva and sclera clear  NECK: Supple, trachea midline, no JVD  HEART: Regular rate and rhythm  LUNGS: Diminished lung sounds bilaterally, on nasal cannula 3L, tachypneic   ABDOMEN: Soft, nontender, nondistended,   EXTREMITIES: No calf tenderness  NERVOUS SYSTEM:  A&Ox3  SKIN: No rashes or lesions    LABS:                        14.2   12.72 )-----------( 270      ( 05 Mar 2025 06:55 )             42.5     03-05    135  |  102  |  15  ----------------------------<  200[H]  4.5   |  27  |  0.95    Ca    9.4      05 Mar 2025 06:55      PT/INR - ( 06 Mar 2025 07:25 )   PT: 24.6 sec;   INR: 2.10 ratio        Urinalysis Basic - ( 05 Mar 2025 06:55 )    Color: x / Appearance: x / SG: x / pH: x  Gluc: 200 mg/dL / Ketone: x  / Bili: x / Urobili: x   Blood: x / Protein: x / Nitrite: x   Leuk Esterase: x / RBC: x / WBC x   Sq Epi: x / Non Sq Epi: x / Bacteria: x      RADIOLOGY & ADDITIONAL STUDIES:  < from: CT Chest No Cont (03.06.25 @ 11:25) >    ACC: [id number] EXAM:  CT CHEST   ORDERED BY: WENDY SALCEDO   PROCEDURE DATE:  03/06/2025      INTERPRETATION:  CLINICAL INFORMATION: Shortness of breath and dyspnea   upon exertion with wheezing    COMPARISON: February 28, 2025    CONTRAST/COMPLICATIONS:  IV Contrast: NONE  Oral Contrast: NONE    PROCEDURE:  CT of the Chest was performed.  Sagittal and coronal reformats were performed.    FINDINGS:    LUNGS AND LARGE AIRWAYS: Worsening compressive atelectasis of the left   upper lobe, with unchanged complete atelectasis of the left lower lobe   and lingula, secondary to a very large bowel containing anterior   diaphragmatic hernia that extends to the upper lung field. There is mild   mediastinal shift to the right. The right lung is clear.  PLEURA: No pleural effusion.  VESSELS: Aortic calcifications. Coronary artery calcifications.  HEART: Heart size is normal. No pericardial effusion.  MEDIASTINUM AND HIRA: No lymphadenopathy.  CHEST WALL AND LOWER NECK: Unremarkable  VISUALIZED UPPER ABDOMEN: Partially imaged intramural calcification in   the gallbladder fundus  BONES: ACDF at C5-6.    IMPRESSION:    1. Redemonstrated very large anterior left diaphragmatic hernia   containing numerous loops of bowel extending close to the left lung apex,   with near complete collapse of the entire left lung. There is mild   mediastinal shift to the right. These findings have not significantly   changed from the most recent prior study.    --- End of Report ---      MOISES MALDONADO MD; Attending Radiologist  This document has been electronically signed. Mar  6 2025  2:57PM      ASSESSMENT:  78 yo Male with a past medical history Factor V leiden of PE on warfarin, COPD, hypertension, Parkinson disease admitted with COPD exacerbation CT with large anterior left diaphragmatic hernia containing numerous loops of bowel extending close to the left lung apex, with near complete collapse of the entire left lung,    PLAN:  - No acute surgical intervention   - known hx of large anterior left diaphragmatic hernia for 20 years  - was told by previous surgeons he isn't a good candidate for this major surgery  - outpatient thoracic surgery followup   - Dr. Sequeira to speak to patient tomorrow   - Case discussed with Dr. Sequeira

## 2025-03-06 NOTE — PROGRESS NOTE ADULT - ASSESSMENT
78 yo Male with a past medical history of PE on warfarin, COPD, hypertension, Parkinson disease is presenting with complaints of shortness of breath.  States that he was short of breath starting this morning.  Tried his nebulizer without relief in symptoms.  And then upon walking to the bathroom, his symptoms acutely got worse so his wife called 911 for evaluation.    dysphagia  aspiration  copd  diaphragmatic hernia  Atelectasis  PE hx  OP  OA  Ataxic gait  Parkinson's disease  HTN  GERD    vs noted  rrt reviewed  on steroids - nebs  o2 support  plan for home o2 support    SLP eval  asp prec  HOB elev  oral hygiene  skin care  assist with needs  fio2 support - keep sat > 88 pct  NEBS for COPD component - hold off on Systemic steroids for now  cough rx regimen PRN -   AC - hx of PE -   cvs rx regimen  BP control  nutritional assessment  spoke with WIFE  pt follows with Dr Rivas - [facility] - [place] - [facility] office  strong suspicion for Aspiration - chronic - as per pt and wife - coughs and has sx/signs of dysphagia  CT chest reviewed  VBG c/w COPD

## 2025-03-06 NOTE — CHART NOTE - NSCHARTNOTEFT_GEN_A_CORE
Called by RN for Pt with consistently elevated Glucose levels on daily CMP. Patient has a history of diabetes with use of insulin in the past per chart review in [facility]. Patient currently not on sliding scale. Hgb A1c ordered for morning, f/u and if elevated then patient will require LDISS for glycemic control.         T(C): 36.8 (03-06-25 @ 20:25), Max: 36.8 (03-06-25 @ 20:25)  HR: 80 (03-06-25 @ 20:25) (72 - 94)  BP: 129/68 (03-06-25 @ 20:25) (127/78 - 142/64)  RR: 18 (03-06-25 @ 20:25) (18 - 21)  SpO2: 95% (03-06-25 @ 20:25) (89% - 95%)  Wt(kg): --      LABS:                        14.2   12.72 )-----------( 270      ( 05 Mar 2025 06:55 )             42.5     03-05    135  |  102  |  15  ----------------------------<  200[H]  4.5   |  27  |  0.95    Ca    9.4      05 Mar 2025 06:55      PT/INR - ( 06 Mar 2025 07:25 )   PT: 24.6 sec;   INR: 2.10 ratio           Urinalysis Basic - ( 05 Mar 2025 06:55 )    Color: x / Appearance: x / SG: x / pH: x  Gluc: 200 mg/dL / Ketone: x  / Bili: x / Urobili: x   Blood: x / Protein: x / Nitrite: x   Leuk Esterase: x / RBC: x / WBC x   Sq Epi: x / Non Sq Epi: x / Bacteria: x

## 2025-03-06 NOTE — CAREGIVER ENGAGEMENT NOTE - CAREGIVER OUTREACH NOTES - FREE TEXT
No goals expressed Per MD patient is stable for transition home today. Referral with new oxygen sats sent to [facility], awaiting response.

## 2025-03-07 VITALS
OXYGEN SATURATION: 91 % | RESPIRATION RATE: 18 BRPM | DIASTOLIC BLOOD PRESSURE: 69 MMHG | SYSTOLIC BLOOD PRESSURE: 151 MMHG | TEMPERATURE: 99 F

## 2025-03-07 LAB
A1C WITH ESTIMATED AVERAGE GLUCOSE RESULT: 7.3 % — HIGH (ref 4–5.6)
ANION GAP SERPL CALC-SCNC: 5 MMOL/L — SIGNIFICANT CHANGE UP (ref 5–17)
BUN SERPL-MCNC: 28 MG/DL — HIGH (ref 7–23)
CALCIUM SERPL-MCNC: 9 MG/DL — SIGNIFICANT CHANGE UP (ref 8.5–10.1)
CHLORIDE SERPL-SCNC: 102 MMOL/L — SIGNIFICANT CHANGE UP (ref 96–108)
CO2 SERPL-SCNC: 30 MMOL/L — SIGNIFICANT CHANGE UP (ref 22–31)
CREAT SERPL-MCNC: 1.1 MG/DL — SIGNIFICANT CHANGE UP (ref 0.5–1.3)
EGFR: 68 ML/MIN/1.73M2 — SIGNIFICANT CHANGE UP
EGFR: 68 ML/MIN/1.73M2 — SIGNIFICANT CHANGE UP
ESTIMATED AVERAGE GLUCOSE: 163 MG/DL — HIGH (ref 68–114)
GLUCOSE SERPL-MCNC: 246 MG/DL — HIGH (ref 70–99)
HCT VFR BLD CALC: 39.4 % — SIGNIFICANT CHANGE UP (ref 39–50)
HGB BLD-MCNC: 13.4 G/DL — SIGNIFICANT CHANGE UP (ref 13–17)
INR BLD: 3.2 RATIO — HIGH (ref 0.85–1.16)
MCHC RBC-ENTMCNC: 32.6 PG — SIGNIFICANT CHANGE UP (ref 27–34)
MCHC RBC-ENTMCNC: 34 G/DL — SIGNIFICANT CHANGE UP (ref 32–36)
MCV RBC AUTO: 95.9 FL — SIGNIFICANT CHANGE UP (ref 80–100)
NRBC BLD AUTO-RTO: 0 /100 WBCS — SIGNIFICANT CHANGE UP (ref 0–0)
PLATELET # BLD AUTO: 268 K/UL — SIGNIFICANT CHANGE UP (ref 150–400)
POTASSIUM SERPL-MCNC: 4.9 MMOL/L — SIGNIFICANT CHANGE UP (ref 3.5–5.3)
POTASSIUM SERPL-SCNC: 4.9 MMOL/L — SIGNIFICANT CHANGE UP (ref 3.5–5.3)
PROTHROM AB SERPL-ACNC: 37 SEC — HIGH (ref 9.9–13.4)
RBC # BLD: 4.11 M/UL — LOW (ref 4.2–5.8)
RBC # FLD: 13.6 % — SIGNIFICANT CHANGE UP (ref 10.3–14.5)
SODIUM SERPL-SCNC: 137 MMOL/L — SIGNIFICANT CHANGE UP (ref 135–145)
WBC # BLD: 17.01 K/UL — HIGH (ref 3.8–10.5)
WBC # FLD AUTO: 17.01 K/UL — HIGH (ref 3.8–10.5)

## 2025-03-07 RX ORDER — IPRATROPIUM BROMIDE AND ALBUTEROL SULFATE .5; 2.5 MG/3ML; MG/3ML
3 SOLUTION RESPIRATORY (INHALATION) ONCE
Refills: 0 | Status: COMPLETED | OUTPATIENT
Start: 2025-03-07 | End: 2025-03-07

## 2025-03-07 RX ADMIN — Medication 1 TABLET(S): at 12:03

## 2025-03-07 RX ADMIN — FOLIC ACID 1 MILLIGRAM(S): 1 TABLET ORAL at 12:03

## 2025-03-07 RX ADMIN — ROPINIROLE 2 MILLIGRAM(S): 5 TABLET, FILM COATED ORAL at 12:03

## 2025-03-07 RX ADMIN — IPRATROPIUM BROMIDE AND ALBUTEROL SULFATE 3 MILLILITER(S): .5; 2.5 SOLUTION RESPIRATORY (INHALATION) at 13:20

## 2025-03-07 RX ADMIN — IPRATROPIUM BROMIDE AND ALBUTEROL SULFATE 3 MILLILITER(S): .5; 2.5 SOLUTION RESPIRATORY (INHALATION) at 05:59

## 2025-03-07 RX ADMIN — ROPINIROLE 2 MILLIGRAM(S): 5 TABLET, FILM COATED ORAL at 05:50

## 2025-03-07 RX ADMIN — Medication 1 TABLET(S): at 05:51

## 2025-03-07 RX ADMIN — Medication 10 MILLIGRAM(S): at 12:03

## 2025-03-07 RX ADMIN — DILTIAZEM HYDROCHLORIDE 240 MILLIGRAM(S): 240 TABLET, EXTENDED RELEASE ORAL at 05:51

## 2025-03-07 RX ADMIN — Medication 100 MILLIGRAM(S): at 10:19

## 2025-03-07 RX ADMIN — TRIHEXYPHENIDYL HYDROCHLORIDE 1 MILLIGRAM(S): 2 TABLET ORAL at 12:03

## 2025-03-07 RX ADMIN — Medication 1 DOSE(S): at 06:54

## 2025-03-07 RX ADMIN — TRIHEXYPHENIDYL HYDROCHLORIDE 1 MILLIGRAM(S): 2 TABLET ORAL at 05:50

## 2025-03-07 RX ADMIN — Medication 4 MILLILITER(S): at 01:13

## 2025-03-07 RX ADMIN — Medication 4 MILLILITER(S): at 08:16

## 2025-03-07 RX ADMIN — IPRATROPIUM BROMIDE AND ALBUTEROL SULFATE 3 MILLILITER(S): .5; 2.5 SOLUTION RESPIRATORY (INHALATION) at 00:50

## 2025-03-07 RX ADMIN — Medication 4 MILLILITER(S): at 13:20

## 2025-03-07 NOTE — CAREGIVER ENGAGEMENT NOTE - CAREGIVER OUTREACH NOTES - FREE TEXT
Discussed that per MD patient is stable for transition home today with home oxygen. Concentrator to be delivered to house. Spouse stated that [facility] already contacted her for the delivery yesterday, but she was in the hospital. She stated that she was instructed to call to schedule delivery when she gets home. Discharge Notice was given yesterday. Spouse is in agreement with patient transitioning home today and will be transporting him home with [facility]HA at bedside. Spouse stated that she hired 24/7 private duty Aides to assist in caring for her spouse.

## 2025-03-07 NOTE — PROGRESS NOTE ADULT - SUBJECTIVE AND OBJECTIVE BOX
Date/Time Patient Seen:  		  Referring MD:   Data Reviewed	       Patient is a 79y old  Male who presents with a chief complaint of Acute respiratory failure with hypoxia     (02 Mar 2025 13:34)      Subjective/HPI     PAST MEDICAL & SURGICAL HISTORY:  Personal history of PE (pulmonary embolism)    COPD (chronic obstructive pulmonary disease)    Hypertension    Restless leg syndrome    Factor V Leiden    Parkinsons    Spinal stenosis    Memory loss    Right inguinal hernia    AL (obstructive sleep apnea)    Diabetes    Herniated cervical disc    H/O hernia repair          Medication list         MEDICATIONS  (STANDING):  albuterol/ipratropium for Nebulization 3 milliLiter(s) Nebulizer every 6 hours  carbidopa/levodopa  25/100 1 Tablet(s) Oral four times a day  cetirizine 10 milliGRAM(s) Oral daily  diltiazem    Tablet 60 milliGRAM(s) Oral four times a day  fluticasone propionate/ salmeterol 250-50 MICROgram(s) Diskus 1 Dose(s) Inhalation two times a day  folic acid 1 milliGRAM(s) Oral daily  rOPINIRole 2 milliGRAM(s) Oral four times a day  trihexyphenidyl 1 milliGRAM(s) Oral three times a day  warfarin 2.5 milliGRAM(s) Oral once    MEDICATIONS  (PRN):  acetaminophen     Tablet .. 650 milliGRAM(s) Oral every 6 hours PRN Temp greater or equal to 38C (100.4F), Mild Pain (1 - 3)  aluminum hydroxide/magnesium hydroxide/simethicone Suspension 30 milliLiter(s) Oral every 4 hours PRN Dyspepsia  benzonatate 100 milliGRAM(s) Oral three times a day PRN Cough  melatonin 3 milliGRAM(s) Oral at bedtime PRN Insomnia  ondansetron Injectable 4 milliGRAM(s) IV Push every 8 hours PRN Nausea and/or Vomiting         Vitals log        ICU Vital Signs Last 24 Hrs  T(C): 36.4 (02 Mar 2025 12:00), Max: 36.6 (02 Mar 2025 05:00)  T(F): 97.5 (02 Mar 2025 12:00), Max: 97.9 (02 Mar 2025 05:00)  HR: 78 (02 Mar 2025 13:20) (50 - 83)  BP: 107/59 (02 Mar 2025 12:00) (107/59 - 145/67)  BP(mean): --  ABP: --  ABP(mean): --  RR: 18 (02 Mar 2025 12:00) (18 - 20)  SpO2: 95% (02 Mar 2025 13:20) (94% - 98%)    O2 Parameters below as of 02 Mar 2025 13:20  Patient On (Oxygen Delivery Method): nasal cannula                 Input and Output:  I&O's Detail    01 Mar 2025 07:01  -  02 Mar 2025 07:00  --------------------------------------------------------  IN:  Total IN: 0 mL    OUT:    Stool (mL): 1 mL    Voided (mL): 600 mL  Total OUT: 601 mL    Total NET: -601 mL          Lab Data                        13.5   11.45 )-----------( 220      ( 02 Mar 2025 06:55 )             40.1     03-02    138  |  105  |  19  ----------------------------<  136[H]  3.7   |  29  |  0.94    Ca    8.6      02 Mar 2025 06:55  Phos  3.5     03-01  Mg     2.1     03-01              Review of Systems	      Objective     Physical Examination    heart s1s2  lung dc BS  head nc  head at      Pertinent Lab findings & Imaging      Tad:  NO   Adequate UO     I&O's Detail    01 Mar 2025 07:01  -  02 Mar 2025 07:00  --------------------------------------------------------  IN:  Total IN: 0 mL    OUT:    Stool (mL): 1 mL    Voided (mL): 600 mL  Total OUT: 601 mL    Total NET: -601 mL               Discussed with:     Cultures:	        Radiology                            
Date/Time Patient Seen:  		  Referring MD:   Data Reviewed	       Patient is a 79y old  Male who presents with a chief complaint of COPD exacerbation (03 Mar 2025 19:39)      Subjective/HPI     PAST MEDICAL & SURGICAL HISTORY:  Personal history of PE (pulmonary embolism)    COPD (chronic obstructive pulmonary disease)    Hypertension    Restless leg syndrome    Factor V Leiden    Parkinsons    Spinal stenosis    Memory loss    Right inguinal hernia    AL (obstructive sleep apnea)    Diabetes    Herniated cervical disc    H/O hernia repair          Medication list         MEDICATIONS  (STANDING):  albuterol/ipratropium for Nebulization 3 milliLiter(s) Nebulizer every 6 hours  carbidopa/levodopa  25/100 1 Tablet(s) Oral four times a day  cetirizine 10 milliGRAM(s) Oral daily  diltiazem    milliGRAM(s) Oral daily  fluticasone propionate/ salmeterol 250-50 MICROgram(s) Diskus 1 Dose(s) Inhalation two times a day  folic acid 1 milliGRAM(s) Oral daily  rOPINIRole 2 milliGRAM(s) Oral four times a day  trihexyphenidyl 1 milliGRAM(s) Oral three times a day    MEDICATIONS  (PRN):  acetaminophen     Tablet .. 650 milliGRAM(s) Oral every 6 hours PRN Temp greater or equal to 38C (100.4F), Mild Pain (1 - 3)  aluminum hydroxide/magnesium hydroxide/simethicone Suspension 30 milliLiter(s) Oral every 4 hours PRN Dyspepsia  benzonatate 100 milliGRAM(s) Oral three times a day PRN Cough  melatonin 3 milliGRAM(s) Oral at bedtime PRN Insomnia  ondansetron Injectable 4 milliGRAM(s) IV Push every 8 hours PRN Nausea and/or Vomiting         Vitals log        ICU Vital Signs Last 24 Hrs  T(C): 36.7 (04 Mar 2025 05:06), Max: 36.7 (03 Mar 2025 21:11)  T(F): 98 (04 Mar 2025 05:06), Max: 98 (03 Mar 2025 21:11)  HR: 77 (04 Mar 2025 05:06) (64 - 82)  BP: 130/80 (04 Mar 2025 05:06) (120/79 - 133/65)  BP(mean): --  ABP: --  ABP(mean): --  RR: 19 (04 Mar 2025 05:06) (19 - 20)  SpO2: 98% (04 Mar 2025 05:06) (92% - 98%)    O2 Parameters below as of 04 Mar 2025 05:06  Patient On (Oxygen Delivery Method): nasal cannula  O2 Flow (L/min): 2               Input and Output:  I&O's Detail    02 Mar 2025 07:01  -  03 Mar 2025 07:00  --------------------------------------------------------  IN:  Total IN: 0 mL    OUT:    Voided (mL): 400 mL  Total OUT: 400 mL    Total NET: -400 mL      03 Mar 2025 07:01  -  04 Mar 2025 05:32  --------------------------------------------------------  IN:  Total IN: 0 mL    OUT:    Stool (mL): 1 mL    Voided (mL): 1 mL  Total OUT: 2 mL    Total NET: -2 mL          Lab Data                        12.5   9.76  )-----------( 190      ( 03 Mar 2025 08:38 )             37.4     03-03    139  |  106  |  20  ----------------------------<  159[H]  3.7   |  29  |  0.92    Ca    8.8      03 Mar 2025 08:38              Review of Systems	      Objective     Physical Examination    heart s1s2  lung dc bS  head nc  head at      Pertinent Lab findings & Imaging      Tad:  NO   Adequate UO     I&O's Detail    02 Mar 2025 07:01  -  03 Mar 2025 07:00  --------------------------------------------------------  IN:  Total IN: 0 mL    OUT:    Voided (mL): 400 mL  Total OUT: 400 mL    Total NET: -400 mL      03 Mar 2025 07:01  -  04 Mar 2025 05:32  --------------------------------------------------------  IN:  Total IN: 0 mL    OUT:    Stool (mL): 1 mL    Voided (mL): 1 mL  Total OUT: 2 mL    Total NET: -2 mL               Discussed with:     Cultures:	        Radiology                            
Date/Time Patient Seen:  		  Referring MD:   Data Reviewed	       Patient is a 79y old  Male who presents with a chief complaint of COPD exacerbation (04 Mar 2025 19:23)      Subjective/HPI     PAST MEDICAL & SURGICAL HISTORY:  Personal history of PE (pulmonary embolism)    COPD (chronic obstructive pulmonary disease)    Hypertension    Restless leg syndrome    Factor V Leiden    Parkinsons    Spinal stenosis    Memory loss    Right inguinal hernia    AL (obstructive sleep apnea)    Diabetes    Herniated cervical disc    H/O hernia repair          Medication list         MEDICATIONS  (STANDING):  albuterol/ipratropium for Nebulization 3 milliLiter(s) Nebulizer every 6 hours  carbidopa/levodopa  25/100 1 Tablet(s) Oral four times a day  cetirizine 10 milliGRAM(s) Oral daily  diltiazem    milliGRAM(s) Oral daily  fluticasone propionate/ salmeterol 250-50 MICROgram(s) Diskus 1 Dose(s) Inhalation two times a day  folic acid 1 milliGRAM(s) Oral daily  rOPINIRole 2 milliGRAM(s) Oral four times a day  trihexyphenidyl 1 milliGRAM(s) Oral three times a day    MEDICATIONS  (PRN):  acetaminophen     Tablet .. 650 milliGRAM(s) Oral every 6 hours PRN Temp greater or equal to 38C (100.4F), Mild Pain (1 - 3)  aluminum hydroxide/magnesium hydroxide/simethicone Suspension 30 milliLiter(s) Oral every 4 hours PRN Dyspepsia  benzonatate 100 milliGRAM(s) Oral three times a day PRN Cough  melatonin 3 milliGRAM(s) Oral at bedtime PRN Insomnia  ondansetron Injectable 4 milliGRAM(s) IV Push every 8 hours PRN Nausea and/or Vomiting         Vitals log        ICU Vital Signs Last 24 Hrs  T(C): 36.7 (05 Mar 2025 05:15), Max: 36.7 (05 Mar 2025 05:15)  T(F): 98.1 (05 Mar 2025 05:15), Max: 98.1 (05 Mar 2025 05:15)  HR: 88 (05 Mar 2025 05:15) (68 - 90)  BP: 159/78 (05 Mar 2025 05:15) (119/61 - 159/78)  BP(mean): --  ABP: --  ABP(mean): --  RR: 18 (05 Mar 2025 05:15) (18 - 23)  SpO2: 94% (05 Mar 2025 05:15) (93% - 98%)    O2 Parameters below as of 05 Mar 2025 05:15  Patient On (Oxygen Delivery Method): nasal cannula  O2 Flow (L/min): 2               Input and Output:  I&O's Detail    03 Mar 2025 07:01  -  04 Mar 2025 07:00  --------------------------------------------------------  IN:  Total IN: 0 mL    OUT:    Stool (mL): 1 mL    Voided (mL): 1 mL  Total OUT: 2 mL    Total NET: -2 mL      04 Mar 2025 07:01  -  05 Mar 2025 05:38  --------------------------------------------------------  IN:    Oral Fluid: 720 mL  Total IN: 720 mL    OUT:    Voided (mL): 400 mL  Total OUT: 400 mL    Total NET: 320 mL          Lab Data                        12.5   9.76  )-----------( 190      ( 03 Mar 2025 08:38 )             37.4     03-03    139  |  106  |  20  ----------------------------<  159[H]  3.7   |  29  |  0.92    Ca    8.8      03 Mar 2025 08:38              Review of Systems	      Objective     Physical Examination    heart s1s2  lung dc BS  head nc  head at      Pertinent Lab findings & Imaging      Tad:  NO   Adequate UO     I&O's Detail    03 Mar 2025 07:01  -  04 Mar 2025 07:00  --------------------------------------------------------  IN:  Total IN: 0 mL    OUT:    Stool (mL): 1 mL    Voided (mL): 1 mL  Total OUT: 2 mL    Total NET: -2 mL      04 Mar 2025 07:01  -  05 Mar 2025 05:38  --------------------------------------------------------  IN:    Oral Fluid: 720 mL  Total IN: 720 mL    OUT:    Voided (mL): 400 mL  Total OUT: 400 mL    Total NET: 320 mL               Discussed with:     Cultures:	        Radiology                            
Date/Time Patient Seen:  		  Referring MD:   Data Reviewed	       Patient is a 79y old  Male who presents with a chief complaint of COPD exacerbation (06 Mar 2025 18:09)      Subjective/HPI     PAST MEDICAL & SURGICAL HISTORY:  Personal history of PE (pulmonary embolism)    COPD (chronic obstructive pulmonary disease)    Hypertension    Restless leg syndrome    Factor V Leiden    Parkinsons    Spinal stenosis    Memory loss    Right inguinal hernia    AL (obstructive sleep apnea)    Diabetes    Herniated cervical disc    H/O hernia repair          Medication list         MEDICATIONS  (STANDING):  albuterol/ipratropium for Nebulization 3 milliLiter(s) Nebulizer every 6 hours  carbidopa/levodopa  25/100 1 Tablet(s) Oral four times a day  cetirizine 10 milliGRAM(s) Oral daily  diltiazem    milliGRAM(s) Oral daily  fluticasone propionate/ salmeterol 250-50 MICROgram(s) Diskus 1 Dose(s) Inhalation two times a day  folic acid 1 milliGRAM(s) Oral daily  rOPINIRole 2 milliGRAM(s) Oral four times a day  sodium chloride 3%  Inhalation 4 milliLiter(s) Inhalation every 6 hours  trihexyphenidyl 1 milliGRAM(s) Oral three times a day    MEDICATIONS  (PRN):  acetaminophen     Tablet .. 650 milliGRAM(s) Oral every 6 hours PRN Temp greater or equal to 38C (100.4F), Mild Pain (1 - 3)  aluminum hydroxide/magnesium hydroxide/simethicone Suspension 30 milliLiter(s) Oral every 4 hours PRN Dyspepsia  benzonatate 100 milliGRAM(s) Oral three times a day PRN Cough  melatonin 3 milliGRAM(s) Oral at bedtime PRN Insomnia  ondansetron Injectable 4 milliGRAM(s) IV Push every 8 hours PRN Nausea and/or Vomiting         Vitals log        ICU Vital Signs Last 24 Hrs  T(C): 36.4 (07 Mar 2025 04:26), Max: 36.8 (06 Mar 2025 20:25)  T(F): 97.5 (07 Mar 2025 04:26), Max: 98.3 (06 Mar 2025 20:25)  HR: 74 (07 Mar 2025 04:26) (72 - 94)  BP: 113/63 (07 Mar 2025 04:26) (113/63 - 142/64)  BP(mean): --  ABP: --  ABP(mean): --  RR: 18 (07 Mar 2025 04:26) (18 - 21)  SpO2: 94% (07 Mar 2025 04:26) (89% - 96%)    O2 Parameters below as of 07 Mar 2025 04:26  Patient On (Oxygen Delivery Method): nasal cannula  O2 Flow (L/min): 3               Input and Output:  I&O's Detail    05 Mar 2025 07:01  -  06 Mar 2025 07:00  --------------------------------------------------------  IN:    Oral Fluid: 600 mL  Total IN: 600 mL    OUT:    Stool (mL): 1 mL    Voided (mL): 450 mL  Total OUT: 451 mL    Total NET: 149 mL      06 Mar 2025 07:01  -  07 Mar 2025 05:42  --------------------------------------------------------  IN:    Oral Fluid: 720 mL  Total IN: 720 mL    OUT:    Voided (mL): 1550 mL  Total OUT: 1550 mL    Total NET: -830 mL          Lab Data                        14.2   12.72 )-----------( 270      ( 05 Mar 2025 06:55 )             42.5     03-05    135  |  102  |  15  ----------------------------<  200[H]  4.5   |  27  |  0.95    Ca    9.4      05 Mar 2025 06:55              Review of Systems	      Objective     Physical Examination      heart s1s2  lung dc BS  head nc  head at    Pertinent Lab findings & Imaging      Tad:  NO   Adequate UO     I&O's Detail    05 Mar 2025 07:01  -  06 Mar 2025 07:00  --------------------------------------------------------  IN:    Oral Fluid: 600 mL  Total IN: 600 mL    OUT:    Stool (mL): 1 mL    Voided (mL): 450 mL  Total OUT: 451 mL    Total NET: 149 mL      06 Mar 2025 07:01  -  07 Mar 2025 05:42  --------------------------------------------------------  IN:    Oral Fluid: 720 mL  Total IN: 720 mL    OUT:    Voided (mL): 1550 mL  Total OUT: 1550 mL    Total NET: -830 mL               Discussed with:     Cultures:	        Radiology                            
Patient is a 79y Male with a known history of :    HPI:  78 yo Male with a past medical history of PE on warfarin, COPD, hypertension, Parkinson disease is presenting with complaints of shortness of breath.  States that he was short of breath starting this morning.  Tried his nebulizer without relief in symptoms.  And then upon walking to the bathroom, his symptoms acutely got worse so his wife called 911 for evaluation.  No recent fevers or sick contacts.  Patient has been coughing.  No nausea or vomiting.  No leg swelling.  No history of heart failure. No fever at home. Initially in ED he was placed on BIPAP, now improved and placed on NC oxygen. He feels comfortable now with his breathing.  (23 Feb 2025 19:17)      REVIEW OF SYSTEMS:    CONSTITUTIONAL: No fever, weight loss, or fatigue  EYES: No eye pain, visual disturbances, or discharge  ENMT:  No difficulty hearing, tinnitus, vertigo; No sinus or throat pain  NECK: No pain or stiffness  BREASTS: No pain, masses, or nipple discharge  RESPIRATORY: No cough, wheezing, chills or hemoptysis; No shortness of breath  CARDIOVASCULAR: No chest pain, palpitations, dizziness, or leg swelling  GASTROINTESTINAL: No abdominal or epigastric pain. No nausea, vomiting, or hematemesis; No diarrhea or constipation. No melena or hematochezia.  GENITOURINARY: No dysuria, frequency, hematuria, or incontinence  NEUROLOGICAL: No headaches, memory loss, loss of strength, numbness, or tremors  SKIN: No itching, burning, rashes, or lesions   LYMPH NODES: No enlarged glands  ENDOCRINE: No heat or cold intolerance; No hair loss  MUSCULOSKELETAL: No joint pain or swelling; No muscle, back, or extremity pain  PSYCHIATRIC: No depression, anxiety, mood swings, or difficulty sleeping  HEME/LYMPH: No easy bruising, or bleeding gums  ALLERGY AND IMMUNOLOGIC: No hives or eczema    MEDICATIONS  (STANDING):  albuterol/ipratropium for Nebulization 3 milliLiter(s) Nebulizer every 6 hours  carbidopa/levodopa  25/100 1 Tablet(s) Oral four times a day  cetirizine 10 milliGRAM(s) Oral daily  diltiazem    Tablet 60 milliGRAM(s) Oral four times a day  fluticasone propionate/ salmeterol 250-50 MICROgram(s) Diskus 1 Dose(s) Inhalation two times a day  folic acid 1 milliGRAM(s) Oral daily  rOPINIRole 2 milliGRAM(s) Oral four times a day  trihexyphenidyl 1 milliGRAM(s) Oral three times a day    MEDICATIONS  (PRN):  acetaminophen     Tablet .. 650 milliGRAM(s) Oral every 6 hours PRN Temp greater or equal to 38C (100.4F), Mild Pain (1 - 3)  aluminum hydroxide/magnesium hydroxide/simethicone Suspension 30 milliLiter(s) Oral every 4 hours PRN Dyspepsia  benzonatate 100 milliGRAM(s) Oral three times a day PRN Cough  melatonin 3 milliGRAM(s) Oral at bedtime PRN Insomnia  ondansetron Injectable 4 milliGRAM(s) IV Push every 8 hours PRN Nausea and/or Vomiting      ALLERGIES: Levaquin (Anaphylaxis)  garlic (Other (Mild))      FAMILY HISTORY:  FH: lung cancer (Father)        Social history:  Alochol:   Smoking:   Drug Use:   Marital Status:     PHYSICAL EXAMINATION:  -----------------------------  T(C): 36.6 (03-02-25 @ 05:00), Max: 36.6 (03-02-25 @ 05:00)  HR: 83 (03-02-25 @ 05:00) (50 - 100)  BP: 110/71 (03-02-25 @ 05:00) (102/48 - 158/77)  RR: 18 (03-02-25 @ 05:00) (18 - 20)  SpO2: 95% (03-02-25 @ 05:00) (95% - 98%)  Wt(kg): --    03-01 @ 07:01  -  03-02 @ 07:00  --------------------------------------------------------  IN:  Total IN: 0 mL    OUT:    Stool (mL): 1 mL    Voided (mL): 600 mL  Total OUT: 601 mL    Total NET: -601 mL            Constitutional: well developed, normal appearance, well groomed, well nourished, no deformities and no acute distress.   Eyes: the conjunctiva exhibited no abnormalities and the eyelids demonstrated no xanthelasmas.   HEENT: normal oral mucosa, no oral pallor and no oral cyanosis.   Neck: normal jugular venous A waves present, normal jugular venous V waves present and no jugular venous [place] A waves.   Pulmonary: no respiratory distress, normal respiratory rhythm and effort, no accessory muscle use and lungs were clear to auscultation bilaterally. Anteriorly  Cardiovascular: heart rate and rhythm were normal, normal S1 and S2 and no murmur, gallop, rub, heave or thrill are present.   Musculoskeletal: the gait could not be assessed.   Extremities: no clubbing of the fingernails, no localized cyanosis, no petechial hemorrhages and no ischemic changes.   Skin: normal skin color and pigmentation, no rash, no venous stasis, no skin lesions, no skin ulcer and no xanthoma was observed.       LABS:   --------  03-01    139  |  103  |  14  ----------------------------<  138[H]  4.2   |  30  |  0.90    Ca    8.5      01 Mar 2025 03:20  Phos  3.5     03-01  Mg     2.1     03-01                           13.5   8.12  )-----------( 171      ( 28 Feb 2025 12:45 )             39.6     PT/INR - ( 01 Mar 2025 03:20 )   PT: 21.7 sec;   INR: 1.87 ratio                       Radiology:    
Patient is a 79y old  Male who presents with a chief complaint of COPD exacerbation (24 Feb 2025 11:22)      INTERVAL HPI/OVERNIGHT EVENTS: noted  pt seen and examined this am   events noted      Vital Signs Last 24 Hrs  T(C): 36.8 (24 Feb 2025 18:22), Max: 37.2 (24 Feb 2025 11:47)  T(F): 98.3 (24 Feb 2025 18:22), Max: 98.9 (24 Feb 2025 11:47)  HR: 105 (24 Feb 2025 18:22) (90 - 112)  BP: 117/62 (24 Feb 2025 18:22) (117/62 - 158/77)  BP(mean): 95 (24 Feb 2025 06:16) (95 - 99)  RR: 18 (24 Feb 2025 18:22) (17 - 20)  SpO2: 93% (24 Feb 2025 18:22) (93% - 96%)    Parameters below as of 24 Feb 2025 18:22  Patient On (Oxygen Delivery Method): nasal cannula  O2 Flow (L/min): 4      acetaminophen     Tablet .. 650 milliGRAM(s) Oral every 6 hours PRN  albuterol/ipratropium for Nebulization 3 milliLiter(s) Nebulizer every 6 hours  aluminum hydroxide/magnesium hydroxide/simethicone Suspension 30 milliLiter(s) Oral every 4 hours PRN  azithromycin   Tablet 500 milliGRAM(s) Oral daily  carbidopa/levodopa  25/100 1 Tablet(s) Oral four times a day  cetirizine 10 milliGRAM(s) Oral daily  fluticasone propionate/ salmeterol 250-50 MICROgram(s) Diskus 1 Dose(s) Inhalation two times a day  folic acid 1 milliGRAM(s) Oral daily  melatonin 3 milliGRAM(s) Oral at bedtime PRN  ondansetron Injectable 4 milliGRAM(s) IV Push every 8 hours PRN  rOPINIRole 2 milliGRAM(s) Oral four times a day  trihexyphenidyl 1 milliGRAM(s) Oral three times a day  warfarin 2.5 milliGRAM(s) Oral once      PHYSICAL EXAM:  GENERAL: NAD,   EYES: conjunctiva and sclera clear  ENMT: Moist mucous membranes  NECK: Supple, No JVD, Normal thyroid  CHEST/LUNG: non labored, cta b/l  HEART: Regular rate and rhythm; No murmurs, rubs, or gallops  ABDOMEN: Soft, Nontender, Nondistended; Bowel sounds present  EXTREMITIES:  2+ Peripheral Pulses, No clubbing, cyanosis, or edema  LYMPH: No lymphadenopathy noted  SKIN: No rashes or lesions    Consultant(s) Notes Reviewed:  [x ] YES  [ ] NO  Care Discussed with Consultants/Other Providers [ x] YES  [ ] NO    LABS:                        14.8   8.13  )-----------( 206      ( 24 Feb 2025 07:20 )             43.6     02-24    138  |  105  |  21  ----------------------------<  209[H]  4.1   |  28  |  1.20    Ca    9.6      24 Feb 2025 07:20  Mg     2.4     02-24    TPro  7.1  /  Alb  3.6  /  TBili  0.9  /  DBili  x   /  AST  19  /  ALT  12  /  AlkPhos  92  02-23    PT/INR - ( 24 Feb 2025 07:20 )   PT: 28.8 sec;   INR: 2.48 ratio         PTT - ( 23 Feb 2025 15:40 )  PTT:39.9 sec  Urinalysis Basic - ( 24 Feb 2025 07:20 )    Color: x / Appearance: x / SG: x / pH: x  Gluc: 209 mg/dL / Ketone: x  / Bili: x / Urobili: x   Blood: x / Protein: x / Nitrite: x   Leuk Esterase: x / RBC: x / WBC x   Sq Epi: x / Non Sq Epi: x / Bacteria: x      CAPILLARY BLOOD GLUCOSE            Urinalysis Basic - ( 24 Feb 2025 07:20 )    Color: x / Appearance: x / SG: x / pH: x  Gluc: 209 mg/dL / Ketone: x  / Bili: x / Urobili: x   Blood: x / Protein: x / Nitrite: x   Leuk Esterase: x / RBC: x / WBC x   Sq Epi: x / Non Sq Epi: x / Bacteria: x          RADIOLOGY & ADDITIONAL TESTS:    Imaging Personally Reviewed:  [x ] YES  [ ] NO
Patient is a 79y old  Male who presents with a chief complaint of COPD exacerbation (26 Feb 2025 07:19)        INTERVAL HPI/OVERNIGHT EVENTS:   no complaints  pt seen and examined         Vital Signs Last 24 Hrs  T(C): 36.7 (26 Feb 2025 11:35), Max: 36.9 (25 Feb 2025 21:17)  T(F): 98 (26 Feb 2025 11:35), Max: 98.4 (25 Feb 2025 21:17)  HR: 118 (26 Feb 2025 14:58) (81 - 119)  BP: 127/69 (26 Feb 2025 11:35) (127/69 - 136/73)  BP(mean): --  RR: 18 (26 Feb 2025 11:35) (18 - 18)  SpO2: 94% (26 Feb 2025 14:58) (87% - 95%)    Parameters below as of 26 Feb 2025 14:58  Patient On (Oxygen Delivery Method): room air        acetaminophen     Tablet .. 650 milliGRAM(s) Oral every 6 hours PRN  albuterol/ipratropium for Nebulization 3 milliLiter(s) Nebulizer every 6 hours  aluminum hydroxide/magnesium hydroxide/simethicone Suspension 30 milliLiter(s) Oral every 4 hours PRN  azithromycin   Tablet 500 milliGRAM(s) Oral daily  benzonatate 100 milliGRAM(s) Oral three times a day PRN  carbidopa/levodopa  25/100 1 Tablet(s) Oral four times a day  cetirizine 10 milliGRAM(s) Oral daily  fluticasone propionate/ salmeterol 250-50 MICROgram(s) Diskus 1 Dose(s) Inhalation two times a day  folic acid 1 milliGRAM(s) Oral daily  melatonin 3 milliGRAM(s) Oral at bedtime PRN  ondansetron Injectable 4 milliGRAM(s) IV Push every 8 hours PRN  rOPINIRole 2 milliGRAM(s) Oral four times a day  trihexyphenidyl 1 milliGRAM(s) Oral three times a day      PHYSICAL EXAM:  GENERAL: NAD   EYES: conjunctiva and sclera clear  ENMT: Moist mucous membranes  NECK: Supple, No JVD, Normal thyroid  CHEST/LUNG: non labored, cta b/l  HEART: Regular rate and rhythm; No murmurs, rubs, or gallops  ABDOMEN: Soft, Nontender, Nondistended; Bowel sounds present  EXTREMITIES:  No clubbing, no cyanosis, no edema  LYMPH: No lymphadenopathy noted  SKIN: No rashes or lesions  NEURO: no new focal deficits    Consultant(s) Notes Reviewed:  [x ] YES  [ ] NO  Care Discussed with Consultants/Other Providers [ x] YES  [ ] NO    LABS:                        12.9   11.02 )-----------( 150      ( 26 Feb 2025 07:50 )             38.7     02-26    140  |  106  |  27[H]  ----------------------------<  128[H]  3.7   |  29  |  0.87    Ca    8.5      26 Feb 2025 07:50      PT/INR - ( 26 Feb 2025 07:50 )   PT: 39.7 sec;   INR: 3.43 ratio           Urinalysis Basic - ( 26 Feb 2025 07:50 )    Color: x / Appearance: x / SG: x / pH: x  Gluc: 128 mg/dL / Ketone: x  / Bili: x / Urobili: x   Blood: x / Protein: x / Nitrite: x   Leuk Esterase: x / RBC: x / WBC x   Sq Epi: x / Non Sq Epi: x / Bacteria: x      CAPILLARY BLOOD GLUCOSE            Urinalysis Basic - ( 26 Feb 2025 07:50 )    Color: x / Appearance: x / SG: x / pH: x  Gluc: 128 mg/dL / Ketone: x  / Bili: x / Urobili: x   Blood: x / Protein: x / Nitrite: x   Leuk Esterase: x / RBC: x / WBC x   Sq Epi: x / Non Sq Epi: x / Bacteria: x          RADIOLOGY & ADDITIONAL TESTS:    Imaging Personally Reviewed  Reviewed consultants input
Chief Complaint: Shortness of breath, cough    Interval Events: No events overnight.    Review of Systems:  General: No fevers, chills, weight gain  Skin: No rashes, color changes  Cardiovascular: No chest pain, orthopnea  Respiratory: No shortness of breath, cough  Gastrointestinal: No nausea, abdominal pain  Genitourinary: No incontinence, pain with urination  Musculoskeletal: No pain, swelling, decreased range of motion  Neurological: No headache, weakness  Psychiatric: No depression, anxiety  Endocrine: No weight gain, increased thirst  All other systems are comprehensively negative.    Physical Exam:  Vital Signs Last 24 Hrs  T(C): 36.7 (04 Mar 2025 05:06), Max: 36.7 (03 Mar 2025 21:11)  T(F): 98 (04 Mar 2025 05:06), Max: 98 (03 Mar 2025 21:11)  HR: 77 (04 Mar 2025 05:06) (64 - 82)  BP: 130/80 (04 Mar 2025 05:06) (120/79 - 133/65)  BP(mean): --  RR: 19 (04 Mar 2025 05:06) (19 - 20)  SpO2: 98% (04 Mar 2025 05:06) (92% - 98%)  Parameters below as of 04 Mar 2025 05:06  Patient On (Oxygen Delivery Method): nasal cannula  O2 Flow (L/min): 2  General: NAD  HEENT: MMM  Neck: No JVD, no carotid bruit  Lungs: CTAB  CV: RRR, nl S1/S2, no M/R/G  Abdomen: S/NT/ND, +BS  Extremities: No LE edema, no cyanosis  Neuro: AAOx3, non-focal  Skin: No rash    Labs:             03-03    139  |  106  |  20  ----------------------------<  159[H]  3.7   |  29  |  0.92    Ca    8.8      03 Mar 2025 08:38                          12.5   9.76  )-----------( 190      ( 03 Mar 2025 08:38 )             37.4       ECG/Telemetry: Baseline artifact, atrial fibrillation, normal axis, nonspecific ST abnormality
Chief Complaint: Shortness of breath, cough    Interval Events: No events overnight.    Review of Systems:  General: No fevers, chills, weight gain  Skin: No rashes, color changes  Cardiovascular: No chest pain, orthopnea  Respiratory: No shortness of breath, cough  Gastrointestinal: No nausea, abdominal pain  Genitourinary: No incontinence, pain with urination  Musculoskeletal: No pain, swelling, decreased range of motion  Neurological: No headache, weakness  Psychiatric: No depression, anxiety  Endocrine: No weight gain, increased thirst  All other systems are comprehensively negative.    Physical Exam:  Vitals:        Vital Signs Last 24 Hrs  T(C): 36.5 (03 Mar 2025 04:27), Max: 36.7 (02 Mar 2025 20:20)  T(F): 97.7 (03 Mar 2025 04:27), Max: 98 (02 Mar 2025 20:20)  HR: 68 (03 Mar 2025 04:27) (68 - 83)  BP: 132/72 (03 Mar 2025 04:27) (107/59 - 146/62)  BP(mean): --  RR: 18 (03 Mar 2025 04:27) (18 - 20)  SpO2: 93% (03 Mar 2025 04:27) (93% - 96%)  Parameters below as of 03 Mar 2025 04:27  Patient On (Oxygen Delivery Method): nasal cannula  O2 Flow (L/min): 2  General: NAD  HEENT: MMM  Neck: No JVD, no carotid bruit  Lungs: CTAB  CV: RRR, nl S1/S2, no M/R/G  Abdomen: S/NT/ND, +BS  Extremities: No LE edema, no cyanosis  Neuro: AAOx3, non-focal  Skin: No rash    Labs:                        12.5   9.76  )-----------( 190      ( 03 Mar 2025 08:38 )             37.4     03-03    139  |  106  |  20  ----------------------------<  159[H]  3.7   |  29  |  0.92    Ca    8.8      03 Mar 2025 08:38          PT/INR - ( 03 Mar 2025 08:38 )   PT: 18.9 sec;   INR: 1.62 ratio             ECG/Telemetry: Baseline artifact, atrial fibrillation, normal axis, nonspecific ST abnormality
Date/Time Patient Seen:  		  Referring MD:   Data Reviewed	       Patient is a 79y old  Male who presents with a chief complaint of COPD exacerbation (25 Feb 2025 12:49)      Subjective/HPI     PAST MEDICAL & SURGICAL HISTORY:  Personal history of PE (pulmonary embolism)    COPD (chronic obstructive pulmonary disease)    Hypertension    Restless leg syndrome    Factor V Leiden    Parkinsons    Spinal stenosis    Memory loss    Right inguinal hernia    AL (obstructive sleep apnea)    Diabetes    Herniated cervical disc    H/O hernia repair          Medication list         MEDICATIONS  (STANDING):  albuterol/ipratropium for Nebulization 3 milliLiter(s) Nebulizer every 6 hours  azithromycin   Tablet 500 milliGRAM(s) Oral daily  carbidopa/levodopa  25/100 1 Tablet(s) Oral four times a day  cetirizine 10 milliGRAM(s) Oral daily  fluticasone propionate/ salmeterol 250-50 MICROgram(s) Diskus 1 Dose(s) Inhalation two times a day  folic acid 1 milliGRAM(s) Oral daily  rOPINIRole 2 milliGRAM(s) Oral four times a day  trihexyphenidyl 1 milliGRAM(s) Oral three times a day    MEDICATIONS  (PRN):  acetaminophen     Tablet .. 650 milliGRAM(s) Oral every 6 hours PRN Temp greater or equal to 38C (100.4F), Mild Pain (1 - 3)  aluminum hydroxide/magnesium hydroxide/simethicone Suspension 30 milliLiter(s) Oral every 4 hours PRN Dyspepsia  benzonatate 100 milliGRAM(s) Oral three times a day PRN Cough  melatonin 3 milliGRAM(s) Oral at bedtime PRN Insomnia  ondansetron Injectable 4 milliGRAM(s) IV Push every 8 hours PRN Nausea and/or Vomiting         Vitals log        ICU Vital Signs Last 24 Hrs  T(C): 36.7 (26 Feb 2025 05:01), Max: 36.9 (25 Feb 2025 21:17)  T(F): 98.1 (26 Feb 2025 05:01), Max: 98.4 (25 Feb 2025 21:17)  HR: 85 (26 Feb 2025 05:01) (85 - 93)  BP: 136/73 (26 Feb 2025 05:01) (122/65 - 136/73)  BP(mean): --  ABP: --  ABP(mean): --  RR: 18 (26 Feb 2025 05:01) (18 - 19)  SpO2: 92% (26 Feb 2025 05:01) (87% - 97%)    O2 Parameters below as of 26 Feb 2025 05:01  Patient On (Oxygen Delivery Method): room air                 Input and Output:  I&O's Detail    25 Feb 2025 07:01  -  26 Feb 2025 07:00  --------------------------------------------------------  IN:  Total IN: 0 mL    OUT:    Voided (mL): 200 mL  Total OUT: 200 mL    Total NET: -200 mL          Lab Data                        13.8   15.91 )-----------( 199      ( 25 Feb 2025 09:35 )             40.0     02-25    138  |  103  |  30[H]  ----------------------------<  163[H]  4.0   |  29  |  1.10    Ca    9.2      25 Feb 2025 09:35  Mg     2.4     02-24              Review of Systems	      Objective     Physical Examination    heart s1s2  lung dc BS  head nc  head at  abd soft      Pertinent Lab findings & Imaging      Tad:  NO   Adequate UO     I&O's Detail    25 Feb 2025 07:01  -  26 Feb 2025 07:00  --------------------------------------------------------  IN:  Total IN: 0 mL    OUT:    Voided (mL): 200 mL  Total OUT: 200 mL    Total NET: -200 mL               Discussed with:     Cultures:	        Radiology                            
Patient is a 79y old  Male who presents with a chief complaint of COPD exacerbation (03 Mar 2025 09:28)        INTERVAL HPI/OVERNIGHT EVENTS:   c/o cough  pt seen and examined         Vital Signs Last 24 Hrs  T(C): 36.3 (03 Mar 2025 11:36), Max: 36.7 (02 Mar 2025 20:20)  T(F): 97.3 (03 Mar 2025 11:36), Max: 98 (02 Mar 2025 20:20)  HR: 75 (03 Mar 2025 13:50) (64 - 79)  BP: 133/65 (03 Mar 2025 11:36) (117/62 - 146/62)  BP(mean): --  RR: 20 (03 Mar 2025 11:36) (18 - 20)  SpO2: 95% (03 Mar 2025 13:50) (92% - 96%)    Parameters below as of 03 Mar 2025 13:50  Patient On (Oxygen Delivery Method): nasal cannula        acetaminophen     Tablet .. 650 milliGRAM(s) Oral every 6 hours PRN  albuterol/ipratropium for Nebulization 3 milliLiter(s) Nebulizer every 6 hours  aluminum hydroxide/magnesium hydroxide/simethicone Suspension 30 milliLiter(s) Oral every 4 hours PRN  benzonatate 100 milliGRAM(s) Oral three times a day PRN  carbidopa/levodopa  25/100 1 Tablet(s) Oral four times a day  cetirizine 10 milliGRAM(s) Oral daily  diltiazem    milliGRAM(s) Oral daily  fluticasone propionate/ salmeterol 250-50 MICROgram(s) Diskus 1 Dose(s) Inhalation two times a day  folic acid 1 milliGRAM(s) Oral daily  melatonin 3 milliGRAM(s) Oral at bedtime PRN  ondansetron Injectable 4 milliGRAM(s) IV Push every 8 hours PRN  rOPINIRole 2 milliGRAM(s) Oral four times a day  trihexyphenidyl 1 milliGRAM(s) Oral three times a day  warfarin 2.5 milliGRAM(s) Oral once      PHYSICAL EXAM:  GENERAL: NAD   EYES: conjunctiva and sclera clear  ENMT: Moist mucous membranes  NECK: Supple, No JVD, Normal thyroid  CHEST/LUNG: non labored, cta b/l  HEART: Regular rate and rhythm; No murmurs, rubs, or gallops  ABDOMEN: Soft, Nontender, Nondistended; Bowel sounds present  EXTREMITIES:  No clubbing, no cyanosis, no edema  LYMPH: No lymphadenopathy noted  SKIN: No rashes or lesions  NEURO: no new focal deficits    Consultant(s) Notes Reviewed:  [x ] YES  [ ] NO  Care Discussed with Consultants/Other Providers [ x] YES  [ ] NO    LABS:                        12.5   9.76  )-----------( 190      ( 03 Mar 2025 08:38 )             37.4     03-03    139  |  106  |  20  ----------------------------<  159[H]  3.7   |  29  |  0.92    Ca    8.8      03 Mar 2025 08:38      PT/INR - ( 03 Mar 2025 08:38 )   PT: 18.9 sec;   INR: 1.62 ratio           Urinalysis Basic - ( 03 Mar 2025 08:38 )    Color: x / Appearance: x / SG: x / pH: x  Gluc: 159 mg/dL / Ketone: x  / Bili: x / Urobili: x   Blood: x / Protein: x / Nitrite: x   Leuk Esterase: x / RBC: x / WBC x   Sq Epi: x / Non Sq Epi: x / Bacteria: x      CAPILLARY BLOOD GLUCOSE            Urinalysis Basic - ( 03 Mar 2025 08:38 )    Color: x / Appearance: x / SG: x / pH: x  Gluc: 159 mg/dL / Ketone: x  / Bili: x / Urobili: x   Blood: x / Protein: x / Nitrite: x   Leuk Esterase: x / RBC: x / WBC x   Sq Epi: x / Non Sq Epi: x / Bacteria: x          RADIOLOGY & ADDITIONAL TESTS:    Imaging Personally Reviewed  Reviewed consultants input
Chief Complaint: Shortness of breath, cough    Interval Events: No events overnight.    Review of Systems:  General: No fevers, chills, weight gain  Skin: No rashes, color changes  Cardiovascular: No chest pain, orthopnea  Respiratory: No shortness of breath, cough  Gastrointestinal: No nausea, abdominal pain  Genitourinary: No incontinence, pain with urination  Musculoskeletal: No pain, swelling, decreased range of motion  Neurological: No headache, weakness  Psychiatric: No depression, anxiety  Endocrine: No weight gain, increased thirst  All other systems are comprehensively negative.    Physical Exam:  Vital Signs Last 24 Hrs  T(C): 36.7 (05 Mar 2025 05:15), Max: 36.7 (05 Mar 2025 05:15)  T(F): 98.1 (05 Mar 2025 05:15), Max: 98.1 (05 Mar 2025 05:15)  HR: 85 (05 Mar 2025 07:51) (76 - 92)  BP: 159/78 (05 Mar 2025 05:15) (158/88 - 159/78)  BP(mean): --  RR: 18 (05 Mar 2025 05:15) (18 - 18)  SpO2: 95% (05 Mar 2025 07:51) (94% - 98%)  Parameters below as of 05 Mar 2025 07:51  Patient On (Oxygen Delivery Method): nasal cannula w/ humidification  General: NAD  HEENT: MMM  Neck: No JVD, no carotid bruit  Lungs: CTAB  CV: RRR, nl S1/S2, no M/R/G  Abdomen: S/NT/ND, +BS  Extremities: No LE edema, no cyanosis  Neuro: AAOx3, non-focal  Skin: No rash    Labs:             03-05    135  |  102  |  15  ----------------------------<  200[H]  4.5   |  27  |  0.95    Ca    9.4      05 Mar 2025 06:55                          14.2   12.72 )-----------( 270      ( 05 Mar 2025 06:55 )             42.5       ECG/Telemetry: Baseline artifact, atrial fibrillation, normal axis, nonspecific ST abnormality
Chief Complaint: Shortness of breath, cough    Interval Events: No events overnight.    Review of Systems:  General: No fevers, chills, weight gain  Skin: No rashes, color changes  Cardiovascular: No chest pain, orthopnea  Respiratory: No shortness of breath, cough  Gastrointestinal: No nausea, abdominal pain  Genitourinary: No incontinence, pain with urination  Musculoskeletal: No pain, swelling, decreased range of motion  Neurological: No headache, weakness  Psychiatric: No depression, anxiety  Endocrine: No weight gain, increased thirst  All other systems are comprehensively negative.    Physical Exam:  Vital Signs Last 24 Hrs  T(C): 36.7 (06 Mar 2025 04:50), Max: 36.7 (06 Mar 2025 04:50)  T(F): 98 (06 Mar 2025 04:50), Max: 98 (06 Mar 2025 04:50)  HR: 72 (06 Mar 2025 08:35) (72 - 101)  BP: 127/78 (06 Mar 2025 04:50) (127/78 - 157/90)  BP(mean): --  RR: 19 (06 Mar 2025 04:50) (19 - 22)  SpO2: 93% (06 Mar 2025 08:35) (93% - 98%)  Parameters below as of 06 Mar 2025 08:35  Patient On (Oxygen Delivery Method): nasal cannula  General: NAD  HEENT: MMM  Neck: No JVD, no carotid bruit  Lungs: CTAB  CV: RRR, nl S1/S2, no M/R/G  Abdomen: S/NT/ND, +BS  Extremities: No LE edema, no cyanosis  Neuro: AAOx3, non-focal  Skin: No rash    Labs:             03-05    135  |  102  |  15  ----------------------------<  200[H]  4.5   |  27  |  0.95    Ca    9.4      05 Mar 2025 06:55                          14.2   12.72 )-----------( 270      ( 05 Mar 2025 06:55 )             42.5       ECG/Telemetry: Baseline artifact, atrial fibrillation, normal axis, nonspecific ST abnormality
Date/Time Patient Seen:  		  Referring MD:   Data Reviewed	       Patient is a 79y old  Male who presents with a chief complaint of COPD exacerbation (05 Mar 2025 12:57)      Subjective/HPI     PAST MEDICAL & SURGICAL HISTORY:  Personal history of PE (pulmonary embolism)    COPD (chronic obstructive pulmonary disease)    Hypertension    Restless leg syndrome    Factor V Leiden    Parkinsons    Spinal stenosis    Memory loss    Right inguinal hernia    AL (obstructive sleep apnea)    Diabetes    Herniated cervical disc    H/O hernia repair          Medication list         MEDICATIONS  (STANDING):  albuterol/ipratropium for Nebulization 3 milliLiter(s) Nebulizer every 6 hours  carbidopa/levodopa  25/100 1 Tablet(s) Oral four times a day  cetirizine 10 milliGRAM(s) Oral daily  diltiazem    milliGRAM(s) Oral daily  fluticasone propionate/ salmeterol 250-50 MICROgram(s) Diskus 1 Dose(s) Inhalation two times a day  folic acid 1 milliGRAM(s) Oral daily  methylPREDNISolone sodium succinate Injectable 40 milliGRAM(s) IV Push every 12 hours  rOPINIRole 2 milliGRAM(s) Oral four times a day  sodium chloride 3%  Inhalation 4 milliLiter(s) Inhalation every 6 hours  trihexyphenidyl 1 milliGRAM(s) Oral three times a day    MEDICATIONS  (PRN):  acetaminophen     Tablet .. 650 milliGRAM(s) Oral every 6 hours PRN Temp greater or equal to 38C (100.4F), Mild Pain (1 - 3)  aluminum hydroxide/magnesium hydroxide/simethicone Suspension 30 milliLiter(s) Oral every 4 hours PRN Dyspepsia  benzonatate 100 milliGRAM(s) Oral three times a day PRN Cough  melatonin 3 milliGRAM(s) Oral at bedtime PRN Insomnia  ondansetron Injectable 4 milliGRAM(s) IV Push every 8 hours PRN Nausea and/or Vomiting         Vitals log        ICU Vital Signs Last 24 Hrs  T(C): 36.7 (06 Mar 2025 04:50), Max: 36.7 (06 Mar 2025 04:50)  T(F): 98 (06 Mar 2025 04:50), Max: 98 (06 Mar 2025 04:50)  HR: 81 (06 Mar 2025 04:50) (77 - 101)  BP: 127/78 (06 Mar 2025 04:50) (127/78 - 157/90)  BP(mean): --  ABP: --  ABP(mean): --  RR: 19 (06 Mar 2025 04:50) (19 - 22)  SpO2: 95% (06 Mar 2025 04:50) (93% - 98%)    O2 Parameters below as of 06 Mar 2025 04:50  Patient On (Oxygen Delivery Method): nasal cannula  O2 Flow (L/min): 2               Input and Output:  I&O's Detail    04 Mar 2025 07:01  -  05 Mar 2025 07:00  --------------------------------------------------------  IN:    Oral Fluid: 720 mL  Total IN: 720 mL    OUT:    Voided (mL): 400 mL  Total OUT: 400 mL    Total NET: 320 mL      05 Mar 2025 07:01  -  06 Mar 2025 05:37  --------------------------------------------------------  IN:    Oral Fluid: 600 mL  Total IN: 600 mL    OUT:    Stool (mL): 1 mL    Voided (mL): 200 mL  Total OUT: 201 mL    Total NET: 399 mL          Lab Data                        14.2   12.72 )-----------( 270      ( 05 Mar 2025 06:55 )             42.5     03-05    135  |  102  |  15  ----------------------------<  200[H]  4.5   |  27  |  0.95    Ca    9.4      05 Mar 2025 06:55              Review of Systems	      Objective     Physical Examination    heart s1s2  lung dc BS  head nc  head at      Pertinent Lab findings & Imaging      Tad:  NO   Adequate UO     I&O's Detail    04 Mar 2025 07:01  -  05 Mar 2025 07:00  --------------------------------------------------------  IN:    Oral Fluid: 720 mL  Total IN: 720 mL    OUT:    Voided (mL): 400 mL  Total OUT: 400 mL    Total NET: 320 mL      05 Mar 2025 07:01  -  06 Mar 2025 05:37  --------------------------------------------------------  IN:    Oral Fluid: 600 mL  Total IN: 600 mL    OUT:    Stool (mL): 1 mL    Voided (mL): 200 mL  Total OUT: 201 mL    Total NET: 399 mL               Discussed with:     Cultures:	        Radiology                            
Date/Time Patient Seen:  		  Referring MD:   Data Reviewed	       Patient is a 79y old  Male who presents with a chief complaint of COPD exacerbation (24 Feb 2025 18:28)      Subjective/HPI     PAST MEDICAL & SURGICAL HISTORY:  Personal history of PE (pulmonary embolism)    COPD (chronic obstructive pulmonary disease)    Hypertension    Restless leg syndrome    Factor V Leiden    Parkinsons    Spinal stenosis    Memory loss    Right inguinal hernia    AL (obstructive sleep apnea)    Diabetes    Herniated cervical disc    H/O hernia repair          Medication list         MEDICATIONS  (STANDING):  albuterol/ipratropium for Nebulization 3 milliLiter(s) Nebulizer every 6 hours  azithromycin   Tablet 500 milliGRAM(s) Oral daily  carbidopa/levodopa  25/100 1 Tablet(s) Oral four times a day  cetirizine 10 milliGRAM(s) Oral daily  fluticasone propionate/ salmeterol 250-50 MICROgram(s) Diskus 1 Dose(s) Inhalation two times a day  folic acid 1 milliGRAM(s) Oral daily  rOPINIRole 2 milliGRAM(s) Oral four times a day  trihexyphenidyl 1 milliGRAM(s) Oral three times a day    MEDICATIONS  (PRN):  acetaminophen     Tablet .. 650 milliGRAM(s) Oral every 6 hours PRN Temp greater or equal to 38C (100.4F), Mild Pain (1 - 3)  aluminum hydroxide/magnesium hydroxide/simethicone Suspension 30 milliLiter(s) Oral every 4 hours PRN Dyspepsia  melatonin 3 milliGRAM(s) Oral at bedtime PRN Insomnia  ondansetron Injectable 4 milliGRAM(s) IV Push every 8 hours PRN Nausea and/or Vomiting         Vitals log        ICU Vital Signs Last 24 Hrs  T(C): 36.8 (24 Feb 2025 20:36), Max: 37.2 (24 Feb 2025 11:47)  T(F): 98.3 (24 Feb 2025 20:36), Max: 98.9 (24 Feb 2025 11:47)  HR: 94 (24 Feb 2025 20:47) (94 - 107)  BP: 144/71 (24 Feb 2025 20:36) (117/62 - 158/77)  BP(mean): 95 (24 Feb 2025 06:16) (95 - 95)  ABP: --  ABP(mean): --  RR: 20 (24 Feb 2025 20:36) (17 - 20)  SpO2: 96% (24 Feb 2025 20:47) (93% - 96%)    O2 Parameters below as of 24 Feb 2025 20:47  Patient On (Oxygen Delivery Method): nasal cannula, 3L                 Input and Output:  I&O's Detail      Lab Data                        14.8   8.13  )-----------( 206      ( 24 Feb 2025 07:20 )             43.6     02-24    138  |  105  |  21  ----------------------------<  209[H]  4.1   |  28  |  1.20    Ca    9.6      24 Feb 2025 07:20  Mg     2.4     02-24    TPro  7.1  /  Alb  3.6  /  TBili  0.9  /  DBili  x   /  AST  19  /  ALT  12  /  AlkPhos  92  02-23            Review of Systems	      Objective     Physical Examination    heart s1s2  lung dc BS  head nc  head at  abd soft      Pertinent Lab findings & Imaging      Tad:  NO   Adequate UO     I&O's Detail           Discussed with:     Cultures:	        Radiology                            
EP Attending  HISTORY OF PRESENT ILLNESS: HPI:  78 yo Male with a past medical history of PE on warfarin, COPD, hypertension, Parkinson disease is presenting with complaints of shortness of breath.  States that he was short of breath starting this morning.  Tried his nebulizer without relief in symptoms.  And then upon walking to the bathroom, his symptoms acutely got worse so his wife called 911 for evaluation.  No recent fevers or sick contacts.  Patient has been coughing.  No nausea or vomiting.  No leg swelling.  No history of heart failure. No fever at home. Initially in ED he was placed on BIPAP, now improved and placed on NC oxygen. He feels comfortable now with his breathing.  (23 Feb 2025 19:17)    Presented in sinus rhythm.  While hospitalized, demonstrated AFib which is now sustained x 3 days on telemetry, HR mostly >100bpm.  Hx of COPD, on home O2, uses nebulized albuterol once a day and albuterol inhaler 3x/day.  Never intubatd for his COPD.  Has remote hx of PE, on Warfarin, has never tried Apixaban or Rivaroxaban.  No history of palpitations, even now. No fainting.  A 10 pt ROS is otherwise negative.  Date of service 3/3 - resting in bed comfortably.  SOB improving.  no palpitations.    PAST MEDICAL & SURGICAL HISTORY:  Personal history of PE (pulmonary embolism)  COPD (chronic obstructive pulmonary disease)  Hypertension  Restless leg syndrome  Factor V Leiden  Parkinsons  Spinal stenosis  Memory loss  Right inguinal hernia  AL (obstructive sleep apnea)  Diabetes  Herniated cervical disc  H/O hernia repair    acetaminophen     Tablet .. 650 milliGRAM(s) Oral every 6 hours PRN  albuterol/ipratropium for Nebulization 3 milliLiter(s) Nebulizer every 6 hours  aluminum hydroxide/magnesium hydroxide/simethicone Suspension 30 milliLiter(s) Oral every 4 hours PRN  benzonatate 100 milliGRAM(s) Oral three times a day PRN  carbidopa/levodopa  25/100 1 Tablet(s) Oral four times a day  cetirizine 10 milliGRAM(s) Oral daily  diltiazem    milliGRAM(s) Oral daily  fluticasone propionate/ salmeterol 250-50 MICROgram(s) Diskus 1 Dose(s) Inhalation two times a day  folic acid 1 milliGRAM(s) Oral daily  melatonin 3 milliGRAM(s) Oral at bedtime PRN  ondansetron Injectable 4 milliGRAM(s) IV Push every 8 hours PRN  rOPINIRole 2 milliGRAM(s) Oral four times a day  trihexyphenidyl 1 milliGRAM(s) Oral three times a day                            12.5   9.76  )-----------( 190      ( 03 Mar 2025 08:38 )             37.4       03-02    138  |  105  |  19  ----------------------------<  136[H]  3.7   |  29  |  0.94    Ca    8.6      02 Mar 2025 06:55    T(C): 36.5 (03-03-25 @ 04:27), Max: 36.7 (03-02-25 @ 20:20)  HR: 68 (03-03-25 @ 04:27) (68 - 83)  BP: 132/72 (03-03-25 @ 04:27) (107/59 - 146/62)  RR: 18 (03-03-25 @ 04:27) (18 - 20)  SpO2: 93% (03-03-25 @ 04:27) (93% - 96%)  Wt(kg): --    I&O's Summary    02 Mar 2025 07:01  -  03 Mar 2025 07:00  --------------------------------------------------------  IN: 0 mL / OUT: 400 mL / NET: -400 mL      Appearance: elderly man in no acute distress	  HEENT:   Normal oral mucosa, PERRL, EOMI	  Lymphatic: No lymphadenopathy , no edema  Cardiovascular: regular S1 S2, No JVD, No murmurs , Peripheral pulses palpable 2+ bilaterally  Respiratory: diffuse soft wheezes. no rales.  Gastrointestinal:  Soft, Non-tender, + BS	  Skin: No rashes, No ecchymoses, No cyanosis, warm to touch  Musculoskeletal: Normal range of motion, normal strength  Psychiatry:  Mood & affect appropriate      TELEMETRY: regular R-R intervals.  difficult to see P-waves due to baseline artifact.	    ECG: AFib  Echo:  < from: Transthoracic Echocardiogram (12.27.22 @ 10:06) >  Observations:  Mitral Valve: Mitral annular calcification. No mitral valve  regurgitation seen.  Aortic Valve/Aorta: Transcatheter aortic valve replacement.  Peak transaortic valve gradient equals 8 mm Hg, which is  probably normal in the presence of a transcatheter aortic  valve replacement. No aortic valve regurgitation seen.  Not well visualized.  Left Atrium: Left atrium not well visualized.  Left Ventricle: Endocardium not well visualized; grossly  Overall preserved left ventricular ejection fraction.  Unable to evaluate diastology.  Right Heart: Right atrium not well visualized. The right  ventricle is not well visualized; grossly normal right  ventricular systolic function. Tricuspid valve not well  visualized, probably normal. Pulmonic valve not well  visualized.  Pericardium/Pleura: No pericardial effusion seen.  Hemodynamic: RAP 3mmHG Unable to estimate RVSP.  ------------------------------------------------------------------------  Conclusions:  1. Endocardium not well visualized; grossly Overall  preserved left ventricular ejection fraction.  2. The right ventricle is not well visualized; grossly  normal right ventricular systolic function.  3. No pericardial effusion seen.  *** No previous Echo exam.    < end of copied text >        ASSESSMENT/PLAN: Mr Boeckle (pronounced "Rebecca") is a very pleasant 79y Male here with shortness of breath.  He presented with audible wheezing, and known history of COPD.  He demonstrated AFib after being hospitalized, and has been in persistent fib for a few days at time of consult.  No prior history of documented AFib.  Already therapeutically anticoagulated with Warfarin, INR 2-3, for remote history of pulmonary embolus, which will also cover AFib stroke prevention.  Alternatively, if affordable, could prescribe Rivaroxban 20mg qPM or Apixaban 5mg BID instead of Warfarin.    Please repeat an echocardiogram. Last was 3 yrs ago, rule out systolic heart failure.  Wheezing is improving.  HR now 70s-80s, regular pulse.  Likely sinus.  Will check a 12-lead EKG.  Continue diltiaze 240mg/day.      Rashawn Dunaway M.D.  Cardiac Electrophysiology    office [phone]  pager [phone]
Patient is a 79y old  Male who presents with a chief complaint of COPD exacerbation (01 Mar 2025 17:07)        INTERVAL HPI/OVERNIGHT EVENTS:   no complaints  pt seen and examined         Vital Signs Last 24 Hrs  T(C): 36.2 (01 Mar 2025 11:54), Max: 36.6 (01 Mar 2025 02:57)  T(F): 97.2 (01 Mar 2025 11:54), Max: 97.8 (01 Mar 2025 02:57)  HR: 50 (01 Mar 2025 18:30) (50 - 106)  BP: 145/67 (01 Mar 2025 18:30) (102/48 - 158/77)  BP(mean): --  RR: 20 (01 Mar 2025 18:30) (18 - 20)  SpO2: 96% (01 Mar 2025 18:30) (94% - 98%)    Parameters below as of 01 Mar 2025 18:30  Patient On (Oxygen Delivery Method): nasal cannula  O2 Flow (L/min): 3      acetaminophen     Tablet .. 650 milliGRAM(s) Oral every 6 hours PRN  albuterol/ipratropium for Nebulization 3 milliLiter(s) Nebulizer every 6 hours  aluminum hydroxide/magnesium hydroxide/simethicone Suspension 30 milliLiter(s) Oral every 4 hours PRN  benzonatate 100 milliGRAM(s) Oral three times a day PRN  carbidopa/levodopa  25/100 1 Tablet(s) Oral four times a day  cetirizine 10 milliGRAM(s) Oral daily  diltiazem    Tablet 60 milliGRAM(s) Oral four times a day  fluticasone propionate/ salmeterol 250-50 MICROgram(s) Diskus 1 Dose(s) Inhalation two times a day  folic acid 1 milliGRAM(s) Oral daily  melatonin 3 milliGRAM(s) Oral at bedtime PRN  ondansetron Injectable 4 milliGRAM(s) IV Push every 8 hours PRN  rOPINIRole 2 milliGRAM(s) Oral four times a day  trihexyphenidyl 1 milliGRAM(s) Oral three times a day      PHYSICAL EXAM:  GENERAL: NAD   EYES: conjunctiva and sclera clear  ENMT: Moist mucous membranes  NECK: Supple, No JVD, Normal thyroid  CHEST/LUNG: non labored, cta b/l  HEART: Regular rate and rhythm; No murmurs, rubs, or gallops  ABDOMEN: Soft, Nontender, Nondistended; Bowel sounds present  EXTREMITIES:  No clubbing, no cyanosis, no edema  LYMPH: No lymphadenopathy noted  SKIN: No rashes or lesions  NEURO: no new focal deficits    Consultant(s) Notes Reviewed:  [x ] YES  [ ] NO  Care Discussed with Consultants/Other Providers [ x] YES  [ ] NO    LABS:                        13.5   8.12  )-----------( 171      ( 28 Feb 2025 12:45 )             39.6     03-01    139  |  103  |  14  ----------------------------<  138[H]  4.2   |  30  |  0.90    Ca    8.5      01 Mar 2025 03:20  Phos  3.5     03-01  Mg     2.1     03-01      PT/INR - ( 01 Mar 2025 03:20 )   PT: 21.7 sec;   INR: 1.87 ratio           Urinalysis Basic - ( 01 Mar 2025 03:20 )    Color: x / Appearance: x / SG: x / pH: x  Gluc: 138 mg/dL / Ketone: x  / Bili: x / Urobili: x   Blood: x / Protein: x / Nitrite: x   Leuk Esterase: x / RBC: x / WBC x   Sq Epi: x / Non Sq Epi: x / Bacteria: x      CAPILLARY BLOOD GLUCOSE            Urinalysis Basic - ( 01 Mar 2025 03:20 )    Color: x / Appearance: x / SG: x / pH: x  Gluc: 138 mg/dL / Ketone: x  / Bili: x / Urobili: x   Blood: x / Protein: x / Nitrite: x   Leuk Esterase: x / RBC: x / WBC x   Sq Epi: x / Non Sq Epi: x / Bacteria: x          RADIOLOGY & ADDITIONAL TESTS:    Imaging Personally Reviewed  Reviewed consultants input
Patient is a 79y old  Male who presents with a chief complaint of COPD exacerbation (02 Mar 2025 18:14)        INTERVAL HPI/OVERNIGHT EVENTS:   no complaints  pt seen and examined         Vital Signs Last 24 Hrs  T(C): 36.4 (02 Mar 2025 12:00), Max: 36.6 (02 Mar 2025 05:00)  T(F): 97.5 (02 Mar 2025 12:00), Max: 97.9 (02 Mar 2025 05:00)  HR: 74 (02 Mar 2025 18:20) (60 - 83)  BP: 135/68 (02 Mar 2025 18:20) (107/59 - 135/80)  BP(mean): --  RR: 20 (02 Mar 2025 18:20) (18 - 20)  SpO2: 94% (02 Mar 2025 18:20) (94% - 98%)    Parameters below as of 02 Mar 2025 18:20  Patient On (Oxygen Delivery Method): nasal cannula  O2 Flow (L/min): 2      acetaminophen     Tablet .. 650 milliGRAM(s) Oral every 6 hours PRN  albuterol/ipratropium for Nebulization 3 milliLiter(s) Nebulizer every 6 hours  aluminum hydroxide/magnesium hydroxide/simethicone Suspension 30 milliLiter(s) Oral every 4 hours PRN  benzonatate 100 milliGRAM(s) Oral three times a day PRN  carbidopa/levodopa  25/100 1 Tablet(s) Oral four times a day  cetirizine 10 milliGRAM(s) Oral daily  diltiazem    Tablet 60 milliGRAM(s) Oral four times a day  fluticasone propionate/ salmeterol 250-50 MICROgram(s) Diskus 1 Dose(s) Inhalation two times a day  folic acid 1 milliGRAM(s) Oral daily  melatonin 3 milliGRAM(s) Oral at bedtime PRN  ondansetron Injectable 4 milliGRAM(s) IV Push every 8 hours PRN  rOPINIRole 2 milliGRAM(s) Oral four times a day  trihexyphenidyl 1 milliGRAM(s) Oral three times a day  warfarin 2.5 milliGRAM(s) Oral once      PHYSICAL EXAM:  GENERAL: NAD   EYES: conjunctiva and sclera clear  ENMT: Moist mucous membranes  NECK: Supple, No JVD, Normal thyroid  CHEST/LUNG: non labored, cta b/l  HEART: Regular rate and rhythm; No murmurs, rubs, or gallops  ABDOMEN: Soft, Nontender, Nondistended; Bowel sounds present  EXTREMITIES:  No clubbing, no cyanosis, no edema  LYMPH: No lymphadenopathy noted  SKIN: No rashes or lesions  NEURO: no new focal deficits    Consultant(s) Notes Reviewed:  [x ] YES  [ ] NO  Care Discussed with Consultants/Other Providers [ x] YES  [ ] NO    LABS:                        13.5   11.45 )-----------( 220      ( 02 Mar 2025 06:55 )             40.1     03-02    138  |  105  |  19  ----------------------------<  136[H]  3.7   |  29  |  0.94    Ca    8.6      02 Mar 2025 06:55  Phos  3.5     03-01  Mg     2.1     03-01      PT/INR - ( 02 Mar 2025 06:55 )   PT: 19.5 sec;   INR: 1.68 ratio           Urinalysis Basic - ( 02 Mar 2025 06:55 )    Color: x / Appearance: x / SG: x / pH: x  Gluc: 136 mg/dL / Ketone: x  / Bili: x / Urobili: x   Blood: x / Protein: x / Nitrite: x   Leuk Esterase: x / RBC: x / WBC x   Sq Epi: x / Non Sq Epi: x / Bacteria: x      CAPILLARY BLOOD GLUCOSE            Urinalysis Basic - ( 02 Mar 2025 06:55 )    Color: x / Appearance: x / SG: x / pH: x  Gluc: 136 mg/dL / Ketone: x  / Bili: x / Urobili: x   Blood: x / Protein: x / Nitrite: x   Leuk Esterase: x / RBC: x / WBC x   Sq Epi: x / Non Sq Epi: x / Bacteria: x          RADIOLOGY & ADDITIONAL TESTS:    Imaging Personally Reviewed  Reviewed consultants input
Patient is a 79y old  Male who presents with a chief complaint of COPD exacerbation (25 Feb 2025 05:14)      INTERVAL HPI/OVERNIGHT EVENTS: noted  pt seen and examined this am   events noted  feels well  +cough      Vital Signs Last 24 Hrs  T(C): 36.8 (25 Feb 2025 11:20), Max: 36.8 (24 Feb 2025 18:22)  T(F): 98.2 (25 Feb 2025 11:20), Max: 98.3 (24 Feb 2025 18:22)  HR: 92 (25 Feb 2025 11:20) (90 - 107)  BP: 122/65 (25 Feb 2025 11:20) (116/62 - 158/77)  BP(mean): --  RR: 19 (25 Feb 2025 11:20) (18 - 20)  SpO2: 95% (25 Feb 2025 11:20) (93% - 96%)    Parameters below as of 25 Feb 2025 11:20  Patient On (Oxygen Delivery Method): nasal cannula  O2 Flow (L/min): 3      acetaminophen     Tablet .. 650 milliGRAM(s) Oral every 6 hours PRN  albuterol/ipratropium for Nebulization 3 milliLiter(s) Nebulizer every 6 hours  aluminum hydroxide/magnesium hydroxide/simethicone Suspension 30 milliLiter(s) Oral every 4 hours PRN  azithromycin   Tablet 500 milliGRAM(s) Oral daily  carbidopa/levodopa  25/100 1 Tablet(s) Oral four times a day  cetirizine 10 milliGRAM(s) Oral daily  fluticasone propionate/ salmeterol 250-50 MICROgram(s) Diskus 1 Dose(s) Inhalation two times a day  folic acid 1 milliGRAM(s) Oral daily  melatonin 3 milliGRAM(s) Oral at bedtime PRN  ondansetron Injectable 4 milliGRAM(s) IV Push every 8 hours PRN  rOPINIRole 2 milliGRAM(s) Oral four times a day  trihexyphenidyl 1 milliGRAM(s) Oral three times a day      PHYSICAL EXAM:  GENERAL: NAD,   EYES: conjunctiva and sclera clear  ENMT: Moist mucous membranes  NECK: Supple, No JVD, Normal thyroid  CHEST/LUNG: non labored, cta b/l  HEART: Regular rate and rhythm; No murmurs, rubs, or gallops  ABDOMEN: Soft, Nontender, Nondistended; Bowel sounds present  EXTREMITIES:  2+ Peripheral Pulses, No clubbing, cyanosis, or edema  LYMPH: No lymphadenopathy noted  SKIN: No rashes or lesions    Consultant(s) Notes Reviewed:  [x ] YES  [ ] NO  Care Discussed with Consultants/Other Providers [ x] YES  [ ] NO    LABS:                        13.8   15.91 )-----------( 199      ( 25 Feb 2025 09:35 )             40.0     02-25    138  |  103  |  30[H]  ----------------------------<  163[H]  4.0   |  29  |  1.10    Ca    9.2      25 Feb 2025 09:35  Mg     2.4     02-24    TPro  7.1  /  Alb  3.6  /  TBili  0.9  /  DBili  x   /  AST  19  /  ALT  12  /  AlkPhos  92  02-23    PT/INR - ( 25 Feb 2025 09:35 )   PT: 29.0 sec;   INR: 2.48 ratio         PTT - ( 23 Feb 2025 15:40 )  PTT:39.9 sec  Urinalysis Basic - ( 25 Feb 2025 09:35 )    Color: x / Appearance: x / SG: x / pH: x  Gluc: 163 mg/dL / Ketone: x  / Bili: x / Urobili: x   Blood: x / Protein: x / Nitrite: x   Leuk Esterase: x / RBC: x / WBC x   Sq Epi: x / Non Sq Epi: x / Bacteria: x      CAPILLARY BLOOD GLUCOSE            Urinalysis Basic - ( 25 Feb 2025 09:35 )    Color: x / Appearance: x / SG: x / pH: x  Gluc: 163 mg/dL / Ketone: x  / Bili: x / Urobili: x   Blood: x / Protein: x / Nitrite: x   Leuk Esterase: x / RBC: x / WBC x   Sq Epi: x / Non Sq Epi: x / Bacteria: x        Culture - Blood (collected 23 Feb 2025 15:49)  Source: .Blood Blood-Peripheral  Preliminary Report (24 Feb 2025 20:02):    No growth at 24 hours    Culture - Blood (collected 23 Feb 2025 15:40)  Source: .Blood Blood-Peripheral  Preliminary Report (24 Feb 2025 20:02):    No growth at 24 hours        RADIOLOGY & ADDITIONAL TESTS:    Imaging Personally Reviewed:  [x ] YES  [ ] NO
Patient is a 79y old  Male who presents with a chief complaint of COPD exacerbation (27 Feb 2025 22:26)        INTERVAL HPI/OVERNIGHT EVENTS:   no complaints  pt seen and examined         Vital Signs Last 24 Hrs  T(C): 36.7 (28 Feb 2025 11:57), Max: 36.7 (28 Feb 2025 11:57)  T(F): 98 (28 Feb 2025 11:57), Max: 98 (28 Feb 2025 11:57)  HR: 78 (28 Feb 2025 13:56) (78 - 93)  BP: 155/81 (28 Feb 2025 11:57) (149/73 - 180/87)  BP(mean): --  RR: 19 (28 Feb 2025 11:57) (18 - 22)  SpO2: 98% (28 Feb 2025 13:56) (90% - 99%)    Parameters below as of 28 Feb 2025 13:56  Patient On (Oxygen Delivery Method): nasal cannula        acetaminophen     Tablet .. 650 milliGRAM(s) Oral every 6 hours PRN  albuterol/ipratropium for Nebulization 3 milliLiter(s) Nebulizer every 6 hours  aluminum hydroxide/magnesium hydroxide/simethicone Suspension 30 milliLiter(s) Oral every 4 hours PRN  benzonatate 100 milliGRAM(s) Oral three times a day PRN  carbidopa/levodopa  25/100 1 Tablet(s) Oral four times a day  cetirizine 10 milliGRAM(s) Oral daily  fluticasone propionate/ salmeterol 250-50 MICROgram(s) Diskus 1 Dose(s) Inhalation two times a day  folic acid 1 milliGRAM(s) Oral daily  melatonin 3 milliGRAM(s) Oral at bedtime PRN  ondansetron Injectable 4 milliGRAM(s) IV Push every 8 hours PRN  rOPINIRole 2 milliGRAM(s) Oral four times a day  trihexyphenidyl 1 milliGRAM(s) Oral three times a day      PHYSICAL EXAM:  GENERAL: NAD   EYES: conjunctiva and sclera clear  ENMT: Moist mucous membranes  NECK: Supple, No JVD, Normal thyroid  CHEST/LUNG: non labored, cta b/l  HEART: Regular rate and rhythm; No murmurs, rubs, or gallops  ABDOMEN: Soft, Nontender, Nondistended; Bowel sounds present  EXTREMITIES:  No clubbing, no cyanosis, no edema  LYMPH: No lymphadenopathy noted  SKIN: No rashes or lesions  NEURO: no new focal deficits    Consultant(s) Notes Reviewed:  [x ] YES  [ ] NO  Care Discussed with Consultants/Other Providers [ x] YES  [ ] NO    LABS:                        13.5   8.12  )-----------( 171      ( 28 Feb 2025 12:45 )             39.6     02-28    138  |  104  |  19  ----------------------------<  160[H]  3.8   |  29  |  0.87    Ca    8.6      28 Feb 2025 12:45      PT/INR - ( 28 Feb 2025 07:20 )   PT: 21.7 sec;   INR: 1.87 ratio           Urinalysis Basic - ( 28 Feb 2025 12:45 )    Color: x / Appearance: x / SG: x / pH: x  Gluc: 160 mg/dL / Ketone: x  / Bili: x / Urobili: x   Blood: x / Protein: x / Nitrite: x   Leuk Esterase: x / RBC: x / WBC x   Sq Epi: x / Non Sq Epi: x / Bacteria: x      CAPILLARY BLOOD GLUCOSE            Urinalysis Basic - ( 28 Feb 2025 12:45 )    Color: x / Appearance: x / SG: x / pH: x  Gluc: 160 mg/dL / Ketone: x  / Bili: x / Urobili: x   Blood: x / Protein: x / Nitrite: x   Leuk Esterase: x / RBC: x / WBC x   Sq Epi: x / Non Sq Epi: x / Bacteria: x          RADIOLOGY & ADDITIONAL TESTS:    Imaging Personally Reviewed  Reviewed consultants input
Date/Time Patient Seen:  		  Referring MD:   Data Reviewed	       Patient is a 79y old  Male who presents with a chief complaint of COPD exacerbation (02 Mar 2025 19:49)      Subjective/HPI     PAST MEDICAL & SURGICAL HISTORY:  Personal history of PE (pulmonary embolism)    COPD (chronic obstructive pulmonary disease)    Hypertension    Restless leg syndrome    Factor V Leiden    Parkinsons    Spinal stenosis    Memory loss    Right inguinal hernia    AL (obstructive sleep apnea)    Diabetes    Herniated cervical disc    H/O hernia repair          Medication list         MEDICATIONS  (STANDING):  albuterol/ipratropium for Nebulization 3 milliLiter(s) Nebulizer every 6 hours  carbidopa/levodopa  25/100 1 Tablet(s) Oral four times a day  cetirizine 10 milliGRAM(s) Oral daily  diltiazem    Tablet 60 milliGRAM(s) Oral four times a day  fluticasone propionate/ salmeterol 250-50 MICROgram(s) Diskus 1 Dose(s) Inhalation two times a day  folic acid 1 milliGRAM(s) Oral daily  rOPINIRole 2 milliGRAM(s) Oral four times a day  trihexyphenidyl 1 milliGRAM(s) Oral three times a day    MEDICATIONS  (PRN):  acetaminophen     Tablet .. 650 milliGRAM(s) Oral every 6 hours PRN Temp greater or equal to 38C (100.4F), Mild Pain (1 - 3)  aluminum hydroxide/magnesium hydroxide/simethicone Suspension 30 milliLiter(s) Oral every 4 hours PRN Dyspepsia  benzonatate 100 milliGRAM(s) Oral three times a day PRN Cough  melatonin 3 milliGRAM(s) Oral at bedtime PRN Insomnia  ondansetron Injectable 4 milliGRAM(s) IV Push every 8 hours PRN Nausea and/or Vomiting         Vitals log        ICU Vital Signs Last 24 Hrs  T(C): 36.5 (03 Mar 2025 04:27), Max: 36.7 (02 Mar 2025 20:20)  T(F): 97.7 (03 Mar 2025 04:27), Max: 98 (02 Mar 2025 20:20)  HR: 68 (03 Mar 2025 04:27) (68 - 83)  BP: 132/72 (03 Mar 2025 04:27) (107/59 - 146/62)  BP(mean): --  ABP: --  ABP(mean): --  RR: 18 (03 Mar 2025 04:27) (18 - 20)  SpO2: 93% (03 Mar 2025 04:27) (93% - 97%)    O2 Parameters below as of 03 Mar 2025 04:27  Patient On (Oxygen Delivery Method): nasal cannula  O2 Flow (L/min): 2               Input and Output:  I&O's Detail    01 Mar 2025 07:01  -  02 Mar 2025 07:00  --------------------------------------------------------  IN:  Total IN: 0 mL    OUT:    Stool (mL): 1 mL    Voided (mL): 600 mL  Total OUT: 601 mL    Total NET: -601 mL          Lab Data                        13.5   11.45 )-----------( 220      ( 02 Mar 2025 06:55 )             40.1     03-02    138  |  105  |  19  ----------------------------<  136[H]  3.7   |  29  |  0.94    Ca    8.6      02 Mar 2025 06:55              Review of Systems	      Objective     Physical Examination    heart s1s2  lung dc bS  head nc  head at      Pertinent Lab findings & Imaging      Tad:  NO   Adequate UO     I&O's Detail    01 Mar 2025 07:01  -  02 Mar 2025 07:00  --------------------------------------------------------  IN:  Total IN: 0 mL    OUT:    Stool (mL): 1 mL    Voided (mL): 600 mL  Total OUT: 601 mL    Total NET: -601 mL               Discussed with:     Cultures:	        Radiology                            
Patient is a 79y old  Male who presents with a chief complaint of COPD exacerbation (04 Mar 2025 05:32)        INTERVAL HPI/OVERNIGHT EVENTS:   no complaints  pt seen and examined         Vital Signs Last 24 Hrs  T(C): 36.3 (04 Mar 2025 11:14), Max: 36.7 (03 Mar 2025 21:11)  T(F): 97.4 (04 Mar 2025 11:14), Max: 98 (03 Mar 2025 21:11)  HR: 68 (04 Mar 2025 11:14) (68 - 82)  BP: 119/61 (04 Mar 2025 11:14) (119/61 - 130/80)  BP(mean): --  RR: 23 (04 Mar 2025 11:14) (19 - 23)  SpO2: 94% (04 Mar 2025 11:14) (93% - 98%)    Parameters below as of 04 Mar 2025 11:14  Patient On (Oxygen Delivery Method): nasal cannula  O2 Flow (L/min): 2      acetaminophen     Tablet .. 650 milliGRAM(s) Oral every 6 hours PRN  albuterol/ipratropium for Nebulization 3 milliLiter(s) Nebulizer every 6 hours  aluminum hydroxide/magnesium hydroxide/simethicone Suspension 30 milliLiter(s) Oral every 4 hours PRN  benzonatate 100 milliGRAM(s) Oral three times a day PRN  carbidopa/levodopa  25/100 1 Tablet(s) Oral four times a day  cetirizine 10 milliGRAM(s) Oral daily  diltiazem    milliGRAM(s) Oral daily  fluticasone propionate/ salmeterol 250-50 MICROgram(s) Diskus 1 Dose(s) Inhalation two times a day  folic acid 1 milliGRAM(s) Oral daily  melatonin 3 milliGRAM(s) Oral at bedtime PRN  ondansetron Injectable 4 milliGRAM(s) IV Push every 8 hours PRN  rOPINIRole 2 milliGRAM(s) Oral four times a day  trihexyphenidyl 1 milliGRAM(s) Oral three times a day  warfarin 3 milliGRAM(s) Oral once      PHYSICAL EXAM:  GENERAL: NAD   EYES: conjunctiva and sclera clear  ENMT: Moist mucous membranes  NECK: Supple, No JVD, Normal thyroid  CHEST/LUNG: non labored, cta b/l  HEART: Regular rate and rhythm; No murmurs, rubs, or gallops  ABDOMEN: Soft, Nontender, Nondistended; Bowel sounds present  EXTREMITIES:  No clubbing, no cyanosis, no edema  LYMPH: No lymphadenopathy noted  SKIN: No rashes or lesions  NEURO: no new focal deficits    Consultant(s) Notes Reviewed:  [x ] YES  [ ] NO  Care Discussed with Consultants/Other Providers [ x] YES  [ ] NO    LABS:                        12.5   9.76  )-----------( 190      ( 03 Mar 2025 08:38 )             37.4     03-03    139  |  106  |  20  ----------------------------<  159[H]  3.7   |  29  |  0.92    Ca    8.8      03 Mar 2025 08:38      PT/INR - ( 04 Mar 2025 07:45 )   PT: 20.8 sec;   INR: 1.77 ratio           Urinalysis Basic - ( 03 Mar 2025 08:38 )    Color: x / Appearance: x / SG: x / pH: x  Gluc: 159 mg/dL / Ketone: x  / Bili: x / Urobili: x   Blood: x / Protein: x / Nitrite: x   Leuk Esterase: x / RBC: x / WBC x   Sq Epi: x / Non Sq Epi: x / Bacteria: x      CAPILLARY BLOOD GLUCOSE            Urinalysis Basic - ( 03 Mar 2025 08:38 )    Color: x / Appearance: x / SG: x / pH: x  Gluc: 159 mg/dL / Ketone: x  / Bili: x / Urobili: x   Blood: x / Protein: x / Nitrite: x   Leuk Esterase: x / RBC: x / WBC x   Sq Epi: x / Non Sq Epi: x / Bacteria: x          RADIOLOGY & ADDITIONAL TESTS:    Imaging Personally Reviewed  Reviewed consultants input
Patient is a 79y old  Male who presents with a chief complaint of COPD exacerbation (05 Mar 2025 05:38)      INTERVAL HPI/OVERNIGHT EVENTS: noted  pt seen and examined this am   events noted  feels well      Vital Signs Last 24 Hrs  T(C): 36.3 (05 Mar 2025 11:53), Max: 36.7 (05 Mar 2025 05:15)  T(F): 97.3 (05 Mar 2025 11:53), Max: 98.1 (05 Mar 2025 05:15)  HR: 87 (05 Mar 2025 11:53) (76 - 92)  BP: 143/72 (05 Mar 2025 11:53) (143/72 - 159/78)  BP(mean): --  RR: 22 (05 Mar 2025 11:53) (18 - 22)  SpO2: 93% (05 Mar 2025 11:53) (93% - 98%)    Parameters below as of 05 Mar 2025 11:53  Patient On (Oxygen Delivery Method): nasal cannula  O2 Flow (L/min): 2      acetaminophen     Tablet .. 650 milliGRAM(s) Oral every 6 hours PRN  albuterol/ipratropium for Nebulization 3 milliLiter(s) Nebulizer every 6 hours  aluminum hydroxide/magnesium hydroxide/simethicone Suspension 30 milliLiter(s) Oral every 4 hours PRN  benzonatate 100 milliGRAM(s) Oral three times a day PRN  carbidopa/levodopa  25/100 1 Tablet(s) Oral four times a day  cetirizine 10 milliGRAM(s) Oral daily  diltiazem    milliGRAM(s) Oral daily  fluticasone propionate/ salmeterol 250-50 MICROgram(s) Diskus 1 Dose(s) Inhalation two times a day  folic acid 1 milliGRAM(s) Oral daily  melatonin 3 milliGRAM(s) Oral at bedtime PRN  ondansetron Injectable 4 milliGRAM(s) IV Push every 8 hours PRN  rOPINIRole 2 milliGRAM(s) Oral four times a day  trihexyphenidyl 1 milliGRAM(s) Oral three times a day      PHYSICAL EXAM:  GENERAL: NAD,   EYES: conjunctiva and sclera clear  ENMT: Moist mucous membranes  NECK: Supple, No JVD, Normal thyroid  CHEST/LUNG: non labored, cta b/l  HEART: Regular rate and rhythm; No murmurs, rubs, or gallops  ABDOMEN: Soft, Nontender, Nondistended; Bowel sounds present  EXTREMITIES:  2+ Peripheral Pulses, No clubbing, cyanosis, or edema  LYMPH: No lymphadenopathy noted  SKIN: No rashes or lesions    Consultant(s) Notes Reviewed:  [x ] YES  [ ] NO  Care Discussed with Consultants/Other Providers [ x] YES  [ ] NO    LABS:                        14.2   12.72 )-----------( 270      ( 05 Mar 2025 06:55 )             42.5     03-05    135  |  102  |  15  ----------------------------<  200[H]  4.5   |  27  |  0.95    Ca    9.4      05 Mar 2025 06:55      PT/INR - ( 05 Mar 2025 06:55 )   PT: 22.1 sec;   INR: 1.90 ratio           Urinalysis Basic - ( 05 Mar 2025 06:55 )    Color: x / Appearance: x / SG: x / pH: x  Gluc: 200 mg/dL / Ketone: x  / Bili: x / Urobili: x   Blood: x / Protein: x / Nitrite: x   Leuk Esterase: x / RBC: x / WBC x   Sq Epi: x / Non Sq Epi: x / Bacteria: x      CAPILLARY BLOOD GLUCOSE            Urinalysis Basic - ( 05 Mar 2025 06:55 )    Color: x / Appearance: x / SG: x / pH: x  Gluc: 200 mg/dL / Ketone: x  / Bili: x / Urobili: x   Blood: x / Protein: x / Nitrite: x   Leuk Esterase: x / RBC: x / WBC x   Sq Epi: x / Non Sq Epi: x / Bacteria: x          RADIOLOGY & ADDITIONAL TESTS:    Imaging Personally Reviewed:  [x ] YES  [ ] NO
Patient is a 79y old  Male who presents with a chief complaint of COPD exacerbation (26 Feb 2025 18:45)        INTERVAL HPI/OVERNIGHT EVENTS:   no complaints  pt seen and examined         Vital Signs Last 24 Hrs  T(C): 36.4 (27 Feb 2025 20:44), Max: 36.6 (27 Feb 2025 04:30)  T(F): 97.5 (27 Feb 2025 20:44), Max: 97.9 (27 Feb 2025 11:20)  HR: 79 (27 Feb 2025 20:44) (79 - 89)  BP: 154/84 (27 Feb 2025 20:44) (146/79 - 156/76)  BP(mean): --  RR: 18 (27 Feb 2025 20:44) (18 - 20)  SpO2: 99% (27 Feb 2025 20:44) (90% - 99%)    Parameters below as of 27 Feb 2025 20:44  Patient On (Oxygen Delivery Method): room air        acetaminophen     Tablet .. 650 milliGRAM(s) Oral every 6 hours PRN  albuterol/ipratropium for Nebulization 3 milliLiter(s) Nebulizer every 6 hours  aluminum hydroxide/magnesium hydroxide/simethicone Suspension 30 milliLiter(s) Oral every 4 hours PRN  azithromycin   Tablet 500 milliGRAM(s) Oral daily  benzonatate 100 milliGRAM(s) Oral three times a day PRN  carbidopa/levodopa  25/100 1 Tablet(s) Oral four times a day  cetirizine 10 milliGRAM(s) Oral daily  fluticasone propionate/ salmeterol 250-50 MICROgram(s) Diskus 1 Dose(s) Inhalation two times a day  folic acid 1 milliGRAM(s) Oral daily  melatonin 3 milliGRAM(s) Oral at bedtime PRN  ondansetron Injectable 4 milliGRAM(s) IV Push every 8 hours PRN  rOPINIRole 2 milliGRAM(s) Oral four times a day  trihexyphenidyl 1 milliGRAM(s) Oral three times a day      PHYSICAL EXAM:  GENERAL: NAD   EYES: conjunctiva and sclera clear  ENMT: Moist mucous membranes  NECK: Supple, No JVD, Normal thyroid  CHEST/LUNG: non labored, cta b/l  HEART: Regular rate and rhythm; No murmurs, rubs, or gallops  ABDOMEN: Soft, Nontender, Nondistended; Bowel sounds present  EXTREMITIES:  No clubbing, no cyanosis, no edema  LYMPH: No lymphadenopathy noted  SKIN: No rashes or lesions  NEURO: no new focal deficits    Consultant(s) Notes Reviewed:  [x ] YES  [ ] NO  Care Discussed with Consultants/Other Providers [ x] YES  [ ] NO    LABS:                        13.1   7.28  )-----------( 146      ( 27 Feb 2025 06:30 )             39.1     02-27    138  |  104  |  17  ----------------------------<  126[H]  3.6   |  30  |  0.87    Ca    8.8      27 Feb 2025 06:30      PT/INR - ( 27 Feb 2025 06:30 )   PT: 31.6 sec;   INR: 2.70 ratio           Urinalysis Basic - ( 27 Feb 2025 06:30 )    Color: x / Appearance: x / SG: x / pH: x  Gluc: 126 mg/dL / Ketone: x  / Bili: x / Urobili: x   Blood: x / Protein: x / Nitrite: x   Leuk Esterase: x / RBC: x / WBC x   Sq Epi: x / Non Sq Epi: x / Bacteria: x      CAPILLARY BLOOD GLUCOSE            Urinalysis Basic - ( 27 Feb 2025 06:30 )    Color: x / Appearance: x / SG: x / pH: x  Gluc: 126 mg/dL / Ketone: x  / Bili: x / Urobili: x   Blood: x / Protein: x / Nitrite: x   Leuk Esterase: x / RBC: x / WBC x   Sq Epi: x / Non Sq Epi: x / Bacteria: x          RADIOLOGY & ADDITIONAL TESTS:    Imaging Personally Reviewed  Reviewed consultants input

## 2025-03-07 NOTE — PROGRESS NOTE ADULT - ASSESSMENT
78 yo Male with a past medical history of PE on warfarin, COPD, hypertension, Parkinson disease is presenting with complaints of shortness of breath.  States that he was short of breath starting this morning.  Tried his nebulizer without relief in symptoms.  And then upon walking to the bathroom, his symptoms acutely got worse so his wife called 911 for evaluation.    dysphagia  aspiration  copd  diaphragmatic hernia  Atelectasis  PE hx  OP  OA  Ataxic gait  Parkinson's disease  HTN  GERD    rpt ct chest reviewed - known diaphragmatic hernia on left -   thoracic eval noted  pt is at high risk for acid reflux and aspiration into airways  vs noted  nebs - mucolytics -     SLP eval  asp prec  HOB elev  oral hygiene  skin care  assist with needs  fio2 support - keep sat > 88 pct  NEBS for COPD component -  cough rx regimen PRN -   AC - hx of PE -   cvs rx regimen  BP control  nutritional assessment  spoke with WIFE  pt follows with Dr Rivas - Pulm Med - [place] - [facility] office  strong suspicion for Aspiration - chronic - as per pt and wife - coughs and has sx/signs of dysphagia  CT chest reviewed  VBG c/w COPD

## 2025-03-07 NOTE — CAREGIVER ENGAGEMENT NOTE - CAREGIVER EDUCATION - TYPES DISCUSSED
Discharge plan
Discharge plan/DME/Home Care Services/Transportation coordination

## 2025-03-07 NOTE — PROGRESS NOTE ADULT - PROVIDER SPECIALTY LIST ADULT
Cardiology
Cardiology
Electrophysiology
Hospitalist
Internal Medicine
Pulmonology
Pulmonology
Cardiology
Cardiology
Hospitalist
Hospitalist
Internal Medicine
Pulmonology
Hospitalist
Internal Medicine
Cardiology
Hospitalist
Pulmonology
Pulmonology

## 2025-03-07 NOTE — CAREGIVER ENGAGEMENT NOTE - CAREGIVER OUTREACH - FIRST ATTEMPT
Successfully contacted

## 2025-03-08 ENCOUNTER — TRANSCRIPTION ENCOUNTER (OUTPATIENT)
Age: 80
End: 2025-03-08

## 2025-03-12 ENCOUNTER — NON-APPOINTMENT (OUTPATIENT)
Age: 80
End: 2025-03-12

## 2025-03-17 ENCOUNTER — APPOINTMENT (OUTPATIENT)
Dept: THORACIC SURGERY | Facility: CLINIC | Age: 80
End: 2025-03-17
Payer: MEDICARE

## 2025-03-17 VITALS
WEIGHT: 197 LBS | OXYGEN SATURATION: 94 % | DIASTOLIC BLOOD PRESSURE: 73 MMHG | HEIGHT: 67 IN | HEART RATE: 82 BPM | RESPIRATION RATE: 16 BRPM | BODY MASS INDEX: 30.92 KG/M2 | SYSTOLIC BLOOD PRESSURE: 162 MMHG

## 2025-03-17 DIAGNOSIS — Q79.0 CONGENITAL DIAPHRAGMATIC HERNIA: ICD-10-CM

## 2025-03-17 PROCEDURE — 99205 OFFICE O/P NEW HI 60 MIN: CPT

## 2025-03-18 PROCEDURE — 84443 ASSAY THYROID STIM HORMONE: CPT

## 2025-03-18 PROCEDURE — 83605 ASSAY OF LACTIC ACID: CPT

## 2025-03-18 PROCEDURE — 93005 ELECTROCARDIOGRAM TRACING: CPT

## 2025-03-18 PROCEDURE — 82962 GLUCOSE BLOOD TEST: CPT

## 2025-03-18 PROCEDURE — 71250 CT THORAX DX C-: CPT | Mod: MC

## 2025-03-18 PROCEDURE — 36415 COLL VENOUS BLD VENIPUNCTURE: CPT

## 2025-03-18 PROCEDURE — 85025 COMPLETE CBC W/AUTO DIFF WBC: CPT

## 2025-03-18 PROCEDURE — 85730 THROMBOPLASTIN TIME PARTIAL: CPT

## 2025-03-18 PROCEDURE — 97110 THERAPEUTIC EXERCISES: CPT

## 2025-03-18 PROCEDURE — 80048 BASIC METABOLIC PNL TOTAL CA: CPT

## 2025-03-18 PROCEDURE — 96375 TX/PRO/DX INJ NEW DRUG ADDON: CPT

## 2025-03-18 PROCEDURE — 97162 PT EVAL MOD COMPLEX 30 MIN: CPT

## 2025-03-18 PROCEDURE — 83880 ASSAY OF NATRIURETIC PEPTIDE: CPT

## 2025-03-18 PROCEDURE — 85610 PROTHROMBIN TIME: CPT

## 2025-03-18 PROCEDURE — 84100 ASSAY OF PHOSPHORUS: CPT

## 2025-03-18 PROCEDURE — 74176 CT ABD & PELVIS W/O CONTRAST: CPT | Mod: MC

## 2025-03-18 PROCEDURE — 87040 BLOOD CULTURE FOR BACTERIA: CPT

## 2025-03-18 PROCEDURE — 80053 COMPREHEN METABOLIC PANEL: CPT

## 2025-03-18 PROCEDURE — 93306 TTE W/DOPPLER COMPLETE: CPT

## 2025-03-18 PROCEDURE — 85027 COMPLETE CBC AUTOMATED: CPT

## 2025-03-18 PROCEDURE — 94660 CPAP INITIATION&MGMT: CPT

## 2025-03-18 PROCEDURE — 86140 C-REACTIVE PROTEIN: CPT

## 2025-03-18 PROCEDURE — 97116 GAIT TRAINING THERAPY: CPT

## 2025-03-18 PROCEDURE — 84484 ASSAY OF TROPONIN QUANT: CPT

## 2025-03-18 PROCEDURE — 0225U NFCT DS DNA&RNA 21 SARSCOV2: CPT

## 2025-03-18 PROCEDURE — 94760 N-INVAS EAR/PLS OXIMETRY 1: CPT

## 2025-03-18 PROCEDURE — 96365 THER/PROPH/DIAG IV INF INIT: CPT

## 2025-03-18 PROCEDURE — 99285 EMERGENCY DEPT VISIT HI MDM: CPT

## 2025-03-18 PROCEDURE — 71045 X-RAY EXAM CHEST 1 VIEW: CPT

## 2025-03-18 PROCEDURE — 87637 SARSCOV2&INF A&B&RSV AMP PRB: CPT

## 2025-03-18 PROCEDURE — 94640 AIRWAY INHALATION TREATMENT: CPT

## 2025-03-18 PROCEDURE — 92610 EVALUATE SWALLOWING FUNCTION: CPT

## 2025-03-18 PROCEDURE — 82803 BLOOD GASES ANY COMBINATION: CPT

## 2025-03-18 PROCEDURE — 83036 HEMOGLOBIN GLYCOSYLATED A1C: CPT

## 2025-03-18 PROCEDURE — 97112 NEUROMUSCULAR REEDUCATION: CPT

## 2025-03-18 PROCEDURE — 71275 CT ANGIOGRAPHY CHEST: CPT | Mod: MC

## 2025-03-18 PROCEDURE — 83735 ASSAY OF MAGNESIUM: CPT

## 2025-03-24 ENCOUNTER — APPOINTMENT (OUTPATIENT)
Dept: THORACIC SURGERY | Facility: CLINIC | Age: 80
End: 2025-03-24

## 2025-03-24 ENCOUNTER — APPOINTMENT (OUTPATIENT)
Dept: VASCULAR SURGERY | Facility: CLINIC | Age: 80
End: 2025-03-24

## 2025-04-01 ENCOUNTER — NON-APPOINTMENT (OUTPATIENT)
Age: 80
End: 2025-04-01

## 2025-04-01 ENCOUNTER — APPOINTMENT (OUTPATIENT)
Dept: CARDIOLOGY | Facility: CLINIC | Age: 80
End: 2025-04-01
Payer: MEDICARE

## 2025-04-01 VITALS
HEIGHT: 67 IN | OXYGEN SATURATION: 93 % | SYSTOLIC BLOOD PRESSURE: 170 MMHG | HEART RATE: 82 BPM | DIASTOLIC BLOOD PRESSURE: 73 MMHG

## 2025-04-01 DIAGNOSIS — I48.91 UNSPECIFIED ATRIAL FIBRILLATION: ICD-10-CM

## 2025-04-01 PROCEDURE — G2211 COMPLEX E/M VISIT ADD ON: CPT

## 2025-04-01 PROCEDURE — 99205 OFFICE O/P NEW HI 60 MIN: CPT

## 2025-04-01 PROCEDURE — 93000 ELECTROCARDIOGRAM COMPLETE: CPT

## 2025-04-01 RX ORDER — DILTIAZEM HYDROCHLORIDE 240 MG/1
240 CAPSULE, EXTENDED RELEASE ORAL
Qty: 30 | Refills: 2 | Status: ACTIVE | COMMUNITY
Start: 2025-04-01

## 2025-05-27 ENCOUNTER — APPOINTMENT (OUTPATIENT)
Dept: CARDIOLOGY | Facility: CLINIC | Age: 80
End: 2025-05-27
Payer: MEDICARE

## 2025-05-27 ENCOUNTER — NON-APPOINTMENT (OUTPATIENT)
Age: 80
End: 2025-05-27

## 2025-05-27 VITALS
SYSTOLIC BLOOD PRESSURE: 150 MMHG | DIASTOLIC BLOOD PRESSURE: 80 MMHG | HEART RATE: 102 BPM | OXYGEN SATURATION: 95 % | HEIGHT: 67 IN

## 2025-05-27 DIAGNOSIS — I48.91 UNSPECIFIED ATRIAL FIBRILLATION: ICD-10-CM

## 2025-05-27 PROCEDURE — 93000 ELECTROCARDIOGRAM COMPLETE: CPT

## 2025-05-27 PROCEDURE — 99215 OFFICE O/P EST HI 40 MIN: CPT

## 2025-05-27 PROCEDURE — G2211 COMPLEX E/M VISIT ADD ON: CPT

## 2025-05-27 RX ORDER — BISOPROLOL FUMARATE 2.5 MG/1
2.5 TABLET, FILM COATED ORAL
Qty: 30 | Refills: 5 | Status: ACTIVE | COMMUNITY
Start: 2025-05-27 | End: 1900-01-01

## 2025-05-31 ENCOUNTER — INPATIENT (INPATIENT)
Facility: HOSPITAL | Age: 80
LOS: 8 days | Discharge: HOME | DRG: 293 | End: 2025-06-09
Attending: INTERNAL MEDICINE | Admitting: INTERNAL MEDICINE
Payer: MEDICARE

## 2025-05-31 VITALS
OXYGEN SATURATION: 96 % | SYSTOLIC BLOOD PRESSURE: 152 MMHG | DIASTOLIC BLOOD PRESSURE: 89 MMHG | RESPIRATION RATE: 20 BRPM | HEART RATE: 112 BPM | TEMPERATURE: 97 F | WEIGHT: 149.91 LBS | HEIGHT: 66 IN

## 2025-05-31 DIAGNOSIS — M50.20 OTHER CERVICAL DISC DISPLACEMENT, UNSPECIFIED CERVICAL REGION: Chronic | ICD-10-CM

## 2025-05-31 DIAGNOSIS — Z98.890 OTHER SPECIFIED POSTPROCEDURAL STATES: Chronic | ICD-10-CM

## 2025-05-31 DIAGNOSIS — I50.9 HEART FAILURE, UNSPECIFIED: ICD-10-CM

## 2025-05-31 LAB
ALBUMIN SERPL ELPH-MCNC: 2.8 G/DL — LOW (ref 3.3–5)
ALP SERPL-CCNC: 102 U/L — SIGNIFICANT CHANGE UP (ref 40–120)
ALT FLD-CCNC: 6 U/L — LOW (ref 12–78)
ANION GAP SERPL CALC-SCNC: 6 MMOL/L — SIGNIFICANT CHANGE UP (ref 5–17)
APTT BLD: 38.2 SEC — HIGH (ref 26.1–36.8)
AST SERPL-CCNC: 16 U/L — SIGNIFICANT CHANGE UP (ref 15–37)
BASE EXCESS BLDV CALC-SCNC: 8.4 MMOL/L — HIGH (ref -2–3)
BASOPHILS # BLD AUTO: 0.05 K/UL — SIGNIFICANT CHANGE UP (ref 0–0.2)
BASOPHILS NFR BLD AUTO: 0.5 % — SIGNIFICANT CHANGE UP (ref 0–2)
BILIRUB SERPL-MCNC: 0.7 MG/DL — SIGNIFICANT CHANGE UP (ref 0.2–1.2)
BUN SERPL-MCNC: 13 MG/DL — SIGNIFICANT CHANGE UP (ref 7–23)
CALCIUM SERPL-MCNC: 9.1 MG/DL — SIGNIFICANT CHANGE UP (ref 8.5–10.1)
CHLORIDE SERPL-SCNC: 100 MMOL/L — SIGNIFICANT CHANGE UP (ref 96–108)
CO2 SERPL-SCNC: 30 MMOL/L — SIGNIFICANT CHANGE UP (ref 22–31)
CREAT SERPL-MCNC: 0.93 MG/DL — SIGNIFICANT CHANGE UP (ref 0.5–1.3)
EGFR: 83 ML/MIN/1.73M2 — SIGNIFICANT CHANGE UP
EGFR: 83 ML/MIN/1.73M2 — SIGNIFICANT CHANGE UP
EOSINOPHIL # BLD AUTO: 0.1 K/UL — SIGNIFICANT CHANGE UP (ref 0–0.5)
EOSINOPHIL NFR BLD AUTO: 1.1 % — SIGNIFICANT CHANGE UP (ref 0–6)
FLUAV AG NPH QL: SIGNIFICANT CHANGE UP
FLUBV AG NPH QL: SIGNIFICANT CHANGE UP
GLUCOSE SERPL-MCNC: 104 MG/DL — HIGH (ref 70–99)
HCO3 BLDV-SCNC: 33 MMOL/L — HIGH (ref 22–29)
HCT VFR BLD CALC: 33.2 % — LOW (ref 39–50)
HGB BLD-MCNC: 11.1 G/DL — LOW (ref 13–17)
IMM GRANULOCYTES NFR BLD AUTO: 0.4 % — SIGNIFICANT CHANGE UP (ref 0–0.9)
INR BLD: 2.14 RATIO — HIGH (ref 0.85–1.16)
LYMPHOCYTES # BLD AUTO: 0.7 K/UL — LOW (ref 1–3.3)
LYMPHOCYTES # BLD AUTO: 7.4 % — LOW (ref 13–44)
MCHC RBC-ENTMCNC: 31.9 PG — SIGNIFICANT CHANGE UP (ref 27–34)
MCHC RBC-ENTMCNC: 33.4 G/DL — SIGNIFICANT CHANGE UP (ref 32–36)
MCV RBC AUTO: 95.4 FL — SIGNIFICANT CHANGE UP (ref 80–100)
MONOCYTES # BLD AUTO: 1.38 K/UL — HIGH (ref 0–0.9)
MONOCYTES NFR BLD AUTO: 14.6 % — HIGH (ref 2–14)
NEUTROPHILS # BLD AUTO: 7.17 K/UL — SIGNIFICANT CHANGE UP (ref 1.8–7.4)
NEUTROPHILS NFR BLD AUTO: 76 % — SIGNIFICANT CHANGE UP (ref 43–77)
NRBC BLD AUTO-RTO: 0 /100 WBCS — SIGNIFICANT CHANGE UP (ref 0–0)
NT-PROBNP SERPL-SCNC: 2440 PG/ML — HIGH (ref 0–450)
PCO2 BLDV: 52 MMHG — SIGNIFICANT CHANGE UP (ref 42–55)
PH BLDV: 7.41 — SIGNIFICANT CHANGE UP (ref 7.32–7.43)
PLATELET # BLD AUTO: 288 K/UL — SIGNIFICANT CHANGE UP (ref 150–400)
PO2 BLDV: 88 MMHG — HIGH (ref 25–45)
POTASSIUM SERPL-MCNC: 3.9 MMOL/L — SIGNIFICANT CHANGE UP (ref 3.5–5.3)
POTASSIUM SERPL-SCNC: 3.9 MMOL/L — SIGNIFICANT CHANGE UP (ref 3.5–5.3)
PROCALCITONIN SERPL-MCNC: 0.15 NG/ML — HIGH
PROT SERPL-MCNC: 6.6 G/DL — SIGNIFICANT CHANGE UP (ref 6–8.3)
PROTHROM AB SERPL-ACNC: 24.9 SEC — HIGH (ref 9.9–13.4)
RBC # BLD: 3.48 M/UL — LOW (ref 4.2–5.8)
RBC # FLD: 13.5 % — SIGNIFICANT CHANGE UP (ref 10.3–14.5)
RSV RNA NPH QL NAA+NON-PROBE: SIGNIFICANT CHANGE UP
SAO2 % BLDV: 98 % — HIGH (ref 67–88)
SARS-COV-2 RNA SPEC QL NAA+PROBE: SIGNIFICANT CHANGE UP
SODIUM SERPL-SCNC: 136 MMOL/L — SIGNIFICANT CHANGE UP (ref 135–145)
SOURCE RESPIRATORY: SIGNIFICANT CHANGE UP
TROPONIN I, HIGH SENSITIVITY RESULT: 12 NG/L — SIGNIFICANT CHANGE UP
WBC # BLD: 9.44 K/UL — SIGNIFICANT CHANGE UP (ref 3.8–10.5)
WBC # FLD AUTO: 9.44 K/UL — SIGNIFICANT CHANGE UP (ref 3.8–10.5)

## 2025-05-31 PROCEDURE — 71045 X-RAY EXAM CHEST 1 VIEW: CPT | Mod: 26

## 2025-05-31 PROCEDURE — 99223 1ST HOSP IP/OBS HIGH 75: CPT

## 2025-05-31 PROCEDURE — 71250 CT THORAX DX C-: CPT | Mod: 26

## 2025-05-31 PROCEDURE — 99285 EMERGENCY DEPT VISIT HI MDM: CPT | Mod: FS

## 2025-05-31 PROCEDURE — 93010 ELECTROCARDIOGRAM REPORT: CPT

## 2025-05-31 RX ORDER — FUROSEMIDE 10 MG/ML
40 INJECTION INTRAMUSCULAR; INTRAVENOUS ONCE
Refills: 0 | Status: COMPLETED | OUTPATIENT
Start: 2025-05-31 | End: 2025-05-31

## 2025-05-31 RX ORDER — CARBIDOPA/LEVODOPA 25MG-100MG
1 TABLET ORAL
Refills: 0 | Status: DISCONTINUED | OUTPATIENT
Start: 2025-05-31 | End: 2025-06-09

## 2025-05-31 RX ORDER — ACETAMINOPHEN 500 MG/5ML
650 LIQUID (ML) ORAL EVERY 6 HOURS
Refills: 0 | Status: DISCONTINUED | OUTPATIENT
Start: 2025-05-31 | End: 2025-06-09

## 2025-05-31 RX ORDER — BENZONATATE 100 MG
100 CAPSULE ORAL THREE TIMES A DAY
Refills: 0 | Status: DISCONTINUED | OUTPATIENT
Start: 2025-05-31 | End: 2025-06-09

## 2025-05-31 RX ORDER — FOLIC ACID 1 MG/1
1 TABLET ORAL DAILY
Refills: 0 | Status: DISCONTINUED | OUTPATIENT
Start: 2025-05-31 | End: 2025-06-09

## 2025-05-31 RX ORDER — FUROSEMIDE 10 MG/ML
40 INJECTION INTRAMUSCULAR; INTRAVENOUS EVERY 12 HOURS
Refills: 0 | Status: DISCONTINUED | OUTPATIENT
Start: 2025-05-31 | End: 2025-06-09

## 2025-05-31 RX ORDER — ALBUTEROL SULFATE 2.5 MG/3ML
2.5 VIAL, NEBULIZER (ML) INHALATION
Refills: 0 | Status: DISCONTINUED | OUTPATIENT
Start: 2025-05-31 | End: 2025-06-03

## 2025-05-31 RX ORDER — TRIHEXYPHENIDYL HYDROCHLORIDE 2 MG/1
1 TABLET ORAL THREE TIMES A DAY
Refills: 0 | Status: DISCONTINUED | OUTPATIENT
Start: 2025-05-31 | End: 2025-05-31

## 2025-05-31 RX ORDER — ROPINIROLE 5 MG/1
2 TABLET, FILM COATED ORAL
Refills: 0 | Status: DISCONTINUED | OUTPATIENT
Start: 2025-05-31 | End: 2025-06-09

## 2025-05-31 RX ORDER — TRIHEXYPHENIDYL HYDROCHLORIDE 2 MG/1
1 TABLET ORAL THREE TIMES A DAY
Refills: 0 | Status: DISCONTINUED | OUTPATIENT
Start: 2025-05-31 | End: 2025-06-09

## 2025-05-31 RX ADMIN — Medication 100 MILLIGRAM(S): at 18:40

## 2025-05-31 RX ADMIN — FUROSEMIDE 40 MILLIGRAM(S): 10 INJECTION INTRAMUSCULAR; INTRAVENOUS at 15:54

## 2025-05-31 NOTE — CHART NOTE - NSCHARTNOTEFT_GEN_A_CORE
RN called about patient being admitted to telemetry but there is no bed available, requesting to be transferred to med/surg with remote telemetry and continuous pulse ox.  Patient hemodynamically stable.  Went through orders, all able to be pushed by med/surg  Ordered transfer to [facility] with remote tele and continuous pulse ox RN called about patient being admitted to telemetry but there is no bed available, requesting to be transferred to med/surg with remote telemetry and continuous pulse ox.  Patient hemodynamically stable.  Went through orders, all able to be pushed by med/surg  Ordered transfer to [facility] with remote tele and continuous pulse ox    __________________________________  03:50 - RN called for patient increased wheezing. Patient seen and examined at bedside. Patient denies any SOB or chest pain. States he uses nebulizers at home for his COPD when his wheezing gets bad.     VITALS:   T(C): 36.7 (05-31-25 @ 23:00), Max: 37.2 (05-31-25 @ 21:41)  HR: 100 (05-31-25 @ 23:00) (100 - 112)  BP: 145/83 (05-31-25 @ 23:00) (103/67 - 152/89)  RR: 20 (05-31-25 @ 23:00) (18 - 23)  SpO2: 99% (05-31-25 @ 23:00) (96% - 99%)    GENERAL: NAD, lying in bed comfortably on NC  HEAD:  Atraumatic, Normocephalic  EYES: EOMI, PERRLA, conjunctiva and sclera clear  ENT: Moist mucous membranes  NECK: Supple, No JVD  CHEST/LUNG: Inspiratory and expiratory wheezes b/l R>L  HEART: Regular rate and rhythm; No murmurs, rubs, or gallops  ABDOMEN: Soft, nontender, nondistended  EXTREMITIES: No clubbing, cyanosis, or edema  NERVOUS SYSTEM:   no focal deficits   SKIN: No rashes or lesions    Assessment: 79 year old male with a 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 Patient recently admitted to [facility] for new-onset Afib and acute hypoxic respiratory failure 2/2 COPD and worsened diaphragmatic hernia.sp diaphragmatic hernia repair. admitted with  sob and cough. Called for increased wheezing    Plan:  - Wheezing on exam likely 2/2 hx of COPD  - pt uses nebulizers at home   - hemodynamically stable on NC   - will give duonebs x1 for wheezing  - RN to call with any changes

## 2025-05-31 NOTE — H&P ADULT - HISTORY OF PRESENT ILLNESS
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 Patient recently admitted to [facility] for new-onset Afib and acute hypoxic respiratory failure 2/2 COPD and worsened diaphragmatic hernia. He was treated and discharged home with outpatient Cardiology follow-up with Dr. Mccauley in preparation for diaphragmatic hernia repair. Patient tolerated the procedure well, however developed worsening leg swelling afterwards. PCP believed this was due to the new Diltiazem that was initiated for Afib, and discontinued it. Leg edema improved somewhat but is still persistent despite starting 40mg Lasix PO for the past 2 days. Patient saw Dr. Mccauley earlier this week who prescribed Bisoprolol instead, however wife and patient reluctant to start due to worsening cough. Wife states patient was ambulating today when he HR increased to 130s and O2 saturation decreased despite 2L NC. Patient denies orthopnea, SOB, MAST, CP, palpitations.

## 2025-05-31 NOTE — ED ADULT NURSE NOTE - NSFALLHARMRISKINTERV_ED_ALL_ED
Assistance OOB with selected safe patient handling equipment if applicable/Assistance with ambulation/Communicate risk of Fall with Harm to all staff, patient, and family/Monitor gait and stability/Monitor for mental status changes and reorient to person, place, and time, as needed/Move patient closer to nursing station/within visual sight of ED staff/Provide patient with walking aids/Provide visual cue: red socks, yellow wristband, yellow gown, etc/Reinforce activity limits and safety measures with patient and family/Toileting schedule using arm’s reach rule for commode and bathroom/Use of alarms - bed, stretcher, chair and/or video monitoring/Bed in lowest position, wheels locked, appropriate side rails in place/Call bell, personal items and telephone in reach/Instruct patient to call for assistance before getting out of bed/chair/stretcher/Non-slip footwear applied when patient is off stretcher/[place] to call system/Physically safe environment - no spills, clutter or unnecessary equipment/Purposeful Proactive Rounding/Room/bathroom lighting operational, light cord in reach

## 2025-05-31 NOTE — ED PROVIDER NOTE - OBJECTIVE STATEMENT
79-year-old male with past medical history of COPD, diabetes, factor V Leyden, hypertension, dementia presents today due to shortness of breath.  Patient is at the bedside with his aide.  The aide reports that patient is chronically on home oxygen at 2 L but has required 4 L.  Patient was on 2 L earlier today in which his oxygen was in the 80s.  Patient has been experiencing a productive cough. pt is on Coumadin. Patient denies chest pain, fever, vomiting, diarrhea, abdominal pain, hemoptysis, or any other complaints.

## 2025-05-31 NOTE — ED PROVIDER NOTE - CLINICAL SUMMARY MEDICAL DECISION MAKING FREE TEXT BOX
79 male PMH factor V leiden on coumadin, ivc filter, COPD on home O2 2 liters, having cough, congestion, difficulty breathing, home o2 sat high 80;s, patient awake, decreased breath sounds bilaterally, abdomen soft, f/u cbc, cmp, probnp, trop, chest xray, ekg, place on cardiac monitor, pulse oximter, give o2 as needed, admit

## 2025-05-31 NOTE — CONSULT NOTE ADULT - ASSESSMENT
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 Patient recently admitted to [facility] for new-onset Afib and acute hypoxic respiratory failure 2/2 COPD and worsened diaphragmatic hernia. He was treated and discharged home with outpatient Cardiology follow-up with Dr. Mccauley in preparation for diaphragmatic hernia repair. 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 Patient recently admitted to [facility] for new-onset Afib and acute hypoxic respiratory failure 2/2 COPD and worsened diaphragmatic hernia. He was treated and discharged home with outpatient Cardiology follow-up with Dr. Mccauley in preparation for diaphragmatic hernia repair. Patient tolerated the procedure well, however developed worsening leg swelling afterwards    Dyspnea - Cough -   copd  diaphragmatic hernia  Atelectasis  PE hx  OP  OA  Ataxic gait  Parkinson's disease  HTN  GERD    consider ID eval - Ct chest  I zion  nebs PRN  HOB elev  asp prec  oral hygiene  skin care  cvs rx regimen  BP control and HD monitoring  I and O  replete lytes  assist with needs  PPI  OLD - recent admission record reviewed

## 2025-05-31 NOTE — ED PROVIDER NOTE - PHYSICAL EXAMINATION
Constitutional: Awake, Alert, non-toxic. (+) nasal cannula   HEAD: Normocephalic, atraumatic.   EYES: EOM intact, conjunctiva and sclera are clear bilaterally.   ENT: No rhinorrhea, patent, mucous membranes pink/moist, no drooling or stridor.   NECK: Supple, non-tender  CARDIOVASCULAR: Normal S1, S2; (+) irregular rate and rhythm.  RESPIRATORY: Normal respiratory effort;(+) diminished breath sounds, (+) left rales, no wheezes. Speaking in full sentences. No accessory muscle use.   ABDOMEN: Soft; non-tender, non-distended.   EXTREMITIES: Full passive and active ROM in all extremities; non-tender to palpation; distal pulses palpable and symmetric, (+) B/L LE edema   SKIN: Warm, dry; good skin turgor, no apparent lesions or rashes, no ecchymosis, brisk capillary refill.  NEURO: A&O x3. Sensory and motor functions are grossly intact. Speech is normal. Appearance and judgement seem appropriate for gender and age.

## 2025-05-31 NOTE — H&P ADULT - ASSESSMENT
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 Patient recently admitted to [facility] for new-onset Afib and acute hypoxic respiratory failure 2/2 COPD and worsened diaphragmatic hernia.sp diaphragmatic hernia repair. aw sob and cough    CXR-Moderate left and small right-sided pleural effusions.      #acute on chronic  hypoxic respiratory failure   likey from acute chf w pleural effusions  check probnp, i/o, wts, TTE  cards cs noted  ct chest  pulm cs    #COPD on home o2  resume home inhalers    #DVT/PE, hx factor v leiden deffi  new onset afib  coumadin, monitor inr    #Parkinsons dz- resume home meds    [facility]   [phone]

## 2025-05-31 NOTE — H&P ADULT - NSHPREVIEWOFSYSTEMS_GEN_ALL_CORE
REVIEW OF SYSTEMS:    CONSTITUTIONAL: No weakness, fevers or chills  EYES/ENT: No visual changes;  No vertigo or throat pain   NECK: No pain or stiffness  RESPIRATORY: + cough, wheezing, hemoptysis; + shortness of breath  CARDIOVASCULAR: No chest pain or palpitations  GASTROINTESTINAL: No abdominal or epigastric pain. No nausea, vomiting, or hematemesis; No diarrhea or constipation. No melena or hematochezia.  GENITOURINARY: No dysuria, frequency or hematuria  NEUROLOGICAL: No numbness or weakness  SKIN: No itching, rashes

## 2025-05-31 NOTE — CONSULT NOTE ADULT - SUBJECTIVE AND OBJECTIVE BOX
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 Patient recently admitted to [facility] for new-onset Afib and acute hypoxic respiratory failure 2/2 COPD and worsened diaphragmatic hernia. He was treated and discharged home with outpatient Cardiology follow-up with Dr. Mccauley in preparation for diaphragmatic hernia repair. Patient tolerated the procedure well, however developed worsening leg swelling afterwards. PCP believed this was due to the new Diltiazem that was initiated for Afib, and discontinued it. Leg edema improved somewhat but is still persistent despite starting 40mg Lasix PO for the past 2 days. Patient saw Dr. Mccauley earlier this week who prescribed Bisoprolol instead, however wife and patient reluctant to start due to worsening cough. Wife states patient was ambulating today when he HR increased to 130s and O2 saturation decreased despite 2L NC. Patient denies orthopnea, SOB, MAST, CP, palpitations.       PAST MEDICAL & SURGICAL HISTORY:  Personal history of PE (pulmonary embolism)      COPD (chronic obstructive pulmonary disease)      Hypertension      Restless leg syndrome      Factor V Leiden      Parkinsons      Spinal stenosis      Memory loss      Right inguinal hernia      AL (obstructive sleep apnea)      Diabetes      Herniated cervical disc      H/O hernia repair                MEDICATIONS  (STANDING):  furosemide   Injectable 40 milliGRAM(s) IV Push Once    MEDICATIONS  (PRN):      FAMILY HISTORY:  FH: lung cancer (Father)      Denies Family history of CAD or early MI      Constitutional: denies fever, chills  HEENT: denies blurry vision, difficulty hearing  Respiratory: + cough, denies MAST, SOB  Cardiovascular: denies CP, palpitations, orthopnea, PND, + LE edema  Gastrointestinal: denies nausea, vomiting, abdominal pain  Genitourinary: denies urinary changes  Skin: Denies rashes, itching  Neurologic: denies headache, weakness, dizziness  MSK: denies leg swelling, varicose veins  Hematology/Oncology: denies bleeding, easy bruising  ROS negative except as noted above      SOCIAL HISTORY:    No tobacco, alcohol or recreational drug use    Vital Signs Last 24 Hrs  T(C): 36.2 (31 May 2025 12:57), Max: 36.2 (31 May 2025 12:57)  T(F): 97.2 (31 May 2025 12:57), Max: 97.2 (31 May 2025 12:57)  HR: 109 (31 May 2025 13:30) (109 - 112)  BP: 129/85 (31 May 2025 13:30) (129/85 - 152/89)  BP(mean): --  RR: 22 (31 May 2025 13:30) (20 - 22)  SpO2: 98% (31 May 2025 13:30) (96% - 98%)    Parameters below as of 31 May 2025 13:30  Patient On (Oxygen Delivery Method): nasal cannula  O2 Flow (L/min): 4      Physical Exam:  General: Well developed, well nourished, NAD  HEENT: NCAT, PERRLA, EOMI bl, moist mucous membranes   Neck: Supple, nontender, no mass  Neurology: A&Ox3, +resting tremors   Respiratory: +diffuse wheezing and coarse BS   CV: +irregularly irregular, +S1/S2, no murmurs, rubs or gallops  Abdominal: Soft, NT, ND +BSx4, no palpable masses  Extremities: 2+ BLE edema to knees  MSK: Normal ROM, no joint erythema or warmth, no joint swelling   Heme: No obvious ecchymosis or petechiae   Skin: warm, dry, normal color        I&O's Detail      LABS:                        11.1   9.44  )-----------( 288      ( 31 May 2025 13:30 )             33.2     05-31    136  |  100  |  13  ----------------------------<  104[H]  3.9   |  30  |  0.93    Ca    9.1      31 May 2025 13:30    TPro  6.6  /  Alb  2.8[L]  /  TBili  0.7  /  DBili  x   /  AST  16  /  ALT  6[L]  /  AlkPhos  102  05-31        PT/INR - ( 31 May 2025 13:30 )   PT: 24.9 sec;   INR: 2.14 ratio         PTT - ( 31 May 2025 13:30 )  PTT:38.2 sec  Urinalysis Basic - ( 31 May 2025 13:30 )    Color: x / Appearance: x / SG: x / pH: x  Gluc: 104 mg/dL / Ketone: x  / Bili: x / Urobili: x   Blood: x / Protein: x / Nitrite: x   Leuk Esterase: x / RBC: x / WBC x   Sq Epi: x / Non Sq Epi: x / Bacteria: x      I&O's Summary    BNP: 2440    RADIOLOGY & ADDITIONAL STUDIES:      ECG: Afib with RVR @ 109 bpm     ECHO  FINDINGS:   1. Left ventricular endocardium is not well visualized; however, the left ventricular systolic function appears grossly normal.   2. Non-diagnostic image quality.

## 2025-05-31 NOTE — CONSULT NOTE ADULT - SUBJECTIVE AND OBJECTIVE BOX
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 Patient recently admitted to [facility] for new-onset Afib and acute hypoxic respiratory failure 2/2 COPD and worsened diaphragmatic hernia. He was treated and discharged home with outpatient Cardiology follow-up with Dr. Mccauley in preparation for diaphragmatic hernia repair. Patient tolerated the procedure well, however developed worsening leg swelling afterwards. PCP believed this was due to the new Diltiazem that was initiated for Afib, and discontinued it. Leg edema improved somewhat but is still persistent despite starting 40mg Lasix PO for the past 2 days. Patient saw Dr. Mccauley earlier this week who prescribed Bisoprolol instead, however wife and patient reluctant to start due to worsening cough. Wife states patient was ambulating today when he HR increased to 130s and O2 saturation decreased despite 2L NC. Patient denies orthopnea, SOB, MONTOYA, CP, palpitations.        Review of Systems:  Review of Systems: REVIEW OF SYSTEMS:    CONSTITUTIONAL: No weakness, fevers or chills  EYES/ENT: No visual changes;  No vertigo or throat pain   NECK: No pain or stiffness  RESPIRATORY: + cough, wheezing, hemoptysis; + shortness of breath  CARDIOVASCULAR: No chest pain or palpitations  GASTROINTESTINAL: No abdominal or epigastric pain. No nausea, vomiting, or hematemesis; No diarrhea or constipation. No melena or hematochezia.  GENITOURINARY: No dysuria, frequency or hematuria  NEUROLOGICAL: No numbness or weakness  SKIN: No itching, rashes      Allergies and Intolerances:        Allergies:  	Levaquin: Drug, Anaphylaxis  	garlic: Food, Other (Mild), unknown    Home Medications:   * Patient Currently Takes Medications as of 06-Mar-2025 13:45 documented in Structured Notes  · 	DilTIAZem (Eqv-Cardizem CD) 240 mg/24 hours oral capsule, extended release: 1 cap(s) orally once a day  · 	benzonatate 100 mg oral capsule: 1 cap(s) orally 3 times a day as needed for Cough  · 	folic acid 1 mg oral tablet: 1 tab(s) orally once a day  · 	Symbicort 160 mcg-4.5 mcg/inh inhalation aerosol: 2 puff(s) inhaled 2 times a day  · 	rOPINIRole 2 mg oral tablet: 1 tab(s) orally 4 times a day 8am, 1pm, 6pm, 11pm  · 	fexofenadine 180 mg oral tablet: 1 tab(s) orally once a day  · 	carbidopa-levodopa 25 mg-100 mg oral tablet: 1 tab(s) orally 4 times a day 8am,1pm,6pm,11p  · 	trihexyphenidyl: 1 milligram(s) 3 times a day  · 	Coumadin 2.5 mg oral tablet: 1 tab(s) orally once a day 8am  · 	Albuterol (Eqv-ProAir HFA) 90 mcg/inh inhalation aerosol: 2 puff(s) inhaled every 4 hours as needed for  shortness of breath and/or wheezing    Patient History:    Past Medical, Past Surgical, and Family History:  PAST MEDICAL HISTORY:  COPD (chronic obstructive pulmonary disease)     Diabetes     Factor V Leiden     Hypertension     Memory loss     AL (obstructive sleep apnea)     Parkinsons     Personal history of PE (pulmonary embolism)     Restless leg syndrome     Right inguinal hernia     Spinal stenosis.     PAST SURGICAL HISTORY:  H/O hernia repair     Herniated cervical disc.     FAMILY HISTORY:  Father  Still living? Unknown  FH: lung cancer, Age at diagnosis: Age Unknown.     Social History:  · Substance use	No     Tobacco Screening:  · Core Measure Site	Yes  · Has the patient used tobacco in the past 30 days?	No    Risk Assessment:    Hepatitis C Test Questions:  · In accordance with [place] State Law, we offer every patient a Hepatitis C test. Would you like to be tested today?	Opt out    PAST MEDICAL & SURGICAL HISTORY:  Personal history of PE (pulmonary embolism)    COPD (chronic obstructive pulmonary disease)    Hypertension    Restless leg syndrome    Factor V Leiden    Parkinsons    Spinal stenosis    Memory loss    Right inguinal hernia    AL (obstructive sleep apnea)    Diabetes    Herniated cervical disc    H/O hernia repair          Medication list         MEDICATIONS  (STANDING):  carbidopa/levodopa  25/100 1 Tablet(s) Oral <User Schedule>  folic acid 1 milliGRAM(s) Oral daily  furosemide   Injectable 40 milliGRAM(s) IV Push every 12 hours  rOPINIRole 2 milliGRAM(s) Oral four times a day  trihexyphenidyl Elixir 1 milliGRAM(s) Oral three times a day  warfarin 2.5 milliGRAM(s) Oral once    MEDICATIONS  (PRN):  acetaminophen     Tablet .. 650 milliGRAM(s) Oral every 6 hours PRN Temp greater or equal to 38.5C (101.3F), Moderate Pain (4 - 6)  benzonatate 100 milliGRAM(s) Oral three times a day PRN Cough         Vitals log        ICU Vital Signs Last 24 Hrs  T(C): 36.7 (31 May 2025 15:50), Max: 36.7 (31 May 2025 15:50)  T(F): 98 (31 May 2025 15:50), Max: 98 (31 May 2025 15:50)  HR: 100 (31 May 2025 15:50) (100 - 112)  BP: 148/94 (31 May 2025 15:50) (129/85 - 152/89)  BP(mean): --  ABP: --  ABP(mean): --  RR: 23 (31 May 2025 15:50) (20 - 23)  SpO2: 98% (31 May 2025 15:50) (96% - 98%)    O2 Parameters below as of 31 May 2025 15:50  Patient On (Oxygen Delivery Method): nasal cannula  O2 Flow (L/min): 4               Input and Output:  I&O's Detail      Lab Data                        11.1   9.44  )-----------( 288      ( 31 May 2025 13:30 )             33.2     05-31    136  |  100  |  13  ----------------------------<  104[H]  3.9   |  30  |  0.93    Ca    9.1      31 May 2025 13:30    TPro  6.6  /  Alb  2.8[L]  /  TBili  0.7  /  DBili  x   /  AST  16  /  ALT  6[L]  /  AlkPhos  102  05-31            Review of Systems	  sob  montoya  weakness      Objective     Physical Examination        Pertinent Lab findings & Imaging      Mishra:  NO   Adequate UO     I&O's Detail           Discussed with:     Cultures:	        Radiology    ACC: [id number] EXAM:  XR CHEST PORTABLE URGENT 1V   ORDERED BY: SARAH FOOTE     PROCEDURE DATE:  05/31/2025          INTERPRETATION:  TECHNIQUE: Single portable view of the chest.    COMPARISON:  3/3/2025    CLINICAL HISTORY: SOB    FINDINGS:    Single frontal view of the chest demonstrates moderate left and small   right-sided pleural effusions. The cardiomediastinal silhouette is   unremarkable. No acute osseous abnormalities. Overlying EKG leads and   wires are noted    IMPRESSION: Moderate left and small right-sided pleural effusions.    --- End of Report ---            LYDIA MOODY MD; Attending Radiologist  This document has been electronically signed. May 31 2025  3:35PM               CONCLUSIONS:      1. Left ventricular endocardium is not well visualized; however, the left ventricular systolic function appears grossly normal.   2. Non-diagnostic image quality.                 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 Patient recently admitted to hospitals for new-onset Afib and acute hypoxic respiratory failure 2/2 COPD and worsened diaphragmatic hernia. He was treated and discharged home with outpatient Cardiology follow-up with Dr. Mccauley in preparation for diaphragmatic hernia repair. Patient tolerated the procedure well, however developed worsening leg swelling afterwards. PCP believed this was due to the new Diltiazem that was initiated for Afib, and discontinued it. Leg edema improved somewhat but is still persistent despite starting 40mg Lasix PO for the past 2 days. Patient saw Dr. Mccauley earlier this week who prescribed Bisoprolol instead, however wife and patient reluctant to start due to worsening cough. Wife states patient was ambulating today when he HR increased to 130s and O2 saturation decreased despite 2L NC. Patient denies orthopnea, SOB, MONTOYA, CP, palpitations.        Review of Systems:  Review of Systems: REVIEW OF SYSTEMS:    CONSTITUTIONAL: No weakness, fevers or chills  EYES/ENT: No visual changes;  No vertigo or throat pain   NECK: No pain or stiffness  RESPIRATORY: + cough, wheezing, hemoptysis; + shortness of breath  CARDIOVASCULAR: No chest pain or palpitations  GASTROINTESTINAL: No abdominal or epigastric pain. No nausea, vomiting, or hematemesis; No diarrhea or constipation. No melena or hematochezia.  GENITOURINARY: No dysuria, frequency or hematuria  NEUROLOGICAL: No numbness or weakness  SKIN: No itching, rashes      Allergies and Intolerances:        Allergies:  	Levaquin: Drug, Anaphylaxis  	garlic: Food, Other (Mild), unknown    Home Medications:   * Patient Currently Takes Medications as of 06-Mar-2025 13:45 documented in Structured Notes  · 	DilTIAZem (Eqv-Cardizem CD) 240 mg/24 hours oral capsule, extended release: 1 cap(s) orally once a day  · 	benzonatate 100 mg oral capsule: 1 cap(s) orally 3 times a day as needed for Cough  · 	folic acid 1 mg oral tablet: 1 tab(s) orally once a day  · 	Symbicort 160 mcg-4.5 mcg/inh inhalation aerosol: 2 puff(s) inhaled 2 times a day  · 	rOPINIRole 2 mg oral tablet: 1 tab(s) orally 4 times a day 8am, 1pm, 6pm, 11pm  · 	fexofenadine 180 mg oral tablet: 1 tab(s) orally once a day  · 	carbidopa-levodopa 25 mg-100 mg oral tablet: 1 tab(s) orally 4 times a day 8am,1pm,6pm,11p  · 	trihexyphenidyl: 1 milligram(s) 3 times a day  · 	Coumadin 2.5 mg oral tablet: 1 tab(s) orally once a day 8am  · 	Albuterol (Eqv-ProAir HFA) 90 mcg/inh inhalation aerosol: 2 puff(s) inhaled every 4 hours as needed for  shortness of breath and/or wheezing    Patient History:    Past Medical, Past Surgical, and Family History:  PAST MEDICAL HISTORY:  COPD (chronic obstructive pulmonary disease)     Diabetes     Factor V Leiden     Hypertension     Memory loss     AL (obstructive sleep apnea)     Parkinsons     Personal history of PE (pulmonary embolism)     Restless leg syndrome     Right inguinal hernia     Spinal stenosis.     PAST SURGICAL HISTORY:  H/O hernia repair     Herniated cervical disc.     FAMILY HISTORY:  Father  Still living? Unknown  FH: lung cancer, Age at diagnosis: Age Unknown.     Social History:  · Substance use	No     Tobacco Screening:  · Core Measure Site	Yes  · Has the patient used tobacco in the past 30 days?	No    Risk Assessment:    Hepatitis C Test Questions:  · In accordance with [place] State Law, we offer every patient a Hepatitis C test. Would you like to be tested today?	Opt out    PAST MEDICAL & SURGICAL HISTORY:  Personal history of PE (pulmonary embolism)    COPD (chronic obstructive pulmonary disease)    Hypertension    Restless leg syndrome    Factor V Leiden    Parkinsons    Spinal stenosis    Memory loss    Right inguinal hernia    AL (obstructive sleep apnea)    Diabetes    Herniated cervical disc    H/O hernia repair          Medication list         MEDICATIONS  (STANDING):  carbidopa/levodopa  25/100 1 Tablet(s) Oral <User Schedule>  folic acid 1 milliGRAM(s) Oral daily  furosemide   Injectable 40 milliGRAM(s) IV Push every 12 hours  rOPINIRole 2 milliGRAM(s) Oral four times a day  trihexyphenidyl Elixir 1 milliGRAM(s) Oral three times a day  warfarin 2.5 milliGRAM(s) Oral once    MEDICATIONS  (PRN):  acetaminophen     Tablet .. 650 milliGRAM(s) Oral every 6 hours PRN Temp greater or equal to 38.5C (101.3F), Moderate Pain (4 - 6)  benzonatate 100 milliGRAM(s) Oral three times a day PRN Cough         Vitals log        ICU Vital Signs Last 24 Hrs  T(C): 36.7 (31 May 2025 15:50), Max: 36.7 (31 May 2025 15:50)  T(F): 98 (31 May 2025 15:50), Max: 98 (31 May 2025 15:50)  HR: 100 (31 May 2025 15:50) (100 - 112)  BP: 148/94 (31 May 2025 15:50) (129/85 - 152/89)  BP(mean): --  ABP: --  ABP(mean): --  RR: 23 (31 May 2025 15:50) (20 - 23)  SpO2: 98% (31 May 2025 15:50) (96% - 98%)    O2 Parameters below as of 31 May 2025 15:50  Patient On (Oxygen Delivery Method): nasal cannula  O2 Flow (L/min): 4               Input and Output:  I&O's Detail      Lab Data                        11.1   9.44  )-----------( 288      ( 31 May 2025 13:30 )             33.2     05-31    136  |  100  |  13  ----------------------------<  104[H]  3.9   |  30  |  0.93    Ca    9.1      31 May 2025 13:30    TPro  6.6  /  Alb  2.8[L]  /  TBili  0.7  /  DBili  x   /  AST  16  /  ALT  6[L]  /  AlkPhos  102  05-31            Review of Systems	  sob  montoya  weakness      Objective     Physical Examination  heart s1s2  lung dc bs  head nc  tachy  abd soft  o2 support  poor historian        Pertinent Lab findings & Imaging      Mishra:  NO   Adequate UO     I&O's Detail           Discussed with:     Cultures:	        Radiology    ACC: [id number] EXAM:  XR CHEST PORTABLE URGENT 1V   ORDERED BY: SARAH FOOTE     PROCEDURE DATE:  05/31/2025          INTERPRETATION:  TECHNIQUE: Single portable view of the chest.    COMPARISON:  3/3/2025    CLINICAL HISTORY: SOB    FINDINGS:    Single frontal view of the chest demonstrates moderate left and small   right-sided pleural effusions. The cardiomediastinal silhouette is   unremarkable. No acute osseous abnormalities. Overlying EKG leads and   wires are noted    IMPRESSION: Moderate left and small right-sided pleural effusions.    --- End of Report ---            LYDIA MOODY MD; Attending Radiologist  This document has been electronically signed. May 31 2025  3:35PM               CONCLUSIONS:      1. Left ventricular endocardium is not well visualized; however, the left ventricular systolic function appears grossly normal.   2. Non-diagnostic image quality.

## 2025-05-31 NOTE — ED PROVIDER NOTE - CARE PLAN
Principal Discharge DX:	Acute CHF  Secondary Diagnosis:	COPD exacerbation  Secondary Diagnosis:	Pleural effusion   1

## 2025-05-31 NOTE — CONSULT NOTE ADULT - ATTENDING COMMENTS
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 Consulted for acute hypoxia and volume overload.     known o2-dep copd  known af, diagnosed over the past few months  ventral hernia for which had recent repair, with persistent partial left lung collapse by report  had been on dilt for af rate control, recently stopped bc of edema  told by dr leone in the office to start bisoprolol last week, but given a recent deterioration in resp status she has not started it  has been more sob and noted to be hypoxic at home  more edematous on exam  cxr with a left pleural eff, prob superimposed on lobar collapse  rates 100s-110s on tele  wheezing on exam  suggest lasix 40 iv bid for now  hold off on addl rate control though would try to add bb in time  last echo was very limited and non-diagnostic, would repeat  meds for vol ol to be determined by lv fxn on better echo   needs CT Chest    infectious and pulm eval given lung issues as described  warfarin goal inr 2-3    patient is a fall risk with prior h/o brain bleed, outpatient physicians have continued AC due risk of recurrence.   at risk of decompensation

## 2025-05-31 NOTE — PATIENT PROFILE ADULT - DEAF OR HARD OF HEARING?
Patient needs to go to [facility] for evaluation and treatment.  They will screen her there.  If she is not concerned for Covid and wants an appointment here, I will see her.  I do not have ability to check for flu, strep or covid - please make sure she is aware of that.   no

## 2025-05-31 NOTE — ED ADULT NURSE NOTE - OBJECTIVE STATEMENT
Pt received in bed alert and confused and resting in bed with the c/o SOB and lethargy. Pt has hx COPD and Afib and chronically uses O2 NC at home. As per Md's orders IV kirsten placed blood specimen obtained and sent to the lab. Pt stable with wife and private aide at bedside nursing care ongoing and safety maintained Pt received in bed alert and confused and resting in bed with the c/o SOB and lethargy. Pt has hx COPD and Afib and chronically uses O2 NC at home. Pt has b/l LE edema with discoloration due to wear tight fitting socks around ankles. As per Md's orders IV kirsten placed blood specimen obtained and sent to the lab. Pt stable with wife and private aide at bedside nursing care ongoing and safety maintained

## 2025-05-31 NOTE — PATIENT PROFILE ADULT - FALL HARM RISK - HARM RISK INTERVENTIONS

## 2025-05-31 NOTE — CONSULT NOTE ADULT - ASSESSMENT
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 Consulted for acute hypoxia and volume overload.     Volume overload w/ AHRF  - Significant leg edema may be due to venous insufficiency +/- decompensated HF  - Difficult to state definitively given indeterminate CXR and prior non-diagnositic TTE   - pro-BNP: 2440  - Needs CT Chest and repeat TTE  - Start Lasix 40mg IV BID  - Daily weights, low sodium diet, I/Os  - Infectious work-up appreciated. Pulm consult recommended     - Patient is not complaining of any cardiac symptoms at this time.  - No clear evidence of acute ischemia, trops negative.  No need to trend unless patient develops concerning symptoms for ACS.   - If patient proves to be in new decompensated HF, will need ischemic evaluation    AFib  - EKG: Afib with RVR.   - Rapid rate may be physiologic response to infection or volume status   - Previously on Diltazem, but cannot tolerated d/t leg edema.   - Will hold off initiating BB due to current wheezing. May use IV prn if Afib with RVR again   - Already on Coumadin 2.5mg qhs for prior DVT/PE. Has IVC filter as well   - Cont Coumadin  - Though patient is a fall risk with prior h/o brain bleed, outpatient physicians have continued AC due risk of recurrence. Wife in agreement    HTN  - BP well controlled, monitor routine hemodynamics.  - Lasix for volume status, eventually will need BB  - Check orthostatics     - Monitor and replete lytes, keep K>4, Mg>2.  - Other cardiovascular workup will depend on clinical course.  - All other workup per primary team.  - Will continue to follow.

## 2025-05-31 NOTE — ED ADULT NURSE REASSESSMENT NOTE - NS ED NURSE REASSESS COMMENT FT1
Received report from ELIDA Betancur for continuity of care. Pt on cardiac monitor. VSS. Family at bedside. Pt is in stable condition.

## 2025-05-31 NOTE — H&P ADULT - NSHPLABSRESULTS_GEN_ALL_CORE
LABS:                        11.1   9.44  )-----------( 288      ( 31 May 2025 13:30 )             33.2     05-31    136  |  100  |  13  ----------------------------<  104[H]  3.9   |  30  |  0.93    Ca    9.1      31 May 2025 13:30    TPro  6.6  /  Alb  2.8[L]  /  TBili  0.7  /  DBili  x   /  AST  16  /  ALT  6[L]  /  AlkPhos  102  05-31    PT/INR - ( 31 May 2025 13:30 )   PT: 24.9 sec;   INR: 2.14 ratio         PTT - ( 31 May 2025 13:30 )  PTT:38.2 sec  CAPILLARY BLOOD GLUCOSE            Urinalysis Basic - ( 31 May 2025 13:30 )    Color: x / Appearance: x / SG: x / pH: x  Gluc: 104 mg/dL / Ketone: x  / Bili: x / Urobili: x   Blood: x / Protein: x / Nitrite: x   Leuk Esterase: x / RBC: x / WBC x   Sq Epi: x / Non Sq Epi: x / Bacteria: x        RADIOLOGY & ADDITIONAL TESTS:

## 2025-05-31 NOTE — H&P ADULT - NSHPPHYSICALEXAM_GEN_ALL_CORE
Vitals last 24 hrs  T(C): 36.7 (05-31-25 @ 15:50), Max: 36.7 (05-31-25 @ 15:50)  HR: 100 (05-31-25 @ 15:50) (100 - 112)  BP: 148/94 (05-31-25 @ 15:50) (129/85 - 152/89)  RR: 23 (05-31-25 @ 15:50) (20 - 23)  SpO2: 98% (05-31-25 @ 15:50) (96% - 98%)    PHYSICAL EXAM:  GENERAL: NAD, well-groomed, well-developed  HEAD:  Atraumatic, Normocephalic  EYES: EOMI, PERRLA, conjunctiva and sclera clear  ENMT: No tonsillar erythema, exudates, or enlargement; Moist mucous membranes  NECK: Supple, No JVD, Normal thyroid  HEART: Regular rate and rhythm; No murmurs, rubs, or gallops  RESPIRATORY: dec bs at bases  ABDOMEN: Soft, Nontender, Nondistended; Bowel sounds present  NEUROLOGY: A&Ox3, nonfocal, moving all extremities  EXTREMITIES:  2+ Peripheral Pulses, No clubbing, cyanosis, or edema  SKIN: warm, dry, normal color, no rash or abnormal lesions

## 2025-05-31 NOTE — ED ADULT NURSE NOTE - SKIN TURGOR
Patient moved to CT 2 for BIpap          Dale Kiser RN  06/11/18 [id number] The patient location is: home  The chief complaint leading to consultation is: insomnia    Visit type: audiovisual    Face to Face time with patient: 15    20 minutes of total time spent on the encounter, which includes face to face time and non-face to face time preparing to see the patient (eg, review of tests), Obtaining and/or reviewing separately obtained history, Documenting clinical information in the electronic or other health record, Independently interpreting results (not separately reported) and communicating results to the patient/family/caregiver, or Care coordination (not separately reported).     Each patient to whom he or she provides medical services by telemedicine is:  (1) informed of the relationship between the physician and patient and the respective role of any other health care provider with respect to management of the patient; and (2) notified that he or she may decline to receive medical services by telemedicine and may withdraw from such care at any time.    Notes:      History & Physical  Gynecology      SUBJECTIVE:     Chief Complaint:   Insomnia      History of Present Illness  Yadira Rodrigues is a 32 y.o. female presents for insomnia. Pt had a  23. Saw in the OB ED for anxiety- started on Zoloft 25 mg and Ativan PRN. States that these are not helping. Reports that she does not feel depressed, does not really feel anxious. Just having trouble falling asleep. She is breastfeeding/pumping. States when she tries to lie down to sleep, she is just thinking that she will have to get up in a few hours and cannot fall asleep. She has tried meditation, ear plugs, darkened room. She and her  take turns sleeping away from baby. Even when baby not in room, unable to fall asleep.     BP Readings from Last 2 Encounters:   23 98/62   23 99/66      Review of patient's allergies indicates:   Allergen Reactions    Codeine Nausea And Vomiting       Past Medical History:    Diagnosis Date    Abnormal Pap smear of cervix      History reviewed. No pertinent surgical history.  OB History          2    Para   1    Term   1            AB   1    Living   1         SAB        IAB        Ectopic        Multiple   0    Live Births   1               Family History   Problem Relation Age of Onset    Hypertension Father     Hyperlipidemia Father     Prostate cancer Father         s/p resection    Hypertension Mother     Mental illness Sister     Mental illness Maternal Grandmother     Mental illness Paternal Grandmother     Cancer Maternal Uncle         lung?    Colon cancer Neg Hx     Breast cancer Neg Hx     Ovarian cancer Neg Hx      Social History     Tobacco Use    Smoking status: Never    Smokeless tobacco: Never   Substance Use Topics    Alcohol use: Not Currently     Alcohol/week: 4.0 standard drinks of alcohol     Types: 4 Standard drinks or equivalent per week     Comment: drinks socially, occasionally     Drug use: Never       Current Outpatient Medications   Medication Sig    ibuprofen (ADVIL,MOTRIN) 600 MG tablet Take 1 tablet (600 mg total) by mouth every 6 (six) hours.    LORazepam (ATIVAN) 0.5 MG tablet Take 1 tablet (0.5 mg total) by mouth nightly as needed (for anxiety and insomnia).    LORazepam (ATIVAN) 0.5 MG tablet Take 1 tablet (0.5 mg total) by mouth nightly as needed for Anxiety.    prenatal 25/iron fum/folic/dha (PRENATAL-1 ORAL) Take by mouth.    sertraline (ZOLOFT) 25 MG tablet Take 1 tablet (25 mg total) by mouth once daily.     No current facility-administered medications for this visit.         Review of Systems:  Review of Systems   Respiratory:  Negative for cough and shortness of breath.    Cardiovascular:  Negative for chest pain and leg swelling.   Psychiatric/Behavioral:  Positive for sleep disturbance.       OBJECTIVE:     Physical Exam:  Physical Exam  Pulmonary:      Effort: Pulmonary effort is normal.   Neurological:      Mental Status: She  is alert and oriented to person, place, and time.   Psychiatric:         Mood and Affect: Mood normal.       ASSESSMENT:       ICD-10-CM ICD-9-CM    1. Insomnia, unspecified type  G47.00 780.52 Ambulatory referral/consult to  Behavioral Health      2. Anxiety  F41.9 300.00 Ambulatory referral/consult to  Behavioral Health      3. Postpartum care following vaginal delivery  Z39.2 V24.2 Ambulatory referral/consult to  Behavioral Health             Plan:      - Placed referral to  behavioral health for therapy  - Gave name of outside therapist specializing in post partum if she cannot get in soon   - Discussed good sleep habits - calm magnesium supplement, sleepy time tea, low dose melatonin  - Discussed possibly increasing Zoloft - she does not think this will be helpful      Follow up for PP visit in ~ 2 weeks    PAUL Blanc          resilient/elastic

## 2025-06-01 LAB
ANION GAP SERPL CALC-SCNC: 10 MMOL/L — SIGNIFICANT CHANGE UP (ref 5–17)
BUN SERPL-MCNC: 11 MG/DL — SIGNIFICANT CHANGE UP (ref 7–23)
CALCIUM SERPL-MCNC: 9 MG/DL — SIGNIFICANT CHANGE UP (ref 8.5–10.1)
CHLORIDE SERPL-SCNC: 98 MMOL/L — SIGNIFICANT CHANGE UP (ref 96–108)
CO2 SERPL-SCNC: 31 MMOL/L — SIGNIFICANT CHANGE UP (ref 22–31)
CREAT SERPL-MCNC: 0.93 MG/DL — SIGNIFICANT CHANGE UP (ref 0.5–1.3)
CRP SERPL-MCNC: 116 MG/L — HIGH
EGFR: 83 ML/MIN/1.73M2 — SIGNIFICANT CHANGE UP
EGFR: 83 ML/MIN/1.73M2 — SIGNIFICANT CHANGE UP
GLUCOSE SERPL-MCNC: 121 MG/DL — HIGH (ref 70–99)
HCT VFR BLD CALC: 36.5 % — LOW (ref 39–50)
HGB BLD-MCNC: 12.1 G/DL — LOW (ref 13–17)
INR BLD: 2.32 RATIO — HIGH (ref 0.85–1.16)
MCHC RBC-ENTMCNC: 31.8 PG — SIGNIFICANT CHANGE UP (ref 27–34)
MCHC RBC-ENTMCNC: 33.2 G/DL — SIGNIFICANT CHANGE UP (ref 32–36)
MCV RBC AUTO: 95.8 FL — SIGNIFICANT CHANGE UP (ref 80–100)
NRBC BLD AUTO-RTO: 0 /100 WBCS — SIGNIFICANT CHANGE UP (ref 0–0)
PLATELET # BLD AUTO: 301 K/UL — SIGNIFICANT CHANGE UP (ref 150–400)
POTASSIUM SERPL-MCNC: 3.4 MMOL/L — LOW (ref 3.5–5.3)
POTASSIUM SERPL-SCNC: 3.4 MMOL/L — LOW (ref 3.5–5.3)
PROTHROM AB SERPL-ACNC: 27.1 SEC — HIGH (ref 9.9–13.4)
RBC # BLD: 3.81 M/UL — LOW (ref 4.2–5.8)
RBC # FLD: 13.5 % — SIGNIFICANT CHANGE UP (ref 10.3–14.5)
SODIUM SERPL-SCNC: 139 MMOL/L — SIGNIFICANT CHANGE UP (ref 135–145)
WBC # BLD: 9.74 K/UL — SIGNIFICANT CHANGE UP (ref 3.8–10.5)
WBC # FLD AUTO: 9.74 K/UL — SIGNIFICANT CHANGE UP (ref 3.8–10.5)

## 2025-06-01 PROCEDURE — 99233 SBSQ HOSP IP/OBS HIGH 50: CPT

## 2025-06-01 RX ORDER — IPRATROPIUM BROMIDE AND ALBUTEROL SULFATE .5; 2.5 MG/3ML; MG/3ML
3 SOLUTION RESPIRATORY (INHALATION) ONCE
Refills: 0 | Status: COMPLETED | OUTPATIENT
Start: 2025-06-01 | End: 2025-06-01

## 2025-06-01 RX ADMIN — ROPINIROLE 2 MILLIGRAM(S): 5 TABLET, FILM COATED ORAL at 00:43

## 2025-06-01 RX ADMIN — FOLIC ACID 1 MILLIGRAM(S): 1 TABLET ORAL at 13:07

## 2025-06-01 RX ADMIN — ROPINIROLE 2 MILLIGRAM(S): 5 TABLET, FILM COATED ORAL at 17:11

## 2025-06-01 RX ADMIN — Medication 2.5 MILLIGRAM(S): at 00:43

## 2025-06-01 RX ADMIN — Medication 1 TABLET(S): at 08:25

## 2025-06-01 RX ADMIN — ROPINIROLE 2 MILLIGRAM(S): 5 TABLET, FILM COATED ORAL at 08:25

## 2025-06-01 RX ADMIN — TRIHEXYPHENIDYL HYDROCHLORIDE 1 MILLIGRAM(S): 2 TABLET ORAL at 00:42

## 2025-06-01 RX ADMIN — ROPINIROLE 2 MILLIGRAM(S): 5 TABLET, FILM COATED ORAL at 13:07

## 2025-06-01 RX ADMIN — TRIHEXYPHENIDYL HYDROCHLORIDE 1 MILLIGRAM(S): 2 TABLET ORAL at 13:07

## 2025-06-01 RX ADMIN — Medication 1 TABLET(S): at 22:23

## 2025-06-01 RX ADMIN — ROPINIROLE 2 MILLIGRAM(S): 5 TABLET, FILM COATED ORAL at 22:23

## 2025-06-01 RX ADMIN — TRIHEXYPHENIDYL HYDROCHLORIDE 1 MILLIGRAM(S): 2 TABLET ORAL at 22:22

## 2025-06-01 RX ADMIN — FUROSEMIDE 40 MILLIGRAM(S): 10 INJECTION INTRAMUSCULAR; INTRAVENOUS at 17:10

## 2025-06-01 RX ADMIN — FUROSEMIDE 40 MILLIGRAM(S): 10 INJECTION INTRAMUSCULAR; INTRAVENOUS at 05:02

## 2025-06-01 RX ADMIN — Medication 2.5 MILLIGRAM(S): at 22:24

## 2025-06-01 RX ADMIN — Medication 1 TABLET(S): at 13:07

## 2025-06-01 RX ADMIN — Medication 1 TABLET(S): at 00:52

## 2025-06-01 RX ADMIN — TRIHEXYPHENIDYL HYDROCHLORIDE 1 MILLIGRAM(S): 2 TABLET ORAL at 05:02

## 2025-06-01 RX ADMIN — Medication 1 TABLET(S): at 17:11

## 2025-06-01 NOTE — PROGRESS NOTE ADULT - ASSESSMENT
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 Consulted for acute hypoxia and volume overload.     known o2-dep copd  known af, diagnosed over the past few months  ventral hernia for which had recent repair, with persistent partial left lung collapse by report  had been on dilt for af rate control, recently stopped bc of edema  told by dr leone in the office to start bisoprolol last week, but given a recent deterioration in resp status she has not started it  has been more sob and noted to be hypoxic at home  more edematous on exam  cxr with a left pleural eff, prob superimposed on lobar collapse  rates 100s-110s on tele  wheezing on exam  cont lasix 40 iv bid for now  hold off on addl rate control though would try to add bb in time; wheezing is persistent and remains with vol ol and am hopeful will slow with some addl diuresis  last echo was very limited and non-diagnostic, would repeat  meds for vol ol/hf to be determined by lv fxn on better echo   CT Chest  noted with loculated pl eff  infectious and pulm eval given lung issues as described  warfarin goal inr 2-3    patient is a fall risk with prior h/o brain bleed, outpatient physicians have continued AC due risk of recurrence.   at risk of decompensation .

## 2025-06-01 NOTE — PHYSICAL THERAPY INITIAL EVALUATION ADULT - PERTINENT HX OF CURRENT PROBLEM, REHAB EVAL
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 Patient recently admitted to [facility] for new-onset Afib and acute hypoxic respiratory failure 2/2 COPD and worsened diaphragmatic hernia. He was treated and discharged home with outpatient Cardiology follow-up with Dr. Mccauley in preparation for diaphragmatic hernia repair. Patient tolerated the procedure well, however developed worsening leg swelling afterwards. PCP believed this was due to the new Diltiazem that was initiated for Afib, and discontinued it. Leg edema improved somewhat but is still persistent despite starting 40mg Lasix PO for the past 2 days. Patient saw Dr. Mccauley earlier this week who prescribed Bisoprolol instead, however wife and patient reluctant to start due to worsening cough. Wife states patient was ambulating today when he HR increased to 130s and O2 saturation decreased despite 2L NC

## 2025-06-01 NOTE — PROGRESS NOTE ADULT - SUBJECTIVE AND OBJECTIVE BOX
Date/Time Patient Seen:  		  Referring MD:   Data Reviewed	       Patient is a 79y old  Male who presents with a chief complaint of sob, cough (31 May 2025 18:08)      Subjective/HPI     PAST MEDICAL & SURGICAL HISTORY:  Personal history of PE (pulmonary embolism)    COPD (chronic obstructive pulmonary disease)    Hypertension    Restless leg syndrome    Factor V Leiden    Parkinsons    Spinal stenosis    Memory loss    Right inguinal hernia    AL (obstructive sleep apnea)    Diabetes    Herniated cervical disc    H/O hernia repair          Medication list         MEDICATIONS  (STANDING):  albuterol/ipratropium for Nebulization 3 milliLiter(s) Nebulizer once  carbidopa/levodopa  25/100 1 Tablet(s) Oral <User Schedule>  folic acid 1 milliGRAM(s) Oral daily  furosemide   Injectable 40 milliGRAM(s) IV Push every 12 hours  rOPINIRole 2 milliGRAM(s) Oral <User Schedule>  trihexyphenidyl 1 milliGRAM(s) Oral three times a day    MEDICATIONS  (PRN):  acetaminophen     Tablet .. 650 milliGRAM(s) Oral every 6 hours PRN Temp greater or equal to 38.5C (101.3F), Moderate Pain (4 - 6)  albuterol    0.083% 2.5 milliGRAM(s) Nebulizer every 2 hours PRN Shortness of Breath and/or Wheezing  benzonatate 100 milliGRAM(s) Oral three times a day PRN Cough         Vitals log        ICU Vital Signs Last 24 Hrs  T(C): 36.8 (01 Jun 2025 04:50), Max: 37.2 (31 May 2025 21:41)  T(F): 98.3 (01 Jun 2025 04:50), Max: 98.9 (31 May 2025 21:41)  HR: 111 (01 Jun 2025 04:50) (100 - 112)  BP: 144/79 (01 Jun 2025 04:50) (103/67 - 152/89)  BP(mean): --  ABP: --  ABP(mean): --  RR: 20 (01 Jun 2025 04:50) (18 - 23)  SpO2: 95% (01 Jun 2025 04:50) (95% - 99%)    O2 Parameters below as of 01 Jun 2025 04:50  Patient On (Oxygen Delivery Method): nasal cannula                 Input and Output:  I&O's Detail    31 May 2025 07:01  - 01 Jun 2025 06:33  --------------------------------------------------------  IN:    Oral Fluid: 420 mL  Total IN: 420 mL    OUT:    Voided (mL): 1250 mL  Total OUT: 1250 mL    Total NET: -830 mL          Lab Data                        11.1   9.44  )-----------( 288      ( 31 May 2025 13:30 )             33.2     05-31    136  |  100  |  13  ----------------------------<  104[H]  3.9   |  30  |  0.93    Ca    9.1      31 May 2025 13:30    TPro  6.6  /  Alb  2.8[L]  /  TBili  0.7  /  DBili  x   /  AST  16  /  ALT  6[L]  /  AlkPhos  102  05-31            Review of Systems	      Objective     Physical Examination    heart s1s2  lung dc bs  head nc  head at  abd soft      Pertinent Lab findings & Imaging      Tad:  NO   Adequate UO     I&O's Detail    31 May 2025 07:01  -  01 Jun 2025 06:33  --------------------------------------------------------  IN:    Oral Fluid: 420 mL  Total IN: 420 mL    OUT:    Voided (mL): 1250 mL  Total OUT: 1250 mL    Total NET: -830 mL               Discussed with:     Cultures:	        Radiology

## 2025-06-01 NOTE — PROGRESS NOTE ADULT - ASSESSMENT
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 Patient recently admitted to [facility] for new-onset Afib and acute hypoxic respiratory failure 2/2 COPD and worsened diaphragmatic hernia.sp diaphragmatic hernia repair. aw sob and cough    CXR-Moderate left and small right-sided pleural effusions.      #acute on chronic  hypoxic respiratory failure   likey from acute chf w pleural effusions   probnp- high, i/o, wts, TTE  cards cs noted  ct chest- ?consolidations and loculated effusions  procal minimally elev  pulm cs noted  ID cs fu  ?empiric abx    #COPD on home o2  resume home inhalers    #DVT/PE, hx factor v leiden deffi  hx of afib  coumadin, monitor inr    #Parkinsons dz- resume home meds    [facility]   [phone]

## 2025-06-01 NOTE — PHYSICAL THERAPY INITIAL EVALUATION ADULT - ADDITIONAL COMMENTS
Pt lives in a house w/ stairs/rail.  Pt is primarily a household ambulator.  At this time, pt has 24/7 HHA

## 2025-06-01 NOTE — SOCIAL WORK PROGRESS NOTE - NSSWPROGRESSNOTE_GEN_ALL_CORE
SW received consult for SBIRT, however patient has dementia and lives at home with his aide.  Not appropriate at this time.  Arabella to follow.

## 2025-06-01 NOTE — PROGRESS NOTE ADULT - ASSESSMENT
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 Patient recently admitted to [facility] for new-onset Afib and acute hypoxic respiratory failure 2/2 COPD and worsened diaphragmatic hernia. He was treated and discharged home with outpatient Cardiology follow-up with Dr. Mccauley in preparation for diaphragmatic hernia repair. Patient tolerated the procedure well, however developed worsening leg swelling afterwards    Dyspnea - Cough -   copd  diaphragmatic hernia  Atelectasis  PE hx  OP  OA  Ataxic gait  Parkinson's disease  HTN  GERD    vs noted  o2 support  ct chest reviewed - atelectasis - small to mod loculated left eff  procal and crp noted  consider ID eval and emp ABX    I zion  nebs PRN  HOB elev  asp prec  oral hygiene  skin care  cvs rx regimen  BP control and HD monitoring  I and O  replete lytes  assist with needs  PPI  OLD - recent admission record reviewed

## 2025-06-01 NOTE — PROGRESS NOTE ADULT - SUBJECTIVE AND OBJECTIVE BOX
Patient is a 79y old  Male who presents with a chief complaint of sob, cough (01 Jun 2025 11:14)      INTERVAL HPI/OVERNIGHT EVENTS: noted  pt seen and examined this am   events noted  feels well      Vital Signs Last 24 Hrs  T(C): 36.5 (01 Jun 2025 12:20), Max: 37.2 (31 May 2025 21:41)  T(F): 97.7 (01 Jun 2025 12:20), Max: 98.9 (31 May 2025 21:41)  HR: 115 (01 Jun 2025 12:20) (100 - 118)  BP: 118/81 (01 Jun 2025 12:20) (103/67 - 165/83)  BP(mean): --  RR: 20 (01 Jun 2025 12:20) (18 - 20)  SpO2: 96% (01 Jun 2025 12:20) (95% - 99%)    Parameters below as of 01 Jun 2025 12:20  Patient On (Oxygen Delivery Method): nasal cannula  O2 Flow (L/min): 2      acetaminophen     Tablet .. 650 milliGRAM(s) Oral every 6 hours PRN  albuterol    0.083% 2.5 milliGRAM(s) Nebulizer every 2 hours PRN  albuterol/ipratropium for Nebulization 3 milliLiter(s) Nebulizer once  benzonatate 100 milliGRAM(s) Oral three times a day PRN  carbidopa/levodopa  25/100 1 Tablet(s) Oral <User Schedule>  folic acid 1 milliGRAM(s) Oral daily  furosemide   Injectable 40 milliGRAM(s) IV Push every 12 hours  rOPINIRole 2 milliGRAM(s) Oral <User Schedule>  trihexyphenidyl 1 milliGRAM(s) Oral three times a day  warfarin 2.5 milliGRAM(s) Oral once      PHYSICAL EXAM:  GENERAL: NAD,   EYES: conjunctiva and sclera clear  ENMT: Moist mucous membranes  NECK: Supple, No JVD, Normal thyroid  CHEST/LUNG: non labored, cta b/l  HEART: Regular rate and rhythm; No murmurs, rubs, or gallops  ABDOMEN: Soft, Nontender, Nondistended; Bowel sounds present  EXTREMITIES:  2+ Peripheral Pulses, No clubbing, cyanosis, or edema  LYMPH: No lymphadenopathy noted  SKIN: No rashes or lesions    Consultant(s) Notes Reviewed:  [x ] YES  [ ] NO  Care Discussed with Consultants/Other Providers [ x] YES  [ ] NO    LABS:                        12.1   9.74  )-----------( 301      ( 01 Jun 2025 06:25 )             36.5     06-01    139  |  98  |  11  ----------------------------<  121[H]  3.4[L]   |  31  |  0.93    Ca    9.0      01 Jun 2025 06:25    TPro  6.6  /  Alb  2.8[L]  /  TBili  0.7  /  DBili  x   /  AST  16  /  ALT  6[L]  /  AlkPhos  102  05-31    PT/INR - ( 01 Jun 2025 06:25 )   PT: 27.1 sec;   INR: 2.32 ratio         PTT - ( 31 May 2025 13:30 )  PTT:38.2 sec  Urinalysis Basic - ( 01 Jun 2025 06:25 )    Color: x / Appearance: x / SG: x / pH: x  Gluc: 121 mg/dL / Ketone: x  / Bili: x / Urobili: x   Blood: x / Protein: x / Nitrite: x   Leuk Esterase: x / RBC: x / WBC x   Sq Epi: x / Non Sq Epi: x / Bacteria: x      CAPILLARY BLOOD GLUCOSE            Urinalysis Basic - ( 01 Jun 2025 06:25 )    Color: x / Appearance: x / SG: x / pH: x  Gluc: 121 mg/dL / Ketone: x  / Bili: x / Urobili: x   Blood: x / Protein: x / Nitrite: x   Leuk Esterase: x / RBC: x / WBC x   Sq Epi: x / Non Sq Epi: x / Bacteria: x          RADIOLOGY & ADDITIONAL TESTS:    Imaging Personally Reviewed:  [x ] YES  [ ] NO

## 2025-06-01 NOTE — PROGRESS NOTE ADULT - SUBJECTIVE AND OBJECTIVE BOX
[facility]    Sid White, Garrick, Mohsen, Della, Mathieu      [phone]    CHIEF COMPLAINT: Patient is a 79y old  Male who presents with a chief complaint of sob, cough (01 Jun 2025 06:33)      Follow Up: af hypoxia    Interim history: The patient reports no new symptoms.  Denies chest discomfort.  Improved shortness of breath.  No abdominal pain.  No new neurologic symptoms.      MEDICATIONS  (STANDING):  albuterol/ipratropium for Nebulization 3 milliLiter(s) Nebulizer once  carbidopa/levodopa  25/100 1 Tablet(s) Oral <User Schedule>  folic acid 1 milliGRAM(s) Oral daily  furosemide   Injectable 40 milliGRAM(s) IV Push every 12 hours  rOPINIRole 2 milliGRAM(s) Oral <User Schedule>  trihexyphenidyl 1 milliGRAM(s) Oral three times a day  warfarin 2.5 milliGRAM(s) Oral once    MEDICATIONS  (PRN):  acetaminophen     Tablet .. 650 milliGRAM(s) Oral every 6 hours PRN Temp greater or equal to 38.5C (101.3F), Moderate Pain (4 - 6)  albuterol    0.083% 2.5 milliGRAM(s) Nebulizer every 2 hours PRN Shortness of Breath and/or Wheezing  benzonatate 100 milliGRAM(s) Oral three times a day PRN Cough      REVIEW OF SYSTEMS:  eye, ent, GI, , allergic, dermatologic, musculoskeletal and neurologic are negative except as described above    Vital Signs Last 24 Hrs  T(C): 36.8 (01 Jun 2025 04:50), Max: 37.2 (31 May 2025 21:41)  T(F): 98.3 (01 Jun 2025 04:50), Max: 98.9 (31 May 2025 21:41)  HR: 118 (01 Jun 2025 10:53) (100 - 118)  BP: 165/83 (01 Jun 2025 10:53) (103/67 - 165/83)  BP(mean): --  RR: 20 (01 Jun 2025 04:50) (18 - 23)  SpO2: 95% (01 Jun 2025 10:53) (95% - 99%)    Parameters below as of 01 Jun 2025 10:53  Patient On (Oxygen Delivery Method): nasal cannula        I&O's Summary    31 May 2025 07:01  -  01 Jun 2025 07:00  --------------------------------------------------------  IN: 420 mL / OUT: 1250 mL / NET: -830 mL        Telemetry past 24h: af 110s    PHYSICAL EXAM:    Constitutional: well-nourished, well-developed, NAD   HEENT:  MMM, sclerae anicteric, conjunctivae clear, no oral cyanosis.  Pulmonary: Non-labored, breath sounds are decreased with wheezing bilaterally   Cardiovascular: irregular, S1 and S2.  No murmur.  No rubs, gallops or clicks  Gastrointestinal: Bowel Sounds present, soft, nontender.   Lymph: 2+ peripheral edema.   Neurological: Alert, no focal deficits  Skin: No rashes.  Psych:  Mood & affect appropriate    LABS: All Labs Reviewed:                        12.1   9.74  )-----------( 301      ( 01 Jun 2025 06:25 )             36.5                         11.1   9.44  )-----------( 288      ( 31 May 2025 13:30 )             33.2     01 Jun 2025 06:25    139    |  98     |  11     ----------------------------<  121    3.4     |  31     |  0.93   31 May 2025 13:30    136    |  100    |  13     ----------------------------<  104    3.9     |  30     |  0.93     Ca    9.0        01 Jun 2025 06:25  Ca    9.1        31 May 2025 13:30    TPro  6.6    /  Alb  2.8    /  TBili  0.7    /  DBili  x      /  AST  16     /  ALT  6      /  AlkPhos  102    31 May 2025 13:30    PT/INR - ( 01 Jun 2025 06:25 )   PT: 27.1 sec;   INR: 2.32 ratio         PTT - ( 31 May 2025 13:30 )  PTT:38.2 sec      Blood Culture:        RADIOLOGY:    EKG:    Echo:

## 2025-06-02 LAB
ANION GAP SERPL CALC-SCNC: 9 MMOL/L — SIGNIFICANT CHANGE UP (ref 5–17)
BUN SERPL-MCNC: 12 MG/DL — SIGNIFICANT CHANGE UP (ref 7–23)
CALCIUM SERPL-MCNC: 8.6 MG/DL — SIGNIFICANT CHANGE UP (ref 8.5–10.1)
CHLORIDE SERPL-SCNC: 97 MMOL/L — SIGNIFICANT CHANGE UP (ref 96–108)
CO2 SERPL-SCNC: 33 MMOL/L — HIGH (ref 22–31)
CREAT SERPL-MCNC: 0.88 MG/DL — SIGNIFICANT CHANGE UP (ref 0.5–1.3)
EGFR: 87 ML/MIN/1.73M2 — SIGNIFICANT CHANGE UP
EGFR: 87 ML/MIN/1.73M2 — SIGNIFICANT CHANGE UP
GLUCOSE SERPL-MCNC: 125 MG/DL — HIGH (ref 70–99)
HCT VFR BLD CALC: 35.3 % — LOW (ref 39–50)
HGB BLD-MCNC: 11.7 G/DL — LOW (ref 13–17)
INR BLD: 2.89 RATIO — HIGH (ref 0.85–1.16)
MAGNESIUM SERPL-MCNC: 1.8 MG/DL — SIGNIFICANT CHANGE UP (ref 1.6–2.6)
MCHC RBC-ENTMCNC: 31.8 PG — SIGNIFICANT CHANGE UP (ref 27–34)
MCHC RBC-ENTMCNC: 33.1 G/DL — SIGNIFICANT CHANGE UP (ref 32–36)
MCV RBC AUTO: 95.9 FL — SIGNIFICANT CHANGE UP (ref 80–100)
MRSA PCR RESULT.: SIGNIFICANT CHANGE UP
NRBC BLD AUTO-RTO: 0 /100 WBCS — SIGNIFICANT CHANGE UP (ref 0–0)
PHOSPHATE SERPL-MCNC: 2.9 MG/DL — SIGNIFICANT CHANGE UP (ref 2.5–4.5)
PLATELET # BLD AUTO: 309 K/UL — SIGNIFICANT CHANGE UP (ref 150–400)
POTASSIUM SERPL-MCNC: 3 MMOL/L — LOW (ref 3.5–5.3)
POTASSIUM SERPL-SCNC: 3 MMOL/L — LOW (ref 3.5–5.3)
PROTHROM AB SERPL-ACNC: 33.5 SEC — HIGH (ref 9.9–13.4)
RAPID RVP RESULT: SIGNIFICANT CHANGE UP
RBC # BLD: 3.68 M/UL — LOW (ref 4.2–5.8)
RBC # FLD: 13.2 % — SIGNIFICANT CHANGE UP (ref 10.3–14.5)
S AUREUS DNA NOSE QL NAA+PROBE: SIGNIFICANT CHANGE UP
SARS-COV-2 RNA SPEC QL NAA+PROBE: SIGNIFICANT CHANGE UP
SODIUM SERPL-SCNC: 139 MMOL/L — SIGNIFICANT CHANGE UP (ref 135–145)
WBC # BLD: 10.38 K/UL — SIGNIFICANT CHANGE UP (ref 3.8–10.5)
WBC # FLD AUTO: 10.38 K/UL — SIGNIFICANT CHANGE UP (ref 3.8–10.5)

## 2025-06-02 PROCEDURE — 99233 SBSQ HOSP IP/OBS HIGH 50: CPT

## 2025-06-02 PROCEDURE — 93010 ELECTROCARDIOGRAM REPORT: CPT

## 2025-06-02 RX ORDER — METOPROLOL SUCCINATE 50 MG/1
25 TABLET, EXTENDED RELEASE ORAL EVERY 8 HOURS
Refills: 0 | Status: DISCONTINUED | OUTPATIENT
Start: 2025-06-02 | End: 2025-06-09

## 2025-06-02 RX ORDER — FUROSEMIDE 10 MG/ML
40 INJECTION INTRAMUSCULAR; INTRAVENOUS ONCE
Refills: 0 | Status: COMPLETED | OUTPATIENT
Start: 2025-06-02 | End: 2025-06-02

## 2025-06-02 RX ORDER — CEFTRIAXONE 500 MG/1
1000 INJECTION, POWDER, FOR SOLUTION INTRAMUSCULAR; INTRAVENOUS EVERY 24 HOURS
Refills: 0 | Status: DISCONTINUED | OUTPATIENT
Start: 2025-06-02 | End: 2025-06-04

## 2025-06-02 RX ORDER — METOPROLOL SUCCINATE 50 MG/1
5 TABLET, EXTENDED RELEASE ORAL ONCE
Refills: 0 | Status: COMPLETED | OUTPATIENT
Start: 2025-06-02 | End: 2025-06-02

## 2025-06-02 RX ADMIN — METOPROLOL SUCCINATE 25 MILLIGRAM(S): 50 TABLET, EXTENDED RELEASE ORAL at 14:50

## 2025-06-02 RX ADMIN — METOPROLOL SUCCINATE 25 MILLIGRAM(S): 50 TABLET, EXTENDED RELEASE ORAL at 21:17

## 2025-06-02 RX ADMIN — CEFTRIAXONE 100 MILLIGRAM(S): 500 INJECTION, POWDER, FOR SOLUTION INTRAMUSCULAR; INTRAVENOUS at 10:35

## 2025-06-02 RX ADMIN — FOLIC ACID 1 MILLIGRAM(S): 1 TABLET ORAL at 11:01

## 2025-06-02 RX ADMIN — Medication 1 TABLET(S): at 12:30

## 2025-06-02 RX ADMIN — FUROSEMIDE 40 MILLIGRAM(S): 10 INJECTION INTRAMUSCULAR; INTRAVENOUS at 17:05

## 2025-06-02 RX ADMIN — METOPROLOL SUCCINATE 25 MILLIGRAM(S): 50 TABLET, EXTENDED RELEASE ORAL at 11:02

## 2025-06-02 RX ADMIN — Medication 1 TABLET(S): at 21:18

## 2025-06-02 RX ADMIN — Medication 1 TABLET(S): at 17:05

## 2025-06-02 RX ADMIN — METOPROLOL SUCCINATE 5 MILLIGRAM(S): 50 TABLET, EXTENDED RELEASE ORAL at 10:35

## 2025-06-02 RX ADMIN — Medication 2.5 MILLIGRAM(S): at 06:01

## 2025-06-02 RX ADMIN — Medication 40 MILLIEQUIVALENT(S): at 14:49

## 2025-06-02 RX ADMIN — TRIHEXYPHENIDYL HYDROCHLORIDE 1 MILLIGRAM(S): 2 TABLET ORAL at 21:18

## 2025-06-02 RX ADMIN — ROPINIROLE 2 MILLIGRAM(S): 5 TABLET, FILM COATED ORAL at 07:36

## 2025-06-02 RX ADMIN — Medication 100 MILLIGRAM(S): at 06:01

## 2025-06-02 RX ADMIN — TRIHEXYPHENIDYL HYDROCHLORIDE 1 MILLIGRAM(S): 2 TABLET ORAL at 05:58

## 2025-06-02 RX ADMIN — ROPINIROLE 2 MILLIGRAM(S): 5 TABLET, FILM COATED ORAL at 17:05

## 2025-06-02 RX ADMIN — Medication 100 MILLIGRAM(S): at 21:24

## 2025-06-02 RX ADMIN — TRIHEXYPHENIDYL HYDROCHLORIDE 1 MILLIGRAM(S): 2 TABLET ORAL at 14:50

## 2025-06-02 RX ADMIN — Medication 1 TABLET(S): at 07:36

## 2025-06-02 RX ADMIN — FUROSEMIDE 40 MILLIGRAM(S): 10 INJECTION INTRAMUSCULAR; INTRAVENOUS at 05:57

## 2025-06-02 RX ADMIN — FUROSEMIDE 40 MILLIGRAM(S): 10 INJECTION INTRAMUSCULAR; INTRAVENOUS at 10:35

## 2025-06-02 RX ADMIN — ROPINIROLE 2 MILLIGRAM(S): 5 TABLET, FILM COATED ORAL at 12:30

## 2025-06-02 RX ADMIN — ROPINIROLE 2 MILLIGRAM(S): 5 TABLET, FILM COATED ORAL at 21:18

## 2025-06-02 RX ADMIN — Medication 40 MILLIEQUIVALENT(S): at 10:36

## 2025-06-02 RX ADMIN — Medication 40 MILLIEQUIVALENT(S): at 17:05

## 2025-06-02 NOTE — CARE COORDINATION ASSESSMENT. - REASON FOR CONSULT
Met with patient and his wife/Dayna at bedside in order to conduct assessment and to discuss SW roles with good understanding./coordination of care/discharge planning

## 2025-06-02 NOTE — CHART NOTE - NSCHARTNOTEFT_GEN_A_CORE
Rapid response was called at 10:08 for atrial fibrillation HR 170s    Events leading up to Rapid Response:  Patient has afib typically rate controlled, off of dilt due to edema and holding bisoprolol because of dyspnea, not on afib meds at this time      Patient was seen and examined at the bedside by the rapid response team. ICU PA and residents at bedside    Rapid Response Vital Signs:    BP:  HR:  RR:  SpO2: % on  Temp:  FS:      Physical Exam:  Gen: well appearing, NAD  HEENT: NCAT, PEERLA b/l, EOMI b/l  Cardio: regular rate and rhythm, +s1s2, no murmurs, rubs, or gallops  Pulm: CTA b/l, no wheezes, rales or rhonchi  Abdomen: soft, nontender, nondistended, +BS x4 quadrants, no guarding  Extremities: no cyanosis or edema, +2 pedal pulses  Neuro: AAOx3, CNII-XII intact, 5/5 strength all extremities, sensation intact  Skin: warm and dry      Assessment/Plan:   _____ year old _____. Rapid response called for _______.    - ______ likely 2/2 ______    -Discussed with  _______, agrees with above plan  -Family updated by ____  -RN to call if any changes Rapid response was called at 10:08 for atrial fibrillation HR 170s    Events leading up to Rapid Response:  Patient has afib typically rate controlled, off of dilt due to edema and holding bisoprolol because of dyspnea, not on afib meds at this time      Patient was seen and examined at the bedside by the rapid response team. 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 rapid response called for afib 170s    - STAT EKG ordered, f/u  - IV Lopressor 5mg 1x given  - Potassium repleted 40 mEq 3x  - Mag and phos STAT ordered, f/u results  BP after lopressor 116/56  -Giving lasix 1x IV  - Likely due to not being on rate control meds    -Discussed with , agrees with above plan  -Family updated by resident, patient wife at bedside  -RN to call if any changes

## 2025-06-02 NOTE — DIETITIAN INITIAL EVALUATION ADULT - PERTINENT MEDS FT
MEDICATIONS  (STANDING):  albuterol/ipratropium for Nebulization 3 milliLiter(s) Nebulizer once  carbidopa/levodopa  25/100 1 Tablet(s) Oral <User Schedule>  cefTRIAXone   IVPB 1000 milliGRAM(s) IV Intermittent every 24 hours  folic acid 1 milliGRAM(s) Oral daily  furosemide   Injectable 40 milliGRAM(s) IV Push every 12 hours  metoprolol tartrate 25 milliGRAM(s) Oral every 8 hours  potassium chloride    Tablet ER 40 milliEquivalent(s) Oral every 4 hours  rOPINIRole 2 milliGRAM(s) Oral <User Schedule>  trihexyphenidyl 1 milliGRAM(s) Oral three times a day    MEDICATIONS  (PRN):  acetaminophen     Tablet .. 650 milliGRAM(s) Oral every 6 hours PRN Temp greater or equal to 38.5C (101.3F), Moderate Pain (4 - 6)  albuterol    0.083% 2.5 milliGRAM(s) Nebulizer every 2 hours PRN Shortness of Breath and/or Wheezing  benzonatate 100 milliGRAM(s) Oral three times a day PRN Cough

## 2025-06-02 NOTE — DIETITIAN INITIAL EVALUATION ADULT - ORAL INTAKE PTA/DIET HISTORY
Pt with good appetite/intake PTA. Typically consumes 3 meals/day and snacks in between meals. Spouse does grocery shopping and meal preparation. Tries to follow a JASON, NCS diet at home.

## 2025-06-02 NOTE — DIETITIAN INITIAL EVALUATION ADULT - ADD RECOMMEND
1) Recommend consistent carbohydrate, low Na diet; honor pt's food preferences as able to optimize po intake/tolerance  2) Electrolyte replacement prn  3) Recommend MVI/minerals daily  4) Monitor po intake, diet tolerance, weight trends, labs, GI function, skin integrity

## 2025-06-02 NOTE — PROGRESS NOTE ADULT - SUBJECTIVE AND OBJECTIVE BOX
[facility] -- Sid White Pannella, Patel, Savella, Goodger, Cohen: Office # [phone]    Follow Up: A fib, Hypoxia       Subjective/Observations: Patient awake, alert, resting in bed. Confused at times. Denies chest pain, palpitations and dizziness. Denies any difficulty breathing. No orthopnea and PND. Tolerating O2 via nasal cannula.     REVIEW OF SYSTEMS: All other review of systems are negative unless indicated above    PAST MEDICAL & SURGICAL HISTORY:  Personal history of PE (pulmonary embolism)      COPD (chronic obstructive pulmonary disease)      Hypertension      Hypertension      Restless leg syndrome      Factor V Leiden      Parkinsons      Spinal stenosis      Memory loss      Right inguinal hernia      AL (obstructive sleep apnea)      Diabetes      Herniated cervical disc      H/O hernia repair      MEDICATIONS  (STANDING):  albuterol/ipratropium for Nebulization 3 milliLiter(s) Nebulizer once  carbidopa/levodopa  25/100 1 Tablet(s) Oral <User Schedule>  folic acid 1 milliGRAM(s) Oral daily  furosemide   Injectable 40 milliGRAM(s) IV Push every 12 hours  rOPINIRole 2 milliGRAM(s) Oral <User Schedule>  trihexyphenidyl 1 milliGRAM(s) Oral three times a day    MEDICATIONS  (PRN):  acetaminophen     Tablet .. 650 milliGRAM(s) Oral every 6 hours PRN Temp greater or equal to 38.5C (101.3F), Moderate Pain (4 - 6)  albuterol    0.083% 2.5 milliGRAM(s) Nebulizer every 2 hours PRN Shortness of Breath and/or Wheezing  benzonatate 100 milliGRAM(s) Oral three times a day PRN Cough    Allergies    Levaquin (Anaphylaxis)  garlic (Other (Mild))    Intolerances      Vital Signs Last 24 Hrs  T(C): 36.9 (02 Jun 2025 04:55), Max: 36.9 (02 Jun 2025 04:55)  T(F): 98.4 (02 Jun 2025 04:55), Max: 98.4 (02 Jun 2025 04:55)  HR: 120 (02 Jun 2025 04:55) (106 - 120)  BP: 132/77 (02 Jun 2025 04:55) (118/81 - 165/83)  BP(mean): --  RR: 19 (02 Jun 2025 04:55) (18 - 20)  SpO2: 91% (02 Jun 2025 04:55) (91% - 96%)    Parameters below as of 02 Jun 2025 04:55  Patient On (Oxygen Delivery Method): nasal cannula      I&O's Summary        TELE:   PHYSICAL EXAM:  Constitutional: NAD, awake and alert  HEENT: Moist Mucous Membranes, Anicteric  Pulmonary: Non-labored, breath sounds are clear bilaterally, No wheezing, rales or rhonchi  Cardiovascular: Regular, S1 and S2, No murmurs, No rubs, gallops or clicks  Gastrointestinal:  soft, nontender, nondistended   Lymph: No peripheral edema. No lymphadenopathy.   Skin: No visible rashes or ulcers.  Psych:  Mood & affect appropriate      LABS: All Labs Reviewed:                        11.7   10.38 )-----------( 309      ( 02 Jun 2025 07:21 )             35.3                         12.1   9.74  )-----------( 301      ( 01 Jun 2025 06:25 )             36.5                         11.1   9.44  )-----------( 288      ( 31 May 2025 13:30 )             33.2     01 Jun 2025 06:25    139    |  98     |  11     ----------------------------<  121    3.4     |  31     |  0.93   31 May 2025 13:30    136    |  100    |  13     ----------------------------<  104    3.9     |  30     |  0.93     Ca    9.0        01 Jun 2025 06:25  Ca    9.1        31 May 2025 13:30    TPro  6.6    /  Alb  2.8    /  TBili  0.7    /  DBili  x      /  AST  16     /  ALT  6      /  AlkPhos  102    31 May 2025 13:30   LIVER FUNCTIONS - ( 31 May 2025 13:30 )  Alb: 2.8 g/dL / Pro: 6.6 g/dL / ALK PHOS: 102 U/L / ALT: 6 U/L / AST: 16 U/L / GGT: x           PT/INR - ( 01 Jun 2025 06:25 )   PT: 27.1 sec;   INR: 2.32 ratio         PTT - ( 31 May 2025 13:30 )  PTT:38.2 secTroponin I, High Sensitivity Result: 12.0 ng/L (05-31-25 @ 13:30)    12 Lead ECG:   Ventricular Rate 109 BPM    QRS Duration 84 ms    Q-T Interval 322 ms    QTC Calculation(Bazett) 433 ms    R Axis 32 degrees    T Axis 77 degrees    Diagnosis Line Atrial fibrillation with rapid ventricular response  Abnormal ECG  When compared with ECG of 03-MAR-2025 10:00,  Vent. rate has increased BY  39 BPM  Criteria for Septal infarct are no longer present  ST no longer depressed in Inferior leads  Confirmed by Tima Lau MD ([id number]) on 5/31/2025 4:01:43 PM (05-31-25 @ 13:12)      TRANSTHORACIC ECHOCARDIOGRAM REPORT  ________________________________________________________________________________                                      _______       Pt. Name:       ROBERT F BOECKLE Study Date:    3/3/2025  MRN:            [id number]         YOB: 1945  Accession #:    [id number]        Age:           79 years  Account#:       [id number]       Gender:        M  Heart Rate:                      Height:        ( )  Rhythm:                          Weight:        ( )  Blood Pressure: 133/65 mmHg      BSA/BMI:       /  ________________________________________________________________________________________  Referring Physician:    [id number] Asya Muller  Interpreting Physician: Chris Heaton  Primary Sonographer:    Andres Mejias    CPT:               ECHO TTE WO CON COMP W DOPP - 96058.m  Indication(s):     Palpitations - R00.2  Procedure:         Transthoracic echocardiogram with 2-D, M-mode and complete                     spectral and color flow Doppler.  Ordering Location: [facility]  Admission Status:  Inpatient  Study Information: Image quality for this study is non-diagnostic.    _______________________________________________________________________________________     CONCLUSIONS:      1. Left ventricular endocardium is not well visualized; however, the left ventricular systolic function appears grossly normal.   2. Non-diagnostic image quality.    ________________________________________________________________________________________  FINDINGS:     Left Ventricle:  The left ventricle was not well visualized. Left ventricular endocardium is not well visualized; however, the left ventricular systolic function appears grossly normal.     Right Ventricle:  The right ventricle is not well visualized.     Left Atrium:  The left atrium was not well visualized, and the LA volume could not be calculated.     Right Atrium:  The right atrium was not well visualized.     Interatrial Septum:  The interatrial septum was not well visualized.     Aortic Valve:  The aortic valve was not well visualized.     Mitral Valve:  The mitral valve was not well visualized. There is calcification of the mitral valve annulus.     Tricuspid Valve:  The tricuspid valve was not well visualized.     Pulmonic Valve:  The pulmonic valve was not well visualized.     Systemic Veins:  The inferior vena cava is normal in size (normal <2.1cm) with normal inspiratory collapse (normal >50%) consistent with normal right atrial pressure (~3, range 0-5mmHg).  ____________________________________________________________________  QUANTITATIVE DATA:              Tricuspid Valve Measurements:     RA Pressure: 3 mmHg    ________________________________________________________________________________________  Electronically signed on 3/4/2025 at 9:22:27 AM by Chris Heaton         *** Final ***   [facility] -- Sid White, Mohsen Mccauley Savella, Goodger, Cohen: Office # [phone]    Follow Up: A fib, Hypoxia       Subjective/Observations: Patient awake, alert, resting in bed. OOB to chair. Confused at times. Family at bedside. Reports his lungs sound bad. Denies chest pain, palpitations and dizziness. No orthopnea and PND. Tolerating O2 via nasal cannula.     REVIEW OF SYSTEMS: All other review of systems are negative unless indicated above    PAST MEDICAL & SURGICAL HISTORY:  Personal history of PE (pulmonary embolism)      COPD (chronic obstructive pulmonary disease)      Hypertension      Hypertension      Restless leg syndrome      Factor V Leiden      Parkinsons      Spinal stenosis      Memory loss      Right inguinal hernia      AL (obstructive sleep apnea)      Diabetes      Herniated cervical disc      H/O hernia repair      MEDICATIONS  (STANDING):  albuterol/ipratropium for Nebulization 3 milliLiter(s) Nebulizer once  carbidopa/levodopa  25/100 1 Tablet(s) Oral <User Schedule>  folic acid 1 milliGRAM(s) Oral daily  furosemide   Injectable 40 milliGRAM(s) IV Push every 12 hours  rOPINIRole 2 milliGRAM(s) Oral <User Schedule>  trihexyphenidyl 1 milliGRAM(s) Oral three times a day    MEDICATIONS  (PRN):  acetaminophen     Tablet .. 650 milliGRAM(s) Oral every 6 hours PRN Temp greater or equal to 38.5C (101.3F), Moderate Pain (4 - 6)  albuterol    0.083% 2.5 milliGRAM(s) Nebulizer every 2 hours PRN Shortness of Breath and/or Wheezing  benzonatate 100 milliGRAM(s) Oral three times a day PRN Cough    Allergies    Levaquin (Anaphylaxis)  garlic (Other (Mild))    Intolerances      Vital Signs Last 24 Hrs  T(C): 36.9 (02 Jun 2025 04:55), Max: 36.9 (02 Jun 2025 04:55)  T(F): 98.4 (02 Jun 2025 04:55), Max: 98.4 (02 Jun 2025 04:55)  HR: 120 (02 Jun 2025 04:55) (106 - 120)  BP: 132/77 (02 Jun 2025 04:55) (118/81 - 165/83)  BP(mean): --  RR: 19 (02 Jun 2025 04:55) (18 - 20)  SpO2: 91% (02 Jun 2025 04:55) (91% - 96%)    Parameters below as of 02 Jun 2025 04:55  Patient On (Oxygen Delivery Method): nasal cannula      I&O's Summary        TELE: A fib 110-140s   PHYSICAL EXAM:  Constitutional: NAD, awake and alert  HEENT: Moist Mucous Membranes, Anicteric  Pulmonary: Non-labored, breath sounds are clear bilaterally, No wheezing, rales + rhonchi  Cardiovascular: Regular, S1 and S2, No murmurs, No rubs, gallops or clicks  Gastrointestinal:  soft, nontender, nondistended   Lymph: +1 peripheral edema. No lymphadenopathy.   Skin: No visible rashes or ulcers.  Psych:  Mood & affect appropriate      LABS: All Labs Reviewed:                        11.7   10.38 )-----------( 309      ( 02 Jun 2025 07:21 )             35.3                         12.1   9.74  )-----------( 301      ( 01 Jun 2025 06:25 )             36.5                         11.1   9.44  )-----------( 288      ( 31 May 2025 13:30 )             33.2     01 Jun 2025 06:25    139    |  98     |  11     ----------------------------<  121    3.4     |  31     |  0.93   31 May 2025 13:30    136    |  100    |  13     ----------------------------<  104    3.9     |  30     |  0.93     Ca    9.0        01 Jun 2025 06:25  Ca    9.1        31 May 2025 13:30    TPro  6.6    /  Alb  2.8    /  TBili  0.7    /  DBili  x      /  AST  16     /  ALT  6      /  AlkPhos  102    31 May 2025 13:30   LIVER FUNCTIONS - ( 31 May 2025 13:30 )  Alb: 2.8 g/dL / Pro: 6.6 g/dL / ALK PHOS: 102 U/L / ALT: 6 U/L / AST: 16 U/L / GGT: x           PT/INR - ( 01 Jun 2025 06:25 )   PT: 27.1 sec;   INR: 2.32 ratio         PTT - ( 31 May 2025 13:30 )  PTT:38.2 secTroponin I, High Sensitivity Result: 12.0 ng/L (05-31-25 @ 13:30)    12 Lead ECG:   Ventricular Rate 109 BPM    QRS Duration 84 ms    Q-T Interval 322 ms    QTC Calculation(Bazett) 433 ms    R Axis 32 degrees    T Axis 77 degrees    Diagnosis Line Atrial fibrillation with rapid ventricular response  Abnormal ECG  When compared with ECG of 03-MAR-2025 10:00,  Vent. rate has increased BY  39 BPM  Criteria for Septal infarct are no longer present  ST no longer depressed in Inferior leads  Confirmed by Tima Lau MD ([id number]) on 5/31/2025 4:01:43 PM (05-31-25 @ 13:12)      TRANSTHORACIC ECHOCARDIOGRAM REPORT  ________________________________________________________________________________                                      _______       Pt. Name:       ROBERT F BOECKLE Study Date:    3/3/2025  MRN:            [id number]         YOB: 1945  Accession #:    [id number]        Age:           79 years  Account#:       [id number]       Gender:        M  Heart Rate:                      Height:        ( )  Rhythm:                          Weight:        ( )  Blood Pressure: 133/65 mmHg      BSA/BMI:       /  ________________________________________________________________________________________  Referring Physician:    [id number] Asya Muller  Interpreting Physician: Chris Heaton  Primary Sonographer:    Andres Mejias    CPT:               ECHO TTE WO CON COMP W DOPP - 58402.m  Indication(s):     Palpitations - R00.2  Procedure:         Transthoracic echocardiogram with 2-D, M-mode and complete                     spectral and color flow Doppler.  Ordering Location: [facility]  Admission Status:  Inpatient  Study Information: Image quality for this study is non-diagnostic.    _______________________________________________________________________________________     CONCLUSIONS:      1. Left ventricular endocardium is not well visualized; however, the left ventricular systolic function appears grossly normal.   2. Non-diagnostic image quality.    ________________________________________________________________________________________  FINDINGS:     Left Ventricle:  The left ventricle was not well visualized. Left ventricular endocardium is not well visualized; however, the left ventricular systolic function appears grossly normal.     Right Ventricle:  The right ventricle is not well visualized.     Left Atrium:  The left atrium was not well visualized, and the LA volume could not be calculated.     Right Atrium:  The right atrium was not well visualized.     Interatrial Septum:  The interatrial septum was not well visualized.     Aortic Valve:  The aortic valve was not well visualized.     Mitral Valve:  The mitral valve was not well visualized. There is calcification of the mitral valve annulus.     Tricuspid Valve:  The tricuspid valve was not well visualized.     Pulmonic Valve:  The pulmonic valve was not well visualized.     Systemic Veins:  The inferior vena cava is normal in size (normal <2.1cm) with normal inspiratory collapse (normal >50%) consistent with normal right atrial pressure (~3, range 0-5mmHg).  ____________________________________________________________________  QUANTITATIVE DATA:              Tricuspid Valve Measurements:     RA Pressure: 3 mmHg    ________________________________________________________________________________________  Electronically signed on 3/4/2025 at 9:22:27 AM by Chris Heaton         *** Final ***

## 2025-06-02 NOTE — PROGRESS NOTE ADULT - ASSESSMENT
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 Patient recently admitted to [facility] for new-onset Afib and acute hypoxic respiratory failure 2/2 COPD and worsened diaphragmatic hernia. He was treated and discharged home with outpatient Cardiology follow-up with Dr. Mccauley in preparation for diaphragmatic hernia repair. Patient tolerated the procedure well, however developed worsening leg swelling afterwards    Dyspnea - Cough -   copd  diaphragmatic hernia  Atelectasis  PE hx  OP  OA  Ataxic gait  Parkinson's disease  HTN  GERD    vs noted, tachy,   o2 support  ct chest reviewed - atelectasis - small to mod loculated left eff - no intervention at present  procal and crp noted  consider ID eval and emp ABX  on lasix    I zion  nebs PRN  HOB elev  asp prec  oral hygiene  skin care  cvs rx regimen  BP control and HD monitoring  I and O  replete lytes  assist with needs  PPI  OLD - recent admission record reviewed

## 2025-06-02 NOTE — CONSULT NOTE ADULT - SUBJECTIVE AND OBJECTIVE BOX
[facility]  Tima Camacho MD PhD, Yu Loya MD, Shayna Lares MD, Surendra Connolly MD, Pedro Bedoya MD  and providing coverage with Wm Machado MD  Providing Infectious Disease Consultations at [facility], [facility]    Office# [phone] to schedule follow up appointments  Answering Service for urgent calls or New Consults [phone]  Cell# to text for urgent issues Tima Camacho [phone]     infectious diseases consult note:    ROBERT BOECKLE is a 79y y. o. 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 Patient recently admitted to [facility] for new-onset Afib and acute hypoxic respiratory failure 2/2 COPD and worsened diaphragmatic hernia. He was treated and discharged home with outpatient Cardiology follow-up with Dr. Mccauley in preparation for diaphragmatic hernia repair. Patient tolerated the procedure well, however developed worsening leg swelling afterwards. PCP believed this was due to the new Diltiazem that was initiated for Afib, and discontinued it. Leg edema improved somewhat but is still persistent despite starting 40mg Lasix PO for the past 2 days. Patient saw Dr. Mccauley earlier this week who prescribed Bisoprolol instead, however wife and patient reluctant to start due to worsening cough. Wife states patient was ambulating today when he HR increased to 130s and O2 saturation decreased despite 2L NC. Patient denies orthopnea.      PAST MEDICAL & SURGICAL HISTORY:  Personal history of PE (pulmonary embolism)      COPD (chronic obstructive pulmonary disease)      Hypertension      Restless leg syndrome      Factor V Leiden      Parkinsons      Spinal stenosis      Memory loss      Right inguinal hernia      AL (obstructive sleep apnea)      Diabetes      Herniated cervical disc      H/O hernia repair          Allergies    Levaquin (Anaphylaxis)  garlic (Other (Mild))    Intolerances        ANTIBIOTICS/RELEVANT:  antimicrobials    immunologic:    OTHER:  acetaminophen     Tablet .. 650 milliGRAM(s) Oral every 6 hours PRN  albuterol    0.083% 2.5 milliGRAM(s) Nebulizer every 2 hours PRN  albuterol/ipratropium for Nebulization 3 milliLiter(s) Nebulizer once  benzonatate 100 milliGRAM(s) Oral three times a day PRN  carbidopa/levodopa  25/100 1 Tablet(s) Oral <User Schedule>  folic acid 1 milliGRAM(s) Oral daily  furosemide   Injectable 40 milliGRAM(s) IV Push every 12 hours  rOPINIRole 2 milliGRAM(s) Oral <User Schedule>  trihexyphenidyl 1 milliGRAM(s) Oral three times a day      Social History: no toxic habits reported      FAMILY HISTORY:  FH: lung cancer (Father)          ROS:    EYES:  Negative  blurry vision or double vision  GASTROINTESTINAL:  Negative for nausea, vomiting, diarrhea  -otherwise negative except for subjective    Objective:  Last 24-Vital Signs Last 24 Hrs  T(C): 36.9 (02 Jun 2025 04:55), Max: 36.9 (02 Jun 2025 04:55)  T(F): 98.4 (02 Jun 2025 04:55), Max: 98.4 (02 Jun 2025 04:55)  HR: 120 (02 Jun 2025 04:55) (106 - 120)  BP: 132/77 (02 Jun 2025 04:55) (118/81 - 165/83)  BP(mean): --  RR: 19 (02 Jun 2025 04:55) (18 - 20)  SpO2: 91% (02 Jun 2025 04:55) (91% - 96%)    Parameters below as of 02 Jun 2025 04:55  Patient On (Oxygen Delivery Method): nasal cannula        T(C): 36.9 (06-02-25 @ 04:55), Max: 37.2 (05-31-25 @ 21:41)  T(F): 98.4 (06-02-25 @ 04:55), Max: 98.9 (05-31-25 @ 21:41)  T(C): 36.9 (06-02-25 @ 04:55), Max: 37.2 (05-31-25 @ 21:41)  T(F): 98.4 (06-02-25 @ 04:55), Max: 98.9 (05-31-25 @ 21:41)  T(C): 36.9 (06-02-25 @ 04:55), Max: 37.2 (05-31-25 @ 21:41)  T(F): 98.4 (06-02-25 @ 04:55), Max: 98.9 (05-31-25 @ 21:41)    PHYSICAL EXAM:  Constitutional: NAD  Eyes: PERRLA, EOMI  Ear/Nose/Throat: oropharynx normal	  Neck: no JVD, no lymphadenopathy, supple  Respiratory: no accessory muscle use, lung fields bilaterally diffuse ronchi  Cardiovascular: distant  Gastrointestinal: soft, NT, no HSM, BS-normal  Extremities: no clubbing, no cyanosis, edema absent  Neuro: patient alert, oriented and appropriate  Skin: no sig lesions        LABS:                        11.7   10.38 )-----------( 309      ( 02 Jun 2025 07:21 )             35.3       WBC 10.38  06-02 @ 07:21  WBC 9.74  06-01 @ 06:25  WBC 9.44  05-31 @ 13:30      06-02    139  |  97  |  12  ----------------------------<  125[H]  3.0[L]   |  33[H]  |  0.88    Ca    8.6      02 Jun 2025 07:21    TPro  6.6  /  Alb  2.8[L]  /  TBili  0.7  /  DBili  x   /  AST  16  /  ALT  6[L]  /  AlkPhos  102  05-31      Creatinine: 0.88 mg/dL (06-02-25 @ 07:21)  Creatinine: 0.93 mg/dL (06-01-25 @ 06:25)  Creatinine: 0.93 mg/dL (05-31-25 @ 13:30)      PT/INR - ( 02 Jun 2025 07:21 )   PT: 33.5 sec;   INR: 2.89 ratio         PTT - ( 31 May 2025 13:30 )  PTT:38.2 sec  Urinalysis Basic - ( 02 Jun 2025 07:21 )    Color: x / Appearance: x / SG: x / pH: x  Gluc: 125 mg/dL / Ketone: x  / Bili: x / Urobili: x   Blood: x / Protein: x / Nitrite: x   Leuk Esterase: x / RBC: x / WBC x   Sq Epi: x / Non Sq Epi: x / Bacteria: x            MICROBIOLOGY:              RADIOLOGY & ADDITIONAL STUDIES:  < from: CT Chest No Cont (05.31.25 @ 18:52) >    ACC: [id number] EXAM:  CT CHEST   ORDERED BY:  APPLE BRODERICKWILFRED     PROCEDURE DATE:  05/31/2025          INTERPRETATION:  EXAMINATION: CT CHEST    CLINICAL INDICATION: Hypoxia.    TECHNIQUE: Noncontrast CT of the chest was obtained.    COMPARISON: 3.6.25.    FINDINGS:    AIRWAYS AND LUNGS: The central tracheobronchial tree is patent.  Moderate   size loculated left pleural effusion with left lower lobe consolidation   and volume loss. Motion limited examination the lungs. Question of a few   patchy bilateral lung opacities.    MEDIASTINUM AND PLEURA: There are no enlarged mediastinal, hilar or   axillary lymph nodes. The visualized portion of the thyroid gland is   unremarkable. There is no pneumothorax.    HEART AND VESSELS: There is cardiomegaly.  There are atherosclerotic   calcifications of the aorta and coronary arteries.  There is no   pericardial effusion.    UPPER ABDOMEN: Images of the upper abdomen demonstrate cholelithiasis.   Right renal cyst..    BONES AND SOFT TISSUES: There are mild degenerative changes of the spine.    The soft tissues are unremarkable.    IMPRESSION:  Moderate size loculated left pleural effusion with left lower lobe   consolidation and volume loss. Motion limited examination the lungs.   Question of a few patchy bilateral lung opacities. Follow-up to   resolution recommended.

## 2025-06-02 NOTE — DIETITIAN INITIAL EVALUATION ADULT - OTHER INFO
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 Patient recently admitted to [facility] for new-onset Afib and acute hypoxic respiratory failure 2/2 COPD and worsened diaphragmatic hernia.sp diaphragmatic hernia repair. aw sob and cough."    Visited pt at bedside this afternoon. Pt sleeping soundly during visit. Spouse (Dayna) present during visit; provided pt's nutrition-related hx. Pt with fair po intake. Dislikes some food provided. Consumed >50% of breakfast and lunch meal today. Pt eating well at home PTA. Garlic allergy. No report N/V. +BM 6/2 per spouse report. Spouse reports UBW of ~195-210#. 2+ BL leg edema noted. Bedscale wt of 141#? obtained. Recommend new weight. Will use last admit wt of 190# for assessment. Spouse declines significant wt changes. Skin: stage I to sacrum. Pt on steroids. A1c 7.3%. Spouse reports A1c of 8.1% during last hospitalization. Pt not on any DM meds. Spouse has been checking pt's BS at home. Pt currently on regular diet. Recommend low sodium, consistent carbohydrate restriction. Discussed with pt's spouse. Spouse declines diet education at this time. Food preferences obtained to optimize po intake/tolerance. RD remains available and will continue to follow-up.

## 2025-06-02 NOTE — CARE COORDINATION ASSESSMENT. - NSDCPLANSERVICES_GEN_ALL_CORE
Anticipated dc home with reinstatement of private hire home care services when medically cleared.   PTE recommending home PT, pt/spouse declined at this time. Pt's wife shared that the pt has received services in the past and she is familiarized with the exercise program. Pt/wife requesting [facility] referral for RN services (it pt qualifies). CM to follow./Same as Prior Admission

## 2025-06-02 NOTE — CONSULT NOTE ADULT - ASSESSMENT
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 Patient recently admitted to hospitals for new-onset Afib and acute hypoxic respiratory failure 2/2 COPD and worsened diaphragmatic hernia. He was treated and discharged home with outpatient Cardiology follow-up with Dr. Mccauley in preparation for diaphragmatic hernia repair. Patient tolerated the procedure well, however developed worsening leg swelling afterwards. PCP believed this was due to the new Diltiazem that was initiated for Afib, and discontinued it. Leg edema improved somewhat but is still persistent despite starting 40mg Lasix PO for the past 2 days. Patient saw Dr. Mccauley earlier this week who prescribed Bisoprolol instead, however wife and patient reluctant to start due to worsening cough. Wife states patient was ambulating today when he HR increased to 130s and O2 saturation decreased despite 2L NC. Patient denies orthopnea.  CT-chest -Moderate size loculated left pleural effusion with left lower lobe consolidation and volume loss    Dyspnea, pleural effusion, hypoxic respiratory failure  Challenging presentation with no fever, minimal leukocytosis, but elevated procalcitonin, elevated CRP, pt describing productive cough, minimal BNP elevation relative to symptoms and of note as per pulmonary assessment effusion is small and not recommended to undergo thoracentesis  consideration for PNA so rec empiric abx with  Ceftriaxone 1 gram IV daily started 6/2    Thank you for consulting us and involving us in the management of this most interesting and challenging case.  In addition to reviewing history, imaging, documents, labs, microbiology, took into account antibiotic stewardship, local antibiogram and infection control strategies and potential transmission issues.    We will follow along in the care of this patient. Please contact me by texting me directly on my cell# at [phone] using TEAMS or call our answering service at [phone] with any concerns.

## 2025-06-02 NOTE — PROGRESS NOTE ADULT - SUBJECTIVE AND OBJECTIVE BOX
Date/Time Patient Seen:  		  Referring MD:   Data Reviewed	       Patient is a 79y old  Male who presents with a chief complaint of sob, cough (01 Jun 2025 16:02)      Subjective/HPI     PAST MEDICAL & SURGICAL HISTORY:  Personal history of PE (pulmonary embolism)    COPD (chronic obstructive pulmonary disease)    Hypertension    Restless leg syndrome    Factor V Leiden    Parkinsons    Spinal stenosis    Memory loss    Right inguinal hernia    AL (obstructive sleep apnea)    Diabetes    Herniated cervical disc    H/O hernia repair          Medication list         MEDICATIONS  (STANDING):  albuterol/ipratropium for Nebulization 3 milliLiter(s) Nebulizer once  carbidopa/levodopa  25/100 1 Tablet(s) Oral <User Schedule>  folic acid 1 milliGRAM(s) Oral daily  furosemide   Injectable 40 milliGRAM(s) IV Push every 12 hours  rOPINIRole 2 milliGRAM(s) Oral <User Schedule>  trihexyphenidyl 1 milliGRAM(s) Oral three times a day    MEDICATIONS  (PRN):  acetaminophen     Tablet .. 650 milliGRAM(s) Oral every 6 hours PRN Temp greater or equal to 38.5C (101.3F), Moderate Pain (4 - 6)  albuterol    0.083% 2.5 milliGRAM(s) Nebulizer every 2 hours PRN Shortness of Breath and/or Wheezing  benzonatate 100 milliGRAM(s) Oral three times a day PRN Cough         Vitals log        ICU Vital Signs Last 24 Hrs  T(C): 36.9 (02 Jun 2025 04:55), Max: 36.9 (02 Jun 2025 04:55)  T(F): 98.4 (02 Jun 2025 04:55), Max: 98.4 (02 Jun 2025 04:55)  HR: 120 (02 Jun 2025 04:55) (106 - 120)  BP: 132/77 (02 Jun 2025 04:55) (118/81 - 165/83)  BP(mean): --  ABP: --  ABP(mean): --  RR: 19 (02 Jun 2025 04:55) (18 - 20)  SpO2: 91% (02 Jun 2025 04:55) (91% - 96%)    O2 Parameters below as of 02 Jun 2025 04:55  Patient On (Oxygen Delivery Method): nasal cannula                 Input and Output:  I&O's Detail    31 May 2025 07:01  -  01 Jun 2025 07:00  --------------------------------------------------------  IN:    Oral Fluid: 420 mL  Total IN: 420 mL    OUT:    Voided (mL): 1250 mL  Total OUT: 1250 mL    Total NET: -830 mL          Lab Data                        12.1   9.74  )-----------( 301      ( 01 Jun 2025 06:25 )             36.5     06-01    139  |  98  |  11  ----------------------------<  121[H]  3.4[L]   |  31  |  0.93    Ca    9.0      01 Jun 2025 06:25    TPro  6.6  /  Alb  2.8[L]  /  TBili  0.7  /  DBili  x   /  AST  16  /  ALT  6[L]  /  AlkPhos  102  05-31            Review of Systems	      Objective     Physical Examination    heart s1s2  lung dc bs  head nc  head at      Pertinent Lab findings & Imaging      Tad:  NO   Adequate UO     I&O's Detail    31 May 2025 07:01  -  01 Jun 2025 07:00  --------------------------------------------------------  IN:    Oral Fluid: 420 mL  Total IN: 420 mL    OUT:    Voided (mL): 1250 mL  Total OUT: 1250 mL    Total NET: -830 mL               Discussed with:     Cultures:	        Radiology

## 2025-06-02 NOTE — DIETITIAN INITIAL EVALUATION ADULT - PERTINENT LABORATORY DATA
06-02    139  |  97  |  12  ----------------------------<  125[H]  3.0[L]   |  33[H]  |  0.88    Ca    8.6      02 Jun 2025 07:21  Phos  2.9     06-02  Mg     1.8     06-02    A1C with Estimated Average Glucose Result: 7.3 % (03-07-25 @ 06:15)  A1C with Estimated Average Glucose Result: 6.6 % (02-24-25 @ 07:20)

## 2025-06-02 NOTE — CARE COORDINATION ASSESSMENT. - NSCAREPROVIDERS_GEN_ALL_CORE_FT
CARE PROVIDERS:  Accepting Physician: Asya Muller  Administration: Ramin Garcia  Admitting: Asya Muller  Attending: Asya Muller  Cardiology Technician: Fiona Gill  Case Management: Jaz Paige  Consultant: Dk Cervantes  Consultant: Tima Camacho  Consultant: Grayson Gao  Consultant: Wilton Hull  Consultant: Ondina Malik  Consultant: Tima Lau  Consultant: Topher Alvarez  ED ACP: Mitch Banerjee  ED Attending: Rogers Acosta  ED Nurse: Valorie Conti  ED Nurse 2: Sesar Willingham  Nurse: Aleida Christianson  Nurse: Darcie Corrales  Outpatient Provider: Jose Eubanks  Outpatient Provider: Asya Muller  Outpatient Provider: Charles Mccauley  Override: Maura Aviles  Override: Yesenia Hernández  PCA/Nursing Assistant: Sheila Ryan  PCA/Nursing Assistant: Tawana Ribera  Physical Therapy: Yasmin Dawson  Primary Team: Nilsa Borjas  Primary Team: Angel Balderrama  Registered Dietitian: Amelie Samayoa  Registered Dietitian: Jesika Starr  : Daniel Maynard  : Lilian Wang  : Toby Faustin  : Zeina Lennon  Team: [facility] [place] [facility], Team

## 2025-06-02 NOTE — GOALS OF CARE CONVERSATION - ADVANCED CARE PLANNING - CONVERSATION DETAILS
PC spoke to pt about events leading to his hospitalization and diagnosis Parkinson's, impaired mental status ,COPD, afib, AL, and debility. Inquired about pt wishes about cardiopulmonary resuscitation and intubation. Explained the process to Pt wife in full detail including probable outcome given pt medical condition. Pt wife states she is not quite ready to make a decision and will think it over.

## 2025-06-02 NOTE — PROGRESS NOTE ADULT - ASSESSMENT
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 Consulted for acute hypoxia and volume overload.     - Known o2-dep copd, now p/w orsening cough and SOB x 1week  - Ventral hernia for which had recent repair, with persistent partial left lung collapse by report  - Known p A fib, diagnosed over the past few months  - Had been on diltiazem for af rate control, recently stopped because of edema  - Told by Dr Mccauley in the office to start bisoprolol last week, but given a recent deterioration in respiratory status she has not started it  - Has been more sob and noted to be hypoxic at home, more edematous on exam  - BNP:  <--2440  - CXR with a left pleural eff, probably superimposed on lobar collapse  - CT Chest  noted with loculated pl eff  - Tele w/   - Continue Lasix 40 iv bid for now  - Hold off on additional rate control though would try to add bb in time; wheezing is persistent and remains with vol ol and am hopeful will slow with some additional diuresis  - Last echo was very limited and non-diagnostic, would repeat  - Meds for vol ol/ CHF to be determined by lv fxn on better echo   - -160s   - Warfarin goal inr 2-3    - Patient is a fall risk with prior h/o brain bleed, outpatient physicians have continued AC due risk of recurrence.     - Monitor and replete lytes, keep K>4, Mg>2.  - Will continue to follow.    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 Consulted for acute hypoxia and volume overload.     - Known o2-dep copd, now p/w worsening cough and SOB x 1week  - Ventral hernia for which had recent repair, with persistent partial left lung collapse by report  - Known p A fib, diagnosed over the past few months  - Had been on diltiazem for af rate control, recently stopped because of edema  - Told by Dr Mccauley in the office to start bisoprolol last week, but given a recent deterioration in respiratory status she has not started it  - Has been more sob and noted to be hypoxic at home, more edematous on exam  - BNP:  <--2440  - CXR with a left pleural eff, probably superimposed on lobar collapse  - CT Chest  noted with loculated pl effusion  - Continue Lasix 40 iv bid for now  - Last echo was very limited and non-diagnostic, would repeat  - Meds for vol ol/ CHF to be determined by lv fxn on better echo     - Tele w/ A fib 110-140s   - Would start Metoprolol 25 mg PO Q8H for now (ordered)  - Warfarin goal inr 2-3    - Patient is a fall risk with prior h/o brain bleed, outpatient physicians have continued AC due risk of recurrence.     - EKG A fib RVR @ 109  - No evidence of any active ischemia   - Troponin: <-12.0    - -160s     - Monitor and replete lytes, keep K>4, Mg>2.  - Will continue to follow.    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 Consulted for acute hypoxia and volume overload.     - Known o2-dep copd, now p/w worsening cough and SOB x 1week  - Ventral hernia for which had recent repair, with persistent partial left lung collapse by report  - Known p A fib, diagnosed over the past few months  - Had been on diltiazem for af rate control, recently stopped because of edema  - Told by Dr Mccauley in the office to start bisoprolol last week, but given a recent deterioration in respiratory status she has not started it  - Has been more sob and noted to be hypoxic at home, more edematous on exam  - BNP:  <--2440  - CXR with a left pleural eff, probably superimposed on lobar collapse  - CT Chest  noted with loculated pl effusion  - Continue Lasix 40 iv bid for now. Extra dose ordered today.   - Last echo was very limited and non-diagnostic, would repeat  - Meds for vol ol/ CHF to be determined by lv fxn on better echo     - Tele w/ A fib 110-140s   - S/p Lopressor 5 mg IV x 1   - Would start Metoprolol 25 mg PO Q8H for now (ordered)  - Warfarin goal inr 2-3    - Patient is a fall risk with prior h/o brain bleed, outpatient physicians have continued AC due risk of recurrence.     - EKG A fib RVR @ 109  - No evidence of any active ischemia   - Troponin: <-12.0    - -160s     - Monitor and replete lytes, keep K>4, Mg>2.  - Will continue to follow.    Ondina Malik, MS FNP, AGACNP  Nurse Practitioner- Cardiology   Please call on TEAMS

## 2025-06-02 NOTE — PROGRESS NOTE ADULT - SUBJECTIVE AND OBJECTIVE BOX
Patient is a 79y old  Male who presents with a chief complaint of sob, cough (02 Jun 2025 13:51)      INTERVAL HPI/OVERNIGHT EVENTS: noted  pt seen and examined this am   events noted  feels well      Vital Signs Last 24 Hrs  T(C): 36.8 (02 Jun 2025 12:23), Max: 36.9 (02 Jun 2025 04:55)  T(F): 98.2 (02 Jun 2025 12:23), Max: 98.4 (02 Jun 2025 04:55)  HR: 102 (02 Jun 2025 17:04) (99 - 120)  BP: 125/73 (02 Jun 2025 17:04) (117/65 - 148/84)  BP(mean): --  RR: 19 (02 Jun 2025 12:23) (18 - 19)  SpO2: 98% (02 Jun 2025 12:23) (91% - 98%)    Parameters below as of 02 Jun 2025 12:23  Patient On (Oxygen Delivery Method): nasal cannula  O2 Flow (L/min): 2      acetaminophen     Tablet .. 650 milliGRAM(s) Oral every 6 hours PRN  albuterol    0.083% 2.5 milliGRAM(s) Nebulizer every 2 hours PRN  albuterol/ipratropium for Nebulization 3 milliLiter(s) Nebulizer once  benzonatate 100 milliGRAM(s) Oral three times a day PRN  carbidopa/levodopa  25/100 1 Tablet(s) Oral <User Schedule>  cefTRIAXone   IVPB 1000 milliGRAM(s) IV Intermittent every 24 hours  folic acid 1 milliGRAM(s) Oral daily  furosemide   Injectable 40 milliGRAM(s) IV Push every 12 hours  metoprolol tartrate 25 milliGRAM(s) Oral every 8 hours  rOPINIRole 2 milliGRAM(s) Oral <User Schedule>  trihexyphenidyl 1 milliGRAM(s) Oral three times a day      PHYSICAL EXAM:  GENERAL: NAD,   EYES: conjunctiva and sclera clear  ENMT: Moist mucous membranes  NECK: Supple, No JVD, Normal thyroid  CHEST/LUNG: non labored, cta b/l  HEART: Regular rate and rhythm; No murmurs, rubs, or gallops  ABDOMEN: Soft, Nontender, Nondistended; Bowel sounds present  EXTREMITIES:  2+ Peripheral Pulses, No clubbing, cyanosis, or edema  LYMPH: No lymphadenopathy noted  SKIN: No rashes or lesions    Consultant(s) Notes Reviewed:  [x ] YES  [ ] NO  Care Discussed with Consultants/Other Providers [ x] YES  [ ] NO    LABS:                        11.7   10.38 )-----------( 309      ( 02 Jun 2025 07:21 )             35.3     06-02    139  |  97  |  12  ----------------------------<  125[H]  3.0[L]   |  33[H]  |  0.88    Ca    8.6      02 Jun 2025 07:21  Phos  2.9     06-02  Mg     1.8     06-02      PT/INR - ( 02 Jun 2025 07:21 )   PT: 33.5 sec;   INR: 2.89 ratio           Urinalysis Basic - ( 02 Jun 2025 07:21 )    Color: x / Appearance: x / SG: x / pH: x  Gluc: 125 mg/dL / Ketone: x  / Bili: x / Urobili: x   Blood: x / Protein: x / Nitrite: x   Leuk Esterase: x / RBC: x / WBC x   Sq Epi: x / Non Sq Epi: x / Bacteria: x      CAPILLARY BLOOD GLUCOSE            Urinalysis Basic - ( 02 Jun 2025 07:21 )    Color: x / Appearance: x / SG: x / pH: x  Gluc: 125 mg/dL / Ketone: x  / Bili: x / Urobili: x   Blood: x / Protein: x / Nitrite: x   Leuk Esterase: x / RBC: x / WBC x   Sq Epi: x / Non Sq Epi: x / Bacteria: x          RADIOLOGY & ADDITIONAL TESTS:    Imaging Personally Reviewed:  [x ] YES  [ ] NO

## 2025-06-02 NOTE — PROGRESS NOTE ADULT - ASSESSMENT
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 Patient recently admitted [facility] for new-onset Afib and acute hypoxic respiratory failure 2/2 COPD and worsened diaphragmatic hernia.sp diaphragmatic hernia repair. aw sob and cough    CXR-Moderate left and small right-sided pleural effusions.      #acute on chronic  hypoxic respiratory failure   likey from acute chf w pleural effusions   probnp- high, i/o, wts, TTE  cards cs noted  ct chest- ?consolidations and loculated effusions  procal minimally elev  pulm cs noted  ID cs fu  empiric abx    #COPD on home o2  resume home inhalers    #DVT/PE, hx factor v leiden deffi  hx of afib  coumadin, monitor inr    #Parkinsons dz- resume home meds    [facility]   [phone]

## 2025-06-03 ENCOUNTER — RESULT REVIEW (OUTPATIENT)
Age: 80
End: 2025-06-03

## 2025-06-03 LAB
ANION GAP SERPL CALC-SCNC: 7 MMOL/L — SIGNIFICANT CHANGE UP (ref 5–17)
BUN SERPL-MCNC: 13 MG/DL — SIGNIFICANT CHANGE UP (ref 7–23)
CALCIUM SERPL-MCNC: 9.2 MG/DL — SIGNIFICANT CHANGE UP (ref 8.5–10.1)
CHLORIDE SERPL-SCNC: 98 MMOL/L — SIGNIFICANT CHANGE UP (ref 96–108)
CO2 SERPL-SCNC: 33 MMOL/L — HIGH (ref 22–31)
CREAT SERPL-MCNC: 0.91 MG/DL — SIGNIFICANT CHANGE UP (ref 0.5–1.3)
EGFR: 85 ML/MIN/1.73M2 — SIGNIFICANT CHANGE UP
EGFR: 85 ML/MIN/1.73M2 — SIGNIFICANT CHANGE UP
GLUCOSE SERPL-MCNC: 116 MG/DL — HIGH (ref 70–99)
GRAM STN FLD: ABNORMAL
HCT VFR BLD CALC: 36.6 % — LOW (ref 39–50)
HGB BLD-MCNC: 12.1 G/DL — LOW (ref 13–17)
INR BLD: 3.13 RATIO — HIGH (ref 0.85–1.16)
LEGIONELLA AG UR QL: NEGATIVE — SIGNIFICANT CHANGE UP
MCHC RBC-ENTMCNC: 31.9 PG — SIGNIFICANT CHANGE UP (ref 27–34)
MCHC RBC-ENTMCNC: 33.1 G/DL — SIGNIFICANT CHANGE UP (ref 32–36)
MCV RBC AUTO: 96.6 FL — SIGNIFICANT CHANGE UP (ref 80–100)
NRBC BLD AUTO-RTO: 0 /100 WBCS — SIGNIFICANT CHANGE UP (ref 0–0)
PLATELET # BLD AUTO: 359 K/UL — SIGNIFICANT CHANGE UP (ref 150–400)
POTASSIUM SERPL-MCNC: 3.4 MMOL/L — LOW (ref 3.5–5.3)
POTASSIUM SERPL-SCNC: 3.4 MMOL/L — LOW (ref 3.5–5.3)
PROTHROM AB SERPL-ACNC: 36.5 SEC — HIGH (ref 9.9–13.4)
RBC # BLD: 3.79 M/UL — LOW (ref 4.2–5.8)
RBC # FLD: 13.1 % — SIGNIFICANT CHANGE UP (ref 10.3–14.5)
S PNEUM AG UR QL: NEGATIVE — SIGNIFICANT CHANGE UP
SODIUM SERPL-SCNC: 138 MMOL/L — SIGNIFICANT CHANGE UP (ref 135–145)
SPECIMEN SOURCE: SIGNIFICANT CHANGE UP
WBC # BLD: 10.69 K/UL — HIGH (ref 3.8–10.5)
WBC # FLD AUTO: 10.69 K/UL — HIGH (ref 3.8–10.5)

## 2025-06-03 PROCEDURE — 93306 TTE W/DOPPLER COMPLETE: CPT | Mod: 26

## 2025-06-03 PROCEDURE — 99233 SBSQ HOSP IP/OBS HIGH 50: CPT

## 2025-06-03 RX ORDER — ROPINIROLE 5 MG/1
1 TABLET, FILM COATED ORAL
Refills: 0 | DISCHARGE

## 2025-06-03 RX ORDER — DEXTROMETHORPHAN HBR, GUAIFENESIN 200 MG/10ML
200 LIQUID ORAL EVERY 6 HOURS
Refills: 0 | Status: DISCONTINUED | OUTPATIENT
Start: 2025-06-03 | End: 2025-06-09

## 2025-06-03 RX ORDER — TRIHEXYPHENIDYL HYDROCHLORIDE 2 MG/1
0.5 TABLET ORAL
Refills: 0 | DISCHARGE

## 2025-06-03 RX ORDER — BUDESONIDE AND FORMOTEROL FUMARATE DIHYDRATE 80; 4.5 UG/1; UG/1
2 AEROSOL RESPIRATORY (INHALATION)
Refills: 0 | DISCHARGE

## 2025-06-03 RX ORDER — BUTYROSPERMUM PARKII(SHEA BUTTER), SIMMONDSIA CHINENSIS (JOJOBA) SEED OIL, ALOE BARBADENSIS LEAF EXTRACT .01; 1; 3.5 G/100G; G/100G; G/100G
250 LIQUID TOPICAL
Refills: 0 | Status: DISCONTINUED | OUTPATIENT
Start: 2025-06-03 | End: 2025-06-09

## 2025-06-03 RX ORDER — FOLIC ACID 1 MG/1
1 TABLET ORAL
Refills: 0 | DISCHARGE

## 2025-06-03 RX ORDER — IPRATROPIUM BROMIDE AND ALBUTEROL SULFATE .5; 2.5 MG/3ML; MG/3ML
3 SOLUTION RESPIRATORY (INHALATION) EVERY 6 HOURS
Refills: 0 | Status: DISCONTINUED | OUTPATIENT
Start: 2025-06-03 | End: 2025-06-09

## 2025-06-03 RX ORDER — MONTELUKAST SODIUM 10 MG/1
1 TABLET ORAL
Refills: 0 | DISCHARGE

## 2025-06-03 RX ADMIN — METOPROLOL SUCCINATE 25 MILLIGRAM(S): 50 TABLET, EXTENDED RELEASE ORAL at 21:35

## 2025-06-03 RX ADMIN — ROPINIROLE 2 MILLIGRAM(S): 5 TABLET, FILM COATED ORAL at 14:38

## 2025-06-03 RX ADMIN — BUTYROSPERMUM PARKII(SHEA BUTTER), SIMMONDSIA CHINENSIS (JOJOBA) SEED OIL, ALOE BARBADENSIS LEAF EXTRACT 250 MILLIGRAM(S): .01; 1; 3.5 LIQUID TOPICAL at 17:33

## 2025-06-03 RX ADMIN — Medication 1 TABLET(S): at 17:34

## 2025-06-03 RX ADMIN — Medication 100 MILLIGRAM(S): at 05:19

## 2025-06-03 RX ADMIN — Medication 1 TABLET(S): at 21:36

## 2025-06-03 RX ADMIN — Medication 1 TABLET(S): at 14:37

## 2025-06-03 RX ADMIN — FUROSEMIDE 40 MILLIGRAM(S): 10 INJECTION INTRAMUSCULAR; INTRAVENOUS at 05:21

## 2025-06-03 RX ADMIN — METOPROLOL SUCCINATE 25 MILLIGRAM(S): 50 TABLET, EXTENDED RELEASE ORAL at 05:20

## 2025-06-03 RX ADMIN — ROPINIROLE 2 MILLIGRAM(S): 5 TABLET, FILM COATED ORAL at 17:34

## 2025-06-03 RX ADMIN — FOLIC ACID 1 MILLIGRAM(S): 1 TABLET ORAL at 14:04

## 2025-06-03 RX ADMIN — CEFTRIAXONE 100 MILLIGRAM(S): 500 INJECTION, POWDER, FOR SOLUTION INTRAMUSCULAR; INTRAVENOUS at 08:51

## 2025-06-03 RX ADMIN — Medication 100 MILLIGRAM(S): at 22:58

## 2025-06-03 RX ADMIN — TRIHEXYPHENIDYL HYDROCHLORIDE 1 MILLIGRAM(S): 2 TABLET ORAL at 14:04

## 2025-06-03 RX ADMIN — Medication 2.5 MILLIGRAM(S): at 11:41

## 2025-06-03 RX ADMIN — Medication 1.5 MILLIGRAM(S): at 21:34

## 2025-06-03 RX ADMIN — IPRATROPIUM BROMIDE AND ALBUTEROL SULFATE 3 MILLILITER(S): .5; 2.5 SOLUTION RESPIRATORY (INHALATION) at 19:14

## 2025-06-03 RX ADMIN — Medication 1 TABLET(S): at 08:51

## 2025-06-03 RX ADMIN — ROPINIROLE 2 MILLIGRAM(S): 5 TABLET, FILM COATED ORAL at 21:34

## 2025-06-03 RX ADMIN — TRIHEXYPHENIDYL HYDROCHLORIDE 1 MILLIGRAM(S): 2 TABLET ORAL at 05:20

## 2025-06-03 RX ADMIN — Medication 40 MILLIEQUIVALENT(S): at 14:05

## 2025-06-03 RX ADMIN — FUROSEMIDE 40 MILLIGRAM(S): 10 INJECTION INTRAMUSCULAR; INTRAVENOUS at 17:33

## 2025-06-03 RX ADMIN — METOPROLOL SUCCINATE 25 MILLIGRAM(S): 50 TABLET, EXTENDED RELEASE ORAL at 14:04

## 2025-06-03 RX ADMIN — TRIHEXYPHENIDYL HYDROCHLORIDE 1 MILLIGRAM(S): 2 TABLET ORAL at 21:34

## 2025-06-03 RX ADMIN — DEXTROMETHORPHAN HBR, GUAIFENESIN 200 MILLIGRAM(S): 200 LIQUID ORAL at 22:57

## 2025-06-03 NOTE — PROGRESS NOTE ADULT - ASSESSMENT
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 Patient recently admitted to [facility] for new-onset Afib and acute hypoxic respiratory failure 2/2 COPD and worsened diaphragmatic hernia. He was treated and discharged home with outpatient Cardiology follow-up with Dr. Mccauley in preparation for diaphragmatic hernia repair. Patient tolerated the procedure well, however developed worsening leg swelling afterwards    Dyspnea - Cough -   copd  diaphragmatic hernia  Atelectasis  PE hx  OP  OA  Ataxic gait  Parkinson's disease  HTN  GERD    vs noted  o2 support  ct chest reviewed - atelectasis - small to mod loculated left eff - no intervention at present  procal and crp noted  rrt reviewed  on ABX  ID eval noted    I zion  nebs PRN  HOB elev  asp prec  oral hygiene  skin care  cvs rx regimen  BP control and HD monitoring  I and O  replete lytes  assist with needs  PPI  OLD - recent admission record reviewed

## 2025-06-03 NOTE — PROGRESS NOTE ADULT - SUBJECTIVE AND OBJECTIVE BOX
Date/Time Patient Seen:  		  Referring MD:   Data Reviewed	       Patient is a 79y old  Male who presents with a chief complaint of sob, cough (02 Jun 2025 17:24)      Subjective/HPI     PAST MEDICAL & SURGICAL HISTORY:  Personal history of PE (pulmonary embolism)    COPD (chronic obstructive pulmonary disease)    Hypertension    Restless leg syndrome    Factor V Leiden    Parkinsons    Spinal stenosis    Memory loss    Right inguinal hernia    AL (obstructive sleep apnea)    Diabetes    Herniated cervical disc    H/O hernia repair          Medication list         MEDICATIONS  (STANDING):  albuterol/ipratropium for Nebulization 3 milliLiter(s) Nebulizer once  carbidopa/levodopa  25/100 1 Tablet(s) Oral <User Schedule>  cefTRIAXone   IVPB 1000 milliGRAM(s) IV Intermittent every 24 hours  folic acid 1 milliGRAM(s) Oral daily  furosemide   Injectable 40 milliGRAM(s) IV Push every 12 hours  metoprolol tartrate 25 milliGRAM(s) Oral every 8 hours  rOPINIRole 2 milliGRAM(s) Oral <User Schedule>  trihexyphenidyl 1 milliGRAM(s) Oral three times a day    MEDICATIONS  (PRN):  acetaminophen     Tablet .. 650 milliGRAM(s) Oral every 6 hours PRN Temp greater or equal to 38.5C (101.3F), Moderate Pain (4 - 6)  albuterol    0.083% 2.5 milliGRAM(s) Nebulizer every 2 hours PRN Shortness of Breath and/or Wheezing  benzonatate 100 milliGRAM(s) Oral three times a day PRN Cough         Vitals log        ICU Vital Signs Last 24 Hrs  T(C): 36.4 (03 Jun 2025 04:50), Max: 37.1 (02 Jun 2025 20:30)  T(F): 97.5 (03 Jun 2025 04:50), Max: 98.8 (02 Jun 2025 20:30)  HR: 108 (03 Jun 2025 04:50) (99 - 108)  BP: 142/87 (03 Jun 2025 04:50) (115/80 - 148/84)  BP(mean): --  ABP: --  ABP(mean): --  RR: 18 (03 Jun 2025 04:50) (18 - 19)  SpO2: 98% (03 Jun 2025 04:50) (97% - 98%)    O2 Parameters below as of 03 Jun 2025 04:50  Patient On (Oxygen Delivery Method): nasal cannula                 Input and Output:  I&O's Detail    02 Jun 2025 07:01  -  03 Jun 2025 05:27  --------------------------------------------------------  IN:    IV PiggyBack: 50 mL  Total IN: 50 mL    OUT:  Total OUT: 0 mL    Total NET: 50 mL          Lab Data                        11.7   10.38 )-----------( 309      ( 02 Jun 2025 07:21 )             35.3     06-02    139  |  97  |  12  ----------------------------<  125[H]  3.0[L]   |  33[H]  |  0.88    Ca    8.6      02 Jun 2025 07:21  Phos  2.9     06-02  Mg     1.8     06-02              Review of Systems	      Objective     Physical Examination  heart s1s2  lung dc bs  head nc  head at        Pertinent Lab findings & Imaging      Tad:  NO   Adequate UO     I&O's Detail    02 Jun 2025 07:01  -  03 Jun 2025 05:27  --------------------------------------------------------  IN:    IV PiggyBack: 50 mL  Total IN: 50 mL    OUT:  Total OUT: 0 mL    Total NET: 50 mL               Discussed with:     Cultures:	        Radiology

## 2025-06-03 NOTE — PROGRESS NOTE ADULT - SUBJECTIVE AND OBJECTIVE BOX
[facility]  Tima Camacho MD PhD, Yu Loya MD, Shayna Lares MD, Surendra Connolly MD, Pedro Bedoya MD  and providing coverage with Wm Machado MD  Providing Infectious Disease Consultations at [facility], [facility] [place], [place], [facility], [facility]    Office# [phone] to schedule follow up appointments  Answering Service for urgent calls or New Consults [phone]  Cell# to text for urgent issues Tima Camacho [phone]     infectious diseases progress note:    ROBERT BOECKLE is a 79y y. o. Male patient    Overnight and events of the last 24hrs reviewed    Allergies    Levaquin (Anaphylaxis)  garlic (Other (Mild))    Intolerances        ANTIBIOTICS/RELEVANT:  antimicrobials  cefTRIAXone   IVPB 1000 milliGRAM(s) IV Intermittent every 24 hours    immunologic:    OTHER:  acetaminophen     Tablet .. 650 milliGRAM(s) Oral every 6 hours PRN  albuterol    0.083% 2.5 milliGRAM(s) Nebulizer every 2 hours PRN  albuterol/ipratropium for Nebulization 3 milliLiter(s) Nebulizer once  benzonatate 100 milliGRAM(s) Oral three times a day PRN  carbidopa/levodopa  25/100 1 Tablet(s) Oral <User Schedule>  folic acid 1 milliGRAM(s) Oral daily  furosemide   Injectable 40 milliGRAM(s) IV Push every 12 hours  metoprolol tartrate 25 milliGRAM(s) Oral every 8 hours  potassium chloride    Tablet ER 40 milliEquivalent(s) Oral once  rOPINIRole 2 milliGRAM(s) Oral <User Schedule>  trihexyphenidyl 1 milliGRAM(s) Oral three times a day      Objective:  Vital Signs Last 24 Hrs  T(C): 36.4 (03 Jun 2025 04:50), Max: 37.1 (02 Jun 2025 20:30)  T(F): 97.5 (03 Jun 2025 04:50), Max: 98.8 (02 Jun 2025 20:30)  HR: 108 (03 Jun 2025 04:50) (99 - 108)  BP: 142/87 (03 Jun 2025 04:50) (115/80 - 148/84)  BP(mean): --  RR: 18 (03 Jun 2025 04:50) (18 - 19)  SpO2: 98% (03 Jun 2025 04:50) (97% - 98%)    Parameters below as of 03 Jun 2025 04:50  Patient On (Oxygen Delivery Method): nasal cannula        T(C): 36.4 (06-03-25 @ 04:50), Max: 37.1 (06-02-25 @ 20:30)  T(C): 36.4 (06-03-25 @ 04:50), Max: 37.2 (05-31-25 @ 21:41)  T(C): 36.4 (06-03-25 @ 04:50), Max: 37.2 (05-31-25 @ 21:41)    PHYSICAL EXAM:  HEENT: NC atraumatic  Neck: supple, elevated JVP, +HJR  Respiratory: no accessory muscle use, diffuse ronchi  Cardiovascular: distant  Gastrointestinal: normal appearing, nondistended  Extremities: no clubbing, no cyanosis,        LABS:                          12.1   10.69 )-----------( 359      ( 03 Jun 2025 06:50 )             36.6       WBC  10.69 06-03 @ 06:50  10.38 06-02 @ 07:21  9.74 06-01 @ 06:25  9.44 05-31 @ 13:30      06-03    138  |  98  |  13  ----------------------------<  116[H]  3.4[L]   |  33[H]  |  0.91    Ca    9.2      03 Jun 2025 06:50  Phos  2.9     06-02  Mg     1.8     06-02        Creatinine: 0.91 mg/dL (06-03-25 @ 06:50)  Creatinine: 0.88 mg/dL (06-02-25 @ 07:21)  Creatinine: 0.93 mg/dL (06-01-25 @ 06:25)  Creatinine: 0.93 mg/dL (05-31-25 @ 13:30)      PT/INR - ( 03 Jun 2025 06:50 )   PT: 36.5 sec;   INR: 3.13 ratio           Urinalysis Basic - ( 03 Jun 2025 06:50 )    Color: x / Appearance: x / SG: x / pH: x  Gluc: 116 mg/dL / Ketone: x  / Bili: x / Urobili: x   Blood: x / Protein: x / Nitrite: x   Leuk Esterase: x / RBC: x / WBC x   Sq Epi: x / Non Sq Epi: x / Bacteria: x            INFLAMMATORY MARKERS      MICROBIOLOGY:              RADIOLOGY & ADDITIONAL STUDIES:

## 2025-06-03 NOTE — PROGRESS NOTE ADULT - ASSESSMENT
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 Patient recently admitted to [facility] for new-onset Afib and acute hypoxic respiratory failure 2/2 COPD and worsened diaphragmatic hernia.sp diaphragmatic hernia repair. aw sob and cough    CXR-Moderate left and small right-sided pleural effusions.      #acute on chronic  hypoxic respiratory failure   likey from acute chf w pleural effusions   probnp- high, i/o, wts, TTE  cards cs noted  ct chest- ?consolidations and loculated effusions  procalelev  pulm cs noted  ID cs fu  cont abx    #COPD on home o2  resume home inhalers    #DVT/PE, hx factor v leiden deffi  hx of afib  coumadin, monitor inr    #Parkinsons dz- resume home meds    [facility]   [phone]

## 2025-06-03 NOTE — PROGRESS NOTE ADULT - SUBJECTIVE AND OBJECTIVE BOX
Patient is a 79y old  Male who presents with a chief complaint of sob, cough (03 Jun 2025 10:49)      INTERVAL HPI/OVERNIGHT EVENTS: noted  pt seen and examined this am   events noted  feels well      Vital Signs Last 24 Hrs  T(C): 36.6 (03 Jun 2025 13:23), Max: 37.1 (02 Jun 2025 20:30)  T(F): 97.9 (03 Jun 2025 13:23), Max: 98.8 (02 Jun 2025 20:30)  HR: 80 (03 Jun 2025 13:23) (56 - 108)  BP: 96/60 (03 Jun 2025 13:23) (96/60 - 142/87)  BP(mean): --  RR: 18 (03 Jun 2025 13:23) (18 - 18)  SpO2: 94% (03 Jun 2025 13:23) (94% - 98%)    Parameters below as of 03 Jun 2025 13:23  Patient On (Oxygen Delivery Method): nasal cannula        acetaminophen     Tablet .. 650 milliGRAM(s) Oral every 6 hours PRN  albuterol/ipratropium for Nebulization 3 milliLiter(s) Nebulizer once  albuterol/ipratropium for Nebulization 3 milliLiter(s) Nebulizer every 6 hours  benzonatate 100 milliGRAM(s) Oral three times a day PRN  carbidopa/levodopa  25/100 1 Tablet(s) Oral <User Schedule>  cefTRIAXone   IVPB 1000 milliGRAM(s) IV Intermittent every 24 hours  folic acid 1 milliGRAM(s) Oral daily  furosemide   Injectable 40 milliGRAM(s) IV Push every 12 hours  metoprolol tartrate 25 milliGRAM(s) Oral every 8 hours  rOPINIRole 2 milliGRAM(s) Oral <User Schedule>  saccharomyces boulardii 250 milliGRAM(s) Oral two times a day  trihexyphenidyl 1 milliGRAM(s) Oral three times a day      PHYSICAL EXAM:  GENERAL: NAD,   EYES: conjunctiva and sclera clear  ENMT: Moist mucous membranes  NECK: Supple, No JVD, Normal thyroid  CHEST/LUNG: non labored, cta b/l  HEART: Regular rate and rhythm; No murmurs, rubs, or gallops  ABDOMEN: Soft, Nontender, Nondistended; Bowel sounds present  EXTREMITIES:  2+ Peripheral Pulses, No clubbing, cyanosis, or edema  LYMPH: No lymphadenopathy noted  SKIN: No rashes or lesions    Consultant(s) Notes Reviewed:  [x ] YES  [ ] NO  Care Discussed with Consultants/Other Providers [ x] YES  [ ] NO    LABS:                        12.1   10.69 )-----------( 359      ( 03 Jun 2025 06:50 )             36.6     06-03    138  |  98  |  13  ----------------------------<  116[H]  3.4[L]   |  33[H]  |  0.91    Ca    9.2      03 Jun 2025 06:50  Phos  2.9     06-02  Mg     1.8     06-02      PT/INR - ( 03 Jun 2025 06:50 )   PT: 36.5 sec;   INR: 3.13 ratio           Urinalysis Basic - ( 03 Jun 2025 06:50 )    Color: x / Appearance: x / SG: x / pH: x  Gluc: 116 mg/dL / Ketone: x  / Bili: x / Urobili: x   Blood: x / Protein: x / Nitrite: x   Leuk Esterase: x / RBC: x / WBC x   Sq Epi: x / Non Sq Epi: x / Bacteria: x      CAPILLARY BLOOD GLUCOSE            Urinalysis Basic - ( 03 Jun 2025 06:50 )    Color: x / Appearance: x / SG: x / pH: x  Gluc: 116 mg/dL / Ketone: x  / Bili: x / Urobili: x   Blood: x / Protein: x / Nitrite: x   Leuk Esterase: x / RBC: x / WBC x   Sq Epi: x / Non Sq Epi: x / Bacteria: x        Culture - Sputum (collected 03 Jun 2025 06:30)  Source: Sputum Sputum  Gram Stain (03 Jun 2025 15:17):    Few polymorphonuclear leukocytes per low power field    Rare Squamous epithelial cells per low power field    Moderate Gram Negative Rods per oil power field    Few Gram Positive Cocci in Pairs and Chains per oil power field    Rare Gram Positive Cocci in Clusters per oil power field        RADIOLOGY & ADDITIONAL TESTS:    Imaging Personally Reviewed:  [x ] YES  [ ] NO

## 2025-06-03 NOTE — PHARMACOTHERAPY INTERVENTION NOTE - COMMENTS
Medication reconciliation completed on admission by pharmacy representative. Updated medication list in OMR/prescription writer, discussed with Dr Muller.    Home Medications:  Albuterol (Eqv-ProAir HFA) 90 mcg/inh inhalation aerosol: 2 puff(s) inhaled every 4 hours as needed for  shortness of breath and/or wheezing (03 Jun 2025 10:21)  carbidopa-levodopa 25 mg-100 mg oral tablet: 1 tab(s) orally 4 times a day (03 Jun 2025 10:13)  fexofenadine 180 mg oral tablet: 1 tab(s) orally once a day (03 Jun 2025 10:21)  folic acid 1 mg oral tablet: 1 tab(s) orally once a day (03 Jun 2025 10:21)  ipratropium-albuterol 0.5 mg-2.5 mg/3 mL inhalation solution: 3 milliliter(s) by nebulizer 1-3 times a day as needed (03 Jun 2025 10:17)  montelukast 10 mg oral tablet: 1 tab(s) orally once a day (at bedtime) (03 Jun 2025 10:17)  rOPINIRole 2 mg oral tablet: 1 tab(s) orally 4 times a day (03 Jun 2025 10:17)  Symbicort 160 mcg-4.5 mcg/inh inhalation aerosol: 2 puff(s) inhaled once a day (in the morning) **Prescribed as 2 puffs 2 times a day** (03 Jun 2025 10:24)  trihexyphenidyl 2 mg oral tablet: 0.5 tab(s) orally 3 times a day (03 Jun 2025 10:24)  warfarin 5 mg oral tablet: 0.5 tab(s) orally once a day (03 Jun 2025 10:24)

## 2025-06-03 NOTE — PROGRESS NOTE ADULT - ASSESSMENT
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 Patient recently admitted to [facility] for new-onset Afib and acute hypoxic respiratory failure 2/2 COPD and worsened diaphragmatic hernia. He was treated and discharged home with outpatient Cardiology follow-up with Dr. Mccauley in preparation for diaphragmatic hernia repair. Patient tolerated the procedure well, however developed worsening leg swelling afterwards. PCP believed this was due to the new Diltiazem that was initiated for Afib, and discontinued it. Leg edema improved somewhat but is still persistent despite starting 40mg Lasix PO for the past 2 days. Patient saw Dr. Mccauley earlier this week who prescribed Bisoprolol instead, however wife and patient reluctant to start due to worsening cough. Wife states patient was ambulating today when he HR increased to 130s and O2 saturation decreased despite 2L NC. Patient denies orthopnea.  CT-chest -Moderate size loculated left pleural effusion with left lower lobe consolidation and volume loss    Dyspnea, pleural effusion, hypoxic respiratory failure  Challenging presentation with no fever, minimal leukocytosis, but elevated procalcitonin, elevated CRP, pt describing productive cough, minimal BNP elevation relative to symptoms and of note as per pulmonary assessment effusion is small and not recommended to undergo thoracentesis  consideration for PNA so rec empiric abx with  Ceftriaxone 1 gram IV daily started 6/2-continue for now added cdc w diff and procal for 6/4 am    6/3-pt reports increased cough, noted more sig JVD and HJR so suspect more fluid than infectious    Thank you for consulting us and involving us in the management of this most interesting and challenging case.  In addition to reviewing history, imaging, documents, labs, microbiology, took into account antibiotic stewardship, local antibiogram and infection control strategies and potential transmission issues.    We will follow along in the care of this patient. Please contact me by texting me directly on my cell# at [phone] using TEAMS or call our answering service at [phone] with any concerns.

## 2025-06-03 NOTE — PROGRESS NOTE ADULT - ASSESSMENT
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 Consulted for acute hypoxia and volume overload.     - Known o2-dep copd, now p/w worsening cough and SOB x 1week  - Ventral hernia for which had recent repair, with persistent partial left lung collapse by report  - Known p A fib, diagnosed over the past few months  - Had been on diltiazem for af rate control, recently stopped because of edema  - Told by Dr Mccauley in the office to start bisoprolol last week, but given a recent deterioration in respiratory status she has not started it  - Has been more sob and noted to be hypoxic at home, more edematous on exam  - BNP:  <--2440  - CXR with a left pleural eff, probably superimposed on lobar collapse  - CT Chest  noted with loculated pl effusion  - Continue Lasix 40 iv bid   -seen by pulmonary no indication for thora   - Last echo was very limited and non-diagnostic,3/2025, please repeat       -Af rates still low 100s started on BB every 8 hours   - Warfarin goal inr 2-3    - Patient is a fall risk with prior h/o brain bleed, outpatient physicians have continued AC due risk of recurrence.     - EKG A fib RVR @ 109  - No evidence of any active ischemia   - Troponin: <-12.0    - BP stable on bb     - Monitor and replete lytes, keep K>4, Mg>2.  - Will continue to follow.

## 2025-06-03 NOTE — PROGRESS NOTE ADULT - SUBJECTIVE AND OBJECTIVE BOX
[facility] -- Sid White Pannella, Patel, Savella Goodger, Cohen  Office # [phone]      Follow Up:    ADhf  Subjective/Observations:     No events overnight resting comfortably in bed.  No complaints of chest pain, or palpitations reported. No signs of orthopnea or PND. Sob at times, remains on NC,    REVIEW OF SYSTEMS: All other review of systems is negative unless indicated above    PAST MEDICAL & SURGICAL HISTORY:  Personal history of PE (pulmonary embolism)      COPD (chronic obstructive pulmonary disease)      Hypertension      Restless leg syndrome      Factor V Leiden      Parkinsons      Spinal stenosis      Memory loss      Right inguinal hernia      AL (obstructive sleep apnea)      Diabetes      Herniated cervical disc      H/O hernia repair          MEDICATIONS  (STANDING):  albuterol/ipratropium for Nebulization 3 milliLiter(s) Nebulizer once  carbidopa/levodopa  25/100 1 Tablet(s) Oral <User Schedule>  cefTRIAXone   IVPB 1000 milliGRAM(s) IV Intermittent every 24 hours  folic acid 1 milliGRAM(s) Oral daily  furosemide   Injectable 40 milliGRAM(s) IV Push every 12 hours  metoprolol tartrate 25 milliGRAM(s) Oral every 8 hours  potassium chloride    Tablet ER 40 milliEquivalent(s) Oral once  rOPINIRole 2 milliGRAM(s) Oral <User Schedule>  trihexyphenidyl 1 milliGRAM(s) Oral three times a day    MEDICATIONS  (PRN):  acetaminophen     Tablet .. 650 milliGRAM(s) Oral every 6 hours PRN Temp greater or equal to 38.5C (101.3F), Moderate Pain (4 - 6)  albuterol    0.083% 2.5 milliGRAM(s) Nebulizer every 2 hours PRN Shortness of Breath and/or Wheezing  benzonatate 100 milliGRAM(s) Oral three times a day PRN Cough      Allergies    Levaquin (Anaphylaxis)  garlic (Other (Mild))    Intolerances        Vital Signs Last 24 Hrs  T(C): 36.4 (2025 04:50), Max: 37.1 (2025 20:30)  T(F): 97.5 (2025 04:50), Max: 98.8 (2025 20:30)  HR: 108 (2025 04:50) (99 - 108)  BP: 142/87 (2025 04:50) (115/80 - 148/84)  BP(mean): --  RR: 18 (2025 04:50) (18 - 19)  SpO2: 98% (2025 04:50) (97% - 98%)    Parameters below as of 2025 04:50  Patient On (Oxygen Delivery Method): nasal cannula        I&O's Summary    2025 07:01  -  2025 07:00  --------------------------------------------------------  IN: 50 mL / OUT: 0 mL / NET: 50 mL    2025 07:01  -  2025 10:50  --------------------------------------------------------  IN: 50 mL / OUT: 0 mL / NET: 50 mL          PHYSICAL EXAM:  Constitutional: NAD, awake and alert, well-developed  HEENT: Moist Mucous Membranes, Anicteric  Pulmonary: Non-labored, breath sounds are exp wheezing   Cardiovascular: Regular, S1 and S2 nl, No murmurs, rubs, gallops or clicks  Gastrointestinal: Bowel Sounds present, soft, nontender.   Lymph: No lymphadenopathy. No peripheral edema.  Skin: No visible rashes or ulcers.  Psych:  Mood & affect appropriate    LABS: All Labs Reviewed:                        12.1   10.69 )-----------( 359      ( 2025 06:50 )             36.6                         11.7   10.38 )-----------( 309      ( 2025 07:21 )             35.3                         12.1   9.74  )-----------( 301      ( 2025 06:25 )             36.5     2025 06:50    138    |  98     |  13     ----------------------------<  116    3.4     |  33     |  0.91   2025 07:21    139    |  97     |  12     ----------------------------<  125    3.0     |  33     |  0.88   2025 06:25    139    |  98     |  11     ----------------------------<  121    3.4     |  31     |  0.93     Ca    9.2        2025 06:50  Ca    8.6        2025 07:21  Ca    9.0        2025 06:25  Phos  2.9       2025 11:37  Mg     1.8       2025 11:37    TPro  6.6    /  Alb  2.8    /  TBili  0.7    /  DBili  x      /  AST  16     /  ALT  6      /  AlkPhos  102    31 May 2025 13:30    PT/INR - ( 2025 06:50 )   PT: 36.5 sec;   INR: 3.13 ratio                  EC Lead ECG:   Ventricular Rate 119 BPM    QRS Duration 82 ms    Q-T Interval 328 ms    QTC Calculation(Bazett) 461 ms    R Axis -5 degrees    T Axis 32 degrees    Diagnosis Line Atrial fibrillation with rapid ventricular response  Abnormal ECG  Confirmed by german Hull ([id number]) on 2025 3:14:44 PM (25 @ 10:28)      TRANSTHORACIC ECHOCARDIOGRAM REPORT  ________________________________________________________________________________                                      _______       Pt. Name:       ROBERT F BOECKLE Study Date:    3/3/2025  MRN:            [id number]         YOB: 1945  Accession #:    [id number]        Age:           79 years  Account#:       [id number]       Gender:        M  Heart Rate:                      Height:        ( )  Rhythm:                          Weight:        ( )  Blood Pressure: 133/65 mmHg      BSA/BMI:       /  ________________________________________________________________________________________  Referring Physician:    [id number] Asya Muller  Interpreting Physician: Chris Heaton  Primary Sonographer:    Andres Mejias    CPT:               ECHO TTE WO CON COMP W DOPP - 67272.m  Indication(s):     Palpitations - R00.2  Procedure:         Transthoracic echocardiogram with 2-D, M-mode and complete                     spectral and color flow Doppler.  Ordering Location: [facility]  Admission Status:  Inpatient  Study Information: Image quality for this study is non-diagnostic.    _______________________________________________________________________________________     CONCLUSIONS:      1. Left ventricular endocardium is not well visualized; however, the left ventricular systolic function appears grossly normal.   2. Non-diagnostic image quality.    ________________________________________________________________________________________  FINDINGS:     Left Ventricle:  The left ventricle was not well visualized. Left ventricular endocardium is not well visualized; however, the left ventricular systolic function appears grossly normal.     Right Ventricle:  The right ventricle is not well visualized.     Left Atrium:  The left atrium was not well visualized, and the LA volume could not be calculated.     Right Atrium:  The right atrium was not well visualized.     Interatrial Septum:  The interatrial septum was not well visualized.     Aortic Valve:  The aortic valve was not well visualized.     Mitral Valve:  The mitral valve was not well visualized. There is calcification of the mitral valve annulus.     Tricuspid Valve:  The tricuspid valve was not well visualized.     Pulmonic Valve:  The pulmonic valve was not well visualized.     Systemic Veins:  The inferior vena cava is normal in size (normal <2.1cm) with normal inspiratory collapse (normal >50%) consistent with normal right atrial pressure (~3, range 0-5mmHg).  ____________________________________________________________________  QUANTITATIVE DATA:              Tricuspid Valve Measurements:     RA Pressure: 3 mmHg    ________________________________________________________________________________________  Electronically signed on 3/4/2025 at 9:22:27 AM by Chris Heaton

## 2025-06-04 LAB
ANION GAP SERPL CALC-SCNC: 7 MMOL/L — SIGNIFICANT CHANGE UP (ref 5–17)
BASOPHILS # BLD AUTO: 0.11 K/UL — SIGNIFICANT CHANGE UP (ref 0–0.2)
BASOPHILS NFR BLD AUTO: 1 % — SIGNIFICANT CHANGE UP (ref 0–2)
BUN SERPL-MCNC: 15 MG/DL — SIGNIFICANT CHANGE UP (ref 7–23)
CALCIUM SERPL-MCNC: 9.1 MG/DL — SIGNIFICANT CHANGE UP (ref 8.5–10.1)
CHLORIDE SERPL-SCNC: 98 MMOL/L — SIGNIFICANT CHANGE UP (ref 96–108)
CO2 SERPL-SCNC: 33 MMOL/L — HIGH (ref 22–31)
CREAT SERPL-MCNC: 0.95 MG/DL — SIGNIFICANT CHANGE UP (ref 0.5–1.3)
CULTURE RESULTS: ABNORMAL
EGFR: 81 ML/MIN/1.73M2 — SIGNIFICANT CHANGE UP
EGFR: 81 ML/MIN/1.73M2 — SIGNIFICANT CHANGE UP
EOSINOPHIL # BLD AUTO: 0.27 K/UL — SIGNIFICANT CHANGE UP (ref 0–0.5)
EOSINOPHIL NFR BLD AUTO: 2.6 % — SIGNIFICANT CHANGE UP (ref 0–6)
GLUCOSE SERPL-MCNC: 122 MG/DL — HIGH (ref 70–99)
HCT VFR BLD CALC: 37.4 % — LOW (ref 39–50)
HGB BLD-MCNC: 12.3 G/DL — LOW (ref 13–17)
IMM GRANULOCYTES NFR BLD AUTO: 0.9 % — SIGNIFICANT CHANGE UP (ref 0–0.9)
INR BLD: 2.75 RATIO — HIGH (ref 0.85–1.16)
LYMPHOCYTES # BLD AUTO: 1.6 K/UL — SIGNIFICANT CHANGE UP (ref 1–3.3)
LYMPHOCYTES # BLD AUTO: 15.2 % — SIGNIFICANT CHANGE UP (ref 13–44)
MCHC RBC-ENTMCNC: 31.6 PG — SIGNIFICANT CHANGE UP (ref 27–34)
MCHC RBC-ENTMCNC: 32.9 G/DL — SIGNIFICANT CHANGE UP (ref 32–36)
MCV RBC AUTO: 96.1 FL — SIGNIFICANT CHANGE UP (ref 80–100)
MONOCYTES # BLD AUTO: 1.41 K/UL — HIGH (ref 0–0.9)
MONOCYTES NFR BLD AUTO: 13.4 % — SIGNIFICANT CHANGE UP (ref 2–14)
NEUTROPHILS # BLD AUTO: 7.02 K/UL — SIGNIFICANT CHANGE UP (ref 1.8–7.4)
NEUTROPHILS NFR BLD AUTO: 66.9 % — SIGNIFICANT CHANGE UP (ref 43–77)
NRBC BLD AUTO-RTO: 0 /100 WBCS — SIGNIFICANT CHANGE UP (ref 0–0)
PLATELET # BLD AUTO: 357 K/UL — SIGNIFICANT CHANGE UP (ref 150–400)
POTASSIUM SERPL-MCNC: 3.4 MMOL/L — LOW (ref 3.5–5.3)
POTASSIUM SERPL-SCNC: 3.4 MMOL/L — LOW (ref 3.5–5.3)
PROCALCITONIN SERPL-MCNC: 0.12 NG/ML — HIGH
PROTHROM AB SERPL-ACNC: 31.8 SEC — HIGH (ref 9.9–13.4)
RBC # BLD: 3.89 M/UL — LOW (ref 4.2–5.8)
RBC # FLD: 13.2 % — SIGNIFICANT CHANGE UP (ref 10.3–14.5)
SODIUM SERPL-SCNC: 138 MMOL/L — SIGNIFICANT CHANGE UP (ref 135–145)
SPECIMEN SOURCE: SIGNIFICANT CHANGE UP
WBC # BLD: 10.5 K/UL — SIGNIFICANT CHANGE UP (ref 3.8–10.5)
WBC # FLD AUTO: 10.5 K/UL — SIGNIFICANT CHANGE UP (ref 3.8–10.5)

## 2025-06-04 PROCEDURE — 99233 SBSQ HOSP IP/OBS HIGH 50: CPT

## 2025-06-04 RX ORDER — CEFUROXIME SODIUM 1.5 G
500 VIAL (EA) INJECTION EVERY 12 HOURS
Refills: 0 | Status: COMPLETED | OUTPATIENT
Start: 2025-06-05 | End: 2025-06-06

## 2025-06-04 RX ADMIN — FOLIC ACID 1 MILLIGRAM(S): 1 TABLET ORAL at 11:03

## 2025-06-04 RX ADMIN — ROPINIROLE 2 MILLIGRAM(S): 5 TABLET, FILM COATED ORAL at 23:21

## 2025-06-04 RX ADMIN — BUTYROSPERMUM PARKII(SHEA BUTTER), SIMMONDSIA CHINENSIS (JOJOBA) SEED OIL, ALOE BARBADENSIS LEAF EXTRACT 250 MILLIGRAM(S): .01; 1; 3.5 LIQUID TOPICAL at 05:29

## 2025-06-04 RX ADMIN — Medication 1.5 MILLIGRAM(S): at 21:20

## 2025-06-04 RX ADMIN — IPRATROPIUM BROMIDE AND ALBUTEROL SULFATE 3 MILLILITER(S): .5; 2.5 SOLUTION RESPIRATORY (INHALATION) at 01:05

## 2025-06-04 RX ADMIN — METOPROLOL SUCCINATE 25 MILLIGRAM(S): 50 TABLET, EXTENDED RELEASE ORAL at 05:29

## 2025-06-04 RX ADMIN — METOPROLOL SUCCINATE 25 MILLIGRAM(S): 50 TABLET, EXTENDED RELEASE ORAL at 14:15

## 2025-06-04 RX ADMIN — Medication 1 TABLET(S): at 12:48

## 2025-06-04 RX ADMIN — CEFTRIAXONE 100 MILLIGRAM(S): 500 INJECTION, POWDER, FOR SOLUTION INTRAMUSCULAR; INTRAVENOUS at 08:58

## 2025-06-04 RX ADMIN — FUROSEMIDE 40 MILLIGRAM(S): 10 INJECTION INTRAMUSCULAR; INTRAVENOUS at 05:29

## 2025-06-04 RX ADMIN — DEXTROMETHORPHAN HBR, GUAIFENESIN 200 MILLIGRAM(S): 200 LIQUID ORAL at 17:53

## 2025-06-04 RX ADMIN — Medication 1 TABLET(S): at 23:29

## 2025-06-04 RX ADMIN — METOPROLOL SUCCINATE 25 MILLIGRAM(S): 50 TABLET, EXTENDED RELEASE ORAL at 21:21

## 2025-06-04 RX ADMIN — TRIHEXYPHENIDYL HYDROCHLORIDE 1 MILLIGRAM(S): 2 TABLET ORAL at 21:20

## 2025-06-04 RX ADMIN — Medication 1 TABLET(S): at 08:08

## 2025-06-04 RX ADMIN — IPRATROPIUM BROMIDE AND ALBUTEROL SULFATE 3 MILLILITER(S): .5; 2.5 SOLUTION RESPIRATORY (INHALATION) at 12:59

## 2025-06-04 RX ADMIN — ROPINIROLE 2 MILLIGRAM(S): 5 TABLET, FILM COATED ORAL at 12:48

## 2025-06-04 RX ADMIN — ROPINIROLE 2 MILLIGRAM(S): 5 TABLET, FILM COATED ORAL at 17:55

## 2025-06-04 RX ADMIN — IPRATROPIUM BROMIDE AND ALBUTEROL SULFATE 3 MILLILITER(S): .5; 2.5 SOLUTION RESPIRATORY (INHALATION) at 07:38

## 2025-06-04 RX ADMIN — DEXTROMETHORPHAN HBR, GUAIFENESIN 200 MILLIGRAM(S): 200 LIQUID ORAL at 23:21

## 2025-06-04 RX ADMIN — TRIHEXYPHENIDYL HYDROCHLORIDE 1 MILLIGRAM(S): 2 TABLET ORAL at 14:11

## 2025-06-04 RX ADMIN — DEXTROMETHORPHAN HBR, GUAIFENESIN 200 MILLIGRAM(S): 200 LIQUID ORAL at 11:03

## 2025-06-04 RX ADMIN — BUTYROSPERMUM PARKII(SHEA BUTTER), SIMMONDSIA CHINENSIS (JOJOBA) SEED OIL, ALOE BARBADENSIS LEAF EXTRACT 250 MILLIGRAM(S): .01; 1; 3.5 LIQUID TOPICAL at 17:52

## 2025-06-04 RX ADMIN — IPRATROPIUM BROMIDE AND ALBUTEROL SULFATE 3 MILLILITER(S): .5; 2.5 SOLUTION RESPIRATORY (INHALATION) at 19:09

## 2025-06-04 RX ADMIN — ROPINIROLE 2 MILLIGRAM(S): 5 TABLET, FILM COATED ORAL at 08:08

## 2025-06-04 RX ADMIN — Medication 1 TABLET(S): at 17:52

## 2025-06-04 RX ADMIN — FUROSEMIDE 40 MILLIGRAM(S): 10 INJECTION INTRAMUSCULAR; INTRAVENOUS at 17:53

## 2025-06-04 RX ADMIN — DEXTROMETHORPHAN HBR, GUAIFENESIN 200 MILLIGRAM(S): 200 LIQUID ORAL at 05:29

## 2025-06-04 RX ADMIN — TRIHEXYPHENIDYL HYDROCHLORIDE 1 MILLIGRAM(S): 2 TABLET ORAL at 05:29

## 2025-06-04 NOTE — PROGRESS NOTE ADULT - ASSESSMENT
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 Patient recently admitted to [facility] for new-onset Afib and acute hypoxic respiratory failure 2/2 COPD and worsened diaphragmatic hernia.sp diaphragmatic hernia repair. aw sob and cough    CXR-Moderate left and small right-sided pleural effusions.      #acute on chronic  hypoxic respiratory failure   likey from acute chf w pleural effusions   probnp- high, i/o, wts, TTE  cards cs noted  ct chest- ?consolidations and loculated effusions  procal elev  pulm cs noted  ID cs fu  cont abx    #COPD on home o2  resume home inhalers    #DVT/PE, hx factor v leiden deffi  hx of afib  coumadin, monitor inr    #Parkinsons dz- resume home meds    [facility]   [phone]

## 2025-06-04 NOTE — PROGRESS NOTE ADULT - SUBJECTIVE AND OBJECTIVE BOX
[facility]  Tima Camacho MD PhD, Yu Loya MD, Shayna Lares MD, Surendra Connolly MD, Pedro Bedoya MD  and providing coverage with Wm Machado MD  Providing Infectious Disease Consultations at [facility], [facility], [facility]'s    Office# [phone] to schedule follow up appointments  Answering Service for urgent calls or New Consults [phone]  Cell# to text for urgent issues Tima Camacho [phone]     infectious diseases progress note:    ROBERT BOECKLE is a 79y y. o. Male patient    Overnight and events of the last 24hrs reviewed    Allergies    Levaquin (Anaphylaxis)  garlic (Other (Mild))    Intolerances        ANTIBIOTICS/RELEVANT:  antimicrobials  cefTRIAXone   IVPB 1000 milliGRAM(s) IV Intermittent every 24 hours    immunologic:    OTHER:  acetaminophen     Tablet .. 650 milliGRAM(s) Oral every 6 hours PRN  albuterol/ipratropium for Nebulization 3 milliLiter(s) Nebulizer once  albuterol/ipratropium for Nebulization 3 milliLiter(s) Nebulizer every 6 hours  benzonatate 100 milliGRAM(s) Oral three times a day PRN  carbidopa/levodopa  25/100 1 Tablet(s) Oral <User Schedule>  folic acid 1 milliGRAM(s) Oral daily  furosemide   Injectable 40 milliGRAM(s) IV Push every 12 hours  guaiFENesin Oral Liquid (Sugar-Free) 200 milliGRAM(s) Oral every 6 hours  metoprolol tartrate 25 milliGRAM(s) Oral every 8 hours  rOPINIRole 2 milliGRAM(s) Oral <User Schedule>  saccharomyces boulardii 250 milliGRAM(s) Oral two times a day  trihexyphenidyl 1 milliGRAM(s) Oral three times a day      Objective:  Vital Signs Last 24 Hrs  T(C): 36.8 (04 Jun 2025 04:50), Max: 36.8 (04 Jun 2025 04:50)  T(F): 98.3 (04 Jun 2025 04:50), Max: 98.3 (04 Jun 2025 04:50)  HR: 92 (04 Jun 2025 07:50) (56 - 112)  BP: 110/69 (04 Jun 2025 04:50) (96/60 - 127/84)  BP(mean): --  RR: 18 (04 Jun 2025 04:50) (18 - 18)  SpO2: 95% (04 Jun 2025 04:50) (94% - 99%)    Parameters below as of 04 Jun 2025 04:50  Patient On (Oxygen Delivery Method): nasal cannula        T(C): 36.8 (06-04-25 @ 04:50), Max: 37.1 (06-02-25 @ 20:30)  T(C): 36.8 (06-04-25 @ 04:50), Max: 37.1 (06-02-25 @ 20:30)  T(C): 36.8 (06-04-25 @ 04:50), Max: 37.2 (05-31-25 @ 21:41)    PHYSICAL EXAM:  HEENT: NC atraumatic  Neck: supple, dec JVP  Respiratory: no accessory muscle use, breathing comfortably, no ronchi  Cardiovascular: distant  Gastrointestinal: normal appearing, nondistended  Extremities: no clubbing, no cyanosis,        LABS:                          12.3   10.50 )-----------( 357      ( 04 Jun 2025 06:30 )             37.4       WBC  10.50 06-04 @ 06:30  10.69 06-03 @ 06:50  10.38 06-02 @ 07:21  9.74 06-01 @ 06:25  9.44 05-31 @ 13:30      06-04    138  |  98  |  15  ----------------------------<  122[H]  3.4[L]   |  33[H]  |  0.95    Ca    9.1      04 Jun 2025 06:30  Phos  2.9     06-02  Mg     1.8     06-02        Creatinine: 0.95 mg/dL (06-04-25 @ 06:30)  Creatinine: 0.91 mg/dL (06-03-25 @ 06:50)  Creatinine: 0.88 mg/dL (06-02-25 @ 07:21)  Creatinine: 0.93 mg/dL (06-01-25 @ 06:25)  Creatinine: 0.93 mg/dL (05-31-25 @ 13:30)      PT/INR - ( 04 Jun 2025 06:30 )   PT: 31.8 sec;   INR: 2.75 ratio           Urinalysis Basic - ( 04 Jun 2025 06:30 )    Color: x / Appearance: x / SG: x / pH: x  Gluc: 122 mg/dL / Ketone: x  / Bili: x / Urobili: x   Blood: x / Protein: x / Nitrite: x   Leuk Esterase: x / RBC: x / WBC x   Sq Epi: x / Non Sq Epi: x / Bacteria: x            INFLAMMATORY MARKERS      MICROBIOLOGY:              RADIOLOGY & ADDITIONAL STUDIES:

## 2025-06-04 NOTE — PROGRESS NOTE ADULT - SUBJECTIVE AND OBJECTIVE BOX
[facility] -- Sid White, Mohsen Mccauley Savella, Goodger: Office # [phone]    Follow Up:  ADHF    Subjective/Observations: Patient seen and examined. Patient alert awake, Denies chest pain palpitation dizziness, denies difficulty breathing , no orthopnea or PND noted. Tolerating NC      REVIEW OF SYSTEMS: All other review of systems are negative unless indicated above    PAST MEDICAL & SURGICAL HISTORY:  Personal history of PE (pulmonary embolism)      COPD (chronic obstructive pulmonary disease)      Hypertension      Restless leg syndrome      Factor V Leiden      Parkinsons      Spinal stenosis      Memory loss      Right inguinal hernia      AL (obstructive sleep apnea)      Diabetes      Herniated cervical disc      H/O hernia repair          MEDICATIONS  (STANDING):  albuterol/ipratropium for Nebulization 3 milliLiter(s) Nebulizer once  albuterol/ipratropium for Nebulization 3 milliLiter(s) Nebulizer every 6 hours  carbidopa/levodopa  25/100 1 Tablet(s) Oral <User Schedule>  cefTRIAXone   IVPB 1000 milliGRAM(s) IV Intermittent every 24 hours  folic acid 1 milliGRAM(s) Oral daily  furosemide   Injectable 40 milliGRAM(s) IV Push every 12 hours  guaiFENesin Oral Liquid (Sugar-Free) 200 milliGRAM(s) Oral every 6 hours  metoprolol tartrate 25 milliGRAM(s) Oral every 8 hours  rOPINIRole 2 milliGRAM(s) Oral <User Schedule>  saccharomyces boulardii 250 milliGRAM(s) Oral two times a day  trihexyphenidyl 1 milliGRAM(s) Oral three times a day    MEDICATIONS  (PRN):  acetaminophen     Tablet .. 650 milliGRAM(s) Oral every 6 hours PRN Temp greater or equal to 38.5C (101.3F), Moderate Pain (4 - 6)  benzonatate 100 milliGRAM(s) Oral three times a day PRN Cough    Allergies    Levaquin (Anaphylaxis)  garlic (Other (Mild))          Vital Signs Last 24 Hrs  T(C): 36.8 (04 Jun 2025 04:50), Max: 36.8 (04 Jun 2025 04:50)  T(F): 98.3 (04 Jun 2025 04:50), Max: 98.3 (04 Jun 2025 04:50)  HR: 96 (04 Jun 2025 04:50) (56 - 112)  BP: 110/69 (04 Jun 2025 04:50) (96/60 - 127/84)  BP(mean): --  RR: 18 (04 Jun 2025 04:50) (18 - 18)  SpO2: 95% (04 Jun 2025 04:50) (94% - 99%)    Parameters below as of 04 Jun 2025 04:50  Patient On (Oxygen Delivery Method): nasal cannula      I&O's Summary    03 Jun 2025 07:01  -  04 Jun 2025 07:00  --------------------------------------------------------  IN: 50 mL / OUT: 0 mL / NET: 50 mL          TELE: atrial fibrillation   PHYSICAL EXAM:  Constitutional: NAD, awake and alert,   HEENT: Moist Mucous Membranes, Anicteric  Pulmonary: Non-labored, breath sounds are exp wheezing bilaterally  Cardiovascular: Regular, S1 and S2, No murmurs, No rubs, gallops or clicks  Gastrointestinal:  soft, nontender, nondistended   Lymph: No peripheral edema.   Skin: No visible rashes or ulcers.  Psych:  Mood & affect appropriate      LABS: All Labs Reviewed:                        12.3   10.50 )-----------( 357      ( 04 Jun 2025 06:30 )             37.4                         12.1   10.69 )-----------( 359      ( 03 Jun 2025 06:50 )             36.6                         11.7   10.38 )-----------( 309      ( 02 Jun 2025 07:21 )             35.3     04 Jun 2025 06:30    138    |  98     |  15     ----------------------------<  122    3.4     |  33     |  0.95   03 Jun 2025 06:50    138    |  98     |  13     ----------------------------<  116    3.4     |  33     |  0.91   02 Jun 2025 07:21    139    |  97     |  12     ----------------------------<  125    3.0     |  33     |  0.88     Ca    9.1        04 Jun 2025 06:30  Ca    9.2        03 Jun 2025 06:50  Ca    8.6        02 Jun 2025 07:21  Phos  2.9       02 Jun 2025 11:37  Mg     1.8       02 Jun 2025 11:37       PT/INR - ( 04 Jun 2025 06:30 )   PT: 31.8 sec;   INR: 2.75 ratio         Troponin I, High Sensitivity Result: 12.0 ng/L (05-31-25 @ 13:30)    12 Lead ECG:   Ventricular Rate 119 BPM    QRS Duration 82 ms    Q-T Interval 328 ms    QTC Calculation(Bazett) 461 ms    R Axis -5 degrees    T Axis 32 degrees    Diagnosis Line Atrial fibrillation with rapid ventricular response  Abnormal ECG  Confirmed by german Hull ([id number]) on 6/2/2025 3:14:44 PM (06-02-25 @ 10:28)      TRANSTHORACIC ECHOCARDIOGRAM REPORT  ________________________________________________________________________________                                      _______       Pt. Name:       ROBERT F BOECKLE Study Date:    6/3/2025  MRN:            [id number]         YOB: 1945  Accession #:    [id number]        Age:           79 years  Account#:       [id number]       Gender:        M  Heart Rate:                      Height:        66.93 in (170.00 cm)  Rhythm:                          Weight:        147.71 lb (67.00 kg)  Blood Pressure: 96/60 mmHg       BSA/BMI:       1.78 m² / 23.18 kg/m²  ________________________________________________________________________________________  Referring Physician:    [id number] Asya Muller  Interpreting Physician: Mere Mena MD  Primary Sonographer:    Andres Mejias    CPT:                ECHO TTE WITH CON COMP W DOPP - .m  Indication(s):      Edema, unspecified - R60.9  Procedure:          Transthoracic echocardiogram with 2-D, M-mode and complete                      spectral and color flow Doppler.  Ordering Location:  [facility]  Admission Status:   Inpatient  Contrast Injection: Verbal consent was obtained for injection of Ultrasonic                      Enhancing Agent following a discussion of risks and                      benefits.                      Endocardial visualization enhanced with Definity Ultrasound                      enhancing agent.  Study Information:  Image quality for this study is technically difficult.                      Technically difficult study secondary to lung interference.    _______________________________________________________________________________________     CONCLUSIONS:      1. Technically difficult image quality.   2. Leftventricular systolic function is normal with an ejection fraction of 57 % by Andrews's method of disks.   3. Probably normal right ventricular systolic function.   4. Aortic valve was not well visualized.   5. Mitral valve leaflets have focal calcification.   6. Trace mitral regurgitation.   7. Tricuspid valve was not well visualized.   8. The inferior vena cava is normal in size measuring 1.77 cm in diameter, (normal <2.1cm) with normal inspiratory collapse (normal >50%) consistent with normal right atrial pressure (~3, range 0-5mmHg).   9. Left pleural effusion noted.    ________________________________________________________________________________________  FINDINGS:     Left Ventricle:  After obtaining consent, Definity ultrasound enhancing agent was given for enhanced left ventricular opacification and improved delineation of the left ventricular endocardial borders. Left ventricular systolic function is normal with a calculated ejection fraction of 57 % by the Andrews's biplane method of disks.     Right Ventricle:  The right ventricle is not well visualized. Right ventricular systolic function is probably normal.     Left Atrium:  The left atrium is normal in size.     Right Atrium:  The right atrium is normal in size with an indexed volume of 18.86 ml/m² and an indexed area of 8.50 cm²/m².     Interatrial Septum:  The interatrial septum was not well visualized.     Aortic Valve:  The aortic valve was not well visualized.     Mitral Valve:  Mitral valve leaflets have focalcalcification. There is trace mitral regurgitation.     Tricuspid Valve:  The tricuspid valve was not well visualized.     Pulmonic Valve:  The pulmonic valve was not well visualized.     Aorta:  The aortic root at the sinuses of Valsalva is normal in size, measuring 3.60 cm (indexed 2.03 cm/m²). The ascending aorta is normal in size, measuring 3.30 cm (indexed 1.86 cm/m²).     Pleura:  Left pleural effusion noted.     Systemic Veins:  The inferior vena cava is normal in size measuring 1.77 cm indiameter, (normal <2.1cm) with normal inspiratory collapse (normal >50%) consistent with normal right atrial pressure (~3, range 0-5mmHg).  ____________________________________________________________________  QUANTITATIVE DATA:  Left Ventricle Measurements: (Indexed to BSA)     IVSd (2D):   0.9 cm  LVPWd (2D):  1.2 cm  LVIDd (2D):  4.6 cm  LVIDs (2D):  3.2 cm  LV Mass:     176 g  99.2 g/m²  LV Vol d, MOD A2C: 27.7 ml 15.59 ml/m²  LV Vol d, MOD A4C: 40.9 ml 23.03 ml/m²  LV Vol d, MOD BP:  34.1 ml19.20 ml/m²  LV Vol s, MOD A2C: 12.3 ml 6.92 ml/m²  LV Vol s, MOD A4C: 17.6 ml 9.91 ml/m²  LV Vol s, MOD BP:  14.8 ml 8.31 ml/m²  LVOT SV MOD BP:    19.3 ml  LV EF% MOD BP:     57 %     MV E Vmax:    1.26 m/s  MV A Vmax:    0.40 m/s  MV E/A:       3.12  e' lateral:   8.05 cm/s  e' medial:    7.51 cm/s  E/e' lateral: 15.65  E/e' medial:  16.78  E/e' Average: 16.20  MV DT:        181 msec    Aorta Measurements: (Normal range) (Indexed to BSA)     Ao Root d     3.60 cm (3.1 - 3.7 cm) 2.03 cm/m²  Ao Asc d, 2D: 3.30  Ao Asc prox:  3.30 cm                1.86 cm/m²            Left Atrium Measurements: (Indexed to BSA)  LA Diam 2D: 4.70 cm         Right Atrial Measurements:     RA Vol s, MOD A4C         33.5 ml  RA Vol s, MOD A4C i BSA   18.86 ml/m²  RA Area s, MOD A4C        15.1 cm²  RA Area s, MOD A4C, i BSA 8.50 cm²/m²       LVOT / RVOT/ Qp/Qs Data: (Indexed to BSA)  LVOT Diameter,s: 2.10 cm  LVOT Area:       3.46 cm²    Mitral Valve Measurements:     MV E Vmax: 1.3 m/s  MV A Vmax: 0.4 m/s  MV E/A:    3.1       Tricuspid Valve Measurements:     RA Pressure: 3 mmHg    ________________________________________________________________________________________  Electronically signed on 6/4/2025 at 7:59:49 AM by Mere Mena MD         *** Final ***   [facility] -- Sid White, Mohsen Mccauley Savella, Goodger: Office # [phone]    Follow Up:  ADHF    Subjective/Observations: Patient seen and examined. Patient alert awake, Denies chest pain palpitation dizziness, denies difficulty breathing , no orthopnea or PND noted. Tolerating NC      REVIEW OF SYSTEMS: All other review of systems are negative unless indicated above    PAST MEDICAL & SURGICAL HISTORY:  Personal history of PE (pulmonary embolism)      COPD (chronic obstructive pulmonary disease)      Hypertension      Restless leg syndrome      Factor V Leiden      Parkinsons      Spinal stenosis      Memory loss      Right inguinal hernia      AL (obstructive sleep apnea)      Diabetes      Herniated cervical disc      H/O hernia repair          MEDICATIONS  (STANDING):  albuterol/ipratropium for Nebulization 3 milliLiter(s) Nebulizer once  albuterol/ipratropium for Nebulization 3 milliLiter(s) Nebulizer every 6 hours  carbidopa/levodopa  25/100 1 Tablet(s) Oral <User Schedule>  cefTRIAXone   IVPB 1000 milliGRAM(s) IV Intermittent every 24 hours  folic acid 1 milliGRAM(s) Oral daily  furosemide   Injectable 40 milliGRAM(s) IV Push every 12 hours  guaiFENesin Oral Liquid (Sugar-Free) 200 milliGRAM(s) Oral every 6 hours  metoprolol tartrate 25 milliGRAM(s) Oral every 8 hours  rOPINIRole 2 milliGRAM(s) Oral <User Schedule>  saccharomyces boulardii 250 milliGRAM(s) Oral two times a day  trihexyphenidyl 1 milliGRAM(s) Oral three times a day    MEDICATIONS  (PRN):  acetaminophen     Tablet .. 650 milliGRAM(s) Oral every 6 hours PRN Temp greater or equal to 38.5C (101.3F), Moderate Pain (4 - 6)  benzonatate 100 milliGRAM(s) Oral three times a day PRN Cough    Allergies    Levaquin (Anaphylaxis)  garlic (Other (Mild))          Vital Signs Last 24 Hrs  T(C): 36.8 (04 Jun 2025 04:50), Max: 36.8 (04 Jun 2025 04:50)  T(F): 98.3 (04 Jun 2025 04:50), Max: 98.3 (04 Jun 2025 04:50)  HR: 96 (04 Jun 2025 04:50) (56 - 112)  BP: 110/69 (04 Jun 2025 04:50) (96/60 - 127/84)  BP(mean): --  RR: 18 (04 Jun 2025 04:50) (18 - 18)  SpO2: 95% (04 Jun 2025 04:50) (94% - 99%)    Parameters below as of 04 Jun 2025 04:50  Patient On (Oxygen Delivery Method): nasal cannula      I&O's Summary    03 Jun 2025 07:01  -  04 Jun 2025 07:00  --------------------------------------------------------  IN: 50 mL / OUT: 0 mL / NET: 50 mL          TELE: atrial fibrillation   PHYSICAL EXAM:  Constitutional: NAD, awake and alert,   HEENT: Moist Mucous Membranes, Anicteric  Pulmonary: Non-labored, breath sounds are exp wheezing bilaterally and diminished at bases L>R  Cardiovascular: Regular, S1 and S2, No murmurs, No rubs, gallops or clicks  Gastrointestinal:  soft, nontender, nondistended   Lymph: No peripheral edema.   Skin: No visible rashes or ulcers.  Psych:  Mood & affect appropriate      LABS: All Labs Reviewed:                        12.3   10.50 )-----------( 357      ( 04 Jun 2025 06:30 )             37.4                         12.1   10.69 )-----------( 359      ( 03 Jun 2025 06:50 )             36.6                         11.7   10.38 )-----------( 309      ( 02 Jun 2025 07:21 )             35.3     04 Jun 2025 06:30    138    |  98     |  15     ----------------------------<  122    3.4     |  33     |  0.95   03 Jun 2025 06:50    138    |  98     |  13     ----------------------------<  116    3.4     |  33     |  0.91   02 Jun 2025 07:21    139    |  97     |  12     ----------------------------<  125    3.0     |  33     |  0.88     Ca    9.1        04 Jun 2025 06:30  Ca    9.2        03 Jun 2025 06:50  Ca    8.6        02 Jun 2025 07:21  Phos  2.9       02 Jun 2025 11:37  Mg     1.8       02 Jun 2025 11:37       PT/INR - ( 04 Jun 2025 06:30 )   PT: 31.8 sec;   INR: 2.75 ratio         Troponin I, High Sensitivity Result: 12.0 ng/L (05-31-25 @ 13:30)    12 Lead ECG:   Ventricular Rate 119 BPM    QRS Duration 82 ms    Q-T Interval 328 ms    QTC Calculation(Bazett) 461 ms    R Axis -5 degrees    T Axis 32 degrees    Diagnosis Line Atrial fibrillation with rapid ventricular response  Abnormal ECG  Confirmed by german Hull ([id number]) on 6/2/2025 3:14:44 PM (06-02-25 @ 10:28)      TRANSTHORACIC ECHOCARDIOGRAM REPORT  ________________________________________________________________________________                                      _______       Pt. Name:       ROBERT F BOECKLE Study Date:    6/3/2025  MRN:            [id number]         YOB: 1945  Accession #:    [id number]        Age:           79 years  Account#:       [id number]       Gender:        M  Heart Rate:                      Height:        66.93 in (170.00 cm)  Rhythm:                          Weight:        147.71 lb (67.00 kg)  Blood Pressure: 96/60 mmHg       BSA/BMI:       1.78 m² / 23.18 kg/m²  ________________________________________________________________________________________  Referring Physician:    [id number] Asya Muller  Interpreting Physician: Mere Mena MD  Primary Sonographer:    Andres Mejias    CPT:                ECHO TTE WITH CON COMP W DOPP - .m  Indication(s):      Edema, unspecified - R60.9  Procedure:          Transthoracic echocardiogram with 2-D, M-mode and complete                      spectral and color flow Doppler.  Ordering Location:  [facility]  Admission Status:   Inpatient  Contrast Injection: Verbal consent was obtained for injection of Ultrasonic                      Enhancing Agent following a discussion of risks and                      benefits.                      Endocardial visualization enhanced with Definity Ultrasound                      enhancing agent.  Study Information:  Image quality for this study is technically difficult.                      Technically difficult study secondary to lung interference.    _______________________________________________________________________________________     CONCLUSIONS:      1. Technically difficult image quality.   2. Leftventricular systolic function is normal with an ejection fraction of 57 % by Andrews's method of disks.   3. Probably normal right ventricular systolic function.   4. Aortic valve was not well visualized.   5. Mitral valve leaflets have focal calcification.   6. Trace mitral regurgitation.   7. Tricuspid valve was not well visualized.   8. The inferior vena cava is normal in size measuring 1.77 cm in diameter, (normal <2.1cm) with normal inspiratory collapse (normal >50%) consistent with normal right atrial pressure (~3, range 0-5mmHg).   9. Left pleural effusion noted.    ________________________________________________________________________________________  FINDINGS:     Left Ventricle:  After obtaining consent, Definity ultrasound enhancing agent was given for enhanced left ventricular opacification and improved delineation of the left ventricular endocardial borders. Left ventricular systolic function is normal with a calculated ejection fraction of 57 % by the Andrews's biplane method of disks.     Right Ventricle:  The right ventricle is not well visualized. Right ventricular systolic function is probably normal.     Left Atrium:  The left atrium is normal in size.     Right Atrium:  The right atrium is normal in size with an indexed volume of 18.86 ml/m² and an indexed area of 8.50 cm²/m².     Interatrial Septum:  The interatrial septum was not well visualized.     Aortic Valve:  The aortic valve was not well visualized.     Mitral Valve:  Mitral valve leaflets have focalcalcification. There is trace mitral regurgitation.     Tricuspid Valve:  The tricuspid valve was not well visualized.     Pulmonic Valve:  The pulmonic valve was not well visualized.     Aorta:  The aortic root at the sinuses of Valsalva is normal in size, measuring 3.60 cm (indexed 2.03 cm/m²). The ascending aorta is normal in size, measuring 3.30 cm (indexed 1.86 cm/m²).     Pleura:  Left pleural effusion noted.     Systemic Veins:  The inferior vena cava is normal in size measuring 1.77 cm indiameter, (normal <2.1cm) with normal inspiratory collapse (normal >50%) consistent with normal right atrial pressure (~3, range 0-5mmHg).  ____________________________________________________________________  QUANTITATIVE DATA:  Left Ventricle Measurements: (Indexed to BSA)     IVSd (2D):   0.9 cm  LVPWd (2D):  1.2 cm  LVIDd (2D):  4.6 cm  LVIDs (2D):  3.2 cm  LV Mass:     176 g  99.2 g/m²  LV Vol d, MOD A2C: 27.7 ml 15.59 ml/m²  LV Vol d, MOD A4C: 40.9 ml 23.03 ml/m²  LV Vol d, MOD BP:  34.1 ml19.20 ml/m²  LV Vol s, MOD A2C: 12.3 ml 6.92 ml/m²  LV Vol s, MOD A4C: 17.6 ml 9.91 ml/m²  LV Vol s, MOD BP:  14.8 ml 8.31 ml/m²  LVOT SV MOD BP:    19.3 ml  LV EF% MOD BP:     57 %     MV E Vmax:    1.26 m/s  MV A Vmax:    0.40 m/s  MV E/A:       3.12  e' lateral:   8.05 cm/s  e' medial:    7.51 cm/s  E/e' lateral: 15.65  E/e' medial:  16.78  E/e' Average: 16.20  MV DT:        181 msec    Aorta Measurements: (Normal range) (Indexed to BSA)     Ao Root d     3.60 cm (3.1 - 3.7 cm) 2.03 cm/m²  Ao Asc d, 2D: 3.30  Ao Asc prox:  3.30 cm                1.86 cm/m²            Left Atrium Measurements: (Indexed to BSA)  LA Diam 2D: 4.70 cm         Right Atrial Measurements:     RA Vol s, MOD A4C         33.5 ml  RA Vol s, MOD A4C i BSA   18.86 ml/m²  RA Area s, MOD A4C        15.1 cm²  RA Area s, MOD A4C, i BSA 8.50 cm²/m²       LVOT / RVOT/ Qp/Qs Data: (Indexed to BSA)  LVOT Diameter,s: 2.10 cm  LVOT Area:       3.46 cm²    Mitral Valve Measurements:     MV E Vmax: 1.3 m/s  MV A Vmax: 0.4 m/s  MV E/A:    3.1       Tricuspid Valve Measurements:     RA Pressure: 3 mmHg    ________________________________________________________________________________________  Electronically signed on 6/4/2025 at 7:59:49 AM by Mere Mena MD         *** Final ***   [facility] -- Sid White, Mohsen Mccauley Savella, Goodger: Office # [phone]    Follow Up:  ADHF    Subjective/Observations: Patient seen and examined. Patient alert awake, Denies chest pain palpitation dizziness, denies difficulty breathing , no orthopnea or PND noted. Tolerating NC      REVIEW OF SYSTEMS: All other review of systems are negative unless indicated above    PAST MEDICAL & SURGICAL HISTORY:  Personal history of PE (pulmonary embolism)      COPD (chronic obstructive pulmonary disease)      Hypertension      Restless leg syndrome      Factor V Leiden      Parkinsons      Spinal stenosis      Memory loss      Right inguinal hernia      AL (obstructive sleep apnea)      Diabetes      Herniated cervical disc      H/O hernia repair          MEDICATIONS  (STANDING):  albuterol/ipratropium for Nebulization 3 milliLiter(s) Nebulizer once  albuterol/ipratropium for Nebulization 3 milliLiter(s) Nebulizer every 6 hours  carbidopa/levodopa  25/100 1 Tablet(s) Oral <User Schedule>  cefTRIAXone   IVPB 1000 milliGRAM(s) IV Intermittent every 24 hours  folic acid 1 milliGRAM(s) Oral daily  furosemide   Injectable 40 milliGRAM(s) IV Push every 12 hours  guaiFENesin Oral Liquid (Sugar-Free) 200 milliGRAM(s) Oral every 6 hours  metoprolol tartrate 25 milliGRAM(s) Oral every 8 hours  rOPINIRole 2 milliGRAM(s) Oral <User Schedule>  saccharomyces boulardii 250 milliGRAM(s) Oral two times a day  trihexyphenidyl 1 milliGRAM(s) Oral three times a day    MEDICATIONS  (PRN):  acetaminophen     Tablet .. 650 milliGRAM(s) Oral every 6 hours PRN Temp greater or equal to 38.5C (101.3F), Moderate Pain (4 - 6)  benzonatate 100 milliGRAM(s) Oral three times a day PRN Cough    Allergies    Levaquin (Anaphylaxis)  garlic (Other (Mild))          Vital Signs Last 24 Hrs  T(C): 36.8 (04 Jun 2025 04:50), Max: 36.8 (04 Jun 2025 04:50)  T(F): 98.3 (04 Jun 2025 04:50), Max: 98.3 (04 Jun 2025 04:50)  HR: 96 (04 Jun 2025 04:50) (56 - 112)  BP: 110/69 (04 Jun 2025 04:50) (96/60 - 127/84)  BP(mean): --  RR: 18 (04 Jun 2025 04:50) (18 - 18)  SpO2: 95% (04 Jun 2025 04:50) (94% - 99%)    Parameters below as of 04 Jun 2025 04:50  Patient On (Oxygen Delivery Method): nasal cannula      I&O's Summary    03 Jun 2025 07:01  -  04 Jun 2025 07:00  --------------------------------------------------------  IN: 50 mL / OUT: 0 mL / NET: 50 mL          TELE: atrial fibrillation   PHYSICAL EXAM:  Constitutional: NAD, awake and alert,   HEENT: Moist Mucous Membranes, Anicteric  Pulmonary: Non-labored, breath sounds are exp wheezing bilaterally and diminished at bases L>R  Cardiovascular: Regular, S1 and S2, No murmurs, No rubs, gallops or clicks  Gastrointestinal:  soft, nontender, nondistended   Lymph: No peripheral edema.   Skin: No visible rashes or ulcers.  Psych:  Mood & affect appropriate      LABS: All Labs Reviewed:                        12.3   10.50 )-----------( 357      ( 04 Jun 2025 06:30 )             37.4                         12.1   10.69 )-----------( 359      ( 03 Jun 2025 06:50 )             36.6                         11.7   10.38 )-----------( 309      ( 02 Jun 2025 07:21 )             35.3     04 Jun 2025 06:30    138    |  98     |  15     ----------------------------<  122    3.4     |  33     |  0.95   03 Jun 2025 06:50    138    |  98     |  13     ----------------------------<  116    3.4     |  33     |  0.91   02 Jun 2025 07:21    139    |  97     |  12     ----------------------------<  125    3.0     |  33     |  0.88     Ca    9.1        04 Jun 2025 06:30  Ca    9.2        03 Jun 2025 06:50  Ca    8.6        02 Jun 2025 07:21  Phos  2.9       02 Jun 2025 11:37  Mg     1.8       02 Jun 2025 11:37       PT/INR - ( 04 Jun 2025 06:30 )   PT: 31.8 sec;   INR: 2.75 ratio         Troponin I, High Sensitivity Result: 12.0 ng/L (05-31-25 @ 13:30)    12 Lead ECG:   Ventricular Rate 119 BPM    QRS Duration 82 ms    Q-T Interval 328 ms    QTC Calculation(Bazett) 461 ms    R Axis -5 degrees    T Axis 32 degrees    Diagnosis Line Atrial fibrillation with rapid ventricular response  Abnormal ECG  Confirmed by german Hull ([id number]) on 6/2/2025 3:14:44 PM (06-02-25 @ 10:28)      TRANSTHORACIC ECHOCARDIOGRAM REPORT  ________________________________________________________________________________                                      _______       Pt. Name:       ROBERT F BOECKLE Study Date:    6/3/2025  MRN:            [id number]         YOB: 1945  Accession #:    [id number]        Age:           79 years  Account#:       [id number]       Gender:        M  Heart Rate:                      Height:        66.93 in (170.00 cm)  Rhythm:                          Weight:        147.71 lb (67.00 kg)  Blood Pressure: 96/60 mmHg       BSA/BMI:       1.78 m² / 23.18 kg/m²  ________________________________________________________________________________________  Referring Physician:    [id number] Asya Muller  Interpreting Physician: Mere Mena MD  Primary Sonographer:    Andres Mejias    CPT:                ECHO TTE WITH CON COMP W DOPP - .m  Indication(s):      Edema, unspecified - R60.9  Procedure:          Transthoracic echocardiogram with 2-D, M-mode and complete                      spectral and color flow Doppler.  Ordering Location:  [facility]  Admission Status:   Inpatient  Contrast Injection: Verbal consent was obtained for injection of Ultrasonic                      Enhancing Agent following a discussion of risks and                      benefits.                      Endocardial visualization enhanced with Definity Ultrasound                      enhancing agent.  Study Information:  Image quality for this study is technically difficult.                      Technically difficult study secondary to lung interference.    _______________________________________________________________________________________     CONCLUSIONS:      1. Technically difficult image quality.   2. Leftventricular systolic function is normal with an ejection fraction of 57 % by Andrews's method of disks.   3. Probably normal right ventricular systolic function.   4. Aortic valve was not well visualized.   5. Mitral valve leaflets have focal calcification.   6. Trace mitral regurgitation.   7. Tricuspid valve was not well visualized.   8. The inferior vena cava is normal in size measuring 1.77 cm in diameter, (normal <2.1cm) with normal inspiratory collapse (normal >50%) consistent with normal right atrial pressure (~3, range 0-5mmHg).   9. Left pleural effusion noted.    ________________________________________________________________________________________  FINDINGS:     Left Ventricle:  After obtaining consent, Definity ultrasound enhancing agent was given for enhanced left ventricular opacification and improved delineation of the left ventricular endocardial borders. Left ventricular systolic function is normal with a calculated ejection fraction of 57 % by the Andrews's biplane method of disks.     Right Ventricle:  The right ventricle is not well visualized. Right ventricular systolic function is probably normal.     Left Atrium:  The left atrium is normal in size.     Right Atrium:  The right atrium is normal in size with an indexed volume of 18.86 ml/m² and an indexed area of 8.50 cm²/m².     Interatrial Septum:  The interatrial septum was not well visualized.     Aortic Valve:  The aortic valve was not well visualized.     Mitral Valve:  Mitral valve leaflets have focalcalcification. There is trace mitral regurgitation.     Tricuspid Valve:  The tricuspid valve was not well visualized.     Pulmonic Valve:  The pulmonic valve was not well visualized.     Aorta:  The aortic root at the sinuses of Valsalva is normal in size, measuring 3.60 cm (indexed 2.03 cm/m²). The ascending aorta is normal in size, measuring 3.30 cm (indexed 1.86 cm/m²).     Pleura:  Left pleural effusion noted.     Systemic Veins:  The inferior vena cava is normal in size measuring 1.77 cm indiameter, (normal <2.1cm) with normal inspiratory collapse (normal >50%) consistent with normal right atrial pressure (~3, range 0-5mmHg).  ____________________________________________________________________  QUANTITATIVE DATA:  Left Ventricle Measurements: (Indexed to BSA)     IVSd (2D):   0.9 cm  LVPWd (2D):  1.2 cm  LVIDd (2D):  4.6 cm  LVIDs (2D):  3.2 cm  LV Mass:     176 g  99.2 g/m²  LV Vol d, MOD A2C: 27.7 ml 15.59 ml/m²  LV Vol d, MOD A4C: 40.9 ml 23.03 ml/m²  LV Vol d, MOD BP:  34.1 ml19.20 ml/m²  LV Vol s, MOD A2C: 12.3 ml 6.92 ml/m²  LV Vol s, MOD A4C: 17.6 ml 9.91 ml/m²  LV Vol s, MOD BP:  14.8 ml 8.31 ml/m²  LVOT SV MOD BP:    19.3 ml  LV EF% MOD BP:     57 %     MV E Vmax:    1.26 m/s  MV A Vmax:    0.40 m/s  MV E/A:       3.12  e' lateral:   8.05 cm/s  e' medial:    7.51 cm/s  E/e' lateral: 15.65  E/e' medial:  16.78  E/e' Average: 16.20  MV DT:        181 msec    Aorta Measurements: (Normal range) (Indexed to BSA)     Ao Root d     3.60 cm (3.1 - 3.7 cm) 2.03 cm/m²  Ao Asc d, 2D: 3.30  Ao Asc prox:  3.30 cm                1.86 cm/m²            Left Atrium Measurements: (Indexed to BSA)  LA Diam 2D: 4.70 cm         Right Atrial Measurements:     RA Vol s, MOD A4C         33.5 ml  RA Vol s, MOD A4C i BSA   18.86 ml/m²  RA Area s, MOD A4C        15.1 cm²  RA Area s, MOD A4C, i BSA 8.50 cm²/m²       LVOT / RVOT/ Qp/Qs Data: (Indexed to BSA)  LVOT Diameter,s: 2.10 cm  LVOT Area:       3.46 cm²    Mitral Valve Measurements:     MV E Vmax: 1.3 m/s  MV A Vmax: 0.4 m/s  MV E/A:    3.1       Tricuspid Valve Measurements:     RA Pressure: 3 mmHg    ________________________________________________________________________________________  Electronically signed on 6/4/2025 at 7:59:49 AM by Mere Mena MD         *** Final ***

## 2025-06-04 NOTE — PROGRESS NOTE ADULT - ASSESSMENT
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 Consulted for acute hypoxia and volume overload.     - Known o2-dep copd, now p/w worsening cough and SOB x 1week  - Ventral hernia for which had recent repair, with persistent partial left lung collapse by report   presented with worsening sob and hypoxic at home and  edematous on exam  - BNP:  <--2440  - CXR with a left pleural eff, probably superimposed on lobar collapse  - CT Chest  noted with loculated pl effusion  - Continue Lasix 40 iv bid   -seen by pulmonary no indication for thora   - Last echo was very limited and non-diagnostic,3/2025, please repeat     - h/o  p A fib, diagnosed over the past few months  -  diltiazem  recently stop for edema  - Told by Dr Mccauley in the office to start bisoprolol last week, but given a recent deterioration in respiratory status she has not started it  -Af rates   - c/w metoprolol tartrate 25 milliGRAM(s) Oral every 8 hours  - Warfarin goal inr 2-3    - Patient is a fall risk with prior h/o brain bleed, outpatient physicians have continued AC due risk of recurrence.     - EKG A fib RVR @ 109  - No evidence of any active ischemia   - Troponin: <-12.0    - BP stable on bb     - Monitor and replete lytes, keep K>4, Mg>2.  - Will continue to follow. 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 Consulted for acute hypoxia and volume overload.     - Known o2-dep copd, now p/w worsening cough and SOB x 1week  - Ventral hernia for which had recent repair, with persistent partial left lung collapse by report   presented with worsening sob and hypoxic at home and  edematous on exam  - BNP:  <--2440  - CXR with a left pleural eff, probably superimposed on lobar collapse  - CT Chest  noted with loculated pl effusion  - Continue Lasix 40 iv bid   -seen by pulmonary no indication for thora   - Last echo was very limited and non-diagnostic,3/2025, please repeat     - h/o  p A fib, diagnosed over the past few months  -  diltiazem  recently stop for edema  - Told by Dr Mccauley in the office to start bisoprolol last week, but given a recent deterioration in respiratory status she has not started it  -Af rates   - c/w metoprolol tartrate 25 milliGRAM(s) Oral every 8 hours  - Warfarin goal inr 2-3    - Patient is a fall risk with prior h/o brain bleed, outpatient physicians have continued AC due risk of recurrence.     - EKG A fib RVR @ 109  - No evidence of any active ischemia   - Troponin: <-12.0    - BP stable on bb     - Monitor and replete lytes, keep K>4, Mg>2.  - Will continue to follow. 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 Consulted for acute hypoxia and volume overload.     - Known o2-dep copd, now p/w worsening cough and SOB x 1week  - Ventral hernia for which had recent repair, with persistent partial left lung collapse by report   presented with worsening sob and hypoxic at home and  edematous on exam  - BNP:  <--2440  - CXR with a left pleural eff, probably superimposed on lobar collapse  - CT Chest  noted with loculated pl effusion  - Continue Lasix 40 iv bid   -seen by pulmonary no indication for thora   - Last echo was very limited and non-diagnostic,3/2025, please repeat     - h/o  p A fib, diagnosed over the past few months  -  diltiazem  recently stop for edema  - Told by Dr Mccauley in the office to start bisoprolol last week, but given a recent deterioration in respiratory status she has not started it  -Af rates   - Switch Lopressor to Metoprolol XL 50mg BID  - Warfarin goal inr 2-3    - Patient is a fall risk with prior h/o brain bleed, outpatient physicians have continued AC due risk of recurrence.     - EKG A fib RVR @ 109  - No evidence of any active ischemia   - Troponin: <-12.0    - BP stable on bb     - Monitor and replete lytes, keep K>4, Mg>2.  - Will continue to follow.

## 2025-06-04 NOTE — PROGRESS NOTE ADULT - ASSESSMENT
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 [facility] recently [facility] for new-onset Afib and acute hypoxic respiratory failure 2/2 COPD and worsened diaphragmatic hernia. He was treated and discharged home with outpatient Cardiology follow-up with Dr. Mccauley in preparation for diaphragmatic hernia repair. Patient tolerated the procedure well, however developed worsening leg swelling afterwards. PCP believed this was due to the new Diltiazem that was initiated for Afib, and discontinued it. Leg edema improved somewhat but is still persistent despite starting 40mg Lasix PO for the past 2 days. Patient saw Dr. Mccauley earlier this week who prescribed Bisoprolol instead, however wife and patient reluctant to start due to worsening cough. Wife states patient was ambulating today when he HR increased to 130s and O2 saturation decreased despite 2L NC. Patient denies orthopnea.  CT-chest -Moderate size loculated left pleural effusion with left lower lobe consolidation and volume loss    Dyspnea, pleural effusion, hypoxic respiratory failure  Challenging presentation with no fever, minimal leukocytosis, but elevated procalcitonin, elevated CRP, pt describing productive cough, minimal BNP elevation relative to symptoms and of note as per pulmonary assessment effusion is small and not recommended to undergo thoracentesis  consideration for PNA so rec empiric abx with  Ceftriaxone 1 gram IV daily started 6/2-continue for now added cdc w diff and procal for 6/4 am    6/3-pt reports increased cough, noted more sig JVD and HJR   6/4-increased diuresis, nebs scheduled and pt much improved, procal decreased, will give last dose of ceftriaxone today then   Cefuroxime 500 mg PO BID with last day 6/6 Friday    Thank you for consulting us and involving us in the management of this most interesting and challenging case.  In addition to reviewing history, imaging, documents, labs, microbiology, took into account antibiotic stewardship, local antibiogram and infection control strategies and potential transmission issues.    We will follow along in the care of this patient. Please contact me by texting me directly on my cell# at [phone] using TEAMS or call our answering service at [phone] with any concerns.

## 2025-06-04 NOTE — PROGRESS NOTE ADULT - SUBJECTIVE AND OBJECTIVE BOX
Patient is a 79y old  Male who presents with a chief complaint of sob, cough (04 Jun 2025 09:54)        INTERVAL HPI/OVERNIGHT EVENTS:   no complaints  pt seen and examined         Vital Signs Last 24 Hrs  T(C): 36.7 (04 Jun 2025 12:08), Max: 36.8 (04 Jun 2025 04:50)  T(F): 98 (04 Jun 2025 12:08), Max: 98.3 (04 Jun 2025 04:50)  HR: 90 (04 Jun 2025 13:08) (89 - 112)  BP: 105/63 (04 Jun 2025 12:08) (105/63 - 127/84)  BP(mean): --  RR: 17 (04 Jun 2025 12:08) (17 - 18)  SpO2: 97% (04 Jun 2025 12:08) (94% - 99%)    Parameters below as of 04 Jun 2025 12:08  Patient On (Oxygen Delivery Method): nasal cannula        acetaminophen     Tablet .. 650 milliGRAM(s) Oral every 6 hours PRN  albuterol/ipratropium for Nebulization 3 milliLiter(s) Nebulizer once  albuterol/ipratropium for Nebulization 3 milliLiter(s) Nebulizer every 6 hours  benzonatate 100 milliGRAM(s) Oral three times a day PRN  carbidopa/levodopa  25/100 1 Tablet(s) Oral <User Schedule>  folic acid 1 milliGRAM(s) Oral daily  furosemide   Injectable 40 milliGRAM(s) IV Push every 12 hours  guaiFENesin Oral Liquid (Sugar-Free) 200 milliGRAM(s) Oral every 6 hours  metoprolol tartrate 25 milliGRAM(s) Oral every 8 hours  rOPINIRole 2 milliGRAM(s) Oral <User Schedule>  saccharomyces boulardii 250 milliGRAM(s) Oral two times a day  trihexyphenidyl 1 milliGRAM(s) Oral three times a day      PHYSICAL EXAM:  GENERAL: NAD   EYES: conjunctiva and sclera clear  ENMT: Moist mucous membranes  NECK: Supple, No JVD, Normal thyroid  CHEST/LUNG: non labored, cta b/l  HEART: Regular rate and rhythm; No murmurs, rubs, or gallops  ABDOMEN: Soft, Nontender, Nondistended; Bowel sounds present  EXTREMITIES:  No clubbing, no cyanosis, no edema  LYMPH: No lymphadenopathy noted  SKIN: No rashes or lesions  NEURO: no new focal deficits    Consultant(s) Notes Reviewed:  [x ] YES  [ ] NO  Care Discussed with Consultants/Other Providers [ x] YES  [ ] NO    LABS:                        12.3   10.50 )-----------( 357      ( 04 Jun 2025 06:30 )             37.4     06-04    138  |  98  |  15  ----------------------------<  122[H]  3.4[L]   |  33[H]  |  0.95    Ca    9.1      04 Jun 2025 06:30      PT/INR - ( 04 Jun 2025 06:30 )   PT: 31.8 sec;   INR: 2.75 ratio           Urinalysis Basic - ( 04 Jun 2025 06:30 )    Color: x / Appearance: x / SG: x / pH: x  Gluc: 122 mg/dL / Ketone: x  / Bili: x / Urobili: x   Blood: x / Protein: x / Nitrite: x   Leuk Esterase: x / RBC: x / WBC x   Sq Epi: x / Non Sq Epi: x / Bacteria: x      CAPILLARY BLOOD GLUCOSE            Urinalysis Basic - ( 04 Jun 2025 06:30 )    Color: x / Appearance: x / SG: x / pH: x  Gluc: 122 mg/dL / Ketone: x  / Bili: x / Urobili: x   Blood: x / Protein: x / Nitrite: x   Leuk Esterase: x / RBC: x / WBC x   Sq Epi: x / Non Sq Epi: x / Bacteria: x        Culture - Sputum (collected 03 Jun 2025 06:30)  Source: Sputum Sputum  Gram Stain (03 Jun 2025 15:17):    Few polymorphonuclear leukocytes per low power field    Rare Squamous epithelial cells per low power field    Moderate Gram Negative Rods per oil power field    Few Gram Positive Cocci in Pairs and Chains per oil power field    Rare Gram Positive Cocci in Clusters per oil power field  Preliminary Report (04 Jun 2025 08:30):    Commensal mone consistent with body site        RADIOLOGY & ADDITIONAL TESTS:    Imaging Personally Reviewed  Reviewed consultants input

## 2025-06-04 NOTE — PROGRESS NOTE ADULT - ASSESSMENT
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 Patient recently admitted to [facility] for new-onset Afib and acute hypoxic respiratory failure 2/2 COPD and worsened diaphragmatic hernia. He was treated and discharged home with outpatient Cardiology follow-up with Dr. Mccauley in preparation for diaphragmatic hernia repair. Patient tolerated the procedure well, however developed worsening leg swelling afterwards    Dyspnea - Cough -   copd  diaphragmatic hernia  Atelectasis  PE hx  OP  OA  Ataxic gait  Parkinson's disease  HTN  GERD    vs noted  o2 support  ct chest reviewed - atelectasis - small to mod loculated left eff - no intervention at present  on abx  on lasix  on nebs    I zion  nebs PRN  HOB elev  asp prec  oral hygiene  skin care  cvs rx regimen  BP control and HD monitoring  I and O  replete lytes  assist with needs  PPI  OLD - recent admission record reviewed

## 2025-06-04 NOTE — PROGRESS NOTE ADULT - SUBJECTIVE AND OBJECTIVE BOX
Date/Time Patient Seen:  		  Referring MD:   Data Reviewed	       Patient is a 79y old  Male who presents with a chief complaint of sob, cough (03 Jun 2025 18:50)      Subjective/HPI     PAST MEDICAL & SURGICAL HISTORY:  Personal history of PE (pulmonary embolism)    COPD (chronic obstructive pulmonary disease)    Hypertension    Restless leg syndrome    Factor V Leiden    Parkinsons    Spinal stenosis    Memory loss    Right inguinal hernia    AL (obstructive sleep apnea)    Diabetes    Herniated cervical disc    H/O hernia repair          Medication list         MEDICATIONS  (STANDING):  albuterol/ipratropium for Nebulization 3 milliLiter(s) Nebulizer once  albuterol/ipratropium for Nebulization 3 milliLiter(s) Nebulizer every 6 hours  carbidopa/levodopa  25/100 1 Tablet(s) Oral <User Schedule>  cefTRIAXone   IVPB 1000 milliGRAM(s) IV Intermittent every 24 hours  folic acid 1 milliGRAM(s) Oral daily  furosemide   Injectable 40 milliGRAM(s) IV Push every 12 hours  guaiFENesin Oral Liquid (Sugar-Free) 200 milliGRAM(s) Oral every 6 hours  metoprolol tartrate 25 milliGRAM(s) Oral every 8 hours  rOPINIRole 2 milliGRAM(s) Oral <User Schedule>  saccharomyces boulardii 250 milliGRAM(s) Oral two times a day  trihexyphenidyl 1 milliGRAM(s) Oral three times a day    MEDICATIONS  (PRN):  acetaminophen     Tablet .. 650 milliGRAM(s) Oral every 6 hours PRN Temp greater or equal to 38.5C (101.3F), Moderate Pain (4 - 6)  benzonatate 100 milliGRAM(s) Oral three times a day PRN Cough         Vitals log        ICU Vital Signs Last 24 Hrs  T(C): 36.8 (04 Jun 2025 04:50), Max: 36.8 (04 Jun 2025 04:50)  T(F): 98.3 (04 Jun 2025 04:50), Max: 98.3 (04 Jun 2025 04:50)  HR: 96 (04 Jun 2025 04:50) (56 - 112)  BP: 110/69 (04 Jun 2025 04:50) (96/60 - 127/84)  BP(mean): --  ABP: --  ABP(mean): --  RR: 18 (04 Jun 2025 04:50) (18 - 18)  SpO2: 95% (04 Jun 2025 04:50) (94% - 99%)    O2 Parameters below as of 04 Jun 2025 04:50  Patient On (Oxygen Delivery Method): nasal cannula                 Input and Output:  I&O's Detail    02 Jun 2025 07:01  -  03 Jun 2025 07:00  --------------------------------------------------------  IN:    IV PiggyBack: 50 mL  Total IN: 50 mL    OUT:  Total OUT: 0 mL    Total NET: 50 mL      03 Jun 2025 07:01  -  04 Jun 2025 05:35  --------------------------------------------------------  IN:    IV PiggyBack: 50 mL  Total IN: 50 mL    OUT:  Total OUT: 0 mL    Total NET: 50 mL          Lab Data                        12.1   10.69 )-----------( 359      ( 03 Jun 2025 06:50 )             36.6     06-03    138  |  98  |  13  ----------------------------<  116[H]  3.4[L]   |  33[H]  |  0.91    Ca    9.2      03 Jun 2025 06:50  Phos  2.9     06-02  Mg     1.8     06-02              Review of Systems	      Objective     Physical Examination    heart s1s2  lung dc bs  head nc  head at      Pertinent Lab findings & Imaging      Tad:  NO   Adequate UO     I&O's Detail    02 Jun 2025 07:01  -  03 Jun 2025 07:00  --------------------------------------------------------  IN:    IV PiggyBack: 50 mL  Total IN: 50 mL    OUT:  Total OUT: 0 mL    Total NET: 50 mL      03 Jun 2025 07:01 - 04 Jun 2025 05:35  --------------------------------------------------------  IN:    IV PiggyBack: 50 mL  Total IN: 50 mL    OUT:  Total OUT: 0 mL    Total NET: 50 mL               Discussed with:     Cultures:	        Radiology

## 2025-06-05 LAB
ANION GAP SERPL CALC-SCNC: 7 MMOL/L — SIGNIFICANT CHANGE UP (ref 5–17)
BUN SERPL-MCNC: 23 MG/DL — SIGNIFICANT CHANGE UP (ref 7–23)
CALCIUM SERPL-MCNC: 9.5 MG/DL — SIGNIFICANT CHANGE UP (ref 8.5–10.1)
CHLORIDE SERPL-SCNC: 97 MMOL/L — SIGNIFICANT CHANGE UP (ref 96–108)
CO2 SERPL-SCNC: 34 MMOL/L — HIGH (ref 22–31)
CREAT SERPL-MCNC: 1.1 MG/DL — SIGNIFICANT CHANGE UP (ref 0.5–1.3)
EGFR: 68 ML/MIN/1.73M2 — SIGNIFICANT CHANGE UP
EGFR: 68 ML/MIN/1.73M2 — SIGNIFICANT CHANGE UP
GLUCOSE SERPL-MCNC: 129 MG/DL — HIGH (ref 70–99)
HCT VFR BLD CALC: 38.9 % — LOW (ref 39–50)
HGB BLD-MCNC: 12.9 G/DL — LOW (ref 13–17)
INR BLD: 2.26 RATIO — HIGH (ref 0.85–1.16)
MCHC RBC-ENTMCNC: 31.6 PG — SIGNIFICANT CHANGE UP (ref 27–34)
MCHC RBC-ENTMCNC: 33.2 G/DL — SIGNIFICANT CHANGE UP (ref 32–36)
MCV RBC AUTO: 95.3 FL — SIGNIFICANT CHANGE UP (ref 80–100)
NRBC BLD AUTO-RTO: 0 /100 WBCS — SIGNIFICANT CHANGE UP (ref 0–0)
PLATELET # BLD AUTO: 399 K/UL — SIGNIFICANT CHANGE UP (ref 150–400)
POTASSIUM SERPL-MCNC: 3.5 MMOL/L — SIGNIFICANT CHANGE UP (ref 3.5–5.3)
POTASSIUM SERPL-SCNC: 3.5 MMOL/L — SIGNIFICANT CHANGE UP (ref 3.5–5.3)
PROTHROM AB SERPL-ACNC: 26.5 SEC — HIGH (ref 9.9–13.4)
RBC # BLD: 4.08 M/UL — LOW (ref 4.2–5.8)
RBC # FLD: 13.2 % — SIGNIFICANT CHANGE UP (ref 10.3–14.5)
SODIUM SERPL-SCNC: 138 MMOL/L — SIGNIFICANT CHANGE UP (ref 135–145)
WBC # BLD: 10.91 K/UL — HIGH (ref 3.8–10.5)
WBC # FLD AUTO: 10.91 K/UL — HIGH (ref 3.8–10.5)

## 2025-06-05 PROCEDURE — 99232 SBSQ HOSP IP/OBS MODERATE 35: CPT

## 2025-06-05 RX ADMIN — FUROSEMIDE 40 MILLIGRAM(S): 10 INJECTION INTRAMUSCULAR; INTRAVENOUS at 05:23

## 2025-06-05 RX ADMIN — DEXTROMETHORPHAN HBR, GUAIFENESIN 200 MILLIGRAM(S): 200 LIQUID ORAL at 22:18

## 2025-06-05 RX ADMIN — Medication 500 MILLIGRAM(S): at 05:23

## 2025-06-05 RX ADMIN — ROPINIROLE 2 MILLIGRAM(S): 5 TABLET, FILM COATED ORAL at 18:06

## 2025-06-05 RX ADMIN — Medication 1 TABLET(S): at 22:25

## 2025-06-05 RX ADMIN — IPRATROPIUM BROMIDE AND ALBUTEROL SULFATE 3 MILLILITER(S): .5; 2.5 SOLUTION RESPIRATORY (INHALATION) at 19:26

## 2025-06-05 RX ADMIN — Medication 1 TABLET(S): at 18:05

## 2025-06-05 RX ADMIN — TRIHEXYPHENIDYL HYDROCHLORIDE 1 MILLIGRAM(S): 2 TABLET ORAL at 22:18

## 2025-06-05 RX ADMIN — ROPINIROLE 2 MILLIGRAM(S): 5 TABLET, FILM COATED ORAL at 08:55

## 2025-06-05 RX ADMIN — Medication 1.5 MILLIGRAM(S): at 22:18

## 2025-06-05 RX ADMIN — DEXTROMETHORPHAN HBR, GUAIFENESIN 200 MILLIGRAM(S): 200 LIQUID ORAL at 18:05

## 2025-06-05 RX ADMIN — METOPROLOL SUCCINATE 25 MILLIGRAM(S): 50 TABLET, EXTENDED RELEASE ORAL at 05:24

## 2025-06-05 RX ADMIN — BUTYROSPERMUM PARKII(SHEA BUTTER), SIMMONDSIA CHINENSIS (JOJOBA) SEED OIL, ALOE BARBADENSIS LEAF EXTRACT 250 MILLIGRAM(S): .01; 1; 3.5 LIQUID TOPICAL at 18:29

## 2025-06-05 RX ADMIN — DEXTROMETHORPHAN HBR, GUAIFENESIN 200 MILLIGRAM(S): 200 LIQUID ORAL at 05:23

## 2025-06-05 RX ADMIN — IPRATROPIUM BROMIDE AND ALBUTEROL SULFATE 3 MILLILITER(S): .5; 2.5 SOLUTION RESPIRATORY (INHALATION) at 07:32

## 2025-06-05 RX ADMIN — ROPINIROLE 2 MILLIGRAM(S): 5 TABLET, FILM COATED ORAL at 14:22

## 2025-06-05 RX ADMIN — Medication 500 MILLIGRAM(S): at 18:06

## 2025-06-05 RX ADMIN — Medication 1 TABLET(S): at 08:55

## 2025-06-05 RX ADMIN — ROPINIROLE 2 MILLIGRAM(S): 5 TABLET, FILM COATED ORAL at 22:25

## 2025-06-05 RX ADMIN — FOLIC ACID 1 MILLIGRAM(S): 1 TABLET ORAL at 14:20

## 2025-06-05 RX ADMIN — Medication 1 TABLET(S): at 14:20

## 2025-06-05 RX ADMIN — DEXTROMETHORPHAN HBR, GUAIFENESIN 200 MILLIGRAM(S): 200 LIQUID ORAL at 14:20

## 2025-06-05 RX ADMIN — TRIHEXYPHENIDYL HYDROCHLORIDE 1 MILLIGRAM(S): 2 TABLET ORAL at 06:11

## 2025-06-05 RX ADMIN — IPRATROPIUM BROMIDE AND ALBUTEROL SULFATE 3 MILLILITER(S): .5; 2.5 SOLUTION RESPIRATORY (INHALATION) at 13:34

## 2025-06-05 RX ADMIN — BUTYROSPERMUM PARKII(SHEA BUTTER), SIMMONDSIA CHINENSIS (JOJOBA) SEED OIL, ALOE BARBADENSIS LEAF EXTRACT 250 MILLIGRAM(S): .01; 1; 3.5 LIQUID TOPICAL at 05:24

## 2025-06-05 RX ADMIN — TRIHEXYPHENIDYL HYDROCHLORIDE 1 MILLIGRAM(S): 2 TABLET ORAL at 14:23

## 2025-06-05 RX ADMIN — METOPROLOL SUCCINATE 25 MILLIGRAM(S): 50 TABLET, EXTENDED RELEASE ORAL at 22:18

## 2025-06-05 NOTE — PROGRESS NOTE ADULT - ASSESSMENT
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 Patient recently admitted [facility] for new-onset Afib and acute hypoxic respiratory failure 2/2 COPD and worsened diaphragmatic hernia. He was treated and discharged home with outpatient Cardiology follow-up with Dr. Mccauley in preparation for diaphragmatic hernia repair. Patient tolerated the procedure well, however developed worsening leg swelling afterwards. PCP believed this was due to the new Diltiazem that was initiated for Afib, and discontinued it. Leg edema improved somewhat but is still persistent despite starting 40mg Lasix PO for the past 2 days. Patient saw Dr. Mccauley earlier this week who prescribed Bisoprolol instead, however wife and patient reluctant to start due to worsening cough. Wife states patient was ambulating today when he HR increased to 130s and O2 saturation decreased despite 2L NC. Patient denies orthopnea.  CT-chest -Moderate size loculated left pleural effusion with left lower lobe consolidation and volume loss    Dyspnea, pleural effusion, hypoxic respiratory failure  Challenging presentation with no fever, minimal leukocytosis, but elevated procalcitonin, elevated CRP, pt describing productive cough, minimal BNP elevation relative to symptoms and of note as per pulmonary assessment effusion is small and not recommended to undergo thoracentesis  consideration for PNA so rec empiric abx with  Ceftriaxone 1 gram IV daily started 6/2-continue for now added cdc w diff and procal for 6/4 am   last dose of ceftriaxone 6/4 rec   Cefuroxime 500 mg PO BID with last day 6/6 Friday    DISPO wife reports she needs notice to get aids in place so discussed that pt is close to dc from ID and no issue with discharge tomorrow from my perspective    From an ID standpoint no further requirement for inpatient status for the management of ID issues. Fine with discharge from ID standpoint when other medical issues no longer require inpatient care and social issues allow for a safe discharge plan. To schedule an outpatient ID follow up appointment please call our office at [phone]    Thank you for consulting us and involving us in the management of this most interesting and challenging case.  In addition to reviewing history, imaging, documents, labs, microbiology, took into account antibiotic stewardship, local antibiogram and infection control strategies and potential transmission issues.    We will follow along in the care of this patient. Please contact me by texting me directly on my cell# at [phone] using TEAMS or call our answering service at [phone] with any concerns.

## 2025-06-05 NOTE — PROGRESS NOTE ADULT - ASSESSMENT
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 Consulted for acute hypoxia and volume overload.     - Known o2-dep copd, now p/w worsening cough and SOB x 1week  - Ventral hernia for which had recent repair, with persistent partial left lung collapse by report   presented with worsening sob and hypoxic at home and  edematous on exam  - BNP:  <--2440  - CXR with a left pleural eff, probably superimposed on lobar collapse  - CT Chest  noted with loculated pl effusion  - Continue Lasix 40 iv bid , still with diffuse wheezing on exam   -needs K supplemented for goal > 4.0   -seen by pulmonary no indication for thora   -echo 6/3/2025 lv 57% Left pleural effusion noted     - h/o  p A fib, diagnosed over the past few months  -  diltiazem  recently stop for edema  - Told by Dr Mccauley in the office to start bisoprolol last week, but given a recent deterioration in respiratory status she has not started it  -Af rates still tachy at times, increase BB to every 6 hours   - Warfarin goal inr 2-3    - Patient is a fall risk with prior h/o brain bleed, outpatient physicians have continued AC due risk of recurrence.     - EKG A fib RVR @ 109  - No evidence of any active ischemia   - Troponin: <-12.0    - BP stable on bb     - Monitor and replete lytes, keep K>4, Mg>2.  - Will continue to follow.

## 2025-06-05 NOTE — PROGRESS NOTE ADULT - ASSESSMENT
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 Patient recently admitted to [facility] for new-onset Afib and acute hypoxic respiratory failure 2/2 COPD and worsened diaphragmatic hernia.sp diaphragmatic hernia repair. aw sob and cough    CXR-Moderate left and small right-sided pleural effusions.      #acute on chronic  hypoxic respiratory failure   likey from acute chf w pleural effusions   probnp- high, i/o, wts, TTE  cards cs noted  ct chest- ?consolidations and loculated effusions  procal elev  pulm cs noted  ID cs fu  cont abx    #COPD on home o2  resume home inhalers    #DVT/PE, hx factor v leiden deffi  hx of afib  coumadin, monitor inr    #Parkinsons dz- resume home meds    [facility]   [phone]

## 2025-06-05 NOTE — PROGRESS NOTE ADULT - SUBJECTIVE AND OBJECTIVE BOX
[facility]  Tima Camacho MD PhD, Yu Loya MD, Shayna Lares MD, Surendra Connolly MD, Pedro Bedoya MD  and providing coverage with Wm Machado MD  Providing Infectious Disease Consultations at [facility], [facility], [facility]'s    Office# [phone] to schedule follow up appointments  Answering Service for urgent calls or New Consults [phone]  Cell# to text for urgent issues Tima Camacho [phone]     infectious diseases progress note:    ROBERT BOECKLE is a 79y y. o. Male patient    Overnight and events of the last 24hrs reviewed    Allergies    Levaquin (Anaphylaxis)  garlic (Other (Mild))    Intolerances        ANTIBIOTICS/RELEVANT:  antimicrobials  cefuroxime   Tablet 500 milliGRAM(s) Oral every 12 hours    immunologic:    OTHER:  acetaminophen     Tablet .. 650 milliGRAM(s) Oral every 6 hours PRN  albuterol/ipratropium for Nebulization 3 milliLiter(s) Nebulizer every 6 hours  benzonatate 100 milliGRAM(s) Oral three times a day PRN  carbidopa/levodopa  25/100 1 Tablet(s) Oral <User Schedule>  folic acid 1 milliGRAM(s) Oral daily  furosemide   Injectable 40 milliGRAM(s) IV Push every 12 hours  guaiFENesin Oral Liquid (Sugar-Free) 200 milliGRAM(s) Oral every 6 hours  metoprolol tartrate 25 milliGRAM(s) Oral every 8 hours  rOPINIRole 2 milliGRAM(s) Oral <User Schedule>  saccharomyces boulardii 250 milliGRAM(s) Oral two times a day  trihexyphenidyl 1 milliGRAM(s) Oral three times a day      Objective:  Vital Signs Last 24 Hrs  T(C): 36.7 (05 Jun 2025 05:00), Max: 36.8 (04 Jun 2025 20:34)  T(F): 98.1 (05 Jun 2025 05:00), Max: 98.3 (04 Jun 2025 20:34)  HR: 90 (05 Jun 2025 08:05) (89 - 104)  BP: 122/68 (05 Jun 2025 05:00) (105/63 - 122/68)  BP(mean): --  RR: 18 (05 Jun 2025 05:00) (17 - 18)  SpO2: 97% (05 Jun 2025 05:00) (95% - 97%)    Parameters below as of 05 Jun 2025 05:00  Patient On (Oxygen Delivery Method): nasal cannula        T(C): 36.7 (06-05-25 @ 05:00), Max: 36.8 (06-04-25 @ 04:50)  T(C): 36.7 (06-05-25 @ 05:00), Max: 37.1 (06-02-25 @ 20:30)  T(C): 36.7 (06-05-25 @ 05:00), Max: 37.1 (06-02-25 @ 20:30)    PHYSICAL EXAM:  HEENT: NC atraumatic  Neck: supple  Respiratory: no accessory muscle use, breathing comfortably  Cardiovascular: distant  Gastrointestinal: normal appearing, nondistended  Extremities: no clubbing, no cyanosis,        LABS:                          12.9   10.91 )-----------( 399      ( 05 Jun 2025 07:42 )             38.9       WBC  10.91 06-05 @ 07:42  10.50 06-04 @ 06:30  10.69 06-03 @ 06:50  10.38 06-02 @ 07:21  9.74 06-01 @ 06:25  9.44 05-31 @ 13:30      06-05    138  |  97  |  23  ----------------------------<  129[H]  3.5   |  34[H]  |  1.10    Ca    9.5      05 Jun 2025 07:42        Creatinine: 1.10 mg/dL (06-05-25 @ 07:42)  Creatinine: 0.95 mg/dL (06-04-25 @ 06:30)  Creatinine: 0.91 mg/dL (06-03-25 @ 06:50)  Creatinine: 0.88 mg/dL (06-02-25 @ 07:21)  Creatinine: 0.93 mg/dL (06-01-25 @ 06:25)  Creatinine: 0.93 mg/dL (05-31-25 @ 13:30)      PT/INR - ( 05 Jun 2025 07:42 )   PT: 26.5 sec;   INR: 2.26 ratio           Urinalysis Basic - ( 05 Jun 2025 07:42 )    Color: x / Appearance: x / SG: x / pH: x  Gluc: 129 mg/dL / Ketone: x  / Bili: x / Urobili: x   Blood: x / Protein: x / Nitrite: x   Leuk Esterase: x / RBC: x / WBC x   Sq Epi: x / Non Sq Epi: x / Bacteria: x            INFLAMMATORY MARKERS      MICROBIOLOGY:              RADIOLOGY & ADDITIONAL STUDIES:

## 2025-06-05 NOTE — PROGRESS NOTE ADULT - ASSESSMENT
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 Patient recently admitted to [facility] for new-onset Afib and acute hypoxic respiratory failure 2/2 COPD and worsened diaphragmatic hernia. He was treated and discharged home with outpatient Cardiology follow-up with Dr. Mccauley in preparation for diaphragmatic hernia repair. Patient tolerated the procedure well, however developed worsening leg swelling afterwards    Dyspnea - Cough -   copd  diaphragmatic hernia  Atelectasis  PE hx  OP  OA  Ataxic gait  Parkinson's disease  HTN  GERD    vs noted  o2 support  ct chest reviewed - atelectasis - small to mod loculated left eff - no intervention at present  on abx - now on PO  on lasix  on nebs    I zion  nebs PRN  HOB elev  asp prec  oral hygiene  skin care  cvs rx regimen  BP control and HD monitoring  I and O  replete lytes  assist with needs  PPI  OLD - recent admission record reviewed

## 2025-06-05 NOTE — CASE MANAGEMENT PROGRESS NOTE - NSCMPROGRESSNOTE_GEN_ALL_CORE
Patient completed IV Rocephin 6/4. Cefuroxime 500 mg PO BID with last day 6/6 Friday. Possible discharge tomorrow. Patient has 24hr /7 days a week, private hire aide will be resumed by wife.

## 2025-06-05 NOTE — PROGRESS NOTE ADULT - SUBJECTIVE AND OBJECTIVE BOX
Date/Time Patient Seen:  		  Referring MD:   Data Reviewed	       Patient is a 79y old  Male who presents with a chief complaint of sob, cough (04 Jun 2025 17:22)      Subjective/HPI     PAST MEDICAL & SURGICAL HISTORY:  Personal history of PE (pulmonary embolism)    COPD (chronic obstructive pulmonary disease)    Hypertension    Restless leg syndrome    Factor V Leiden    Parkinsons    Spinal stenosis    Memory loss    Right inguinal hernia    AL (obstructive sleep apnea)    Diabetes    Herniated cervical disc    H/O hernia repair          Medication list         MEDICATIONS  (STANDING):  albuterol/ipratropium for Nebulization 3 milliLiter(s) Nebulizer once  albuterol/ipratropium for Nebulization 3 milliLiter(s) Nebulizer every 6 hours  carbidopa/levodopa  25/100 1 Tablet(s) Oral <User Schedule>  cefuroxime   Tablet 500 milliGRAM(s) Oral every 12 hours  folic acid 1 milliGRAM(s) Oral daily  furosemide   Injectable 40 milliGRAM(s) IV Push every 12 hours  guaiFENesin Oral Liquid (Sugar-Free) 200 milliGRAM(s) Oral every 6 hours  metoprolol tartrate 25 milliGRAM(s) Oral every 8 hours  rOPINIRole 2 milliGRAM(s) Oral <User Schedule>  saccharomyces boulardii 250 milliGRAM(s) Oral two times a day  trihexyphenidyl 1 milliGRAM(s) Oral three times a day    MEDICATIONS  (PRN):  acetaminophen     Tablet .. 650 milliGRAM(s) Oral every 6 hours PRN Temp greater or equal to 38.5C (101.3F), Moderate Pain (4 - 6)  benzonatate 100 milliGRAM(s) Oral three times a day PRN Cough         Vitals log        ICU Vital Signs Last 24 Hrs  T(C): 36.7 (05 Jun 2025 05:00), Max: 36.8 (04 Jun 2025 20:34)  T(F): 98.1 (05 Jun 2025 05:00), Max: 98.3 (04 Jun 2025 20:34)  HR: 104 (05 Jun 2025 05:00) (89 - 104)  BP: 122/68 (05 Jun 2025 05:00) (105/63 - 122/68)  BP(mean): --  ABP: --  ABP(mean): --  RR: 18 (05 Jun 2025 05:00) (17 - 18)  SpO2: 97% (05 Jun 2025 05:00) (95% - 97%)    O2 Parameters below as of 05 Jun 2025 05:00  Patient On (Oxygen Delivery Method): nasal cannula                 Input and Output:  I&O's Detail    03 Jun 2025 07:01  -  04 Jun 2025 07:00  --------------------------------------------------------  IN:    IV PiggyBack: 50 mL  Total IN: 50 mL    OUT:  Total OUT: 0 mL    Total NET: 50 mL      04 Jun 2025 07:01  -  05 Jun 2025 05:52  --------------------------------------------------------  IN:    IV PiggyBack: 50 mL  Total IN: 50 mL    OUT:  Total OUT: 0 mL    Total NET: 50 mL          Lab Data                        12.3   10.50 )-----------( 357      ( 04 Jun 2025 06:30 )             37.4     06-04    138  |  98  |  15  ----------------------------<  122[H]  3.4[L]   |  33[H]  |  0.95    Ca    9.1      04 Jun 2025 06:30              Review of Systems	      Objective     Physical Examination  heart s1s2  lung dc bs  head nc  head at        Pertinent Lab findings & Imaging      Tad:  NO   Adequate UO     I&O's Detail    03 Jun 2025 07:01  -  04 Jun 2025 07:00  --------------------------------------------------------  IN:    IV PiggyBack: 50 mL  Total IN: 50 mL    OUT:  Total OUT: 0 mL    Total NET: 50 mL      04 Jun 2025 07:01  -  05 Jun 2025 05:52  --------------------------------------------------------  IN:    IV PiggyBack: 50 mL  Total IN: 50 mL    OUT:  Total OUT: 0 mL    Total NET: 50 mL               Discussed with:     Cultures:	        Radiology

## 2025-06-05 NOTE — PROGRESS NOTE ADULT - SUBJECTIVE AND OBJECTIVE BOX
[facility] -- Sid White Pannella, Patel, Savella Goodger, [place]  Office # [phone]      Follow Up:    ADHF    Subjective/Observations:     Seen bedside, remains on room air + wheezing   no sob or chest pain  Nonproductive cough noted    REVIEW OF SYSTEMS: All other review of systems is negative unless indicated above    PAST MEDICAL & SURGICAL HISTORY:  Personal history of PE (pulmonary embolism)      COPD (chronic obstructive pulmonary disease)      Hypertension      Restless leg syndrome      Factor V Leiden      Parkinsons      Spinal stenosis      Memory loss      Right inguinal hernia      AL (obstructive sleep apnea)      Diabetes      Herniated cervical disc      H/O hernia repair          MEDICATIONS  (STANDING):  albuterol/ipratropium for Nebulization 3 milliLiter(s) Nebulizer every 6 hours  carbidopa/levodopa  25/100 1 Tablet(s) Oral <User Schedule>  cefuroxime   Tablet 500 milliGRAM(s) Oral every 12 hours  folic acid 1 milliGRAM(s) Oral daily  furosemide   Injectable 40 milliGRAM(s) IV Push every 12 hours  guaiFENesin Oral Liquid (Sugar-Free) 200 milliGRAM(s) Oral every 6 hours  metoprolol tartrate 25 milliGRAM(s) Oral every 8 hours  rOPINIRole 2 milliGRAM(s) Oral <User Schedule>  saccharomyces boulardii 250 milliGRAM(s) Oral two times a day  trihexyphenidyl 1 milliGRAM(s) Oral three times a day    MEDICATIONS  (PRN):  acetaminophen     Tablet .. 650 milliGRAM(s) Oral every 6 hours PRN Temp greater or equal to 38.5C (101.3F), Moderate Pain (4 - 6)  benzonatate 100 milliGRAM(s) Oral three times a day PRN Cough      Allergies    Levaquin (Anaphylaxis)  garlic (Other (Mild))    Intolerances        Vital Signs Last 24 Hrs  T(C): 36.7 (2025 05:00), Max: 36.8 (2025 20:34)  T(F): 98.1 (2025 05:00), Max: 98.3 (2025 20:34)  HR: 90 (2025 08:05) (89 - 104)  BP: 122/68 (2025 05:00) (105/63 - 122/68)  BP(mean): --  RR: 18 (2025 05:00) (17 - 18)  SpO2: 97% (2025 05:00) (95% - 97%)    Parameters below as of 2025 05:00  Patient On (Oxygen Delivery Method): nasal cannula        I&O's Summary    2025 07:01  -  2025 07:00  --------------------------------------------------------  IN: 290 mL / OUT: 0 mL / NET: 290 mL          PHYSICAL EXAM:    Constitutional: NAD, awake and alert,obese  HEENT: Moist Mucous Membranes, Anicteric  Pulmonary: Non-labored, breath sounds are exp wheezing   Cardiovascular: IRRegular, S1 and S2 nl, No murmurs, rubs, gallops or clicks  Gastrointestinal: Bowel Sounds present, soft, nontender.   Lymph: No lymphadenopathy. No peripheral edema.  Skin: No visible rashes or ulcers.  Psych:  Mood & affect appropriate    LABS: All Labs Reviewed:                        12.9   10.91 )-----------( 399      ( 2025 07:42 )             38.9                         12.3   10.50 )-----------( 357      ( 2025 06:30 )             37.4                         12.1   10.69 )-----------( 359      ( 2025 06:50 )             36.6     2025 06:30    138    |  98     |  15     ----------------------------<  122    3.4     |  33     |  0.95   2025 06:50    138    |  98     |  13     ----------------------------<  116    3.4     |  33     |  0.91     Ca    9.1        2025 06:30  Ca    9.2        2025 06:50  Phos  2.9       2025 11:37  Mg     1.8       2025 11:37      PT/INR - ( 2025 07:42 )   PT: 26.5 sec;   INR: 2.26 ratio                  EC Lead ECG:   Ventricular Rate 119 BPM    QRS Duration 82 ms    Q-T Interval 328 ms    QTC Calculation(Bazett) 461 ms    R Axis -5 degrees    T Axis 32 degrees    Diagnosis Line Atrial fibrillation with rapid ventricular response  Abnormal ECG  Confirmed by german Hull ([id number]) on 2025 3:14:44 PM (25 @ 10:28)      TRANSTHORACIC ECHOCARDIOGRAM REPORT  ________________________________________________________________________________                                      _______       Pt. Name:       ROBERT F BOECKLE Study Date:    6/3/2025  MRN:            [id number]         YOB: 1945  Accession #:    [id number]        Age:           79 years  Account#:       [id number]       Gender:        M  Heart Rate:                      Height:        66.93 in (170.00 cm)  Rhythm:                          Weight:        147.71 lb (67.00 kg)  Blood Pressure: 96/60 mmHg       BSA/BMI:       1.78 m² / 23.18 kg/m²  ________________________________________________________________________________________  Referring Physician:    [id number] Asya Muller  Interpreting Physician: Mere Mena MD  Primary Sonographer:    Andres Mejias    CPT:                ECHO TTE WITH CON COMP W DOPP - .m  Indication(s):      Edema, unspecified - R60.9  Procedure:          Transthoracic echocardiogram with 2-D, M-mode and complete                      spectral and color flow Doppler.  Ordering Location:  [facility]  Admission Status:   Inpatient  Contrast Injection: Verbal consent was obtained for injection of Ultrasonic                      Enhancing Agent following a discussion of risks and                      benefits.                      Endocardial visualization enhanced with Definity Ultrasound                      enhancing agent.  Study Information:  Image quality for this study is technically difficult.                      Technically difficult study secondary to lung interference.    _______________________________________________________________________________________     CONCLUSIONS:      1. Technically difficult image quality.   2. Leftventricular systolic function is normal with an ejection fraction of 57 % by Andrews's method of disks.   3. Probably normal right ventricular systolic function.   4. Aortic valve was not well visualized.   5. Mitral valve leaflets have focal calcification.   6. Trace mitral regurgitation.   7. Tricuspid valve was not well visualized.   8. The inferior vena cava is normal in size measuring 1.77 cm in diameter, (normal <2.1cm) with normal inspiratory collapse (normal >50%) consistent with normal right atrial pressure (~3, range 0-5mmHg).   9. Left pleural effusion noted.    ________________________________________________________________________________________  FINDINGS:     Left Ventricle:  After obtaining consent, Definity ultrasound enhancing agent was given for enhanced left ventricular opacification and improved delineation of the left ventricular endocardial borders. Left ventricular systolic function is normal with a calculated ejection fraction of 57 % by the Andrews's biplane method of disks.     Right Ventricle:  The right ventricle is not well visualized. Right ventricular systolic function is probably normal.     Left Atrium:  The left atrium is normal in size.     Right Atrium:  The right atrium is normal in size with an indexed volume of 18.86 ml/m² and an indexed area of 8.50 cm²/m².     Interatrial Septum:  The interatrial septum was not well visualized.     Aortic Valve:  The aortic valve was not well visualized.     Mitral Valve:  Mitral valve leaflets have focalcalcification. There is trace mitral regurgitation.     Tricuspid Valve:  The tricuspid valve was not well visualized.     Pulmonic Valve:  The pulmonic valve was not well visualized.     Aorta:  The aortic root at the sinuses of Valsalva is normal in size, measuring 3.60 cm (indexed 2.03 cm/m²). The ascending aorta is normal in size, measuring 3.30 cm (indexed 1.86 cm/m²).     Pleura:  Left pleural effusion noted.     Systemic Veins:  The inferior vena cava is normal in size measuring 1.77 cm indiameter, (normal <2.1cm) with normal inspiratory collapse (normal >50%) consistent with normal right atrial pressure (~3, range 0-5mmHg).  ____________________________________________________________________  QUANTITATIVE DATA:  Left Ventricle Measurements: (Indexed to BSA)     IVSd (2D):   0.9 cm  LVPWd (2D):  1.2 cm  LVIDd (2D):  4.6 cm  LVIDs (2D):  3.2 cm  LV Mass:     176 g  99.2 g/m²  LV Vol d, MOD A2C: 27.7 ml 15.59 ml/m²  LV Vol d, MOD A4C: 40.9 ml 23.03 ml/m²  LV Vol d, MOD BP:  34.1 ml19.20 ml/m²  LV Vol s, MOD A2C: 12.3 ml 6.92 ml/m²  LV Vol s, MOD A4C: 17.6 ml 9.91 ml/m²  LV Vol s, MOD BP:  14.8 ml 8.31 ml/m²  LVOT SV MOD BP:    19.3 ml  LV EF% MOD BP:     57 %     MV E Vmax:    1.26 m/s  MV A Vmax:    0.40 m/s  MV E/A:       3.12  e' lateral:   8.05 cm/s  e' medial:    7.51 cm/s  E/e' lateral: 15.65  E/e' medial:  16.78  E/e' Average: 16.20  MV DT:        181 msec    Aorta Measurements: (Normal range) (Indexed to BSA)     Ao Root d     3.60 cm (3.1 - 3.7 cm) 2.03 cm/m²  Ao Asc d, 2D: 3.30  Ao Asc prox:  3.30 cm                1.86 cm/m²            Left Atrium Measurements: (Indexed to BSA)  LA Diam 2D: 4.70 cm         Right Atrial Measurements:     RA Vol s, MOD A4C         33.5 ml  RA Vol s, MOD A4C i BSA   18.86 ml/m²  RA Area s, MOD A4C        15.1 cm²  RA Area s, MOD A4C, i BSA 8.50 cm²/m²       LVOT / RVOT/ Qp/Qs Data: (Indexed to BSA)  LVOT Diameter,s: 2.10 cm  LVOT Area:       3.46 cm²    Mitral Valve Measurements:     MV E Vmax: 1.3 m/s  MV A Vmax: 0.4 m/s  MV E/A:    3.1       Tricuspid Valve Measurements:     RA Pressure: 3 mmHg    ________________________________________________________________________________________  Electronically signed on 2025 at 7:59:49 AM by Mere Mena MD         *** Final ***      Radiology:

## 2025-06-05 NOTE — PROGRESS NOTE ADULT - SUBJECTIVE AND OBJECTIVE BOX
Patient is a 79y old  Male who presents with a chief complaint of sob, cough (05 Jun 2025 10:02)        INTERVAL HPI/OVERNIGHT EVENTS:   no complaints  pt seen and examined         Vital Signs Last 24 Hrs  T(C): 36.3 (05 Jun 2025 12:09), Max: 36.7 (05 Jun 2025 05:00)  T(F): 97.3 (05 Jun 2025 12:09), Max: 98.1 (05 Jun 2025 05:00)  HR: 100 (05 Jun 2025 20:04) (84 - 105)  BP: 100/60 (05 Jun 2025 18:05) (100/60 - 122/68)  BP(mean): --  RR: 17 (05 Jun 2025 12:09) (17 - 18)  SpO2: 98% (05 Jun 2025 20:04) (95% - 98%)    Parameters below as of 05 Jun 2025 20:04  Patient On (Oxygen Delivery Method): nasal cannula, 3L        acetaminophen     Tablet .. 650 milliGRAM(s) Oral every 6 hours PRN  albuterol/ipratropium for Nebulization 3 milliLiter(s) Nebulizer every 6 hours  benzonatate 100 milliGRAM(s) Oral three times a day PRN  carbidopa/levodopa  25/100 1 Tablet(s) Oral <User Schedule>  cefuroxime   Tablet 500 milliGRAM(s) Oral every 12 hours  folic acid 1 milliGRAM(s) Oral daily  furosemide   Injectable 40 milliGRAM(s) IV Push every 12 hours  guaiFENesin Oral Liquid (Sugar-Free) 200 milliGRAM(s) Oral every 6 hours  metoprolol tartrate 25 milliGRAM(s) Oral every 8 hours  rOPINIRole 2 milliGRAM(s) Oral <User Schedule>  saccharomyces boulardii 250 milliGRAM(s) Oral two times a day  trihexyphenidyl 1 milliGRAM(s) Oral three times a day  warfarin 1.5 milliGRAM(s) Oral once      PHYSICAL EXAM:  GENERAL: NAD   EYES: conjunctiva and sclera clear  ENMT: Moist mucous membranes  NECK: Supple, No JVD, Normal thyroid  CHEST/LUNG: non labored, cta b/l  HEART: Regular rate and rhythm; No murmurs, rubs, or gallops  ABDOMEN: Soft, Nontender, Nondistended; Bowel sounds present  EXTREMITIES:  No clubbing, no cyanosis, no edema  LYMPH: No lymphadenopathy noted  SKIN: No rashes or lesions  NEURO: no new focal deficits    Consultant(s) Notes Reviewed:  [x ] YES  [ ] NO  Care Discussed with Consultants/Other Providers [ x] YES  [ ] NO    LABS:                        12.9   10.91 )-----------( 399      ( 05 Jun 2025 07:42 )             38.9     06-05    138  |  97  |  23  ----------------------------<  129[H]  3.5   |  34[H]  |  1.10    Ca    9.5      05 Jun 2025 07:42      PT/INR - ( 05 Jun 2025 07:42 )   PT: 26.5 sec;   INR: 2.26 ratio           Urinalysis Basic - ( 05 Jun 2025 07:42 )    Color: x / Appearance: x / SG: x / pH: x  Gluc: 129 mg/dL / Ketone: x  / Bili: x / Urobili: x   Blood: x / Protein: x / Nitrite: x   Leuk Esterase: x / RBC: x / WBC x   Sq Epi: x / Non Sq Epi: x / Bacteria: x      CAPILLARY BLOOD GLUCOSE            Urinalysis Basic - ( 05 Jun 2025 07:42 )    Color: x / Appearance: x / SG: x / pH: x  Gluc: 129 mg/dL / Ketone: x  / Bili: x / Urobili: x   Blood: x / Protein: x / Nitrite: x   Leuk Esterase: x / RBC: x / WBC x   Sq Epi: x / Non Sq Epi: x / Bacteria: x        Culture - Sputum (collected 03 Jun 2025 06:30)  Source: Sputum Sputum  Gram Stain (03 Jun 2025 15:17):    Few polymorphonuclear leukocytes per low power field    Rare Squamous epithelial cells per low power field    Moderate Gram Negative Rods per oil power field    Few Gram Positive Cocci in Pairs and Chains per oil power field    Rare Gram Positive Cocci in Clusters per oil power field  Final Report (04 Jun 2025 17:30):    Commensal mone consistent with body site        RADIOLOGY & ADDITIONAL TESTS:    Imaging Personally Reviewed  Reviewed consultants input

## 2025-06-06 ENCOUNTER — TRANSCRIPTION ENCOUNTER (OUTPATIENT)
Age: 80
End: 2025-06-06

## 2025-06-06 LAB
ANION GAP SERPL CALC-SCNC: 5 MMOL/L — SIGNIFICANT CHANGE UP (ref 5–17)
BUN SERPL-MCNC: 22 MG/DL — SIGNIFICANT CHANGE UP (ref 7–23)
CALCIUM SERPL-MCNC: 9 MG/DL — SIGNIFICANT CHANGE UP (ref 8.5–10.1)
CHLORIDE SERPL-SCNC: 98 MMOL/L — SIGNIFICANT CHANGE UP (ref 96–108)
CO2 SERPL-SCNC: 35 MMOL/L — HIGH (ref 22–31)
CREAT SERPL-MCNC: 0.95 MG/DL — SIGNIFICANT CHANGE UP (ref 0.5–1.3)
EGFR: 81 ML/MIN/1.73M2 — SIGNIFICANT CHANGE UP
EGFR: 81 ML/MIN/1.73M2 — SIGNIFICANT CHANGE UP
GLUCOSE SERPL-MCNC: 122 MG/DL — HIGH (ref 70–99)
HCT VFR BLD CALC: 35.7 % — LOW (ref 39–50)
HGB BLD-MCNC: 12 G/DL — LOW (ref 13–17)
INR BLD: 1.91 RATIO — HIGH (ref 0.85–1.16)
MCHC RBC-ENTMCNC: 31.8 PG — SIGNIFICANT CHANGE UP (ref 27–34)
MCHC RBC-ENTMCNC: 33.6 G/DL — SIGNIFICANT CHANGE UP (ref 32–36)
MCV RBC AUTO: 94.7 FL — SIGNIFICANT CHANGE UP (ref 80–100)
NRBC BLD AUTO-RTO: 0 /100 WBCS — SIGNIFICANT CHANGE UP (ref 0–0)
PLATELET # BLD AUTO: 348 K/UL — SIGNIFICANT CHANGE UP (ref 150–400)
POTASSIUM SERPL-MCNC: 3 MMOL/L — LOW (ref 3.5–5.3)
POTASSIUM SERPL-SCNC: 3 MMOL/L — LOW (ref 3.5–5.3)
PROTHROM AB SERPL-ACNC: 22.2 SEC — HIGH (ref 9.9–13.4)
RBC # BLD: 3.77 M/UL — LOW (ref 4.2–5.8)
RBC # FLD: 13.1 % — SIGNIFICANT CHANGE UP (ref 10.3–14.5)
SODIUM SERPL-SCNC: 138 MMOL/L — SIGNIFICANT CHANGE UP (ref 135–145)
WBC # BLD: 10.75 K/UL — HIGH (ref 3.8–10.5)
WBC # FLD AUTO: 10.75 K/UL — HIGH (ref 3.8–10.5)

## 2025-06-06 PROCEDURE — 99232 SBSQ HOSP IP/OBS MODERATE 35: CPT

## 2025-06-06 RX ADMIN — BUTYROSPERMUM PARKII(SHEA BUTTER), SIMMONDSIA CHINENSIS (JOJOBA) SEED OIL, ALOE BARBADENSIS LEAF EXTRACT 250 MILLIGRAM(S): .01; 1; 3.5 LIQUID TOPICAL at 18:02

## 2025-06-06 RX ADMIN — ROPINIROLE 2 MILLIGRAM(S): 5 TABLET, FILM COATED ORAL at 13:46

## 2025-06-06 RX ADMIN — DEXTROMETHORPHAN HBR, GUAIFENESIN 200 MILLIGRAM(S): 200 LIQUID ORAL at 23:23

## 2025-06-06 RX ADMIN — IPRATROPIUM BROMIDE AND ALBUTEROL SULFATE 3 MILLILITER(S): .5; 2.5 SOLUTION RESPIRATORY (INHALATION) at 13:23

## 2025-06-06 RX ADMIN — Medication 100 MILLIGRAM(S): at 23:23

## 2025-06-06 RX ADMIN — ROPINIROLE 2 MILLIGRAM(S): 5 TABLET, FILM COATED ORAL at 08:29

## 2025-06-06 RX ADMIN — METOPROLOL SUCCINATE 25 MILLIGRAM(S): 50 TABLET, EXTENDED RELEASE ORAL at 13:46

## 2025-06-06 RX ADMIN — Medication 1 TABLET(S): at 13:46

## 2025-06-06 RX ADMIN — TRIHEXYPHENIDYL HYDROCHLORIDE 1 MILLIGRAM(S): 2 TABLET ORAL at 05:17

## 2025-06-06 RX ADMIN — ROPINIROLE 2 MILLIGRAM(S): 5 TABLET, FILM COATED ORAL at 18:02

## 2025-06-06 RX ADMIN — FOLIC ACID 1 MILLIGRAM(S): 1 TABLET ORAL at 11:51

## 2025-06-06 RX ADMIN — DEXTROMETHORPHAN HBR, GUAIFENESIN 200 MILLIGRAM(S): 200 LIQUID ORAL at 05:17

## 2025-06-06 RX ADMIN — TRIHEXYPHENIDYL HYDROCHLORIDE 1 MILLIGRAM(S): 2 TABLET ORAL at 22:35

## 2025-06-06 RX ADMIN — DEXTROMETHORPHAN HBR, GUAIFENESIN 200 MILLIGRAM(S): 200 LIQUID ORAL at 18:02

## 2025-06-06 RX ADMIN — Medication 1 TABLET(S): at 18:02

## 2025-06-06 RX ADMIN — Medication 500 MILLIGRAM(S): at 18:01

## 2025-06-06 RX ADMIN — METOPROLOL SUCCINATE 25 MILLIGRAM(S): 50 TABLET, EXTENDED RELEASE ORAL at 22:36

## 2025-06-06 RX ADMIN — TRIHEXYPHENIDYL HYDROCHLORIDE 1 MILLIGRAM(S): 2 TABLET ORAL at 13:47

## 2025-06-06 RX ADMIN — Medication 2 MILLIGRAM(S): at 22:36

## 2025-06-06 RX ADMIN — FUROSEMIDE 40 MILLIGRAM(S): 10 INJECTION INTRAMUSCULAR; INTRAVENOUS at 18:02

## 2025-06-06 RX ADMIN — Medication 100 MILLIGRAM(S): at 05:18

## 2025-06-06 RX ADMIN — Medication 40 MILLIEQUIVALENT(S): at 13:47

## 2025-06-06 RX ADMIN — ROPINIROLE 2 MILLIGRAM(S): 5 TABLET, FILM COATED ORAL at 22:35

## 2025-06-06 RX ADMIN — METOPROLOL SUCCINATE 25 MILLIGRAM(S): 50 TABLET, EXTENDED RELEASE ORAL at 05:18

## 2025-06-06 RX ADMIN — IPRATROPIUM BROMIDE AND ALBUTEROL SULFATE 3 MILLILITER(S): .5; 2.5 SOLUTION RESPIRATORY (INHALATION) at 00:38

## 2025-06-06 RX ADMIN — Medication 500 MILLIGRAM(S): at 05:18

## 2025-06-06 RX ADMIN — Medication 1 TABLET(S): at 08:29

## 2025-06-06 RX ADMIN — IPRATROPIUM BROMIDE AND ALBUTEROL SULFATE 3 MILLILITER(S): .5; 2.5 SOLUTION RESPIRATORY (INHALATION) at 20:20

## 2025-06-06 RX ADMIN — IPRATROPIUM BROMIDE AND ALBUTEROL SULFATE 3 MILLILITER(S): .5; 2.5 SOLUTION RESPIRATORY (INHALATION) at 07:36

## 2025-06-06 RX ADMIN — BUTYROSPERMUM PARKII(SHEA BUTTER), SIMMONDSIA CHINENSIS (JOJOBA) SEED OIL, ALOE BARBADENSIS LEAF EXTRACT 250 MILLIGRAM(S): .01; 1; 3.5 LIQUID TOPICAL at 05:18

## 2025-06-06 RX ADMIN — Medication 40 MILLIEQUIVALENT(S): at 10:29

## 2025-06-06 RX ADMIN — Medication 1 TABLET(S): at 23:22

## 2025-06-06 RX ADMIN — DEXTROMETHORPHAN HBR, GUAIFENESIN 200 MILLIGRAM(S): 200 LIQUID ORAL at 11:51

## 2025-06-06 NOTE — PROGRESS NOTE ADULT - SUBJECTIVE AND OBJECTIVE BOX
[facility]  Tima Camacho MD PhD, Yu Loya MD, Shayna Lares MD, Surendra Connolly MD, Pedro Bedoya MD  and providing coverage with Wm Machado MD  Providing Infectious Disease Consultations at [facility], [facility], [facility]'s    Office# [phone] to schedule follow up appointments  Answering Service for urgent calls or New Consults [phone]  Cell# to text for urgent issues Tima Camacho [phone]     infectious diseases progress note:    ROBERT BOECKLE is a 79y y. o. Male patient    Overnight and events of the last 24hrs reviewed    Allergies    Levaquin (Anaphylaxis)  garlic (Other (Mild))    Intolerances        ANTIBIOTICS/RELEVANT:  antimicrobials  cefuroxime   Tablet 500 milliGRAM(s) Oral every 12 hours    immunologic:    OTHER:  acetaminophen     Tablet .. 650 milliGRAM(s) Oral every 6 hours PRN  albuterol/ipratropium for Nebulization 3 milliLiter(s) Nebulizer every 6 hours  benzonatate 100 milliGRAM(s) Oral three times a day PRN  carbidopa/levodopa  25/100 1 Tablet(s) Oral <User Schedule>  folic acid 1 milliGRAM(s) Oral daily  furosemide   Injectable 40 milliGRAM(s) IV Push every 12 hours  guaiFENesin Oral Liquid (Sugar-Free) 200 milliGRAM(s) Oral every 6 hours  metoprolol tartrate 25 milliGRAM(s) Oral every 8 hours  potassium chloride    Tablet ER 40 milliEquivalent(s) Oral every 4 hours  rOPINIRole 2 milliGRAM(s) Oral <User Schedule>  saccharomyces boulardii 250 milliGRAM(s) Oral two times a day  trihexyphenidyl 1 milliGRAM(s) Oral three times a day      Objective:  Vital Signs Last 24 Hrs  T(C): 36.9 (06 Jun 2025 04:50), Max: 36.9 (06 Jun 2025 04:50)  T(F): 98.4 (06 Jun 2025 04:50), Max: 98.4 (06 Jun 2025 04:50)  HR: 96 (06 Jun 2025 08:30) (84 - 111)  BP: 102/62 (06 Jun 2025 04:50) (100/60 - 113/69)  BP(mean): --  RR: 18 (06 Jun 2025 04:50) (17 - 18)  SpO2: 99% (06 Jun 2025 08:30) (94% - 100%)    Parameters below as of 06 Jun 2025 08:30  Patient On (Oxygen Delivery Method): nasal cannula        T(C): 36.9 (06-06-25 @ 04:50), Max: 36.9 (06-06-25 @ 04:50)  T(C): 36.9 (06-06-25 @ 04:50), Max: 36.9 (06-06-25 @ 04:50)  T(C): 36.9 (06-06-25 @ 04:50), Max: 37.1 (06-02-25 @ 20:30)    PHYSICAL EXAM:  HEENT: NC atraumatic  Neck: supple  Respiratory: no accessory muscle use, breathing comfortably  Cardiovascular: distant  Gastrointestinal: normal appearing, nondistended  Extremities: no clubbing, no cyanosis,        LABS:                          12.0   10.75 )-----------( 348      ( 06 Jun 2025 06:50 )             35.7       WBC  10.75 06-06 @ 06:50  10.91 06-05 @ 07:42  10.50 06-04 @ 06:30  10.69 06-03 @ 06:50  10.38 06-02 @ 07:21  9.74 06-01 @ 06:25  9.44 05-31 @ 13:30      06-06    138  |  98  |  22  ----------------------------<  122[H]  3.0[L]   |  35[H]  |  0.95    Ca    9.0      06 Jun 2025 06:50        Creatinine: 0.95 mg/dL (06-06-25 @ 06:50)  Creatinine: 1.10 mg/dL (06-05-25 @ 07:42)  Creatinine: 0.95 mg/dL (06-04-25 @ 06:30)  Creatinine: 0.91 mg/dL (06-03-25 @ 06:50)  Creatinine: 0.88 mg/dL (06-02-25 @ 07:21)  Creatinine: 0.93 mg/dL (06-01-25 @ 06:25)  Creatinine: 0.93 mg/dL (05-31-25 @ 13:30)      PT/INR - ( 06 Jun 2025 06:50 )   PT: 22.2 sec;   INR: 1.91 ratio           Urinalysis Basic - ( 06 Jun 2025 06:50 )    Color: x / Appearance: x / SG: x / pH: x  Gluc: 122 mg/dL / Ketone: x  / Bili: x / Urobili: x   Blood: x / Protein: x / Nitrite: x   Leuk Esterase: x / RBC: x / WBC x   Sq Epi: x / Non Sq Epi: x / Bacteria: x            INFLAMMATORY MARKERS      MICROBIOLOGY:              RADIOLOGY & ADDITIONAL STUDIES:

## 2025-06-06 NOTE — PROGRESS NOTE ADULT - SUBJECTIVE AND OBJECTIVE BOX
Date/Time Patient Seen:  		  Referring MD:   Data Reviewed	       Patient is a 79y old  Male who presents with a chief complaint of sob, cough (05 Jun 2025 20:40)      Subjective/HPI     PAST MEDICAL & SURGICAL HISTORY:  Personal history of PE (pulmonary embolism)    COPD (chronic obstructive pulmonary disease)    Hypertension    Restless leg syndrome    Factor V Leiden    Parkinsons    Spinal stenosis    Memory loss    Right inguinal hernia    AL (obstructive sleep apnea)    Diabetes    Herniated cervical disc    H/O hernia repair          Medication list         MEDICATIONS  (STANDING):  albuterol/ipratropium for Nebulization 3 milliLiter(s) Nebulizer every 6 hours  carbidopa/levodopa  25/100 1 Tablet(s) Oral <User Schedule>  cefuroxime   Tablet 500 milliGRAM(s) Oral every 12 hours  folic acid 1 milliGRAM(s) Oral daily  furosemide   Injectable 40 milliGRAM(s) IV Push every 12 hours  guaiFENesin Oral Liquid (Sugar-Free) 200 milliGRAM(s) Oral every 6 hours  metoprolol tartrate 25 milliGRAM(s) Oral every 8 hours  rOPINIRole 2 milliGRAM(s) Oral <User Schedule>  saccharomyces boulardii 250 milliGRAM(s) Oral two times a day  trihexyphenidyl 1 milliGRAM(s) Oral three times a day    MEDICATIONS  (PRN):  acetaminophen     Tablet .. 650 milliGRAM(s) Oral every 6 hours PRN Temp greater or equal to 38.5C (101.3F), Moderate Pain (4 - 6)  benzonatate 100 milliGRAM(s) Oral three times a day PRN Cough         Vitals log        ICU Vital Signs Last 24 Hrs  T(C): 36.9 (06 Jun 2025 04:50), Max: 36.9 (06 Jun 2025 04:50)  T(F): 98.4 (06 Jun 2025 04:50), Max: 98.4 (06 Jun 2025 04:50)  HR: 110 (06 Jun 2025 04:50) (84 - 111)  BP: 102/62 (06 Jun 2025 04:50) (100/60 - 113/69)  BP(mean): --  ABP: --  ABP(mean): --  RR: 18 (06 Jun 2025 04:50) (17 - 18)  SpO2: 94% (06 Jun 2025 04:50) (94% - 100%)    O2 Parameters below as of 06 Jun 2025 04:50  Patient On (Oxygen Delivery Method): nasal cannula                 Input and Output:  I&O's Detail      Lab Data                        12.9   10.91 )-----------( 399      ( 05 Jun 2025 07:42 )             38.9     06-05    138  |  97  |  23  ----------------------------<  129[H]  3.5   |  34[H]  |  1.10    Ca    9.5      05 Jun 2025 07:42              Review of Systems	      Objective     Physical Examination    heart s1s2  lung dc bs  head nc  head at      Pertinent Lab findings & Imaging      Tad:  NO   Adequate UO     I&O's Detail           Discussed with:     Cultures:	        Radiology

## 2025-06-06 NOTE — DISCHARGE NOTE NURSING/CASE MANAGEMENT/SOCIAL WORK - NSDCPEFALRISK_GEN_ALL_CORE
For information on Fall & Injury Prevention, visit: [email or web address] OR  [email or web address] OR  https://www.cdc.gov/steadi/patient.html

## 2025-06-06 NOTE — PROGRESS NOTE ADULT - ASSESSMENT
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 Patient recently [facility] [place] for new-onset Afib and acute hypoxic respiratory failure 2/2 COPD and worsened diaphragmatic hernia.sp diaphragmatic hernia repair. aw sob and cough    CXR-Moderate left and small right-sided pleural effusions.    #acute on chronic  hypoxic respiratory failure   acute diastolic chf w pleural effusions   probnp- high, i/o, wts, TTE  ct chest- ?consolidations and loculated effusions  pulm, cardio, and ID following  continue diuresis    #COPD on home o2  resume home inhalers    #DVT/PE, hx factor v leiden deffi  hx of afib  coumadin, monitor inr    #Parkinsons dz- resume home meds    [facility]   [phone]

## 2025-06-06 NOTE — PROGRESS NOTE ADULT - ASSESSMENT
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 Patient recently admitted to [facility] for new-onset Afib and acute hypoxic respiratory failure 2/2 COPD and worsened diaphragmatic hernia. He was treated and discharged home with outpatient Cardiology follow-up with Dr. Mccauley in preparation for diaphragmatic hernia repair. Patient tolerated the procedure well, however developed worsening leg swelling afterwards    Dyspnea - Cough -   copd  diaphragmatic hernia  Atelectasis  PE hx  OP  OA  Ataxic gait  Parkinson's disease  HTN  GERD    vs noted  ct chest reviewed - atelectasis - small to mod loculated left eff - no intervention at present  on abx - now on PO    I zion  nebs PRN  HOB elev  asp prec  oral hygiene  skin care  cvs rx regimen  BP control and HD monitoring  I and O  replete lytes  assist with needs  PPI  OLD - recent admission record reviewed

## 2025-06-06 NOTE — CASE MANAGEMENT PROGRESS NOTE - NSCMPROGRESSNOTE_GEN_ALL_CORE
CMS/Star patient for discharge tomorrow. Pt's wife resuming private hire aide who will assist her to bring patient home tomorrow. Referral for home care sent to [facility] at home. Patient has home O2, patient's wife Dayna will brin the portable with tomorrow.  CMS/Star patient for discharge tomorrow. Pt's wife is resuming private hire aide who will assist her to take patient home tomorrow. Referral for home care sent to [facility] at home. Patient has home O2, patient's wife Dayna will bring the portable with tomorrow.  CMS/Star patient for possible discharge this weekend. Pt's wife is resuming private hire aide who will assist her to take patient home. Referral for home care sent to [facility] at home. Patient has home O2, patient's wife Dayna will bring the portable with her.

## 2025-06-06 NOTE — PROGRESS NOTE ADULT - ASSESSMENT
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 Consulted for acute hypoxia and volume overload.     - Known o2-dep copd, now p/w worsening cough and SOB x 1week  - Ventral hernia for which had recent repair, with persistent partial left lung collapse by report   presented with worsening sob and hypoxic at home and  edematous on exam  - BNP:  <--2440  - CXR with a left pleural eff, probably superimposed on lobar collapse  - CT Chest  noted with loculated pl effusion  - Continue Lasix 40 iv bid , still with diffuse wheezing on exam   -needs K supplemented for goal > 4.0   -seen by pulmonary no indication for thora   -echo 6/3/2025 lv 57% Left pleural effusion noted     - h/o  p A fib, diagnosed over the past few months  -  diltiazem  recently stop for edema  - Told by Dr Mccauley in the office to start bisoprolol last week, but given a recent deterioration in respiratory status she has not started it  -Af rates still tachy at times, increase BB to every 6 hours   - Warfarin goal inr 2-3    - Patient is a fall risk with prior h/o brain bleed, outpatient physicians have continued AC due risk of recurrence.     - EKG A fib RVR @ 109  - No evidence of any active ischemia   - Troponin: <-12.0    - BP stable on bb     - Monitor and replete lytes, keep K>4, Mg>2.  - Will continue to follow.     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 Consulted for acute hypoxia and volume overload.     - Known o2-dep copd, now p/w worsening cough and SOB x 1week  - Ventral hernia for which had recent repair, with persistent partial left lung collapse by report   presented with worsening sob and hypoxic at home and  edematous on exam  - BNP:  <--2440  - CXR with a left pleural eff, probably superimposed on lobar collapse  - CT Chest  noted with loculated pl effusion  - still with wheezing on exam , copd likely contributing, slight increase in bicarb, anticipate changing to PO diuretics 6/7 am   -needs K supplemented for goal > 4.0   -seen by pulmonary no indication for thora   -echo 6/3/2025 lv 57% Left pleural effusion noted     - h/o  p A fib, diagnosed over the past few months  -  diltiazem  recently stop for edema  - Told by Dr Mccauley in the office to start bisoprolol last week, but given a recent deterioration in respiratory status she has not started it  -Af rates still tachy at times, increase BB to every 6 hours   - Warfarin goal inr 2-3    - Patient is a fall risk with prior h/o brain bleed, outpatient physicians have continued AC due risk of recurrence.     - EKG A fib RVR @ 109  - No evidence of any active ischemia   - Troponin: <-12.0    - BP stable on bb     - Monitor and replete lytes, keep K>4, Mg>2.  - Will continue to follow.     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 Consulted for acute hypoxia and volume overload.     - Known o2-dep copd, now p/w worsening cough and SOB x 1week  - Ventral hernia for which had recent repair, with persistent partial left lung collapse by report   presented with worsening sob and hypoxic at home and  edematous on exam  - CXR with a left pleural eff, probably superimposed on lobar collapse  - CT Chest  noted with loculated pl effusion  - still with wheezing on exam , copd likely contributing, slight increase in bicarb, anticipate changing to PO diuretics 6/7 am   -needs K supplemented for goal > 4.0   -seen by pulmonary no indication for thora   -echo 6/3/2025 lv 57% Left pleural effusion noted     - h/o  p A fib, diagnosed over the past few months  -  diltiazem  recently stop for edema  - Told by Dr Mccauley in the office to start bisoprolol last week, but given a recent deterioration in respiratory status she has not started it  -Af rates still tachy at times, increase BB to every 6 hours   - Warfarin goal inr 2-3    - Patient is a fall risk with prior h/o brain bleed, outpatient physicians have continued AC due risk of recurrence.     - EKG A fib RVR @ 109  - No evidence of any active ischemia   - Troponin: <-12.0    - BP stable on bb     - Monitor and replete lytes, keep K>4, Mg>2.  - Will continue to follow.

## 2025-06-06 NOTE — PROGRESS NOTE ADULT - SUBJECTIVE AND OBJECTIVE BOX
Patient is a 79y old  Male who presents with a chief complaint of sob, cough (06 Jun 2025 10:01)        INTERVAL HPI/OVERNIGHT EVENTS:   no complaints  pt seen and examined         Vital Signs Last 24 Hrs  T(C): 36.6 (06 Jun 2025 13:32), Max: 36.9 (06 Jun 2025 04:50)  T(F): 97.8 (06 Jun 2025 13:32), Max: 98.4 (06 Jun 2025 04:50)  HR: 102 (06 Jun 2025 13:45) (73 - 111)  BP: 109/70 (06 Jun 2025 13:45) (102/62 - 113/69)  BP(mean): --  RR: 18 (06 Jun 2025 13:45) (18 - 18)  SpO2: 97% (06 Jun 2025 13:45) (92% - 100%)    Parameters below as of 06 Jun 2025 13:45  Patient On (Oxygen Delivery Method): nasal cannula  O2 Flow (L/min): 3      acetaminophen     Tablet .. 650 milliGRAM(s) Oral every 6 hours PRN  albuterol/ipratropium for Nebulization 3 milliLiter(s) Nebulizer every 6 hours  benzonatate 100 milliGRAM(s) Oral three times a day PRN  carbidopa/levodopa  25/100 1 Tablet(s) Oral <User Schedule>  folic acid 1 milliGRAM(s) Oral daily  furosemide   Injectable 40 milliGRAM(s) IV Push every 12 hours  guaiFENesin Oral Liquid (Sugar-Free) 200 milliGRAM(s) Oral every 6 hours  metoprolol tartrate 25 milliGRAM(s) Oral every 8 hours  rOPINIRole 2 milliGRAM(s) Oral <User Schedule>  saccharomyces boulardii 250 milliGRAM(s) Oral two times a day  trihexyphenidyl 1 milliGRAM(s) Oral three times a day  warfarin 2 milliGRAM(s) Oral once      PHYSICAL EXAM:  GENERAL: NAD   EYES: conjunctiva and sclera clear  ENMT: Moist mucous membranes  NECK: Supple, No JVD, Normal thyroid  CHEST/LUNG: non labored, cta b/l  HEART: Regular rate and rhythm; No murmurs, rubs, or gallops  ABDOMEN: Soft, Nontender, Nondistended; Bowel sounds present  EXTREMITIES:  No clubbing, no cyanosis, no edema  LYMPH: No lymphadenopathy noted  SKIN: No rashes or lesions  NEURO: no new focal deficits    Consultant(s) Notes Reviewed:  [x ] YES  [ ] NO  Care Discussed with Consultants/Other Providers [ x] YES  [ ] NO    LABS:                        12.0   10.75 )-----------( 348      ( 06 Jun 2025 06:50 )             35.7     06-06    138  |  98  |  22  ----------------------------<  122[H]  3.0[L]   |  35[H]  |  0.95    Ca    9.0      06 Jun 2025 06:50      PT/INR - ( 06 Jun 2025 06:50 )   PT: 22.2 sec;   INR: 1.91 ratio           Urinalysis Basic - ( 06 Jun 2025 06:50 )    Color: x / Appearance: x / SG: x / pH: x  Gluc: 122 mg/dL / Ketone: x  / Bili: x / Urobili: x   Blood: x / Protein: x / Nitrite: x   Leuk Esterase: x / RBC: x / WBC x   Sq Epi: x / Non Sq Epi: x / Bacteria: x      CAPILLARY BLOOD GLUCOSE            Urinalysis Basic - ( 06 Jun 2025 06:50 )    Color: x / Appearance: x / SG: x / pH: x  Gluc: 122 mg/dL / Ketone: x  / Bili: x / Urobili: x   Blood: x / Protein: x / Nitrite: x   Leuk Esterase: x / RBC: x / WBC x   Sq Epi: x / Non Sq Epi: x / Bacteria: x          RADIOLOGY & ADDITIONAL TESTS:    Imaging Personally Reviewed  Reviewed consultants input

## 2025-06-06 NOTE — PROGRESS NOTE ADULT - SUBJECTIVE AND OBJECTIVE BOX
[facility] -- Sid White Pannella, Patel, Savella Goodger, Cohen  Office # [phone]      Follow Up:    ADHF   Subjective/Observations:   Seen bedside, remains on room air + wheezing   no sob or chest pain  Nonproductive cough noted    REVIEW OF SYSTEMS: All other review of systems is negative unless indicated above    PAST MEDICAL & SURGICAL HISTORY:  Personal history of PE (pulmonary embolism)      COPD (chronic obstructive pulmonary disease)      Hypertension      Restless leg syndrome      Factor V Leiden      Parkinsons      Spinal stenosis      Memory loss      Right inguinal hernia      AL (obstructive sleep apnea)      Diabetes      Herniated cervical disc      H/O hernia repair          MEDICATIONS  (STANDING):  albuterol/ipratropium for Nebulization 3 milliLiter(s) Nebulizer every 6 hours  carbidopa/levodopa  25/100 1 Tablet(s) Oral <User Schedule>  cefuroxime   Tablet 500 milliGRAM(s) Oral every 12 hours  folic acid 1 milliGRAM(s) Oral daily  furosemide   Injectable 40 milliGRAM(s) IV Push every 12 hours  guaiFENesin Oral Liquid (Sugar-Free) 200 milliGRAM(s) Oral every 6 hours  metoprolol tartrate 25 milliGRAM(s) Oral every 8 hours  potassium chloride    Tablet ER 40 milliEquivalent(s) Oral every 4 hours  rOPINIRole 2 milliGRAM(s) Oral <User Schedule>  saccharomyces boulardii 250 milliGRAM(s) Oral two times a day  trihexyphenidyl 1 milliGRAM(s) Oral three times a day    MEDICATIONS  (PRN):  acetaminophen     Tablet .. 650 milliGRAM(s) Oral every 6 hours PRN Temp greater or equal to 38.5C (101.3F), Moderate Pain (4 - 6)  benzonatate 100 milliGRAM(s) Oral three times a day PRN Cough      Allergies    Levaquin (Anaphylaxis)  garlic (Other (Mild))    Intolerances        Vital Signs Last 24 Hrs  T(C): 36.9 (2025 04:50), Max: 36.9 (2025 04:50)  T(F): 98.4 (2025 04:50), Max: 98.4 (2025 04:50)  HR: 96 (2025 08:30) (84 - 111)  BP: 102/62 (2025 04:50) (100/60 - 113/69)  BP(mean): --  RR: 18 (2025 04:50) (17 - 18)  SpO2: 99% (2025 08:30) (94% - 100%)    Parameters below as of 2025 08:30  Patient On (Oxygen Delivery Method): nasal cannula        I&O's Summary        Constitutional: NAD, awake and alert,obese  HEENT: Moist Mucous Membranes, Anicteric  Pulmonary: Non-labored, breath sounds are exp wheezing   Cardiovascular: IRRegular, S1 and S2 nl, No murmurs, rubs, gallops or clicks  Gastrointestinal: Bowel Sounds present, soft, nontender.   Lymph: No lymphadenopathy. No peripheral edema.  Skin: No visible rashes or ulcers.  Psych:  Mood & affect appropriate        LABS: All Labs Reviewed:                        12.0   1075 )-----------( 348      ( 2025 06:50 )             35.7                         12.9   10.91 )-----------( 399      ( 2025 07:42 )             38.9                         12.3   10.50 )-----------( 357      ( 2025 06:30 )             37.4     2025 06:50    138    |  98     |  22     ----------------------------<  122    3.0     |  35     |  0.95   2025 07:42    138    |  97     |  23     ----------------------------<  129    3.5     |  34     |  1.10   2025 06:30    138    |  98     |  15     ----------------------------<  122    3.4     |  33     |  0.95     Ca    9.0        2025 06:50  Ca    9.5        2025 07:42  Ca    9.1        2025 06:30      PT/INR - ( 2025 06:50 )   PT: 22.2 sec;   INR: 1.91 ratio                  EC Lead ECG:   Ventricular Rate 119 BPM    QRS Duration 82 ms    Q-T Interval 328 ms    QTC Calculation(Bazett) 461 ms    R Axis -5 degrees    T Axis 32 degrees    Diagnosis Line Atrial fibrillation with rapid ventricular response  Abnormal ECG  Confirmed by german Hull ([id number]) on 2025 3:14:44 PM (25 @ 10:28)      TRANSTHORACIC ECHOCARDIOGRAM REPORT  ________________________________________________________________________________                                      _______       Pt. Name:       ROBERT F BOECKLE Study Date:    6/3/2025  MRN:            [id number]         YOB: 1945  Accession #:    [id number]        Age:           79 years  Account#:       [id number]       Gender:        M  Heart Rate:                      Height:        66.93 in (170.00 cm)  Rhythm:                          Weight:        147.71 lb (67.00 kg)  Blood Pressure: 96/60 mmHg       BSA/BMI:       1.78 m² / 23.18 kg/m²  ________________________________________________________________________________________  Referring Physician:    [id number] Asya Muller  Interpreting Physician: Mere Mena MD  Primary Sonographer:    Andres Mejias    CPT:                ECHO TTE WITH CON COMP W DOPP - .m  Indication(s):      Edema, unspecified - R60.9  Procedure:          Transthoracic echocardiogram with 2-D, M-mode and complete                      spectral and color flow Doppler.  Ordering Location:  [facility]  Admission Status:   Inpatient  Contrast Injection: Verbal consent was obtained for injection of Ultrasonic                      Enhancing Agent following a discussion of risks and                      benefits.                      Endocardial visualization enhanced with Definity Ultrasound                      enhancing agent.  Study Information:  Image quality for this study is technically difficult.                      Technically difficult study secondary to lung interference.    _______________________________________________________________________________________     CONCLUSIONS:      1. Technically difficult image quality.   2. Leftventricular systolic function is normal with an ejection fraction of 57 % by Andrews's method of disks.   3. Probably normal right ventricular systolic function.   4. Aortic valve was not well visualized.   5. Mitral valve leaflets have focal calcification.   6. Trace mitral regurgitation.   7. Tricuspid valve was not well visualized.   8. The inferior vena cava is normal in size measuring 1.77 cm in diameter, (normal <2.1cm) with normal inspiratory collapse (normal >50%) consistent with normal right atrial pressure (~3, range 0-5mmHg).   9. Left pleural effusion noted.    ________________________________________________________________________________________  FINDINGS:     Left Ventricle:  After obtaining consent, Definity ultrasound enhancing agent was given for enhanced left ventricular opacification and improved delineation of the left ventricular endocardial borders. Left ventricular systolic function is normal with a calculated ejection fraction of 57 % by the Andrews's biplane method of disks.     Right Ventricle:  The right ventricle is not well visualized. Right ventricular systolic function is probably normal.     Left Atrium:  The left atrium is normal in size.     Right Atrium:  The right atrium is normal in size with an indexed volume of 18.86 ml/m² and an indexed area of 8.50 cm²/m².     Interatrial Septum:  The interatrial septum was not well visualized.     Aortic Valve:  The aortic valve was not well visualized.     Mitral Valve:  Mitral valve leaflets have focalcalcification. There is trace mitral regurgitation.     Tricuspid Valve:  The tricuspid valve was not well visualized.     Pulmonic Valve:  The pulmonic valve was not well visualized.     Aorta:  The aortic root at the sinuses of Valsalva is normal in size, measuring 3.60 cm (indexed 2.03 cm/m²). The ascending aorta is normal in size, measuring 3.30 cm (indexed 1.86 cm/m²).     Pleura:  Left pleural effusion noted.     Systemic Veins:  The inferior vena cava is normal in size measuring 1.77 cm indiameter, (normal <2.1cm) with normal inspiratory collapse (normal >50%) consistent with normal right atrial pressure (~3, range 0-5mmHg).  ____________________________________________________________________  QUANTITATIVE DATA:  Left Ventricle Measurements: (Indexed to BSA)     IVSd (2D):   0.9 cm  LVPWd (2D):  1.2 cm  LVIDd (2D):  4.6 cm  LVIDs (2D):  3.2 cm  LV Mass:     176 g  99.2 g/m²  LV Vol d, MOD A2C: 27.7 ml 15.59 ml/m²  LV Vol d, MOD A4C: 40.9 ml 23.03 ml/m²  LV Vol d, MOD BP:  34.1 ml19.20 ml/m²  LV Vol s, MOD A2C: 12.3 ml 6.92 ml/m²  LV Vol s, MOD A4C: 17.6 ml 9.91 ml/m²  LV Vol s, MOD BP:  14.8 ml 8.31 ml/m²  LVOT SV MOD BP:    19.3 ml  LV EF% MOD BP:     57 %     MV E Vmax:    1.26 m/s  MV A Vmax:    0.40 m/s  MV E/A:       3.12  e' lateral:   8.05 cm/s  e' medial:    7.51 cm/s  E/e' lateral: 15.65  E/e' medial:  16.78  E/e' Average: 16.20  MV DT:        181 msec    Aorta Measurements: (Normal range) (Indexed to BSA)     Ao Root d     3.60 cm (3.1 - 3.7 cm) 2.03 cm/m²  Ao Asc d, 2D: 3.30  Ao Asc prox:  3.30 cm                1.86 cm/m²            Left Atrium Measurements: (Indexed to BSA)  LA Diam 2D: 4.70 cm         Right Atrial Measurements:     RA Vol s, MOD A4C         33.5 ml  RA Vol s, MOD A4C i BSA   18.86 ml/m²  RA Area s, MOD A4C        15.1 cm²  RA Area s, MOD A4C, i BSA 8.50 cm²/m²       LVOT / RVOT/ Qp/Qs Data: (Indexed to BSA)  LVOT Diameter,s: 2.10 cm  LVOT Area:       3.46 cm²    Mitral Valve Measurements:     MV E Vmax: 1.3 m/s  MV A Vmax: 0.4 m/s  MV E/A:    3.1       Tricuspid Valve Measurements:     RA Pressure: 3 mmHg    ________________________________________________________________________________________  Electronically signed on 2025 at 7:59:49 AM by Mere Mena MD         *** Final ***      Radiology:

## 2025-06-06 NOTE — DISCHARGE NOTE NURSING/CASE MANAGEMENT/SOCIAL WORK - FINANCIAL ASSISTANCE
[facility] provides services at a reduced cost to those who are determined to be eligible through [facility]’s financial assistance program. Information regarding [facility]’s financial assistance program can be found by going to [email or web address] or by calling [phone].

## 2025-06-06 NOTE — DISCHARGE NOTE NURSING/CASE MANAGEMENT/SOCIAL WORK - PATIENT PORTAL LINK FT
You can access the FollowMyHealth Patient Portal offered by [facility] by registering at the following website: [email or web address]. By joining [facility]’s FollowMyHealth portal, you will also be able to view your health information using other applications (apps) compatible with our system.

## 2025-06-06 NOTE — PROGRESS NOTE ADULT - ASSESSMENT
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 Patient recently admitted to [facility] for new-onset Afib and acute hypoxic respiratory failure 2/2 COPD and worsened diaphragmatic hernia. He was treated and discharged home with outpatient Cardiology follow-up with Dr. Mccauley in preparation for diaphragmatic hernia repair. Patient tolerated the procedure well, however developed worsening leg swelling afterwards. PCP believed this was due to the new Diltiazem that was initiated for Afib, and discontinued it. Leg edema improved somewhat but is still persistent despite starting 40mg Lasix PO for the past 2 days. Patient saw Dr. Mccauley earlier this week who prescribed Bisoprolol instead, however wife and patient reluctant to start due to worsening cough. Wife states patient was ambulating today when he HR increased to 130s and O2 saturation decreased despite 2L NC. Patient denies orthopnea.  CT-chest -Moderate size loculated left pleural effusion with left lower lobe consolidation and volume loss    Dyspnea, pleural effusion, hypoxic respiratory failure  Challenging presentation with no fever, minimal leukocytosis, but elevated procalcitonin, elevated CRP, pt describing productive cough, minimal BNP elevation relative to symptoms and of note as per pulmonary assessment effusion is small and not recommended to undergo thoracentesis  consideration for PNA so rec empiric abx with  Ceftriaxone 1 gram IV daily started 6/2-continue for now added cdc w diff and procal for 6/4 am   last dose of ceftriaxone 6/4 rec   Cefuroxime 500 mg PO BID with last day 6/6 Friday-TODAY    DISPO wife reports she needs notice to get aids in place so discussed that pt is fine for dc from ID and no issue with discharge from my perspective    From an ID standpoint no further requirement for inpatient status for the management of ID issues. Fine with discharge from ID standpoint when other medical issues no longer require inpatient care and social issues allow for a safe discharge plan. To schedule an outpatient ID follow up appointment please call our office at [phone]    Thank you for consulting us and involving us in the management of this most interesting and challenging case.   In addition to reviewing history, imaging, documents, labs, microbiology, took into account antibiotic stewardship, local antibiogram and infection control strategies and potential transmission issues.    Please call us at [phone] or text me directly on my cell#[phone] with any concerns or further questions.

## 2025-06-07 LAB
ANION GAP SERPL CALC-SCNC: 6 MMOL/L — SIGNIFICANT CHANGE UP (ref 5–17)
BUN SERPL-MCNC: 19 MG/DL — SIGNIFICANT CHANGE UP (ref 7–23)
CALCIUM SERPL-MCNC: 9 MG/DL — SIGNIFICANT CHANGE UP (ref 8.5–10.1)
CHLORIDE SERPL-SCNC: 101 MMOL/L — SIGNIFICANT CHANGE UP (ref 96–108)
CO2 SERPL-SCNC: 32 MMOL/L — HIGH (ref 22–31)
CREAT SERPL-MCNC: 0.99 MG/DL — SIGNIFICANT CHANGE UP (ref 0.5–1.3)
EGFR: 77 ML/MIN/1.73M2 — SIGNIFICANT CHANGE UP
EGFR: 77 ML/MIN/1.73M2 — SIGNIFICANT CHANGE UP
GLUCOSE SERPL-MCNC: 131 MG/DL — HIGH (ref 70–99)
HCT VFR BLD CALC: 35.8 % — LOW (ref 39–50)
HGB BLD-MCNC: 11.7 G/DL — LOW (ref 13–17)
INR BLD: 1.63 RATIO — HIGH (ref 0.85–1.16)
MCHC RBC-ENTMCNC: 31.2 PG — SIGNIFICANT CHANGE UP (ref 27–34)
MCHC RBC-ENTMCNC: 32.7 G/DL — SIGNIFICANT CHANGE UP (ref 32–36)
MCV RBC AUTO: 95.5 FL — SIGNIFICANT CHANGE UP (ref 80–100)
NRBC BLD AUTO-RTO: 0 /100 WBCS — SIGNIFICANT CHANGE UP (ref 0–0)
PLATELET # BLD AUTO: 331 K/UL — SIGNIFICANT CHANGE UP (ref 150–400)
POTASSIUM SERPL-MCNC: 3.7 MMOL/L — SIGNIFICANT CHANGE UP (ref 3.5–5.3)
POTASSIUM SERPL-SCNC: 3.7 MMOL/L — SIGNIFICANT CHANGE UP (ref 3.5–5.3)
PROTHROM AB SERPL-ACNC: 19.1 SEC — HIGH (ref 9.9–13.4)
RBC # BLD: 3.75 M/UL — LOW (ref 4.2–5.8)
RBC # FLD: 13.2 % — SIGNIFICANT CHANGE UP (ref 10.3–14.5)
SODIUM SERPL-SCNC: 139 MMOL/L — SIGNIFICANT CHANGE UP (ref 135–145)
WBC # BLD: 11.44 K/UL — HIGH (ref 3.8–10.5)
WBC # FLD AUTO: 11.44 K/UL — HIGH (ref 3.8–10.5)

## 2025-06-07 PROCEDURE — 99233 SBSQ HOSP IP/OBS HIGH 50: CPT

## 2025-06-07 PROCEDURE — 71045 X-RAY EXAM CHEST 1 VIEW: CPT | Mod: 26

## 2025-06-07 RX ADMIN — FOLIC ACID 1 MILLIGRAM(S): 1 TABLET ORAL at 12:33

## 2025-06-07 RX ADMIN — METOPROLOL SUCCINATE 25 MILLIGRAM(S): 50 TABLET, EXTENDED RELEASE ORAL at 06:20

## 2025-06-07 RX ADMIN — IPRATROPIUM BROMIDE AND ALBUTEROL SULFATE 3 MILLILITER(S): .5; 2.5 SOLUTION RESPIRATORY (INHALATION) at 07:27

## 2025-06-07 RX ADMIN — Medication 1 TABLET(S): at 17:18

## 2025-06-07 RX ADMIN — Medication 1 TABLET(S): at 13:28

## 2025-06-07 RX ADMIN — Medication 2 MILLIGRAM(S): at 21:11

## 2025-06-07 RX ADMIN — TRIHEXYPHENIDYL HYDROCHLORIDE 1 MILLIGRAM(S): 2 TABLET ORAL at 21:11

## 2025-06-07 RX ADMIN — DEXTROMETHORPHAN HBR, GUAIFENESIN 200 MILLIGRAM(S): 200 LIQUID ORAL at 12:33

## 2025-06-07 RX ADMIN — DEXTROMETHORPHAN HBR, GUAIFENESIN 200 MILLIGRAM(S): 200 LIQUID ORAL at 17:19

## 2025-06-07 RX ADMIN — Medication 1 TABLET(S): at 23:20

## 2025-06-07 RX ADMIN — Medication 1 TABLET(S): at 08:40

## 2025-06-07 RX ADMIN — IPRATROPIUM BROMIDE AND ALBUTEROL SULFATE 3 MILLILITER(S): .5; 2.5 SOLUTION RESPIRATORY (INHALATION) at 13:49

## 2025-06-07 RX ADMIN — BUTYROSPERMUM PARKII(SHEA BUTTER), SIMMONDSIA CHINENSIS (JOJOBA) SEED OIL, ALOE BARBADENSIS LEAF EXTRACT 250 MILLIGRAM(S): .01; 1; 3.5 LIQUID TOPICAL at 17:18

## 2025-06-07 RX ADMIN — METOPROLOL SUCCINATE 25 MILLIGRAM(S): 50 TABLET, EXTENDED RELEASE ORAL at 14:31

## 2025-06-07 RX ADMIN — METOPROLOL SUCCINATE 25 MILLIGRAM(S): 50 TABLET, EXTENDED RELEASE ORAL at 21:11

## 2025-06-07 RX ADMIN — DEXTROMETHORPHAN HBR, GUAIFENESIN 200 MILLIGRAM(S): 200 LIQUID ORAL at 06:20

## 2025-06-07 RX ADMIN — TRIHEXYPHENIDYL HYDROCHLORIDE 1 MILLIGRAM(S): 2 TABLET ORAL at 14:31

## 2025-06-07 RX ADMIN — ROPINIROLE 2 MILLIGRAM(S): 5 TABLET, FILM COATED ORAL at 08:40

## 2025-06-07 RX ADMIN — ROPINIROLE 2 MILLIGRAM(S): 5 TABLET, FILM COATED ORAL at 13:28

## 2025-06-07 RX ADMIN — FUROSEMIDE 40 MILLIGRAM(S): 10 INJECTION INTRAMUSCULAR; INTRAVENOUS at 17:18

## 2025-06-07 RX ADMIN — IPRATROPIUM BROMIDE AND ALBUTEROL SULFATE 3 MILLILITER(S): .5; 2.5 SOLUTION RESPIRATORY (INHALATION) at 01:35

## 2025-06-07 RX ADMIN — TRIHEXYPHENIDYL HYDROCHLORIDE 1 MILLIGRAM(S): 2 TABLET ORAL at 06:19

## 2025-06-07 RX ADMIN — FUROSEMIDE 40 MILLIGRAM(S): 10 INJECTION INTRAMUSCULAR; INTRAVENOUS at 06:20

## 2025-06-07 RX ADMIN — IPRATROPIUM BROMIDE AND ALBUTEROL SULFATE 3 MILLILITER(S): .5; 2.5 SOLUTION RESPIRATORY (INHALATION) at 19:33

## 2025-06-07 RX ADMIN — BUTYROSPERMUM PARKII(SHEA BUTTER), SIMMONDSIA CHINENSIS (JOJOBA) SEED OIL, ALOE BARBADENSIS LEAF EXTRACT 250 MILLIGRAM(S): .01; 1; 3.5 LIQUID TOPICAL at 06:20

## 2025-06-07 RX ADMIN — ROPINIROLE 2 MILLIGRAM(S): 5 TABLET, FILM COATED ORAL at 17:18

## 2025-06-07 RX ADMIN — ROPINIROLE 2 MILLIGRAM(S): 5 TABLET, FILM COATED ORAL at 23:20

## 2025-06-07 NOTE — PROGRESS NOTE ADULT - SUBJECTIVE AND OBJECTIVE BOX
Patient is a 79y old  Male who presents with a chief complaint of sob, cough (07 Jun 2025 09:38)        INTERVAL HPI/OVERNIGHT EVENTS:   no complaints  pt seen and examined         Vital Signs Last 24 Hrs  T(C): 36.4 (07 Jun 2025 12:00), Max: 36.4 (06 Jun 2025 21:12)  T(F): 97.5 (07 Jun 2025 12:00), Max: 97.5 (06 Jun 2025 21:12)  HR: 103 (07 Jun 2025 14:31) (74 - 103)  BP: 103/69 (07 Jun 2025 14:31) (102/63 - 131/79)  BP(mean): --  RR: 18 (07 Jun 2025 14:31) (18 - 18)  SpO2: 93% (07 Jun 2025 14:31) (93% - 100%)    Parameters below as of 07 Jun 2025 14:31  Patient On (Oxygen Delivery Method): nasal cannula  O2 Flow (L/min): 2      acetaminophen     Tablet .. 650 milliGRAM(s) Oral every 6 hours PRN  albuterol/ipratropium for Nebulization 3 milliLiter(s) Nebulizer every 6 hours  benzonatate 100 milliGRAM(s) Oral three times a day PRN  carbidopa/levodopa  25/100 1 Tablet(s) Oral <User Schedule>  folic acid 1 milliGRAM(s) Oral daily  furosemide   Injectable 40 milliGRAM(s) IV Push every 12 hours  guaiFENesin Oral Liquid (Sugar-Free) 200 milliGRAM(s) Oral every 6 hours  metoprolol tartrate 25 milliGRAM(s) Oral every 8 hours  rOPINIRole 2 milliGRAM(s) Oral <User Schedule>  saccharomyces boulardii 250 milliGRAM(s) Oral two times a day  trihexyphenidyl 1 milliGRAM(s) Oral three times a day  warfarin 2 milliGRAM(s) Oral once      PHYSICAL EXAM:  GENERAL: NAD   EYES: conjunctiva and sclera clear  ENMT: Moist mucous membranes  NECK: Supple, No JVD, Normal thyroid  CHEST/LUNG: non labored, cta b/l  HEART: Regular rate and rhythm; No murmurs, rubs, or gallops  ABDOMEN: Soft, Nontender, Nondistended; Bowel sounds present  EXTREMITIES:  No clubbing, no cyanosis, no edema  LYMPH: No lymphadenopathy noted  SKIN: No rashes or lesions  NEURO: no new focal deficits    Consultant(s) Notes Reviewed:  [x ] YES  [ ] NO  Care Discussed with Consultants/Other Providers [ x] YES  [ ] NO    LABS:                        11.7   11.44 )-----------( 331      ( 07 Jun 2025 06:35 )             35.8     06-07    139  |  101  |  19  ----------------------------<  131[H]  3.7   |  32[H]  |  0.99    Ca    9.0      07 Jun 2025 06:35      PT/INR - ( 07 Jun 2025 06:35 )   PT: 19.1 sec;   INR: 1.63 ratio           Urinalysis Basic - ( 07 Jun 2025 06:35 )    Color: x / Appearance: x / SG: x / pH: x  Gluc: 131 mg/dL / Ketone: x  / Bili: x / Urobili: x   Blood: x / Protein: x / Nitrite: x   Leuk Esterase: x / RBC: x / WBC x   Sq Epi: x / Non Sq Epi: x / Bacteria: x      CAPILLARY BLOOD GLUCOSE            Urinalysis Basic - ( 07 Jun 2025 06:35 )    Color: x / Appearance: x / SG: x / pH: x  Gluc: 131 mg/dL / Ketone: x  / Bili: x / Urobili: x   Blood: x / Protein: x / Nitrite: x   Leuk Esterase: x / RBC: x / WBC x   Sq Epi: x / Non Sq Epi: x / Bacteria: x          RADIOLOGY & ADDITIONAL TESTS:    Imaging Personally Reviewed  Reviewed consultants input

## 2025-06-07 NOTE — PROGRESS NOTE ADULT - SUBJECTIVE AND OBJECTIVE BOX
Date/Time Patient Seen:  		  Referring MD:   Data Reviewed	       Patient is a 79y old  Male who presents with a chief complaint of sob, cough (06 Jun 2025 18:58)      Subjective/HPI     PAST MEDICAL & SURGICAL HISTORY:  Personal history of PE (pulmonary embolism)    COPD (chronic obstructive pulmonary disease)    Hypertension    Restless leg syndrome    Factor V Leiden    Parkinsons    Spinal stenosis    Memory loss    Right inguinal hernia    AL (obstructive sleep apnea)    Diabetes    Herniated cervical disc    H/O hernia repair          Medication list         MEDICATIONS  (STANDING):  albuterol/ipratropium for Nebulization 3 milliLiter(s) Nebulizer every 6 hours  carbidopa/levodopa  25/100 1 Tablet(s) Oral <User Schedule>  folic acid 1 milliGRAM(s) Oral daily  furosemide   Injectable 40 milliGRAM(s) IV Push every 12 hours  guaiFENesin Oral Liquid (Sugar-Free) 200 milliGRAM(s) Oral every 6 hours  metoprolol tartrate 25 milliGRAM(s) Oral every 8 hours  rOPINIRole 2 milliGRAM(s) Oral <User Schedule>  saccharomyces boulardii 250 milliGRAM(s) Oral two times a day  trihexyphenidyl 1 milliGRAM(s) Oral three times a day    MEDICATIONS  (PRN):  acetaminophen     Tablet .. 650 milliGRAM(s) Oral every 6 hours PRN Temp greater or equal to 38.5C (101.3F), Moderate Pain (4 - 6)  benzonatate 100 milliGRAM(s) Oral three times a day PRN Cough         Vitals log        ICU Vital Signs Last 24 Hrs  T(C): 36.4 (07 Jun 2025 05:25), Max: 36.6 (06 Jun 2025 13:32)  T(F): 97.5 (07 Jun 2025 05:25), Max: 97.8 (06 Jun 2025 13:32)  HR: 86 (07 Jun 2025 07:50) (73 - 102)  BP: 131/79 (07 Jun 2025 05:25) (106/60 - 131/79)  BP(mean): --  ABP: --  ABP(mean): --  RR: 18 (07 Jun 2025 05:25) (18 - 18)  SpO2: 97% (07 Jun 2025 07:50) (92% - 100%)    O2 Parameters below as of 07 Jun 2025 07:50  Patient On (Oxygen Delivery Method): nasal cannula                 Input and Output:  I&O's Detail      Lab Data                        11.7   11.44 )-----------( 331      ( 07 Jun 2025 06:35 )             35.8     06-07    139  |  101  |  19  ----------------------------<  131[H]  3.7   |  32[H]  |  0.99    Ca    9.0      07 Jun 2025 06:35              Review of Systems	      Objective     Physical Examination  heart s1s2  lung dc bs  head nc  head at        Pertinent Lab findings & Imaging      Tad:  NO   Adequate UO     I&O's Detail           Discussed with:     Cultures:	        Radiology

## 2025-06-07 NOTE — PROGRESS NOTE ADULT - SUBJECTIVE AND OBJECTIVE BOX
[facility] -- Sid White, Mohsen Mccauley Savella, Mathieu Mena: Office # [phone]    Follow Up: ADHF     Subjective/Observations: Patient seen and examined. Patient awake, alert, resting in bed. No complaints of chest pain, dyspnea, palpitations or dizziness. No signs of orthopnea or PND. Tolerating O2 via nasal cannula. Reports his breathing is better than yesterday.     REVIEW OF SYSTEMS: All other review of systems are negative unless indicated above    PAST MEDICAL & SURGICAL HISTORY:  Personal history of PE (pulmonary embolism)      COPD (chronic obstructive pulmonary disease)      Hypertension      Hypertension      Restless leg syndrome      Factor V Leiden      Parkinsons      Spinal stenosis      Memory loss      Right inguinal hernia      AL (obstructive sleep apnea)      Diabetes      Herniated cervical disc      H/O hernia repair          MEDICATIONS  (STANDING):  albuterol/ipratropium for Nebulization 3 milliLiter(s) Nebulizer every 6 hours  carbidopa/levodopa  25/100 1 Tablet(s) Oral <User Schedule>  folic acid 1 milliGRAM(s) Oral daily  furosemide   Injectable 40 milliGRAM(s) IV Push every 12 hours  guaiFENesin Oral Liquid (Sugar-Free) 200 milliGRAM(s) Oral every 6 hours  metoprolol tartrate 25 milliGRAM(s) Oral every 8 hours  rOPINIRole 2 milliGRAM(s) Oral <User Schedule>  saccharomyces boulardii 250 milliGRAM(s) Oral two times a day  trihexyphenidyl 1 milliGRAM(s) Oral three times a day    MEDICATIONS  (PRN):  acetaminophen     Tablet .. 650 milliGRAM(s) Oral every 6 hours PRN Temp greater or equal to 38.5C (101.3F), Moderate Pain (4 - 6)  benzonatate 100 milliGRAM(s) Oral three times a day PRN Cough    Allergies    Levaquin (Anaphylaxis)  garlic (Other (Mild))    Intolerances      Vital Signs Last 24 Hrs  T(C): 36.4 (07 Jun 2025 05:25), Max: 36.6 (06 Jun 2025 13:32)  T(F): 97.5 (07 Jun 2025 05:25), Max: 97.8 (06 Jun 2025 13:32)  HR: 86 (07 Jun 2025 07:50) (73 - 102)  BP: 131/79 (07 Jun 2025 05:25) (106/60 - 131/79)  BP(mean): --  RR: 18 (07 Jun 2025 05:25) (18 - 18)  SpO2: 97% (07 Jun 2025 07:50) (92% - 100%)    Parameters below as of 07 Jun 2025 07:50  Patient On (Oxygen Delivery Method): nasal cannula      I&O's Summary        TELE: A fib 90-100s   PHYSICAL EXAM:  Constitutional: NAD, awake and alert, Obese   HEENT: Moist Mucous Membranes, Anicteric  Pulmonary: Non-labored, breath sounds are clear bilaterally, No wheezing, rales or rhonchi  Cardiovascular: Irregular, S1 and S2, No murmurs, No rubs, gallops or clicks  Gastrointestinal:  soft, nontender, nondistended   Lymph: No peripheral edema. No lymphadenopathy.   Skin: No visible rashes or ulcers.  Psych:  Mood & affect appropriate      LABS: All Labs Reviewed:                        11.7   11.44 )-----------( 331      ( 07 Jun 2025 06:35 )             35.8                         12.0   10.75 )-----------( 348      ( 06 Jun 2025 06:50 )             35.7                         12.9   10.91 )-----------( 399      ( 05 Jun 2025 07:42 )             38.9     07 Jun 2025 06:35    139    |  101    |  19     ----------------------------<  131    3.7     |  32     |  0.99   06 Jun 2025 06:50    138    |  98     |  22     ----------------------------<  122    3.0     |  35     |  0.95   05 Jun 2025 07:42    138    |  97     |  23     ----------------------------<  129    3.5     |  34     |  1.10     Ca    9.0        07 Jun 2025 06:35  Ca    9.0        06 Jun 2025 06:50  Ca    9.5        05 Jun 2025 07:42       PT/INR - ( 07 Jun 2025 06:35 )   PT: 19.1 sec;   INR: 1.63 ratio         Troponin I, High Sensitivity Result: 12.0 ng/L (05-31-25 @ 13:30)    12 Lead ECG:   Ventricular Rate 119 BPM    QRS Duration 82 ms    Q-T Interval 328 ms    QTC Calculation(Bazett) 461 ms    R Axis -5 degrees    T Axis 32 degrees    Diagnosis Line Atrial fibrillation with rapid ventricular response  Abnormal ECG  Confirmed by german Hull ([id number]) on 6/2/2025 3:14:44 PM (06-02-25 @ 10:28)      TRANSTHORACIC ECHOCARDIOGRAM REPORT  ________________________________________________________________________________                                      _______       Pt. Name:       ROBERT F BOECKLE Study Date:    6/3/2025  MRN:            [id number]         YOB: 1945  Accession #:    [id number]        Age:           79 years  Account#:       [id number]       Gender:        M  Heart Rate:                      Height:        66.93 in (170.00 cm)  Rhythm:                          Weight:        147.71 lb (67.00 kg)  Blood Pressure: 96/60 mmHg       BSA/BMI:       1.78 m² / 23.18 kg/m²  ________________________________________________________________________________________  Referring Physician:    [id number] Asya Muller  Interpreting Physician: Mere Mena MD  Primary Sonographer:    Andres Mejias    CPT:                ECHO TTE WITH CON COMP W DOPP - .m  Indication(s):      Edema, unspecified - R60.9  Procedure:          Transthoracic echocardiogram with 2-D, M-mode and complete                      spectral and color flow Doppler.  Ordering Location:  [facility]  Admission Status:   Inpatient  Contrast Injection: Verbal consent was obtained for injection of Ultrasonic                      Enhancing Agent following a discussion of risks and                      benefits.                      Endocardial visualization enhanced with Definity Ultrasound                      enhancing agent.  Study Information:  Image quality for this study is technically difficult.                      Technically difficult study secondary to lung interference.    _______________________________________________________________________________________     CONCLUSIONS:      1. Technically difficult image quality.   2. Leftventricular systolic function is normal with an ejection fraction of 57 % by Andrews's method of disks.   3. Probably normal right ventricular systolic function.   4. Aortic valve was not well visualized.   5. Mitral valve leaflets have focal calcification.   6. Trace mitral regurgitation.   7. Tricuspid valve was not well visualized.   8. The inferior vena cava is normal in size measuring 1.77 cm in diameter, (normal <2.1cm) with normal inspiratory collapse (normal >50%) consistent with normal right atrial pressure (~3, range 0-5mmHg).   9. Left pleural effusion noted.    ________________________________________________________________________________________  FINDINGS:     Left Ventricle:  After obtaining consent, Definity ultrasound enhancing agent was given for enhanced left ventricular opacification and improved delineation of the left ventricular endocardial borders. Left ventricular systolic function is normal with a calculated ejection fraction of 57 % by the Andrews's biplane method of disks.     Right Ventricle:  The right ventricle is not well visualized. Right ventricular systolic function is probably normal.     Left Atrium:  The left atrium is normal in size.     Right Atrium:  The right atrium is normal in size with an indexed volume of 18.86 ml/m² and an indexed area of 8.50 cm²/m².     Interatrial Septum:  The interatrial septum was not well visualized.     Aortic Valve:  The aortic valve was not well visualized.     Mitral Valve:  Mitral valve leaflets have focalcalcification. There is trace mitral regurgitation.     Tricuspid Valve:  The tricuspid valve was not well visualized.     Pulmonic Valve:  The pulmonic valve was not well visualized.     Aorta:  The aortic root at the sinuses of Valsalva is normal in size, measuring 3.60 cm (indexed 2.03 cm/m²). The ascending aorta is normal in size, measuring 3.30 cm (indexed 1.86 cm/m²).     Pleura:  Left pleural effusion noted.     Systemic Veins:  The inferior vena cava is normal in size measuring 1.77 cm indiameter, (normal <2.1cm) with normal inspiratory collapse (normal >50%) consistent with normal right atrial pressure (~3, range 0-5mmHg).  ____________________________________________________________________  QUANTITATIVE DATA:  Left Ventricle Measurements: (Indexed to BSA)     IVSd (2D):   0.9 cm  LVPWd (2D):  1.2 cm  LVIDd (2D):  4.6 cm  LVIDs (2D):  3.2 cm  LV Mass:     176 g  99.2 g/m²  LV Vol d, MOD A2C: 27.7 ml 15.59 ml/m²  LV Vol d, MOD A4C: 40.9 ml 23.03 ml/m²  LV Vol d, MOD BP:  34.1 ml19.20 ml/m²  LV Vol s, MOD A2C: 12.3 ml 6.92 ml/m²  LV Vol s, MOD A4C: 17.6 ml 9.91 ml/m²  LV Vol s, MOD BP:  14.8 ml 8.31 ml/m²  LVOT SV MOD BP:    19.3 ml  LV EF% MOD BP:     57 %     MV E Vmax:    1.26 m/s  MV A Vmax:    0.40 m/s  MV E/A:       3.12  e' lateral:   8.05 cm/s  e' medial:    7.51 cm/s  E/e' lateral: 15.65  E/e' medial:  16.78  E/e' Average: 16.20  MV DT:        181 msec    Aorta Measurements: (Normal range) (Indexed to BSA)     Ao Root d     3.60 cm (3.1 - 3.7 cm) 2.03 cm/m²  Ao Asc d, 2D: 3.30  Ao Asc prox:  3.30 cm                1.86 cm/m²            Left Atrium Measurements: (Indexed to BSA)  LA Diam 2D: 4.70 cm         Right Atrial Measurements:     RA Vol s, MOD A4C         33.5 ml  RA Vol s, MOD A4C i BSA   18.86 ml/m²  RA Area s, MOD A4C        15.1 cm²  RA Area s, MOD A4C, i BSA 8.50 cm²/m²       LVOT / RVOT/ Qp/Qs Data: (Indexed to BSA)  LVOT Diameter,s: 2.10 cm  LVOT Area:       3.46 cm²    Mitral Valve Measurements:     MV E Vmax: 1.3 m/s  MV A Vmax: 0.4 m/s  MV E/A:    3.1       Tricuspid Valve Measurements:     RA Pressure: 3 mmHg    ________________________________________________________________________________________  Electronically signed on 6/4/2025 at 7:59:49 AM by Mere Mena MD         *** Final ***

## 2025-06-07 NOTE — PROGRESS NOTE ADULT - ASSESSMENT
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 Patient recently admitted to [facility] for new-onset Afib and acute hypoxic respiratory failure 2/2 COPD and worsened diaphragmatic hernia.sp diaphragmatic hernia repair. aw sob and cough    CXR-Moderate left and small right-sided pleural effusions.    #acute on chronic  hypoxic respiratory failure   acute diastolic chf w pleural effusions   probnp- high, i/o, wts, TTE  ct chest- ?consolidations and loculated effusions  pulm, cardio, and ID following  continue lasix    #COPD on home o2  resume home inhalers    #DVT/PE, hx factor v leiden deffi  hx of afib  coumadin, monitor inr    #Parkinsons dz- resume home meds    [facility]   [phone]

## 2025-06-07 NOTE — PROGRESS NOTE ADULT - ASSESSMENT
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 Consulted for acute hypoxia and volume overload.     - Known O2-dep copd, now p/w worsening cough and SOB x 1week, hypoxic at home and  edematous on exam  - Ventral hernia for which had recent repair, with persistent partial left lung collapse by report  - CXR with a left pleural eff, probably superimposed on lobar collapse  - CT Chest 5/31 noted with loculated pl effusion  - Continue Lasix 40 IV BID  - Bicarb:  <--32,  <--35  - Copd likely contributing, slight increase in bicarb  - Would repeat CXR today (ordered)  - Follow pulmonary recommendations, no indication for thoracentesis   - Echo 6/3/2025 lv 57% Left pleural effusion noted     - h/o p A fib, diagnosed over the past few months  - Diltiazem recently stop for edema  - Told by Dr Mccauley in the office to start bisoprolol last week, but given a recent deterioration in respiratory status she has not started it  - TELE: A fib 90-100s   - Continue Metoprolol 25 Q8H  - Warfarin goal inr 2-3    - Patient is a fall risk with prior h/o brain bleed, outpatient physicians have continued AC due risk of recurrence.     - EKG A fib RVR @ 109  - No evidence of any active ischemia   - Troponin: <-12.0    - BP stable and controlled    - Monitor and replete lytes, keep K>4, Mg>2.  - Will continue to follow.    Ondina Malik, MS FNP, AGACNP  Nurse Practitioner- Cardiology   Please call on TEAMS

## 2025-06-07 NOTE — CASE MANAGEMENT PROGRESS NOTE - NSCMPROGRESSNOTE_GEN_ALL_CORE
Per MD patient not stable for transition home today, awaiting therapeutic INR. Per Cardio note Goal INR 2-3, INR 1.63 today

## 2025-06-07 NOTE — PROGRESS NOTE ADULT - ASSESSMENT
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 Patient recently admitted to [facility] for new-onset Afib and acute hypoxic respiratory failure 2/2 COPD and worsened diaphragmatic hernia. He was treated and discharged home with outpatient Cardiology follow-up with Dr. Mccauley in preparation for diaphragmatic hernia repair. Patient tolerated the procedure well, however developed worsening leg swelling afterwards    Dyspnea - Cough -   copd  diaphragmatic hernia  Atelectasis  PE hx  OP  OA  Ataxic gait  Parkinson's disease  HTN  GERD    vs noted  ct chest reviewed - atelectasis - small to mod loculated left eff - no intervention at present  s/p ABX  on NEBS  pt has Home o2    I zion  nebs PRN  HOB elev  asp prec  oral hygiene  skin care  cvs rx regimen  BP control and HD monitoring  I and O  replete lytes  assist with needs  PPI  OLD - recent admission record reviewed

## 2025-06-08 LAB
ANION GAP SERPL CALC-SCNC: 6 MMOL/L — SIGNIFICANT CHANGE UP (ref 5–17)
BUN SERPL-MCNC: 17 MG/DL — SIGNIFICANT CHANGE UP (ref 7–23)
CALCIUM SERPL-MCNC: 8.9 MG/DL — SIGNIFICANT CHANGE UP (ref 8.5–10.1)
CHLORIDE SERPL-SCNC: 100 MMOL/L — SIGNIFICANT CHANGE UP (ref 96–108)
CO2 SERPL-SCNC: 33 MMOL/L — HIGH (ref 22–31)
CREAT SERPL-MCNC: 0.92 MG/DL — SIGNIFICANT CHANGE UP (ref 0.5–1.3)
EGFR: 84 ML/MIN/1.73M2 — SIGNIFICANT CHANGE UP
EGFR: 84 ML/MIN/1.73M2 — SIGNIFICANT CHANGE UP
GLUCOSE SERPL-MCNC: 125 MG/DL — HIGH (ref 70–99)
HCT VFR BLD CALC: 35.9 % — LOW (ref 39–50)
HGB BLD-MCNC: 12.1 G/DL — LOW (ref 13–17)
INR BLD: 1.59 RATIO — HIGH (ref 0.85–1.16)
MCHC RBC-ENTMCNC: 32.1 PG — SIGNIFICANT CHANGE UP (ref 27–34)
MCHC RBC-ENTMCNC: 33.7 G/DL — SIGNIFICANT CHANGE UP (ref 32–36)
MCV RBC AUTO: 95.2 FL — SIGNIFICANT CHANGE UP (ref 80–100)
NRBC BLD AUTO-RTO: 0 /100 WBCS — SIGNIFICANT CHANGE UP (ref 0–0)
PLATELET # BLD AUTO: 296 K/UL — SIGNIFICANT CHANGE UP (ref 150–400)
POTASSIUM SERPL-MCNC: 3.7 MMOL/L — SIGNIFICANT CHANGE UP (ref 3.5–5.3)
POTASSIUM SERPL-SCNC: 3.7 MMOL/L — SIGNIFICANT CHANGE UP (ref 3.5–5.3)
PROTHROM AB SERPL-ACNC: 18.5 SEC — HIGH (ref 9.9–13.4)
RBC # BLD: 3.77 M/UL — LOW (ref 4.2–5.8)
RBC # FLD: 13.3 % — SIGNIFICANT CHANGE UP (ref 10.3–14.5)
SODIUM SERPL-SCNC: 139 MMOL/L — SIGNIFICANT CHANGE UP (ref 135–145)
WBC # BLD: 10.33 K/UL — SIGNIFICANT CHANGE UP (ref 3.8–10.5)
WBC # FLD AUTO: 10.33 K/UL — SIGNIFICANT CHANGE UP (ref 3.8–10.5)

## 2025-06-08 PROCEDURE — 99232 SBSQ HOSP IP/OBS MODERATE 35: CPT

## 2025-06-08 RX ADMIN — Medication 1 TABLET(S): at 08:23

## 2025-06-08 RX ADMIN — BUTYROSPERMUM PARKII(SHEA BUTTER), SIMMONDSIA CHINENSIS (JOJOBA) SEED OIL, ALOE BARBADENSIS LEAF EXTRACT 250 MILLIGRAM(S): .01; 1; 3.5 LIQUID TOPICAL at 17:56

## 2025-06-08 RX ADMIN — TRIHEXYPHENIDYL HYDROCHLORIDE 1 MILLIGRAM(S): 2 TABLET ORAL at 22:26

## 2025-06-08 RX ADMIN — IPRATROPIUM BROMIDE AND ALBUTEROL SULFATE 3 MILLILITER(S): .5; 2.5 SOLUTION RESPIRATORY (INHALATION) at 13:25

## 2025-06-08 RX ADMIN — TRIHEXYPHENIDYL HYDROCHLORIDE 1 MILLIGRAM(S): 2 TABLET ORAL at 05:54

## 2025-06-08 RX ADMIN — Medication 1 TABLET(S): at 17:55

## 2025-06-08 RX ADMIN — ROPINIROLE 2 MILLIGRAM(S): 5 TABLET, FILM COATED ORAL at 23:29

## 2025-06-08 RX ADMIN — Medication 1 TABLET(S): at 13:36

## 2025-06-08 RX ADMIN — IPRATROPIUM BROMIDE AND ALBUTEROL SULFATE 3 MILLILITER(S): .5; 2.5 SOLUTION RESPIRATORY (INHALATION) at 07:40

## 2025-06-08 RX ADMIN — TRIHEXYPHENIDYL HYDROCHLORIDE 1 MILLIGRAM(S): 2 TABLET ORAL at 14:33

## 2025-06-08 RX ADMIN — DEXTROMETHORPHAN HBR, GUAIFENESIN 200 MILLIGRAM(S): 200 LIQUID ORAL at 12:11

## 2025-06-08 RX ADMIN — Medication 1 TABLET(S): at 23:28

## 2025-06-08 RX ADMIN — METOPROLOL SUCCINATE 25 MILLIGRAM(S): 50 TABLET, EXTENDED RELEASE ORAL at 22:26

## 2025-06-08 RX ADMIN — METOPROLOL SUCCINATE 25 MILLIGRAM(S): 50 TABLET, EXTENDED RELEASE ORAL at 05:55

## 2025-06-08 RX ADMIN — DEXTROMETHORPHAN HBR, GUAIFENESIN 200 MILLIGRAM(S): 200 LIQUID ORAL at 05:54

## 2025-06-08 RX ADMIN — FUROSEMIDE 40 MILLIGRAM(S): 10 INJECTION INTRAMUSCULAR; INTRAVENOUS at 05:54

## 2025-06-08 RX ADMIN — ROPINIROLE 2 MILLIGRAM(S): 5 TABLET, FILM COATED ORAL at 14:33

## 2025-06-08 RX ADMIN — DEXTROMETHORPHAN HBR, GUAIFENESIN 200 MILLIGRAM(S): 200 LIQUID ORAL at 00:40

## 2025-06-08 RX ADMIN — FUROSEMIDE 40 MILLIGRAM(S): 10 INJECTION INTRAMUSCULAR; INTRAVENOUS at 17:55

## 2025-06-08 RX ADMIN — ROPINIROLE 2 MILLIGRAM(S): 5 TABLET, FILM COATED ORAL at 17:55

## 2025-06-08 RX ADMIN — ROPINIROLE 2 MILLIGRAM(S): 5 TABLET, FILM COATED ORAL at 08:23

## 2025-06-08 RX ADMIN — Medication 2.5 MILLIGRAM(S): at 22:26

## 2025-06-08 RX ADMIN — IPRATROPIUM BROMIDE AND ALBUTEROL SULFATE 3 MILLILITER(S): .5; 2.5 SOLUTION RESPIRATORY (INHALATION) at 19:33

## 2025-06-08 RX ADMIN — DEXTROMETHORPHAN HBR, GUAIFENESIN 200 MILLIGRAM(S): 200 LIQUID ORAL at 17:55

## 2025-06-08 RX ADMIN — FOLIC ACID 1 MILLIGRAM(S): 1 TABLET ORAL at 12:11

## 2025-06-08 RX ADMIN — BUTYROSPERMUM PARKII(SHEA BUTTER), SIMMONDSIA CHINENSIS (JOJOBA) SEED OIL, ALOE BARBADENSIS LEAF EXTRACT 250 MILLIGRAM(S): .01; 1; 3.5 LIQUID TOPICAL at 05:54

## 2025-06-08 NOTE — PROGRESS NOTE ADULT - ASSESSMENT
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 Consulted for acute hypoxia and volume overload.     ADHF, Pleural Effusions, PAfib, HTN  - Known COPD, O2-dep  - CxR showed persistent mod-large left pleural effusion  - Pulm following.  No intervention at this time  - Continue Lasix 40 mg IV q12H  - Strict I/O's and daily weights  - Monitor renal function and bicarb  - Echo 6/3/2025 LV 57% Left pleural effusion noted, RAP ~3mmHg    - h/o p A fib, diagnosed over the past few months  - Diltiazem recently stop for edema  - Told by Dr Mccauley in the office to start bisoprolol last week, but given a recent deterioration in respiratory status she has not started it  - TELE: A fib 90-100s, now off  - Continue Metoprolol 25 Q8H.  Plan to switch to long-acting  - Daily coumadin to keep INR 2-3    - Patient is a fall risk with prior h/o brain bleed, outpatient physicians have continued AC due risk of recurrence.     - EKG A fib RVR @ 109  - No evidence of any active ischemia   - Troponin negative    - BP stable and controlled  - Monitor and replete lytes, keep K>4, Mg>2.  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 Consulted for acute hypoxia and volume overload.     ADHF, Pleural Effusions, PAfib, HTN  - Known COPD, O2-dep  - CxR showed persistent mod-large left pleural effusion  - Pulm following.  No intervention at this time  - Continue Lasix 40 mg IV q12H. can tranisiotn to 40m  q12   - Strict I/O's and daily weights  - Monitor renal function and bicarb  - Echo 6/3/2025 LV 57% Left pleural effusion noted, RAP ~3mmHg    - h/o p A fib, diagnosed over the past few months  - Diltiazem recently stop for edema  - Told by Dr Mccauley in the office to start bisoprolol last week, but given a recent deterioration in respiratory status she has not started it  - TELE: A fib 90-100s, now off  - Continue Metoprolol 25 Q8H.  Plan to switch to long-acting  - Daily coumadin to keep INR 2-3    - Patient is a fall risk with prior h/o brain bleed, outpatient physicians have continued AC due risk of recurrence.     - EKG A fib RVR @ 109  - No evidence of any active ischemia   - Troponin negative    - BP stable and controlled  - Monitor and replete lytes, keep K>4, Mg>2.  - Will continue to follow    Angelica Bolaños DNP, NP-C, AGACNP-C  Cardiology   Call TEAMS

## 2025-06-08 NOTE — PROGRESS NOTE ADULT - SUBJECTIVE AND OBJECTIVE BOX
Patient is a 79y old  Male who presents with a chief complaint of sob, cough (08 Jun 2025 06:19)        INTERVAL HPI/OVERNIGHT EVENTS:   no complaints  pt seen and examined         Vital Signs Last 24 Hrs  T(C): 36.8 (08 Jun 2025 05:20), Max: 36.9 (07 Jun 2025 20:52)  T(F): 98.3 (08 Jun 2025 05:20), Max: 98.4 (07 Jun 2025 20:52)  HR: 102 (08 Jun 2025 07:50) (83 - 104)  BP: 131/69 (08 Jun 2025 05:20) (103/69 - 131/69)  BP(mean): --  RR: 17 (08 Jun 2025 05:20) (17 - 18)  SpO2: 99% (08 Jun 2025 07:50) (92% - 99%)    Parameters below as of 08 Jun 2025 13:50  Patient On (Oxygen Delivery Method): nasal cannula        acetaminophen     Tablet .. 650 milliGRAM(s) Oral every 6 hours PRN  albuterol/ipratropium for Nebulization 3 milliLiter(s) Nebulizer every 6 hours  benzonatate 100 milliGRAM(s) Oral three times a day PRN  carbidopa/levodopa  25/100 1 Tablet(s) Oral <User Schedule>  folic acid 1 milliGRAM(s) Oral daily  furosemide   Injectable 40 milliGRAM(s) IV Push every 12 hours  guaiFENesin Oral Liquid (Sugar-Free) 200 milliGRAM(s) Oral every 6 hours  metoprolol tartrate 25 milliGRAM(s) Oral every 8 hours  rOPINIRole 2 milliGRAM(s) Oral <User Schedule>  saccharomyces boulardii 250 milliGRAM(s) Oral two times a day  trihexyphenidyl 1 milliGRAM(s) Oral three times a day  warfarin 2.5 milliGRAM(s) Oral once      PHYSICAL EXAM:  GENERAL: NAD   EYES: conjunctiva and sclera clear  ENMT: Moist mucous membranes  NECK: Supple, No JVD, Normal thyroid  CHEST/LUNG: non labored, cta b/l  HEART: Regular rate and rhythm; No murmurs, rubs, or gallops  ABDOMEN: Soft, Nontender, Nondistended; Bowel sounds present  EXTREMITIES:  No clubbing, no cyanosis, no edema  LYMPH: No lymphadenopathy noted  SKIN: No rashes or lesions  NEURO: no new focal deficits    Consultant(s) Notes Reviewed:  [x ] YES  [ ] NO  Care Discussed with Consultants/Other Providers [ x] YES  [ ] NO    LABS:                        12.1   10.33 )-----------( 296      ( 08 Jun 2025 06:13 )             35.9     06-08    139  |  100  |  17  ----------------------------<  125[H]  3.7   |  33[H]  |  0.92    Ca    8.9      08 Jun 2025 06:13      PT/INR - ( 08 Jun 2025 06:13 )   PT: 18.5 sec;   INR: 1.59 ratio           Urinalysis Basic - ( 08 Jun 2025 06:13 )    Color: x / Appearance: x / SG: x / pH: x  Gluc: 125 mg/dL / Ketone: x  / Bili: x / Urobili: x   Blood: x / Protein: x / Nitrite: x   Leuk Esterase: x / RBC: x / WBC x   Sq Epi: x / Non Sq Epi: x / Bacteria: x      CAPILLARY BLOOD GLUCOSE            Urinalysis Basic - ( 08 Jun 2025 06:13 )    Color: x / Appearance: x / SG: x / pH: x  Gluc: 125 mg/dL / Ketone: x  / Bili: x / Urobili: x   Blood: x / Protein: x / Nitrite: x   Leuk Esterase: x / RBC: x / WBC x   Sq Epi: x / Non Sq Epi: x / Bacteria: x          RADIOLOGY & ADDITIONAL TESTS:    Imaging Personally Reviewed  Reviewed consultants input

## 2025-06-08 NOTE — PROGRESS NOTE ADULT - ASSESSMENT
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 Patient recently admitted to [facility] for new-onset Afib and acute hypoxic respiratory failure 2/2 COPD and worsened diaphragmatic hernia. He was treated and discharged home with outpatient Cardiology follow-up with Dr. Mccauley in preparation for diaphragmatic hernia repair. Patient tolerated the procedure well, however developed worsening leg swelling afterwards    Dyspnea - Cough -   copd  diaphragmatic hernia  Atelectasis  PE hx  OP  OA  Ataxic gait  Parkinson's disease  HTN  GERD    vs noted  ct chest reviewed - atelectasis - small to mod loculated left eff - no intervention at present  s/p ABX  on NEBS  Daily Coags  pt has Home o2    I zion  nebs PRN  HOB elev  asp prec  oral hygiene  skin care  cvs rx regimen  BP control and HD monitoring  I and O  replete lytes  assist with needs  PPI  OLD - recent admission record reviewed

## 2025-06-08 NOTE — PROGRESS NOTE ADULT - SUBJECTIVE AND OBJECTIVE BOX
Date/Time Patient Seen:  		  Referring MD:   Data Reviewed	       Patient is a 79y old  Male who presents with a chief complaint of sob, cough (07 Jun 2025 16:34)      Subjective/HPI     PAST MEDICAL & SURGICAL HISTORY:  Personal history of PE (pulmonary embolism)    COPD (chronic obstructive pulmonary disease)    Hypertension    Restless leg syndrome    Factor V Leiden    Parkinsons    Spinal stenosis    Memory loss    Right inguinal hernia    AL (obstructive sleep apnea)    Diabetes    Herniated cervical disc    H/O hernia repair          Medication list         MEDICATIONS  (STANDING):  albuterol/ipratropium for Nebulization 3 milliLiter(s) Nebulizer every 6 hours  carbidopa/levodopa  25/100 1 Tablet(s) Oral <User Schedule>  folic acid 1 milliGRAM(s) Oral daily  furosemide   Injectable 40 milliGRAM(s) IV Push every 12 hours  guaiFENesin Oral Liquid (Sugar-Free) 200 milliGRAM(s) Oral every 6 hours  metoprolol tartrate 25 milliGRAM(s) Oral every 8 hours  rOPINIRole 2 milliGRAM(s) Oral <User Schedule>  saccharomyces boulardii 250 milliGRAM(s) Oral two times a day  trihexyphenidyl 1 milliGRAM(s) Oral three times a day    MEDICATIONS  (PRN):  acetaminophen     Tablet .. 650 milliGRAM(s) Oral every 6 hours PRN Temp greater or equal to 38.5C (101.3F), Moderate Pain (4 - 6)  benzonatate 100 milliGRAM(s) Oral three times a day PRN Cough         Vitals log        ICU Vital Signs Last 24 Hrs  T(C): 36.8 (08 Jun 2025 05:20), Max: 36.9 (07 Jun 2025 20:52)  T(F): 98.3 (08 Jun 2025 05:20), Max: 98.4 (07 Jun 2025 20:52)  HR: 100 (08 Jun 2025 05:20) (83 - 103)  BP: 131/69 (08 Jun 2025 05:20) (102/63 - 131/69)  BP(mean): --  ABP: --  ABP(mean): --  RR: 17 (08 Jun 2025 05:20) (17 - 18)  SpO2: 92% (08 Jun 2025 05:20) (92% - 99%)    O2 Parameters below as of 08 Jun 2025 05:20  Patient On (Oxygen Delivery Method): nasal cannula  O2 Flow (L/min): 2               Input and Output:  I&O's Detail    07 Jun 2025 07:01  -  08 Jun 2025 06:19  --------------------------------------------------------  IN:    Oral Fluid: 960 mL  Total IN: 960 mL    OUT:  Total OUT: 0 mL    Total NET: 960 mL          Lab Data                        11.7   11.44 )-----------( 331      ( 07 Jun 2025 06:35 )             35.8     06-07    139  |  101  |  19  ----------------------------<  131[H]  3.7   |  32[H]  |  0.99    Ca    9.0      07 Jun 2025 06:35              Review of Systems	      Objective     Physical Examination    heart s1s2  lung dc bs  head nc  head at      Pertinent Lab findings & Imaging      Tad:  NO   Adequate UO     I&O's Detail    07 Jun 2025 07:01  -  08 Jun 2025 06:19  --------------------------------------------------------  IN:    Oral Fluid: 960 mL  Total IN: 960 mL    OUT:  Total OUT: 0 mL    Total NET: 960 mL               Discussed with:     Cultures:	        Radiology

## 2025-06-08 NOTE — PROGRESS NOTE ADULT - ASSESSMENT
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 Patient recently admitted to [facility] for new-onset Afib and acute hypoxic respiratory failure 2/2 COPD and worsened diaphragmatic hernia.sp diaphragmatic hernia repair. aw sob and cough    CXR-Moderate left and small right-sided pleural effusions.    #acute on chronic  hypoxic respiratory failure   acute diastolic chf w pleural effusions   probnp- high, i/o, wts, TTE  ct chest- ?consolidations and loculated effusions  pulm, cardio, and ID following  continue lasix    #COPD on home o2  resume home inhalers    #DVT/PE, hx factor v leiden deffi  hx of afib  coumadin, monitor inr    #Parkinsons dz- resume home meds    anticipate dc home 6/9    [facility]   [phone]

## 2025-06-08 NOTE — PROGRESS NOTE ADULT - SUBJECTIVE AND OBJECTIVE BOX
[facility] -- Sid White, Mohsen Mccauley Savella, , Trina Murdock  Office # [phone]    Follow Up:  ADHF, Pleural Effusions    Subjective/Observations: Remains on NC at 2L/min.  Admits to cough.  Denies CP or palpitations.      REVIEW OF SYSTEMS: All other review of systems is negative unless indicated above  PAST MEDICAL & SURGICAL HISTORY:  Personal history of PE (pulmonary embolism)  COPD (chronic obstructive pulmonary disease)  Hypertension  Restless leg syndrome  Factor V Leiden  Parkinsons  Spinal stenosis  Memory loss  Right inguinal hernia  AL (obstructive sleep apnea)  Diabetes  Herniated cervical disc  H/O hernia repair    MEDICATIONS  (STANDING):  albuterol/ipratropium for Nebulization 3 milliLiter(s) Nebulizer every 6 hours  carbidopa/levodopa  25/100 1 Tablet(s) Oral <User Schedule>  folic acid 1 milliGRAM(s) Oral daily  furosemide   Injectable 40 milliGRAM(s) IV Push every 12 hours  guaiFENesin Oral Liquid (Sugar-Free) 200 milliGRAM(s) Oral every 6 hours  metoprolol tartrate 25 milliGRAM(s) Oral every 8 hours  rOPINIRole 2 milliGRAM(s) Oral <User Schedule>  saccharomyces boulardii 250 milliGRAM(s) Oral two times a day  trihexyphenidyl 1 milliGRAM(s) Oral three times a day  warfarin 2.5 milliGRAM(s) Oral once    MEDICATIONS  (PRN):  acetaminophen     Tablet .. 650 milliGRAM(s) Oral every 6 hours PRN Temp greater or equal to 38.5C (101.3F), Moderate Pain (4 - 6)  benzonatate 100 milliGRAM(s) Oral three times a day PRN Cough    Allergies    Levaquin (Anaphylaxis)  garlic (Other (Mild))    Intolerances  Vital Signs Last 24 Hrs  T(C): 36.4 (08 Jun 2025 12:00), Max: 36.9 (07 Jun 2025 20:52)  T(F): 97.5 (08 Jun 2025 12:00), Max: 98.4 (07 Jun 2025 20:52)  HR: 75 (08 Jun 2025 14:24) (74 - 104)  BP: 94/61 (08 Jun 2025 14:24) (92/51 - 131/69)  BP(mean): --  RR: 18 (08 Jun 2025 14:24) (17 - 18)  SpO2: 98% (08 Jun 2025 14:24) (92% - 99%)    Parameters below as of 08 Jun 2025 14:24  Patient On (Oxygen Delivery Method): nasal cannula  O2 Flow (L/min): 2    I&O's Summary    07 Jun 2025 07:01  -  08 Jun 2025 07:00  --------------------------------------------------------  IN: 960 mL / OUT: 0 mL / NET: 960 mL    08 Jun 2025 07:01  -  08 Jun 2025 17:40  --------------------------------------------------------  IN: 720 mL / OUT: 0 mL / NET: 720 mL    PHYSICAL EXAM:  TELE: Not on tele  Constitutional: NAD, awake and alert, well-developed  HEENT: Moist Mucous Membranes, Anicteric  Pulmonary: Non-labored, breath sounds are diminished bilaterally, No wheezing, rales or rhonchi  Cardiovascular: Regular, S1 and S2, No murmurs, rubs, gallops or clicks  Gastrointestinal: Bowel Sounds present, soft, nontender.   Lymph: Trace peripheral edema. No lymphadenopathy.  Skin: No visible rashes or ulcers.  Psych:  Mood & affect appropriate  LABS: All Labs Reviewed:                        12.1   10.33 )-----------( 296      ( 08 Jun 2025 06:13 )             35.9                         11.7   11.44 )-----------( 331      ( 07 Jun 2025 06:35 )             35.8                         12.0   10.75 )-----------( 348      ( 06 Jun 2025 06:50 )             35.7     08 Jun 2025 06:13    139    |  100    |  17     ----------------------------<  125    3.7     |  33     |  0.92   07 Jun 2025 06:35    139    |  101    |  19     ----------------------------<  131    3.7     |  32     |  0.99   06 Jun 2025 06:50    138    |  98     |  22     ----------------------------<  122    3.0     |  35     |  0.95     Ca    8.9        08 Jun 2025 06:13  Ca    9.0        07 Jun 2025 06:35  Ca    9.0        06 Jun 2025 06:50  PT/INR - ( 08 Jun 2025 06:13 )   PT: 18.5 sec;   INR: 1.59 ratio      12 Lead ECG:   Ventricular Rate 119 BPM    QRS Duration 82 ms    Q-T Interval 328 ms    QTC Calculation(Bazett) 461 ms    R Axis -5 degrees    T Axis 32 degrees    Diagnosis Line Atrial fibrillation with rapid ventricular response  Abnormal ECG  Confirmed by german Hull ([id number]) on 6/2/2025 3:14:44 PM (06-02-25 @ 10:28)      TRANSTHORACIC ECHOCARDIOGRAM REPORT  ________________________________________________________________________________                                      _______       Pt. Name:       ROBERT F BOECKLE Study Date:    6/3/2025  MRN:            [id number]         YOB: 1945  Accession #:    [id number]        Age:           79 years  Account#:       [id number]       Gender:        M  Heart Rate:                      Height:        66.93 in (170.00 cm)  Rhythm:                          Weight:        147.71 lb (67.00 kg)  Blood Pressure: 96/60 mmHg       BSA/BMI:       1.78 m² / 23.18 kg/m²  ________________________________________________________________________________________  Referring Physician:    [id number] Asya Muller  Interpreting Physician: Mere Mena MD  Primary Sonographer:    Andres Mejias    CPT:                ECHO TTE WITH CON COMP W DOPP - .m  Indication(s):      Edema, unspecified - R60.9  Procedure:          Transthoracic echocardiogram with 2-D, M-mode and complete                      spectral and color flow Doppler.  Ordering Location:  [facility]  Admission Status:   Inpatient  Contrast Injection: Verbal consent was obtained for injection of Ultrasonic                      Enhancing Agent following a discussion of risks and                      benefits.                      Endocardial visualization enhanced with Definity Ultrasound                      enhancing agent.  Study Information:  Image quality for this study is technically difficult.                      Technically difficult study secondary to lung interference.    _______________________________________________________________________________________     CONCLUSIONS:      1. Technically difficult image quality.   2. Leftventricular systolic function is normal with an ejection fraction of 57 % by Andrews's method of disks.   3. Probably normal right ventricular systolic function.   4. Aortic valve was not well visualized.   5. Mitral valve leaflets have focal calcification.   6. Trace mitral regurgitation.   7. Tricuspid valve was not well visualized.   8. The inferior vena cava is normal in size measuring 1.77 cm in diameter, (normal <2.1cm) with normal inspiratory collapse (normal >50%) consistent with normal right atrial pressure (~3, range 0-5mmHg).   9. Left pleural effusion noted.  _____________________________________________________________________________________  FINDINGS:     Left Ventricle:  After obtaining consent, Definity ultrasound enhancing agent was given for enhanced left ventricular opacification and improved delineation of the left ventricular endocardial borders. Left ventricular systolic function is normal with a calculated ejection fraction of 57 % by the Andrews's biplane method of disks.     Right Ventricle:  The right ventricle is not well visualized. Right ventricular systolic function is probably normal.     Left Atrium:  The left atrium is normal in size.     Right Atrium:  The right atrium is normal in size with an indexed volume of 18.86 ml/m² and an indexed area of 8.50 cm²/m².     Interatrial Septum:  The interatrial septum was not well visualized.     Aortic Valve:  The aortic valve was not well visualized.     Mitral Valve:  Mitral valve leaflets have focalcalcification. There is trace mitral regurgitation.     Tricuspid Valve:  The tricuspid valve was not well visualized.     Pulmonic Valve:  The pulmonic valve was not well visualized.     Aorta:  The aortic root at the sinuses of Valsalva is normal in size, measuring 3.60 cm (indexed 2.03 cm/m²). The ascending aorta is normal in size, measuring 3.30 cm (indexed 1.86 cm/m²).     Pleura:  Left pleural effusion noted.     Systemic Veins:  The inferior vena cava is normal in size measuring 1.77 cm indiameter, (normal <2.1cm) with normal inspiratory collapse (normal >50%) consistent with normal right atrial pressure (~3, range 0-5mmHg).  ____________________________________________________________________  QUANTITATIVE DATA:  Left Ventricle Measurements: (Indexed to BSA)     IVSd (2D):   0.9 cm  LVPWd (2D):  1.2 cm  LVIDd (2D):  4.6 cm  LVIDs (2D):  3.2 cm  LV Mass:     176 g  99.2 g/m²  LV Vol d, MOD A2C: 27.7 ml 15.59 ml/m²  LV Vol d, MOD A4C: 40.9 ml 23.03 ml/m²  LV Vol d, MOD BP:  34.1 ml19.20 ml/m²  LV Vol s, MOD A2C: 12.3 ml 6.92 ml/m²  LV Vol s, MOD A4C: 17.6 ml 9.91 ml/m²  LV Vol s, MOD BP:  14.8 ml 8.31 ml/m²  LVOT SV MOD BP:    19.3 ml  LV EF% MOD BP:     57 %     MV E Vmax:    1.26 m/s  MV A Vmax:    0.40 m/s  MV E/A:       3.12  e' lateral:   8.05 cm/s  e' medial:    7.51 cm/s  E/e' lateral: 15.65  E/e' medial:  16.78  E/e' Average: 16.20  MV DT:        181 msec    Aorta Measurements: (Normal range) (Indexed to BSA)     Ao Root d     3.60 cm (3.1 - 3.7 cm) 2.03 cm/m²  Ao Asc d, 2D: 3.30  Ao Asc prox:  3.30 cm                1.86 cm/m²    Left Atrium Measurements: (Indexed to BSA)  LA Diam 2D: 4.70 cm    Right Atrial Measurements:     RA Vol s, MOD A4C         33.5 ml  RA Vol s, MOD A4C i BSA   18.86 ml/m²  RA Area s, MOD A4C        15.1 cm²  RA Area s, MOD A4C, i BSA 8.50 cm²/m²    LVOT / RVOT/ Qp/Qs Data: (Indexed to BSA)  LVOT Diameter,s: 2.10 cm  LVOT Area:       3.46 cm²    Mitral Valve Measurements:     MV E Vmax: 1.3 m/s  MV A Vmax: 0.4 m/s  MV E/A:    3.1  Tricuspid Valve Measurements:     RA Pressure: 3 mmHg    ________________________________________________________________________________________  Electronically signed on 6/4/2025 at 7:59:49 AM by Mere Mena MD         *** Final ***

## 2025-06-09 ENCOUNTER — APPOINTMENT (OUTPATIENT)
Dept: VASCULAR SURGERY | Facility: CLINIC | Age: 80
End: 2025-06-09

## 2025-06-09 ENCOUNTER — TRANSCRIPTION ENCOUNTER (OUTPATIENT)
Age: 80
End: 2025-06-09

## 2025-06-09 VITALS
OXYGEN SATURATION: 95 % | HEART RATE: 82 BPM | RESPIRATION RATE: 18 BRPM | SYSTOLIC BLOOD PRESSURE: 94 MMHG | DIASTOLIC BLOOD PRESSURE: 76 MMHG

## 2025-06-09 LAB
INR BLD: 1.61 RATIO — HIGH (ref 0.85–1.16)
PROTHROM AB SERPL-ACNC: 18.9 SEC — HIGH (ref 9.9–13.4)

## 2025-06-09 PROCEDURE — 86140 C-REACTIVE PROTEIN: CPT

## 2025-06-09 PROCEDURE — 97162 PT EVAL MOD COMPLEX 30 MIN: CPT

## 2025-06-09 PROCEDURE — 97116 GAIT TRAINING THERAPY: CPT

## 2025-06-09 PROCEDURE — 84100 ASSAY OF PHOSPHORUS: CPT

## 2025-06-09 PROCEDURE — 93005 ELECTROCARDIOGRAM TRACING: CPT

## 2025-06-09 PROCEDURE — 87637 SARSCOV2&INF A&B&RSV AMP PRB: CPT

## 2025-06-09 PROCEDURE — 85610 PROTHROMBIN TIME: CPT

## 2025-06-09 PROCEDURE — 82803 BLOOD GASES ANY COMBINATION: CPT

## 2025-06-09 PROCEDURE — 87070 CULTURE OTHR SPECIMN AEROBIC: CPT

## 2025-06-09 PROCEDURE — 80048 BASIC METABOLIC PNL TOTAL CA: CPT

## 2025-06-09 PROCEDURE — 71250 CT THORAX DX C-: CPT

## 2025-06-09 PROCEDURE — 84145 PROCALCITONIN (PCT): CPT

## 2025-06-09 PROCEDURE — 97530 THERAPEUTIC ACTIVITIES: CPT

## 2025-06-09 PROCEDURE — 99233 SBSQ HOSP IP/OBS HIGH 50: CPT

## 2025-06-09 PROCEDURE — 83735 ASSAY OF MAGNESIUM: CPT

## 2025-06-09 PROCEDURE — C8929: CPT

## 2025-06-09 PROCEDURE — 85025 COMPLETE CBC W/AUTO DIFF WBC: CPT

## 2025-06-09 PROCEDURE — 94760 N-INVAS EAR/PLS OXIMETRY 1: CPT

## 2025-06-09 PROCEDURE — 99285 EMERGENCY DEPT VISIT HI MDM: CPT | Mod: 25

## 2025-06-09 PROCEDURE — 94640 AIRWAY INHALATION TREATMENT: CPT

## 2025-06-09 PROCEDURE — 83880 ASSAY OF NATRIURETIC PEPTIDE: CPT

## 2025-06-09 PROCEDURE — 87205 SMEAR GRAM STAIN: CPT

## 2025-06-09 PROCEDURE — 71045 X-RAY EXAM CHEST 1 VIEW: CPT

## 2025-06-09 PROCEDURE — 87641 MR-STAPH DNA AMP PROBE: CPT

## 2025-06-09 PROCEDURE — 87640 STAPH A DNA AMP PROBE: CPT

## 2025-06-09 PROCEDURE — 96374 THER/PROPH/DIAG INJ IV PUSH: CPT

## 2025-06-09 PROCEDURE — 85730 THROMBOPLASTIN TIME PARTIAL: CPT

## 2025-06-09 PROCEDURE — 87899 AGENT NOS ASSAY W/OPTIC: CPT

## 2025-06-09 PROCEDURE — 36415 COLL VENOUS BLD VENIPUNCTURE: CPT

## 2025-06-09 PROCEDURE — 84484 ASSAY OF TROPONIN QUANT: CPT

## 2025-06-09 PROCEDURE — 85027 COMPLETE CBC AUTOMATED: CPT

## 2025-06-09 PROCEDURE — 80053 COMPREHEN METABOLIC PANEL: CPT

## 2025-06-09 PROCEDURE — 0225U NFCT DS DNA&RNA 21 SARSCOV2: CPT

## 2025-06-09 RX ORDER — METOPROLOL SUCCINATE 50 MG/1
1 TABLET, EXTENDED RELEASE ORAL
Qty: 30 | Refills: 0 | DISCHARGE
Start: 2025-06-09

## 2025-06-09 RX ORDER — DEXTROMETHORPHAN HBR, GUAIFENESIN 200 MG/10ML
10 LIQUID ORAL
Qty: 0 | Refills: 0 | DISCHARGE
Start: 2025-06-09

## 2025-06-09 RX ORDER — IPRATROPIUM BROMIDE AND ALBUTEROL SULFATE .5; 2.5 MG/3ML; MG/3ML
3 SOLUTION RESPIRATORY (INHALATION)
Qty: 0 | Refills: 0 | DISCHARGE
Start: 2025-06-09

## 2025-06-09 RX ORDER — IPRATROPIUM BROMIDE AND ALBUTEROL SULFATE .5; 2.5 MG/3ML; MG/3ML
3 SOLUTION RESPIRATORY (INHALATION)
Refills: 0 | DISCHARGE

## 2025-06-09 RX ORDER — METOPROLOL SUCCINATE 50 MG/1
1 TABLET, EXTENDED RELEASE ORAL
Qty: 30 | Refills: 0
Start: 2025-06-09

## 2025-06-09 RX ORDER — FUROSEMIDE 10 MG/ML
1 INJECTION INTRAMUSCULAR; INTRAVENOUS
Qty: 60 | Refills: 0
Start: 2025-06-09

## 2025-06-09 RX ORDER — FUROSEMIDE 10 MG/ML
1 INJECTION INTRAMUSCULAR; INTRAVENOUS
Qty: 60 | Refills: 0 | DISCHARGE
Start: 2025-06-09

## 2025-06-09 RX ORDER — BENZONATATE 100 MG
1 CAPSULE ORAL
Qty: 30 | Refills: 0
Start: 2025-06-09

## 2025-06-09 RX ADMIN — Medication 650 MILLIGRAM(S): at 13:23

## 2025-06-09 RX ADMIN — IPRATROPIUM BROMIDE AND ALBUTEROL SULFATE 3 MILLILITER(S): .5; 2.5 SOLUTION RESPIRATORY (INHALATION) at 07:24

## 2025-06-09 RX ADMIN — DEXTROMETHORPHAN HBR, GUAIFENESIN 200 MILLIGRAM(S): 200 LIQUID ORAL at 12:28

## 2025-06-09 RX ADMIN — IPRATROPIUM BROMIDE AND ALBUTEROL SULFATE 3 MILLILITER(S): .5; 2.5 SOLUTION RESPIRATORY (INHALATION) at 14:02

## 2025-06-09 RX ADMIN — ROPINIROLE 2 MILLIGRAM(S): 5 TABLET, FILM COATED ORAL at 08:57

## 2025-06-09 RX ADMIN — ROPINIROLE 2 MILLIGRAM(S): 5 TABLET, FILM COATED ORAL at 13:31

## 2025-06-09 RX ADMIN — Medication 1 TABLET(S): at 13:31

## 2025-06-09 RX ADMIN — DEXTROMETHORPHAN HBR, GUAIFENESIN 200 MILLIGRAM(S): 200 LIQUID ORAL at 00:24

## 2025-06-09 RX ADMIN — Medication 1 TABLET(S): at 08:57

## 2025-06-09 RX ADMIN — IPRATROPIUM BROMIDE AND ALBUTEROL SULFATE 3 MILLILITER(S): .5; 2.5 SOLUTION RESPIRATORY (INHALATION) at 00:31

## 2025-06-09 RX ADMIN — BUTYROSPERMUM PARKII(SHEA BUTTER), SIMMONDSIA CHINENSIS (JOJOBA) SEED OIL, ALOE BARBADENSIS LEAF EXTRACT 250 MILLIGRAM(S): .01; 1; 3.5 LIQUID TOPICAL at 06:12

## 2025-06-09 RX ADMIN — Medication 650 MILLIGRAM(S): at 12:28

## 2025-06-09 RX ADMIN — METOPROLOL SUCCINATE 25 MILLIGRAM(S): 50 TABLET, EXTENDED RELEASE ORAL at 06:10

## 2025-06-09 RX ADMIN — TRIHEXYPHENIDYL HYDROCHLORIDE 1 MILLIGRAM(S): 2 TABLET ORAL at 06:09

## 2025-06-09 RX ADMIN — FOLIC ACID 1 MILLIGRAM(S): 1 TABLET ORAL at 12:28

## 2025-06-09 RX ADMIN — DEXTROMETHORPHAN HBR, GUAIFENESIN 200 MILLIGRAM(S): 200 LIQUID ORAL at 06:09

## 2025-06-09 RX ADMIN — FUROSEMIDE 40 MILLIGRAM(S): 10 INJECTION INTRAMUSCULAR; INTRAVENOUS at 06:09

## 2025-06-09 NOTE — PROGRESS NOTE ADULT - REASON FOR ADMISSION
sob, cough

## 2025-06-09 NOTE — DISCHARGE NOTE PROVIDER - CARE PROVIDER_API CALL
Chris Rivas  Pulmonary Disease  [address], [address]  [place] [address]  Phone: [phone]  Fax: [phone]  Follow Up Time:     Mehdi Arechiga  Internal Medicine  [address]  [place] [address]  Phone: [phone]  Fax: [phone]  Follow Up Time:

## 2025-06-09 NOTE — PROGRESS NOTE ADULT - PROVIDER SPECIALTY LIST ADULT
Cardiology
Hospitalist
Hospitalist
Infectious Disease
Internal Medicine
Pulmonology
Pulmonology
Cardiology
Cardiology
Hospitalist
Infectious Disease
Pulmonology
Pulmonology
Cardiology
Cardiology
Hospitalist
Infectious Disease
Internal Medicine
Pulmonology
Cardiology
Cardiology
Hospitalist
Infectious Disease
Internal Medicine
Pulmonology
Pulmonology

## 2025-06-09 NOTE — DISCHARGE NOTE PROVIDER - NSDCFUSCHEDAPPT_GEN_ALL_CORE_FT
Charles Mccauley  [facility]  CARDIOLOGY [address]  Scheduled Appointment: 06/25/2025

## 2025-06-09 NOTE — PROGRESS NOTE ADULT - ASSESSMENT
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 Patient recently admitted to [facility] for new-onset Afib and acute hypoxic respiratory failure 2/2 COPD and worsened diaphragmatic hernia. He was treated and discharged home with outpatient Cardiology follow-up with Dr. Mccauley in preparation for diaphragmatic hernia repair. Patient tolerated the procedure well, however developed worsening leg swelling afterwards    Dyspnea - Cough -   copd  diaphragmatic hernia  Atelectasis  PE hx  OP  OA  Ataxic gait  Parkinson's disease  HTN  GERD    vs noted  ct chest reviewed - atelectasis - small to mod loculated left eff - no intervention at present  s/p ABX  on nebs  on lasix  occ wheeze  pt has home o2  follows with Pulm - [place] [facility] office    I zion  nebs PRN  HOB elev  asp prec  oral hygiene  skin care  [facility] rx regimen  BP control and HD monitoring  I and O  replete lytes  assist with needs  PPI  OLD - recent admission record reviewed

## 2025-06-09 NOTE — PROGRESS NOTE ADULT - SUBJECTIVE AND OBJECTIVE BOX
Date/Time Patient Seen:  		  Referring MD:   Data Reviewed	       Patient is a 79y old  Male who presents with a chief complaint of sob, cough (08 Jun 2025 17:40)      Subjective/HPI     PAST MEDICAL & SURGICAL HISTORY:  Personal history of PE (pulmonary embolism)    COPD (chronic obstructive pulmonary disease)    Hypertension    Restless leg syndrome    Factor V Leiden    Parkinsons    Spinal stenosis    Memory loss    Right inguinal hernia    AL (obstructive sleep apnea)    Diabetes    Herniated cervical disc    H/O hernia repair          Medication list         MEDICATIONS  (STANDING):  albuterol/ipratropium for Nebulization 3 milliLiter(s) Nebulizer every 6 hours  carbidopa/levodopa  25/100 1 Tablet(s) Oral <User Schedule>  folic acid 1 milliGRAM(s) Oral daily  furosemide   Injectable 40 milliGRAM(s) IV Push every 12 hours  guaiFENesin Oral Liquid (Sugar-Free) 200 milliGRAM(s) Oral every 6 hours  metoprolol tartrate 25 milliGRAM(s) Oral every 8 hours  rOPINIRole 2 milliGRAM(s) Oral <User Schedule>  saccharomyces boulardii 250 milliGRAM(s) Oral two times a day  trihexyphenidyl 1 milliGRAM(s) Oral three times a day    MEDICATIONS  (PRN):  acetaminophen     Tablet .. 650 milliGRAM(s) Oral every 6 hours PRN Temp greater or equal to 38.5C (101.3F), Moderate Pain (4 - 6)  benzonatate 100 milliGRAM(s) Oral three times a day PRN Cough         Vitals log        ICU Vital Signs Last 24 Hrs  T(C): 36.6 (09 Jun 2025 04:46), Max: 37.1 (08 Jun 2025 20:24)  T(F): 97.8 (09 Jun 2025 04:46), Max: 98.8 (08 Jun 2025 20:24)  HR: 76 (09 Jun 2025 04:46) (74 - 104)  BP: 125/70 (09 Jun 2025 04:46) (92/51 - 125/70)  BP(mean): --  ABP: --  ABP(mean): --  RR: 18 (09 Jun 2025 04:46) (18 - 19)  SpO2: 95% (09 Jun 2025 04:46) (95% - 99%)    O2 Parameters below as of 09 Jun 2025 04:46  Patient On (Oxygen Delivery Method): nasal cannula  O2 Flow (L/min): 2               Input and Output:  I&O's Detail    07 Jun 2025 07:01  -  08 Jun 2025 07:00  --------------------------------------------------------  IN:    Oral Fluid: 960 mL  Total IN: 960 mL    OUT:  Total OUT: 0 mL    Total NET: 960 mL      08 Jun 2025 07:01  -  09 Jun 2025 06:34  --------------------------------------------------------  IN:    Oral Fluid: 960 mL  Total IN: 960 mL    OUT:  Total OUT: 0 mL    Total NET: 960 mL          Lab Data                        12.1   10.33 )-----------( 296      ( 08 Jun 2025 06:13 )             35.9     06-08    139  |  100  |  17  ----------------------------<  125[H]  3.7   |  33[H]  |  0.92    Ca    8.9      08 Jun 2025 06:13              Review of Systems	      Objective     Physical Examination    heart s1s2  lung dc bs  head nc  head at  abd soft      Pertinent Lab findings & Imaging      Tad:  NO   Adequate UO     I&O's Detail    07 Jun 2025 07:01  -  08 Jun 2025 07:00  --------------------------------------------------------  IN:    Oral Fluid: 960 mL  Total IN: 960 mL    OUT:  Total OUT: 0 mL    Total NET: 960 mL      08 Jun 2025 07:01  -  09 Jun 2025 06:34  --------------------------------------------------------  IN:    Oral Fluid: 960 mL  Total IN: 960 mL    OUT:  Total OUT: 0 mL    Total NET: 960 mL               Discussed with:     Cultures:	        Radiology

## 2025-06-09 NOTE — CHART NOTE - NSCHARTNOTEFT_GEN_A_CORE
Assessment: Pt seen for nutrition follow-up. Chart reviewed, hospital course noted.    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 Patient recently admitted to [facility] for new-onset Afib and acute hypoxic respiratory failure 2/2 COPD and worsened diaphragmatic hernia.sp diaphragmatic hernia repair. aw sob and cough."    Visited pt at bedside this am. Pt asleep during visit. Spouse present at time of visit. Pt with variable po intake. Good intake of breakfast meal this am. PO intakes % per nursing documentation. Tolerating diet well. No report N/V. +BM 6/8. Pt continues on consistent carbohydrate, low sodium diet. Additional food preferences obtained to optimize po intake/tolerance. Plan for possible d/c home today. RD remains available and will continue to follow-up.     Factors impacting intake: [X] none [ ] nausea  [ ] vomiting [ ] diarrhea [ ] constipation  [ ]chewing problems [ ] swallowing issues  [ ] other:     Diet Prescription: Diet, Regular:   Consistent Carbohydrate {No Snacks}  Low Sodium (06-02-25 @ 13:41)    Intake: fair    Current Weight: Weight (kg): 66.6 (31 May 2025 23:00), 6/9 156.3#? (1+ BL leg edema noted)  Will continue to monitor pt's weights. Spouse declines significant wt changes  Pt sitting in chair during visit today; unable to take new bedscale wt at this time    Pertinent Medications: MEDICATIONS  (STANDING):  albuterol/ipratropium for Nebulization 3 milliLiter(s) Nebulizer every 6 hours  carbidopa/levodopa  25/100 1 Tablet(s) Oral <User Schedule>  folic acid 1 milliGRAM(s) Oral daily  furosemide   Injectable 40 milliGRAM(s) IV Push every 12 hours  guaiFENesin Oral Liquid (Sugar-Free) 200 milliGRAM(s) Oral every 6 hours  metoprolol tartrate 25 milliGRAM(s) Oral every 8 hours  rOPINIRole 2 milliGRAM(s) Oral <User Schedule>  saccharomyces boulardii 250 milliGRAM(s) Oral two times a day  trihexyphenidyl 1 milliGRAM(s) Oral three times a day    MEDICATIONS  (PRN):  acetaminophen     Tablet .. 650 milliGRAM(s) Oral every 6 hours PRN Temp greater or equal to 38.5C (101.3F), Moderate Pain (4 - 6)  benzonatate 100 milliGRAM(s) Oral three times a day PRN Cough    Pertinent Labs: 06-08 Na139 mmol/L Glu 125 mg/dL[H] K+ 3.7 mmol/L Cr  0.92 mg/dL BUN 17 mg/dL 06-02 Phos 2.9 mg/dL    Skin: stage I to sacrum    Estimated Needs:   [X] no change since previous assessment: based on wt of 190#  20-25kcal/kg (1722-2152kcal)  1-1.2g pro/kg (86-103gm protein)  [ ] recalculated:     Previous Nutrition Diagnosis:   [ ] Inadequate Energy Intake [ ]Inadequate Oral Intake [ ] Excessive Energy Intake   [ ] Underweight [X] Decreased Nutrient Needs (Na) [ ] Overweight/Obesity   [ ] Altered GI Function [ ] Unintended Weight Loss [ ] Food & Nutrition Related Knowledge Deficit [ ] Malnutrition     Nutrition Diagnosis is [X] ongoing  [ ] resolved [ ] not applicable     New Nutrition Diagnosis: [X] not applicable     Interventions:   Recommend  [ ] Change Diet To:  [ ] Nutrition Supplement  [ ] Nutrition Support  [X] Other: Continue current diet as tolerated; assistance/encouragement at meal times as needed  Recommend MVI/minerals daily    Monitoring and Evaluation:   [X] PO intake [ x ] Tolerance to diet prescription [ x ] weights [ x ] labs[ x ] follow up per protocol  [X] other: s/s GI distress, bowel function, skin integrity/ edema

## 2025-06-09 NOTE — PROGRESS NOTE ADULT - SUBJECTIVE AND OBJECTIVE BOX
[facility] -- Sid White Pannella, Patel, Savella, Goodger, Cohen: Office # [phone]    Follow Up:  ADHF, Pleural Effusions    Subjective/Observations: No events overnight resting comfortably in bed.  No complaints of chest pain, dyspnea, or palpitations reported. No signs of orthopnea or PND. Wearing nasal cannula 2 L    REVIEW OF SYSTEMS: All other review of systems are negative unless indicated above    PAST MEDICAL & SURGICAL HISTORY:  Personal history of PE (pulmonary embolism)      COPD (chronic obstructive pulmonary disease)      Hypertension      Restless leg syndrome      Factor V Leiden      Parkinsons      Spinal stenosis      Memory loss      Right inguinal hernia      AL (obstructive sleep apnea)      Diabetes      Herniated cervical disc      H/O hernia repair          MEDICATIONS  (STANDING):  albuterol/ipratropium for Nebulization 3 milliLiter(s) Nebulizer every 6 hours  carbidopa/levodopa  25/100 1 Tablet(s) Oral <User Schedule>  folic acid 1 milliGRAM(s) Oral daily  furosemide   Injectable 40 milliGRAM(s) IV Push every 12 hours  guaiFENesin Oral Liquid (Sugar-Free) 200 milliGRAM(s) Oral every 6 hours  metoprolol tartrate 25 milliGRAM(s) Oral every 8 hours  rOPINIRole 2 milliGRAM(s) Oral <User Schedule>  saccharomyces boulardii 250 milliGRAM(s) Oral two times a day  trihexyphenidyl 1 milliGRAM(s) Oral three times a day    MEDICATIONS  (PRN):  acetaminophen     Tablet .. 650 milliGRAM(s) Oral every 6 hours PRN Temp greater or equal to 38.5C (101.3F), Moderate Pain (4 - 6)  benzonatate 100 milliGRAM(s) Oral three times a day PRN Cough    Allergies    Levaquin (Anaphylaxis)  garlic (Other (Mild))    Intolerances      Vital Signs Last 24 Hrs  T(C): 36.6 (09 Jun 2025 04:46), Max: 37.1 (08 Jun 2025 20:24)  T(F): 97.8 (09 Jun 2025 04:46), Max: 98.8 (08 Jun 2025 20:24)  HR: 94 (09 Jun 2025 08:00) (74 - 102)  BP: 125/70 (09 Jun 2025 04:46) (92/51 - 125/70)  BP(mean): --  RR: 18 (09 Jun 2025 04:46) (18 - 19)  SpO2: 95% (09 Jun 2025 04:46) (95% - 98%)    Parameters below as of 09 Jun 2025 04:46  Patient On (Oxygen Delivery Method): nasal cannula  O2 Flow (L/min): 2    I&O's Summary    08 Jun 2025 07:01  -  09 Jun 2025 07:00  --------------------------------------------------------  IN: 960 mL / OUT: 0 mL / NET: 960 mL          TELE: SR  PHYSICAL EXAM:  Constitutional: NAD, awake and alert  HEENT: Moist Mucous Membranes, Anicteric  Pulmonary: Non-labored, breath sounds coarse throughout lung fields , No wheezing, crackles or rhonchi   Cardiovascular: Regular, S1 and S2, No murmurs, No rubs, gallops or clicks  Gastrointestinal:  soft, nontender, nondistended   Lymph: No peripheral edema. No lymphadenopathy.   Skin: No visible rashes or ulcers.  Psych:  Mood & affect appropriate      LABS: All Labs Reviewed:                        12.1   10.33 )-----------( 296      ( 08 Jun 2025 06:13 )             35.9                         11.7   11.44 )-----------( 331      ( 07 Jun 2025 06:35 )             35.8     08 Jun 2025 06:13    139    |  100    |  17     ----------------------------<  125    3.7     |  33     |  0.92   07 Jun 2025 06:35    139    |  101    |  19     ----------------------------<  131    3.7     |  32     |  0.99     Ca    8.9        08 Jun 2025 06:13  Ca    9.0        07 Jun 2025 06:35       PT/INR - ( 09 Jun 2025 06:28 )   PT: 18.9 sec;   INR: 1.61 ratio         Troponin I, High Sensitivity Result: 12.0 ng/L (05-31-25 @ 13:30)    12 Lead ECG:   Ventricular Rate 119 BPM    QRS Duration 82 ms    Q-T Interval 328 ms    QTC Calculation(Bazett) 461 ms    R Axis -5 degrees    T Axis 32 degrees    Diagnosis Line Atrial fibrillation with rapid ventricular response  Abnormal ECG  Confirmed by german Hull ([id number]) on 6/2/2025 3:14:44 PM (06-02-25 @ 10:28)      TRANSTHORACIC ECHOCARDIOGRAM REPORT  ________________________________________________________________________________                                      _______       Pt. Name:       ROBERT F BOECKLE Study Date:    6/3/2025  MRN:            [id number]         YOB: 1945  Accession #:    [id number]        Age:           79 years  Account#:       [id number]       Gender:        M  Heart Rate:                      Height:        66.93 in (170.00 cm)  Rhythm:                          Weight:        147.71 lb (67.00 kg)  Blood Pressure: 96/60 mmHg       BSA/BMI:       1.78 m² / 23.18 kg/m²  ________________________________________________________________________________________  Referring Physician:    [id number] Asya Muller  Interpreting Physician: Mere Mena MD  Primary Sonographer:    Andres Mejias    CPT:                ECHO TTE WITH CON COMP W DOPP - .m  Indication(s):      Edema, unspecified - R60.9  Procedure:          Transthoracic echocardiogram with 2-D, M-mode and complete                      spectral and color flow Doppler.  Ordering Location:  [facility]  Admission Status:   Inpatient  Contrast Injection: Verbal consent was obtained for injection of Ultrasonic                      Enhancing Agent following a discussion of risks and                      benefits.                      Endocardial visualization enhanced with Definity Ultrasound                      enhancing agent.  Study Information:  Image quality for this study is technically difficult.                      Technically difficult study secondary to lung interference.    _______________________________________________________________________________________     CONCLUSIONS:      1. Technically difficult image quality.   2. Leftventricular systolic function is normal with an ejection fraction of 57 % by Andrews's method of disks.   3. Probably normal right ventricular systolic function.   4. Aortic valve was not well visualized.   5. Mitral valve leaflets have focal calcification.   6. Trace mitral regurgitation.   7. Tricuspid valve was not well visualized.   8. The inferior vena cava is normal in size measuring 1.77 cm in diameter, (normal <2.1cm) with normal inspiratory collapse (normal >50%) consistent with normal right atrial pressure (~3, range 0-5mmHg).   9. Left pleural effusion noted.    ________________________________________________________________________________________  FINDINGS:     Left Ventricle:  After obtaining consent, Definity ultrasound enhancing agent was given for enhanced left ventricular opacification and improved delineation of the left ventricular endocardial borders. Left ventricular systolic function is normal with a calculated ejection fraction of 57 % by the Andrews's biplane method of disks.     Right Ventricle:  The right ventricle is not well visualized. Right ventricular systolic function is probably normal.     Left Atrium:  The left atrium is normal in size.     Right Atrium:  The right atrium is normal in size with an indexed volume of 18.86 ml/m² and an indexed area of 8.50 cm²/m².     Interatrial Septum:  The interatrial septum was not well visualized.     Aortic Valve:  The aortic valve was not well visualized.     Mitral Valve:  Mitral valve leaflets have focalcalcification. There is trace mitral regurgitation.     Tricuspid Valve:  The tricuspid valve was not well visualized.     Pulmonic Valve:  The pulmonic valve was not well visualized.     Aorta:  The aortic root at the sinuses of Valsalva is normal in size, measuring 3.60 cm (indexed 2.03 cm/m²). The ascending aorta is normal in size, measuring 3.30 cm (indexed 1.86 cm/m²).     Pleura:  Left pleural effusion noted.     Systemic Veins:  The inferior vena cava is normal in size measuring 1.77 cm indiameter, (normal <2.1cm) with normal inspiratory collapse (normal >50%) consistent with normal right atrial pressure (~3, range 0-5mmHg).  ____________________________________________________________________  QUANTITATIVE DATA:  Left Ventricle Measurements: (Indexed to BSA)     IVSd (2D):   0.9 cm  LVPWd (2D):  1.2 cm  LVIDd (2D):  4.6 cm  LVIDs (2D):  3.2 cm  LV Mass:     176 g  99.2 g/m²  LV Vol d, MOD A2C: 27.7 ml 15.59 ml/m²  LV Vol d, MOD A4C: 40.9 ml 23.03 ml/m²  LV Vol d, MOD BP:  34.1 ml19.20 ml/m²  LV Vol s, MOD A2C: 12.3 ml 6.92 ml/m²  LV Vol s, MOD A4C: 17.6 ml 9.91 ml/m²  LV Vol s, MOD BP:  14.8 ml 8.31 ml/m²  LVOT SV MOD BP:    19.3 ml  LV EF% MOD BP:     57 %     MV E Vmax:    1.26 m/s  MV A Vmax:    0.40 m/s  MV E/A:       3.12  e' lateral:   8.05 cm/s  e' medial:    7.51 cm/s  E/e' lateral: 15.65  E/e' medial:  16.78  E/e' Average: 16.20  MV DT:        181 msec    Aorta Measurements: (Normal range) (Indexed to BSA)     Ao Root d     3.60 cm (3.1 - 3.7 cm) 2.03 cm/m²  Ao Asc d, 2D: 3.30  Ao Asc prox:  3.30 cm                1.86 cm/m²            Left Atrium Measurements: (Indexed to BSA)  LA Diam 2D: 4.70 cm         Right Atrial Measurements:     RA Vol s, MOD A4C         33.5 ml  RA Vol s, MOD A4C i BSA   18.86 ml/m²  RA Area s, MOD A4C        15.1 cm²  RA Area s, MOD A4C, i BSA 8.50 cm²/m²       LVOT / RVOT/ Qp/Qs Data: (Indexed to BSA)  LVOT Diameter,s: 2.10 cm  LVOT Area:       3.46 cm²    Mitral Valve Measurements:     MV E Vmax: 1.3 m/s  MV A Vmax: 0.4 m/s  MV E/A:    3.1       Tricuspid Valve Measurements:     RA Pressure: 3 mmHg    ________________________________________________________________________________________  Electronically signed on 6/4/2025 at 7:59:49 AM by Mere Mena MD         *** Final ***   [facility] -- Sid White Pannella, Patel, Savella, Goodger, Cohen: Office # [phone]    Follow Up:  ADHF, Pleural Effusions    Subjective/Observations: No events overnight resting comfortably in bed.  No complaints of chest pain, dyspnea, or palpitations reported. No signs of orthopnea or PND. Wearing nasal cannula 2 L    REVIEW OF SYSTEMS: All other review of systems are negative unless indicated above    PAST MEDICAL & SURGICAL HISTORY:  Personal history of PE (pulmonary embolism)      COPD (chronic obstructive pulmonary disease)      Hypertension      Restless leg syndrome      Factor V Leiden      Parkinsons      Spinal stenosis      Memory loss      Right inguinal hernia      AL (obstructive sleep apnea)      Diabetes      Herniated cervical disc      H/O hernia repair          MEDICATIONS  (STANDING):  albuterol/ipratropium for Nebulization 3 milliLiter(s) Nebulizer every 6 hours  carbidopa/levodopa  25/100 1 Tablet(s) Oral <User Schedule>  folic acid 1 milliGRAM(s) Oral daily  furosemide   Injectable 40 milliGRAM(s) IV Push every 12 hours  guaiFENesin Oral Liquid (Sugar-Free) 200 milliGRAM(s) Oral every 6 hours  metoprolol tartrate 25 milliGRAM(s) Oral every 8 hours  rOPINIRole 2 milliGRAM(s) Oral <User Schedule>  saccharomyces boulardii 250 milliGRAM(s) Oral two times a day  trihexyphenidyl 1 milliGRAM(s) Oral three times a day    MEDICATIONS  (PRN):  acetaminophen     Tablet .. 650 milliGRAM(s) Oral every 6 hours PRN Temp greater or equal to 38.5C (101.3F), Moderate Pain (4 - 6)  benzonatate 100 milliGRAM(s) Oral three times a day PRN Cough    Allergies    Levaquin (Anaphylaxis)  garlic (Other (Mild))    Intolerances      Vital Signs Last 24 Hrs  T(C): 36.6 (09 Jun 2025 04:46), Max: 37.1 (08 Jun 2025 20:24)  T(F): 97.8 (09 Jun 2025 04:46), Max: 98.8 (08 Jun 2025 20:24)  HR: 94 (09 Jun 2025 08:00) (74 - 102)  BP: 125/70 (09 Jun 2025 04:46) (92/51 - 125/70)  BP(mean): --  RR: 18 (09 Jun 2025 04:46) (18 - 19)  SpO2: 95% (09 Jun 2025 04:46) (95% - 98%)    Parameters below as of 09 Jun 2025 04:46  Patient On (Oxygen Delivery Method): nasal cannula  O2 Flow (L/min): 2    I&O's Summary    08 Jun 2025 07:01  -  09 Jun 2025 07:00  --------------------------------------------------------  IN: 960 mL / OUT: 0 mL / NET: 960 mL          TELE: SR  PHYSICAL EXAM:  Constitutional: NAD, awake and alert  HEENT: Moist Mucous Membranes, Anicteric  Pulmonary: Non-labored, breath sounds coarse throughout lung fields , No wheezing, crackles or rhonchi   Cardiovascular: Regular, S1 and S2, No murmurs, No rubs, gallops or clicks  Gastrointestinal:  soft, nontender, nondistended   Lymph: No peripheral edema. No lymphadenopathy.   Skin: No visible rashes or ulcers.  Psych:  Mood & affect appropriate      LABS: All Labs Reviewed:                        12.1   10.33 )-----------( 296      ( 08 Jun 2025 06:13 )             35.9                         11.7   11.44 )-----------( 331      ( 07 Jun 2025 06:35 )             35.8     08 Jun 2025 06:13    139    |  100    |  17     ----------------------------<  125    3.7     |  33     |  0.92   07 Jun 2025 06:35    139    |  101    |  19     ----------------------------<  131    3.7     |  32     |  0.99     Ca    8.9        08 Jun 2025 06:13  Ca    9.0        07 Jun 2025 06:35       PT/INR - ( 09 Jun 2025 06:28 )   PT: 18.9 sec;   INR: 1.61 ratio         Troponin I, High Sensitivity Result: 12.0 ng/L (05-31-25 @ 13:30)    12 Lead ECG:   Ventricular Rate 119 BPM    QRS Duration 82 ms    Q-T Interval 328 ms    QTC Calculation(Bazett) 461 ms    R Axis -5 degrees    T Axis 32 degrees    Diagnosis Line Atrial fibrillation with rapid ventricular response  Abnormal ECG  Confirmed by german Hull ([id number]) on 6/2/2025 3:14:44 PM (06-02-25 @ 10:28)      TRANSTHORACIC ECHOCARDIOGRAM REPORT  ________________________________________________________________________________                                      _______       Pt. Name:       ROBERT F BOECKLE Study Date:    6/3/2025  MRN:            [id number]         YOB: 1945  Accession #:    [id number]        Age:           79 years  Account#:       [id number]       Gender:        M  Heart Rate:                      Height:        66.93 in (170.00 cm)  Rhythm:                          Weight:        147.71 lb (67.00 kg)  Blood Pressure: 96/60 mmHg       BSA/BMI:       1.78 m² / 23.18 kg/m²  ________________________________________________________________________________________  Referring Physician:    [id number] Asya Muller  Interpreting Physician: Mere Mena MD  Primary Sonographer:    Andres Mejias    CPT:                ECHO TTE WITH CON COMP W DOPP - .m  Indication(s):      Edema, unspecified - R60.9  Procedure:          Transthoracic echocardiogram with 2-D, M-mode and complete                      spectral and color flow Doppler.  Ordering Location:  [facility]  Admission Status:   Inpatient  Contrast Injection: Verbal consent was obtained for injection of Ultrasonic                      Enhancing Agent following a discussion of risks and                      benefits.                      Endocardial visualization enhanced with Definity Ultrasound                      enhancing agent.  Study Information:  Image quality for this study is technically difficult.                      Technically difficult study secondary to lung interference.    _______________________________________________________________________________________     CONCLUSIONS:      1. Technically difficult image quality.   2. Leftventricular systolic function is normal with an ejection fraction of 57 % by Andrews's method of disks.   3. Probably normal right ventricular systolic function.   4. Aortic valve was not well visualized.   5. Mitral valve leaflets have focal calcification.   6. Trace mitral regurgitation.   7. Tricuspid valve was not well visualized.   8. The inferior vena cava is normal in size measuring 1.77 cm in diameter, (normal <2.1cm) with normal inspiratory collapse (normal >50%) consistent with normal right atrial pressure (~3, range 0-5mmHg).   9. Left pleural effusion noted.    ________________________________________________________________________________________  FINDINGS:     Left Ventricle:  After obtaining consent, Definity ultrasound enhancing agent was given for enhanced left ventricular opacification and improved delineation of the left ventricular endocardial borders. Left ventricular systolic function is normal with a calculated ejection fraction of 57 % by the Andrews's biplane method of disks.     Right Ventricle:  The right ventricle is not well visualized. Right ventricular systolic function is probably normal.     Left Atrium:  The left atrium is normal in size.     Right Atrium:  The right atrium is normal in size with an indexed volume of 18.86 ml/m² and an indexed area of 8.50 cm²/m².     Interatrial Septum:  The interatrial septum was not well visualized.     Aortic Valve:  The aortic valve was not well visualized.     Mitral Valve:  Mitral valve leaflets have focalcalcification. There is trace mitral regurgitation.     Tricuspid Valve:  The tricuspid valve was not well visualized.     Pulmonic Valve:  The pulmonic valve was not well visualized.     Aorta:  The aortic root at the sinuses of Valsalva is normal in size, measuring 3.60 cm (indexed 2.03 cm/m²). The ascending aorta is normal in size, measuring 3.30 cm (indexed 1.86 cm/m²).     Pleura:  Left pleural effusion noted.     Systemic Veins:  The inferior vena cava is normal in size measuring 1.77 cm indiameter, (normal <2.1cm) with normal inspiratory collapse (normal >50%) consistent with normal right atrial pressure (~3, range 0-5mmHg).  ____________________________________________________________________  QUANTITATIVE DATA:  Left Ventricle Measurements: (Indexed to BSA)     IVSd (2D):   0.9 cm  LVPWd (2D):  1.2 cm  LVIDd (2D):  4.6 cm  LVIDs (2D):  3.2 cm  LV Mass:     176 g  99.2 g/m²  LV Vol d, MOD A2C: 27.7 ml 15.59 ml/m²  LV Vol d, MOD A4C: 40.9 ml 23.03 ml/m²  LV Vol d, MOD BP:  34.1 ml19.20 ml/m²  LV Vol s, MOD A2C: 12.3 ml 6.92 ml/m²  LV Vol s, MOD A4C: 17.6 ml 9.91 ml/m²  LV Vol s, MOD BP:  14.8 ml 8.31 ml/m²  LVOT SV MOD BP:    19.3 ml  LV EF% MOD BP:     57 %     MV E Vmax:    1.26 m/s  MV A Vmax:    0.40 m/s  MV E/A:       3.12  e' lateral:   8.05 cm/s  e' medial:    7.51 cm/s  E/e' lateral: 15.65  E/e' medial:  16.78  E/e' Average: 16.20  MV DT:        181 msec    Aorta Measurements: (Normal range) (Indexed to BSA)     Ao Root d     3.60 cm (3.1 - 3.7 cm) 2.03 cm/m²  Ao Asc d, 2D: 3.30  Ao Asc prox:  3.30 cm                1.86 cm/m²            Left Atrium Measurements: (Indexed to BSA)  LA Diam 2D: 4.70 cm         Right Atrial Measurements:     RA Vol s, MOD A4C         33.5 ml  RA Vol s, MOD A4C i BSA   18.86 ml/m²  RA Area s, MOD A4C        15.1 cm²  RA Area s, MOD A4C, i BSA 8.50 cm²/m²       LVOT / RVOT/ Qp/Qs Data: (Indexed to BSA)  LVOT Diameter,s: 2.10 cm  LVOT Area:       3.46 cm²    Mitral Valve Measurements:     MV E Vmax: 1.3 m/s  MV A Vmax: 0.4 m/s  MV E/A:    3.1       Tricuspid Valve Measurements:     RA Pressure: 3 mmHg    ________________________________________________________________________________________  Electronically signed on 6/4/2025 at 7:59:49 AM by Mere Mena MD         *** Final ***

## 2025-06-09 NOTE — PROGRESS NOTE ADULT - ASSESSMENT
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 Consulted for acute hypoxia and volume overload.     ADHF, Pleural Effusions, PAfib, HTN  - Known COPD, O2-dep  - CxR showed persistent mod-large left pleural effusion  - Pulm following.  No intervention at this time  - Continue Lasix 40 mg IV q12H. can transition to 40m po q12   - Strict I/O's and daily weights  - Monitor renal function and bicarb  - Echo 6/3/2025 LV 57% Left pleural effusion noted, RAP ~3mmHg    - h/o p A fib, diagnosed over the past few months  - Diltiazem recently stop for edema  - Told by Dr Mccauley in the office to start bisoprolol last week, but given a recent deterioration in respiratory status she has not started it  - TELE: A fib 90-100s, now off  - Continue Metoprolol 25 Q8H.  Plan to switch to long-acting  - Daily coumadin to keep INR 2-3    - Patient is a fall risk with prior h/o brain bleed, outpatient physicians have continued AC due risk of recurrence.     - EKG A fib RVR @ 109  - No evidence of any active ischemia   - Troponin negative    - BP stable and controlled  - Monitor and replete lytes, keep K>4, Mg>2.  - Will continue to follow    Topher Alvarez DNP, AGACNP-BC  Cardiology   Call Teams

## 2025-06-09 NOTE — DISCHARGE NOTE PROVIDER - HOSPITAL COURSE
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 Patient recently admitted to [facility] for new-onset Afib and acute hypoxic respiratory failure 2/2 COPD and worsened diaphragmatic hernia. He was treated and discharged home with outpatient Cardiology follow-up with Dr. Mccauley in preparation for diaphragmatic hernia repair. Patient tolerated the procedure well, however developed worsening leg swelling afterwards. PCP believed this was due to the new Diltiazem that was initiated for Afib, and discontinued it. Leg edema improved somewhat but is still persistent despite starting 40mg Lasix PO for the past 2 days. Patient saw Dr. Mccauley earlier this week who prescribed Bisoprolol instead, however wife and patient reluctant to start due to worsening cough. Wife states patient was ambulating today when he HR increased to 130s and O2 saturation decreased despite 2L NC. Patient denies orthopnea, SOB, MAST, CP, palpitations. Pt was admitted and seen by cardio, pulmonary and ID. He ws treated for acute HFpEF and pneumonia.  He has a pleural effusion that was too small for drainage.  LABS:                        12.1   10.33 )-----------( 296      ( 08 Jun 2025 06:13 )             35.9     06-08    139  |  100  |  17  ----------------------------<  125[H]  3.7   |  33[H]  |  0.92    Ca    8.9      08 Jun 2025 06:13    RADIOLOGY & ADDITIONAL TESTS:  < from: CT Chest No Cont (05.31.25 @ 18:52) >    AIRWAYS AND LUNGS: The central tracheobronchial tree is patent.  Moderate   size loculated left pleural effusion with left lower lobe consolidation   and volume loss. Motion limited examination the lungs. Question of a few   patchy bilateral lung opacities.    MEDIASTINUM AND PLEURA: There are no enlarged mediastinal, hilar or   axillary lymph nodes. The visualized portion of the thyroid gland is   unremarkable. There is no pneumothorax.    HEART AND VESSELS: There is cardiomegaly.  There are atherosclerotic   calcifications of the aorta and coronary arteries.  There is no   pericardial effusion.    UPPER ABDOMEN: Images of the upper abdomen demonstrate cholelithiasis.   Right renal cyst..    BONES AND SOFT TISSUES: There are mild degenerative changes of the spine.    The soft tissues are unremarkable.    IMPRESSION:  Moderate size loculated left pleural effusion with left lower lobe   consolidation and volume loss. Motion limited examination the lungs.   Question ofa few patchy bilateral lung opacities. Follow-up to   resolution recommended.  < from: TTE W or WO Ultrasound Enhancing Agent (06.03.25 @ 15:59) >     1. Technically difficult image quality.   2. Leftventricular systolic function is normal with an ejection fraction of 57 % by Andrews's method of disks.   3. Probably normal right ventricular systolic function.   4. Aortic valve was not well visualized.   5. Mitral valve leaflets have focal calcification.   6. Trace mitral regurgitation.   7. Tricuspid valve was not well visualized.   8. The inferior vena cava is normal in size measuring 1.77 cm in diameter, (normal <2.1cm) with normal inspiratory collapse (normal >50%) consistent with normal right atrial pressure (~3, range 0-5mmHg).   9. Left pleural effusion noted.    < end of copied text >

## 2025-06-09 NOTE — DISCHARGE NOTE PROVIDER - CARE PROVIDERS DIRECT ADDRESSES
[email or web address]

## 2025-06-09 NOTE — PROGRESS NOTE ADULT - NS ATTEND AMEND GEN_ALL_CORE FT
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 Consulted for acute hypoxia and volume overload.     - o2-dep copd, now p/w worsening cough and SOB x 1week  - hiatal hernia for which had recent repair, with persistent partial left lung collapse by report  -presented with worsening sob and hypoxia at home, w edema    - still with wheezing cont Lasix    -echo normal ef  -af with accel vr at times adj meds as needed  - Warfarin goal inr 2-3     - No acute ischemia    -bp controlled
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 Consulted for acute hypoxia and volume overload.     ADHF, Pleural Effusions, PAfib, HTN  - Known COPD, O2-dep  - CxR showed persistent mod-large left pleural effusion  - Pulm following.  No intervention at this time  - transition to po lasix  40mg q12   - Strict I/O's and daily weights    - Echo 6/3/2025 LV 57% Left pleural effusion noted, RAP ~3mmHg    - h/o p A fib, diagnosed over the past few months  - Diltiazem recently stop for edema  - Told by Dr Mccauley in the office to start bisoprolol last week, but given a recent deterioration in respiratory status she has not started it  - TELE: A fib 90-100s, now off  - Continue Metoprolol 25 Q8H.  Plan to switch to long-acting  - Daily coumadin to keep INR 2-3    - Patient is a fall risk with prior h/o brain bleed, outpatient physicians have continued AC due risk of recurrence.     DC planning per primary team.   - EKG A fib RVR @ 109  - No evidence of any active ischemia   - Troponin negative
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 Consulted for acute hypoxia and volume overload.     - Known o2-dep copd, now p/w worsening cough and SOB x 1week  - Ventral hernia for which had recent repair, with persistent partial left lung collapse by report   presented with worsening sob and hypoxic at home and  edematous on exam  - CXR with a left pleural eff, probably superimposed on lobar collapse  - CT Chest  noted with loculated pl effusion  - still with wheezing on exam , copd likely contributing, slight increase in bicarb, anticipate changing to PO diuretics 6/7 am   -seen by pulmonary no indication for thora   -echo 6/3/2025 lv 57% Left pleural effusion noted     - h/o  p A fib, diagnosed over the past few months  -  diltiazem  recently stop for edema  - Told by Dr Mccauley in the office to start bisoprolol last week, but given a recent deterioration in respiratory status she has not started it  -Af rates still tachy at times, increase BB to every 6 hours   - Warfarin goal inr 2-3    - Patient is a fall risk with prior h/o brain bleed, outpatient physicians have continued AC due risk of recurrence.
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 Consulted for acute hypoxia and volume overload.     - Known O2-dep copd, now p/w worsening cough and SOB x 1week, hypoxic at home and  edematous on exam  - Ventral hernia for which had recent repair, with persistent partial left lung collapse by report  - CXR with a left pleural eff, probably superimposed on lobar collapse  - CT Chest 5/31 noted with loculated pl effusion  - Continue Lasix 40 IV BID  - appears more tachypneic today  - Would repeat CXR today (ordered)    - COPD contributing.   - Follow pulmonary recommendations, no indication for thoracentesis   - Echo 6/3/2025 lv 57% Left pleural effusion noted     - TELE: A fib 90-100s   - Continue Metoprolol 25 Q8H  - Warfarin goal inr 2-3    - Patient is a fall risk with prior h/o brain bleed, outpatient physicians have continued AC due risk of recurrence.     Further cardiac workup will depend on clinical course.
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 Consulted for acute hypoxia and volume overload.       - Had been on diltiazem for af rate control, recently stopped because of edema  - did not start bisoprolol yet  - Has been more sob and noted to be hypoxic at home, more edematous on exam  - CXR with a left pleural eff, probably superimposed on lobar collapse  - CT Chest  noted with loculated pl effusion  - Continue Lasix 40 iv bid   -seen by pulmonary no indication for thora   - Last echo was very limited and non-diagnostic,3/2025, please repeat   - Warfarin goal inr 2-3    - Patient is a fall risk with prior h/o brain bleed, outpatient physicians have continued AC due risk of recurrence.     - BP stable on bb
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 Consulted for acute hypoxia and volume overload.     ADHF, Pleural Effusions, PAfib, HTN  - Known COPD, O2-dep  - CxR showed persistent mod-large left pleural effusion  - Pulm following.  No intervention at this time  - transition to po lasix 40mg q12   - Strict I/O's and daily weights    - Echo 6/3/2025 LV 57% Left pleural effusion noted, RAP ~3mmHg    - h/o p A fib, diagnosed over the past few months  - Diltiazem recently stop for edema  - Told by Dr Mccauley in the office to start bisoprolol last week, but given a recent deterioration in respiratory status she has not started it  - TELE: A fib 90-100s, now off  - Continue Metoprolol 25 Q8H. Switch to Succinate formulation  - Daily coumadin to keep INR 2-3, INR subtherapeutic   - Patient is a fall risk with prior h/o brain bleed, outpatient physicians have continued AC due risk of recurrence.     DC planning per primary team.   - EKG A fib RVR @ 109  - No evidence of any active ischemia   - Troponin negative.
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 Consulted for acute hypoxia and volume overload.       - Had been on diltiazem for AF rate control, recently stopped because of edema  - did not start bisoprolol yet  - Has been more sob and noted to be hypoxic at home, more edematous on exam  - CXR with a left pleural effusion, probably superimposed on lobar collapse  - CT Chest noted with loculated pl effusion  - Remains on IV lasix 40mg BID, TTE with normal LVEF and RAP of 3. Does not appear significant volume overloaded on exam  - Will likely transition to PO lasix in the AM, 6/5  - seen by pulmonary no indication for thora   - Warfarin goal INR 2-3, INR is at goal.   - Patient is a fall risk with prior h/o brain bleed, outpatient physicians have continued AC due risk of recurrence.   - BP stable on bb. Would switch to succinate formulation and increase dosing as noted above.
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 Consulted for acute hypoxia and volume overload.     - Known o2-dep copd, now p/w worsening cough and SOB x 1week  - Ventral hernia for which had recent repair, with persistent partial left lung collapse by report  - Known p A fib, diagnosed over the past few months  - Had been on diltiazem for af rate control, recently stopped because of edema  - Told by Dr Mccauley in the office to start bisoprolol last week, but given a recent deterioration in respiratory status she has not started it  - Has been more sob and noted to be hypoxic at home, more edematous on exam  - BNP:  <--2440  - CXR with a left pleural eff, probably superimposed on lobar collapse  - CT Chest  noted with loculated pl effusion  - Continue Lasix 40 iv bid for now. Extra dose ordered today.   - Last echo was very limited and non-diagnostic, would repeat  - Meds for vol ol/ CHF to be determined by lv fxn on better echo     - Tele w/ A fib 110-140s   - S/p Lopressor 5 mg IV x 1   - Would start Metoprolol 25 mg PO Q8H for now (ordered)  - Warfarin goal inr 2-3    - Patient is a fall risk with prior h/o brain bleed, outpatient physicians have continued AC due risk of recurrence.     - EKG A fib RVR @ 109  - No evidence of any active ischemia   - Troponin: <-12.0    - -160s     - Monitor and replete lytes, keep K>4, Mg>2.  - Will continue to follow.

## 2025-06-09 NOTE — DISCHARGE NOTE PROVIDER - NSDCCPCAREPLAN_GEN_ALL_CORE_FT
PRINCIPAL DISCHARGE DIAGNOSIS  Diagnosis: Acute CHF  Assessment and Plan of Treatment:       SECONDARY DISCHARGE DIAGNOSES  Diagnosis: COPD exacerbation  Assessment and Plan of Treatment:     Diagnosis: Pleural effusion  Assessment and Plan of Treatment:     Diagnosis: Gram-negative pneumonia  Assessment and Plan of Treatment:

## 2025-06-09 NOTE — DISCHARGE NOTE PROVIDER - NSDCMRMEDTOKEN_GEN_ALL_CORE_FT
Albuterol (Eqv-ProAir HFA) 90 mcg/inh inhalation aerosol: 2 puff(s) inhaled every 4 hours as needed for  shortness of breath and/or wheezing  benzonatate 100 mg oral capsule: 1 cap(s) orally 3 times a day as needed for Cough  carbidopa-levodopa 25 mg-100 mg oral tablet: 1 tab(s) orally 4 times a day  fexofenadine 180 mg oral tablet: 1 tab(s) orally once a day  folic acid 1 mg oral tablet: 1 tab(s) orally once a day  furosemide 40 mg oral tablet: 1 tab(s) orally 2 times a day  guaiFENesin 100 mg/5 mL oral liquid: 10 milliliter(s) orally every 6 hours  ipratropium-albuterol 0.5 mg-2.5 mg/3 mL inhalation solution: 3 milliliter(s) inhaled every 6 hours  metoprolol succinate 50 mg oral capsule, extended release: 1 cap(s) orally  montelukast 10 mg oral tablet: 1 tab(s) orally once a day (at bedtime)  rOPINIRole 2 mg oral tablet: 1 tab(s) orally 4 times a day  Symbicort 160 mcg-4.5 mcg/inh inhalation aerosol: 2 puff(s) inhaled once a day (in the morning) **Prescribed as 2 puffs 2 times a day**  trihexyphenidyl 2 mg oral tablet: 0.5 tab(s) orally 3 times a day  warfarin 5 mg oral tablet: 0.5 tab(s) orally once a day

## 2025-06-12 ENCOUNTER — APPOINTMENT (OUTPATIENT)
Dept: CARDIOLOGY | Facility: CLINIC | Age: 80
End: 2025-06-12
Payer: MEDICARE

## 2025-06-12 VITALS — SYSTOLIC BLOOD PRESSURE: 120 MMHG | DIASTOLIC BLOOD PRESSURE: 80 MMHG

## 2025-06-12 VITALS
HEIGHT: 67 IN | OXYGEN SATURATION: 97 % | DIASTOLIC BLOOD PRESSURE: 73 MMHG | SYSTOLIC BLOOD PRESSURE: 150 MMHG | HEART RATE: 62 BPM

## 2025-06-12 PROBLEM — I48.0 PAF (PAROXYSMAL ATRIAL FIBRILLATION): Status: ACTIVE | Noted: 2025-06-12

## 2025-06-12 PROCEDURE — 99215 OFFICE O/P EST HI 40 MIN: CPT

## 2025-06-12 PROCEDURE — 93000 ELECTROCARDIOGRAM COMPLETE: CPT

## 2025-06-12 PROCEDURE — G2211 COMPLEX E/M VISIT ADD ON: CPT

## 2025-06-18 NOTE — DISCHARGE NOTE NURSING/CASE MANAGEMENT/SOCIAL WORK - NSFLUVACAGEDISCH_IMM_ALL_CORE
Please start Lantus 30 and Humalog 3 times a day with meals  Also start the Ozempic  Follow with diabetes education, nutrition, and exercise   Continue metformin 1000 twice a day      Full Version - [email or web address]  Warmup -[email or web address]  Lower Body -[email or web address]  Upper Body - [email or web address]  Core - [email or web address]    Patient Education     Type 2 diabetes   The Basics   Written by the doctors and editors at [facility]   What is type 2 diabetes? -- This is a disorder that disrupts the way the body uses sugar. It is sometimes called type 2 diabetes mellitus.  All of the cells in the body need sugar to work normally. Sugar gets into the cells with the help of a hormone called insulin. Insulin is made by the pancreas, an organ in the belly. If there is not enough insulin, or if cells in the body don't respond normally to insulin, sugar builds up in the blood. That is what happens to people with diabetes.  There are 2 different types of diabetes:   In type 1 diabetes, the pancreas makes little or no insulin.   In type 2 diabetes, the pancreas still makes some insulin, but the cells in the body stop responding normally. Eventually, the pancreas cannot make enough insulin to keep up.  Having excess body weight or obesity increases a person's risk of developing type 2 diabetes. But people without excess body weight can get diabetes, too.  What are the symptoms of type 2 diabetes? -- Type 2 diabetes usually causes no symptoms. When symptoms do happen, they include:   Needing to urinate often   Intense thirst   Blurry vision  Can diabetes lead to other health problems? -- Yes. Type 2 diabetes might not make you feel sick. But if it is not managed, it can lead to serious problems over time, such as:   Heart attacks   Strokes   Kidney disease   Vision problems (or even blindness)   Pain or loss of feeling in  "the hands and feet   Needing to have fingers, toes, or other body parts removed (amputated)  How do I know if I have type 2 diabetes? -- Your doctor or nurse can do a blood test. There are 2 tests that can be used for this. Both involve measuring the amount of sugar in your blood, called your \"blood sugar\" or \"blood glucose\":   One of the tests measures your blood sugar at the time the blood sample is taken. This test is done in the morning. You can't eat or drink anything except water for at least 8 hours before the test.   The other test shows what your average blood sugar has been for the past 2 to 3 months. This blood test is called \"hemoglobin A1C\" or just \"A1C.\" It can be checked at any time of the day, even if you have recently eaten.  How is type 2 diabetes treated? -- The goals of treatment are to manage your blood sugar and lower the risk of future problems that can happen in people with diabetes.  Treatment might include:   Lifestyle changes - This is an important part of managing diabetes. It includes eating healthy foods and getting plenty of physical activity.   Medicines - There are a few medicines that help lower blood sugar. Some people need to take pills that help the body make more insulin or that help insulin do its job. Others need insulin shots.  Depending on what medicines you take, you might need to check your blood sugar regularly at home. But not everyone with type 2 diabetes needs to do this. Your doctor or nurse will tell you if you should be checking your blood sugar, and when and how to do this.  Sometimes, people with type 2 diabetes also need medicines to help prevent problems caused by the disease. For instance, medicines used to lower blood pressure can reduce the chances of a heart attack or stroke.   General medical care - It's also important to take care of other areas of your health. This includes watching your blood pressure and cholesterol levels. You should also get certain " "vaccines, such as vaccines to protect against the flu and coronavirus disease 2019 (\"COVID-19\"). Some people also need a vaccine to prevent pneumonia.  Can type 2 diabetes be prevented? -- Yes. To lower your chances of getting type 2 diabetes, the most important thing you can do is eat a healthy diet and get plenty of physical activity. This can help you lose weight if you are overweight. But eating well and being active are also good for your overall health. Even gentle activity, like walking, has benefits.  If you smoke, quitting can also lower your risk of type 2 diabetes. Quitting smoking can be difficult, but your doctor or nurse can help.  All topics are updated as new evidence becomes available and our peer review process is complete.  This topic retrieved from [employer] on: Apr 24, 2024.  Topic 08475 Version 23.0  Release: 32.3.2 - C32.113  © 2024 UpToDate, Inc. and/or its affiliates. All rights reserved.  Consumer Information Use and Disclaimer   Disclaimer: This generalized information is a limited summary of diagnosis, treatment, and/or medication information. It is not meant to be comprehensive and should be used as a tool to help the user understand and/or assess potential diagnostic and treatment options. It does NOT include all information about conditions, treatments, medications, side effects, or risks that may apply to a specific patient. It is not intended to be medical advice or a substitute for the medical advice, diagnosis, or treatment of a health care provider based on the health care provider's examination and assessment of a patient's specific and unique circumstances. Patients must speak with a health care provider for complete information about their health, medical questions, and treatment options, including any risks or benefits regarding use of medications. This information does not endorse any treatments or medications as safe, effective, or approved for treating a specific patient. " "UpToDate, Inc. and its affiliates disclaim any warranty or liability relating to this information or the use thereof.The use of this information is governed by the Terms of Use, available at https://www.wolNook Mediauwer.com/en/know/clinical-effectiveness-terms. 2024© UpToDate, Inc. and its affiliates and/or licensors. All rights reserved.  Copyright   © 2024 UpToDate, Inc. and/or its affiliates. All rights reserved.    Patient Education     Treatment for type 2 diabetes   The Basics   Written by the doctors and editors at [employer]   What are the goals of type 2 diabetes treatment? -- The goals of treatment for type 2 diabetes are to:   Manage your blood sugar   Prevent future health problems that can happen in people with diabetes  How is type 2 diabetes treated? -- Type 2 diabetes can be treated with:   Diet changes   Lifestyle changes   Medicines  Your doctor or nurse will work with you to make a treatment plan that is right for you.  What diet and lifestyle changes might be part of my treatment? -- As part of your treatment, your doctor or nurse might recommend that you:   Eat healthy foods   Lose weight if you have excess body weight   Get plenty of physical activity   Avoid smoking  Making these lifestyle changes is as important as taking your medicines. They will also help improve your overall health.  What medicines are used to treat type 2 diabetes? -- Different medicines can be used to treat type 2 diabetes. The first medicine that most people with type 2 diabetes take is a pill called metformin (brand name: Glucophage).  How do I know if my treatment is working? -- Your doctor can do a blood test called an \"A1C.\" This test shows what your blood sugar level has been over the past 2 to 3 months.  Another way to know if your treatment is working is to check your blood sugar level yourself. Many people with type 2 diabetes do not need to do this, but some do. It usually involves using a device called a \"blood " "glucose monitor.\" If your doctor recommends doing this, they will explain how and when to use the device.  What if my blood sugar level is still higher than normal? -- If your blood sugar level is still higher than normal after taking metformin for 2 to 3 months, your doctor might increase your dose. If you are already taking the highest possible dose, your doctor might suggest adding a second medicine.  Which second medicine will I take? -- There are different medicines that your doctor can prescribe. The second medicine could be another pill that you take every day, or a shot that you give yourself once a day or once a week. The choice depends on different things, including how high your blood sugar is, your weight, your other health problems, and whether you are comfortable giving yourself a shot.  A few of these medicines can cause low blood sugar as a side effect. Symptoms of low blood sugar can include:   Sweating and shaking   Feeling hungry   Feeling worried  Low blood sugar should be treated quickly because it can cause you to pass out. Your doctor or nurse will tell you if your medicine can cause low blood sugar and how to treat it.  What is insulin? -- Insulin is a hormone normally made by the pancreas, an organ in the belly. It helps sugar get into your body's cells. In most people with type 2 diabetes, the body does not respond to insulin normally. Then, over time, the pancreas stops making enough insulin.  Most people with type 2 diabetes can manage their blood sugar with healthy eating, physical activity, and medicines. Some people need to take insulin as part of their treatment plan.  Insulin might be prescribed as a second medicine, or as the only medicine. It usually comes as a shot that people give themselves (figure 1).  If your doctor prescribes insulin, they will tell you:   Which type to use - There are different types of insulin. Some types work faster or last longer than others.   How much " Adult to use   When to use it   How to give yourself the shot   When to check your blood sugar level   How to avoid low blood sugar  If you take insulin, your dose will need to change if you get sick, have surgery, travel, or eat out. Your doctor or nurse will talk to you about when and how to change your dose.  What other treatments might I need? -- Sometimes, people with type 2 diabetes need medicines to treat or prevent other health problems. For example, if you have high blood pressure, you might take medicine to lower your blood pressure. This can reduce your chances of having a heart attack or stroke.  When should I see my doctor or nurse? -- Most people with diabetes see their doctor or nurse every 3 or 4 months. During these visits, they will talk with you about your medicines and blood sugar levels. If your blood sugar levels are not where they should be, your doctor or nurse might make changes to your treatment plan.  Taking care of diabetes can be hard, and some people feel sad, stressed, or anxious. Let your doctor or nurse know if you are struggling, so they can help.  All topics are updated as new evidence becomes available and our peer review process is complete.  This topic retrieved from [employer] on: Feb 26, 2024.  Topic 71868 Version 11.0  Release: 32.2.4 - C32.56  © 2024 UpToDate, Inc. and/or its affiliates. All rights reserved.  figure 1: Giving insulin with a needle and syringe     To give yourself insulin using a needle and syringe:  Pinch up some skin, and quickly insert the needle. Keep the skin pinched to avoid having the insulin go into the muscle.  Push the plunger down all of the way.  Let go of the skin, and remove the needle. If you can see blood or clear fluid (insulin) where the shot went in, press on the area for 5 to 8 seconds, but do not rub.  Graphic 57179 Version 14.0  Consumer Information Use and Disclaimer   Disclaimer: This generalized information is a limited summary of diagnosis,  "treatment, and/or medication information. It is not meant to be comprehensive and should be used as a tool to help the user understand and/or assess potential diagnostic and treatment options. It does NOT include all information about conditions, treatments, medications, side effects, or risks that may apply to a specific patient. It is not intended to be medical advice or a substitute for the medical advice, diagnosis, or treatment of a health care provider based on the health care provider's examination and assessment of a patient's specific and unique circumstances. Patients must speak with a health care provider for complete information about their health, medical questions, and treatment options, including any risks or benefits regarding use of medications. This information does not endorse any treatments or medications as safe, effective, or approved for treating a specific patient. UpToDate, Inc. and its affiliates disclaim any warranty or liability relating to this information or the use thereof.The use of this information is governed by the Terms of Use, available at https://www.AllBusiness.com.com/en/know/clinical-effectiveness-terms. 2024© UpToDate, Inc. and its affiliates and/or licensors. All rights reserved.  Copyright   © 2024 UpToDate, Inc. and/or its affiliates. All rights reserved.    Patient Education     Foot care for people with diabetes   The Basics   Written by the doctors and editors at [employer]   Why is foot care important if I have diabetes? -- Diabetes can cause nerve damage if your blood sugar is high for a long time. The medical term for this is \"diabetic neuropathy.\"  If you have problems with the nerves in your feet, you might not be able to feel pain in your foot. Normally, people feel pain when they get a cut or a blister on their foot. The pain tells them that they need to treat their cut so it can heal. But people with nerve damage might not feel any pain when their feet get hurt. " They might not even know that they have a cut, so they might not treat it. Problems that aren't treated right away can get much worse. For example, an untreated cut can get infected and turn into an open sore.  High blood sugar can also damage blood vessels and decrease blood flow to your feet. This can weaken your skin and make wounds take longer to heal. You are also more likely to get an infection if you have high blood sugar.  How do I take care of my feet? -- Taking good care of your feet can help prevent foot problems. You should:   Wash your feet every day with soap and warm water. Pat your feet dry, and be sure to dry the skin between your toes.   Keep your feet moisturized. Put lotion on the tops and bottoms of your feet, but not between your toes.   Check your feet every day (figure 1). Look for cuts, blisters, redness, or swelling. Use a mirror, or ask someone to help you check the bottoms of your feet. Check all parts of the foot, especially between the toes. Look for broken skin, ulcers, blisters, or redness.   Trim your toenails straight across when needed (figure 2). Do not cut the corners of your toenails. File rough edges. Do not cut your cuticles. Ask for help if you cannot see well or have problems reaching your feet.   Ask your doctor or nurse to check your feet at each visit. Take your shoes and socks off for these checks.   See a foot care provider (such as a podiatrist) if you have an ingrown toenail, corn, or callus. Do not try to remove corns and calluses yourself.  How do I protect my feet from injury? -- There are several ways to protect your feet. You can:   Wear shoes and socks at all times, even at home. Do not walk barefoot. Wear swim shoes if you go to the beach or a swimming pool.   Choose shoes that fit well. They should not be not too tight or too loose. Your shoes should have plenty of room for your toes (figure 3). Your doctor might give you a prescription for special shoes.  Check to see if they are covered by your insurance.   Check your shoes each time before you put them on to make sure that the lining is smooth. Also check to make sure that there is nothing inside the shoes before putting them on.   Do not wear shoes that expose any part of the foot, like sandals, thongs, or clogs.   Wear cotton socks that fit loosely. Do not wear shoes without socks.   Protect your feet from heat and cold. Test bath water before putting your feet in it to make sure that it is not too hot. Do not walk barefoot on hot ground. Take extra care when going outside in the cold and wear warm socks.  What else should I know? -- You can lower your risk for foot problems by keeping your blood sugar levels as close to your goal as possible. Other things you can do include:   Move your ankles and toes often to help with blood flow. You can wear a support stocking to help with swelling.   Walk often. Regular walking helps blood flow.   If you smoke, try to quit. Your doctor or nurse can help. Smoking causes poor blood flow to your feet and can damage your nerves.  When should I call the doctor? -- Call your doctor or nurse for advice if you have:   A fever of 100.4°F (38°C) or higher, chills, or a wound that will not heal   Swelling, redness, warmth around a wound, a foul smell coming from a wound, or yellowish, greenish, or bloody discharge   Sores or blisters on your feet that hurt more or less than you would expect   Numbness or tingling in your foot or leg   Corns, calluses, blisters, or new sores on your foot   Very dry, scaly, or cracked skin on your feet   Changes in the way your foot joints or arch look  All topics are updated as new evidence becomes available and our peer review process is complete.  This topic retrieved from [employer] on: Mar 13, 2024.  Topic 437673 Version 2.0  Release: 32.2.4 - C32.71  © 2024 UpToDate, Inc. and/or its affiliates. All rights reserved.  figure 1: Foot check for people  with diabetes     People with diabetes should check both of their feet every day. It is important to check your feet all over, including in between your toes. If you can't see the bottom of your foot, use a mirror or ask another person to check for you. Let your doctor or nurse know if you find any:  Redness   Cuts or cracks in the skin   Blisters   Swelling   Graphic 05055 Version 3.0  figure 2: Trim your toenails     Trim your toenails straight across and smooth them with a nail file.  Graphic 38783 Version 2.0  figure 3: Correct shoe shape     Choose shoes that fit the right way and are not too tight or too loose. Your shoes should have plenty of room for your toes.  Graphic 77966 Version 2.0  Consumer Information Use and Disclaimer   Disclaimer: This generalized information is a limited summary of diagnosis, treatment, and/or medication information. It is not meant to be comprehensive and should be used as a tool to help the user understand and/or assess potential diagnostic and treatment options. It does NOT include all information about conditions, treatments, medications, side effects, or risks that may apply to a specific patient. It is not intended to be medical advice or a substitute for the medical advice, diagnosis, or treatment of a health care provider based on the health care provider's examination and assessment of a patient's specific and unique circumstances. Patients must speak with a health care provider for complete information about their health, medical questions, and treatment options, including any risks or benefits regarding use of medications. This information does not endorse any treatments or medications as safe, effective, or approved for treating a specific patient. UpToDate, Inc. and its affiliates disclaim any warranty or liability relating to this information or the use thereof.The use of this information is governed by the Terms of Use, available at  https://www.woltersBrand.netuwer.com/en/know/clinical-effectiveness-terms. 2024© [employer] and its affiliates and/or licensors. All rights reserved.  Copyright   © 2024 [employer] and/or its affiliates. All rights reserved.

## 2025-06-20 RX ORDER — METOPROLOL SUCCINATE 50 MG/1
50 TABLET, EXTENDED RELEASE ORAL
Qty: 90 | Refills: 3 | Status: ACTIVE | COMMUNITY
Start: 2025-06-19

## 2025-06-25 ENCOUNTER — APPOINTMENT (OUTPATIENT)
Dept: CARDIOLOGY | Facility: CLINIC | Age: 80
End: 2025-06-25

## 2025-07-10 NOTE — ED ADULT TRIAGE NOTE - ESI TRIAGE ACUITY LEVEL, MLM
Patient Denies latex allergy.  Medications reviewed with patient.  Allergies verified.    REFERRING MD: Sarah Pop MD  PRIMARY MEDICAL DOCTOR: Jurgen Platt DO  DATE OF INJURY/SURGERY/ONSET: 5.23.25  WORK RELATED: YES  PLACE OF EMPLOYMENT: [employer]   PAIN SCALE 1-10: 3/10- advil PRN     Patient is here for W/C FUV- right ankle pain.     1 PT session- HEP- working one exercises     Heel cup - wears          2

## 2025-07-30 ENCOUNTER — OFFICE (OUTPATIENT)
Dept: URBAN - METROPOLITAN AREA CLINIC 70 | Facility: CLINIC | Age: 80
Setting detail: OPHTHALMOLOGY
End: 2025-07-30
Payer: MEDICARE

## 2025-07-30 DIAGNOSIS — H35.40: ICD-10-CM

## 2025-07-30 DIAGNOSIS — H43.811: ICD-10-CM

## 2025-07-30 DIAGNOSIS — D31.32: ICD-10-CM

## 2025-07-30 DIAGNOSIS — H16.223: ICD-10-CM

## 2025-07-30 DIAGNOSIS — Z96.1: ICD-10-CM

## 2025-07-30 DIAGNOSIS — H35.54: ICD-10-CM

## 2025-07-30 DIAGNOSIS — E11.9: ICD-10-CM

## 2025-07-30 PROCEDURE — 92014 COMPRE OPH EXAM EST PT 1/>: CPT | Performed by: OPHTHALMOLOGY

## 2025-07-30 ASSESSMENT — REFRACTION_AUTOREFRACTION
OD_SPHERE: -0.50
OS_AXIS: 073
OS_SPHERE: +1.00
OS_CYLINDER: -1.50
OD_CYLINDER: -2.00
OD_AXIS: 080

## 2025-07-30 ASSESSMENT — REFRACTION_MANIFEST
OU_VA: 20/20
OS_AXIS: 080
OS_SPHERE: +0.25
OD_VA1: 20/20
OD_CYLINDER: -1.50
OD_AXIS: 90
OD_SPHERE: PLANO
OS_VA1: 20/20
OS_VA2: 20/20
OS_VA1: 20/20
OD_AXIS: 090
OD_AXIS: 090
OS_VA2: 20/20(J1+)
OS_ADD: +2.75
OD_VA2: 20/20(J1+)
OD_SPHERE: +0.25
OS_SPHERE: +0.50
OS_SPHERE: +0.75
OD_CYLINDER: -1.25
OS_AXIS: 065
OD_ADD: +2.50
OD_SPHERE: -0.75
OD_ADD: +2.75
OD_VA2: 20/20
OS_CYLINDER: -0.50
OS_AXIS: 75
OS_ADD: +2.50
OS_CYLINDER: -1.00
OS_ADD: +2.75
OD_VA1: 20/25
OD_VA1: 20/20-
OS_VA2: 20/20
OD_CYLINDER: -1.00
OS_CYLINDER: -0.25
OS_VA1: 20/25
OU_VA: 20/20
OD_VA2: 20/20
OD_ADD: +2.75

## 2025-07-30 ASSESSMENT — REFRACTION_CURRENTRX
OD_VPRISM_DIRECTION: PROGS
OS_AXIS: 066
OS_CYLINDER: -0.25
OD_VPRISM_DIRECTION: PROGS
OS_CYLINDER: -0.25
OS_VPRISM_DIRECTION: PROGS
OD_OVR_VA: 20/
OS_OVR_VA: 20/
OS_SPHERE: +0.50
OS_AXIS: 081
OS_ADD: +2.00
OD_VPRISM_DIRECTION: PROGS
OD_ADD: +2.50
OS_VPRISM_DIRECTION: PROGS
OS_SPHERE: +0.75
OS_VPRISM_DIRECTION: PROGS
OD_ADD: +2.25
OS_ADD: +2.25
OD_SPHERE: -0.50
OD_AXIS: 090
OD_CYLINDER: -1.25
OS_OVR_VA: 20/
OD_CYLINDER: -1.25
OS_ADD: +2.00
OS_SPHERE: +0.50
OD_SPHERE: -0.50
OS_OVR_VA: 20/
OD_OVR_VA: 20/
OD_AXIS: 090
OD_AXIS: 085
OD_SPHERE: +0.50
OS_CYLINDER: -0.50
OS_AXIS: 072
OD_ADD: +2.25
OD_OVR_VA: 20/
OD_CYLINDER: -1.00

## 2025-07-30 ASSESSMENT — KERATOMETRY
OS_K2POWER_DIOPTERS: 46.50
OD_K1POWER_DIOPTERS: 44.75
OD_AXISANGLE_DEGREES: 167
OS_AXISANGLE_DEGREES: 118
OD_K2POWER_DIOPTERS: 46.00
OS_K1POWER_DIOPTERS: 46.25

## 2025-07-30 ASSESSMENT — CONFRONTATIONAL VISUAL FIELD TEST (CVF)
OD_FINDINGS: FULL
OS_FINDINGS: FULL

## 2025-07-30 ASSESSMENT — SUPERFICIAL PUNCTATE KERATITIS (SPK)
OD_SPK: 1+
OS_SPK: 1+

## 2025-07-30 ASSESSMENT — VISUAL ACUITY
OS_BCVA: 20/25
OD_BCVA: 20/25

## 2025-08-01 ENCOUNTER — APPOINTMENT (OUTPATIENT)
Dept: INTERVENTIONAL RADIOLOGY/VASCULAR | Facility: CLINIC | Age: 80
End: 2025-08-01
Payer: MEDICARE

## 2025-08-01 VITALS — BODY MASS INDEX: 28.72 KG/M2 | HEIGHT: 67 IN | WEIGHT: 183 LBS

## 2025-08-01 DIAGNOSIS — K76.9 LIVER DISEASE, UNSPECIFIED: ICD-10-CM

## 2025-08-01 PROCEDURE — 99205 OFFICE O/P NEW HI 60 MIN: CPT

## 2025-08-01 RX ORDER — FUROSEMIDE 40 MG/1
40 TABLET ORAL
Refills: 0 | Status: ACTIVE | COMMUNITY

## 2025-08-06 ENCOUNTER — OUTPATIENT (OUTPATIENT)
Dept: OUTPATIENT SERVICES | Facility: HOSPITAL | Age: 80
LOS: 1 days | End: 2025-08-06

## 2025-08-06 VITALS
HEIGHT: 67 IN | TEMPERATURE: 98 F | SYSTOLIC BLOOD PRESSURE: 149 MMHG | OXYGEN SATURATION: 95 % | HEART RATE: 73 BPM | RESPIRATION RATE: 15 BRPM | WEIGHT: 179.9 LBS | DIASTOLIC BLOOD PRESSURE: 77 MMHG

## 2025-08-06 DIAGNOSIS — G47.33 OBSTRUCTIVE SLEEP APNEA (ADULT) (PEDIATRIC): ICD-10-CM

## 2025-08-06 DIAGNOSIS — I48.91 UNSPECIFIED ATRIAL FIBRILLATION: ICD-10-CM

## 2025-08-06 DIAGNOSIS — Z98.890 OTHER SPECIFIED POSTPROCEDURAL STATES: Chronic | ICD-10-CM

## 2025-08-06 DIAGNOSIS — Z95.828 PRESENCE OF OTHER VASCULAR IMPLANTS AND GRAFTS: Chronic | ICD-10-CM

## 2025-08-06 DIAGNOSIS — K76.9 LIVER DISEASE, UNSPECIFIED: ICD-10-CM

## 2025-08-06 DIAGNOSIS — Z98.1 ARTHRODESIS STATUS: Chronic | ICD-10-CM

## 2025-08-06 DIAGNOSIS — R06.00 DYSPNEA, UNSPECIFIED: ICD-10-CM

## 2025-08-06 DIAGNOSIS — M50.20 OTHER CERVICAL DISC DISPLACEMENT, UNSPECIFIED CERVICAL REGION: Chronic | ICD-10-CM

## 2025-08-06 DIAGNOSIS — I50.9 HEART FAILURE, UNSPECIFIED: Chronic | ICD-10-CM

## 2025-08-06 DIAGNOSIS — D68.51 ACTIVATED PROTEIN C RESISTANCE: ICD-10-CM

## 2025-08-06 LAB
ANION GAP SERPL CALC-SCNC: 9 MMOL/L — SIGNIFICANT CHANGE UP (ref 7–14)
APTT BLD: 45.4 SEC — HIGH (ref 26.1–36.8)
BUN SERPL-MCNC: 24 MG/DL — HIGH (ref 7–23)
CALCIUM SERPL-MCNC: 9.1 MG/DL — SIGNIFICANT CHANGE UP (ref 8.4–10.5)
CHLORIDE SERPL-SCNC: 103 MMOL/L — SIGNIFICANT CHANGE UP (ref 98–107)
CO2 SERPL-SCNC: 28 MMOL/L — SIGNIFICANT CHANGE UP (ref 22–31)
CREAT SERPL-MCNC: 0.89 MG/DL — SIGNIFICANT CHANGE UP (ref 0.5–1.3)
EGFR: 86 ML/MIN/1.73M2 — SIGNIFICANT CHANGE UP
EGFR: 86 ML/MIN/1.73M2 — SIGNIFICANT CHANGE UP
GLUCOSE SERPL-MCNC: 128 MG/DL — HIGH (ref 70–99)
HCT VFR BLD CALC: 35.8 % — LOW (ref 39–50)
HGB BLD-MCNC: 11.7 G/DL — LOW (ref 13–17)
INR BLD: 2.67 RATIO — HIGH (ref 0.85–1.16)
MCHC RBC-ENTMCNC: 30.1 PG — SIGNIFICANT CHANGE UP (ref 27–34)
MCHC RBC-ENTMCNC: 32.7 G/DL — SIGNIFICANT CHANGE UP (ref 32–36)
MCV RBC AUTO: 92 FL — SIGNIFICANT CHANGE UP (ref 80–100)
NRBC # BLD AUTO: 0 K/UL — SIGNIFICANT CHANGE UP (ref 0–0)
NRBC # FLD: 0 K/UL — SIGNIFICANT CHANGE UP (ref 0–0)
NRBC BLD AUTO-RTO: 0 /100 WBCS — SIGNIFICANT CHANGE UP (ref 0–0)
PLATELET # BLD AUTO: 251 K/UL — SIGNIFICANT CHANGE UP (ref 150–400)
PMV BLD: 11.3 FL — SIGNIFICANT CHANGE UP (ref 7–13)
POTASSIUM SERPL-MCNC: 4 MMOL/L — SIGNIFICANT CHANGE UP (ref 3.5–5.3)
POTASSIUM SERPL-SCNC: 4 MMOL/L — SIGNIFICANT CHANGE UP (ref 3.5–5.3)
PROTHROM AB SERPL-ACNC: 30.7 SEC — HIGH (ref 9.9–13.4)
RBC # BLD: 3.89 M/UL — LOW (ref 4.2–5.8)
RBC # FLD: 14.1 % — SIGNIFICANT CHANGE UP (ref 10.3–14.5)
SODIUM SERPL-SCNC: 140 MMOL/L — SIGNIFICANT CHANGE UP (ref 135–145)
WBC # BLD: 7.7 K/UL — SIGNIFICANT CHANGE UP (ref 3.8–10.5)
WBC # FLD AUTO: 7.7 K/UL — SIGNIFICANT CHANGE UP (ref 3.8–10.5)

## 2025-08-18 ENCOUNTER — APPOINTMENT (OUTPATIENT)
Dept: CARDIOLOGY | Facility: CLINIC | Age: 80
End: 2025-08-18
Payer: MEDICARE

## 2025-08-18 ENCOUNTER — NON-APPOINTMENT (OUTPATIENT)
Age: 80
End: 2025-08-18

## 2025-08-18 VITALS
DIASTOLIC BLOOD PRESSURE: 58 MMHG | HEIGHT: 67 IN | OXYGEN SATURATION: 96 % | HEART RATE: 60 BPM | SYSTOLIC BLOOD PRESSURE: 144 MMHG

## 2025-08-18 DIAGNOSIS — I48.91 UNSPECIFIED ATRIAL FIBRILLATION: ICD-10-CM

## 2025-08-18 DIAGNOSIS — I50.30 UNSPECIFIED DIASTOLIC (CONGESTIVE) HEART FAILURE: ICD-10-CM

## 2025-08-18 PROBLEM — J90 PLEURAL EFFUSION, NOT ELSEWHERE CLASSIFIED: Chronic | Status: ACTIVE | Noted: 2025-08-06

## 2025-08-18 PROBLEM — J98.11 ATELECTASIS: Chronic | Status: ACTIVE | Noted: 2025-08-06

## 2025-08-18 PROBLEM — I50.9 HEART FAILURE, UNSPECIFIED: Chronic | Status: ACTIVE | Noted: 2025-08-06

## 2025-08-18 PROBLEM — I82.409 ACUTE EMBOLISM AND THROMBOSIS OF UNSPECIFIED DEEP VEINS OF UNSPECIFIED LOWER EXTREMITY: Chronic | Status: ACTIVE | Noted: 2025-08-06

## 2025-08-18 PROBLEM — S06.30AA: Chronic | Status: ACTIVE | Noted: 2025-08-06

## 2025-08-18 PROCEDURE — 99215 OFFICE O/P EST HI 40 MIN: CPT

## 2025-08-18 PROCEDURE — G2211 COMPLEX E/M VISIT ADD ON: CPT

## 2025-08-18 PROCEDURE — 93000 ELECTROCARDIOGRAM COMPLETE: CPT

## 2025-08-20 ENCOUNTER — OUTPATIENT (OUTPATIENT)
Dept: OUTPATIENT SERVICES | Facility: HOSPITAL | Age: 80
LOS: 1 days | End: 2025-08-20
Payer: MEDICARE

## 2025-08-20 ENCOUNTER — RESULT REVIEW (OUTPATIENT)
Age: 80
End: 2025-08-20

## 2025-08-20 ENCOUNTER — TRANSCRIPTION ENCOUNTER (OUTPATIENT)
Age: 80
End: 2025-08-20

## 2025-08-20 VITALS
OXYGEN SATURATION: 95 % | SYSTOLIC BLOOD PRESSURE: 136 MMHG | RESPIRATION RATE: 16 BRPM | HEART RATE: 67 BPM | DIASTOLIC BLOOD PRESSURE: 86 MMHG

## 2025-08-20 VITALS
DIASTOLIC BLOOD PRESSURE: 79 MMHG | SYSTOLIC BLOOD PRESSURE: 160 MMHG | TEMPERATURE: 96 F | WEIGHT: 182.98 LBS | HEART RATE: 65 BPM | HEIGHT: 67 IN | OXYGEN SATURATION: 95 % | RESPIRATION RATE: 18 BRPM

## 2025-08-20 DIAGNOSIS — Z98.890 OTHER SPECIFIED POSTPROCEDURAL STATES: Chronic | ICD-10-CM

## 2025-08-20 DIAGNOSIS — Z98.1 ARTHRODESIS STATUS: Chronic | ICD-10-CM

## 2025-08-20 DIAGNOSIS — K76.9 LIVER DISEASE, UNSPECIFIED: ICD-10-CM

## 2025-08-20 DIAGNOSIS — Z95.828 PRESENCE OF OTHER VASCULAR IMPLANTS AND GRAFTS: Chronic | ICD-10-CM

## 2025-08-20 LAB
APTT BLD: 29.9 SEC — SIGNIFICANT CHANGE UP (ref 26.1–36.8)
INR BLD: 1.02 RATIO — SIGNIFICANT CHANGE UP (ref 0.85–1.16)
PROTHROM AB SERPL-ACNC: 11.8 SEC — SIGNIFICANT CHANGE UP (ref 9.9–13.4)

## 2025-08-20 PROCEDURE — 88341 IMHCHEM/IMCYTCHM EA ADD ANTB: CPT | Mod: 26

## 2025-08-20 PROCEDURE — 47000 NEEDLE BIOPSY OF LIVER PERQ: CPT

## 2025-08-20 PROCEDURE — 88307 TISSUE EXAM BY PATHOLOGIST: CPT | Mod: 26

## 2025-08-20 PROCEDURE — 88342 IMHCHEM/IMCYTCHM 1ST ANTB: CPT | Mod: 26

## 2025-08-20 PROCEDURE — 77012 CT SCAN FOR NEEDLE BIOPSY: CPT | Mod: 26

## 2025-08-20 PROCEDURE — 88333 PATH CONSLTJ SURG CYTO XM 1: CPT | Mod: 26

## 2025-08-20 RX ORDER — ACETAMINOPHEN 500 MG/5ML
650 LIQUID (ML) ORAL ONCE
Refills: 0 | Status: DISCONTINUED | OUTPATIENT
Start: 2025-08-20 | End: 2025-09-03

## 2025-08-20 RX ORDER — ONDANSETRON HCL/PF 4 MG/2 ML
4 VIAL (ML) INJECTION ONCE
Refills: 0 | Status: DISCONTINUED | OUTPATIENT
Start: 2025-08-20 | End: 2025-09-03

## 2025-08-20 RX ORDER — FENTANYL CITRATE-0.9 % NACL/PF 100MCG/2ML
25 SYRINGE (ML) INTRAVENOUS
Refills: 0 | Status: DISCONTINUED | OUTPATIENT
Start: 2025-08-20 | End: 2025-08-20

## 2025-08-21 LAB — NON-GYNECOLOGICAL CYTOLOGY STUDY: SIGNIFICANT CHANGE UP

## 2025-08-26 ENCOUNTER — RX RENEWAL (OUTPATIENT)
Age: 80
End: 2025-08-26

## 2025-08-26 RX ORDER — BISOPROLOL FUMARATE 5 MG/1
5 TABLET, FILM COATED ORAL
Qty: 45 | Refills: 3 | Status: ACTIVE | COMMUNITY
Start: 2025-08-26

## (undated) DEVICE — MEDICATION LABELS W MARKER

## (undated) DEVICE — PREP CHLORAPREP HI-LITE ORANGE 26ML

## (undated) DEVICE — DRAIN PENROSE .25" X 18" LATEX

## (undated) DEVICE — GOWN TRIMAX LG

## (undated) DEVICE — SUT SOFSILK 0 30" V-20

## (undated) DEVICE — SUT SOFSILK 2-0 30" V-20

## (undated) DEVICE — GLV 6.5 PROTEXIS (WHITE)

## (undated) DEVICE — BLADE SCALPEL SAFETYLOCK #10

## (undated) DEVICE — GLV 7 PROTEXIS (WHITE)

## (undated) DEVICE — PACK GENERAL MINOR

## (undated) DEVICE — LAP PAD 18 X 18"

## (undated) DEVICE — SOL IRR POUR H2O 250ML

## (undated) DEVICE — SPECIMEN CONTAINER 100ML

## (undated) DEVICE — DRSG STERISTRIPS 0.5 X 4"

## (undated) DEVICE — SUT BIOSYN 4-0 18" P-12

## (undated) DEVICE — DRAPE TOWEL BLUE 17" X 24"

## (undated) DEVICE — STAPLER SKIN VISI-STAT 35 WIDE

## (undated) DEVICE — SYR CONTROL LUER LOK 10CC

## (undated) DEVICE — SUT POLYSORB 3-0 30" V-20 UNDYED

## (undated) DEVICE — GLV 7.5 PROTEXIS (WHITE)

## (undated) DEVICE — WARMING BLANKET UPPER ADULT

## (undated) DEVICE — SOL IRR POUR NS 0.9% 500ML

## (undated) DEVICE — DRAPE 1/2 SHEET 40X57"

## (undated) DEVICE — SUT POLYSORB 2-0 30" V-20 UNDYED

## (undated) DEVICE — SUSPENSORY WITH LEG STRAPS XL

## (undated) DEVICE — SUT POLYSORB 0 18" MP UNDYED

## (undated) DEVICE — BLADE SCALPEL SAFETYLOCK #15

## (undated) DEVICE — PREP BETADINE KIT

## (undated) DEVICE — DRSG CURITY GAUZE SPONGE 4 X 4" 12-PLY

## (undated) DEVICE — VISITEC 4X4

## (undated) DEVICE — SUSPENSORY WITH LEG STRAPS LARGE

## (undated) DEVICE — SUSPENSORY WITH LEG STRAPS MEDIUM

## (undated) DEVICE — MARKING PEN W RULER

## (undated) DEVICE — VENODYNE/SCD SLEEVE CALF LARGE

## (undated) DEVICE — DRAPE INSTRUMENT POUCH 6.75" X 11"

## (undated) DEVICE — DRAPE LIGHT HANDLE COVER (BLUE)

## (undated) DEVICE — POSITIONER FOAM EGG CRATE ULNAR 2PCS (PINK)